# Patient Record
Sex: FEMALE | Race: WHITE | NOT HISPANIC OR LATINO | Employment: OTHER | URBAN - NONMETROPOLITAN AREA
[De-identification: names, ages, dates, MRNs, and addresses within clinical notes are randomized per-mention and may not be internally consistent; named-entity substitution may affect disease eponyms.]

---

## 2018-08-22 ENCOUNTER — OUTSIDE FACILITY SERVICE (OUTPATIENT)
Dept: CARDIOLOGY | Facility: CLINIC | Age: 63
End: 2018-08-22

## 2018-08-22 PROCEDURE — 93018 CV STRESS TEST I&R ONLY: CPT | Performed by: INTERNAL MEDICINE

## 2018-08-29 ENCOUNTER — OFFICE VISIT (OUTPATIENT)
Dept: CARDIOLOGY | Facility: CLINIC | Age: 63
End: 2018-08-29

## 2018-08-29 VITALS
OXYGEN SATURATION: 97 % | BODY MASS INDEX: 47.04 KG/M2 | DIASTOLIC BLOOD PRESSURE: 82 MMHG | SYSTOLIC BLOOD PRESSURE: 142 MMHG | HEIGHT: 60 IN | WEIGHT: 239.6 LBS | HEART RATE: 71 BPM

## 2018-08-29 DIAGNOSIS — R42 DIZZINESS: ICD-10-CM

## 2018-08-29 DIAGNOSIS — I10 ESSENTIAL HYPERTENSION: ICD-10-CM

## 2018-08-29 DIAGNOSIS — R06.02 SHORTNESS OF BREATH: ICD-10-CM

## 2018-08-29 DIAGNOSIS — I48.0 PAROXYSMAL ATRIAL FIBRILLATION (HCC): Primary | ICD-10-CM

## 2018-08-29 PROCEDURE — 99204 OFFICE O/P NEW MOD 45 MIN: CPT | Performed by: PHYSICIAN ASSISTANT

## 2018-08-29 RX ORDER — AMIODARONE HYDROCHLORIDE 200 MG/1
TABLET ORAL DAILY
COMMUNITY
Start: 2018-08-24 | End: 2018-09-20 | Stop reason: SDUPTHER

## 2018-08-29 RX ORDER — WARFARIN SODIUM 4 MG/1
4 TABLET ORAL NIGHTLY
COMMUNITY
Start: 2018-08-24 | End: 2019-10-23 | Stop reason: ALTCHOICE

## 2018-08-29 RX ORDER — COLESEVELAM HYDROCHLORIDE 625 MG/1
2 TABLET, FILM COATED ORAL 2 TIMES DAILY WITH MEALS
COMMUNITY
Start: 2018-06-04

## 2018-08-29 RX ORDER — AMLODIPINE BESYLATE 10 MG/1
5 TABLET ORAL DAILY
COMMUNITY
Start: 2018-08-04 | End: 2019-12-12 | Stop reason: SDUPTHER

## 2018-08-29 RX ORDER — FUROSEMIDE 20 MG/1
TABLET ORAL DAILY PRN
COMMUNITY
Start: 2018-08-24 | End: 2019-12-12 | Stop reason: ALTCHOICE

## 2018-08-29 RX ORDER — DIPHENOXYLATE HYDROCHLORIDE AND ATROPINE SULFATE 2.5; .025 MG/1; MG/1
1 TABLET ORAL 2 TIMES DAILY
COMMUNITY
Start: 2016-03-04 | End: 2020-06-18 | Stop reason: HOSPADM

## 2018-08-29 RX ORDER — WARFARIN SODIUM 1 MG/1
TABLET ORAL
COMMUNITY
Start: 2018-07-31 | End: 2018-10-01 | Stop reason: DRUGHIGH

## 2018-08-29 RX ORDER — OMEPRAZOLE 20 MG/1
20 CAPSULE, DELAYED RELEASE ORAL DAILY
COMMUNITY
Start: 2018-06-04 | End: 2020-06-18 | Stop reason: HOSPADM

## 2018-08-29 RX ORDER — METOPROLOL SUCCINATE 100 MG/1
TABLET, EXTENDED RELEASE ORAL DAILY
COMMUNITY
Start: 2018-08-24 | End: 2018-08-29

## 2018-08-29 RX ORDER — METOPROLOL SUCCINATE 50 MG/1
50 TABLET, EXTENDED RELEASE ORAL DAILY
Qty: 30 TABLET | Refills: 11 | Status: SHIPPED | OUTPATIENT
Start: 2018-08-29 | End: 2018-09-10 | Stop reason: SDUPTHER

## 2018-08-29 NOTE — PATIENT INSTRUCTIONS
Obesity, Adult  Obesity is the condition of having too much total body fat. Being overweight or obese means that your weight is greater than what is considered healthy for your body size. Obesity is determined by a measurement called BMI. BMI is an estimate of body fat and is calculated from height and weight. For adults, a BMI of 30 or higher is considered obese.  Obesity can eventually lead to other health concerns and major illnesses, including:  · Stroke.  · Coronary artery disease (CAD).  · Type 2 diabetes.  · Some types of cancer, including cancers of the colon, breast, uterus, and gallbladder.  · Osteoarthritis.  · High blood pressure (hypertension).  · High cholesterol.  · Sleep apnea.  · Gallbladder stones.  · Infertility problems.    What are the causes?  The main cause of obesity is taking in (consuming) more calories than your body uses for energy. Other factors that contribute to this condition may include:  · Being born with genes that make you more likely to become obese.  · Having a medical condition that causes obesity. These conditions include:  ? Hypothyroidism.  ? Polycystic ovarian syndrome (PCOS).  ? Binge-eating disorder.  ? Cushing syndrome.  · Taking certain medicines, such as steroids, antidepressants, and seizure medicines.  · Not being physically active (sedentary lifestyle).  · Living where there are limited places to exercise safely or buy healthy foods.  · Not getting enough sleep.    What increases the risk?  The following factors may increase your risk of this condition:  · Having a family history of obesity.  · Being a woman of -American descent.  · Being a man of  descent.    What are the signs or symptoms?  Having excessive body fat is the main symptom of this condition.  How is this diagnosed?  This condition may be diagnosed based on:  · Your symptoms.  · Your medical history.  · A physical exam. Your health care provider may measure:  ? Your BMI. If you are an  adult with a BMI between 25 and less than 30, you are considered overweight. If you are an adult with a BMI of 30 or higher, you are considered obese.  ? The distances around your hips and your waist (circumferences). These may be compared to each other to help diagnose your condition.  ? Your skinfold thickness. Your health care provider may gently pinch a fold of your skin and measure it.    How is this treated?  Treatment for this condition often includes changing your lifestyle. Treatment may include some or all of the following:  · Dietary changes. Work with your health care provider and a dietitian to set a weight-loss goal that is healthy and reasonable for you. Dietary changes may include eating:  ? Smaller portions. A portion size is the amount of a particular food that is healthy for you to eat at one time. This varies from person to person.  ? Low-calorie or low-fat options.  ? More whole grains, fruits, and vegetables.  · Regular physical activity. This may include aerobic activity (cardio) and strength training.  · Medicine to help you lose weight. Your health care provider may prescribe medicine if you are unable to lose 1 pound a week after 6 weeks of eating more healthily and doing more physical activity.  · Surgery. Surgical options may include gastric banding and gastric bypass. Surgery may be done if:  ? Other treatments have not helped to improve your condition.  ? You have a BMI of 40 or higher.  ? You have life-threatening health problems related to obesity.    Follow these instructions at home:    Eating and drinking    · Follow recommendations from your health care provider about what you eat and drink. Your health care provider may advise you to:  ? Limit fast foods, sweets, and processed snack foods.  ? Choose low-fat options, such as low-fat milk instead of whole milk.  ? Eat 5 or more servings of fruits or vegetables every day.  ? Eat at home more often. This gives you more control over  what you eat.  ? Choose healthy foods when you eat out.  ? Learn what a healthy portion size is.  ? Keep low-fat snacks on hand.  ? Avoid sugary drinks, such as soda, fruit juice, iced tea sweetened with sugar, and flavored milk.  ? Eat a healthy breakfast.  · Drink enough water to keep your urine clear or pale yellow.  · Do not go without eating for long periods of time (do not fast) or follow a fad diet. Fasting and fad diets can be unhealthy and even dangerous.  Physical Activity  · Exercise regularly, as told by your health care provider. Ask your health care provider what types of exercise are safe for you and how often you should exercise.  · Warm up and stretch before being active.  · Cool down and stretch after being active.  · Rest between periods of activity.  Lifestyle  · Limit the time that you spend in front of your TV, computer, or video game system.  · Find ways to reward yourself that do not involve food.  · Limit alcohol intake to no more than 1 drink a day for nonpregnant women and 2 drinks a day for men. One drink equals 12 oz of beer, 5 oz of wine, or 1½ oz of hard liquor.  General instructions  · Keep a weight loss journal to keep track of the food you eat and how much you exercise you get.  · Take over-the-counter and prescription medicines only as told by your health care provider.  · Take vitamins and supplements only as told by your health care provider.  · Consider joining a support group. Your health care provider may be able to recommend a support group.  · Keep all follow-up visits as told by your health care provider. This is important.  Contact a health care provider if:  · You are unable to meet your weight loss goal after 6 weeks of dietary and lifestyle changes.  This information is not intended to replace advice given to you by your health care provider. Make sure you discuss any questions you have with your health care provider.  Document Released: 01/25/2006 Document Revised:  05/22/2017 Document Reviewed: 10/05/2016  [employer] Interactive Patient Education © 2018 [employer]  MyPlate from [employer]  The general, healthful diet is based on the 2010 Dietary Guidelines for Americans. The amount of food you need to eat from each food group depends on your age, sex, and level of physical activity and can be individualized by a dietitian. Go to ChooseMyPlate.gov for more information.  What do I need to know about the MyPlate plan?  · Enjoy your food, but eat less.  · Avoid oversized portions.  ? ½ of your plate should include fruits and vegetables.  ? ¼ of your plate should be grains.  ? ¼ of your plate should be protein.  Grains  · Make at least half of your grains whole grains.  · For a 2,000 calorie daily food plan, eat 6 oz every day.  · 1 oz is about 1 slice bread, 1 cup cereal, or ½ cup cooked rice, cereal, or pasta.  Vegetables  · Make half your plate fruits and vegetables.  · For a 2,000 calorie daily food plan, eat 2½ cups every day.  · 1 cup is about 1 cup raw or cooked vegetables or vegetable juice or 2 cups raw leafy greens.  Fruits  · Make half your plate fruits and vegetables.  · For a 2,000 calorie daily food plan, eat 2 cups every day.  · 1 cup is about 1 cup fruit or 100% fruit juice or ½ cup dried fruit.  Protein  · For a 2,000 calorie daily food plan, eat 5½ oz every day.  · 1 oz is about 1 oz meat, poultry, or fish, ¼ cup cooked beans, 1 egg, 1 Tbsp peanut butter, or ½ oz nuts or seeds.  Dairy  · Switch to fat-free or low-fat (1%) milk.  · For a 2,000 calorie daily food plan, eat 3 cups every day.  · 1 cup is about 1 cup milk or yogurt or soy milk (soy beverage), 1½ oz natural cheese, or 2 oz processed cheese.  Fats, Oils, and Empty Calories  · Only small amounts of oils are recommended.  · Empty calories are calories from solid fats or added sugars.  · Compare sodium in foods like soup, bread, and frozen meals. Choose the foods with lower numbers.  · Drink water instead of  sugary drinks.  What foods can I eat?  Grains  Whole grains such as whole wheat, quinoa, millet, and bulgur. Bread, rolls, and pasta made from whole grains. Brown or wild rice. Hot or cold cereals made from whole grains and without added sugar.  Vegetables  All fresh vegetables, especially fresh red, dark green, or orange vegetables. Peas and beans. Low-sodium frozen or canned vegetables prepared without added salt. Low-sodium vegetable juices.  Fruits  All fresh, frozen, and dried fruits. Canned fruit packed in water or fruit juice without added sugar. Fruit juices without added sugar.  Meats and Other Protein Sources  Boiled, baked, or grilled lean meat trimmed of fat. Skinless poultry. Fresh seafood and shellfish. Canned seafood packed in water. Unsalted nuts and unsalted nut butters. Tofu. Dried beans and pea. Eggs.  Dairy  Low-fat or fat-free milk, yogurt, and cheeses.  Sweets and Desserts  Frozen desserts made from low-fat milk.  Fats and Oils  Olive, peanut, and canola oils and margarine. Salad dressing and mayonnaise made from these oils.  Other  Soups and casseroles made from allowed ingredients and without added fat or salt.  The items listed above may not be a complete list of recommended foods or beverages. Contact your dietitian for more options.  What foods are not recommended?  Grains  Sweetened, low-fiber cereals. Packaged baked goods. Snack crackers and chips. Cheese crackers, butter crackers, and biscuits. Frozen waffles, sweet breads, doughnuts, pastries, packaged baking mixes, pancakes, cakes, and cookies.  Vegetables  Regular canned or frozen vegetables or vegetables prepared with salt. Canned tomatoes. Canned tomato sauce. Fried vegetables. Vegetables in cream sauce or cheese sauce.  Fruits  Fruits packed in syrup or made with added sugar.  Meats and Other Protein Sources  Marbled or fatty meats such as ribs. Poultry with skin. Fried meats, poultry, eggs, or fish. Sausages, hot dogs, and deli  meats such as pastrami, bologna, or salami.  Dairy  Whole milk, cream, cheeses made from whole milk, sour cream. Ice cream or yogurt made from whole milk or with added sugar.  Beverages  For adults, no more than one alcoholic drink per day. Regular soft drinks or other sugary beverages. Juice drinks.  Sweets and Desserts  Sugary or fatty desserts, candy, and other sweets.  Fats and Oils  Solid shortening or partially hydrogenated oils. Solid margarine. Margarine that contains trans fats. Butter.  The items listed above may not be a complete list of foods and beverages to avoid. Contact your dietitian for more information.  This information is not intended to replace advice given to you by your health care provider. Make sure you discuss any questions you have with your health care provider.  Document Released: 01/06/2009 Document Revised: 05/25/2017 Document Reviewed: 11/26/2014  [employer] Interactive Patient Education © 2018 [employer]    Atrial Fibrillation  Atrial fibrillation is a type of heartbeat that is irregular or fast (rapid). If you have this condition, your heart keeps quivering in a weird (chaotic) way. This condition can make it so your heart cannot pump blood normally. Having this condition gives a person more risk for stroke, heart failure, and other heart problems. There are different types of atrial fibrillation. Talk with your doctor to learn about the type that you have.  Follow these instructions at home:  · Take over-the-counter and prescription medicines only as told by your doctor.  · If your doctor prescribed a blood-thinning medicine, take it exactly as told. Taking too much of it can cause bleeding. If you do not take enough of it, you will not have the protection that you need against stroke and other problems.  · Do not use any tobacco products. These include cigarettes, chewing tobacco, and e-cigarettes. If you need help quitting, ask your doctor.  · If you have apnea (obstructive  sleep apnea), manage it as told by your doctor.  · Do not drink alcohol.  · Do not drink beverages that have caffeine. These include coffee, soda, and tea.  · Maintain a healthy weight. Do not use diet pills unless your doctor says they are safe for you. Diet pills may make heart problems worse.  · Follow diet instructions as told by your doctor.  · Exercise regularly as told by your doctor.  · Keep all follow-up visits as told by your doctor. This is important.  Contact a doctor if:  · You notice a change in the speed, rhythm, or strength of your heartbeat.  · You are taking a blood-thinning medicine and you notice more bruising.  · You get tired more easily when you move or exercise.  Get help right away if:  · You have pain in your chest or your belly (abdomen).  · You have sweating or weakness.  · You feel sick to your stomach (nauseous).  · You notice blood in your throw up (vomit), poop (stool), or pee (urine).  · You are short of breath.  · You suddenly have swollen feet and ankles.  · You feel dizzy.  · Your suddenly get weak or numb in your face, arms, or legs, especially if it happens on one side of your body.  · You have trouble talking, trouble understanding, or both.  · Your face or your eyelid droops on one side.  These symptoms may be an emergency. Do not wait to see if the symptoms will go away. Get medical help right away. Call your local emergency services (911 in the [place] Do not drive yourself to the hospital.  This information is not intended to replace advice given to you by your health care provider. Make sure you discuss any questions you have with your health care provider.  Document Released: 09/26/2009 Document Revised: 05/25/2017 Document Reviewed: 04/13/2016  [employer] Interactive Patient Education © 2018 [employer] Inc.

## 2018-08-29 NOTE — PROGRESS NOTES
Subjective   Sidra Lane is a 63 y.o. female     Chief Complaint   Patient presents with   • Establish Care     Here for hosp. f/u   • Atrial Fibrillation   • Hypertension   • Congestive Heart Failure       HPI    Problem list  1.  History of chest pain  1.1 stress test August 2018 at [facility] demonstrated no evidence of ischemia and preserved LV function  2.  Preserved systolic function  3.  Paroxysmal atrial fibrillation  3.1 patient is on amiodarone and Coumadin for anticoagulation  4.  Hypertension   5.  History of DVT on chronic anticoagulation      patient is a 63-year-old female that presents to the office for evaluation.  She was recently hospitalized in the setting of atrial fibrillation rapid ventricular response.  Patient went to the hospital with complaints of shortness of breath, diaphoresis and had palpitations.  She was diagnosed with atrial fibrillation rapid ventricular response.  She was seen by cardiology in the hospital.  She was placed on amiodarone.  She was placed on rate control therapy as well.  She underwent stress testing and echocardiogram which was largely unremarkable.  She was discharged to follow-up as an outpatient    Patient feels much improved since discharge.  She has no chest pain or pressure.  Shortness of breath is mild without any progression and in fact has improved as well.  She has no PND orthopnea.  Palpitations have improved.  She has dizziness on occasion.  She feels the medication might be causing the dizziness.  She has no presyncope or syncope and otherwise is doing well          Current Outpatient Prescriptions   Medication Sig Dispense Refill   • amiodarone (PACERONE) 200 MG tablet Daily.     • amLODIPine (NORVASC) 10 MG tablet Daily.     • diphenoxylate-atropine (LOMOTIL) 2.5-0.025 MG per tablet Take  by mouth. Bid-tid prn for diarrhea     • fluticasone (VERAMYST) 27.5 MCG/SPRAY nasal spray 2 sprays into the nostril(s) as directed by  provider Daily.     • furosemide (LASIX) 20 MG tablet Daily As Needed.     • omeprazole (priLOSEC) 20 MG capsule Daily.     • warfarin (COUMADIN) 1 MG tablet takes 5 mg on Wed. And 4 mg all other days     • warfarin (COUMADIN) 4 MG tablet Takes 5 mg on Wed's and 4 mg all other days     • WELCHOL 625 MG tablet 2 tablets 2 (Two) Times a Day.     • metoprolol succinate XL (TOPROL-XL) 50 MG 24 hr tablet Take 1 tablet by mouth Daily. 30 tablet 11     No current facility-administered medications for this visit.        Other and Iodine    Past Medical History:   Diagnosis Date   • Atrial fibrillation (CMS/HCC)    • CHF (congestive heart failure) (CMS/HCC)    • Deep vein thrombosis (CMS/HCC)    • GERD (gastroesophageal reflux disease)    • Hypertension        Social History     Social History   • Marital status: Single     Spouse name: N/A   • Number of children: N/A   • Years of education: N/A     Occupational History   • Not on file.     Social History Main Topics   • Smoking status: Never Smoker   • Smokeless tobacco: Never Used   • Alcohol use No   • Drug use: No   • Sexual activity: Not on file     Other Topics Concern   • Not on file     Social History Narrative   • No narrative on file           Family History   Problem Relation Age of Onset   • Hypertension Mother    • Cervical cancer Mother        Review of Systems   Constitutional: Positive for fatigue (off and on).   HENT: Negative.    Eyes: Positive for visual disturbance (glasses prn).   Respiratory: Positive for shortness of breath.    Cardiovascular: Positive for palpitations and leg swelling (occas. mildly left). Negative for chest pain.   Gastrointestinal: Negative.    Endocrine: Negative.    Genitourinary: Negative.    Musculoskeletal: Positive for arthralgias and myalgias.   Skin: Negative.    Allergic/Immunologic: Positive for environmental allergies.   Neurological: Positive for dizziness and light-headedness.   Hematological: Bruises/bleeds easily  "(both , On Coumadin).   Psychiatric/Behavioral: Positive for sleep disturbance.   All other systems reviewed and are negative.      Objective   Vitals:    08/29/18 1117   BP: 142/82   BP Location: Left arm   Patient Position: Sitting   Pulse: 71   SpO2: 97%   Weight: 109 kg (239 lb 9.6 oz)   Height: 152.4 cm (60\")      /82 (BP Location: Left arm, Patient Position: Sitting)   Pulse 71   Ht 152.4 cm (60\")   Wt 109 kg (239 lb 9.6 oz)   SpO2 97%   BMI 46.79 kg/m²     Lab Results (most recent)     None          Physical Exam   Constitutional: She is oriented to person, place, and time. She appears well-developed and well-nourished. No distress.   HENT:   Head: Normocephalic and atraumatic.   Eyes: Pupils are equal, round, and reactive to light. EOM are normal.   Neck: No JVD present.   Cardiovascular: Normal rate, regular rhythm, normal heart sounds and intact distal pulses.  Exam reveals no gallop and no friction rub.    No murmur heard.  Pulmonary/Chest: Effort normal and breath sounds normal. No respiratory distress. She has no wheezes. She has no rales. She exhibits no tenderness.   Musculoskeletal: Normal range of motion. She exhibits no edema.   Neurological: She is alert and oriented to person, place, and time. No cranial nerve deficit.   Skin: Skin is warm and dry. No rash noted. No erythema. No pallor.   Psychiatric: She has a normal mood and affect. Her behavior is normal.   Nursing note and vitals reviewed.      Procedure   Procedures         Assessment/Plan     Problems Addressed this Visit        Cardiovascular and Mediastinum    Paroxysmal atrial fibrillation (CMS/HCC) - Primary    Relevant Medications    amLODIPine (NORVASC) 10 MG tablet    metoprolol succinate XL (TOPROL-XL) 50 MG 24 hr tablet    Essential hypertension    Relevant Medications    amLODIPine (NORVASC) 10 MG tablet    furosemide (LASIX) 20 MG tablet    metoprolol succinate XL (TOPROL-XL) 50 MG 24 hr tablet       Respiratory    " Shortness of breath       Other    Dizziness            Recommendation  1.  On physical examination, patient appears to BE in sinus rhythm.  She is feeling much improved and I will continue rhythm control medications.  However, I would like to see her for follow-up in one to 2 months.  We will likely consider switching that to another medication without the potential long-term side effects.  2.  Patient on chronic anticoagulation because of history of DVT.  She would like to continue this rather than a novel agent as discussed.  She request anticoagulation with a reversal.  3.  Patient with hypertension with complaints of dizziness.  She was on 25 mg of metoprolol prior to hospital admission.  She was increased 100 mg.  I would like to decrease that to 50 mg and watch her symptoms.  She will call back in a week.  4.  We will see her back for follow-up as scheduled.  For any change in symptom, she will contact us.  Follow-up primary as scheduled                 Patient's Body mass index is 46.79 kg/m². BMI is above normal parameters. Recommendations include: educational material and referral to primary care.         Electronically signed by:

## 2018-09-10 ENCOUNTER — TELEPHONE (OUTPATIENT)
Dept: CARDIOLOGY | Facility: CLINIC | Age: 63
End: 2018-09-10

## 2018-09-10 DIAGNOSIS — R00.2 PALPITATION: Primary | ICD-10-CM

## 2018-09-10 RX ORDER — METOPROLOL SUCCINATE 100 MG/1
100 TABLET, EXTENDED RELEASE ORAL DAILY
Qty: 30 TABLET | Refills: 5 | Status: SHIPPED | OUTPATIENT
Start: 2018-09-10 | End: 2019-01-16

## 2018-09-10 NOTE — TELEPHONE ENCOUNTER
PATIENT L/M FREDDIE DECREASED HER METOPROLOL FROM 100 MG DAILY TO 50 MG PO DAILY, BUT STATES SINCE IT WAS DECREASED HER H/R HAS BEEN GOING BACK UP, SO SHE WENT BACK TO TAKING  MG PO DAILY. FREDDIE WASSERMAN, PAC OK WITH THIS. TRIED CALLING PATIENT BACK TO LET HER KNOW THIS, BUT NO ANSWER AND WAS UNABLE TO LEAVE A MESSAGE. NEW SCRIPT SENT TO BE'S PHARM. PH,LPN

## 2018-09-20 DIAGNOSIS — I48.0 PAROXYSMAL ATRIAL FIBRILLATION (HCC): Primary | ICD-10-CM

## 2018-09-20 RX ORDER — AMIODARONE HYDROCHLORIDE 200 MG/1
200 TABLET ORAL DAILY
Qty: 30 TABLET | Refills: 5 | Status: SHIPPED | OUTPATIENT
Start: 2018-09-20 | End: 2018-10-01 | Stop reason: ALTCHOICE

## 2018-10-01 ENCOUNTER — OFFICE VISIT (OUTPATIENT)
Dept: CARDIOLOGY | Facility: CLINIC | Age: 63
End: 2018-10-01

## 2018-10-01 VITALS
HEIGHT: 60 IN | WEIGHT: 240.4 LBS | OXYGEN SATURATION: 95 % | DIASTOLIC BLOOD PRESSURE: 88 MMHG | SYSTOLIC BLOOD PRESSURE: 130 MMHG | BODY MASS INDEX: 47.2 KG/M2 | HEART RATE: 92 BPM

## 2018-10-01 DIAGNOSIS — R42 DIZZINESS: ICD-10-CM

## 2018-10-01 DIAGNOSIS — R06.02 SHORTNESS OF BREATH: ICD-10-CM

## 2018-10-01 DIAGNOSIS — I48.0 PAROXYSMAL ATRIAL FIBRILLATION (HCC): Primary | ICD-10-CM

## 2018-10-01 DIAGNOSIS — I10 ESSENTIAL HYPERTENSION: ICD-10-CM

## 2018-10-01 PROBLEM — I82.4Y9 DEEP VEIN THROMBOSIS (DVT) OF PROXIMAL LOWER EXTREMITY (HCC): Status: ACTIVE | Noted: 2018-10-01

## 2018-10-01 PROCEDURE — 93000 ELECTROCARDIOGRAM COMPLETE: CPT | Performed by: INTERNAL MEDICINE

## 2018-10-01 PROCEDURE — 99214 OFFICE O/P EST MOD 30 MIN: CPT | Performed by: INTERNAL MEDICINE

## 2018-10-01 RX ORDER — FLECAINIDE ACETATE 50 MG/1
50 TABLET ORAL 2 TIMES DAILY
Qty: 60 TABLET | Refills: 5 | Status: SHIPPED | OUTPATIENT
Start: 2018-10-01 | End: 2018-11-28 | Stop reason: SDUPTHER

## 2018-10-01 NOTE — PROGRESS NOTES
Subjective   Sidra Lane is a 63 y.o. female     Chief Complaint   Patient presents with   • Atrial Fibrillation     Here for hosp. f/u   • Hypertension   • Deep Vein Thrombosis   • Congestive Heart Failure       PROBLEM LIST:     1.  History of chest pain  1.1 stress test August 2018 at [facility] demonstrated no evidence of ischemia and preserved LV function  2.  Preserved systolic function  3.  Paroxysmal atrial fibrillation  3.1 patient is on amiodarone and Coumadin for anticoagulation  4.  Hypertension   5.  History of DVT on chronic anticoagulation              Specialty Problems        Cardiology Problems    Essential hypertension        Paroxysmal atrial fibrillation (CMS/HCC)                HPI:  Ms. Laen returns for follow-up on paroxysmal atrial fibrillation.    When last seen in our office Ms. Lane was feeling well was felt to be in a sinus rhythm by physical exam.  No EKG was performed.  She did well from that time until last Thursday when she noted recurrent dizziness weakness and fatigue.  She was found to be in atrial fibrillation with a predominantly controlled ventricular response.    Ms. Lane denies orthopnea, PND, or significant change in lower extremity edema.  However, she noted that her INR was high for the first time overnight extended period, and that she has had more distention of her abdominal wall hernia since being in A. fib.  He had minimal palpitations she has had no symptoms of arterial embolic events.  She continues to be without bleeding on Coumadin.  Last INR was 2.7.              CURRENT MEDICATION:    Current Outpatient Prescriptions   Medication Sig Dispense Refill   • amiodarone (PACERONE) 200 MG tablet Take 1 tablet by mouth Daily. 30 tablet 5   • amLODIPine (NORVASC) 10 MG tablet Daily.     • diphenoxylate-atropine (LOMOTIL) 2.5-0.025 MG per tablet Take  by mouth. Bid-tid prn for diarrhea     • fluticasone (VERAMYST) 27.5 MCG/SPRAY nasal spray 2  "sprays into the nostril(s) as directed by provider Daily.     • metoprolol succinate XL (TOPROL-XL) 100 MG 24 hr tablet Take 1 tablet by mouth Daily. 30 tablet 5   • omeprazole (priLOSEC) 20 MG capsule Daily.     • warfarin (COUMADIN) 4 MG tablet Take 4 mg by mouth Every Night.     • WELCHOL 625 MG tablet 2 tablets 2 (Two) Times a Day.     • furosemide (LASIX) 20 MG tablet Daily As Needed.       No current facility-administered medications for this visit.        ALLERGIES:    Other and Iodine    PAST MEDICAL HISTORY:    Past Medical History:   Diagnosis Date   • Atrial fibrillation (CMS/HCC)    • CHF (congestive heart failure) (CMS/HCC)    • Deep vein thrombosis (CMS/HCC)    • GERD (gastroesophageal reflux disease)    • Hypertension        SURGICAL HISTORY:    Past Surgical History:   Procedure Laterality Date   • CHOLECYSTECTOMY     • COLON SURGERY      bowel leakage, some of colon removed   • LAPAROSCOPIC TUBAL LIGATION     • LEG SURGERY      multiple stents to BLE   • TONSILLECTOMY         SOCIAL HISTORY:    Social History     Social History   • Marital status: Single     Spouse name: N/A   • Number of children: N/A   • Years of education: N/A     Occupational History   • Not on file.     Social History Main Topics   • Smoking status: Never Smoker   • Smokeless tobacco: Never Used   • Alcohol use No   • Drug use: No   • Sexual activity: Not on file     Other Topics Concern   • Not on file     Social History Narrative   • No narrative on file       FAMILY HISTORY:    Family History   Problem Relation Age of Onset   • Hypertension Mother    • Cervical cancer Mother        Review of Systems   Constitutional: Positive for fatigue.   HENT: Negative.    Eyes: Positive for visual disturbance (reading glasses).   Respiratory: Positive for shortness of breath.    Cardiovascular: Positive for chest pain (\"tightness\" with a-fib episodes and left arm pain), palpitations (Hx a-fib) and leg swelling (occas. left). " "  Gastrointestinal: Positive for diarrhea. Negative for blood in stool (no melena,hematuria,heematochezia,hemoptysis).   Endocrine: Negative.    Genitourinary: Negative.    Musculoskeletal: Positive for arthralgias and myalgias.   Skin: Negative.    Allergic/Immunologic: Positive for environmental allergies.   Neurological: Positive for dizziness (with a-fib episodes).   Hematological: Bruises/bleeds easily (bruise).   Psychiatric/Behavioral: Positive for sleep disturbance.       VISIT VITALS:  Vitals:    10/01/18 1031   BP: 130/88   BP Location: Left arm   Patient Position: Sitting   Pulse: 92   SpO2: 95%   Weight: 109 kg (240 lb 6.4 oz)   Height: 152.4 cm (60\")      /88 (BP Location: Left arm, Patient Position: Sitting)   Pulse 92   Ht 152.4 cm (60\")   Wt 109 kg (240 lb 6.4 oz)   SpO2 95%   BMI 46.95 kg/m²     RECENT LABS:    Objective       Physical Exam   Constitutional: She is oriented to person, place, and time. She appears well-developed and well-nourished. No distress.   HENT:   Head: Normocephalic and atraumatic.   Eyes: Pupils are equal, round, and reactive to light. Conjunctivae and EOM are normal.   Neck: Normal range of motion. Neck supple. No hepatojugular reflux and no JVD present. Carotid bruit is not present. No tracheal deviation present.   NL. Carotid upstrokes   Cardiovascular: Normal rate, normal heart sounds and intact distal pulses.  An irregular rhythm present.   Apical rate approx. 100     Pulmonary/Chest: Effort normal and breath sounds normal. She has no wheezes. She has no rhonchi. She has no rales.   NL. Exp. phase   Abdominal: Soft. Bowel sounds are normal. She exhibits no distension, no abdominal bruit and no mass. There is no tenderness. There is no rebound and no guarding.   No organomegaly   Musculoskeletal: Normal range of motion. She exhibits no edema, tenderness or deformity.   LLE, barley trace edema, palpable pedal pulses  RLE, trace edema   Neurological: She is " alert and oriented to person, place, and time.   Skin: Skin is warm and dry. No rash noted. No erythema. No pallor.   Psychiatric: She has a normal mood and affect. Her behavior is normal. Judgment and thought content normal.   Nursing note and vitals reviewed.        ECG 12 Lead  Date/Time: 10/1/2018 10:46 AM  Performed by: HI SINGH  Authorized by: HI SINGH   Rhythm: atrial fibrillation  Rate: normal  QRS axis: normal                Assessment/Plan   #1.  Paroxysmal atrial fibrillation.  The patient had normal LV systolic function by echo and no evidence of ischemia on initial evaluation for A. fib when recently hospitalized.  As symptoms are temporally related to recurrence of atrial fibrillation, I don't think that further evaluation is indicated.    #2.  Therefore, we will have the patient stop amiodarone and we will start flecainide 50 mg twice a day tomorrow with serial EKGs.  QTC today, despite amiodarone, was 389.    #3.  We'll make arrangements for elective cardioversion in one to 2 weeks and we'll recheck INR immediately before that procedure.    #4.  She was given precautions about increased ventricular response rates off amiodarone and the potential for proarrhythmia.    #5.  Ms. Lane will follow-up with Dr. Wilson as instructed, and in our office on a when necessary basis.   Diagnosis Plan   1. Paroxysmal atrial fibrillation (CMS/HCC)  ECG 12 Lead   2. Essential hypertension  ECG 12 Lead   3. Shortness of breath     4. Dizziness     5.      DVT    No Follow-up on file.            Patient's Body mass index is 46.95 kg/m². BMI is above normal parameters. Recommendations include: educational material and referral to primary care.       Sidra Hines LPN    Scribed for Dr. Hi Singh by Sidra Hines LPN October 1, 2018 10:47 AM         Electronically signed by:            This note is dictated utilizing voice recognition software.  Although this record has been proof read,  transcriptional errors may still be present. If questions occur regarding the content of this record please do not hesitate to call our office.

## 2018-10-01 NOTE — PATIENT INSTRUCTIONS
Obesity, Adult  Obesity is the condition of having too much total body fat. Being overweight or obese means that your weight is greater than what is considered healthy for your body size. Obesity is determined by a measurement called BMI. BMI is an estimate of body fat and is calculated from height and weight. For adults, a BMI of 30 or higher is considered obese.  Obesity can eventually lead to other health concerns and major illnesses, including:  · Stroke.  · Coronary artery disease (CAD).  · Type 2 diabetes.  · Some types of cancer, including cancers of the colon, breast, uterus, and gallbladder.  · Osteoarthritis.  · High blood pressure (hypertension).  · High cholesterol.  · Sleep apnea.  · Gallbladder stones.  · Infertility problems.    What are the causes?  The main cause of obesity is taking in (consuming) more calories than your body uses for energy. Other factors that contribute to this condition may include:  · Being born with genes that make you more likely to become obese.  · Having a medical condition that causes obesity. These conditions include:  ? Hypothyroidism.  ? Polycystic ovarian syndrome (PCOS).  ? Binge-eating disorder.  ? Cushing syndrome.  · Taking certain medicines, such as steroids, antidepressants, and seizure medicines.  · Not being physically active (sedentary lifestyle).  · Living where there are limited places to exercise safely or buy healthy foods.  · Not getting enough sleep.    What increases the risk?  The following factors may increase your risk of this condition:  · Having a family history of obesity.  · Being a woman of -American descent.  · Being a man of  descent.    What are the signs or symptoms?  Having excessive body fat is the main symptom of this condition.  How is this diagnosed?  This condition may be diagnosed based on:  · Your symptoms.  · Your medical history.  · A physical exam. Your health care provider may measure:  ? Your BMI. If you are an  adult with a BMI between 25 and less than 30, you are considered overweight. If you are an adult with a BMI of 30 or higher, you are considered obese.  ? The distances around your hips and your waist (circumferences). These may be compared to each other to help diagnose your condition.  ? Your skinfold thickness. Your health care provider may gently pinch a fold of your skin and measure it.    How is this treated?  Treatment for this condition often includes changing your lifestyle. Treatment may include some or all of the following:  · Dietary changes. Work with your health care provider and a dietitian to set a weight-loss goal that is healthy and reasonable for you. Dietary changes may include eating:  ? Smaller portions. A portion size is the amount of a particular food that is healthy for you to eat at one time. This varies from person to person.  ? Low-calorie or low-fat options.  ? More whole grains, fruits, and vegetables.  · Regular physical activity. This may include aerobic activity (cardio) and strength training.  · Medicine to help you lose weight. Your health care provider may prescribe medicine if you are unable to lose 1 pound a week after 6 weeks of eating more healthily and doing more physical activity.  · Surgery. Surgical options may include gastric banding and gastric bypass. Surgery may be done if:  ? Other treatments have not helped to improve your condition.  ? You have a BMI of 40 or higher.  ? You have life-threatening health problems related to obesity.    Follow these instructions at home:    Eating and drinking    · Follow recommendations from your health care provider about what you eat and drink. Your health care provider may advise you to:  ? Limit fast foods, sweets, and processed snack foods.  ? Choose low-fat options, such as low-fat milk instead of whole milk.  ? Eat 5 or more servings of fruits or vegetables every day.  ? Eat at home more often. This gives you more control over  what you eat.  ? Choose healthy foods when you eat out.  ? Learn what a healthy portion size is.  ? Keep low-fat snacks on hand.  ? Avoid sugary drinks, such as soda, fruit juice, iced tea sweetened with sugar, and flavored milk.  ? Eat a healthy breakfast.  · Drink enough water to keep your urine clear or pale yellow.  · Do not go without eating for long periods of time (do not fast) or follow a fad diet. Fasting and fad diets can be unhealthy and even dangerous.  Physical Activity  · Exercise regularly, as told by your health care provider. Ask your health care provider what types of exercise are safe for you and how often you should exercise.  · Warm up and stretch before being active.  · Cool down and stretch after being active.  · Rest between periods of activity.  Lifestyle  · Limit the time that you spend in front of your TV, computer, or video game system.  · Find ways to reward yourself that do not involve food.  · Limit alcohol intake to no more than 1 drink a day for nonpregnant women and 2 drinks a day for men. One drink equals 12 oz of beer, 5 oz of wine, or 1½ oz of hard liquor.  General instructions  · Keep a weight loss journal to keep track of the food you eat and how much you exercise you get.  · Take over-the-counter and prescription medicines only as told by your health care provider.  · Take vitamins and supplements only as told by your health care provider.  · Consider joining a support group. Your health care provider may be able to recommend a support group.  · Keep all follow-up visits as told by your health care provider. This is important.  Contact a health care provider if:  · You are unable to meet your weight loss goal after 6 weeks of dietary and lifestyle changes.  This information is not intended to replace advice given to you by your health care provider. Make sure you discuss any questions you have with your health care provider.  Document Released: 01/25/2006 Document Revised:  05/22/2017 Document Reviewed: 10/05/2016  [employer] Interactive Patient Education © 2018 [employer]  MyPlate from [employer]  The general, healthful diet is based on the 2010 Dietary Guidelines for Americans. The amount of food you need to eat from each food group depends on your age, sex, and level of physical activity and can be individualized by a dietitian. Go to ChooseMyPlate.gov for more information.  What do I need to know about the MyPlate plan?  · Enjoy your food, but eat less.  · Avoid oversized portions.  ? ½ of your plate should include fruits and vegetables.  ? ¼ of your plate should be grains.  ? ¼ of your plate should be protein.  Grains  · Make at least half of your grains whole grains.  · For a 2,000 calorie daily food plan, eat 6 oz every day.  · 1 oz is about 1 slice bread, 1 cup cereal, or ½ cup cooked rice, cereal, or pasta.  Vegetables  · Make half your plate fruits and vegetables.  · For a 2,000 calorie daily food plan, eat 2½ cups every day.  · 1 cup is about 1 cup raw or cooked vegetables or vegetable juice or 2 cups raw leafy greens.  Fruits  · Make half your plate fruits and vegetables.  · For a 2,000 calorie daily food plan, eat 2 cups every day.  · 1 cup is about 1 cup fruit or 100% fruit juice or ½ cup dried fruit.  Protein  · For a 2,000 calorie daily food plan, eat 5½ oz every day.  · 1 oz is about 1 oz meat, poultry, or fish, ¼ cup cooked beans, 1 egg, 1 Tbsp peanut butter, or ½ oz nuts or seeds.  Dairy  · Switch to fat-free or low-fat (1%) milk.  · For a 2,000 calorie daily food plan, eat 3 cups every day.  · 1 cup is about 1 cup milk or yogurt or soy milk (soy beverage), 1½ oz natural cheese, or 2 oz processed cheese.  Fats, Oils, and Empty Calories  · Only small amounts of oils are recommended.  · Empty calories are calories from solid fats or added sugars.  · Compare sodium in foods like soup, bread, and frozen meals. Choose the foods with lower numbers.  · Drink water instead of  sugary drinks.  What foods can I eat?  Grains  Whole grains such as whole wheat, quinoa, millet, and bulgur. Bread, rolls, and pasta made from whole grains. Brown or wild rice. Hot or cold cereals made from whole grains and without added sugar.  Vegetables  All fresh vegetables, especially fresh red, dark green, or orange vegetables. Peas and beans. Low-sodium frozen or canned vegetables prepared without added salt. Low-sodium vegetable juices.  Fruits  All fresh, frozen, and dried fruits. Canned fruit packed in water or fruit juice without added sugar. Fruit juices without added sugar.  Meats and Other Protein Sources  Boiled, baked, or grilled lean meat trimmed of fat. Skinless poultry. Fresh seafood and shellfish. Canned seafood packed in water. Unsalted nuts and unsalted nut butters. Tofu. Dried beans and pea. Eggs.  Dairy  Low-fat or fat-free milk, yogurt, and cheeses.  Sweets and Desserts  Frozen desserts made from low-fat milk.  Fats and Oils  Olive, peanut, and canola oils and margarine. Salad dressing and mayonnaise made from these oils.  Other  Soups and casseroles made from allowed ingredients and without added fat or salt.  The items listed above may not be a complete list of recommended foods or beverages. Contact your dietitian for more options.  What foods are not recommended?  Grains  Sweetened, low-fiber cereals. Packaged baked goods. Snack crackers and chips. Cheese crackers, butter crackers, and biscuits. Frozen waffles, sweet breads, doughnuts, pastries, packaged baking mixes, pancakes, cakes, and cookies.  Vegetables  Regular canned or frozen vegetables or vegetables prepared with salt. Canned tomatoes. Canned tomato sauce. Fried vegetables. Vegetables in cream sauce or cheese sauce.  Fruits  Fruits packed in syrup or made with added sugar.  Meats and Other Protein Sources  Marbled or fatty meats such as ribs. Poultry with skin. Fried meats, poultry, eggs, or fish. Sausages, hot dogs, and deli  meats such as pastrami, bologna, or salami.  Dairy  Whole milk, cream, cheeses made from whole milk, sour cream. Ice cream or yogurt made from whole milk or with added sugar.  Beverages  For adults, no more than one alcoholic drink per day. Regular soft drinks or other sugary beverages. Juice drinks.  Sweets and Desserts  Sugary or fatty desserts, candy, and other sweets.  Fats and Oils  Solid shortening or partially hydrogenated oils. Solid margarine. Margarine that contains trans fats. Butter.  The items listed above may not be a complete list of foods and beverages to avoid. Contact your dietitian for more information.  This information is not intended to replace advice given to you by your health care provider. Make sure you discuss any questions you have with your health care provider.  Document Released: 01/06/2009 Document Revised: 05/25/2017 Document Reviewed: 11/26/2014  [employer] Interactive Patient Education © 2018 [employer] Inc.

## 2018-10-03 ENCOUNTER — CLINICAL SUPPORT (OUTPATIENT)
Dept: CARDIOLOGY | Facility: CLINIC | Age: 63
End: 2018-10-03

## 2018-10-03 VITALS
SYSTOLIC BLOOD PRESSURE: 130 MMHG | DIASTOLIC BLOOD PRESSURE: 60 MMHG | OXYGEN SATURATION: 96 % | BODY MASS INDEX: 47.91 KG/M2 | HEIGHT: 60 IN | WEIGHT: 244 LBS | HEART RATE: 60 BPM

## 2018-10-03 DIAGNOSIS — I48.0 PAROXYSMAL ATRIAL FIBRILLATION (HCC): Primary | ICD-10-CM

## 2018-10-03 PROCEDURE — 93000 ELECTROCARDIOGRAM COMPLETE: CPT | Performed by: PHYSICIAN ASSISTANT

## 2018-10-03 NOTE — PROGRESS NOTES
Sidratonya Lane  1955  10/3/2018   ?   Chief Complaint   Patient presents with   • Atrial Fibrillation     presents as a nurse visit       ?   HPI: patient was recently started on flecainide therapy by Dr. Singh. Patient states that she is feeling great today. EKG verbal order given by Dr. Singh. EKG was obtain and taken to Freddie Silva PA-C to review.   ?   ?   ?     Current Outpatient Prescriptions:   •  amLODIPine (NORVASC) 10 MG tablet, Daily., Disp: , Rfl:   •  diphenoxylate-atropine (LOMOTIL) 2.5-0.025 MG per tablet, Take  by mouth. Bid-tid prn for diarrhea, Disp: , Rfl:   •  flecainide (TAMBOCOR) 50 MG tablet, Take 1 tablet by mouth 2 (Two) Times a Day., Disp: 60 tablet, Rfl: 5  •  fluticasone (VERAMYST) 27.5 MCG/SPRAY nasal spray, 2 sprays into the nostril(s) as directed by provider Daily., Disp: , Rfl:   •  furosemide (LASIX) 20 MG tablet, Daily As Needed., Disp: , Rfl:   •  metoprolol succinate XL (TOPROL-XL) 100 MG 24 hr tablet, Take 1 tablet by mouth Daily., Disp: 30 tablet, Rfl: 5  •  omeprazole (priLOSEC) 20 MG capsule, Daily., Disp: , Rfl:   •  warfarin (COUMADIN) 4 MG tablet, Take 4 mg by mouth Every Night., Disp: , Rfl:   •  WELCHOL 625 MG tablet, 2 tablets 2 (Two) Times a Day., Disp: , Rfl:    ?   ?   Other and Iodine         ECG 12 Lead  Date/Time: 10/3/2018 6:18 PM  Performed by: FREDDIE SILVA  Authorized by: FREDDIE SILVA                ?   Assessment/Plan      1. Cherelle fib    Freddie reviewed ekg. EKG looked fine. Patient is to come back 1 more day for ekg. Patient was instructed to continue medications as directed.       ?   ?   ?

## 2018-10-04 ENCOUNTER — CLINICAL SUPPORT (OUTPATIENT)
Dept: CARDIOLOGY | Facility: CLINIC | Age: 63
End: 2018-10-04

## 2018-10-04 VITALS
HEIGHT: 60 IN | HEART RATE: 87 BPM | WEIGHT: 243.6 LBS | DIASTOLIC BLOOD PRESSURE: 87 MMHG | BODY MASS INDEX: 47.83 KG/M2 | SYSTOLIC BLOOD PRESSURE: 153 MMHG | OXYGEN SATURATION: 99 %

## 2018-10-04 DIAGNOSIS — I48.0 PAROXYSMAL ATRIAL FIBRILLATION (HCC): Primary | ICD-10-CM

## 2018-10-04 PROCEDURE — 93000 ELECTROCARDIOGRAM COMPLETE: CPT | Performed by: PHYSICIAN ASSISTANT

## 2018-10-04 NOTE — PROGRESS NOTES
Sidra Baker  1955  10/4/2018   ?   Chief Complaint   Patient presents with   • Atrial Fibrillation     presents as a nurse visit       ?   HPI: patient presents today for day #2 EKG for flecainide therapy. Patient states she feels great today. Patient denies any chest pain and shortness of breath.   ?   ?   ?     Current Outpatient Prescriptions:   •  amLODIPine (NORVASC) 10 MG tablet, Daily., Disp: , Rfl:   •  diphenoxylate-atropine (LOMOTIL) 2.5-0.025 MG per tablet, Take  by mouth. Bid-tid prn for diarrhea, Disp: , Rfl:   •  flecainide (TAMBOCOR) 50 MG tablet, Take 1 tablet by mouth 2 (Two) Times a Day., Disp: 60 tablet, Rfl: 5  •  fluticasone (VERAMYST) 27.5 MCG/SPRAY nasal spray, 2 sprays into the nostril(s) as directed by provider Daily., Disp: , Rfl:   •  furosemide (LASIX) 20 MG tablet, Daily As Needed., Disp: , Rfl:   •  metoprolol succinate XL (TOPROL-XL) 100 MG 24 hr tablet, Take 1 tablet by mouth Daily., Disp: 30 tablet, Rfl: 5  •  omeprazole (priLOSEC) 20 MG capsule, Daily., Disp: , Rfl:   •  warfarin (COUMADIN) 4 MG tablet, Take 4 mg by mouth Every Night., Disp: , Rfl:   •  WELCHOL 625 MG tablet, 2 tablets 2 (Two) Times a Day., Disp: , Rfl:    ?   ?   Other and Iodine         ECG 12 Lead  Date/Time: 10/4/2018 5:49 PM  Performed by: BLAIR SILVA  Authorized by: BLAIR SILVA                ?   Assessment/Plan    ?   ? 1. Atrial fib    Verbal orders readback and verified by Blair Silva Pa-C to continue medications as directed. Patient's EKG looked good today. Patient is going to local PCP office to have EKG and will have final ekg faxed to us. We will call the patient Monday to talk with her about her EKG.   ?

## 2018-10-10 ENCOUNTER — TELEPHONE (OUTPATIENT)
Dept: CARDIOLOGY | Facility: CLINIC | Age: 63
End: 2018-10-10

## 2018-10-10 NOTE — TELEPHONE ENCOUNTER
PATIENT IS AWARE OF EKG BEING IN NORMAL SINUS RHYTHM. PATIENT IS AWARE CARDIOVERSION IS CANCELED.

## 2018-11-28 DIAGNOSIS — I48.0 PAROXYSMAL ATRIAL FIBRILLATION (HCC): ICD-10-CM

## 2018-11-28 RX ORDER — FLECAINIDE ACETATE 50 MG/1
TABLET ORAL
Qty: 60 TABLET | Refills: 5 | Status: SHIPPED | OUTPATIENT
Start: 2018-11-28 | End: 2018-11-29 | Stop reason: SDUPTHER

## 2018-11-29 DIAGNOSIS — I48.0 PAROXYSMAL ATRIAL FIBRILLATION (HCC): ICD-10-CM

## 2018-11-29 RX ORDER — FLECAINIDE ACETATE 50 MG/1
50 TABLET ORAL 2 TIMES DAILY
Qty: 180 TABLET | Refills: 3 | Status: SHIPPED | OUTPATIENT
Start: 2018-11-29 | End: 2019-01-16

## 2019-01-16 ENCOUNTER — OFFICE VISIT (OUTPATIENT)
Dept: CARDIOLOGY | Facility: CLINIC | Age: 64
End: 2019-01-16

## 2019-01-16 VITALS
BODY MASS INDEX: 49.36 KG/M2 | OXYGEN SATURATION: 98 % | WEIGHT: 251.4 LBS | SYSTOLIC BLOOD PRESSURE: 148 MMHG | HEART RATE: 69 BPM | DIASTOLIC BLOOD PRESSURE: 84 MMHG | HEIGHT: 60 IN

## 2019-01-16 DIAGNOSIS — I48.91 ATRIAL FIBRILLATION, UNSPECIFIED TYPE (HCC): ICD-10-CM

## 2019-01-16 DIAGNOSIS — I25.10 CORONARY ARTERY DISEASE INVOLVING NATIVE CORONARY ARTERY OF NATIVE HEART WITHOUT ANGINA PECTORIS: ICD-10-CM

## 2019-01-16 DIAGNOSIS — R06.02 SHORTNESS OF BREATH: Primary | ICD-10-CM

## 2019-01-16 PROCEDURE — 99214 OFFICE O/P EST MOD 30 MIN: CPT | Performed by: PHYSICIAN ASSISTANT

## 2019-01-16 RX ORDER — METOPROLOL SUCCINATE 50 MG/1
50 TABLET, EXTENDED RELEASE ORAL DAILY
Qty: 30 TABLET | Refills: 11 | Status: SHIPPED | OUTPATIENT
Start: 2019-01-16 | End: 2019-08-27 | Stop reason: SDUPTHER

## 2019-01-16 NOTE — PROGRESS NOTES
Problem list     Subjective   Sidra Lane is a 64 y.o. female     Chief Complaint   Patient presents with   • Follow-up     presents for f/u   • Atrial Fibrillation   • Shortness of Breath       HPI        PROBLEM LIST:      1.  History of chest pain  1.1 stress test August 2018 at [facility] demonstrated no evidence of ischemia and preserved LV function  2.  Preserved systolic function  3.  Paroxysmal atrial fibrillation  3.1 patient  Coumadin for anticoagulation  3.2 patient previously on amiodarone.  Currently on flecainide  4.  Hypertension   5.  History of DVT on chronic anticoagulation         Patient is a 64-year-old female that presents back for follow-up.  Last office visit she had amiodarone discontinued and was placed on flecainide.  Patient describes not being able to tolerate that medication.  It has worked will suppress her atrial fibrillation she describes having flushing and lightheadedness with this medication.  She would like to have that switch to something else.    She has no chest pain or pressure.  No shortness of breath.  No PND orthopnea.  She is feeling well otherwise.  She is feeling she is having adverse effects from the antiarrhythmic medicine    Outpatient Encounter Medications as of 1/16/2019   Medication Sig Dispense Refill   • amLODIPine (NORVASC) 10 MG tablet Daily.     • diphenoxylate-atropine (LOMOTIL) 2.5-0.025 MG per tablet Take  by mouth. Bid-tid prn for diarrhea     • fluticasone (VERAMYST) 27.5 MCG/SPRAY nasal spray 2 sprays into the nostril(s) as directed by provider Daily.     • omeprazole (priLOSEC) 20 MG capsule Daily.     • warfarin (COUMADIN) 4 MG tablet Take 4 mg by mouth Every Night.     • WELCHOL 625 MG tablet 2 tablets 2 (Two) Times a Day.     • [DISCONTINUED] flecainide (TAMBOCOR) 50 MG tablet Take 1 tablet by mouth 2 (Two) Times a Day. 180 tablet 3   • [DISCONTINUED] metoprolol succinate XL (TOPROL-XL) 100 MG 24 hr tablet Take 1 tablet by  mouth Daily. 30 tablet 5   • furosemide (LASIX) 20 MG tablet Daily As Needed.     • metoprolol succinate XL (TOPROL-XL) 50 MG 24 hr tablet Take 1 tablet by mouth Daily. 30 tablet 11   • Sotalol HCl AF 80 MG tablet Take 1 tablet by mouth 2 (Two) Times a Day. 60 tablet 11     No facility-administered encounter medications on file as of 1/16/2019.        Other and Iodine    Past Medical History:   Diagnosis Date   • Atrial fibrillation (CMS/HCC)    • CHF (congestive heart failure) (CMS/[facility])    • Deep vein thrombosis (CMS/HCC)    • GERD (gastroesophageal reflux disease)    • Hypertension        Social History     Socioeconomic History   • Marital status: Single     Spouse name: Not on file   • Number of children: Not on file   • Years of education: Not on file   • Highest education level: Not on file   Social Needs   • Financial resource strain: Not on file   • Food insecurity - worry: Not on file   • Food insecurity - inability: Not on file   • Transportation needs - medical: Not on file   • Transportation needs - non-medical: Not on file   Occupational History   • Not on file   Tobacco Use   • Smoking status: Never Smoker   • Smokeless tobacco: Never Used   Substance and Sexual Activity   • Alcohol use: No   • Drug use: No   • Sexual activity: Not on file   Other Topics Concern   • Not on file   Social History Narrative   • Not on file       Family History   Problem Relation Age of Onset   • Hypertension Mother    • Cervical cancer Mother        Review of Systems   Constitutional: Positive for fatigue.   Respiratory: Positive for cough (dry) and shortness of breath (with activity and times at rest).    Cardiovascular: Negative.  Negative for chest pain, palpitations and leg swelling.   Gastrointestinal: Positive for diarrhea.   Endocrine: Negative.    Genitourinary: Negative.    Musculoskeletal: Positive for arthralgias.   Skin: Negative.    Allergic/Immunologic: Positive for environmental allergies.   Neurological:  "Positive for dizziness (dizziness with feeling of presyncope lasting a few seconds. She states this happens every couple days and started when taking the Flecainide.  ) and weakness (legs).   Hematological: Bruises/bleeds easily.   Psychiatric/Behavioral: Positive for agitation. The patient is nervous/anxious.    All other systems reviewed and are negative.      Objective   Vitals:    01/16/19 1134   BP: 148/84   BP Location: Left arm   Patient Position: Sitting   Pulse: 69   SpO2: 98%   Weight: 114 kg (251 lb 6.4 oz)   Height: 152.4 cm (60\")      /84 (BP Location: Left arm, Patient Position: Sitting)   Pulse 69   Ht 152.4 cm (60\")   Wt 114 kg (251 lb 6.4 oz)   SpO2 98%   BMI 49.10 kg/m²     Lab Results (most recent)     None          Physical Exam   Constitutional: She is oriented to person, place, and time. She appears well-developed and well-nourished. No distress.   HENT:   Head: Normocephalic and atraumatic.   Eyes: EOM are normal. Pupils are equal, round, and reactive to light.   Neck: No JVD present.   Cardiovascular: Normal rate, regular rhythm, normal heart sounds and intact distal pulses. Exam reveals no gallop and no friction rub.   No murmur heard.  Pulmonary/Chest: Effort normal and breath sounds normal. No respiratory distress. She has no wheezes. She has no rales. She exhibits no tenderness.   Musculoskeletal: Normal range of motion. She exhibits no edema.   Neurological: She is alert and oriented to person, place, and time. No cranial nerve deficit.   Skin: Skin is warm and dry. No rash noted. No erythema. No pallor.   Psychiatric: She has a normal mood and affect. Her behavior is normal.   Nursing note and vitals reviewed.      Procedure   Procedures       Assessment/Plan     Problems Addressed this Visit        Cardiovascular and Mediastinum    Atrial fibrillation (CMS/HCC)    Relevant Medications    Sotalol HCl AF 80 MG tablet    metoprolol succinate XL (TOPROL-XL) 50 MG 24 hr tablet    " Coronary artery disease involving native coronary artery of native heart without angina pectoris    Relevant Medications    Sotalol HCl AF 80 MG tablet    metoprolol succinate XL (TOPROL-XL) 50 MG 24 hr tablet       Respiratory    Shortness of breath - Primary            Recommendation  1.  I'm stopping flecainide.  She will be placed on sotalol 80 mg twice a day.  She'll start this on Sunday and return Monday for EKG.  We will monitor QTC.  2.  I am decreasing her metoprolol 50 mg since we are starting her on sotalol and may likely stop that medication pending EKG findings.     3.  I would like to see her back officially in one month to reevaluate clinical course patient follow-up primary as scheduled              Patient's Body mass index is 49.1 kg/m². BMI is above normal parameters. Recommendations include: educational material.       Electronically signed by:

## 2019-01-22 ENCOUNTER — TELEPHONE (OUTPATIENT)
Dept: CARDIOLOGY | Facility: CLINIC | Age: 64
End: 2019-01-22

## 2019-01-28 ENCOUNTER — TELEPHONE (OUTPATIENT)
Dept: CARDIOLOGY | Facility: CLINIC | Age: 64
End: 2019-01-28

## 2019-01-28 NOTE — TELEPHONE ENCOUNTER
Patient was in office to be seen today, while waiting she reported current symptoms of chest pain. Patient was notified to go to local ER. Patient verbalized understanding and left stating she was going to ER.-JAVAD:ROBERT

## 2019-02-05 ENCOUNTER — OFFICE VISIT (OUTPATIENT)
Dept: CARDIOLOGY | Facility: CLINIC | Age: 64
End: 2019-02-05

## 2019-02-05 VITALS
DIASTOLIC BLOOD PRESSURE: 82 MMHG | HEIGHT: 60 IN | OXYGEN SATURATION: 96 % | HEART RATE: 57 BPM | WEIGHT: 242 LBS | SYSTOLIC BLOOD PRESSURE: 129 MMHG | BODY MASS INDEX: 47.51 KG/M2

## 2019-02-05 DIAGNOSIS — R07.2 PRECORDIAL PAIN: Primary | ICD-10-CM

## 2019-02-05 DIAGNOSIS — R00.2 PALPITATIONS: ICD-10-CM

## 2019-02-05 DIAGNOSIS — R06.02 SOB (SHORTNESS OF BREATH): ICD-10-CM

## 2019-02-05 DIAGNOSIS — I48.91 ATRIAL FIBRILLATION, UNSPECIFIED TYPE (HCC): ICD-10-CM

## 2019-02-05 PROCEDURE — 93000 ELECTROCARDIOGRAM COMPLETE: CPT | Performed by: PHYSICIAN ASSISTANT

## 2019-02-05 PROCEDURE — 99214 OFFICE O/P EST MOD 30 MIN: CPT | Performed by: PHYSICIAN ASSISTANT

## 2019-02-05 RX ORDER — DILTIAZEM HYDROCHLORIDE 180 MG/1
CAPSULE, COATED, EXTENDED RELEASE ORAL DAILY
COMMUNITY
Start: 2019-01-22 | End: 2019-02-21

## 2019-02-05 NOTE — PROGRESS NOTES
"Problem list     Subjective   Sidra Lane is a 64 y.o. female     Chief Complaint   Patient presents with   • Follow-up     prsents as hospital f/u for afib and PE   • Atrial Fibrillation   • Chest Pain   • Palpitations   • Pulmonary Embolism   • Shortness of Breath       HPI         PROBLEM LIST:      1.  History of chest pain  1.1 stress test August 2018 at [facility] demonstrated no evidence of ischemia and preserved LV function  2.  Preserved systolic function  3.  Paroxysmal atrial fibrillation  3.1 patient  Coumadin for anticoagulation  3.2 patient previously on amiodarone.  Currently on flecainide  4.  Hypertension   5.  History of DVT on chronic anticoagulation  6.  Recent left lower lobe pulmonary embolus with patient subtherapeutic on Coumadin January 2019         Patient is a 64-year-old female that presents back to the office for follow-up.  Patient recently had to stop Coumadin therapy because of GI bleed.  This subsequently improved and she was started back on Coumadin therapy.  However, one night she woke up and had a sudden onset of chest pain that she described as a forceful\" character\".  She had shortness of breath and dizziness.  She eventually went emergency room and was diagnosed with pulmonary embolus.  Coumadin was subtherapeutic INR on laboratories.  Patient was subsequently placed on Lovenox shots until INR was therapeutic.    Patient describes continuing to feel poorly.  Her chest pain has improved significantly.  She still has occasional tightness and discomfort.  She has moderate levels of exertional dyspnea and this is been chronic but rather mildly progressive.  She has no PND orthopnea.    Occasional palpitations but no associated dizziness presyncope or syncope.  She has significant fatigue.  She has no evidence of bleeding on Coumadin.  Otherwise is doing well      Outpatient Encounter Medications as of 2/5/2019   Medication Sig Dispense Refill   • " amLODIPine (NORVASC) 10 MG tablet Daily.     • diltiaZEM CD (CARDIZEM CD) 180 MG 24 hr capsule Daily.     • diphenoxylate-atropine (LOMOTIL) 2.5-0.025 MG per tablet Take  by mouth. Bid-tid prn for diarrhea     • fluticasone (VERAMYST) 27.5 MCG/SPRAY nasal spray 2 sprays into the nostril(s) as directed by provider Daily.     • metoprolol succinate XL (TOPROL-XL) 50 MG 24 hr tablet Take 1 tablet by mouth Daily. 30 tablet 11   • omeprazole (priLOSEC) 20 MG capsule Daily.     • Sotalol HCl AF 80 MG tablet Take 1 tablet by mouth 2 (Two) Times a Day. 60 tablet 11   • warfarin (COUMADIN) 4 MG tablet Take 4 mg by mouth Every Night.     • WELCHOL 625 MG tablet 2 tablets 2 (Two) Times a Day.     • furosemide (LASIX) 20 MG tablet Daily As Needed.       No facility-administered encounter medications on file as of 2/5/2019.        Other and Iodine    Past Medical History:   Diagnosis Date   • Atrial fibrillation (CMS/HCC)    • CHF (congestive heart failure) (CMS/HCC)    • Deep vein thrombosis (CMS/HCC)    • GERD (gastroesophageal reflux disease)    • Hypertension    • Pulmonary embolism (CMS/HCC)        Social History     Socioeconomic History   • Marital status: Single     Spouse name: Not on file   • Number of children: Not on file   • Years of education: Not on file   • Highest education level: Not on file   Social Needs   • Financial resource strain: Not on file   • Food insecurity - worry: Not on file   • Food insecurity - inability: Not on file   • Transportation needs - medical: Not on file   • Transportation needs - non-medical: Not on file   Occupational History   • Not on file   Tobacco Use   • Smoking status: Never Smoker   • Smokeless tobacco: Never Used   Substance and Sexual Activity   • Alcohol use: No   • Drug use: No   • Sexual activity: Not on file   Other Topics Concern   • Not on file   Social History Narrative   • Not on file       Family History   Problem Relation Age of Onset   • Hypertension Mother    •  "Cervical cancer Mother        Review of Systems   Constitutional: Positive for diaphoresis (night sweats) and fatigue.   HENT: Negative.    Eyes: Positive for visual disturbance (reading glasses).   Respiratory: Positive for cough (dry), shortness of breath ( with daily activity and times at rest ) and wheezing.    Cardiovascular: Positive for chest pain and palpitations. Negative for leg swelling.   Gastrointestinal: Positive for diarrhea.   Endocrine: Negative.    Genitourinary: Negative.    Musculoskeletal: Positive for arthralgias and back pain.   Skin: Negative.    Neurological: Positive for dizziness ( with quick movement; recent episode of near syncope while sitting at kitchen table) and weakness (generalized).   Hematological: Bruises/bleeds easily.   Psychiatric/Behavioral: Positive for sleep disturbance (wakes up smothering/soa).   All other systems reviewed and are negative.      Objective   Vitals:    02/05/19 1054   BP: 129/82   BP Location: Left arm   Patient Position: Sitting   Pulse: 57   SpO2: 96%   Weight: 110 kg (242 lb)   Height: 152.4 cm (60\")      /82 (BP Location: Left arm, Patient Position: Sitting)   Pulse 57   Ht 152.4 cm (60\")   Wt 110 kg (242 lb)   SpO2 96%   BMI 47.26 kg/m²     Lab Results (most recent)     None          Physical Exam   Constitutional: She is oriented to person, place, and time. She appears well-developed and well-nourished. No distress.   HENT:   Head: Normocephalic and atraumatic.   Eyes: EOM are normal. Pupils are equal, round, and reactive to light.   Neck: No JVD present.   Cardiovascular: Normal rate, regular rhythm, normal heart sounds and intact distal pulses. Exam reveals no gallop and no friction rub.   No murmur heard.  Pulmonary/Chest: Effort normal and breath sounds normal. No respiratory distress. She has no wheezes. She has no rales. She exhibits no tenderness.   Musculoskeletal: Normal range of motion. She exhibits no edema.   Neurological: She " is alert and oriented to person, place, and time. No cranial nerve deficit.   Skin: Skin is warm and dry. No rash noted. No erythema. No pallor.   Psychiatric: She has a normal mood and affect. Her behavior is normal.   Nursing note and vitals reviewed.      Procedure     ECG 12 Lead  Date/Time: 2/5/2019 11:00 AM  Performed by: Blair Silva PA  Authorized by: Blair Silva PA   Comments: EKG demonstrates sinus rhythm at 71 bpm with no acute ST changes               Assessment/Plan     Problems Addressed this Visit        Cardiovascular and Mediastinum    Atrial fibrillation (CMS/HCC)    Relevant Medications    diltiaZEM CD (CARDIZEM CD) 180 MG 24 hr capsule    Other Relevant Orders    ECG 12 Lead      Other Visit Diagnoses     Precordial pain    -  Primary    Relevant Orders    ECG 12 Lead    Palpitations        Relevant Orders    ECG 12 Lead    SOB (shortness of breath)        Relevant Orders    ECG 12 Lead            Recommendation  1.  Patient with atrial fibrillation that is paroxysmal currently on sotalol therapy.  We will continue that for now continue Coumadin therapy.  We did discuss novel oral -anticoagulants but she would like to continue Coumadin therapy at this time.  2.  Patient currently on sotalol, diltiazem, amlodipine, and metoprolol.  I feel that the combination of these medications would be dangerous.  I would like to stop her metoprolol and diltiazem.  We are slowly tapering her off these medications she will continue amlodipine and sotalol.  She will call Monday for symptom check and blood pressure readings.  3.  I would like to see her back for follow-up in a few weeks to reevaluate clinical course.  Otherwise I feel her chest discomfort shortness of breath is likely from her recent diagnosis of PE.  However, if symptoms persist with recent stress test being normal and failing to improve on antianginal medications, when mapped consider catheterization for refractory symptoms.  For  now, we would like to adjust medications and see her back closely.  Otherwise, she will follow with primary as scheduled              Patient's Body mass index is 47.26 kg/m². BMI is above normal parameters. Recommendations include: educational material.       Electronically signed by:

## 2019-02-05 NOTE — PATIENT INSTRUCTIONS
Heart-Healthy Eating Plan  Many factors influence your heart health, including eating and exercise habits. Heart (coronary) risk increases with abnormal blood fat (lipid) levels. Heart-healthy meal planning includes limiting unhealthy fats, increasing healthy fats, and making other small dietary changes. This includes maintaining a healthy body weight to help keep lipid levels within a normal range.  What is my plan?  Your health care provider recommends that you:  · Get no more than _________% of the total calories in your daily diet from fat.  · Limit your intake of saturated fat to less than _________% of your total calories each day.  · Limit the amount of cholesterol in your diet to less than _________ mg per day.    What types of fat should I choose?  · Choose healthy fats more often. Choose monounsaturated and polyunsaturated fats, such as olive oil and canola oil, flaxseeds, walnuts, almonds, and seeds.  · Eat more omega-3 fats. Good choices include salmon, mackerel, sardines, tuna, flaxseed oil, and ground flaxseeds. Aim to eat fish at least two times each week.  · Limit saturated fats. Saturated fats are primarily found in animal products, such as meats, butter, and cream. Plant sources of saturated fats include palm oil, palm kernel oil, and coconut oil.  · Avoid foods with partially hydrogenated oils in them. These contain trans fats. Examples of foods that contain trans fats are stick margarine, some tub margarines, cookies, crackers, and other baked goods.  What general guidelines do I need to follow?  · Check food labels carefully to identify foods with trans fats or high amounts of saturated fat.  · Fill one half of your plate with vegetables and green salads. Eat 4-5 servings of vegetables per day. A serving of vegetables equals 1 cup of raw leafy vegetables, ½ cup of raw or cooked cut-up vegetables, or ½ cup of vegetable juice.  · Fill one fourth of your plate with whole grains. Look for the word  "\"whole\" as the first word in the ingredient list.  · Fill one fourth of your plate with lean protein foods.  · Eat 4-5 servings of fruit per day. A serving of fruit equals one medium whole fruit, ¼ cup of dried fruit, ½ cup of fresh, frozen, or canned fruit, or ½ cup of 100% fruit juice.  · Eat more foods that contain soluble fiber. Examples of foods that contain this type of fiber are apples, broccoli, carrots, beans, peas, and barley. Aim to get 20-30 g of fiber per day.  · Eat more home-cooked food and less restaurant, buffet, and fast food.  · Limit or avoid alcohol.  · Limit foods that are high in starch and sugar.  · Avoid fried foods.  · Cook foods by using methods other than frying. Baking, boiling, grilling, and broiling are all great options. Other fat-reducing suggestions include:  ? Removing the skin from poultry.  ? Removing all visible fats from meats.  ? Skimming the fat off of stews, soups, and gravies before serving them.  ? Steaming vegetables in water or broth.  · Lose weight if you are overweight. Losing just 5-10% of your initial body weight can help your overall health and prevent diseases such as diabetes and heart disease.  · Increase your consumption of nuts, legumes, and seeds to 4-5 servings per week. One serving of dried beans or legumes equals ½ cup after being cooked, one serving of nuts equals 1½ ounces, and one serving of seeds equals ½ ounce or 1 tablespoon.  · You may need to monitor your salt (sodium) intake, especially if you have high blood pressure. Talk with your health care provider or dietitian to get more information about reducing sodium.  What foods can I eat?  Grains    Breads, including [place], white, tomas, wheat, raisin, rye, oatmeal, and Italian. Tortillas that are neither fried nor made with lard or trans fat. Low-fat rolls, including hotdog and hamburger buns and English muffins. Biscuits. Muffins. Waffles. Pancakes. Light popcorn. Whole-grain cereals. Flatbread. " Araceli toast. Pretzels. Breadsticks. Rusks. Low-fat snacks and crackers, including oyster, saltine, matzo, demarcus, animal, and rye. Rice and pasta, including brown rice and those that are made with whole wheat.  Vegetables  All vegetables.  Fruits  All fruits, but limit coconut.  Meats and Other Protein Sources  Lean, well-trimmed beef, veal, pork, and lamb. Chicken and turkey without skin. All fish and shellfish. Wild duck, rabbit, pheasant, and venison. Egg whites or low-cholesterol egg substitutes. Dried beans, peas, lentils, and tofu. Seeds and most nuts.  Dairy  Low-fat or nonfat cheeses, including ricotta, string, and mozzarella. Skim or 1% milk that is liquid, powdered, or evaporated. Buttermilk that is made with low-fat milk. Nonfat or low-fat yogurt.  Beverages  Mineral water. Diet carbonated beverages.  Sweets and Desserts  Sherbets and fruit ices. Honey, jam, marmalade, jelly, and syrups. Meringues and gelatins. Pure sugar candy, such as hard candy, jelly beans, gumdrops, mints, marshmallows, and small amounts of dark chocolate. Wil food cake.  Eat all sweets and desserts in moderation.  Fats and Oils  Nonhydrogenated (trans-free) margarines. Vegetable oils, including soybean, sesame, sunflower, olive, peanut, safflower, corn, canola, and cottonseed. Salad dressings or mayonnaise that are made with a vegetable oil. Limit added fats and oils that you use for cooking, baking, salads, and as spreads.  Other  Cocoa powder. Coffee and tea. All seasonings and condiments.  The items listed above may not be a complete list of recommended foods or beverages. Contact your dietitian for more options.  What foods are not recommended?  Grains  Breads that are made with saturated or trans fats, oils, or whole milk. Croissants. Butter rolls. Cheese breads. Sweet rolls. Donuts. Buttered popcorn. Chow mein noodles. High-fat crackers, such as cheese or butter crackers.  Meats and Other Protein Sources  Fatty meats, such  as hotdogs, short ribs, sausage, spareribs, atkins, ribeye roast or steak, and mutton. High-fat deli meats, such as salami and bologna. Caviar. Domestic duck and goose. Organ meats, such as kidney, liver, sweetbreads, brains, gizzard, chitterlings, and heart.  Dairy  Cream, sour cream, cream cheese, and creamed cottage cheese. Whole milk cheeses, including blue (will), [place], Brie, Bryant, American, Havarti, Swiss, cheddar, Camembert, and [place]. Whole or 2% milk that is liquid, evaporated, or condensed. Whole buttermilk. Cream sauce or high-fat cheese sauce. Yogurt that is made from whole milk.  Beverages  Regular sodas and drinks with added sugar.  Sweets and Desserts  Frosting. Pudding. Cookies. Cakes other than ilya food cake. Candy that has milk chocolate or white chocolate, hydrogenated fat, butter, coconut, or unknown ingredients. Buttered syrups. Full-fat ice cream or ice cream drinks.  Fats and Oils  Gravy that has suet, meat fat, or shortening. Cocoa butter, hydrogenated oils, palm oil, coconut oil, palm kernel oil. These can often be found in baked products, candy, fried foods, nondairy creamers, and whipped toppings. Solid fats and shortenings, including atkins fat, salt pork, lard, and butter. Nondairy cream substitutes, such as coffee creamers and sour cream substitutes. Salad dressings that are made of unknown oils, cheese, or sour cream.  The items listed above may not be a complete list of foods and beverages to avoid. Contact your dietitian for more information.  This information is not intended to replace advice given to you by your health care provider. Make sure you discuss any questions you have with your health care provider.  Document Released: 09/26/2009 Document Revised: 07/07/2017 Document Reviewed: 06/11/2015  [employer] Interactive Patient Education © 2018 [employer]

## 2019-02-13 ENCOUNTER — TELEPHONE (OUTPATIENT)
Dept: CARDIOLOGY | Facility: CLINIC | Age: 64
End: 2019-02-13

## 2019-02-13 NOTE — TELEPHONE ENCOUNTER
Patient stopped the Diltiazem 3 days ago and initially noticed her HR increasing so she didn't wean herself off the Metoprolol as instructed by Blair (half tablet for 3 days).   Today HR 66, /72   Yesterday /71 HR 65     Advised patient to take half tablet of Metoprolol the next 3 days as instructed by Blair with monitoring her HR/BP. She will contact our office with questions or concerns. Anne Wiggins MA    ----- Message from Kye Jenkins sent at 2/13/2019  9:24 AM EST -----  Regarding: CHANGE IN T'S MEDICATION  BLAIR TOLD PT TO WEAN OFF 2 OF HER MEDS. SAID HR BEEN RUNNING 102, 113 BUT PAST 2 DAYS 94, 98,69. WANTS TO KNOW IF HE STILL WANTS HER TO KEEP WEANING OFF THE MEDS ON A COUNT OF HER HR GOING UP.

## 2019-02-18 ENCOUNTER — TELEPHONE (OUTPATIENT)
Dept: CARDIOLOGY | Facility: CLINIC | Age: 64
End: 2019-02-18

## 2019-02-18 NOTE — TELEPHONE ENCOUNTER
Pt called to report vitals as discussed with Blair Silva PA-C post tapering metoprolol and diltiazem. Her BP is as follows:  Day 1.  113/81 P.107,98  Day 2.  112/68 P.114,105  Day 3.  118/86 P.116,100  Day 4.  114/108 P.125,107    Pt states she is having light-headedness upon standing from sitting position.  She denies syncope or any other sx at this time.    She was reminded of her apt. 2/21/19.  She confirmed understanding.  DENISE;KARLA

## 2019-02-19 ENCOUNTER — TELEPHONE (OUTPATIENT)
Dept: CARDIOLOGY | Facility: CLINIC | Age: 64
End: 2019-02-19

## 2019-02-19 NOTE — TELEPHONE ENCOUNTER
----- Message from CONNIE Steven sent at 2/19/2019 10:54 AM EST -----  Contact: Pt  Is patient just on one medicine now  ----- Message -----  From: Fouzia Webster MA  Sent: 2/18/2019   2:59 PM  To: CONNIE Steven    Pt called to report vitals as discussed with Blair Silva PA-C post tapering metoprolol and diltiazem.  He BP is as follows:    Day1.  113/81 P.107,98  Day2.  112/68 P.114,105  Day 3. 118/86 P.116,100  Day 4. 114/108 P.125,107    Pt states she is having light-headedness upon standing from sitting position.  She denies syncope or any other sx at this time.  She was reminded of her apt. 2/21/19.  She confirmed understanding.  SONAL    Pt confirmed d/c after tapering diltiazem.  She is still tapering metoprolol.  She said she is tapering at a slower rate than you advised, due to increase light-headedness.  Tried calling today to check on her, No answer x2.  SONAL

## 2019-02-21 ENCOUNTER — OFFICE VISIT (OUTPATIENT)
Dept: CARDIOLOGY | Facility: CLINIC | Age: 64
End: 2019-02-21

## 2019-02-21 VITALS
DIASTOLIC BLOOD PRESSURE: 81 MMHG | WEIGHT: 249.4 LBS | OXYGEN SATURATION: 97 % | HEIGHT: 60 IN | BODY MASS INDEX: 48.97 KG/M2 | HEART RATE: 81 BPM | SYSTOLIC BLOOD PRESSURE: 136 MMHG

## 2019-02-21 DIAGNOSIS — I10 ESSENTIAL HYPERTENSION: ICD-10-CM

## 2019-02-21 DIAGNOSIS — I48.0 PAROXYSMAL ATRIAL FIBRILLATION (HCC): ICD-10-CM

## 2019-02-21 DIAGNOSIS — R06.02 SHORTNESS OF BREATH: Primary | ICD-10-CM

## 2019-02-21 PROCEDURE — 99213 OFFICE O/P EST LOW 20 MIN: CPT | Performed by: PHYSICIAN ASSISTANT

## 2019-02-21 NOTE — PATIENT INSTRUCTIONS
Heart-Healthy Eating Plan  Many factors influence your heart health, including eating and exercise habits. Heart (coronary) risk increases with abnormal blood fat (lipid) levels. Heart-healthy meal planning includes limiting unhealthy fats, increasing healthy fats, and making other small dietary changes. This includes maintaining a healthy body weight to help keep lipid levels within a normal range.  What is my plan?  Your health care provider recommends that you:  · Get no more than _________% of the total calories in your daily diet from fat.  · Limit your intake of saturated fat to less than _________% of your total calories each day.  · Limit the amount of cholesterol in your diet to less than _________ mg per day.    What types of fat should I choose?  · Choose healthy fats more often. Choose monounsaturated and polyunsaturated fats, such as olive oil and canola oil, flaxseeds, walnuts, almonds, and seeds.  · Eat more omega-3 fats. Good choices include salmon, mackerel, sardines, tuna, flaxseed oil, and ground flaxseeds. Aim to eat fish at least two times each week.  · Limit saturated fats. Saturated fats are primarily found in animal products, such as meats, butter, and cream. Plant sources of saturated fats include palm oil, palm kernel oil, and coconut oil.  · Avoid foods with partially hydrogenated oils in them. These contain trans fats. Examples of foods that contain trans fats are stick margarine, some tub margarines, cookies, crackers, and other baked goods.  What general guidelines do I need to follow?  · Check food labels carefully to identify foods with trans fats or high amounts of saturated fat.  · Fill one half of your plate with vegetables and green salads. Eat 4-5 servings of vegetables per day. A serving of vegetables equals 1 cup of raw leafy vegetables, ½ cup of raw or cooked cut-up vegetables, or ½ cup of vegetable juice.  · Fill one fourth of your plate with whole grains. Look for the word  "\"whole\" as the first word in the ingredient list.  · Fill one fourth of your plate with lean protein foods.  · Eat 4-5 servings of fruit per day. A serving of fruit equals one medium whole fruit, ¼ cup of dried fruit, ½ cup of fresh, frozen, or canned fruit, or ½ cup of 100% fruit juice.  · Eat more foods that contain soluble fiber. Examples of foods that contain this type of fiber are apples, broccoli, carrots, beans, peas, and barley. Aim to get 20-30 g of fiber per day.  · Eat more home-cooked food and less restaurant, buffet, and fast food.  · Limit or avoid alcohol.  · Limit foods that are high in starch and sugar.  · Avoid fried foods.  · Cook foods by using methods other than frying. Baking, boiling, grilling, and broiling are all great options. Other fat-reducing suggestions include:  ? Removing the skin from poultry.  ? Removing all visible fats from meats.  ? Skimming the fat off of stews, soups, and gravies before serving them.  ? Steaming vegetables in water or broth.  · Lose weight if you are overweight. Losing just 5-10% of your initial body weight can help your overall health and prevent diseases such as diabetes and heart disease.  · Increase your consumption of nuts, legumes, and seeds to 4-5 servings per week. One serving of dried beans or legumes equals ½ cup after being cooked, one serving of nuts equals 1½ ounces, and one serving of seeds equals ½ ounce or 1 tablespoon.  · You may need to monitor your salt (sodium) intake, especially if you have high blood pressure. Talk with your health care provider or dietitian to get more information about reducing sodium.  What foods can I eat?  Grains    Breads, including Samoan, white, tomas, wheat, raisin, rye, oatmeal, and Italian. Tortillas that are neither fried nor made with lard or trans fat. Low-fat rolls, including hotdog and hamburger buns and English muffins. Biscuits. Muffins. Waffles. Pancakes. Light popcorn. Whole-grain cereals. Flatbread. " Araceli toast. Pretzels. Breadsticks. Rusks. Low-fat snacks and crackers, including oyster, saltine, matzo, demarcus, animal, and rye. Rice and pasta, including brown rice and those that are made with whole wheat.  Vegetables  All vegetables.  Fruits  All fruits, but limit coconut.  Meats and Other Protein Sources  Lean, well-trimmed beef, veal, pork, and lamb. Chicken and turkey without skin. All fish and shellfish. Wild duck, rabbit, pheasant, and venison. Egg whites or low-cholesterol egg substitutes. Dried beans, peas, lentils, and tofu. Seeds and most nuts.  Dairy  Low-fat or nonfat cheeses, including ricotta, string, and mozzarella. Skim or 1% milk that is liquid, powdered, or evaporated. Buttermilk that is made with low-fat milk. Nonfat or low-fat yogurt.  Beverages  Mineral water. Diet carbonated beverages.  Sweets and Desserts  Sherbets and fruit ices. Honey, jam, marmalade, jelly, and syrups. Meringues and gelatins. Pure sugar candy, such as hard candy, jelly beans, gumdrops, mints, marshmallows, and small amounts of dark chocolate. Wil food cake.  Eat all sweets and desserts in moderation.  Fats and Oils  Nonhydrogenated (trans-free) margarines. Vegetable oils, including soybean, sesame, sunflower, olive, peanut, safflower, corn, canola, and cottonseed. Salad dressings or mayonnaise that are made with a vegetable oil. Limit added fats and oils that you use for cooking, baking, salads, and as spreads.  Other  Cocoa powder. Coffee and tea. All seasonings and condiments.  The items listed above may not be a complete list of recommended foods or beverages. Contact your dietitian for more options.  What foods are not recommended?  Grains  Breads that are made with saturated or trans fats, oils, or whole milk. Croissants. Butter rolls. Cheese breads. Sweet rolls. Donuts. Buttered popcorn. Chow mein noodles. High-fat crackers, such as cheese or butter crackers.  Meats and Other Protein Sources  Fatty meats, such  as hotdogs, short ribs, sausage, spareribs, atkins, ribeye roast or steak, and mutton. High-fat deli meats, such as salami and bologna. Caviar. Domestic duck and goose. Organ meats, such as kidney, liver, sweetbreads, brains, gizzard, chitterlings, and heart.  Dairy  Cream, sour cream, cream cheese, and creamed cottage cheese. Whole milk cheeses, including blue (will), [place], Brie, [place], American, Havarti, Swiss, cheddar, Camembert, and [place]. Whole or 2% milk that is liquid, evaporated, or condensed. Whole buttermilk. Cream sauce or high-fat cheese sauce. Yogurt that is made from whole milk.  Beverages  Regular sodas and drinks with added sugar.  Sweets and Desserts  Frosting. Pudding. Cookies. Cakes other than ilya food cake. Candy that has milk chocolate or white chocolate, hydrogenated fat, butter, coconut, or unknown ingredients. Buttered syrups. Full-fat ice cream or ice cream drinks.  Fats and Oils  Gravy that has suet, meat fat, or shortening. Cocoa butter, hydrogenated oils, palm oil, coconut oil, palm kernel oil. These can often be found in baked products, candy, fried foods, nondairy creamers, and whipped toppings. Solid fats and shortenings, including atkins fat, salt pork, lard, and butter. Nondairy cream substitutes, such as coffee creamers and sour cream substitutes. Salad dressings that are made of unknown oils, cheese, or sour cream.  The items listed above may not be a complete list of foods and beverages to avoid. Contact your dietitian for more information.  This information is not intended to replace advice given to you by your health care provider. Make sure you discuss any questions you have with your health care provider.  Document Released: 09/26/2009 Document Revised: 07/07/2017 Document Reviewed: 06/11/2015  [employer] Interactive Patient Education © 2018 [employer]

## 2019-02-21 NOTE — PROGRESS NOTES
Problem list     Subjective   Sidra Lane is a 64 y.o. female     Chief Complaint   Patient presents with   • Follow-up     presents for 2 wk f/u for medication changes   • Atrial Fibrillation   • Shortness of Breath       HPI      PROBLEM LIST:      1.  History of chest pain  1.1 stress test August 2018 at [facility] demonstrated no evidence of ischemia and preserved LV function  2.  Preserved systolic function  3.  Paroxysmal atrial fibrillation  3.1 patient  Coumadin for anticoagulation  3.2 patient previously on amiodarone.  Currently on flecainide  4.  Hypertension   5.  History of DVT on chronic anticoagulation  6.  Recent left lower lobe pulmonary embolus with patient subtherapeutic on Coumadin January 2019           Patient is a 64-year-old female that presents back for follow-up.  Last office visit she was on sotalol, diltiazem, metoprolol and amlodipine.  We stopped her diltiazem and wanted to wait her metoprolol.  She presents back today for follow-up.    Patient describes being down to 25 mg metoprolol but feeling remarkably improved.  Patient has no chest pain.  Shortness of breath is improved and only mild at this time.  She will experience this when she exerts for extended levels.  No PND orthopnea.    Patient doesn't palpitate or have dysrhythmic symptoms.  She is feeling remarkably improved since medications were adjusted.  No other complaints        Outpatient Encounter Medications as of 2/21/2019   Medication Sig Dispense Refill   • amLODIPine (NORVASC) 10 MG tablet Daily.     • diphenoxylate-atropine (LOMOTIL) 2.5-0.025 MG per tablet Take  by mouth. Bid-tid prn for diarrhea     • furosemide (LASIX) 20 MG tablet Daily As Needed.     • metoprolol succinate XL (TOPROL-XL) 50 MG 24 hr tablet Take 1 tablet by mouth Daily. (Patient taking differently: Take 25 mg by mouth Daily.) 30 tablet 11   • omeprazole (priLOSEC) 20 MG capsule Daily.     • Sotalol HCl AF 80 MG tablet Take  1 tablet by mouth 2 (Two) Times a Day. 60 tablet 11   • warfarin (COUMADIN) 4 MG tablet Take 4 mg by mouth Every Night.     • WELCHOL 625 MG tablet 2 tablets 2 (Two) Times a Day.     • fluticasone (VERAMYST) 27.5 MCG/SPRAY nasal spray 2 sprays into the nostril(s) as directed by provider Daily.     • [DISCONTINUED] diltiaZEM CD (CARDIZEM CD) 180 MG 24 hr capsule Daily.       No facility-administered encounter medications on file as of 2/21/2019.        Other and Iodine    Past Medical History:   Diagnosis Date   • Atrial fibrillation (CMS/HCC)    • CHF (congestive heart failure) (CMS/HCC)    • Deep vein thrombosis (CMS/HCC)    • GERD (gastroesophageal reflux disease)    • Hypertension    • Pulmonary embolism (CMS/HCC)        Social History     Socioeconomic History   • Marital status: Single     Spouse name: Not on file   • Number of children: Not on file   • Years of education: Not on file   • Highest education level: Not on file   Social Needs   • Financial resource strain: Not on file   • Food insecurity - worry: Not on file   • Food insecurity - inability: Not on file   • Transportation needs - medical: Not on file   • Transportation needs - non-medical: Not on file   Occupational History   • Not on file   Tobacco Use   • Smoking status: Never Smoker   • Smokeless tobacco: Never Used   Substance and Sexual Activity   • Alcohol use: No   • Drug use: No   • Sexual activity: Not on file   Other Topics Concern   • Not on file   Social History Narrative   • Not on file       Family History   Problem Relation Age of Onset   • Hypertension Mother    • Cervical cancer Mother        Review of Systems   Constitutional: Positive for fatigue.   HENT:        Sinus issues   Eyes: Negative.    Respiratory: Positive for shortness of breath (on exertion).    Cardiovascular: Negative.  Negative for chest pain, palpitations and leg swelling.   Gastrointestinal: Positive for diarrhea.   Endocrine: Negative.    Genitourinary:  "Negative.    Musculoskeletal: Negative.    Skin: Negative.    Allergic/Immunologic: Negative.    Neurological: Negative.    Hematological: Bruises/bleeds easily (bruise).   Psychiatric/Behavioral: Positive for suicidal ideas (has woke up smothering/soa).   All other systems reviewed and are negative.      Objective   Vitals:    02/21/19 0954   BP: 136/81   BP Location: Left arm   Patient Position: Sitting   Pulse: 81   SpO2: 97%   Weight: 113 kg (249 lb 6.4 oz)   Height: 152.4 cm (60\")      /81 (BP Location: Left arm, Patient Position: Sitting)   Pulse 81   Ht 152.4 cm (60\")   Wt 113 kg (249 lb 6.4 oz)   SpO2 97%   BMI 48.71 kg/m²     Lab Results (most recent)     None          Physical Exam   Constitutional: She is oriented to person, place, and time. She appears well-developed and well-nourished. No distress.   HENT:   Head: Normocephalic and atraumatic.   Eyes: EOM are normal. Pupils are equal, round, and reactive to light.   Neck: No JVD present.   Cardiovascular: Normal rate, regular rhythm, normal heart sounds and intact distal pulses. Exam reveals no gallop and no friction rub.   No murmur heard.  Pulmonary/Chest: Effort normal and breath sounds normal. No respiratory distress. She has no wheezes. She has no rales. She exhibits no tenderness.   Musculoskeletal: Normal range of motion. She exhibits no edema.   Neurological: She is alert and oriented to person, place, and time. No cranial nerve deficit.   Skin: Skin is warm and dry. No rash noted. No erythema. No pallor.   Psychiatric: She has a normal mood and affect. Her behavior is normal.   Nursing note and vitals reviewed.      Procedure   Procedures       Assessment/Plan     Problems Addressed this Visit        Cardiovascular and Mediastinum    Atrial fibrillation (CMS/HCC)    Essential hypertension       Respiratory    Shortness of breath - Primary             recommendation  1.  Patient with atrial fibrillation currently on sotalol therapy.  " Patient feeling much better after just medications.  We will continue for now.  2.  No evidence of bleeding on Coumadin nor symptoms of cerebral embolic events.  INR is being monitored by primary.  3.  Dyspnea is very minimal at this time.  It is only mild and I feel it is likely multifactorial in origin.  We will continue to monitor.  If that worsens, she will contact our office.  Otherwise blood pressure is well-controlled on current medical therapy and will continue current dosing.  4.  We will see her back follow-up in 6 months or sooner as symptoms discussed.  Follow-up primary as scheduled              Patient's Body mass index is 48.71 kg/m². BMI is above normal parameters. Recommendations include: educational material.       Electronically signed by:

## 2019-03-13 ENCOUNTER — TELEPHONE (OUTPATIENT)
Dept: CARDIOLOGY | Facility: CLINIC | Age: 64
End: 2019-03-13

## 2019-03-13 NOTE — TELEPHONE ENCOUNTER
Called pt x2 LM to call office to get her scheduled for EKG NV per Qamar ROSALES,KARLA    ----- Message from CONNIE Mendoza sent at 3/13/2019  1:59 PM EDT -----  Patient needs consideration for EKG.  ----- Message -----  From: Fouzia Webster MA  Sent: 3/13/2019  10:34 AM  To: CONNIE Mendoza    Pt called stating she is in A-fib x3 days with light- headedness.  She denies CP or SOA at this time.   She saw Blair the end of Feb.  She has a f/u in Aug.   Please advise.  KRALA WALKER

## 2019-03-13 NOTE — TELEPHONE ENCOUNTER
Pt called stating she has been in A-fib for 3 days.  She states she is having light-headedness she denies SOA, or CP.  She saw NB last in Feb.  She has f/u in Aug.  Sent to JM for advisement.  DENISE,CMA

## 2019-03-14 ENCOUNTER — TELEPHONE (OUTPATIENT)
Dept: CARDIOLOGY | Facility: CLINIC | Age: 64
End: 2019-03-14

## 2019-03-14 NOTE — TELEPHONE ENCOUNTER
Called pt to schedule NV.  She stated she went to PCP and they referred her to go to ED.  Pt said dx was A-fib, she received injection of diltiazem.  CT scan , lungs clear.  Pt  Stated she is not having any cardiac symptoms at this time.  KARLA WALKER    ----- Message from CONNIE Mendoza sent at 3/13/2019  1:59 PM EDT -----  Patient needs consideration for EKG.  ----- Message -----  From: Fouzia Webster MA  Sent: 3/13/2019  10:34 AM  To: CONNIE Mendoza    Pt called stating she is in A-fib x3 days with light- headedness.  She denies CP or SOA at this time.   She saw Blair the end of Feb.  She has a f/u in Aug.   Please advise.  KARLA WALKER

## 2019-08-27 ENCOUNTER — OFFICE VISIT (OUTPATIENT)
Dept: CARDIOLOGY | Facility: CLINIC | Age: 64
End: 2019-08-27

## 2019-08-27 VITALS
SYSTOLIC BLOOD PRESSURE: 133 MMHG | HEIGHT: 60 IN | WEIGHT: 237 LBS | HEART RATE: 105 BPM | OXYGEN SATURATION: 97 % | BODY MASS INDEX: 46.53 KG/M2 | DIASTOLIC BLOOD PRESSURE: 89 MMHG

## 2019-08-27 DIAGNOSIS — I48.0 PAROXYSMAL ATRIAL FIBRILLATION (HCC): Primary | ICD-10-CM

## 2019-08-27 DIAGNOSIS — R06.02 SHORTNESS OF BREATH: ICD-10-CM

## 2019-08-27 DIAGNOSIS — I10 ESSENTIAL HYPERTENSION: ICD-10-CM

## 2019-08-27 PROCEDURE — 99214 OFFICE O/P EST MOD 30 MIN: CPT | Performed by: PHYSICIAN ASSISTANT

## 2019-08-27 PROCEDURE — 93000 ELECTROCARDIOGRAM COMPLETE: CPT | Performed by: PHYSICIAN ASSISTANT

## 2019-08-27 RX ORDER — METOPROLOL SUCCINATE 50 MG/1
50 TABLET, EXTENDED RELEASE ORAL DAILY
Qty: 30 TABLET | Refills: 11 | Status: SHIPPED | OUTPATIENT
Start: 2019-08-27 | End: 2020-04-14

## 2019-08-27 NOTE — PROGRESS NOTES
Problem list     Subjective   Sidra Lane is a 64 y.o. female     Chief Complaint   Patient presents with   • Atrial Fibrillation   • Palpitations       HPI    PROBLEM LIST:      1.  History of chest pain  1.1 stress test August 2018 at [facility] demonstrated no evidence of ischemia and preserved LV function  2.  Preserved systolic function  3.  Paroxysmal atrial fibrillation  3.1 patient  Coumadin for anticoagulation  3.2 patient previously on amiodarone.  Currently on sotalol having failed flecainide  4.  Hypertension   5.  History of DVT on chronic anticoagulation  6.  Recent left lower lobe pulmonary embolus with patient subtherapeutic on Coumadin January 2019     Patient is a 64-year-old female who presents back to the office for follow-up.  We follow the patient historically because of paroxysmal atrial fibrillation.  She has been on sotalol.  She has done well.  However, she has noticed her heart rate increasing of recent.  She feels that she is having atrial fibrillation frequently if not all the time.    She does not describe any chest pain.  Her dyspnea is mild and unchanged.  This is chronic without any progression.  She has no PND orthopnea.    Again, she continues to have palpitations.  She does not describe any dizziness presyncope or syncope but otherwise is doing well      Outpatient Encounter Medications as of 8/27/2019   Medication Sig Dispense Refill   • amLODIPine (NORVASC) 10 MG tablet Daily.     • diphenoxylate-atropine (LOMOTIL) 2.5-0.025 MG per tablet Take  by mouth. Bid-tid prn for diarrhea     • fluticasone (VERAMYST) 27.5 MCG/SPRAY nasal spray 2 sprays into the nostril(s) as directed by provider Daily.     • furosemide (LASIX) 20 MG tablet Daily As Needed.     • metoprolol succinate XL (TOPROL-XL) 50 MG 24 hr tablet Take 1 tablet by mouth Daily. 30 tablet 11   • omeprazole (priLOSEC) 20 MG capsule Daily.     • Sotalol HCl AF 80 MG tablet Take 1 tablet by mouth 2  (Two) Times a Day. 60 tablet 11   • warfarin (COUMADIN) 4 MG tablet Take 4 mg by mouth Every Night.     • WELCHOL 625 MG tablet 2 tablets 2 (Two) Times a Day.     • [DISCONTINUED] metoprolol succinate XL (TOPROL-XL) 50 MG 24 hr tablet Take 1 tablet by mouth Daily. (Patient taking differently: Take 25 mg by mouth Daily.) 30 tablet 11     No facility-administered encounter medications on file as of 8/27/2019.        Other and Iodine    Past Medical History:   Diagnosis Date   • Atrial fibrillation (CMS/HCC)    • CHF (congestive heart failure) (CMS/HCC)    • Deep vein thrombosis (CMS/HCC)    • GERD (gastroesophageal reflux disease)    • Hypertension    • Pulmonary embolism (CMS/HCC)        Social History     Socioeconomic History   • Marital status: Single     Spouse name: Not on file   • Number of children: Not on file   • Years of education: Not on file   • Highest education level: Not on file   Tobacco Use   • Smoking status: Never Smoker   • Smokeless tobacco: Never Used   Substance and Sexual Activity   • Alcohol use: No   • Drug use: No       Family History   Problem Relation Age of Onset   • Hypertension Mother    • Cervical cancer Mother        Review of Systems   Constitutional: Negative.  Negative for chills, fatigue and fever.   HENT: Positive for congestion (stuffy nose ).    Eyes: Positive for visual disturbance (reading glasses ).   Respiratory: Positive for shortness of breath (SOA when laying down ). Negative for chest tightness.    Cardiovascular: Positive for palpitations (Races occasionally ) and leg swelling (Left LE edema at times ). Negative for chest pain.   Gastrointestinal: Negative.    Endocrine: Positive for heat intolerance (stays hot ). Negative for cold intolerance.   Genitourinary: Negative.    Musculoskeletal: Positive for arthralgias (joints ). Negative for back pain.   Skin: Negative.  Negative for rash and wound.   Allergic/Immunologic: Negative.  Negative for environmental allergies  "and food allergies.   Neurological: Positive for light-headedness (when standing too fast, bending over ). Negative for dizziness and weakness.   Hematological: Bruises/bleeds easily (bruises easily ).   Psychiatric/Behavioral: Positive for sleep disturbance (Will wake up with smothering at times ).   All other systems reviewed and are negative.      Objective   Vitals:    08/27/19 1434   BP: 133/89   BP Location: Left arm   Patient Position: Sitting   Pulse: 105   SpO2: 97%   Weight: 108 kg (237 lb)   Height: 152.4 cm (60\")      /89 (BP Location: Left arm, Patient Position: Sitting)   Pulse 105   Ht 152.4 cm (60\")   Wt 108 kg (237 lb)   SpO2 97%   BMI 46.29 kg/m²     Lab Results (most recent)     None          Physical Exam   Constitutional: She is oriented to person, place, and time. She appears well-developed and well-nourished. No distress.   HENT:   Head: Normocephalic and atraumatic.   Eyes: EOM are normal. Pupils are equal, round, and reactive to light.   Neck: No JVD present.   Cardiovascular: Normal rate, regular rhythm, normal heart sounds and intact distal pulses. Exam reveals no gallop and no friction rub.   No murmur heard.  Pulmonary/Chest: Effort normal and breath sounds normal. No respiratory distress. She has no wheezes. She has no rales. She exhibits no tenderness.   Musculoskeletal: Normal range of motion. She exhibits no edema.   Neurological: She is alert and oriented to person, place, and time. No cranial nerve deficit.   Skin: Skin is warm and dry. No rash noted. No erythema. No pallor.   Psychiatric: She has a normal mood and affect. Her behavior is normal.   Nursing note and vitals reviewed.      Procedure     ECG 12 Lead  Date/Time: 8/27/2019 2:42 PM  Performed by: Blair Silva PA  Authorized by: Blair Silva PA   Comparison: not compared with previous ECG   Comments: A fib     EKG demonstrates atrial fibrillation at 90 bpm with no acute ST changes           "     Assessment/Plan     Problems Addressed this Visit        Cardiovascular and Mediastinum    Atrial fibrillation (CMS/HCC) - Primary    Relevant Medications    metoprolol succinate XL (TOPROL-XL) 50 MG 24 hr tablet    Other Relevant Orders    ECG 12 Lead    Cardiac Event Monitor    Essential hypertension    Relevant Medications    metoprolol succinate XL (TOPROL-XL) 50 MG 24 hr tablet    Other Relevant Orders    Cardiac Event Monitor       Respiratory    Shortness of breath    Relevant Orders    Cardiac Event Monitor          Recommendation  1.  Patient with atrial fibrillation on EKG today and is not sure if medication is working.  However, she does feel well on sotalol.  She describes when she started that medication it improved her dizziness.  I would like to increase her Toprol to help with rate control.  However I would like to have her wear an event monitor.  If she is in atrial fibrillation chronically, feel that we need to stop sotalol and discontinue rate control therapy as she does not appear significantly symptomatic.  2.  Otherwise dyspnea is minimal blood pressure control.  We will schedule event monitor and see her back for follow-up.  She will follow with primary as scheduled           Patient's Body mass index is 46.29 kg/m². BMI is above normal parameters. Recommendations include: educational material and referral to primary care.       Electronically signed by:

## 2019-08-27 NOTE — PATIENT INSTRUCTIONS
"Fat and Cholesterol Restricted Eating Plan  Getting too much fat and cholesterol in your diet may cause health problems. Choosing the right foods helps keep your fat and cholesterol at normal levels. This can keep you from getting certain diseases.  Your doctor may recommend an eating plan that includes:  · Total fat: ______% or less of total calories a day.  · Saturated fat: ______% or less of total calories a day.  · Cholesterol: less than _________mg a day.  · Fiber: ______g a day.  What are tips for following this plan?  General tips    · Work with your doctor to lose weight if you need to.  · Avoid:  ? Foods with added sugar.  ? Fried foods.  ? Foods with partially hydrogenated oils.  · Limit alcohol intake to no more than 1 drink a day for nonpregnant women and 2 drinks a day for men. One drink equals 12 oz of beer, 5 oz of wine, or 1½ oz of hard liquor.  Reading food labels  · Check food labels for:  ? Trans fats.  ? Partially hydrogenated oils.  ? Saturated fat (g) in each serving.  ? Cholesterol (mg) in each serving.  ? Fiber (g) in each serving.  · Choose foods with healthy fats, such as:  ? Monounsaturated fats.  ? Polyunsaturated fats.  ? Omega-3 fats.  · Choose grain products that have whole grains. Look for the word \"whole\" as the first word in the ingredient list.  Cooking  · Cook foods using low-fat methods. These include baking, boiling, grilling, and broiling.  · Eat more home-cooked foods. Eat at restaurants and buffets less often.  · Avoid cooking using saturated fats, such as butter, cream, palm oil, palm kernel oil, and coconut oil.  Meal planning    · At meals, divide your plate into four equal parts:  ? Fill one-half of your plate with vegetables and green salads.  ? Fill one-fourth of your plate with whole grains.  ? Fill one-fourth of your plate with low-fat (lean) protein foods.  · Eat fish that is high in omega-3 fats at least two times a week. This includes mackerel, tuna, sardines, and " salmon.  · Eat foods that are high in fiber, such as whole grains, beans, apples, broccoli, carrots, peas, and barley.  Recommended foods  Grains  · Whole grains, such as whole wheat or whole grain breads, crackers, cereals, and pasta. Unsweetened oatmeal, bulgur, barley, quinoa, or brown rice. Corn or whole wheat flour tortillas.  Vegetables  · Fresh or frozen vegetables (raw, steamed, roasted, or grilled). Green salads.  Fruits  · All fresh, canned (in natural juice), or frozen fruits.  Meats and other protein foods  · Ground beef (85% or leaner), grass-fed beef, or beef trimmed of fat. Skinless chicken or turkey. Ground chicken or turkey. Pork trimmed of fat. All fish and seafood. Egg whites. Dried beans, peas, or lentils. Unsalted nuts or seeds. Unsalted canned beans. Nut butters without added sugar or oil.  Dairy  · Low-fat or nonfat dairy products, such as skim or 1% milk, 2% or reduced-fat cheeses, low-fat and fat-free ricotta or cottage cheese, or plain low-fat and nonfat yogurt.  Fats and oils  · Tub margarine without trans fats. Light or reduced-fat mayonnaise and salad dressings. Avocado. Olive, canola, sesame, or safflower oils.  The items listed above may not be a complete list of recommended foods or beverages. Contact your dietitian for more options.  The items listed above may not be a complete list of foods and beverages [you/your child] can eat. Contact a dietitian for more information.  Foods to avoid  Grains  · White bread. White pasta. White rice. Cornbread. Bagels, pastries, and croissants. Crackers and snack foods that contain trans fat and hydrogenated oils.  Vegetables  · Vegetables cooked in cheese, cream, or butter sauce. Fried vegetables.  Fruits  · Canned fruit in heavy syrup. Fruit in cream or butter sauce. Fried fruit.  Meats and other protein foods  · Fatty cuts of meat. Ribs, chicken wings, atkins, sausage, bologna, salami, chitterlings, fatback, hot dogs, bratwurst, and packaged  lunch meats. Liver and organ meats. Whole eggs and egg yolks. Chicken and turkey with skin. Fried meat.  Dairy  · Whole or 2% milk, cream, half-and-half, and cream cheese. Whole milk cheeses. Whole-fat or sweetened yogurt. Full-fat cheeses. Nondairy creamers and whipped toppings. Processed cheese, cheese spreads, and cheese curds.  Beverages  · Alcohol. Sugar-sweetened drinks such as sodas, lemonade, and fruit drinks.  Fats and oils  · Butter, stick margarine, lard, shortening, ghee, or atkins fat. Coconut, palm kernel, and palm oils.  Sweets and desserts  · Corn syrup, sugars, honey, and molasses. Candy. Jam and jelly. Syrup. Sweetened cereals. Cookies, pies, cakes, donuts, muffins, and ice cream.  The items listed above may not be a complete list of foods and beverages to avoid. Contact your dietitian for more information.  The items listed above may not be a complete list of foods and beverages [you/your child] should avoid. Contact a dietitian for more information.  Summary  · Choosing the right foods helps keep your fat and cholesterol at normal levels. This can keep you from getting certain diseases.  · At meals, fill one-half of your plate with vegetables and green salads.  · Eat high-fiber foods, like whole grains, beans, apples, carrots, peas, and barley.  · Limit added sugar, saturated fats, alcohol, and fried foods.  This information is not intended to replace advice given to you by your health care provider. Make sure you discuss any questions you have with your health care provider.  Document Released: 06/18/2013 Document Revised: 09/04/2018 Document Reviewed: 09/04/2018  [employer] Interactive Patient Education © 2019 [employer]  BMI for Adults    Body mass index (BMI) is a number that is calculated from a person's weight and height. BMI may help to estimate how much of a person's weight is composed of fat. BMI can help identify those who may be at higher risk for certain medical problems.  How is BMI  "used with adults?  BMI is used as a screening tool to identify possible weight problems. It is used to check whether a person is obese, overweight, healthy weight, or underweight.  How is BMI calculated?  BMI measures your weight and compares it to your height. This can be done either in English ([place] or metric measurements. Note that charts are available to help you find your BMI quickly and easily without having to do these calculations yourself.  To calculate your BMI in English ([place] measurements, your health care provider will:  1. Measure your weight in pounds (lb).  2. Multiply the number of pounds by 703.  ? For example, for a person who weighs 180 lb, multiply that number by 703, which equals 126,540.  3. Measure your height in inches (in). Then multiply that number by itself to get a measurement called \"inches squared.\"  ? For example, for a person who is 70 in tall, the \"inches squared\" measurement is 70 in x 70 in, which equals 4900 inches squared.  4. Divide the total from Step 2 (number of lb x 703) by the total from Step 3 (inches squared): 126,540 ÷ 4900 = 25.8. This is your BMI.  To calculate your BMI in metric measurements, your health care provider will:  1. Measure your weight in kilograms (kg).  2. Measure your height in meters (m). Then multiply that number by itself to get a measurement called \"meters squared.\"  ? For example, for a person who is 1.75 m tall, the \"meters squared\" measurement is 1.75 m x 1.75 m, which is equal to 3.1 meters squared.  3. Divide the number of kilograms (your weight) by the meters squared number. In this example: 70 ÷ 3.1 = 22.6. This is your BMI.  How is BMI interpreted?  To interpret your results, your health care provider will use BMI charts to identify whether you are underweight, normal weight, overweight, or obese. The following guidelines will be used:  · Underweight: BMI less than 18.5.  · Normal weight: BMI between 18.5 and 24.9.  · Overweight: BMI " between 25 and 29.9.  · Obese: BMI of 30 and above.  Please note:  · Weight includes both fat and muscle, so someone with a muscular build, such as an athlete, may have a BMI that is higher than 24.9. In cases like these, BMI is not an accurate measure of body fat.  · To determine if excess body fat is the cause of a BMI of 25 or higher, further assessments may need to be done by a health care provider.  · BMI is usually interpreted in the same way for men and women.  Why is BMI a useful tool?  BMI is useful in two ways:  · Identifying a weight problem that may be related to a medical condition, or that may increase the risk for medical problems.  · Promoting lifestyle and diet changes in order to reach a healthy weight.  Summary  · Body mass index (BMI) is a number that is calculated from a person's weight and height.  · BMI may help to estimate how much of a person's weight is composed of fat. BMI can help identify those who may be at higher risk for certain medical problems.  · BMI can be measured using English measurements or metric measurements.  · To interpret your results, your health care provider will use BMI charts to identify whether you are underweight, normal weight, overweight, or obese.  This information is not intended to replace advice given to you by your health care provider. Make sure you discuss any questions you have with your health care provider.  Document Released: 08/29/2005 Document Revised: 10/31/2018 Document Reviewed: 10/31/2018  [employer] Interactive Patient Education © 2019 [employer]

## 2019-09-12 ENCOUNTER — TELEPHONE (OUTPATIENT)
Dept: CARDIOLOGY | Facility: CLINIC | Age: 64
End: 2019-09-12

## 2019-09-12 NOTE — TELEPHONE ENCOUNTER
Pt left a vm that she wanted to know where to send her cardiac monitor back to. I called the patient and explained to her that the box has a few UPS dropoff sites listed on them. She states that she was told by [facility] that she needed a different phone for the monitor as the one sent didn't not transmit for her area. Upon logging in to [facility], I informed the patient that there was not a baseline reading for her  Monitor and she should contact [facility] [place] to inquire about resolving her transmission issues. - AS, PAC

## 2019-09-16 ENCOUNTER — TELEPHONE (OUTPATIENT)
Dept: CARDIOLOGY | Facility: CLINIC | Age: 64
End: 2019-09-16

## 2019-09-16 NOTE — TELEPHONE ENCOUNTER
PT STATED THAT WHEN SHE WENT TO CHANGE THE PAD ON HER MONITOR IT TOOK THE SKIN OFF AND SHE DOES NOT THINK THAT SHE WILL BE ABLE TO COMPLETE THE MONITOR BC HER SKIN IS SO IRRITATED / SORE

## 2019-10-16 ENCOUNTER — OFFICE VISIT (OUTPATIENT)
Dept: CARDIOLOGY | Facility: CLINIC | Age: 64
End: 2019-10-16

## 2019-10-16 VITALS
WEIGHT: 235 LBS | OXYGEN SATURATION: 97 % | HEIGHT: 60 IN | SYSTOLIC BLOOD PRESSURE: 112 MMHG | BODY MASS INDEX: 46.13 KG/M2 | HEART RATE: 80 BPM | DIASTOLIC BLOOD PRESSURE: 66 MMHG

## 2019-10-16 DIAGNOSIS — R06.02 SHORTNESS OF BREATH: Primary | ICD-10-CM

## 2019-10-16 DIAGNOSIS — R53.83 FATIGUE, UNSPECIFIED TYPE: ICD-10-CM

## 2019-10-16 DIAGNOSIS — R07.9 CHEST PAIN, UNSPECIFIED TYPE: ICD-10-CM

## 2019-10-16 PROCEDURE — 99214 OFFICE O/P EST MOD 30 MIN: CPT | Performed by: PHYSICIAN ASSISTANT

## 2019-10-16 RX ORDER — DIGOXIN 125 MCG
125 TABLET ORAL DAILY
Qty: 30 TABLET | Refills: 11 | Status: SHIPPED | OUTPATIENT
Start: 2019-10-16 | End: 2019-10-23

## 2019-10-16 NOTE — PROGRESS NOTES
Problem list     Subjective   Sidra Lane is a 64 y.o. female     Chief Complaint   Patient presents with   • Atrial Fibrillation     Here for a follow up on testing    • Hypertension   • Shortness of Breath     PROBLEM LIST:      1.  History of chest pain  1.1 stress test August 2018 at [facility] demonstrated no evidence of ischemia and preserved LV function  2.  Preserved systolic function  3.  Paroxysmal atrial fibrillation  3.1 patient  Coumadin for anticoagulation  3.2 patient previously on amiodarone.  Currently on sotalol having failed flecainide  4.  Hypertension   5.  History of DVT on chronic anticoagulation  6.  Recent left lower lobe pulmonary embolus with patient subtherapeutic on Coumadin January 2019    HPI    Patient is a 64-year-old female who presents to the office for evaluation and follow-up.  Patient describes doing well.  We had patient wear event monitor because of atrial fibrillation to determine whether or not she was in chronic atrial fibrillation or paroxysmal.  She had been on sotalol but the last office visit she was in A. fib.  Event monitor confirmed that she is in atrial fibrillation chronically.    She does feel this but does not seem to be significantly symptomatic.  She notices her heart will race at times.  She describes at times in the past she has had to go to the hospital because of RVR.  She has occasional substernal pressure as of pain that she describes.  This is not associated with her A. fib.  She describes it lasting for about an hour at times will occur approximately once a week.  She has no further pain.  She does have mild to moderate exertional dyspnea but no severe shortness of breath.  No PND orthopnea.    She otherwise is doing well    Outpatient Encounter Medications as of 10/16/2019   Medication Sig Dispense Refill   • amLODIPine (NORVASC) 10 MG tablet Daily.     • diphenoxylate-atropine (LOMOTIL) 2.5-0.025 MG per tablet Take  by mouth.  Bid-tid prn for diarrhea     • fluticasone (VERAMYST) 27.5 MCG/SPRAY nasal spray 2 sprays into the nostril(s) as directed by provider Daily.     • furosemide (LASIX) 20 MG tablet Daily As Needed.     • metoprolol succinate XL (TOPROL-XL) 50 MG 24 hr tablet Take 1 tablet by mouth Daily. 30 tablet 11   • omeprazole (priLOSEC) 20 MG capsule Daily.     • warfarin (COUMADIN) 4 MG tablet Take 4 mg by mouth Every Night.     • WELCHOL 625 MG tablet 2 tablets 2 (Two) Times a Day.     • [DISCONTINUED] Sotalol HCl AF 80 MG tablet Take 1 tablet by mouth 2 (Two) Times a Day. 60 tablet 11   • digoxin (LANOXIN) 125 MCG tablet Take 1 tablet by mouth Daily. 30 tablet 11     No facility-administered encounter medications on file as of 10/16/2019.        Other and Iodine    Past Medical History:   Diagnosis Date   • Atrial fibrillation (CMS/HCC)    • CHF (congestive heart failure) (CMS/HCC)    • Deep vein thrombosis (CMS/HCC)    • GERD (gastroesophageal reflux disease)    • Hypertension    • Pulmonary embolism (CMS/HCC)        Social History     Socioeconomic History   • Marital status: Single     Spouse name: Not on file   • Number of children: Not on file   • Years of education: Not on file   • Highest education level: Not on file   Tobacco Use   • Smoking status: Never Smoker   • Smokeless tobacco: Never Used   Substance and Sexual Activity   • Alcohol use: No   • Drug use: No       Family History   Problem Relation Age of Onset   • Hypertension Mother    • Cervical cancer Mother        Review of Systems   Constitutional: Positive for fatigue (has felt more tired since starting on Sotalol. ). Negative for chills and fever.   HENT: Negative.    Eyes: Positive for visual disturbance (reading glasses ).   Respiratory: Positive for shortness of breath (when laying down ). Negative for chest tightness.    Cardiovascular: Positive for chest pain and palpitations (occasional flutters ). Negative for leg swelling.   Gastrointestinal:  "Positive for abdominal pain (some cramping in abdomen since starting on Sotalol ).   Endocrine: Negative.  Negative for cold intolerance and heat intolerance.   Genitourinary: Negative.    Musculoskeletal: Negative.  Negative for arthralgias and back pain.   Skin: Negative.  Negative for rash and wound.   Allergic/Immunologic: Negative.  Negative for environmental allergies and food allergies.   Neurological: Positive for light-headedness (when standing too fast, bends over ). Negative for dizziness and weakness.   Hematological: Bruises/bleeds easily (bruises and bleeds easily ).   Psychiatric/Behavioral: Positive for sleep disturbance (Doesn't sleep well at night, has awaken with smothering. Does snore and has witnessed apneic events ).   All other systems reviewed and are negative.      Objective   Vitals:    10/16/19 1335   BP: 112/66   BP Location: Left arm   Patient Position: Sitting   Pulse: 80   SpO2: 97%   Weight: 107 kg (235 lb)   Height: 152.4 cm (60\")      /66 (BP Location: Left arm, Patient Position: Sitting)   Pulse 80   Ht 152.4 cm (60\")   Wt 107 kg (235 lb)   SpO2 97%   BMI 45.90 kg/m²     Lab Results (most recent)     None          Physical Exam   Constitutional: She is oriented to person, place, and time. She appears well-developed and well-nourished. No distress.   HENT:   Head: Normocephalic and atraumatic.   Eyes: EOM are normal. Pupils are equal, round, and reactive to light.   Neck: No JVD present.   Cardiovascular: Normal rate, normal heart sounds and intact distal pulses. An irregularly irregular rhythm present. Exam reveals no gallop and no friction rub.   No murmur heard.  Pulmonary/Chest: Effort normal and breath sounds normal. No respiratory distress. She has no wheezes. She has no rales. She exhibits no tenderness.   Musculoskeletal: Normal range of motion. She exhibits no edema.   Neurological: She is alert and oriented to person, place, and time. No cranial nerve deficit. "   Skin: Skin is warm and dry. No rash noted. No erythema. No pallor.   Psychiatric: She has a normal mood and affect. Her behavior is normal.   Nursing note and vitals reviewed.      Procedure   Procedures       Assessment/Plan     Problems Addressed this Visit        Respiratory    Shortness of breath - Primary    Relevant Orders    Overnight Sleep Oximetry Study    Adult Transthoracic Echo Complete W/ Cont if Necessary Per Protocol    Stress Test With Myocardial Perfusion One Day       Nervous and Auditory    Chest pain    Relevant Orders    Overnight Sleep Oximetry Study    Adult Transthoracic Echo Complete W/ Cont if Necessary Per Protocol    Stress Test With Myocardial Perfusion One Day       Other    Fatigue    Relevant Orders    Overnight Sleep Oximetry Study    Adult Transthoracic Echo Complete W/ Cont if Necessary Per Protocol    Stress Test With Myocardial Perfusion One Day            Recommendation  1.  Patient with complaints of chest discomfort.  I feel that we should rule out ischemia as a component.  She had negative testing in the past but continues to have substernal discomfort.  We will schedule for stress testing.  2.  Echocardiogram to evaluate LV structure and function and evaluate filling pressures.  3.  I would like to schedule for overnight sleep oximetry.  She describes waking up frequently and has been short of breath.  4.  I am stopping sotalol as apparently she is in chronic A. fib.  I am adding digoxin to her regimen.  She will monitor heart rate and call back in 1 week.  5.  We will see her back for follow-up after testing.  She will follow with primary as scheduled         Patient's Body mass index is 45.9 kg/m². BMI is above normal parameters. Recommendations include: educational material and referral to primary care.       Electronically signed by:

## 2019-10-16 NOTE — PATIENT INSTRUCTIONS
"Fat and Cholesterol Restricted Eating Plan  Getting too much fat and cholesterol in your diet may cause health problems. Choosing the right foods helps keep your fat and cholesterol at normal levels. This can keep you from getting certain diseases.  Your doctor may recommend an eating plan that includes:  · Total fat: ______% or less of total calories a day.  · Saturated fat: ______% or less of total calories a day.  · Cholesterol: less than _________mg a day.  · Fiber: ______g a day.  What are tips for following this plan?  General tips    · Work with your doctor to lose weight if you need to.  · Avoid:  ? Foods with added sugar.  ? Fried foods.  ? Foods with partially hydrogenated oils.  · Limit alcohol intake to no more than 1 drink a day for nonpregnant women and 2 drinks a day for men. One drink equals 12 oz of beer, 5 oz of wine, or 1½ oz of hard liquor.  Reading food labels  · Check food labels for:  ? Trans fats.  ? Partially hydrogenated oils.  ? Saturated fat (g) in each serving.  ? Cholesterol (mg) in each serving.  ? Fiber (g) in each serving.  · Choose foods with healthy fats, such as:  ? Monounsaturated fats.  ? Polyunsaturated fats.  ? Omega-3 fats.  · Choose grain products that have whole grains. Look for the word \"whole\" as the first word in the ingredient list.  Cooking  · Cook foods using low-fat methods. These include baking, boiling, grilling, and broiling.  · Eat more home-cooked foods. Eat at restaurants and buffets less often.  · Avoid cooking using saturated fats, such as butter, cream, palm oil, palm kernel oil, and coconut oil.  Meal planning    · At meals, divide your plate into four equal parts:  ? Fill one-half of your plate with vegetables and green salads.  ? Fill one-fourth of your plate with whole grains.  ? Fill one-fourth of your plate with low-fat (lean) protein foods.  · Eat fish that is high in omega-3 fats at least two times a week. This includes mackerel, tuna, sardines, and " salmon.  · Eat foods that are high in fiber, such as whole grains, beans, apples, broccoli, carrots, peas, and barley.  Recommended foods  Grains  · Whole grains, such as whole wheat or whole grain breads, crackers, cereals, and pasta. Unsweetened oatmeal, bulgur, barley, quinoa, or brown rice. Corn or whole wheat flour tortillas.  Vegetables  · Fresh or frozen vegetables (raw, steamed, roasted, or grilled). Green salads.  Fruits  · All fresh, canned (in natural juice), or frozen fruits.  Meats and other protein foods  · Ground beef (85% or leaner), grass-fed beef, or beef trimmed of fat. Skinless chicken or turkey. Ground chicken or turkey. Pork trimmed of fat. All fish and seafood. Egg whites. Dried beans, peas, or lentils. Unsalted nuts or seeds. Unsalted canned beans. Nut butters without added sugar or oil.  Dairy  · Low-fat or nonfat dairy products, such as skim or 1% milk, 2% or reduced-fat cheeses, low-fat and fat-free ricotta or cottage cheese, or plain low-fat and nonfat yogurt.  Fats and oils  · Tub margarine without trans fats. Light or reduced-fat mayonnaise and salad dressings. Avocado. Olive, canola, sesame, or safflower oils.  The items listed above may not be a complete list of recommended foods or beverages. Contact your dietitian for more options.  The items listed above may not be a complete list of foods and beverages [you/your child] can eat. Contact a dietitian for more information.  Foods to avoid  Grains  · White bread. White pasta. White rice. Cornbread. Bagels, pastries, and croissants. Crackers and snack foods that contain trans fat and hydrogenated oils.  Vegetables  · Vegetables cooked in cheese, cream, or butter sauce. Fried vegetables.  Fruits  · Canned fruit in heavy syrup. Fruit in cream or butter sauce. Fried fruit.  Meats and other protein foods  · Fatty cuts of meat. Ribs, chicken wings, atkins, sausage, bologna, salami, chitterlings, fatback, hot dogs, bratwurst, and packaged  lunch meats. Liver and organ meats. Whole eggs and egg yolks. Chicken and turkey with skin. Fried meat.  Dairy  · Whole or 2% milk, cream, half-and-half, and cream cheese. Whole milk cheeses. Whole-fat or sweetened yogurt. Full-fat cheeses. Nondairy creamers and whipped toppings. Processed cheese, cheese spreads, and cheese curds.  Beverages  · Alcohol. Sugar-sweetened drinks such as sodas, lemonade, and fruit drinks.  Fats and oils  · Butter, stick margarine, lard, shortening, ghee, or atkins fat. Coconut, palm kernel, and palm oils.  Sweets and desserts  · Corn syrup, sugars, honey, and molasses. Candy. Jam and jelly. Syrup. Sweetened cereals. Cookies, pies, cakes, donuts, muffins, and ice cream.  The items listed above may not be a complete list of foods and beverages to avoid. Contact your dietitian for more information.  The items listed above may not be a complete list of foods and beverages [you/your child] should avoid. Contact a dietitian for more information.  Summary  · Choosing the right foods helps keep your fat and cholesterol at normal levels. This can keep you from getting certain diseases.  · At meals, fill one-half of your plate with vegetables and green salads.  · Eat high-fiber foods, like whole grains, beans, apples, carrots, peas, and barley.  · Limit added sugar, saturated fats, alcohol, and fried foods.  This information is not intended to replace advice given to you by your health care provider. Make sure you discuss any questions you have with your health care provider.  Document Released: 06/18/2013 Document Revised: 09/04/2018 Document Reviewed: 09/04/2018  [employer] Interactive Patient Education © 2019 [employer]  BMI for Adults    Body mass index (BMI) is a number that is calculated from a person's weight and height. BMI may help to estimate how much of a person's weight is composed of fat. BMI can help identify those who may be at higher risk for certain medical problems.  How is BMI  "used with adults?  BMI is used as a screening tool to identify possible weight problems. It is used to check whether a person is obese, overweight, healthy weight, or underweight.  How is BMI calculated?  BMI measures your weight and compares it to your height. This can be done either in English ([place] or metric measurements. Note that charts are available to help you find your BMI quickly and easily without having to do these calculations yourself.  To calculate your BMI in English ([place] measurements, your health care provider will:  1. Measure your weight in pounds (lb).  2. Multiply the number of pounds by 703.  ? For example, for a person who weighs 180 lb, multiply that number by 703, which equals 126,540.  3. Measure your height in inches (in). Then multiply that number by itself to get a measurement called \"inches squared.\"  ? For example, for a person who is 70 in tall, the \"inches squared\" measurement is 70 in x 70 in, which equals 4900 inches squared.  4. Divide the total from Step 2 (number of lb x 703) by the total from Step 3 (inches squared): 126,540 ÷ 4900 = 25.8. This is your BMI.  To calculate your BMI in metric measurements, your health care provider will:  1. Measure your weight in kilograms (kg).  2. Measure your height in meters (m). Then multiply that number by itself to get a measurement called \"meters squared.\"  ? For example, for a person who is 1.75 m tall, the \"meters squared\" measurement is 1.75 m x 1.75 m, which is equal to 3.1 meters squared.  3. Divide the number of kilograms (your weight) by the meters squared number. In this example: 70 ÷ 3.1 = 22.6. This is your BMI.  How is BMI interpreted?  To interpret your results, your health care provider will use BMI charts to identify whether you are underweight, normal weight, overweight, or obese. The following guidelines will be used:  · Underweight: BMI less than 18.5.  · Normal weight: BMI between 18.5 and 24.9.  · Overweight: BMI " between 25 and 29.9.  · Obese: BMI of 30 and above.  Please note:  · Weight includes both fat and muscle, so someone with a muscular build, such as an athlete, may have a BMI that is higher than 24.9. In cases like these, BMI is not an accurate measure of body fat.  · To determine if excess body fat is the cause of a BMI of 25 or higher, further assessments may need to be done by a health care provider.  · BMI is usually interpreted in the same way for men and women.  Why is BMI a useful tool?  BMI is useful in two ways:  · Identifying a weight problem that may be related to a medical condition, or that may increase the risk for medical problems.  · Promoting lifestyle and diet changes in order to reach a healthy weight.  Summary  · Body mass index (BMI) is a number that is calculated from a person's weight and height.  · BMI may help to estimate how much of a person's weight is composed of fat. BMI can help identify those who may be at higher risk for certain medical problems.  · BMI can be measured using English measurements or metric measurements.  · To interpret your results, your health care provider will use BMI charts to identify whether you are underweight, normal weight, overweight, or obese.  This information is not intended to replace advice given to you by your health care provider. Make sure you discuss any questions you have with your health care provider.  Document Released: 08/29/2005 Document Revised: 10/31/2018 Document Reviewed: 10/31/2018  [employer] Interactive Patient Education © 2019 [employer]

## 2019-10-21 ENCOUNTER — TELEPHONE (OUTPATIENT)
Dept: CARDIOLOGY | Facility: CLINIC | Age: 64
End: 2019-10-21

## 2019-10-21 NOTE — TELEPHONE ENCOUNTER
Cut amlodipine to 5 mg daily.  Increase Toprol.  If she is on 25 mg twice a day increase to 50 mg twice a day

## 2019-10-21 NOTE — TELEPHONE ENCOUNTER
----- Message from Anne Wiggins MA sent at 10/21/2019  9:35 AM EDT -----  Patient prescribed Digoxin on her last visit and is unable to take it. She states it causes left arm pain, chest pain and her mouth to feel numb. She will not be taking it today. She is currently not having any chest pain. Please call [phone].

## 2019-10-22 ENCOUNTER — TELEPHONE (OUTPATIENT)
Dept: CARDIOLOGY | Facility: CLINIC | Age: 64
End: 2019-10-22

## 2019-10-22 NOTE — TELEPHONE ENCOUNTER
Patient called office regarding Digoxin causing chest and arm pain. Per Blair Silva PAC patient to stop Digoxin. Increase Metoprolol to 50 mg bid. Decrease Amlodipine to 5 mg daily. Do not restart Sotalol. Patient stated she had not taken digoxin since yesterday, still not feeling well, complained of dizziness and slight chest pain. Instructed patient she needed to go to the nearest ER to be evaluated. Patient verbalized understanding stated she would go to ER. Kaye Daniel LPN

## 2019-10-23 ENCOUNTER — OFFICE VISIT (OUTPATIENT)
Dept: CARDIOLOGY | Facility: CLINIC | Age: 64
End: 2019-10-23

## 2019-10-23 ENCOUNTER — LAB (OUTPATIENT)
Dept: LAB | Facility: HOSPITAL | Age: 64
End: 2019-10-23

## 2019-10-23 VITALS
HEART RATE: 96 BPM | HEIGHT: 60 IN | DIASTOLIC BLOOD PRESSURE: 85 MMHG | SYSTOLIC BLOOD PRESSURE: 135 MMHG | OXYGEN SATURATION: 97 % | WEIGHT: 235.4 LBS | BODY MASS INDEX: 46.22 KG/M2

## 2019-10-23 DIAGNOSIS — I48.0 PAROXYSMAL ATRIAL FIBRILLATION (HCC): Primary | ICD-10-CM

## 2019-10-23 DIAGNOSIS — R06.02 SHORTNESS OF BREATH: ICD-10-CM

## 2019-10-23 DIAGNOSIS — I48.0 PAROXYSMAL ATRIAL FIBRILLATION (HCC): ICD-10-CM

## 2019-10-23 DIAGNOSIS — R53.83 FATIGUE, UNSPECIFIED TYPE: ICD-10-CM

## 2019-10-23 DIAGNOSIS — R07.9 CHEST PAIN, UNSPECIFIED TYPE: ICD-10-CM

## 2019-10-23 DIAGNOSIS — I10 ESSENTIAL HYPERTENSION: ICD-10-CM

## 2019-10-23 DIAGNOSIS — I82.4Y9 ACUTE DEEP VEIN THROMBOSIS (DVT) OF PROXIMAL VEIN OF LOWER EXTREMITY, UNSPECIFIED LATERALITY (HCC): ICD-10-CM

## 2019-10-23 DIAGNOSIS — I25.10 CORONARY ARTERY DISEASE INVOLVING NATIVE CORONARY ARTERY OF NATIVE HEART WITHOUT ANGINA PECTORIS: ICD-10-CM

## 2019-10-23 DIAGNOSIS — R42 DIZZINESS: ICD-10-CM

## 2019-10-23 LAB
INR PPP: 1.96 (ref 0.9–1.1)
PROTHROMBIN TIME: 23.3 SECONDS (ref 11–15.4)

## 2019-10-23 PROCEDURE — 99214 OFFICE O/P EST MOD 30 MIN: CPT | Performed by: INTERNAL MEDICINE

## 2019-10-23 PROCEDURE — 85610 PROTHROMBIN TIME: CPT | Performed by: INTERNAL MEDICINE

## 2019-10-23 PROCEDURE — 36415 COLL VENOUS BLD VENIPUNCTURE: CPT

## 2019-10-23 NOTE — PATIENT INSTRUCTIONS
Obesity, Adult  Obesity is the condition of having too much total body fat. Being overweight or obese means that your weight is greater than what is considered healthy for your body size. Obesity is determined by a measurement called BMI. BMI is an estimate of body fat and is calculated from height and weight. For adults, a BMI of 30 or higher is considered obese.  Obesity can eventually lead to other health concerns and major illnesses, including:  · Stroke.  · Coronary artery disease (CAD).  · Type 2 diabetes.  · Some types of cancer, including cancers of the colon, breast, uterus, and gallbladder.  · Osteoarthritis.  · High blood pressure (hypertension).  · High cholesterol.  · Sleep apnea.  · Gallbladder stones.  · Infertility problems.  What are the causes?  The main cause of obesity is taking in (consuming) more calories than your body uses for energy. Other factors that contribute to this condition may include:  · Being born with genes that make you more likely to become obese.  · Having a medical condition that causes obesity. These conditions include:  ? Hypothyroidism.  ? Polycystic ovarian syndrome (PCOS).  ? Binge-eating disorder.  ? Cushing syndrome.  · Taking certain medicines, such as steroids, antidepressants, and seizure medicines.  · Not being physically active (sedentary lifestyle).  · Living where there are limited places to exercise safely or buy healthy foods.  · Not getting enough sleep.  What increases the risk?  The following factors may increase your risk of this condition:  · Having a family history of obesity.  · Being a woman of -American descent.  · Being a man of  descent.  What are the signs or symptoms?  Having excessive body fat is the main symptom of this condition.  How is this diagnosed?  This condition may be diagnosed based on:  · Your symptoms.  · Your medical history.  · A physical exam. Your health care provider may measure:  ? Your BMI. If you are an adult  with a BMI between 25 and less than 30, you are considered overweight. If you are an adult with a BMI of 30 or higher, you are considered obese.  ? The distances around your hips and your waist (circumferences). These may be compared to each other to help diagnose your condition.  ? Your skinfold thickness. Your health care provider may gently pinch a fold of your skin and measure it.  How is this treated?  Treatment for this condition often includes changing your lifestyle. Treatment may include some or all of the following:  · Dietary changes. Work with your health care provider and a dietitian to set a weight-loss goal that is healthy and reasonable for you. Dietary changes may include eating:  ? Smaller portions. A portion size is the amount of a particular food that is healthy for you to eat at one time. This varies from person to person.  ? Low-calorie or low-fat options.  ? More whole grains, fruits, and vegetables.  · Regular physical activity. This may include aerobic activity (cardio) and strength training.  · Medicine to help you lose weight. Your health care provider may prescribe medicine if you are unable to lose 1 pound a week after 6 weeks of eating more healthily and doing more physical activity.  · Surgery. Surgical options may include gastric banding and gastric bypass. Surgery may be done if:  ? Other treatments have not helped to improve your condition.  ? You have a BMI of 40 or higher.  ? You have life-threatening health problems related to obesity.  Follow these instructions at home:    Eating and drinking    · Follow recommendations from your health care provider about what you eat and drink. Your health care provider may advise you to:  ? Limit fast foods, sweets, and processed snack foods.  ? Choose low-fat options, such as low-fat milk instead of whole milk.  ? Eat 5 or more servings of fruits or vegetables every day.  ? Eat at home more often. This gives you more control over what you  eat.  ? Choose healthy foods when you eat out.  ? Learn what a healthy portion size is.  ? Keep low-fat snacks on hand.  ? Avoid sugary drinks, such as soda, fruit juice, iced tea sweetened with sugar, and flavored milk.  ? Eat a healthy breakfast.  · Drink enough water to keep your urine clear or pale yellow.  · Do not go without eating for long periods of time (do not fast) or follow a fad diet. Fasting and fad diets can be unhealthy and even dangerous.  Physical Activity  · Exercise regularly, as told by your health care provider. Ask your health care provider what types of exercise are safe for you and how often you should exercise.  · Warm up and stretch before being active.  · Cool down and stretch after being active.  · Rest between periods of activity.  Lifestyle  · Limit the time that you spend in front of your TV, computer, or video game system.  · Find ways to reward yourself that do not involve food.  · Limit alcohol intake to no more than 1 drink a day for nonpregnant women and 2 drinks a day for men. One drink equals 12 oz of beer, 5 oz of wine, or 1½ oz of hard liquor.  General instructions  · Keep a weight loss journal to keep track of the food you eat and how much you exercise you get.  · Take over-the-counter and prescription medicines only as told by your health care provider.  · Take vitamins and supplements only as told by your health care provider.  · Consider joining a support group. Your health care provider may be able to recommend a support group.  · Keep all follow-up visits as told by your health care provider. This is important.  Contact a health care provider if:  · You are unable to meet your weight loss goal after 6 weeks of dietary and lifestyle changes.  This information is not intended to replace advice given to you by your health care provider. Make sure you discuss any questions you have with your health care provider.  Document Released: 01/25/2006 Document Revised: 05/22/2017  Document Reviewed: 10/05/2016  [employer] Interactive Patient Education © 2019 [employer]  MyPlate from [employer]    MyPlate is an outline of a general healthy diet based on the 2010 Dietary Guidelines for Americans, from the U.S. Department of Agriculture (USDA). It sets guidelines for how much food you should eat from each food group based on your age, sex, and level of physical activity.  What are tips for following MyPlate?  To follow MyPlate recommendations:  · Eat a wide variety of fruits and vegetables, grains, and protein foods.  · Serve smaller portions and eat less food throughout the day.  · Limit portion sizes to avoid overeating.  · Enjoy your food.  · Get at least 150 minutes of exercise every week. This is about 30 minutes each day, 5 or more days per week.  It can be difficult to have every meal look like MyPlate. Think about MyPlate as eating guidelines for an entire day, rather than each individual meal.  Fruits and vegetables  · Make half of your plate fruits and vegetables.  · Eat many different colors of fruits and vegetables each day.  · For a 2,000 calorie daily food plan, eat:  ? 2½ cups of vegetables every day.  ? 2 cups of fruit every day.  · 1 cup is equal to:  ? 1 cup raw or cooked vegetables.  ? 1 cup raw fruit.  ? 1 medium-sized orange, apple, or banana.  ? 1 cup 100% fruit or vegetable juice.  ? 2 cups raw leafy greens, such as lettuce, spinach, or kale.  ? ½ cup dried fruit.  Grains  · One fourth of your plate should be grains.  · Make at least half of the grains you eat each day whole grains.  · For a 2,000 calorie daily food plan, eat 6 oz of grains every day.  · 1 oz is equal to:  ? 1 slice bread.  ? 1 cup cereal.  ? ½ cup cooked rice, cereal, or pasta.  Protein  · One fourth of your plate should be protein.  · Eat a wide variety of protein foods, including meat, poultry, fish, eggs, beans, nuts, and tofu.  · For a 2,000 calorie daily food plan, eat 5½ oz of protein every day.  · 1 oz  is equal to:  ? 1 oz meat, poultry, or fish.  ? ¼ cup cooked beans.  ? 1 egg.  ? ½ oz nuts or seeds.  ? 1 Tbsp peanut butter.  Dairy  · Drink fat-free or low-fat (1%) milk.  · Eat or drink dairy as a side to meals.  · For a 2,000 calorie daily food plan, eat or drink 3 cups of dairy every day.  · 1 cup is equal to:  ? 1 cup milk, yogurt, cottage cheese, or soy milk (soy beverage).  ? 2 oz processed cheese.  ? 1½ oz natural cheese.  Fats, oils, salt, and sugars  · Only small amounts of oils are recommended.  · Avoid foods that are high in calories and low in nutritional value (empty calories), like foods high in fat or added sugars.  · Choose foods that are low in salt (sodium). Choose foods that have less than 140 milligrams (mg) of sodium per serving.  · Drink water instead of sugary drinks. Drink enough water each day to keep your urine pale yellow.  Where to find support  · Work with your health care provider or a nutrition specialist (dietitian) to develop a customized eating plan that is right for you.  · Download an claudia (mobile application) to help you track your daily food intake.  Where to find more information  · Go to ChooseMyPlate.gov for more information.  · Learn more and log your daily food intake according to MyPlate using USDA's SuperTracker: www.supertracker.usda.gov  Summary  · MyPlate is a general guideline for healthy eating from the USDA. It is based on the 2010 Dietary Guidelines for Americans.  · In general, fruits and vegetables should take up ½ of your plate, grains should take up ¼ of your plate, and protein should take up ¼ of your plate.  This information is not intended to replace advice given to you by your health care provider. Make sure you discuss any questions you have with your health care provider.  Document Released: 01/06/2009 Document Revised: 03/19/2018 Document Reviewed: 03/19/2018  [employer] Interactive Patient Education © 2019 [employer]

## 2019-10-23 NOTE — PROGRESS NOTES
Subjective   Sidra Lane is a 64 y.o. female     Chief Complaint   Patient presents with   • Chest Pain     Here for a ER follow up    • Atrial Fibrillation       PROBLEM LIST:     1.  History of chest pain  1.1 stress test August 2018 at [facility] demonstrated no evidence of ischemia and preserved LV function  2.  Preserved systolic function  3.  Paroxysmal atrial fibrillation  3.1 patient  Coumadin for anticoagulation  3.2 patient previously on amiodarone.  Currently on sotalol having failed flecainide  3.3 Failed Sotalol, Side effects on Dig.  4.  Hypertension   5.  History of DVT on chronic anticoagulation  6.  Recent left lower lobe pulmonary embolus with patient subtherapeutic on Coumadin January 2019        Specialty Problems        Cardiology Problems    Atrial fibrillation (CMS/HCC)        Essential hypertension        Deep vein thrombosis (DVT) of proximal lower extremity (CMS/[facility])        Coronary artery disease involving native coronary artery of native heart without angina pectoris                HPI:  Ms. Lane returns for follow-up after being seen in the emergency room at [facility] for evaluation of chest pain, shortness of breath, perioral numbness, and nausea.    Ms. Lane was seen last week in our office and was determined to be in chronic atrial fibrillation.  Sotalol was stopped, the patient was placed on digoxin.  Ms. Lane states that she did not tolerate the digoxin with symptoms described above.  She was seen in the emergency room yesterday with unremarkable chest x-ray negative cardiac enzymes and unremarkable EKG.  She was discharged in stable condition.  She states she feels better today and that all of the symptoms she had in the ER are improving.    Ms. Lane describes a retrosternal left precordial ache similar to that which she had at the time of her last visit which prompted request for stress testing and echo.    Review of data it  appears that Ms. Lane had a second pulmonary embolism when she was subtherapeutic on Coumadin at the time of a GI bleed.  She has never had GI evaluation.  Apparently she was told that she could not start on a direct acting oral anticoagulant because of increased risk of bleeding and not being able to address bleeding were to occur.                      CURRENT MEDICATION:    Current Outpatient Medications   Medication Sig Dispense Refill   • diphenoxylate-atropine (LOMOTIL) 2.5-0.025 MG per tablet Take  by mouth. Bid-tid prn for diarrhea     • fluticasone (VERAMYST) 27.5 MCG/SPRAY nasal spray 2 sprays into the nostril(s) as directed by provider Daily.     • metoprolol succinate XL (TOPROL-XL) 50 MG 24 hr tablet Take 1 tablet by mouth Daily. (Patient taking differently: Take 50 mg by mouth 2 (Two) Times a Day.) 30 tablet 11   • omeprazole (priLOSEC) 20 MG capsule Daily.     • warfarin (COUMADIN) 4 MG tablet Take 4 mg by mouth Every Night.     • WELCHOL 625 MG tablet 2 tablets 2 (Two) Times a Day.     • amLODIPine (NORVASC) 10 MG tablet 5 mg Daily.     • digoxin (LANOXIN) 125 MCG tablet Take 1 tablet by mouth Daily. 30 tablet 11   • furosemide (LASIX) 20 MG tablet Daily As Needed.       No current facility-administered medications for this visit.        ALLERGIES:    Other and Iodine    PAST MEDICAL HISTORY:    Past Medical History:   Diagnosis Date   • Atrial fibrillation (CMS/HCC)    • CHF (congestive heart failure) (CMS/HCC)    • Deep vein thrombosis (CMS/HCC)    • GERD (gastroesophageal reflux disease)    • Hypertension    • Pulmonary embolism (CMS/HCC)        SURGICAL HISTORY:    Past Surgical History:   Procedure Laterality Date   • CHOLECYSTECTOMY     • COLON SURGERY      bowel leakage, some of colon removed   • LAPAROSCOPIC TUBAL LIGATION     • LEG SURGERY      multiple stents to BLE   • TONSILLECTOMY         SOCIAL HISTORY:    Social History     Socioeconomic History   • Marital status: Single     Spouse  "name: Not on file   • Number of children: Not on file   • Years of education: Not on file   • Highest education level: Not on file   Tobacco Use   • Smoking status: Never Smoker   • Smokeless tobacco: Never Used   Substance and Sexual Activity   • Alcohol use: No   • Drug use: No       FAMILY HISTORY:    Family History   Problem Relation Age of Onset   • Hypertension Mother    • Cervical cancer Mother        Review of Systems   Constitutional: Positive for fatigue.   HENT: Negative.    Eyes: Positive for visual disturbance (glasses prn).   Respiratory: Positive for shortness of breath (with ambulating long distance).    Cardiovascular: Positive for chest pain and palpitations (HX a-fib). Negative for leg swelling.   Gastrointestinal: Positive for diarrhea.   Endocrine: Negative.    Genitourinary: Negative.    Musculoskeletal: Positive for arthralgias and myalgias.   Skin: Negative.    Allergic/Immunologic: Negative.    Neurological: Positive for dizziness and light-headedness.   Hematological: Bruises/bleeds easily (bruise).   Psychiatric/Behavioral: Positive for sleep disturbance.       VISIT VITALS:  Vitals:    10/23/19 1107   BP: 135/85   BP Location: Left arm   Patient Position: Sitting   Pulse: 96   SpO2: 97%   Weight: 107 kg (235 lb 6.4 oz)   Height: 152.4 cm (60\")      /85 (BP Location: Left arm, Patient Position: Sitting)   Pulse 96   Ht 152.4 cm (60\")   Wt 107 kg (235 lb 6.4 oz)   SpO2 97%   BMI 45.97 kg/m²     RECENT LABS:    Objective       Physical Exam   Constitutional: She is oriented to person, place, and time. She appears well-developed and well-nourished. No distress.   HENT:   Head: Normocephalic and atraumatic.   Eyes: Conjunctivae and EOM are normal. Pupils are equal, round, and reactive to light.   Neck: Normal range of motion. Neck supple. No hepatojugular reflux and no JVD present. Carotid bruit is not present. No tracheal deviation present.   Nl. Carotid upstrokes   Cardiovascular: " Normal rate, regular rhythm, normal heart sounds and intact distal pulses.   Pulmonary/Chest: Effort normal and breath sounds normal. She has no wheezes. She has no rhonchi. She has no rales.   Nl. Expir. Phases  Nl. Breath sound intensity   Abdominal: Soft. Bowel sounds are normal. She exhibits no distension, no abdominal bruit and no mass. There is no tenderness. There is no rebound and no guarding.   No organomegaly   Musculoskeletal: Normal range of motion. She exhibits no edema, tenderness or deformity.   Neurological: She is alert and oriented to person, place, and time.   Skin: Skin is warm and dry. No rash noted. No erythema. No pallor.   Psychiatric: She has a normal mood and affect. Her behavior is normal. Judgment and thought content normal.   Nursing note and vitals reviewed.      Procedures      Assessment/Plan   #1.  Atrial fibrillation.  Ms. Lane has failed multiple antidysrhythmic's.  For the present I think that rate control is reasonable and in that regard we will continue metoprolol at 100 mg twice daily.  I do not believe that any of the patient's symptoms were related to digoxin but we will continue to hold that medication.    2.  The patient describes chest discomfort atypical for angina with some features with ischemia.  Therefore I agree with the need for stress testing and echo.    3.  I see no reason why Ms. Lane cannot be started on a novel oral anticoagulant.  We will recheck INR and, when 2 or less, we will start Eliquis 5 mg twice daily.    4.  We will see the patient in follow-up medially after testing at that time make a determination of the need for further evaluation, and consideration for long-term rhythm or rate control.  She will follow with Dr. Mcknight per his instructions.   Diagnosis Plan   1. Paroxysmal atrial fibrillation (CMS/HCC)     2. Essential hypertension     3. Coronary artery disease involving native coronary artery of native heart without angina pectoris     4.  Shortness of breath     5. Chest pain, unspecified type         No Follow-up on file.           Patient's Body mass index is 45.97 kg/m². BMI is above normal parameters. Recommendations include: educational material and referral to primary care.       Sidra Hines LPN    Scribed for Dr. Hi Singh by Sidra Hines LPN October 23, 2019 11:23 AM         Electronically signed by:            This note is dictated utilizing voice recognition software.  Although this record has been proof read, transcriptional errors may still be present. If questions occur regarding the content of this record please do not hesitate to call our office.

## 2019-10-24 ENCOUNTER — TELEPHONE (OUTPATIENT)
Dept: CARDIOLOGY | Facility: CLINIC | Age: 64
End: 2019-10-24

## 2019-10-24 NOTE — TELEPHONE ENCOUNTER
INR 1.96 DRAWN YEST. CALLED AND RELAYED TO MRS. JOHNSON THIS AM, HAD NOT PICKED UP ELIQUIS YET. SHE IS GOING TO PICK IT UP TODAY AND AWARE TO STOP COUMADIN TODAY, DOESN'T TAKE UNTIL EVENING ANYWAY, AND START ELIQUIS TODAY. AWARE TO TAKE BID. VERBALIZED SHE UNDERSTOOD. PH,LPN              ----- Message from Hi Singh MD sent at 10/24/2019 12:35 PM EDT -----  D/c coumadin, start Eliquis today.

## 2019-11-04 ENCOUNTER — TELEPHONE (OUTPATIENT)
Dept: CARDIOLOGY | Facility: CLINIC | Age: 64
End: 2019-11-04

## 2019-11-04 DIAGNOSIS — R09.02 HYPOXEMIA: ICD-10-CM

## 2019-11-04 DIAGNOSIS — R06.02 SHORTNESS OF BREATH: Primary | ICD-10-CM

## 2019-11-04 DIAGNOSIS — R79.81 ABNORMAL PULSE OXIMETRY: ICD-10-CM

## 2019-11-04 NOTE — TELEPHONE ENCOUNTER
Spoke to patient and notified her of results. She is okay with referral. CHARITO MCCULLOUGH    11/5/19 Left another msg for patient. She had tried returning my call and left a msg on nurse line.  CHARITO MCCULLOUGH    11/4/19  Left msg asking patient to call back.  CHARITO MCCULLOUGH    ----- Message from CONNIE Steven sent at 11/4/2019  4:01 PM EST -----  Refer to pulm    Patient had a average O2 sat of 92% with a low of 59%. Over 88 minutes at less than 88%.

## 2019-11-11 ENCOUNTER — APPOINTMENT (OUTPATIENT)
Dept: CARDIOLOGY | Facility: HOSPITAL | Age: 64
End: 2019-11-11

## 2019-12-12 ENCOUNTER — OFFICE VISIT (OUTPATIENT)
Dept: CARDIOLOGY | Facility: CLINIC | Age: 64
End: 2019-12-12

## 2019-12-12 VITALS
DIASTOLIC BLOOD PRESSURE: 94 MMHG | RESPIRATION RATE: 16 BRPM | OXYGEN SATURATION: 93 % | HEART RATE: 98 BPM | HEIGHT: 60 IN | SYSTOLIC BLOOD PRESSURE: 153 MMHG | WEIGHT: 236 LBS | BODY MASS INDEX: 46.33 KG/M2

## 2019-12-12 DIAGNOSIS — I10 ESSENTIAL HYPERTENSION: ICD-10-CM

## 2019-12-12 DIAGNOSIS — I48.20 CHRONIC ATRIAL FIBRILLATION (HCC): ICD-10-CM

## 2019-12-12 DIAGNOSIS — R06.02 SHORTNESS OF BREATH: ICD-10-CM

## 2019-12-12 DIAGNOSIS — R07.9 CHEST PAIN, UNSPECIFIED TYPE: Primary | ICD-10-CM

## 2019-12-12 PROCEDURE — 99214 OFFICE O/P EST MOD 30 MIN: CPT | Performed by: PHYSICIAN ASSISTANT

## 2019-12-12 RX ORDER — AMLODIPINE BESYLATE 10 MG/1
10 TABLET ORAL DAILY
Qty: 30 TABLET | Refills: 6 | Status: SHIPPED | OUTPATIENT
Start: 2019-12-12 | End: 2020-01-30 | Stop reason: SDUPTHER

## 2019-12-12 RX ORDER — LISINOPRIL 20 MG/1
20 TABLET ORAL DAILY
Qty: 30 TABLET | Refills: 11 | Status: SHIPPED | OUTPATIENT
Start: 2019-12-12 | End: 2019-12-12

## 2019-12-12 NOTE — PATIENT INSTRUCTIONS
"Fat and Cholesterol Restricted Eating Plan  Getting too much fat and cholesterol in your diet may cause health problems. Choosing the right foods helps keep your fat and cholesterol at normal levels. This can keep you from getting certain diseases.  Your doctor may recommend an eating plan that includes:  · Total fat: ______% or less of total calories a day.  · Saturated fat: ______% or less of total calories a day.  · Cholesterol: less than _________mg a day.  · Fiber: ______g a day.  What are tips for following this plan?  Meal planning  · At meals, divide your plate into four equal parts:  ? Fill one-half of your plate with vegetables and green salads.  ? Fill one-fourth of your plate with whole grains.  ? Fill one-fourth of your plate with low-fat (lean) protein foods.  · Eat fish that is high in omega-3 fats at least two times a week. This includes mackerel, tuna, sardines, and salmon.  · Eat foods that are high in fiber, such as whole grains, beans, apples, broccoli, carrots, peas, and barley.  General tips    · Work with your doctor to lose weight if you need to.  · Avoid:  ? Foods with added sugar.  ? Fried foods.  ? Foods with partially hydrogenated oils.  · Limit alcohol intake to no more than 1 drink a day for nonpregnant women and 2 drinks a day for men. One drink equals 12 oz of beer, 5 oz of wine, or 1½ oz of hard liquor.  Reading food labels  · Check food labels for:  ? Trans fats.  ? Partially hydrogenated oils.  ? Saturated fat (g) in each serving.  ? Cholesterol (mg) in each serving.  ? Fiber (g) in each serving.  · Choose foods with healthy fats, such as:  ? Monounsaturated fats.  ? Polyunsaturated fats.  ? Omega-3 fats.  · Choose grain products that have whole grains. Look for the word \"whole\" as the first word in the ingredient list.  Cooking  · Cook foods using low-fat methods. These include baking, boiling, grilling, and broiling.  · Eat more home-cooked foods. Eat at restaurants and buffets " less often.  · Avoid cooking using saturated fats, such as butter, cream, palm oil, palm kernel oil, and coconut oil.  Recommended foods    Fruits  · All fresh, canned (in natural juice), or frozen fruits.  Vegetables  · Fresh or frozen vegetables (raw, steamed, roasted, or grilled). Green salads.  Grains  · Whole grains, such as whole wheat or whole grain breads, crackers, cereals, and pasta. Unsweetened oatmeal, bulgur, barley, quinoa, or brown rice. Corn or whole wheat flour tortillas.  Meats and other protein foods  · Ground beef (85% or leaner), grass-fed beef, or beef trimmed of fat. Skinless chicken or turkey. Ground chicken or turkey. Pork trimmed of fat. All fish and seafood. Egg whites. Dried beans, peas, or lentils. Unsalted nuts or seeds. Unsalted canned beans. Nut butters without added sugar or oil.  Dairy  · Low-fat or nonfat dairy products, such as skim or 1% milk, 2% or reduced-fat cheeses, low-fat and fat-free ricotta or cottage cheese, or plain low-fat and nonfat yogurt.  Fats and oils  · Tub margarine without trans fats. Light or reduced-fat mayonnaise and salad dressings. Avocado. Olive, canola, sesame, or safflower oils.  The items listed above may not be a complete list of foods and beverages you can eat. Contact a dietitian for more information.  Foods to avoid  Fruits  · Canned fruit in heavy syrup. Fruit in cream or butter sauce. Fried fruit.  Vegetables  · Vegetables cooked in cheese, cream, or butter sauce. Fried vegetables.  Grains  · White bread. White pasta. White rice. Cornbread. Bagels, pastries, and croissants. Crackers and snack foods that contain trans fat and hydrogenated oils.  Meats and other protein foods  · Fatty cuts of meat. Ribs, chicken wings, atkins, sausage, bologna, salami, chitterlings, fatback, hot dogs, bratwurst, and packaged lunch meats. Liver and organ meats. Whole eggs and egg yolks. Chicken and turkey with skin. Fried meat.  Dairy  · Whole or 2% milk, cream,  half-and-half, and cream cheese. Whole milk cheeses. Whole-fat or sweetened yogurt. Full-fat cheeses. Nondairy creamers and whipped toppings. Processed cheese, cheese spreads, and cheese curds.  Beverages  · Alcohol. Sugar-sweetened drinks such as sodas, lemonade, and fruit drinks.  Fats and oils  · Butter, stick margarine, lard, shortening, ghee, or atkins fat. Coconut, palm kernel, and palm oils.  Sweets and desserts  · Corn syrup, sugars, honey, and molasses. Candy. Jam and jelly. Syrup. Sweetened cereals. Cookies, pies, cakes, donuts, muffins, and ice cream.  The items listed above may not be a complete list of foods and beverages you should avoid. Contact a dietitian for more information.  Summary  · Choosing the right foods helps keep your fat and cholesterol at normal levels. This can keep you from getting certain diseases.  · At meals, fill one-half of your plate with vegetables and green salads.  · Eat high-fiber foods, like whole grains, beans, apples, carrots, peas, and barley.  · Limit added sugar, saturated fats, alcohol, and fried foods.  This information is not intended to replace advice given to you by your health care provider. Make sure you discuss any questions you have with your health care provider.  Document Released: 06/18/2013 Document Revised: 08/21/2019 Document Reviewed: 09/04/2018  [employer] Interactive Patient Education © 2019 [employer]  BMI for Adults    Body mass index (BMI) is a number that is calculated from a person's weight and height. BMI may help to estimate how much of a person's weight is composed of fat. BMI can help identify those who may be at higher risk for certain medical problems.  How is BMI used with adults?  BMI is used as a screening tool to identify possible weight problems. It is used to check whether a person is obese, overweight, healthy weight, or underweight.  How is BMI calculated?  BMI measures your weight and compares it to your height. This can be done  "either in English ([place] or metric measurements. Note that charts are available to help you find your BMI quickly and easily without having to do these calculations yourself.  To calculate your BMI in English ([place] measurements, your health care provider will:  1. Measure your weight in pounds (lb).  2. Multiply the number of pounds by 703.  ? For example, for a person who weighs 180 lb, multiply that number by 703, which equals 126,540.  3. Measure your height in inches (in). Then multiply that number by itself to get a measurement called \"inches squared.\"  ? For example, for a person who is 70 in tall, the \"inches squared\" measurement is 70 in x 70 in, which equals 4900 inches squared.  4. Divide the total from Step 2 (number of lb x 703) by the total from Step 3 (inches squared): 126,540 ÷ 4900 = 25.8. This is your BMI.  To calculate your BMI in metric measurements, your health care provider will:  1. Measure your weight in kilograms (kg).  2. Measure your height in meters (m). Then multiply that number by itself to get a measurement called \"meters squared.\"  ? For example, for a person who is 1.75 m tall, the \"meters squared\" measurement is 1.75 m x 1.75 m, which is equal to 3.1 meters squared.  3. Divide the number of kilograms (your weight) by the meters squared number. In this example: 70 ÷ 3.1 = 22.6. This is your BMI.  How is BMI interpreted?  To interpret your results, your health care provider will use BMI charts to identify whether you are underweight, normal weight, overweight, or obese. The following guidelines will be used:  · Underweight: BMI less than 18.5.  · Normal weight: BMI between 18.5 and 24.9.  · Overweight: BMI between 25 and 29.9.  · Obese: BMI of 30 and above.  Please note:  · Weight includes both fat and muscle, so someone with a muscular build, such as an athlete, may have a BMI that is higher than 24.9. In cases like these, BMI is not an accurate measure of body fat.  · To determine " if excess body fat is the cause of a BMI of 25 or higher, further assessments may need to be done by a health care provider.  · BMI is usually interpreted in the same way for men and women.  Why is BMI a useful tool?  BMI is useful in two ways:  · Identifying a weight problem that may be related to a medical condition, or that may increase the risk for medical problems.  · Promoting lifestyle and diet changes in order to reach a healthy weight.  Summary  · Body mass index (BMI) is a number that is calculated from a person's weight and height.  · BMI may help to estimate how much of a person's weight is composed of fat. BMI can help identify those who may be at higher risk for certain medical problems.  · BMI can be measured using English measurements or metric measurements.  · To interpret your results, your health care provider will use BMI charts to identify whether you are underweight, normal weight, overweight, or obese.  This information is not intended to replace advice given to you by your health care provider. Make sure you discuss any questions you have with your health care provider.  Document Released: 08/29/2005 Document Revised: 10/31/2018 Document Reviewed: 10/31/2018  [employer] Interactive Patient Education © 2019 [employer]

## 2020-01-03 ENCOUNTER — OFFICE VISIT (OUTPATIENT)
Dept: CARDIOLOGY | Facility: CLINIC | Age: 65
End: 2020-01-03

## 2020-01-03 VITALS
BODY MASS INDEX: 46.13 KG/M2 | OXYGEN SATURATION: 96 % | WEIGHT: 235 LBS | HEIGHT: 60 IN | SYSTOLIC BLOOD PRESSURE: 143 MMHG | HEART RATE: 98 BPM | DIASTOLIC BLOOD PRESSURE: 99 MMHG

## 2020-01-03 DIAGNOSIS — I48.0 PAROXYSMAL ATRIAL FIBRILLATION (HCC): Primary | ICD-10-CM

## 2020-01-03 DIAGNOSIS — I10 ESSENTIAL HYPERTENSION: ICD-10-CM

## 2020-01-03 PROCEDURE — 99213 OFFICE O/P EST LOW 20 MIN: CPT | Performed by: NURSE PRACTITIONER

## 2020-01-03 RX ORDER — LISINOPRIL 5 MG/1
5 TABLET ORAL DAILY PRN
COMMUNITY
End: 2020-01-09 | Stop reason: SDUPTHER

## 2020-01-03 NOTE — PATIENT INSTRUCTIONS
"Fat and Cholesterol Restricted Eating Plan  Getting too much fat and cholesterol in your diet may cause health problems. Choosing the right foods helps keep your fat and cholesterol at normal levels. This can keep you from getting certain diseases.  Your doctor may recommend an eating plan that includes:  · Total fat: ______% or less of total calories a day.  · Saturated fat: ______% or less of total calories a day.  · Cholesterol: less than _________mg a day.  · Fiber: ______g a day.  What are tips for following this plan?  Meal planning  · At meals, divide your plate into four equal parts:  ? Fill one-half of your plate with vegetables and green salads.  ? Fill one-fourth of your plate with whole grains.  ? Fill one-fourth of your plate with low-fat (lean) protein foods.  · Eat fish that is high in omega-3 fats at least two times a week. This includes mackerel, tuna, sardines, and salmon.  · Eat foods that are high in fiber, such as whole grains, beans, apples, broccoli, carrots, peas, and barley.  General tips    · Work with your doctor to lose weight if you need to.  · Avoid:  ? Foods with added sugar.  ? Fried foods.  ? Foods with partially hydrogenated oils.  · Limit alcohol intake to no more than 1 drink a day for nonpregnant women and 2 drinks a day for men. One drink equals 12 oz of beer, 5 oz of wine, or 1½ oz of hard liquor.  Reading food labels  · Check food labels for:  ? Trans fats.  ? Partially hydrogenated oils.  ? Saturated fat (g) in each serving.  ? Cholesterol (mg) in each serving.  ? Fiber (g) in each serving.  · Choose foods with healthy fats, such as:  ? Monounsaturated fats.  ? Polyunsaturated fats.  ? Omega-3 fats.  · Choose grain products that have whole grains. Look for the word \"whole\" as the first word in the ingredient list.  Cooking  · Cook foods using low-fat methods. These include baking, boiling, grilling, and broiling.  · Eat more home-cooked foods. Eat at restaurants and buffets " less often.  · Avoid cooking using saturated fats, such as butter, cream, palm oil, palm kernel oil, and coconut oil.  Recommended foods    Fruits  · All fresh, canned (in natural juice), or frozen fruits.  Vegetables  · Fresh or frozen vegetables (raw, steamed, roasted, or grilled). Green salads.  Grains  · Whole grains, such as whole wheat or whole grain breads, crackers, cereals, and pasta. Unsweetened oatmeal, bulgur, barley, quinoa, or brown rice. Corn or whole wheat flour tortillas.  Meats and other protein foods  · Ground beef (85% or leaner), grass-fed beef, or beef trimmed of fat. Skinless chicken or turkey. Ground chicken or turkey. Pork trimmed of fat. All fish and seafood. Egg whites. Dried beans, peas, or lentils. Unsalted nuts or seeds. Unsalted canned beans. Nut butters without added sugar or oil.  Dairy  · Low-fat or nonfat dairy products, such as skim or 1% milk, 2% or reduced-fat cheeses, low-fat and fat-free ricotta or cottage cheese, or plain low-fat and nonfat yogurt.  Fats and oils  · Tub margarine without trans fats. Light or reduced-fat mayonnaise and salad dressings. Avocado. Olive, canola, sesame, or safflower oils.  The items listed above may not be a complete list of foods and beverages you can eat. Contact a dietitian for more information.  Foods to avoid  Fruits  · Canned fruit in heavy syrup. Fruit in cream or butter sauce. Fried fruit.  Vegetables  · Vegetables cooked in cheese, cream, or butter sauce. Fried vegetables.  Grains  · White bread. White pasta. White rice. Cornbread. Bagels, pastries, and croissants. Crackers and snack foods that contain trans fat and hydrogenated oils.  Meats and other protein foods  · Fatty cuts of meat. Ribs, chicken wings, atkins, sausage, bologna, salami, chitterlings, fatback, hot dogs, bratwurst, and packaged lunch meats. Liver and organ meats. Whole eggs and egg yolks. Chicken and turkey with skin. Fried meat.  Dairy  · Whole or 2% milk, cream,  half-and-half, and cream cheese. Whole milk cheeses. Whole-fat or sweetened yogurt. Full-fat cheeses. Nondairy creamers and whipped toppings. Processed cheese, cheese spreads, and cheese curds.  Beverages  · Alcohol. Sugar-sweetened drinks such as sodas, lemonade, and fruit drinks.  Fats and oils  · Butter, stick margarine, lard, shortening, ghee, or atkins fat. Coconut, palm kernel, and palm oils.  Sweets and desserts  · Corn syrup, sugars, honey, and molasses. Candy. Jam and jelly. Syrup. Sweetened cereals. Cookies, pies, cakes, donuts, muffins, and ice cream.  The items listed above may not be a complete list of foods and beverages you should avoid. Contact a dietitian for more information.  Summary  · Choosing the right foods helps keep your fat and cholesterol at normal levels. This can keep you from getting certain diseases.  · At meals, fill one-half of your plate with vegetables and green salads.  · Eat high-fiber foods, like whole grains, beans, apples, carrots, peas, and barley.  · Limit added sugar, saturated fats, alcohol, and fried foods.  This information is not intended to replace advice given to you by your health care provider. Make sure you discuss any questions you have with your health care provider.  Document Released: 06/18/2013 Document Revised: 08/21/2019 Document Reviewed: 09/04/2018  [employer] Interactive Patient Education © 2019 [employer]  BMI for Adults    Body mass index (BMI) is a number that is calculated from a person's weight and height. BMI may help to estimate how much of a person's weight is composed of fat. BMI can help identify those who may be at higher risk for certain medical problems.  How is BMI used with adults?  BMI is used as a screening tool to identify possible weight problems. It is used to check whether a person is obese, overweight, healthy weight, or underweight.  How is BMI calculated?  BMI measures your weight and compares it to your height. This can be done  "either in English ([place] or metric measurements. Note that charts are available to help you find your BMI quickly and easily without having to do these calculations yourself.  To calculate your BMI in English ([place] measurements, your health care provider will:  1. Measure your weight in pounds (lb).  2. Multiply the number of pounds by 703.  ? For example, for a person who weighs 180 lb, multiply that number by 703, which equals 126,540.  3. Measure your height in inches (in). Then multiply that number by itself to get a measurement called \"inches squared.\"  ? For example, for a person who is 70 in tall, the \"inches squared\" measurement is 70 in x 70 in, which equals 4900 inches squared.  4. Divide the total from Step 2 (number of lb x 703) by the total from Step 3 (inches squared): 126,540 ÷ 4900 = 25.8. This is your BMI.  To calculate your BMI in metric measurements, your health care provider will:  1. Measure your weight in kilograms (kg).  2. Measure your height in meters (m). Then multiply that number by itself to get a measurement called \"meters squared.\"  ? For example, for a person who is 1.75 m tall, the \"meters squared\" measurement is 1.75 m x 1.75 m, which is equal to 3.1 meters squared.  3. Divide the number of kilograms (your weight) by the meters squared number. In this example: 70 ÷ 3.1 = 22.6. This is your BMI.  How is BMI interpreted?  To interpret your results, your health care provider will use BMI charts to identify whether you are underweight, normal weight, overweight, or obese. The following guidelines will be used:  · Underweight: BMI less than 18.5.  · Normal weight: BMI between 18.5 and 24.9.  · Overweight: BMI between 25 and 29.9.  · Obese: BMI of 30 and above.  Please note:  · Weight includes both fat and muscle, so someone with a muscular build, such as an athlete, may have a BMI that is higher than 24.9. In cases like these, BMI is not an accurate measure of body fat.  · To determine " if excess body fat is the cause of a BMI of 25 or higher, further assessments may need to be done by a health care provider.  · BMI is usually interpreted in the same way for men and women.  Why is BMI a useful tool?  BMI is useful in two ways:  · Identifying a weight problem that may be related to a medical condition, or that may increase the risk for medical problems.  · Promoting lifestyle and diet changes in order to reach a healthy weight.  Summary  · Body mass index (BMI) is a number that is calculated from a person's weight and height.  · BMI may help to estimate how much of a person's weight is composed of fat. BMI can help identify those who may be at higher risk for certain medical problems.  · BMI can be measured using English measurements or metric measurements.  · To interpret your results, your health care provider will use BMI charts to identify whether you are underweight, normal weight, overweight, or obese.  This information is not intended to replace advice given to you by your health care provider. Make sure you discuss any questions you have with your health care provider.  Document Released: 08/29/2005 Document Revised: 10/31/2018 Document Reviewed: 10/31/2018  [employer] Interactive Patient Education © 2019 [employer]

## 2020-01-03 NOTE — PROGRESS NOTES
Subjective   Sidar Lane is a 65 y.o. female     Chief Complaint   Patient presents with   • Atrial Fibrillation     Here for a follow up    • Shortness of Breath       PROBLEM LIST:       1.  History of chest pain  1.1 stress test August 2018 at [facility] demonstrated no evidence of ischemia and preserved LV function  2.  Preserved systolic function  3.  Persistent atrial fibrillation atrial fibrillation  3.1 patient  Coumadin for anticoagulation  3.2 patient previously on amiodarone.  Currently on sotalol having failed flecainide  3.3 Failed Sotalol, Side effects on Dig.  4.  Hypertension   5.  History of DVT on chronic anticoagulation  6.  Recent left lower lobe pulmonary embolus with patient subtherapeutic on Coumadin January 2019         HPI:    Patient is a pleasant 65-year-old  female who was last seen in the office on December 12, 2019 by CONNIE Faith in the setting of hypertension and tachycardia.  Medication changes, nuclear stress test and echocardiogram were ordered during this visit.  Patient states that she stopped taking her sotalol in December and despite having a dose increase of metoprolol, her blood pressure began to remain elevated.  She was unable to tolerate clonidine in the past so she sought treatment in the emergency room for hypertension.  She states that prior to going to the emergency room that she had taken Norvasc 10 mg, Metoprolol 100 mg, and lisinopril 5 mg that day.  She states that while she was at the hospital she also had chest pain, shortness of breath, palpitations, and dizziness.  She states that she also had a terrible headache.      Patient presents today with blood pressure log over the last several days; see below                     AM            PM  12/21      132/91 123/86  12/22      123/87 132/82  12/23      127/88   12/24      127/91 131/94  12/25      119/85 135/101  12/26      124/95 136/97  12/27       126/84 126/92      Patient denies any chest pain or pressure.  Occasional breakthrough palpitations.  No feelings of tachycardia or rapid forceful heartbeats. She states that she only gets dizzy when her blood pressure is elevated.  No reports of presyncope or syncope.  Patient denies any PND or orthopnea.  Patient continues to report some chronic shortness of breath with activity.  Patient denies any PND or orthopnea.  She tells me today that she feels like that these medications are not helping with her blood pressure she takes her blood pressure multiple times per day and currently she states that she takes Norvasc 5 mg in the morning and 5 mg at night she also takes metoprolol 50 mg in the morning and 50 mg at night and uses lisinopril 5 mg as needed in the afternoons if her blood pressure remains elevated.      PRIOR MEDICATIONS    Current Outpatient Medications on File Prior to Visit   Medication Sig Dispense Refill   • amLODIPine (NORVASC) 10 MG tablet Take 1 tablet by mouth Daily. 30 tablet 6   • apixaban (ELIQUIS) 5 MG tablet tablet Take 1 tablet by mouth Every 12 (Twelve) Hours. 60 tablet 11   • diphenoxylate-atropine (LOMOTIL) 2.5-0.025 MG per tablet Take  by mouth. Bid-tid prn for diarrhea     • fluticasone (VERAMYST) 27.5 MCG/SPRAY nasal spray 2 sprays into the nostril(s) as directed by provider Daily.     • lisinopril (PRINIVIL,ZESTRIL) 5 MG tablet Take 5 mg by mouth Daily As Needed (If diastolic BP is higher than 90).     • metoprolol succinate XL (TOPROL-XL) 50 MG 24 hr tablet Take 1 tablet by mouth Daily. (Patient taking differently: Take 50 mg by mouth 2 (Two) Times a Day.) 30 tablet 11   • omeprazole (priLOSEC) 20 MG capsule Daily.     • WELCHOL 625 MG tablet 2 tablets 2 (Two) Times a Day.       No current facility-administered medications on file prior to visit.        ALLERGIES:    Digoxin; Iodine; and Other    PAST MEDICAL HISTORY:    Past Medical History:   Diagnosis Date   • Atrial  fibrillation (CMS/HCC)    • CHF (congestive heart failure) (CMS/HCC)    • Deep vein thrombosis (CMS/HCC)    • GERD (gastroesophageal reflux disease)    • Hypertension    • Pulmonary embolism (CMS/HCC)        SURGICAL HISTORY:    Past Surgical History:   Procedure Laterality Date   • CHOLECYSTECTOMY     • COLON SURGERY      bowel leakage, some of colon removed   • LAPAROSCOPIC TUBAL LIGATION     • LEG SURGERY      multiple stents to BLE   • TONSILLECTOMY         SOCIAL HISTORY:    Social History     Socioeconomic History   • Marital status: Single     Spouse name: Not on file   • Number of children: Not on file   • Years of education: Not on file   • Highest education level: Not on file   Tobacco Use   • Smoking status: Never Smoker   • Smokeless tobacco: Never Used   Substance and Sexual Activity   • Alcohol use: No   • Drug use: No       FAMILY HISTORY:    Family History   Problem Relation Age of Onset   • Hypertension Mother    • Cervical cancer Mother        Review of Systems   Constitutional: Positive for fatigue (tires easily ). Negative for chills and fever.   HENT: Positive for congestion (stuffy nose ).    Eyes: Positive for visual disturbance (reading glasses ).   Respiratory: Positive for shortness of breath (some increased shortness of air ). Negative for chest tightness.    Cardiovascular: Negative.  Negative for chest pain, palpitations and leg swelling.        Went to [facility] ER with elevated BP about 2 weeks ago. States they kept her overnight and did her stress and ECHO while she was there.    Gastrointestinal: Negative.    Endocrine: Negative.  Negative for cold intolerance and heat intolerance.   Genitourinary: Negative.    Musculoskeletal: Positive for arthralgias (right shoulder ). Negative for back pain.   Skin: Negative.  Negative for rash and wound.   Allergic/Immunologic: Negative.  Negative for environmental allergies and food allergies.   Neurological: Positive for light-headedness (when  "standing too fast ) and headaches (with elevated BP ). Negative for dizziness and weakness.   Hematological: Negative.  Does not bruise/bleed easily.   Psychiatric/Behavioral: Negative.  Negative for sleep disturbance (denies waking with smothering ).       VISIT VITALS:    /99 (BP Location: Left arm, Patient Position: Sitting)   Pulse 98   Ht 152.4 cm (60\")   Wt 107 kg (235 lb)   SpO2 96%   BMI 45.90 kg/m²     RECENT LABS:    Objective       Physical Exam   Constitutional: She is oriented to person, place, and time. She appears well-developed and well-nourished. No distress.   HENT:   Head: Normocephalic and atraumatic.   Eyes: Pupils are equal, round, and reactive to light. Conjunctivae and EOM are normal.   Neck: Neck supple. No JVD present. Carotid bruit is not present.   Cardiovascular: Normal rate, normal heart sounds and intact distal pulses. An irregularly irregular rhythm present. Exam reveals no gallop and no friction rub.   No murmur heard.  Pulses:       Radial pulses are 2+ on the right side, and 2+ on the left side.   Pulmonary/Chest: Effort normal and breath sounds normal. No respiratory distress. She has no wheezes. She has no rales. She exhibits no tenderness.   Abdominal: Normal appearance and bowel sounds are normal. There is no tenderness.   Musculoskeletal: Normal range of motion. She exhibits no edema.   Neurological: She is alert and oriented to person, place, and time. She has normal strength. No cranial nerve deficit.   Skin: Skin is warm, dry and intact. Capillary refill takes less than 2 seconds. No rash noted. No erythema. No pallor.   Psychiatric: She has a normal mood and affect. Her speech is normal and behavior is normal. Judgment and thought content normal. Cognition and memory are normal.   Nursing note and vitals reviewed.      Procedures      Assessment/Plan      Diagnosis Plan   1. Paroxysmal atrial fibrillation (CMS/HCC)     2. Essential hypertension       Chronic " "atrial fibrillation patient rate controlled with metoprolol succinate 50 mg twice daily.  Patient on Eliquis 5 mg twice daily for anticoagulation.  No report of any recent blood loss or bleeding issues.  Patient reports occasional breakthrough palpitations but seems to be doing well on current medication therapy.  In regards to the patient's hypertension I would like for the patient to take metoprolol 100 mg daily in the a.m. and Norvasc 10 mg in the evening.  May continue to use Lisinopril PRN for SBP > 150 or DBP >90.   We will call the patient on Wednesday of next week for a blood pressure log to reassess the need for any medication changes.      Per report patient unable to tolerate clonidine p.o. due to severe dizziness and feeling \"odd\".  If blood pressures remain elevated may consider using a clonidine patch for better delivery and blood pressure control.    Patient states that she did have her stress and echocardiogram performed at [facility], will request these results and will call the patient and notify her of the findings.    Patient does have an appointment later this month with Dr. Singh in which she would like to keep.  Otherwise we can see the patient back pending phone call next week in approximately 3 months.      Return in about 3 months (around 4/3/2020) for Next scheduled follow up, Recheck.       Body mass index is 45.9 kg/m². Educational Material Provided      ISSA Chilel  "

## 2020-01-07 ENCOUNTER — TELEPHONE (OUTPATIENT)
Dept: CARDIOLOGY | Facility: CLINIC | Age: 65
End: 2020-01-07

## 2020-01-07 NOTE — TELEPHONE ENCOUNTER
"STRESS TEST AND ECHO RESULTS DISCUSSED WITH MRS. JOHNSON. STATES CHRISTOPHER CHANGED HER METOPROLOL SUCC.  MG IN AM INSTEAD OF 50 MG PO B ID. STATES SHE TOOK FIRST DOSE THAT AM AND BY EVENING DIASTOLIC B/P STAYED 112 ALL NIGHT, STATES \"I CAN'T DO THAT\", SO SHE WENT BACK TO 50 MG PO BID. TOLD HER I WOULD LET CHRISTOPHER KNOW. DOUGLAS LOFTON              ----- Message from ISSA Chilel sent at 1/7/2020 12:07 PM EST -----  Would you notify of Stress and Echo results from [facility]    Echo showed mild aortic stenosis, EF 60%  Other findings were not clinically significant    Stress yielded no evidence of ischemia and no perfusion defects    "

## 2020-01-08 ENCOUNTER — TELEPHONE (OUTPATIENT)
Dept: CARDIOLOGY | Facility: CLINIC | Age: 65
End: 2020-01-08

## 2020-01-08 NOTE — TELEPHONE ENCOUNTER
Medication list updated.  CHARITO MCCULLOUGH    From Emely note:  (Called patient 1/8/2019 at 4:10 pm  Instructed patient to continue to log her BP readings and to bring her monitor to the next appointment so that we can make sure it is accurate.    Also instructed patient to increase Lisinopril from 5 mg to 10 mg.)      Spoke to Emely Haley and she discussed starting patient on Clonidine patch, but wants to do a little more research first. Patient notified that we will be in contact with her soon. CHARITO MCCULLOUGH    Called and spoke to patient she said she is still getting BP readings with diastolic in the upper 90's and 100's.  This morning it was 134/100 with pulse of 80.  Yesterday was 135/90, 100 and Monday was 123/92, 95.  States she is taking Metoprolol 50mg bid, Amlodipine 10mg about noon and will take Lisinopril 5mg in the evening if BP is still elevated. Having headaches and some dizziness in the last few days. Denies any chest pain. Will discuss with Emely and call patient back with further instructions. CHARITO MCCULLOUGH      ----- Message from ISSA Chilel sent at 1/5/2020  7:15 PM EST -----  Would you call the patient on Wednesday and get her BP readings.  I made some changes in the office an I wanted to check up on her a few days after she modified her meds.

## 2020-01-09 ENCOUNTER — DOCUMENTATION (OUTPATIENT)
Dept: CARDIOLOGY | Facility: CLINIC | Age: 65
End: 2020-01-09

## 2020-01-09 RX ORDER — LISINOPRIL 10 MG/1
10 TABLET ORAL DAILY PRN
Start: 2020-01-09 | End: 2020-01-30 | Stop reason: SDUPTHER

## 2020-01-09 NOTE — PROGRESS NOTES
Called patient 1/8/2019 at 4:10 pm  Instructed patient to continue to log her BP readings and to bring her monitor to the next appointment so that we can make sure it is accurate.    Also instructed patient to increase Lisinopril from 5 mg to 10 mg.

## 2020-01-30 ENCOUNTER — OFFICE VISIT (OUTPATIENT)
Dept: CARDIOLOGY | Facility: CLINIC | Age: 65
End: 2020-01-30

## 2020-01-30 VITALS
HEART RATE: 89 BPM | DIASTOLIC BLOOD PRESSURE: 93 MMHG | HEIGHT: 60 IN | OXYGEN SATURATION: 98 % | SYSTOLIC BLOOD PRESSURE: 136 MMHG | WEIGHT: 233.6 LBS | BODY MASS INDEX: 45.86 KG/M2

## 2020-01-30 DIAGNOSIS — R42 DIZZINESS: ICD-10-CM

## 2020-01-30 DIAGNOSIS — I25.10 CORONARY ARTERY DISEASE INVOLVING NATIVE CORONARY ARTERY OF NATIVE HEART WITHOUT ANGINA PECTORIS: ICD-10-CM

## 2020-01-30 DIAGNOSIS — I82.4Y9 ACUTE DEEP VEIN THROMBOSIS (DVT) OF PROXIMAL VEIN OF LOWER EXTREMITY, UNSPECIFIED LATERALITY (HCC): ICD-10-CM

## 2020-01-30 DIAGNOSIS — R07.2 PRECORDIAL PAIN: ICD-10-CM

## 2020-01-30 DIAGNOSIS — R53.83 FATIGUE, UNSPECIFIED TYPE: ICD-10-CM

## 2020-01-30 DIAGNOSIS — I10 ESSENTIAL HYPERTENSION: ICD-10-CM

## 2020-01-30 DIAGNOSIS — I48.0 PAROXYSMAL ATRIAL FIBRILLATION (HCC): Primary | ICD-10-CM

## 2020-01-30 DIAGNOSIS — R06.02 SHORTNESS OF BREATH: ICD-10-CM

## 2020-01-30 PROCEDURE — 99212 OFFICE O/P EST SF 10 MIN: CPT | Performed by: INTERNAL MEDICINE

## 2020-01-30 RX ORDER — LISINOPRIL 5 MG/1
5 TABLET ORAL EVERY EVENING
Qty: 30 TABLET | Refills: 11 | Status: SHIPPED | OUTPATIENT
Start: 2020-01-30 | End: 2020-03-30 | Stop reason: SDUPTHER

## 2020-01-30 RX ORDER — AMLODIPINE BESYLATE 5 MG/1
5 TABLET ORAL 2 TIMES DAILY
Qty: 60 TABLET | Refills: 11 | Status: SHIPPED | OUTPATIENT
Start: 2020-01-30 | End: 2020-04-14 | Stop reason: SDUPTHER

## 2020-01-30 NOTE — PATIENT INSTRUCTIONS
Obesity, Adult  Obesity is the condition of having too much total body fat. Being overweight or obese means that your weight is greater than what is considered healthy for your body size. Obesity is determined by a measurement called BMI. BMI is an estimate of body fat and is calculated from height and weight. For adults, a BMI of 30 or higher is considered obese.  Obesity can lead to other health concerns and major illnesses, including:  · Stroke.  · Coronary artery disease (CAD).  · Type 2 diabetes.  · Some types of cancer, including cancers of the colon, breast, uterus, and gallbladder.  · Osteoarthritis.  · High blood pressure (hypertension).  · High cholesterol.  · Sleep apnea.  · Gallbladder stones.  · Infertility problems.  What are the causes?  Common causes of this condition include:  · Eating daily meals that are high in calories, sugar, and fat.  · Being born with genes that may make you more likely to become obese.  · Having a medical condition that causes obesity, including:  ? Hypothyroidism.  ? Polycystic ovarian syndrome (PCOS).  ? Binge-eating disorder.  ? Cushing syndrome.  · Taking certain medicines, such as steroids, antidepressants, and seizure medicines.  · Not being physically active (sedentary lifestyle).  · Not getting enough sleep.  · Drinking high amounts of sugar-sweetened beverages, such as soft drinks.  What increases the risk?  The following factors may make you more likely to develop this condition:  · Having a family history of obesity.  · Being a woman of  descent.  · Being a man of  descent.  · Living in an area with limited access to:  ? [place], recreation centers, or sidewalks.  ? Healthy food choices, such as grocery stores and farmers' markets.  What are the signs or symptoms?  The main sign of this condition is having too much body fat.  How is this diagnosed?  This condition is diagnosed based on:  · Your BMI. If you are an adult with a BMI of 30 or  higher, you are considered obese.  · Your waist circumference. This measures the distance around your waistline.  · Your skinfold thickness. Your health care provider may gently pinch a fold of your skin and measure it.  You may have other tests to check for underlying conditions.  How is this treated?  Treatment for this condition often includes changing your lifestyle. Treatment may include some or all of the following:  · Dietary changes. This may include developing a healthy meal plan.  · Regular physical activity. This may include activity that causes your heart to beat faster (aerobic exercise) and strength training. Work with your health care provider to design an exercise program that works for you.  · Medicine to help you lose weight if you are unable to lose 1 pound a week after 6 weeks of healthy eating and more physical activity.  · Treating conditions that cause the obesity (underlying conditions).  · Surgery. Surgical options may include gastric banding and gastric bypass. Surgery may be done if:  ? Other treatments have not helped to improve your condition.  ? You have a BMI of 40 or higher.  ? You have life-threatening health problems related to obesity.  Follow these instructions at home:  Eating and drinking    · Follow recommendations from your health care provider about what you eat and drink. Your health care provider may advise you to:  ? Limit fast food, sweets, and processed snack foods.  ? Choose low-fat options, such as low-fat milk instead of whole milk.  ? Eat 5 or more servings of fruits or vegetables every day.  ? Eat at home more often. This gives you more control over what you eat.  ? Choose healthy foods when you eat out.  ? Learn to read food labels. This will help you understand how much food is considered 1 serving.  ? Learn what a healthy serving size is.  ? Keep low-fat snacks available.  ? Limit sugary drinks, such as soda, fruit juice, sweetened iced tea, and flavored  milk.  · Drink enough water to keep your urine pale yellow.  · Do not follow a fad diet. Fad diets can be unhealthy and even dangerous.  Physical activity  · Exercise regularly, as told by your health care provider.  ? Most adults should get up to 150 minutes of moderate-intensity exercise every week.  ? Ask your health care provider what types of exercise are safe for you and how often you should exercise.  · Warm up and stretch before being active.  · Cool down and stretch after being active.  · Rest between periods of activity.  Lifestyle  · Work with your health care provider and a dietitian to set a weight-loss goal that is healthy and reasonable for you.  · Limit your screen time.  · Find ways to reward yourself that do not involve food.  · Do not drink alcohol if:  ? Your health care provider tells you not to drink.  ? You are pregnant, may be pregnant, or are planning to become pregnant.  · If you drink alcohol:  ? Limit how much you use to:  § 0-1 drink a day for women.  § 0-2 drinks a day for men.  ? Be aware of how much alcohol is in your drink. In the [place] one drink equals one 12 oz bottle of beer (355 mL), one 5 oz glass of wine (148 mL), or one 1½ oz glass of hard liquor (44 mL).  General instructions  · Keep a weight-loss journal to keep track of the food you eat and how much exercise you get.  · Take over-the-counter and prescription medicines only as told by your health care provider.  · Take vitamins and supplements only as told by your health care provider.  · Consider joining a support group. Your health care provider may be able to recommend a support group.  · Keep all follow-up visits as told by your health care provider. This is important.  Contact a health care provider if:  · You are unable to meet your weight loss goal after 6 weeks of dietary and lifestyle changes.  Get help right away if you are having:  · Trouble breathing.  · Suicidal thoughts or behaviors.  Summary  · Obesity is the  condition of having too much total body fat.  · Being overweight or obese means that your weight is greater than what is considered healthy for your body size.  · Work with your health care provider and a dietitian to set a weight-loss goal that is healthy and reasonable for you.  · Exercise regularly, as told by your health care provider. Ask your health care provider what types of exercise are safe for you and how often you should exercise.  This information is not intended to replace advice given to you by your health care provider. Make sure you discuss any questions you have with your health care provider.  Document Released: 01/25/2006 Document Revised: 08/22/2019 Document Reviewed: 08/22/2019  [employer] Interactive Patient Education © 2019 [employer]  MyPlate from [employer]    MyPlate is an outline of a general healthy diet based on the 2010 Dietary Guidelines for Americans, from the [place]. Department of Agriculture (USDA). It sets guidelines for how much food you should eat from each food group based on your age, sex, and level of physical activity.  What are tips for following MyPlate?  To follow MyPlate recommendations:  · Eat a wide variety of fruits and vegetables, grains, and protein foods.  · Serve smaller portions and eat less food throughout the day.  · Limit portion sizes to avoid overeating.  · Enjoy your food.  · Get at least 150 minutes of exercise every week. This is about 30 minutes each day, 5 or more days per week.  It can be difficult to have every meal look like MyPlate. Think about MyPlate as eating guidelines for an entire day, rather than each individual meal.  Fruits and vegetables  · Make half of your plate fruits and vegetables.  · Eat many different colors of fruits and vegetables each day.  · For a 2,000 calorie daily food plan, eat:  ? 2½ cups of vegetables every day.  ? 2 cups of fruit every day.  · 1 cup is equal to:  ? 1 cup raw or cooked vegetables.  ? 1 cup raw fruit.  ? 1  medium-sized orange, apple, or banana.  ? 1 cup 100% fruit or vegetable juice.  ? 2 cups raw leafy greens, such as lettuce, spinach, or kale.  ? ½ cup dried fruit.  Grains  · One fourth of your plate should be grains.  · Make at least half of the grains you eat each day whole grains.  · For a 2,000 calorie daily food plan, eat 6 oz of grains every day.  · 1 oz is equal to:  ? 1 slice bread.  ? 1 cup cereal.  ? ½ cup cooked rice, cereal, or pasta.  Protein  · One fourth of your plate should be protein.  · Eat a wide variety of protein foods, including meat, poultry, fish, eggs, beans, nuts, and tofu.  · For a 2,000 calorie daily food plan, eat 5½ oz of protein every day.  · 1 oz is equal to:  ? 1 oz meat, poultry, or fish.  ? ¼ cup cooked beans.  ? 1 egg.  ? ½ oz nuts or seeds.  ? 1 Tbsp peanut butter.  Dairy  · Drink fat-free or low-fat (1%) milk.  · Eat or drink dairy as a side to meals.  · For a 2,000 calorie daily food plan, eat or drink 3 cups of dairy every day.  · 1 cup is equal to:  ? 1 cup milk, yogurt, cottage cheese, or soy milk (soy beverage).  ? 2 oz processed cheese.  ? 1½ oz natural cheese.  Fats, oils, salt, and sugars  · Only small amounts of oils are recommended.  · Avoid foods that are high in calories and low in nutritional value (empty calories), like foods high in fat or added sugars.  · Choose foods that are low in salt (sodium). Choose foods that have less than 140 milligrams (mg) of sodium per serving.  · Drink water instead of sugary drinks. Drink enough water each day to keep your urine pale yellow.  Where to find support  · Work with your health care provider or a nutrition specialist (dietitian) to develop a customized eating plan that is right for you.  · Download an claudia (mobile application) to help you track your daily food intake.  Where to find more information  · Go to ChooseMyPlate.gov for more information.  · Learn more and log your daily food intake according to MyPlate using  USDA's SuperTracker: www.supertracker.usda.gov  Summary  · MyPlate is a general guideline for healthy eating from the USDA. It is based on the 2010 Dietary Guidelines for Americans.  · In general, fruits and vegetables should take up ½ of your plate, grains should take up ¼ of your plate, and protein should take up ¼ of your plate.  This information is not intended to replace advice given to you by your health care provider. Make sure you discuss any questions you have with your health care provider.  Document Released: 01/06/2009 Document Revised: 03/19/2018 Document Reviewed: 03/19/2018  [employer] Interactive Patient Education © 2019 [employer]

## 2020-02-25 ENCOUNTER — OFFICE VISIT (OUTPATIENT)
Dept: CARDIOLOGY | Facility: CLINIC | Age: 65
End: 2020-02-25

## 2020-02-25 VITALS
DIASTOLIC BLOOD PRESSURE: 96 MMHG | HEART RATE: 102 BPM | WEIGHT: 235 LBS | BODY MASS INDEX: 46.13 KG/M2 | HEIGHT: 60 IN | OXYGEN SATURATION: 96 % | SYSTOLIC BLOOD PRESSURE: 151 MMHG

## 2020-02-25 DIAGNOSIS — I10 ESSENTIAL HYPERTENSION: ICD-10-CM

## 2020-02-25 DIAGNOSIS — R07.9 CHEST PAIN, UNSPECIFIED TYPE: ICD-10-CM

## 2020-02-25 DIAGNOSIS — R06.02 SHORTNESS OF BREATH: ICD-10-CM

## 2020-02-25 DIAGNOSIS — I48.20 CHRONIC ATRIAL FIBRILLATION (HCC): Primary | ICD-10-CM

## 2020-02-25 PROCEDURE — 99214 OFFICE O/P EST MOD 30 MIN: CPT | Performed by: PHYSICIAN ASSISTANT

## 2020-02-25 RX ORDER — NITROGLYCERIN 0.4 MG/1
TABLET SUBLINGUAL
Qty: 25 TABLET | Refills: 11 | Status: SHIPPED | OUTPATIENT
Start: 2020-02-25

## 2020-02-25 NOTE — PATIENT INSTRUCTIONS
"Fat and Cholesterol Restricted Eating Plan  Getting too much fat and cholesterol in your diet may cause health problems. Choosing the right foods helps keep your fat and cholesterol at normal levels. This can keep you from getting certain diseases.  Your doctor may recommend an eating plan that includes:  · Total fat: ______% or less of total calories a day.  · Saturated fat: ______% or less of total calories a day.  · Cholesterol: less than _________mg a day.  · Fiber: ______g a day.  What are tips for following this plan?  Meal planning  · At meals, divide your plate into four equal parts:  ? Fill one-half of your plate with vegetables and green salads.  ? Fill one-fourth of your plate with whole grains.  ? Fill one-fourth of your plate with low-fat (lean) protein foods.  · Eat fish that is high in omega-3 fats at least two times a week. This includes mackerel, tuna, sardines, and salmon.  · Eat foods that are high in fiber, such as whole grains, beans, apples, broccoli, carrots, peas, and barley.  General tips    · Work with your doctor to lose weight if you need to.  · Avoid:  ? Foods with added sugar.  ? Fried foods.  ? Foods with partially hydrogenated oils.  · Limit alcohol intake to no more than 1 drink a day for nonpregnant women and 2 drinks a day for men. One drink equals 12 oz of beer, 5 oz of wine, or 1½ oz of hard liquor.  Reading food labels  · Check food labels for:  ? Trans fats.  ? Partially hydrogenated oils.  ? Saturated fat (g) in each serving.  ? Cholesterol (mg) in each serving.  ? Fiber (g) in each serving.  · Choose foods with healthy fats, such as:  ? Monounsaturated fats.  ? Polyunsaturated fats.  ? Omega-3 fats.  · Choose grain products that have whole grains. Look for the word \"whole\" as the first word in the ingredient list.  Cooking  · Cook foods using low-fat methods. These include baking, boiling, grilling, and broiling.  · Eat more home-cooked foods. Eat at restaurants and buffets " less often.  · Avoid cooking using saturated fats, such as butter, cream, palm oil, palm kernel oil, and coconut oil.  Recommended foods    Fruits  · All fresh, canned (in natural juice), or frozen fruits.  Vegetables  · Fresh or frozen vegetables (raw, steamed, roasted, or grilled). Green salads.  Grains  · Whole grains, such as whole wheat or whole grain breads, crackers, cereals, and pasta. Unsweetened oatmeal, bulgur, barley, quinoa, or brown rice. Corn or whole wheat flour tortillas.  Meats and other protein foods  · Ground beef (85% or leaner), grass-fed beef, or beef trimmed of fat. Skinless chicken or turkey. Ground chicken or turkey. Pork trimmed of fat. All fish and seafood. Egg whites. Dried beans, peas, or lentils. Unsalted nuts or seeds. Unsalted canned beans. Nut butters without added sugar or oil.  Dairy  · Low-fat or nonfat dairy products, such as skim or 1% milk, 2% or reduced-fat cheeses, low-fat and fat-free ricotta or cottage cheese, or plain low-fat and nonfat yogurt.  Fats and oils  · Tub margarine without trans fats. Light or reduced-fat mayonnaise and salad dressings. Avocado. Olive, canola, sesame, or safflower oils.  The items listed above may not be a complete list of foods and beverages you can eat. Contact a dietitian for more information.  Foods to avoid  Fruits  · Canned fruit in heavy syrup. Fruit in cream or butter sauce. Fried fruit.  Vegetables  · Vegetables cooked in cheese, cream, or butter sauce. Fried vegetables.  Grains  · White bread. White pasta. White rice. Cornbread. Bagels, pastries, and croissants. Crackers and snack foods that contain trans fat and hydrogenated oils.  Meats and other protein foods  · Fatty cuts of meat. Ribs, chicken wings, atkins, sausage, bologna, salami, chitterlings, fatback, hot dogs, bratwurst, and packaged lunch meats. Liver and organ meats. Whole eggs and egg yolks. Chicken and turkey with skin. Fried meat.  Dairy  · Whole or 2% milk, cream,  half-and-half, and cream cheese. Whole milk cheeses. Whole-fat or sweetened yogurt. Full-fat cheeses. Nondairy creamers and whipped toppings. Processed cheese, cheese spreads, and cheese curds.  Beverages  · Alcohol. Sugar-sweetened drinks such as sodas, lemonade, and fruit drinks.  Fats and oils  · Butter, stick margarine, lard, shortening, ghee, or atkins fat. Coconut, palm kernel, and palm oils.  Sweets and desserts  · Corn syrup, sugars, honey, and molasses. Candy. Jam and jelly. Syrup. Sweetened cereals. Cookies, pies, cakes, donuts, muffins, and ice cream.  The items listed above may not be a complete list of foods and beverages you should avoid. Contact a dietitian for more information.  Summary  · Choosing the right foods helps keep your fat and cholesterol at normal levels. This can keep you from getting certain diseases.  · At meals, fill one-half of your plate with vegetables and green salads.  · Eat high-fiber foods, like whole grains, beans, apples, carrots, peas, and barley.  · Limit added sugar, saturated fats, alcohol, and fried foods.  This information is not intended to replace advice given to you by your health care provider. Make sure you discuss any questions you have with your health care provider.  Document Released: 06/18/2013 Document Revised: 08/21/2019 Document Reviewed: 09/04/2018  [employer] Interactive Patient Education © 2020 [employer]  BMI for Adults    Body mass index (BMI) is a number that is calculated from a person's weight and height. BMI may help to estimate how much of a person's weight is composed of fat. BMI can help identify those who may be at higher risk for certain medical problems.  How is BMI used with adults?  BMI is used as a screening tool to identify possible weight problems. It is used to check whether a person is obese, overweight, healthy weight, or underweight.  How is BMI calculated?  BMI measures your weight and compares it to your height. This can be done  "either in English ([place] or metric measurements. Note that charts are available to help you find your BMI quickly and easily without having to do these calculations yourself.  To calculate your BMI in English ([place] measurements, your health care provider will:  1. Measure your weight in pounds (lb).  2. Multiply the number of pounds by 703.  ? For example, for a person who weighs 180 lb, multiply that number by 703, which equals 126,540.  3. Measure your height in inches (in). Then multiply that number by itself to get a measurement called \"inches squared.\"  ? For example, for a person who is 70 in tall, the \"inches squared\" measurement is 70 in x 70 in, which equals 4900 inches squared.  4. Divide the total from Step 2 (number of lb x 703) by the total from Step 3 (inches squared): 126,540 ÷ 4900 = 25.8. This is your BMI.  To calculate your BMI in metric measurements, your health care provider will:  1. Measure your weight in kilograms (kg).  2. Measure your height in meters (m). Then multiply that number by itself to get a measurement called \"meters squared.\"  ? For example, for a person who is 1.75 m tall, the \"meters squared\" measurement is 1.75 m x 1.75 m, which is equal to 3.1 meters squared.  3. Divide the number of kilograms (your weight) by the meters squared number. In this example: 70 ÷ 3.1 = 22.6. This is your BMI.  How is BMI interpreted?  To interpret your results, your health care provider will use BMI charts to identify whether you are underweight, normal weight, overweight, or obese. The following guidelines will be used:  · Underweight: BMI less than 18.5.  · Normal weight: BMI between 18.5 and 24.9.  · Overweight: BMI between 25 and 29.9.  · Obese: BMI of 30 and above.  Please note:  · Weight includes both fat and muscle, so someone with a muscular build, such as an athlete, may have a BMI that is higher than 24.9. In cases like these, BMI is not an accurate measure of body fat.  · To determine " if excess body fat is the cause of a BMI of 25 or higher, further assessments may need to be done by a health care provider.  · BMI is usually interpreted in the same way for men and women.  Why is BMI a useful tool?  BMI is useful in two ways:  · Identifying a weight problem that may be related to a medical condition, or that may increase the risk for medical problems.  · Promoting lifestyle and diet changes in order to reach a healthy weight.  Summary  · Body mass index (BMI) is a number that is calculated from a person's weight and height.  · BMI may help to estimate how much of a person's weight is composed of fat. BMI can help identify those who may be at higher risk for certain medical problems.  · BMI can be measured using English measurements or metric measurements.  · To interpret your results, your health care provider will use BMI charts to identify whether you are underweight, normal weight, overweight, or obese.  This information is not intended to replace advice given to you by your health care provider. Make sure you discuss any questions you have with your health care provider.  Document Released: 08/29/2005 Document Revised: 10/31/2018 Document Reviewed: 10/31/2018  [employer] Interactive Patient Education © 2020 [employer]

## 2020-02-25 NOTE — PROGRESS NOTES
Problem list     Subjective   Sidra Lane is a 65 y.o. female     Chief Complaint   Patient presents with   • Atrial Fibrillation     Here for follow up    • Shortness of Breath   PROBLEM LIST:         1.  History of chest pain  1.1 stress test August 2018 at [facility] demonstrated no evidence of ischemia and preserved LV function  1.2 Stress test at [facility] 12-, no ischemia  2.  Preserved systolic function  2.1 echo at [facility] 12-, EF 60%, mild-mod. AS trivial TR, pulm. Pressures 26 mmHg  3.  Persistent atrial fibrillation atrial fibrillation  3.1 patient  Coumadin for anticoagulation  3.2 patient previously on amiodarone.  Currently on sotalol having failed flecainide  3.3 Failed Sotalol, Side effects on Dig.  4.  Hypertension   5.  History of DVT on chronic anticoagulation  6.  Recent left lower lobe pulmonary embolus with patient subtherapeutic on Coumadin January 2019    HPI    Patient is a 65-year-old female that presents to the office for evaluation.  Patient is being seen today because she presented to primary yesterday.  I received a call, she had a lot of issues in regards to chest discomfort and palpitations.  Patient historically has been seen at the office for atrial fibrillation which in the past had been paroxysmal.  Unfortunately, she had breakthrough episodes and had failed sotalol and flecainide.  She had previously been on amiodarone and there was some concern about long-term effects.  We attempted to try to rate control the patient has at least at 1 point it did not seem that she was much symptomatic being in atrial fibrillation.    Unfortunately, she continues to have symptoms and when she feels her heartbeat irregular and she feels poorly.  She is not tolerating it well.  She has chest discomfort.  She notices a left precordial discomfort that occurs on the left side of her chest and she will also feel it in the upper back.  She has noticed this intermittently  for the last 3 days.  Her dyspnea continues to be mild to moderate.  No progressive shortness of breath.  No PND orthopnea.    Patient has had no symptoms of bleeding on Eliquis.  She is doing well otherwise          Current Outpatient Medications on File Prior to Visit   Medication Sig Dispense Refill   • amLODIPine (NORVASC) 5 MG tablet Take 1 tablet by mouth 2 (Two) Times a Day. 60 tablet 11   • apixaban (ELIQUIS) 5 MG tablet tablet Take 1 tablet by mouth Every 12 (Twelve) Hours. 60 tablet 11   • diphenoxylate-atropine (LOMOTIL) 2.5-0.025 MG per tablet Take  by mouth. Bid-tid prn for diarrhea     • fluticasone (VERAMYST) 27.5 MCG/SPRAY nasal spray 2 sprays into the nostril(s) as directed by provider Daily.     • lisinopril (PRINIVIL,ZESTRIL) 5 MG tablet Take 1 tablet by mouth Every Evening. (Patient taking differently: Take 5 mg by mouth 2 (Two) Times a Day.) 30 tablet 11   • metoprolol succinate XL (TOPROL-XL) 50 MG 24 hr tablet Take 1 tablet by mouth Daily. (Patient taking differently: Take 50 mg by mouth 2 (Two) Times a Day.) 30 tablet 11   • omeprazole (priLOSEC) 20 MG capsule Daily.     • WELCHOL 625 MG tablet 2 tablets 2 (Two) Times a Day.       No current facility-administered medications on file prior to visit.        Digoxin; Iodine; and Other    Past Medical History:   Diagnosis Date   • Atrial fibrillation (CMS/HCC)    • CHF (congestive heart failure) (CMS/HCC)    • Deep vein thrombosis (CMS/HCC)    • GERD (gastroesophageal reflux disease)    • Hypertension    • Pulmonary embolism (CMS/HCC)        Social History     Socioeconomic History   • Marital status: Single     Spouse name: Not on file   • Number of children: Not on file   • Years of education: Not on file   • Highest education level: Not on file   Tobacco Use   • Smoking status: Never Smoker   • Smokeless tobacco: Never Used   Substance and Sexual Activity   • Alcohol use: No   • Drug use: No       Family History   Problem Relation Age of Onset  "  • Hypertension Mother    • Cervical cancer Mother        Review of Systems   Constitutional: Positive for fatigue (stays tired ). Negative for chills and fever.   HENT: Positive for congestion (stuffy nose at night ).    Eyes: Positive for visual disturbance (reading glasses ).   Respiratory: Positive for chest tightness (feels like a band around her chest ) and shortness of breath (increased shortness of air recently ).    Cardiovascular: Positive for chest pain (having some dull pain in left side of chest, seems worse when she lays down. Pain is radiating into her back ) and palpitations (occasional palps ). Negative for leg swelling.   Gastrointestinal: Positive for nausea. Negative for abdominal pain, blood in stool and vomiting.   Endocrine: Negative.  Negative for cold intolerance and heat intolerance.   Genitourinary: Negative.    Musculoskeletal: Positive for arthralgias (hands and feet ) and back pain (upper back pain ).   Skin: Negative.  Negative for rash and wound.   Allergic/Immunologic: Negative.  Negative for environmental allergies and food allergies.   Neurological: Positive for light-headedness. Negative for dizziness and weakness.   Hematological: Bruises/bleeds easily.   Psychiatric/Behavioral: Positive for sleep disturbance (has awaken with smothering at times ).        More anxious today since her mother had surgery this morning    All other systems reviewed and are negative.      Objective   Vitals:    02/25/20 1131   BP: 151/96   BP Location: Left arm   Patient Position: Sitting   Pulse: 102   SpO2: 96%   Weight: 107 kg (235 lb)   Height: 152.4 cm (60\")      /96 (BP Location: Left arm, Patient Position: Sitting)   Pulse 102   Ht 152.4 cm (60\")   Wt 107 kg (235 lb)   SpO2 96%   BMI 45.90 kg/m²     Lab Results (most recent)     None          Physical Exam   Constitutional: She is oriented to person, place, and time. She appears well-developed and well-nourished. No distress.   "   HENT:   Head: Normocephalic and atraumatic.   Eyes: Conjunctivae are normal. Right eye exhibits no discharge. Left eye exhibits no discharge. No scleral icterus.   Neck: No JVD present.   Cardiovascular: Normal rate, regular rhythm and normal heart sounds. Exam reveals no gallop and no friction rub.   No murmur heard.  Pulmonary/Chest: Effort normal and breath sounds normal. No respiratory distress. She has no wheezes. She has no rales. She exhibits no tenderness.   Musculoskeletal: Normal range of motion. She exhibits no edema.   Neurological: She is alert and oriented to person, place, and time. No cranial nerve deficit.   Skin: Skin is warm and dry. No rash noted. No erythema. No pallor.   Psychiatric: She has a normal mood and affect. Her behavior is normal.   Nursing note and vitals reviewed.      Procedure   Procedures       Assessment/Plan     Problems Addressed this Visit        Cardiovascular and Mediastinum    Atrial fibrillation (CMS/HCC) - Primary    Relevant Medications    nitroglycerin (NITROSTAT) 0.4 MG SL tablet    Other Relevant Orders    Ambulatory Referral to Cardiac Electrophysiology    Essential hypertension    Relevant Orders    Ambulatory Referral to Cardiac Electrophysiology       Respiratory    Shortness of breath    Relevant Orders    Ambulatory Referral to Cardiac Electrophysiology       Nervous and Auditory    Chest pain    Relevant Orders    Ambulatory Referral to Cardiac Electrophysiology            Recommendation  1.  Patient with complaints of atrial fibrillation and chest discomfort.  She had stress test in December showing no evidence of ischemia but I am concerned about her discomfort.  We have discussed options with the patient.  Ultimately, she wants to be referred to EP services which I agree.  2.  In the short-term, we discussed cardioversion and trying to reinstitute medications at least in the short-term.  She does not want us to perform any evaluation at this time but  rather be seen by EP services and follow  with their recommendation  3.  In regards to chest pain, I am concerned.  She has felt this over the last 3 days despite stress test being negative.  We discussed further evaluation and even catheterization but she refuses despite our concern about ongoing chest pain in the setting of negative stress test.  I am sending in nitroglycerin as needed for chest discomfort.  She describes to me that she will go to the emergency room for any worsening symptoms.  4.  Otherwise she would like to see EP services before considering any further evaluation.  She will follow with primary as scheduled         Sidra Domingo  reports that she has never smoked. She has never used smokeless tobacco..         Patient's Body mass index is 45.9 kg/m². BMI is above normal parameters. Recommendations include: educational material and referral to primary care.       Electronically signed by:

## 2020-03-03 ENCOUNTER — TELEPHONE (OUTPATIENT)
Dept: CARDIOLOGY | Facility: CLINIC | Age: 65
End: 2020-03-03

## 2020-03-03 NOTE — TELEPHONE ENCOUNTER
Patient called the office and stated she just wanted Blair to know that Dr. Mcknight sent her home with a bracelet to check her HR when she is at home and it has consistently been staying between 130-140. Patient did not request a call back regarding this.

## 2020-03-03 NOTE — TELEPHONE ENCOUNTER
Attempted to call patient on both numbers listed however no voicemail available.   Called patient's sister, Ana María (on [employer] to discuss appointments), she will have patient call Dr Alan to schedule appointment. Patient referred by Blair Silva PA-C on 2/25/2020, referral shows ready to schedule. Anne Wiggins MA

## 2020-03-30 ENCOUNTER — TELEPHONE (OUTPATIENT)
Dept: CARDIOLOGY | Facility: CLINIC | Age: 65
End: 2020-03-30

## 2020-03-30 DIAGNOSIS — I10 ESSENTIAL HYPERTENSION: ICD-10-CM

## 2020-03-30 RX ORDER — LISINOPRIL 10 MG/1
10 TABLET ORAL EVERY EVENING
Qty: 30 TABLET | Refills: 11 | Status: SHIPPED | OUTPATIENT
Start: 2020-03-30 | End: 2020-04-14 | Stop reason: SDUPTHER

## 2020-03-30 NOTE — TELEPHONE ENCOUNTER
----- Message from CONNIE Steven sent at 3/30/2020 11:13 AM EDT -----  Either  5 mg twice daily or 10 mg daily  ----- Message -----  From: Rita Vegas  Sent: 3/30/2020   9:50 AM EDT  To: CONNIE Steven/ [facility] L[facility] stating that the last dose of Lisinopril he received from us was 5mg, but pt told him she takes 10mg. He stated he needs a new rx, if 10mg is what you want her to take.    Per chart review, you prescribed 5mg, but there's a note stating pt is taking differently and is taking 5mg BID.     Blair: Would you like pt on 5mg or 10mg?

## 2020-04-14 ENCOUNTER — OFFICE VISIT (OUTPATIENT)
Dept: CARDIOLOGY | Facility: CLINIC | Age: 65
End: 2020-04-14

## 2020-04-14 VITALS
WEIGHT: 235 LBS | SYSTOLIC BLOOD PRESSURE: 122 MMHG | HEART RATE: 97 BPM | BODY MASS INDEX: 46.13 KG/M2 | HEIGHT: 60 IN | DIASTOLIC BLOOD PRESSURE: 89 MMHG

## 2020-04-14 DIAGNOSIS — I82.5Z3 CHRONIC DEEP VEIN THROMBOSIS (DVT) OF DISTAL VEIN OF BOTH LOWER EXTREMITIES (HCC): ICD-10-CM

## 2020-04-14 DIAGNOSIS — I10 ESSENTIAL HYPERTENSION: ICD-10-CM

## 2020-04-14 DIAGNOSIS — I25.10 CORONARY ARTERY DISEASE INVOLVING NATIVE CORONARY ARTERY OF NATIVE HEART WITHOUT ANGINA PECTORIS: Primary | ICD-10-CM

## 2020-04-14 DIAGNOSIS — I48.11 LONGSTANDING PERSISTENT ATRIAL FIBRILLATION (HCC): ICD-10-CM

## 2020-04-14 PROCEDURE — 99443 PR PHYS/QHP TELEPHONE EVALUATION 21-30 MIN: CPT | Performed by: INTERNAL MEDICINE

## 2020-04-14 RX ORDER — LISINOPRIL 10 MG/1
10 TABLET ORAL EVERY EVENING
Qty: 30 TABLET | Refills: 11 | Status: SHIPPED | OUTPATIENT
Start: 2020-04-14 | End: 2020-06-18 | Stop reason: HOSPADM

## 2020-04-14 RX ORDER — SOTALOL HYDROCHLORIDE 80 MG/1
80 TABLET ORAL 2 TIMES DAILY
Qty: 180 TABLET | Refills: 2 | Status: SHIPPED | OUTPATIENT
Start: 2020-04-14 | End: 2020-06-18 | Stop reason: HOSPADM

## 2020-04-14 RX ORDER — AMLODIPINE BESYLATE 5 MG/1
5 TABLET ORAL DAILY
Qty: 90 TABLET | Refills: 2 | Status: SHIPPED | OUTPATIENT
Start: 2020-04-14 | End: 2020-06-18 | Stop reason: HOSPADM

## 2020-04-14 RX ORDER — MECLIZINE HYDROCHLORIDE 25 MG/1
25 TABLET ORAL 3 TIMES DAILY PRN
COMMUNITY
Start: 2020-02-29 | End: 2020-06-18 | Stop reason: HOSPADM

## 2020-04-14 RX ORDER — SOTALOL HYDROCHLORIDE 80 MG/1
TABLET ORAL 2 TIMES DAILY
COMMUNITY
Start: 2020-02-26 | End: 2020-04-14 | Stop reason: SDUPTHER

## 2020-04-14 NOTE — PROGRESS NOTES
Electrophysiology Clinic Consult     Sidra Lane  1955  [unfilled]  [unfilled]    04/14/20    DATE OF ADMISSION: (Not on file)  [facility] CARDIOLOGY    Jl Mcknight MD  [address] / [facility] [address]    Chief Complaint   Patient presents with   • Atrial Fibrillation     Problem list:    1.  Symptomatic persistent atrial fibrillation   A.  Failed flecainide, sotalol, and amiodarone therapy   B.  Intolerant to digoxin   C.  In persistent AF at least since February   D.  On chronic anticoagulation  2.  Hypertension  3.  CAD   A.  Stress test August 2018 at [facility]: No evidence of  ischemia,  normal LV size and  function   B.  Stress test at [facility] 12/14/2019 normal LV size and function no ischemia   C.  Echocardiogram [facility] 12/14/2019: LVEF 60%, mild to moderate aortic stenosis, mild TR.  4.  DVT on chronic anticoagulation.  5.  Left lower lobe pulmonary embolism January 2019 with subtherapeutic PT/INR on Coumadin: now on eliquis  6.  ??  Stenting of both iliac veins with possible IVC filter   7.  Chronic diarrhea  8.  Surgeries   A.  Laparoscopic tubal ligation   B.  Colon surgery with surgical removal due to bowel leakage   C.  Cholecystectomy      History of Present Illness:   65-year-old white female referred to us from Dr. Singh's office for management of her symptomatic atrial fibrillation.  The patient thinks that her atrial fibrillation started approximately 2 to 3 years ago.  She is unsure whether or not she is been persistent or paroxysmal in nature.  She states that she has failed multiple medications including flecainide, amiodarone, and digoxin.  She has been on sotalol which also failed.  She now takes sotalol for heart rate control.  While she is in atrial fibrillation she feels very poorly.  Her symptoms are moderate to severe in nature including fatigue, lightheadedness, tachypalpitations, irregularity of the beats, and  chest pain when her heart rate rate gets greater than 100 bpm.  There there are no relieving or aggravating factors.  She now think she has been in persistent atrial fibrillation for quite some time and at least since February of this year.  Her fatigue is excessive and she feels her quality of life is extremely poor while in atrial fibrillation.  She does have chronic diarrhea which sometimes will worsens on occasion and this does seem to be associated with worsening of her symptoms.  She has had a longstanding history of DVT/PE when on subtherapeutic Coumadin.  However since being on Eliquis she has had no further episodes of DVT or PE.  She also states that she underwent stenting of both iliac veins in the past when she had extensive DVTs.  BP labile at times. HR running  bpm.       Allergies   Allergen Reactions   • Digoxin Other (See Comments)     Severe c/p, left arm pain, lips numb, finger tip numbness   • Iodine Rash     Breathing problems   • Other Other (See Comments)     Monistat, causes blisters        Cannot display prior to admission medications because the patient has not been admitted in this contact.            Current Outpatient Medications:   •  amLODIPine (NORVASC) 5 MG tablet, Take 1 tablet by mouth Daily., Disp: 90 tablet, Rfl: 2  •  apixaban (ELIQUIS) 5 MG tablet tablet, Take 1 tablet by mouth Every 12 (Twelve) Hours., Disp: 180 tablet, Rfl: 2  •  diphenoxylate-atropine (LOMOTIL) 2.5-0.025 MG per tablet, Take  by mouth. Bid-tid prn for diarrhea, Disp: , Rfl:   •  fluticasone (VERAMYST) 27.5 MCG/SPRAY nasal spray, 2 sprays into the nostril(s) as directed by provider Daily., Disp: , Rfl:   •  lisinopril (PRINIVIL,ZESTRIL) 10 MG tablet, Take 1 tablet by mouth Every Evening., Disp: 30 tablet, Rfl: 11  •  meclizine (ANTIVERT) 25 MG tablet, As Needed., Disp: , Rfl:   •  nitroglycerin (NITROSTAT) 0.4 MG SL tablet, 1 under the tongue as needed for angina, may repeat q5mins for up three doses,  "Disp: 25 tablet, Rfl: 11  •  omeprazole (priLOSEC) 20 MG capsule, Daily., Disp: , Rfl:   •  sotalol (BETAPACE) 80 MG tablet, Take 1 tablet by mouth 2 (Two) Times a Day., Disp: 180 tablet, Rfl: 2  •  WELCHOL 625 MG tablet, 2 tablets 2 (Two) Times a Day., Disp: , Rfl:     Social History     Socioeconomic History   • Marital status: Single     Spouse name: Not on file   • Number of children: Not on file   • Years of education: Not on file   • Highest education level: Not on file   Tobacco Use   • Smoking status: Never Smoker   • Smokeless tobacco: Never Used   Substance and Sexual Activity   • Alcohol use: No   • Drug use: No       Family History   Problem Relation Age of Onset   • Hypertension Mother    • Cervical cancer Mother      No family hx of DVT/PE  Mother had AF with AF RFCA    REVIEW OF SYSTEMS:   CONST:  No weight loss, fever, chills, + weakness + fatigue.   HEENT:  No visual loss, blurred vision, double vision, yellow sclerae.                   No hearing loss, congestion, sore throat.   SKIN:      No rashes, urticaria, ulcers, sores.     RESP:     + shortness of breath, No hemoptysis, cough, sputum.   GI:           No anorexia, nausea, vomiting, + diarrhea + abdominal pain, melena.   :         No burning on urination, hematuria or increased frequency.  ENDO:    No diaphoresis, cold or heat intolerance. No polyuria or polydipsia.   NEURO:  No headache, + dizziness, No syncope, paralysis, ataxia, or parasthesias.                  No change in bowel or bladder control. No history of CVA/TIA  MUSC:    No muscle, back pain, + joint pain + stiffness.   HEME:    No anemia, bleeding, bruising. +  DVT/PE.  PSYCH:  No history of depression, anxiety    Vitals:    04/14/20 1349   BP: 122/89   BP Location: Left arm   Patient Position: Sitting   Pulse: 97   Weight: 107 kg (235 lb)   Height: 152.4 cm (60\")                       I personally viewed and interpreted the patient's EKG/Telemetry/lab data    Twelve-lead EKG " 2/24/2020: Atrial fibrillation, heart rate 88 bpm, normal QRS and QT intervals.    Labs on 1/29/2019: PT/INR 1.49, platelet count 180, hematocrit 36, creatinine 1.1.      Lab Results   Component Value Date    INR 1.96 (H) 10/23/2019    PROTIME 23.3 (H) 10/23/2019     No results found for: TSH, K3JNCVP, S3DDTFN, THYROIDAB        Procedures      ICD-10-CM ICD-9-CM   1. Coronary artery disease involving native coronary artery of native heart without angina pectoris I25.10 414.01   2. Longstanding persistent atrial fibrillation I48.11 427.31   3. Essential hypertension I10 401.9   4. Chronic deep vein thrombosis (DVT) of distal vein of both lower extremities (CMS/HCC) I82.5Z3 453.52       Assessment and Plan:   1.  Persistent atrial fibrillation symptomatic in nature refractory or intolerant to flecainide, sotalol, and amiodarone.  Amandeep Vasc score of 4 on warfarin.  Options at this time include Tikosyn versus pulmonary vein ablation.  The patient would like to pursue Tikosyn admission.  She will need to stop her sotalol for 5 days prior to admission.  At that point she will start Lopressor 50 mg p.o. twice daily.  She does understand that her diarrhea may be worsened by the Tikosyn.  If Tikosyn is unsuccessful or she is intolerant to the medication, then we will pursue pulmonary vein ablation.    2.  Hypertensive heart disease; presently doing well on current medical therapy    3. CAD; per Dr. Singh    4.  DVT/PE; will need records from [facility] to determine if an IVC filter was placed.    You have chosen to receive care through a telephone visit. Do you consent to use a telephone visit for your medical care today? Yes    This visit has been rescheduled as a phone visit to comply with patient safety concerns in accordance with CDC recommendations. Total time of discussion/chart preparation was 25 minutes.    Return to clinic in 3 months in [facility] location

## 2020-05-26 PROBLEM — I48.11 LONGSTANDING PERSISTENT ATRIAL FIBRILLATION (HCC): Status: ACTIVE | Noted: 2020-05-26

## 2020-05-27 DIAGNOSIS — I48.11 LONGSTANDING PERSISTENT ATRIAL FIBRILLATION (HCC): Primary | ICD-10-CM

## 2020-05-27 RX ORDER — PREDNISONE 50 MG/1
TABLET ORAL
Qty: 3 TABLET | Refills: 0 | Status: ON HOLD | OUTPATIENT
Start: 2020-05-27 | End: 2020-06-01

## 2020-05-28 ENCOUNTER — TELEPHONE (OUTPATIENT)
Dept: CARDIOLOGY | Facility: CLINIC | Age: 65
End: 2020-05-28

## 2020-05-28 ENCOUNTER — PREP FOR SURGERY (OUTPATIENT)
Dept: OTHER | Facility: HOSPITAL | Age: 65
End: 2020-05-28

## 2020-05-28 DIAGNOSIS — I48.11 LONGSTANDING PERSISTENT ATRIAL FIBRILLATION (HCC): Primary | ICD-10-CM

## 2020-05-28 RX ORDER — PROMETHAZINE HYDROCHLORIDE 25 MG/ML
12.5 INJECTION, SOLUTION INTRAMUSCULAR; INTRAVENOUS EVERY 4 HOURS PRN
Status: CANCELLED | OUTPATIENT
Start: 2020-05-28

## 2020-05-28 RX ORDER — SODIUM CHLORIDE 9 MG/ML
1 INJECTION, SOLUTION INTRAVENOUS CONTINUOUS
Status: CANCELLED | OUTPATIENT
Start: 2020-05-28 | End: 2020-05-28

## 2020-05-28 RX ORDER — NITROGLYCERIN 0.4 MG/1
0.4 TABLET SUBLINGUAL
Status: CANCELLED | OUTPATIENT
Start: 2020-05-28

## 2020-05-28 RX ORDER — SODIUM CHLORIDE 0.9 % (FLUSH) 0.9 %
3 SYRINGE (ML) INJECTION EVERY 12 HOURS SCHEDULED
Status: CANCELLED | OUTPATIENT
Start: 2020-05-28

## 2020-05-28 RX ORDER — ACETAMINOPHEN 325 MG/1
650 TABLET ORAL EVERY 4 HOURS PRN
Status: CANCELLED | OUTPATIENT
Start: 2020-05-28

## 2020-05-28 RX ORDER — SODIUM CHLORIDE 0.9 % (FLUSH) 0.9 %
10 SYRINGE (ML) INJECTION AS NEEDED
Status: CANCELLED | OUTPATIENT
Start: 2020-05-28

## 2020-05-28 NOTE — TELEPHONE ENCOUNTER
Patient notified about premeds for CTA and she understands how to take them. Patient requests that you or Dr. Alan call her because she doesn't understand anything about the procedure and says that it was never explained to her. She has lots of questions about the risks, medications after the procedure, her history of crohns and how she may be affected ect. I offered information, but she requested you or Dr. Alan answer her questions.

## 2020-05-29 ENCOUNTER — DOCUMENTATION (OUTPATIENT)
Dept: CARDIOLOGY | Facility: CLINIC | Age: 65
End: 2020-05-29

## 2020-05-29 RX ORDER — METOPROLOL TARTRATE 50 MG/1
50 TABLET, FILM COATED ORAL 2 TIMES DAILY
Qty: 60 TABLET | Refills: 3 | Status: SHIPPED | OUTPATIENT
Start: 2020-05-29 | End: 2020-06-18 | Stop reason: HOSPADM

## 2020-05-29 NOTE — PROGRESS NOTES
The patient called with questions regarding her upcoming PVA next month.  I did answer all of her questions regarding the PVA including risks, benefits, and alternatives to the procedure.  Questions were answered to her satisfaction.  She is ready to proceed.    Electronically signed by ISSA Gore, 05/29/20, 12:35 PM.

## 2020-05-29 NOTE — TELEPHONE ENCOUNTER
I spoke to the patient over the phone and answered all of her questions and she is ready to proceed.  Are we able to get records from [facility] to see if she had an IVC filter placed? GFT mentioned getting records when he did her telephone visit but I did not see any that came through in Epic.

## 2020-05-31 ENCOUNTER — HOSPITAL ENCOUNTER (INPATIENT)
Facility: HOSPITAL | Age: 65
LOS: 18 days | Discharge: NURSING, REHAB OR LONG-TERM CARE | End: 2020-06-18
Attending: EMERGENCY MEDICINE | Admitting: INTERNAL MEDICINE

## 2020-05-31 ENCOUNTER — APPOINTMENT (OUTPATIENT)
Dept: MRI IMAGING | Facility: HOSPITAL | Age: 65
End: 2020-05-31

## 2020-05-31 ENCOUNTER — APPOINTMENT (OUTPATIENT)
Dept: CT IMAGING | Facility: HOSPITAL | Age: 65
End: 2020-05-31

## 2020-05-31 ENCOUNTER — APPOINTMENT (OUTPATIENT)
Dept: GENERAL RADIOLOGY | Facility: HOSPITAL | Age: 65
End: 2020-05-31

## 2020-05-31 DIAGNOSIS — R47.1 DYSARTHRIA: ICD-10-CM

## 2020-05-31 DIAGNOSIS — R13.12 OROPHARYNGEAL DYSPHAGIA: Primary | ICD-10-CM

## 2020-05-31 DIAGNOSIS — R41.841 COGNITIVE COMMUNICATION DEFICIT: ICD-10-CM

## 2020-05-31 DIAGNOSIS — K52.9 CHRONIC DIARRHEA: ICD-10-CM

## 2020-05-31 DIAGNOSIS — I63.9 ACUTE ISCHEMIC STROKE (HCC): ICD-10-CM

## 2020-05-31 DIAGNOSIS — I63.411 CEREBROVASCULAR ACCIDENT (CVA) DUE TO EMBOLISM OF RIGHT MIDDLE CEREBRAL ARTERY (HCC): ICD-10-CM

## 2020-05-31 LAB
ALT SERPL W P-5'-P-CCNC: 19 U/L (ref 1–33)
APTT PPP: 24.8 SECONDS (ref 24–37)
AST SERPL-CCNC: 31 U/L (ref 1–32)
BASE EXCESS BLDA CALC-SCNC: 1 MMOL/L (ref -5–5)
BASOPHILS # BLD AUTO: 0.08 10*3/MM3 (ref 0–0.2)
BASOPHILS NFR BLD AUTO: 0.9 % (ref 0–1.5)
CA-I BLDA-SCNC: 1.18 MMOL/L (ref 1.2–1.32)
CO2 BLDA-SCNC: 28 MMOL/L (ref 24–29)
CREAT BLDA-MCNC: 0.7 MG/DL (ref 0.6–1.3)
DEPRECATED RDW RBC AUTO: 52.7 FL (ref 37–54)
EOSINOPHIL # BLD AUTO: 0.15 10*3/MM3 (ref 0–0.4)
EOSINOPHIL NFR BLD AUTO: 1.6 % (ref 0.3–6.2)
ERYTHROCYTE [DISTWIDTH] IN BLOOD BY AUTOMATED COUNT: 19.6 % (ref 12.3–15.4)
GLUCOSE BLDC GLUCOMTR-MCNC: 159 MG/DL (ref 70–130)
GLUCOSE BLDC GLUCOMTR-MCNC: 165 MG/DL (ref 70–130)
HCO3 BLDA-SCNC: 26.4 MMOL/L (ref 22–26)
HCT VFR BLD AUTO: 41.9 % (ref 34–46.6)
HCT VFR BLDA CALC: 43 % (ref 38–51)
HGB BLD-MCNC: 11.9 G/DL (ref 12–15.9)
HGB BLDA-MCNC: 14.6 G/DL (ref 12–17)
HOLD SPECIMEN: NORMAL
HOLD SPECIMEN: NORMAL
IMM GRANULOCYTES # BLD AUTO: 0.04 10*3/MM3 (ref 0–0.05)
IMM GRANULOCYTES NFR BLD AUTO: 0.4 % (ref 0–0.5)
LYMPHOCYTES # BLD AUTO: 1.88 10*3/MM3 (ref 0.7–3.1)
LYMPHOCYTES NFR BLD AUTO: 20.1 % (ref 19.6–45.3)
MCH RBC QN AUTO: 22.2 PG (ref 26.6–33)
MCHC RBC AUTO-ENTMCNC: 28.4 G/DL (ref 31.5–35.7)
MCV RBC AUTO: 78.3 FL (ref 79–97)
MONOCYTES # BLD AUTO: 0.74 10*3/MM3 (ref 0.1–0.9)
MONOCYTES NFR BLD AUTO: 7.9 % (ref 5–12)
NEUTROPHILS # BLD AUTO: 6.48 10*3/MM3 (ref 1.7–7)
NEUTROPHILS NFR BLD AUTO: 69.1 % (ref 42.7–76)
NRBC BLD AUTO-RTO: 0 /100 WBC (ref 0–0.2)
PCO2 BLDA: 45.6 MM HG (ref 35–45)
PH BLDA: 7.37 PH UNITS (ref 7.35–7.6)
PLATELET # BLD AUTO: 307 10*3/MM3 (ref 140–450)
PMV BLD AUTO: 10.4 FL (ref 6–12)
PO2 BLDA: 32 MMHG (ref 80–105)
POTASSIUM BLDA-SCNC: 4.8 MMOL/L (ref 3.5–4.9)
RBC # BLD AUTO: 5.35 10*6/MM3 (ref 3.77–5.28)
SAO2 % BLDA: 59 % (ref 95–98)
SARS-COV-2 RNA RESP QL NAA+PROBE: NOT DETECTED
SODIUM BLDA-SCNC: 138 MMOL/L (ref 138–146)
TROPONIN T SERPL-MCNC: <0.01 NG/ML (ref 0–0.03)
WBC NRBC COR # BLD: 9.37 10*3/MM3 (ref 3.4–10.8)
WHOLE BLOOD HOLD SPECIMEN: NORMAL
WHOLE BLOOD HOLD SPECIMEN: NORMAL

## 2020-05-31 PROCEDURE — 82803 BLOOD GASES ANY COMBINATION: CPT

## 2020-05-31 PROCEDURE — 99291 CRITICAL CARE FIRST HOUR: CPT | Performed by: INTERNAL MEDICINE

## 2020-05-31 PROCEDURE — 70450 CT HEAD/BRAIN W/O DYE: CPT

## 2020-05-31 PROCEDURE — 25010000002 METHYLPREDNISOLONE PER 125 MG: Performed by: EMERGENCY MEDICINE

## 2020-05-31 PROCEDURE — 0042T HC CT CEREBRAL PERFUSION W/WO CONTRAST: CPT

## 2020-05-31 PROCEDURE — 82947 ASSAY GLUCOSE BLOOD QUANT: CPT

## 2020-05-31 PROCEDURE — 84460 ALANINE AMINO (ALT) (SGPT): CPT | Performed by: EMERGENCY MEDICINE

## 2020-05-31 PROCEDURE — 85014 HEMATOCRIT: CPT

## 2020-05-31 PROCEDURE — 71045 X-RAY EXAM CHEST 1 VIEW: CPT

## 2020-05-31 PROCEDURE — 0 IODIXANOL PER 1 ML: Performed by: RADIOLOGY

## 2020-05-31 PROCEDURE — 84484 ASSAY OF TROPONIN QUANT: CPT | Performed by: EMERGENCY MEDICINE

## 2020-05-31 PROCEDURE — 84450 TRANSFERASE (AST) (SGOT): CPT | Performed by: EMERGENCY MEDICINE

## 2020-05-31 PROCEDURE — 25010000003 LIDOCAINE 1 % SOLUTION: Performed by: RADIOLOGY

## 2020-05-31 PROCEDURE — C1887 CATHETER, GUIDING: HCPCS | Performed by: RADIOLOGY

## 2020-05-31 PROCEDURE — 82330 ASSAY OF CALCIUM: CPT

## 2020-05-31 PROCEDURE — 61645 PERQ ART M-THROMBECT &/NFS: CPT | Performed by: RADIOLOGY

## 2020-05-31 PROCEDURE — 99284 EMERGENCY DEPT VISIT MOD MDM: CPT

## 2020-05-31 PROCEDURE — 84295 ASSAY OF SERUM SODIUM: CPT

## 2020-05-31 PROCEDURE — 87635 SARS-COV-2 COVID-19 AMP PRB: CPT | Performed by: EMERGENCY MEDICINE

## 2020-05-31 PROCEDURE — 84132 ASSAY OF SERUM POTASSIUM: CPT

## 2020-05-31 PROCEDURE — 85025 COMPLETE CBC W/AUTO DIFF WBC: CPT | Performed by: EMERGENCY MEDICINE

## 2020-05-31 PROCEDURE — 93005 ELECTROCARDIOGRAM TRACING: CPT | Performed by: EMERGENCY MEDICINE

## 2020-05-31 PROCEDURE — 85730 THROMBOPLASTIN TIME PARTIAL: CPT | Performed by: EMERGENCY MEDICINE

## 2020-05-31 PROCEDURE — C1769 GUIDE WIRE: HCPCS | Performed by: RADIOLOGY

## 2020-05-31 PROCEDURE — 82565 ASSAY OF CREATININE: CPT

## 2020-05-31 PROCEDURE — 70496 CT ANGIOGRAPHY HEAD: CPT

## 2020-05-31 PROCEDURE — 25010000002 ALTEPLASE 2 MG RECONSTITUTED SOLUTION: Performed by: RADIOLOGY

## 2020-05-31 PROCEDURE — C1760 CLOSURE DEV, VASC: HCPCS | Performed by: RADIOLOGY

## 2020-05-31 PROCEDURE — C1894 INTRO/SHEATH, NON-LASER: HCPCS | Performed by: RADIOLOGY

## 2020-05-31 PROCEDURE — 25010000002 DIPHENHYDRAMINE PER 50 MG: Performed by: EMERGENCY MEDICINE

## 2020-05-31 PROCEDURE — 0 IOPAMIDOL PER 1 ML: Performed by: EMERGENCY MEDICINE

## 2020-05-31 PROCEDURE — 70498 CT ANGIOGRAPHY NECK: CPT

## 2020-05-31 RX ORDER — ATORVASTATIN CALCIUM 40 MG/1
80 TABLET, FILM COATED ORAL NIGHTLY
Status: DISCONTINUED | OUTPATIENT
Start: 2020-05-31 | End: 2020-06-18 | Stop reason: HOSPADM

## 2020-05-31 RX ORDER — SODIUM CHLORIDE 0.9 % (FLUSH) 0.9 %
10 SYRINGE (ML) INJECTION AS NEEDED
Status: DISCONTINUED | OUTPATIENT
Start: 2020-05-31 | End: 2020-06-06

## 2020-05-31 RX ORDER — FAMOTIDINE 10 MG/ML
20 INJECTION, SOLUTION INTRAVENOUS EVERY 12 HOURS SCHEDULED
Status: DISPENSED | OUTPATIENT
Start: 2020-05-31 | End: 2020-06-07

## 2020-05-31 RX ORDER — SODIUM CHLORIDE 0.9 % (FLUSH) 0.9 %
10 SYRINGE (ML) INJECTION EVERY 12 HOURS SCHEDULED
Status: DISCONTINUED | OUTPATIENT
Start: 2020-05-31 | End: 2020-06-06

## 2020-05-31 RX ORDER — METHYLPREDNISOLONE SODIUM SUCCINATE 125 MG/2ML
125 INJECTION, POWDER, LYOPHILIZED, FOR SOLUTION INTRAMUSCULAR; INTRAVENOUS ONCE
Status: COMPLETED | OUTPATIENT
Start: 2020-05-31 | End: 2020-05-31

## 2020-05-31 RX ORDER — LIDOCAINE HYDROCHLORIDE 10 MG/ML
INJECTION, SOLUTION INFILTRATION; PERINEURAL AS NEEDED
Status: DISCONTINUED | OUTPATIENT
Start: 2020-05-31 | End: 2020-05-31 | Stop reason: HOSPADM

## 2020-05-31 RX ORDER — ASPIRIN 325 MG
325 TABLET ORAL DAILY
Status: DISCONTINUED | OUTPATIENT
Start: 2020-06-01 | End: 2020-06-03

## 2020-05-31 RX ORDER — DIPHENHYDRAMINE HYDROCHLORIDE 50 MG/ML
25 INJECTION INTRAMUSCULAR; INTRAVENOUS ONCE
Status: COMPLETED | OUTPATIENT
Start: 2020-05-31 | End: 2020-05-31

## 2020-05-31 RX ORDER — LABETALOL HYDROCHLORIDE 5 MG/ML
INJECTION, SOLUTION INTRAVENOUS AS NEEDED
Status: DISCONTINUED | OUTPATIENT
Start: 2020-05-31 | End: 2020-05-31 | Stop reason: HOSPADM

## 2020-05-31 RX ORDER — IODIXANOL 320 MG/ML
INJECTION, SOLUTION INTRAVASCULAR AS NEEDED
Status: DISCONTINUED | OUTPATIENT
Start: 2020-05-31 | End: 2020-05-31 | Stop reason: HOSPADM

## 2020-05-31 RX ORDER — ASPIRIN 300 MG/1
300 SUPPOSITORY RECTAL DAILY
Status: DISCONTINUED | OUTPATIENT
Start: 2020-06-01 | End: 2020-06-03

## 2020-05-31 RX ADMIN — NICARDIPINE HYDROCHLORIDE 15 MG: 0.1 INJECTION, SOLUTION INTRAVENOUS at 17:45

## 2020-05-31 RX ADMIN — DIPHENHYDRAMINE HYDROCHLORIDE 25 MG: 50 INJECTION INTRAMUSCULAR; INTRAVENOUS at 14:58

## 2020-05-31 RX ADMIN — METHYLPREDNISOLONE SODIUM SUCCINATE 125 MG: 125 INJECTION, POWDER, FOR SOLUTION INTRAMUSCULAR; INTRAVENOUS at 14:57

## 2020-05-31 RX ADMIN — IOPAMIDOL 120 ML: 755 INJECTION, SOLUTION INTRAVENOUS at 15:15

## 2020-05-31 RX ADMIN — FAMOTIDINE 20 MG: 10 INJECTION INTRAVENOUS at 20:16

## 2020-05-31 RX ADMIN — METOPROLOL TARTRATE 5 MG: 5 INJECTION INTRAVENOUS at 21:07

## 2020-05-31 RX ADMIN — NICARDIPINE HYDROCHLORIDE 5 MG/HR: 0.1 INJECTION, SOLUTION INTRAVENOUS at 23:21

## 2020-05-31 RX ADMIN — SODIUM CHLORIDE, PRESERVATIVE FREE 10 ML: 5 INJECTION INTRAVENOUS at 20:16

## 2020-06-01 ENCOUNTER — APPOINTMENT (OUTPATIENT)
Dept: CT IMAGING | Facility: HOSPITAL | Age: 65
End: 2020-06-01

## 2020-06-01 ENCOUNTER — APPOINTMENT (OUTPATIENT)
Dept: CARDIOLOGY | Facility: HOSPITAL | Age: 65
End: 2020-06-01

## 2020-06-01 LAB
ANION GAP SERPL CALCULATED.3IONS-SCNC: 16 MMOL/L (ref 5–15)
BASOPHILS # BLD AUTO: 0.01 10*3/MM3 (ref 0–0.2)
BASOPHILS NFR BLD AUTO: 0.1 % (ref 0–1.5)
BH CV ECHO MEAS - AO MAX PG (FULL): 17.5 MMHG
BH CV ECHO MEAS - AO MAX PG: 21 MMHG
BH CV ECHO MEAS - AO MEAN PG (FULL): 9 MMHG
BH CV ECHO MEAS - AO MEAN PG: 11 MMHG
BH CV ECHO MEAS - AO ROOT AREA (BSA CORRECTED): 1.6
BH CV ECHO MEAS - AO ROOT AREA: 8.6 CM^2
BH CV ECHO MEAS - AO ROOT DIAM: 3.3 CM
BH CV ECHO MEAS - AO V2 MAX: 227 CM/SEC
BH CV ECHO MEAS - AO V2 MEAN: 154 CM/SEC
BH CV ECHO MEAS - AO V2 VTI: 45.9 CM
BH CV ECHO MEAS - ASC AORTA: 2.9 CM
BH CV ECHO MEAS - AVA(I,A): 1.4 CM^2
BH CV ECHO MEAS - AVA(I,D): 1.4 CM^2
BH CV ECHO MEAS - AVA(V,A): 1.3 CM^2
BH CV ECHO MEAS - AVA(V,D): 1.3 CM^2
BH CV ECHO MEAS - BSA(HAYCOCK): 2.2 M^2
BH CV ECHO MEAS - BSA: 2 M^2
BH CV ECHO MEAS - BZI_BMI: 45.7 KILOGRAMS/M^2
BH CV ECHO MEAS - BZI_METRIC_HEIGHT: 154.9 CM
BH CV ECHO MEAS - BZI_METRIC_WEIGHT: 109.8 KG
BH CV ECHO MEAS - EDV(CUBED): 74.1 ML
BH CV ECHO MEAS - EDV(MOD-SP2): 52.6 ML
BH CV ECHO MEAS - EDV(MOD-SP4): 95.9 ML
BH CV ECHO MEAS - EDV(TEICH): 78.6 ML
BH CV ECHO MEAS - EF(CUBED): 78.9 %
BH CV ECHO MEAS - EF(MOD-SP2): 55.3 %
BH CV ECHO MEAS - EF(MOD-SP4): 66.7 %
BH CV ECHO MEAS - EF(TEICH): 71.6 %
BH CV ECHO MEAS - ESV(CUBED): 15.6 ML
BH CV ECHO MEAS - ESV(MOD-SP2): 23.5 ML
BH CV ECHO MEAS - ESV(MOD-SP4): 31.9 ML
BH CV ECHO MEAS - ESV(TEICH): 22.3 ML
BH CV ECHO MEAS - FS: 40.5 %
BH CV ECHO MEAS - IVS/LVPW: 0.91
BH CV ECHO MEAS - IVSD: 1 CM
BH CV ECHO MEAS - LA DIMENSION: 3.9 CM
BH CV ECHO MEAS - LA/AO: 1.2
BH CV ECHO MEAS - LAD MAJOR: 6.5 CM
BH CV ECHO MEAS - LAT PEAK E' VEL: 15.2 CM/SEC
BH CV ECHO MEAS - LATERAL E/E' RATIO: 7
BH CV ECHO MEAS - LV DIASTOLIC VOL/BSA (35-75): 46.8 ML/M^2
BH CV ECHO MEAS - LV MASS(C)D: 147 GRAMS
BH CV ECHO MEAS - LV MASS(C)DI: 71.8 GRAMS/M^2
BH CV ECHO MEAS - LV MAX PG: 3.5 MMHG
BH CV ECHO MEAS - LV MEAN PG: 2 MMHG
BH CV ECHO MEAS - LV SYSTOLIC VOL/BSA (12-30): 15.6 ML/M^2
BH CV ECHO MEAS - LV V1 MAX: 93.3 CM/SEC
BH CV ECHO MEAS - LV V1 MEAN: 63.4 CM/SEC
BH CV ECHO MEAS - LV V1 VTI: 20.9 CM
BH CV ECHO MEAS - LVIDD: 4.2 CM
BH CV ECHO MEAS - LVIDS: 2.5 CM
BH CV ECHO MEAS - LVLD AP2: 7.4 CM
BH CV ECHO MEAS - LVLD AP4: 7.2 CM
BH CV ECHO MEAS - LVLS AP2: 5.6 CM
BH CV ECHO MEAS - LVLS AP4: 6.5 CM
BH CV ECHO MEAS - LVOT AREA (M): 3.1 CM^2
BH CV ECHO MEAS - LVOT AREA: 3.1 CM^2
BH CV ECHO MEAS - LVOT DIAM: 2 CM
BH CV ECHO MEAS - LVPWD: 1.1 CM
BH CV ECHO MEAS - MED PEAK E' VEL: 8.8 CM/SEC
BH CV ECHO MEAS - MEDIAL E/E' RATIO: 12
BH CV ECHO MEAS - MV DEC SLOPE: 354 CM/SEC^2
BH CV ECHO MEAS - MV DEC TIME: 0.3 SEC
BH CV ECHO MEAS - MV E MAX VEL: 106 CM/SEC
BH CV ECHO MEAS - PA ACC TIME: 0.1 SEC
BH CV ECHO MEAS - PA MAX PG: 4.8 MMHG
BH CV ECHO MEAS - PA PR(ACCEL): 33 MMHG
BH CV ECHO MEAS - PA V2 MAX: 109.6 CM/SEC
BH CV ECHO MEAS - RAP SYSTOLE: 8 MMHG
BH CV ECHO MEAS - RVSP: 30 MMHG
BH CV ECHO MEAS - SI(AO): 191.8 ML/M^2
BH CV ECHO MEAS - SI(CUBED): 28.5 ML/M^2
BH CV ECHO MEAS - SI(LVOT): 32.1 ML/M^2
BH CV ECHO MEAS - SI(MOD-SP2): 14.2 ML/M^2
BH CV ECHO MEAS - SI(MOD-SP4): 31.2 ML/M^2
BH CV ECHO MEAS - SI(TEICH): 27.5 ML/M^2
BH CV ECHO MEAS - SV(AO): 392.9 ML
BH CV ECHO MEAS - SV(CUBED): 58.5 ML
BH CV ECHO MEAS - SV(LVOT): 65.7 ML
BH CV ECHO MEAS - SV(MOD-SP2): 29.1 ML
BH CV ECHO MEAS - SV(MOD-SP4): 64 ML
BH CV ECHO MEAS - SV(TEICH): 56.3 ML
BH CV ECHO MEAS - TAPSE (>1.6): 2.4 CM2
BH CV ECHO MEAS - TR MAX PG: 22 MMHG
BH CV ECHO MEAS - TR MAX VEL: 235 CM/SEC
BH CV ECHO MEAS - TV E MAX VEL: 69 CM/SEC
BH CV ECHO MEASUREMENTS AVERAGE E/E' RATIO: 8.83
BH CV XLRA - RV BASE: 3.9 CM
BH CV XLRA - RV LENGTH: 6.1 CM
BH CV XLRA - RV MID: 3.7 CM
BH CV XLRA - TDI S': 11.5 CM/SEC
BUN BLD-MCNC: 12 MG/DL (ref 8–23)
BUN/CREAT SERPL: 16.4 (ref 7–25)
CALCIUM SPEC-SCNC: 9 MG/DL (ref 8.6–10.5)
CHLORIDE SERPL-SCNC: 101 MMOL/L (ref 98–107)
CHOLEST SERPL-MCNC: 213 MG/DL (ref 0–200)
CO2 SERPL-SCNC: 20 MMOL/L (ref 22–29)
CREAT BLD-MCNC: 0.73 MG/DL (ref 0.57–1)
DEPRECATED RDW RBC AUTO: 53.1 FL (ref 37–54)
EOSINOPHIL # BLD AUTO: 0 10*3/MM3 (ref 0–0.4)
EOSINOPHIL NFR BLD AUTO: 0 % (ref 0.3–6.2)
ERYTHROCYTE [DISTWIDTH] IN BLOOD BY AUTOMATED COUNT: 19.9 % (ref 12.3–15.4)
GFR SERPL CREATININE-BSD FRML MDRD: 80 ML/MIN/1.73
GLUCOSE BLD-MCNC: 149 MG/DL (ref 65–99)
GLUCOSE BLDC GLUCOMTR-MCNC: 101 MG/DL (ref 70–130)
GLUCOSE BLDC GLUCOMTR-MCNC: 120 MG/DL (ref 70–130)
GLUCOSE BLDC GLUCOMTR-MCNC: 122 MG/DL (ref 70–130)
GLUCOSE BLDC GLUCOMTR-MCNC: 148 MG/DL (ref 70–130)
HBA1C MFR BLD: 5.8 % (ref 4.8–5.6)
HCT VFR BLD AUTO: 41.1 % (ref 34–46.6)
HDLC SERPL-MCNC: 90 MG/DL (ref 40–60)
HGB BLD-MCNC: 11.7 G/DL (ref 12–15.9)
IMM GRANULOCYTES # BLD AUTO: 0.06 10*3/MM3 (ref 0–0.05)
IMM GRANULOCYTES NFR BLD AUTO: 0.6 % (ref 0–0.5)
LDLC SERPL CALC-MCNC: 79 MG/DL (ref 0–100)
LDLC/HDLC SERPL: 0.88 {RATIO}
LEFT ATRIUM VOLUME INDEX: 44 ML/M2
LYMPHOCYTES # BLD AUTO: 0.85 10*3/MM3 (ref 0.7–3.1)
LYMPHOCYTES NFR BLD AUTO: 8.5 % (ref 19.6–45.3)
MAGNESIUM SERPL-MCNC: 2 MG/DL (ref 1.6–2.4)
MAXIMAL PREDICTED HEART RATE: 153 BPM
MCH RBC QN AUTO: 22.1 PG (ref 26.6–33)
MCHC RBC AUTO-ENTMCNC: 28.5 G/DL (ref 31.5–35.7)
MCV RBC AUTO: 77.5 FL (ref 79–97)
MONOCYTES # BLD AUTO: 0.33 10*3/MM3 (ref 0.1–0.9)
MONOCYTES NFR BLD AUTO: 3.3 % (ref 5–12)
NEUTROPHILS # BLD AUTO: 8.74 10*3/MM3 (ref 1.7–7)
NEUTROPHILS NFR BLD AUTO: 87.5 % (ref 42.7–76)
NRBC BLD AUTO-RTO: 0 /100 WBC (ref 0–0.2)
PHOSPHATE SERPL-MCNC: 2.8 MG/DL (ref 2.5–4.5)
PLATELET # BLD AUTO: 259 10*3/MM3 (ref 140–450)
PMV BLD AUTO: 10.3 FL (ref 6–12)
POTASSIUM BLD-SCNC: 4.1 MMOL/L (ref 3.5–5.2)
RBC # BLD AUTO: 5.3 10*6/MM3 (ref 3.77–5.28)
SODIUM BLD-SCNC: 137 MMOL/L (ref 136–145)
STRESS TARGET HR: 130 BPM
TRIGL SERPL-MCNC: 218 MG/DL (ref 0–150)
VLDLC SERPL-MCNC: 43.6 MG/DL
WBC NRBC COR # BLD: 9.99 10*3/MM3 (ref 3.4–10.8)

## 2020-06-01 PROCEDURE — 99232 SBSQ HOSP IP/OBS MODERATE 35: CPT | Performed by: INTERNAL MEDICINE

## 2020-06-01 PROCEDURE — 80061 LIPID PANEL: CPT | Performed by: RADIOLOGY

## 2020-06-01 PROCEDURE — 80048 BASIC METABOLIC PNL TOTAL CA: CPT | Performed by: INTERNAL MEDICINE

## 2020-06-01 PROCEDURE — 92610 EVALUATE SWALLOWING FUNCTION: CPT

## 2020-06-01 PROCEDURE — 70450 CT HEAD/BRAIN W/O DYE: CPT

## 2020-06-01 PROCEDURE — 85025 COMPLETE CBC W/AUTO DIFF WBC: CPT | Performed by: INTERNAL MEDICINE

## 2020-06-01 PROCEDURE — 92523 SPEECH SOUND LANG COMPREHEN: CPT

## 2020-06-01 PROCEDURE — 93306 TTE W/DOPPLER COMPLETE: CPT

## 2020-06-01 PROCEDURE — 83735 ASSAY OF MAGNESIUM: CPT | Performed by: INTERNAL MEDICINE

## 2020-06-01 PROCEDURE — 93306 TTE W/DOPPLER COMPLETE: CPT | Performed by: INTERNAL MEDICINE

## 2020-06-01 PROCEDURE — 82962 GLUCOSE BLOOD TEST: CPT

## 2020-06-01 PROCEDURE — 84100 ASSAY OF PHOSPHORUS: CPT | Performed by: INTERNAL MEDICINE

## 2020-06-01 PROCEDURE — 99291 CRITICAL CARE FIRST HOUR: CPT | Performed by: PSYCHIATRY & NEUROLOGY

## 2020-06-01 PROCEDURE — 83036 HEMOGLOBIN GLYCOSYLATED A1C: CPT | Performed by: RADIOLOGY

## 2020-06-01 PROCEDURE — 3E05317 INTRODUCTION OF OTHER THROMBOLYTIC INTO PERIPHERAL ARTERY, PERCUTANEOUS APPROACH: ICD-10-PCS | Performed by: RADIOLOGY

## 2020-06-01 PROCEDURE — 03CG3Z7 EXTIRPATION OF MATTER FROM INTRACRANIAL ARTERY USING STENT RETRIEVER, PERCUTANEOUS APPROACH: ICD-10-PCS | Performed by: RADIOLOGY

## 2020-06-01 RX ORDER — METOPROLOL TARTRATE 5 MG/5ML
2.5 INJECTION INTRAVENOUS EVERY 6 HOURS SCHEDULED
Status: DISCONTINUED | OUTPATIENT
Start: 2020-06-01 | End: 2020-06-03

## 2020-06-01 RX ORDER — DEXTROSE AND SODIUM CHLORIDE 5; .9 G/100ML; G/100ML
75 INJECTION, SOLUTION INTRAVENOUS CONTINUOUS
Status: DISCONTINUED | OUTPATIENT
Start: 2020-06-01 | End: 2020-06-03

## 2020-06-01 RX ORDER — DEXMEDETOMIDINE HYDROCHLORIDE 4 UG/ML
.2-1.5 INJECTION, SOLUTION INTRAVENOUS
Status: DISCONTINUED | OUTPATIENT
Start: 2020-06-01 | End: 2020-06-03

## 2020-06-01 RX ORDER — SODIUM CHLORIDE 9 MG/ML
75 INJECTION, SOLUTION INTRAVENOUS CONTINUOUS
Status: ACTIVE | OUTPATIENT
Start: 2020-06-01 | End: 2020-06-02

## 2020-06-01 RX ORDER — ACETAMINOPHEN 650 MG/1
650 SUPPOSITORY RECTAL EVERY 4 HOURS PRN
Status: DISCONTINUED | OUTPATIENT
Start: 2020-06-01 | End: 2020-06-18 | Stop reason: HOSPADM

## 2020-06-01 RX ORDER — METOPROLOL TARTRATE 5 MG/5ML
5 INJECTION INTRAVENOUS ONCE
Status: COMPLETED | OUTPATIENT
Start: 2020-06-01 | End: 2020-06-01

## 2020-06-01 RX ADMIN — METOPROLOL TARTRATE 2.5 MG: 5 INJECTION INTRAVENOUS at 12:28

## 2020-06-01 RX ADMIN — SODIUM CHLORIDE 75 ML/HR: 9 INJECTION, SOLUTION INTRAVENOUS at 10:02

## 2020-06-01 RX ADMIN — SODIUM CHLORIDE, PRESERVATIVE FREE 10 ML: 5 INJECTION INTRAVENOUS at 12:19

## 2020-06-01 RX ADMIN — FAMOTIDINE 20 MG: 10 INJECTION INTRAVENOUS at 21:11

## 2020-06-01 RX ADMIN — ACETAMINOPHEN 650 MG: 650 SUPPOSITORY RECTAL at 10:02

## 2020-06-01 RX ADMIN — FAMOTIDINE 20 MG: 10 INJECTION INTRAVENOUS at 10:02

## 2020-06-01 RX ADMIN — ASPIRIN 300 MG: 300 SUPPOSITORY RECTAL at 12:18

## 2020-06-01 RX ADMIN — SODIUM CHLORIDE, PRESERVATIVE FREE 10 ML: 5 INJECTION INTRAVENOUS at 21:10

## 2020-06-01 RX ADMIN — DEXMEDETOMIDINE HYDROCHLORIDE 0.2 MCG/KG/HR: 4 INJECTION, SOLUTION INTRAVENOUS at 23:09

## 2020-06-01 RX ADMIN — METOPROLOL TARTRATE 2.5 MG: 5 INJECTION INTRAVENOUS at 18:34

## 2020-06-01 RX ADMIN — DEXTROSE AND SODIUM CHLORIDE 75 ML/HR: 5; 900 INJECTION, SOLUTION INTRAVENOUS at 15:24

## 2020-06-01 RX ADMIN — METOPROLOL TARTRATE 5 MG: 5 INJECTION INTRAVENOUS at 05:00

## 2020-06-01 RX ADMIN — ACETAMINOPHEN 650 MG: 650 SUPPOSITORY RECTAL at 16:45

## 2020-06-01 RX ADMIN — METOPROLOL TARTRATE 5 MG: 5 INJECTION INTRAVENOUS at 00:58

## 2020-06-01 NOTE — PAYOR COMM NOTE
"Poornima Webb RN   Phone [phone]  Fax [phone]      DomingoSidra Chen (65 y.o. Female)     Date of Birth Social Security Number Address Home Phone MRN    1955  [id number]   [place] [address] [phone] [id number]    Latter day Marital Status          Unknown Single       Admission Date Admission Type Admitting Provider Attending Provider Department, Room/Bed    5/31/20 Emergency Edward Garcia MD Mueller, Joseph C, MD [facility] 2B ICU, N238/1    Discharge Date Discharge Disposition Discharge Destination                       Attending Provider:  Edward Garcia MD    Allergies:  Digoxin, Iodine, Other    Isolation:  None   Infection:  None   Code Status:  CPR    Ht:  154.9 cm (61\")   Wt:  110 kg (242 lb 4.6 oz)    Admission Cmt:  None   Principal Problem:  Cerebrovascular accident (CVA) due to embolism of right middle cerebral artery (CMS/HCC) [I63.411]                 Active Insurance as of 5/31/2020     Primary Coverage     Payor Plan Insurance Group Employer/Plan Group    ANTHEM MEDICARE REPLACEMENT ANTHEM MEDICARE ADVANTAGE [id number]     Payor Plan Address Payor Plan Phone Number Payor Plan Fax Number Effective Dates    [address] [phone]  7/1/2019 - None Entered    [facility] [address]       Subscriber Name Subscriber Birth Date Member ID       SIDRA JOHNSON CHEN 1955 [id number]           Secondary Coverage     Payor Plan Insurance Group Employer/Plan Group    [place]      Payor Plan Address Payor Plan Phone Number Payor Plan Fax Number Effective Dates    [address] [phone]  8/27/2019 - None Entered    [place] [id number]       Subscriber Name Subscriber Birth Date Member ID       SIDRA JOHNSON CHEN 1955 [id number]                 Emergency Contacts      (Rel.) Home Phone Work Phone Mobile Phone    Kaye Porter (Daughter) -- -- [phone]    MANDA CUEVAS (Sister) [phone] -- " "[phone]               History & Physical      Edward Garcia MD at 05/31/20 8755          Pulmonary/Critical Care History and Physical Exam     LOS: 0 days   Patient Care Team:  Jl Mcknight MD as PCP - General (Family Medicine)    Chief Complaint:    Chief Complaint   Patient presents with   • Stroke       Subjective     HPI:   Sidra Lane is a 65 year old female with PMH persistent A-Fib on Eliquis, CAD, HTN, DVT/PE, GERD, who developed sudden onset left sided facial droop, left sided weakness, and facial droop at 1230 today. She states that she hasn't felt well this past week with fatigue and last night had some dizziness. She states that her right leg had been \"going out from under her\" intermittently over the past week but she attributed this to back pain which started after she mowed her lawn 3 weeks ago.  She does report that she had a brain scan at [facility] approximately 3 weeks ago for evaluation of episodes of dizziness and was told it was okay.  EMS was called and noted she had some movement in her LUE but still had significant weakness. She was flown to [facility] via helicopter for higher level of care and on arrival NIH was 6. She is on Eliquis daily for persistent A-Fib and was therefore not a candidate for systemic tPA. She reported an allergic reaction to Iodine contrast in 1973 but stated that she could have contrast if she is premedicated beforehand.  She was pre-medicated with Benadryl 25 mg and Solu Medrol 125 mg prior to scans. CT scan of the head showed an area concerning for evolving right MCA infarction.  CTP revealed an M2 occlusion and she was taken emergently to Cath Lab for neurointervention where she underwent unsuccessful attempt at thrombectomy and received IA tPA. Post-procedure she was brought to Neuro ICU for close monitoring.     On arrival to ICU she is alert and oriented with slurred speech and NIH is 7. She has a history of A-Fib " followed by Dr. Alan and is scheduled for a PVA on June 22.     History taken from:     Past Medical History:   Diagnosis Date   • Atrial fibrillation (CMS/HCC)    • CHF (congestive heart failure) (CMS/HCC)    • Deep vein thrombosis (CMS/HCC)    • GERD (gastroesophageal reflux disease)    • Hypertension    • May-Thurner syndrome    • Pulmonary embolism (CMS/HCC)        Past Surgical History:   Procedure Laterality Date   • CHOLECYSTECTOMY     • COLON SURGERY      bowel leakage, some of colon removed   • LAPAROSCOPIC TUBAL LIGATION     • LEG SURGERY      multiple stents to BLE   • TONSILLECTOMY         Family History   Problem Relation Age of Onset   • Hypertension Mother    • Cervical cancer Mother        Social History     Socioeconomic History   • Marital status: Single     Spouse name: Not on file   • Number of children: Not on file   • Years of education: Not on file   • Highest education level: Not on file   Tobacco Use   • Smoking status: Never Smoker   • Smokeless tobacco: Never Used   Substance and Sexual Activity   • Alcohol use: No   • Drug use: No       Allergies   Allergen Reactions   • Digoxin Other (See Comments)     Severe c/p, left arm pain, lips numb, finger tip numbness   • Iodine Rash     Breathing problems   • Other Other (See Comments)     Monistat, causes blisters       PMH/FH/SocH were reviewed by me and updates were made.     Review of Systems:    Review of 14 systems was completed with positives and pertinent negatives noted in the subjective section.  All other systems reviewed and are negative.       Objective     Vital Signs  Temp:  [97.7 °F (36.5 °C)-98.6 °F (37 °C)] 97.7 °F (36.5 °C)  Heart Rate:  [] 116  Resp:  [16] 16  BP: ()/() 111/78  Body mass index is 43.46 kg/m².       Physical Exam:     General Appearance:    Alert, cooperative, in no acute distress   Head:    Normocephalic, without obvious abnormality, atraumatic   Eyes:            Lids and lashes normal,  conjunctivae and sclerae normal, no   icterus, no pallor, corneas clear, PERRL, forced right gaze deviation   ENMT:   Ears appear intact with no abnormalities noted      No oral lesions, no thrush, oral mucosa moist   Neck:   No adenopathy, supple, trachea midline, no thyromegaly, no   carotid bruit, no JVD       Lungs/resp:     Normal effort, symmetric chest rise, no crepitus, clear to      auscultation bilaterally, no chest wall tenderness                  Heart/CV:    Regular rhythm and normal rate, normal S1 and S2, no            murmur   Abdomen/GI:     Normal bowel sounds, no masses, no organomegaly, soft        non-tender, non-distended   G/U:     Deferred, Logan catheter   Extremities/MSK:   No clubbing, cyanosis or edema.  Normal tone.  No deformities. Right groin soft, dressing intact, oozing blood, no hematoma   Pulses:   Pulses palpable and equal bilaterally   Skin:   No bleeding, bruising or rash   Hem/Lymph:   No cervical or supraclavicular adenopathy.    Neurologic:  Left facial droop and right upper facial droop, Left upper extremity greater than left lower extremity weakness.  Very little  strength left hand.            Psychiatric:  Normal mood and affect, oriented x 3.   The above findings are documentation my personal physical examination.  Electronically signed by:Edward Garcia MD 05/31/20 19:40    Interval: handoff  1a. Level of Consciousness: 0-->Alert, keenly responsive  1b. LOC Questions: 0-->Answers both questions correctly  1c. LOC Commands: 0-->Performs both tasks correctly  2. Best Gaze: 1-->Partial gaze palsy, gaze is abnormal in one or both eyes, but forced deviation or total gaze paresis is not present  3. Visual: 0-->No visual loss  4. Facial Palsy: 1-->Minor paralysis (flattened nasolabial fold, asymmetry on smiling)  5a. Motor Arm, Left: 1-->Drift, limb holds 90 (or 45) degrees, but drifts down before full 10 seconds, does not hit bed or other support  5b. Motor Arm,  Right: 0-->No drift, limb holds 90 (or 45) degrees for full 10 secs  6a. Motor Leg, Left: 1-->Drift, leg falls by the end of the 5-sec period but does not hit bed  6b. Motor Leg, Right: 0-->No drift, leg holds 30 degree position for full 5 secs  7. Limb Ataxia: 0-->Absent  8. Sensory: 1-->Mild-to-moderate sensory loss, patient feels pinprick is less sharp or is dull on the affected side, or there is a loss of superficial pain with pinprick, but patient is aware of being touched  9. Best Language: 0-->No aphasia, normal  10. Dysarthria: 1-->Mild-to-moderate dysarthria, patient slurs at least some words and, at worst, can be understood with some difficulty  11. Extinction and Inattention (formerly Neglect): 0-->No abnormality    Total (NIH Stroke Scale): 6   Results Review:     I reviewed the patient's new clinical results.   Results from last 7 days   Lab Units 05/31/20  1517 05/31/20  1501   CREATININE mg/dL  --  0.70   ALT (SGPT) U/L 19  --    AST (SGOT) U/L 31  --      Results from last 7 days   Lab Units 05/31/20  1517 05/31/20  1507   WBC 10*3/mm3 9.37  --    HEMOGLOBIN g/dL 11.9*  --    HEMOGLOBIN, POC g/dL  --  14.6   HEMATOCRIT % 41.9  --    HEMATOCRIT POC %  --  43   PLATELETS 10*3/mm3 307  --      Results from last 7 days   Lab Units 05/31/20  1507   PH, ARTERIAL pH units 7.37       I reviewed the patient's new imaging including images and reports.    CT head:  IMPRESSION:  Abnormal sulcal effacement with cortical extension of edema  in the right frontotemporal region and insular cortex concerning for  evolving right MCA infarction. No acute hemorrhage.    CTA head/neck:  IMPRESSION:  CTA head and neck with only mild atherosclerotic involvement  of the distal internal carotid arteries however no hemodynamically  significant stenosis, aneurysm or occlusion; specifically no large  vessel occlusion involving the [place] with only mild  irregularities in the distal MCA distributions of potential  underlying  atherosclerotic involvement without obvious occlusion.    CT cerebral perfusion:  IMPRESSION:  Area of reversible ischemia within the right MCA territory  without core infarct component identified.    Medication Review:     [START ON 6/1/2020] aspirin 325 mg Oral Daily   Or      [START ON 6/1/2020] aspirin 300 mg Rectal Daily   atorvastatin 80 mg Oral Nightly   famotidine 20 mg Intravenous Q12H   sodium chloride 10 mL Intravenous Q12H       niCARdipine 5-15 mg/hr Last Rate: Stopped (05/31/20 1854)       Assessment/Plan     Active Hospital Problems    Diagnosis   • **Cerebrovascular accident (CVA) due to embolism of right middle cerebral artery (CMS/HCC)   • Acute ischemic stroke (CMS/HCC)   • Atrial fibrillation (CMS/HCC)     65 y.o. 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  CT perfusion showed area in right MCA territory of reversible ischemia and she underwent angiography with intra-arterial TPA after failed thrombectomy.  She is transferred to the intensive care unit post procedure with persistent left sided weakness.  She has persistent atrial fibrillation.    Patient is critically ill secondary to neurologic failure with high risk of life-threatening decline in condition including potential hemorrhagic conversion, recurrent stroke, seizure and requires continuous monitoring frequent reassessment to minimize this risk.  I personally reassessed her multiple times today.    Plan:  1.  Admit to Neuro ICU  2.  Stroke Pathway, no tPA  3.  Neurology consult  4.  ST/OT/PT  5.  Cardene drip to keep SBP   6.  NPO until dysphagia eval  7.   mg QD  8.  Lipitor 80 mg QD  9.  MRI brain  10.  GI/DVT Prophylaxis    Edward Garcai MD  05/31/20  19:19     I performed an independent history and physical examination. Portions of the history were obtained by APRN and  were modified by me according to my findings. The above note reflects my findings, assessment, and plan.    Edward Garcia MD        Critical care time : 40 minutes  spent by me personally.(This excludes time spent performing separately reportable procedures and services). including high complexity decision making to assess, manipulate, and support vital organ system failure in this individual who has impairment of one or more vital organ systems such that there is a high probability of imminent or life threatening deterioration in the patient’s condition.    Electronically signed by:  Edward Garcia MD   05/31/20  19:47      • Please note that portions of this note were completed with [employer] - a voice recognition program.     Electronically signed by Edward Garcia MD at 05/31/20 9701

## 2020-06-01 NOTE — PLAN OF CARE
Problem: Patient Care Overview  Goal: Plan of Care Review  Outcome: Ongoing (interventions implemented as appropriate)  Flowsheets (Taken 6/1/2020 1723)  Progress: no change  Plan of Care Reviewed With: patient  Outcome Summary: NIH 6-8. Pt anxious majority of shift. Pt c/o headache since arrival, MD aware. NPO, awaiting speech eval. 's-150's, PRN 5mg lopressor given with no reief, APRN notified. A one time dose of 5mg lopressor ordered and administered with no relief, APRN notified with no new orders. SBP within range, 90's-130's. Groin site is clean, dry, and intact. Pt refused MRI, stroke navigator notified. Obtained head CT this AM. Afebrile. Adequate urine output. VSS, will continue to monitor.

## 2020-06-01 NOTE — PROGRESS NOTES
Discharge Planning Assessment  [facility]     Patient Name: Sidra Lane  MRN: [id number]  Today's Date: 6/1/2020    Admit Date: 5/31/2020    Discharge Needs Assessment     Row Name 06/01/20 0825       Living Environment    Lives With  alone    Current Living Arrangements  home/apartment/condo    Provides Primary Care For  no one    Family Caregiver if Needed  child(lynne), adult    Family Caregiver Names  Mary dtr    Quality of Family Relationships  helpful;involved;supportive    Living Arrangement Comments  Pt lives alone in [facility]. Pt is independent with ADL's . Pt has had [facility] in the past. 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 Patients mother and sister live nearby and can assist with care if needed.       Resource/Environmental Concerns    Resource/Environmental Concerns  none       Transition Planning    Patient/Family Anticipates Transition to  home;home with help/services    Patient/Family Anticipated Services at Transition  ;rehabilitation services await PT eval    Transportation Anticipated  family or friend will provide       Discharge Needs Assessment    Readmission Within the Last 30 Days  no previous admission in last 30 days    Concerns to be Addressed  adjustment to diagnosis/illness    Equipment Currently Used at Home  walker, rolling;commode;oxygen    Anticipated Changes Related to Illness  none    Equipment Needed After Discharge  none    Outpatient/Agency/Support Group Needs  inpatient rehabilitation facility    Discharge Coordination/Progress  PCP Jl Sandoval Medicare Replacement         Discharge Plan     Row Name 06/01/20 0831       Plan    Plan  IDP    Plan Comments  Met with patient at . Pt does live alone in [facility]. Pt is independent with ADL PTA. Will await PT eval to determine discharge needs. CM will follow.    Final Discharge Disposition Code  62 - inpatient rehab facility        Destination      Coordination has not  been started for this encounter.      Durable Medical Equipment      Coordination has not been started for this encounter.      Dialysis/Infusion      Coordination has not been started for this encounter.      Home Medical Care      Coordination has not been started for this encounter.      Therapy      Coordination has not been started for this encounter.      Community Resources      Coordination has not been started for this encounter.        Expected Discharge Date and Time     Expected Discharge Date Expected Discharge Time    Jun 5, 2020         Demographic Summary     Row Name 06/01/20 0821       General Information    Admission Type  inpatient    Arrived From  emergency department    Referral Source  admission list    Reason for Consult  discharge planning    Preferred Language  English       Contact Information    Permission Granted to Share Info With          Functional Status     Row Name 06/01/20 0821       Functional Status    Usual Activity Tolerance  good       Functional Status, IADL    Medications  independent    Meal Preparation  independent    Housekeeping  independent    Laundry  independent    Shopping  independent        Psychosocial    No documentation.       Abuse/Neglect    No documentation.       Legal    No documentation.       Substance Abuse    No documentation.       Patient Forms    No documentation.           Ester Sprague RN

## 2020-06-01 NOTE — CONSULTS
Stroke Consult Note    Patient Name: Sidra Lane   MRN: [id number]  Age: 65 y.o.  Sex: female  : 1955    Primary Care Physician: Jl Mcknight MD  Referring Physician:  No ref. provider found    Handedness: Right  Race: White    Chief Complaint/Reason for Consultation: Left-sided weakness    HPI: This is 65-year-old right-handed white female with known diagnosis of hypertension, hyperlipidemia, atrial fibrillation on Eliquis (patient has been in persistent A. fib for last few weeks, and was working with cardiologist to have cardioversion done as outpatient within next week or 2) who was brought in with acute onset of left-sided weakness including in the face arm and leg, for which she was considered to be given TPA, but she was not eligible for TPA as she was on Eliquis.  Patient was found to have superior temporal branch of the right MCA occluded, for which she was taken to IR.  Interventionalist was not able to do the thrombectomy, but did give her intra-arterial TPA.  This did not help her with the symptoms, and she continues to have the left-sided weakness.  No significant improvement since yesterday.  Patient continues to be in persistent atrial fibrillation    Last Known Normal Date/Time: 1230 2020 EST     Review of Systems   Constitutional: Negative.    HENT: Negative.    Eyes: Negative.    Respiratory: Negative.    Cardiovascular: Negative.    Gastrointestinal: Negative.    Endocrine: Negative.    Genitourinary: Negative.    Musculoskeletal: Negative.    Skin: Negative.    Allergic/Immunologic: Negative.    Neurological:        Per HPI   Hematological: Negative.    Psychiatric/Behavioral: Negative.         Temp:  [97.7 °F (36.5 °C)-99.4 °F (37.4 °C)] 99.4 °F (37.4 °C)  Heart Rate:  [] 108  Resp:  [16-20] 20  BP: ()/() 126/88    Neurological Exam  Mental Status  Drowsy. Oriented only to person, place and time. Recent and remote memory are intact. Moderate dysarthria  present. Language is fluent with no aphasia. Attention and concentration are normal. Fund of knowledge is appropriate for level of education.  Left-sided neglect.  Anosognosia,asomatognosia. .    Cranial Nerves  CN II: Right normal visual field. Left homonymous hemianopsia.  CN III, IV, VI: Pupils equal round and reactive to light bilaterally. Partial gaze palsy with right gaze preference, unable to move eyes beyond midline on left.  CN V: Facial sensation is normal.  CN VII:  Right: There is no facial weakness.  Left: There is central facial weakness.  CN VIII: Hearing is normal.  CN IX, X: Palate elevates symmetrically  CN XI: Shoulder shrug strength is normal.  CN XII: Tongue midline without atrophy or fasciculations.    Motor  Normal muscle bulk throughout. No fasciculations present. Normal muscle tone.  Left upper extremity is 2/5, left lower extremity is 3/5  Right upper extremity/right lower extremity spontaneous movement 5/5.    Sensory  Sensation: Decreased to light touch on left upper and lower extremity.     Reflexes  Deep tendon reflexes are 2+ and symmetric in all four extremities with downgoing toes bilaterally.    Coordination  No dysmetria appreciated.    Gait  Not assessed.      Physical Exam   Constitutional: She appears well-developed and well-nourished.   HENT:   Head: Normocephalic and atraumatic.   Eyes: Pupils are equal, round, and reactive to light. Conjunctivae are normal.   Neck: Normal range of motion. Neck supple.   Cardiovascular:   Tachycardic, persistent atrial fibrillation   Abdominal: Soft. She exhibits no distension.   Neurological: She has normal reflexes.   Skin: Skin is warm and dry.   Psychiatric: She has a normal mood and affect. Her behavior is normal.   Nursing note and vitals reviewed.      Acute Stroke Data    Alteplase (tPA) Inclusion / Exclusion Criteria    Time: 14:39  Person Administering Scale: Spike Vaughan MD    Inclusion Criteria  [x]   18 years of age or greater    [x]   Onset of symptoms < 4.5 hours before beginning treatment (stroke onset = time patient was last seen well or without symptoms).   [x]   Diagnosis of acute ischemic stroke causing measurable disabling deficit (Complete Hemianopia, Any Aphasia, Visual or Sensory Extinction, Any weakness limiting sustained effort against gravity)   []   Any remaining deficit considered potentially disabling in view of patient and practitioner   Exclusion criteria (Do not proceed with Alteplase if any are checked under exclusion criteria)  []   Onset unknown or GREATER than 4.5 hours   []   ICH on CT/MRI   []   CT demonstrates hypodensity representing acute or subacute infarct   []   Significant head trauma or prior stroke in the previous 3 months   []   Symptoms suggestive of subarachnoid hemorrhage   []   History of un-ruptured intracranial aneurysm GREATER than 10 mm   []   Recent intracranial or intraspinal surgery within the last 3 months   []   Arterial puncture at a non-compressible site in the previous 7 days   []   Active internal bleeding   []   Acute bleeding tendency   []   Platelet count LESS than 100,000 for known hematological diseases such as leukemia, thrombocytopenia or chronic cirrhosis   []   Current use of anticoagulant with INR GREATER than 1.7 or PT GREATER than 15 seconds, aPTT GREATER than 40 seconds   []   Heparin received within 48 hours, resulting in abnormally elevated aPTT GREATER than upper limit of normal   [x]   Current use of direct thrombin inhibitors or direct factor Xa inhibitors in the past 48 hours   []   Elevated blood pressure refractory to treatment (systolic GREATER than 185 mm/Hg or diastolic  GREATER than 110 mm/Hg   []   Suspected infective endocarditis and aortic arch dissection   []   Current use of therapeutic treatment dose of low-molecular-weight heparin (LMWH) within the previous 24 hours   []   Structural GI malignancy or bleed   Relative exclusion for all patients  []   Only  minor non-disabling symptoms   []   Pregnancy   []   Seizure at onset with postictal residual neurological impairments   []   Major surgery or previous trauma within past 14 days   []   History of previous spontaneous ICH, intracranial neoplasm, or AV malformation   []   Postpartum (within previous 14 days)   []   Recent GI or urinary tract hemorrhage (within previous 21 days)   []   Recent acute MI (within previous 3 months)   []   History of un-ruptured intracranial aneurysm LESS than 10 mm   []   History of ruptured intracranial aneurysm   []   Blood glucose LESS than 50 mg/dL (2.7 mmol/L)   []   Dural puncture within the last 7 days   []   Known GREATER than 10 cerebral microbleeds   Additional exclusions for patients with symptoms onset between 3 and 4.5 hours.  []   Age > 80.   []   On any anticoagulants regardless of INR  >>> Warfarin (Coumadin), Heparin, Enoxaparin (Lovenox), fondaparinux (Arixtra), bivalirudin (Angiomax), Argatroban, dabigatran (Pradaxa), rivaroxaban (Xarelto), or apixaban (Eliquis)   []   Severe stroke (NIHSS > 25).   []   History of BOTH diabetes and previous ischemic stroke.   []   The risks and benefits have been discussed with the patient or family related to the administration of IV Alteplase for stroke symptoms.   []   I have discussed and reviewed the patient's case and imaging with the attending prior to IV Alteplase.    Time Alteplase administered       Past Medical History:   Diagnosis Date   • Atrial fibrillation (CMS/HCC)    • CHF (congestive heart failure) (CMS/HCC)    • Deep vein thrombosis (CMS/HCC)    • GERD (gastroesophageal reflux disease)    • Hypertension    • May-Thurner syndrome    • Pulmonary embolism (CMS/HCC)      Past Surgical History:   Procedure Laterality Date   • CHOLECYSTECTOMY     • COLON SURGERY      bowel leakage, some of colon removed   • INTERVENTIONAL RADIOLOGY PROCEDURE Bilateral 5/31/2020    Procedure: CAROTID CEREBRAL ANGIOGRAM BILATERAL;  Surgeon:  Brant Duarte MD;  Location: [facility] INVASIVE LOC[facility];  Service: Interventional Radiology;  Laterality: Bilateral;   • LAPAROSCOPIC TUBAL LIGATION     • LEG SURGERY      multiple stents to BLE   • TONSILLECTOMY       Family History   Problem Relation Age of Onset   • Hypertension Mother    • Cervical cancer Mother      Social History     Socioeconomic History   • Marital status: Single     Spouse name: Not on file   • Number of children: Not on file   • Years of education: Not on file   • Highest education level: Not on file   Tobacco Use   • Smoking status: Never Smoker   • Smokeless tobacco: Never Used   Substance and Sexual Activity   • Alcohol use: No   • Drug use: No     Allergies   Allergen Reactions   • Digoxin Other (See Comments)     Severe c/p, left arm pain, lips numb, finger tip numbness   • Iodine Rash     Breathing problems   • Other Other (See Comments)     Monistat, causes blisters     Prior to Admission medications    Medication Sig Start Date End Date Taking? Authorizing Provider   amLODIPine (NORVASC) 5 MG tablet Take 1 tablet by mouth Daily. 4/14/20  Yes Demetrius Alan MD   apixaban (ELIQUIS) 5 MG tablet tablet Take 1 tablet by mouth Every 12 (Twelve) Hours. 4/14/20  Yes Demetrius Alan MD   diphenoxylate-atropine (LOMOTIL) 2.5-0.025 MG per tablet Take 1 tablet by mouth 2 (Two) Times a Day. May take one additional if severe diarrhea. 3/4/16  Yes Kingsley Velazquez MD   fluticasone (VERAMYST) 27.5 MCG/SPRAY nasal spray 2 sprays into the nostril(s) as directed by provider Daily.   Yes Kingsley Velazquez MD   lisinopril (PRINIVIL,ZESTRIL) 10 MG tablet Take 1 tablet by mouth Every Evening. 4/14/20  Yes Demetrius Alan MD   nitroglycerin (NITROSTAT) 0.4 MG SL tablet 1 under the tongue as needed for angina, may repeat q5mins for up three doses 2/25/20  Yes Blair Silva PA   omeprazole (priLOSEC) 20 MG capsule Take 20 mg by mouth Daily. 6/4/18  Yes Kingsley Velazquez MD    sotalol (BETAPACE) 80 MG tablet Take 1 tablet by mouth 2 (Two) Times a Day. 4/14/20  Yes Demetrius Alan MD   WELCHOL 625 MG tablet Take 2 tablets by mouth 2 (Two) Times a Day With Meals. 6/4/18  Yes ProviderKingsley MD   meclizine (ANTIVERT) 25 MG tablet Take 25 mg by mouth 3 (Three) Times a Day As Needed. 2/29/20   ProviderKingsley MD   metoprolol tartrate (LOPRESSOR) 50 MG tablet Take 1 tablet by mouth 2 (Two) Times a Day. Take 1 tablet by mouth two times a day when you stop your Sotalol  Patient not taking: Reported on 6/1/2020 5/29/20   Cassia Moss APRN   diphenhydrAMINE (BENADRYL) 50 MG tablet Take one tablet 1 hour prior to exam. 5/27/20 6/1/20  Demetrius Alan MD   predniSONE (DELTASONE) 50 MG tablet Take one tablet 13hours, 7 hours and 1 hour prior to exam. 5/27/20 6/1/20  Demetrius Alan MD       [facility] Meds:  Scheduled-   aspirin 325 mg Oral Daily   Or      aspirin 300 mg Rectal Daily   atorvastatin 80 mg Oral Nightly   famotidine 20 mg Intravenous Q12H   metoprolol tartrate 2.5 mg Intravenous Q6H   sodium chloride 10 mL Intravenous Q12H     Infusions-   dextrose 5 % and sodium chloride 0.9 % 75 mL/hr    niCARdipine 5-15 mg/hr Last Rate: Stopped (06/01/20 0015)   sodium chloride 75 mL/hr Last Rate: 75 mL/hr (06/01/20 1002)      PRNs- •  acetaminophen  •  metoprolol tartrate  •  sodium chloride  •  sodium chloride    Functional Status Prior to Current Stroke/[place] Score: 0    NIH Stroke Scale  Time: 14:39  Person Administering Scale: Spike Vaughan MD    1a  Level of consciousness: 1=not alert but arousable by minor stimulation to obey, answer or respond   1b. LOC questions:  0=Performs both tasks correctly   1c. LOC commands: 0=Performs both tasks correctly   2.  Best Gaze: 1=partial gaze palsy   3.  Visual: 2=Complete hemianopia   4. Facial Palsy: 2=Partial paralysis (total or near total paralysis of the lower face)   5a.  Motor left arm: 2=Some effort against  gravity, limb cannot get to or maintain (if cured) 90 (or 45) degrees, drifts down to bed, but has some effort against gravity   5b.  Motor right arm: 0=No drift, limb holds 90 (or 45) degrees for full 10 seconds   6a. motor left le=Drift, limb holds 90 (or 45) degrees but drifts down before full 10 seconds: does not hit bed   6b  Motor right le=No drift, limb holds 90 (or 45) degrees for full 10 seconds   7. Limb Ataxia: 0=Absent   8.  Sensory: 1=Mild to moderate sensory loss; patient feels pinprick is less sharp or is dull on the affected side; there is a loss of superficial pain with pinprick but patient is aware She is being touched   9. Best Language:  0=No aphasia, normal   10. Dysarthria: 1=Mild to moderate, patient slurs at least some words and at worst, can be understood with some difficulty   11. Extinction and Inattention: 2=Profound goldie-inattention or goldie-inattention to more than one modality. Does not recognize own hand or orients only to one side of space    Total:   13       Results Reviewed:  I have personally reviewed current lab, radiology, and data and agree with results.      Results for orders placed in visit on 19   SCANNED - ECHOCARDIOGRAM       Assessment/Plan:  65-year-old right-handed white female with known diagnosis of hypertension, hyperlipidemia, persistent atrial fibrillation, on Eliquis, who comes in with right MCA syndrome, and found to have right MCA stroke.  Thrombectomy was attempted, but intra-arterial TPA was given.  Patient was not a candidate for TPA secondary to being on Eliquis.  Her initial CT head showed early right MCA stroke, with gray-white matter in differentiation and some sulcal effacement.  CT angiogram head and neck showed her to have right superior temporal branch occlusion.  CT perfusion showed her to have a mismatch in the right MCA territory.  Repeat CT head this morning showed an evolving right MCA stroke, in the area of perfusion deficit.       1. Right MCA stroke.  Likely cardioembolic, given her being in persistent A. fib.  Patient has had no significant improvement since getting TPA intra-arterially.  Will recommend to keep her head of bed at around 10 to 15 degrees, and keep systolic blood pressure in 1 40-1 80 range.  Hold any blood pressure medications for now for permissive hypertension.  We will also repeat CT head at around 4 PM which will be her 24-hour CT head.  Patient had some headache, and CT head will help us to see if she has any hemorrhage  2. Persistent atrial fibrillation.  I may need to repeat her CT head in couple days, to decide on her anticoagulation.  Cardiology on board for rate and rhythm control.  3. Essential hypertension.  Allowing for permissive hypertension for now.  Hold any blood pressure medications for now  4. Mixed hyperlipidemia.  Her total cholesterol is 213 with LDL of 79.  Recommend Lipitor 80 mg daily.  Her A1c is 5.8.  5. Activity.  Bedrest for today.  PT/OT can work with her tomorrow.  6. Diet.  Okay for speech therapy to evaluate her.  She can be sitting upright when she is eating, but other times laying in bed.    Patient is critical with evolving right MCA stroke, and at risk of permanent neurological deficit.  I spent more than 45 minutes evaluating the labs and imaging, and discussing goals with the patient and her family.  Also discussed the case with the ICU team.  Thank you for the consult          Spike Vaughan MD  June 1, 2020  2:39 PM

## 2020-06-01 NOTE — PROGRESS NOTES
INTENSIVIST   PROGRESS NOTE     Hospital:  LOS: 1 day     Ms. Sidra Lane, 65 y.o. female is followed for a Chief Complaint of: CVA      Subjective   S     Interval History:  No acute events. Patient is upset that she cannot eat.        The patient's relevant past medical, surgical and social history were reviewed and updated in Epic as appropriate.      ROS:   Constitutional: Negative for fever.   Respiratory: Negative for dyspnea.   Cardiovascular: Negative for chest pain.   Gastrointestinal: Negative for  nausea, vomiting and diarrhea.     Objective   O     Vitals:  Temp  Min: 97.7 °F (36.5 °C)  Max: 99.3 °F (37.4 °C)  BP  Min: 90/74  Max: 162/106  Pulse  Min: 58  Max: 149  Resp  Min: 16  Max: 20  SpO2  Min: 89 %  Max: 99 % Flow (L/min)  Min: 2  Max: 4    Intake/Ouptut 24 hrs (7:00AM - 6:59 AM)  Intake & Output (last 3 days)       05/29 0701 - 05/30 0700 05/30 0701 - 05/31 0700 05/31 0701 - 06/01 0700 06/01 0701 - 06/02 0700    I.V. (mL/kg)   275.9 (2.5)     Total Intake(mL/kg)   275.9 (2.5)     Urine (mL/kg/hr)   1075 150 (0.2)    Stool   0     Total Output   1075 150    Net   -799.1 -150            Stool Unmeasured Occurrence   1 x           Medications (drips):    niCARdipine Last Rate: Stopped (06/01/20 0015)   sodium chloride Last Rate: 75 mL/hr (06/01/20 1002)          Physical Examination  Telemetry:  Atrial fibrillation.    Constitutional:  No acute distress.  Resting in bed comfortably   Eyes: No scleral icterus.   PERRL, EOM intact.    Neck:  Supple, FROM   Cardiovascular: Normal rate, regular and rhythm. Normal heart sounds.  No murmurs, gallop or rub.   Respiratory: No respiratory distress. Normal respiratory effort.  Normal breath sounds  Clear to ascultation   Abdominal:  Soft. No masses. Non-tender. No distension. No HSM.   Extremities: No digital cyanosis. No clubbing.  No peripheral edema.   Skin: No rashes, lesions or ulcers   Neurological:   Alert and Oriented to person, place, and  time.       Interval: handoff  1a. Level of Consciousness: 0-->Alert, keenly responsive  1b. LOC Questions: 0-->Answers both questions correctly  1c. LOC Commands: 0-->Performs both tasks correctly  2. Best Gaze: 1-->Partial gaze palsy, gaze is abnormal in one or both eyes, but forced deviation or total gaze paresis is not present  3. Visual: 1-->Partial hemianopia  4. Facial Palsy: 1-->Minor paralysis (flattened nasolabial fold, asymmetry on smiling)  5a. Motor Arm, Left: 3-->No effort against gravity, limb falls  5b. Motor Arm, Right: 0-->No drift, limb holds 90 (or 45) degrees for full 10 secs  6a. Motor Leg, Left: 1-->Drift, leg falls by the end of the 5-sec period but does not hit bed  6b. Motor Leg, Right: 0-->No drift, leg holds 30 degree position for full 5 secs  7. Limb Ataxia: 0-->Absent  8. Sensory: 1-->Mild-to-moderate sensory loss, patient feels pinprick is less sharp or is dull on the affected side, or there is a loss of superficial pain with pinprick, but patient is aware of being touched  9. Best Language: 0-->No aphasia, normal  10. Dysarthria: 1-->Mild-to-moderate dysarthria, patient slurs at least some words and, at worst, can be understood with some difficulty  11. Extinction and Inattention (formerly Neglect): 1-->Visual, tactile, auditory, spatial, or personal inattention or extinction to bilateral simultaneous stimulation in one of the sensory modalities    Total (NIH Stroke Scale): 10       Results from last 7 days   Lab Units 06/01/20  0637 05/31/20  1517 05/31/20  1507   WBC 10*3/mm3 9.99 9.37  --    HEMOGLOBIN g/dL 11.7* 11.9*  --    HEMOGLOBIN, POC g/dL  --   --  14.6   MCV fL 77.5* 78.3*  --    PLATELETS 10*3/mm3 259 307  --      Results from last 7 days   Lab Units 06/01/20  0637 05/31/20  1501   SODIUM mmol/L 137  --    POTASSIUM mmol/L 4.1  --    CO2 mmol/L 20.0*  --    CREATININE mg/dL 0.73 0.70   GLUCOSE mg/dL 149*  --    MAGNESIUM mg/dL 2.0  --    PHOSPHORUS mg/dL 2.8  --       Estimated Creatinine Clearance: 78.3 mL/min (by C-G formula based on SCr of 0.73 mg/dL).  Results from last 7 days   Lab Units 05/31/20  1517   ALT (SGPT) U/L 19   AST (SGOT) U/L 31       Results from last 7 days   Lab Units 05/31/20  1507   PH, ARTERIAL pH units 7.37       Images:  Imaging Results (Last 24 Hours)     Procedure Component Value Units Date/Time    CT Head Without Contrast [[id number]] Collected:  06/01/20 0815     Updated:  06/01/20 0822    Narrative:       EXAMINATION: CT HEAD WO CONTRAST-06/01/2020:      INDICATION: Stroke; I63.9-Cerebral infarction, unspecified.     TECHNIQUE: 5 mm unenhanced images through the brain.     The radiation dose reduction device was turned on for each scan per the  ALARA (As Low as Reasonably Achievable) protocol.     COMPARISON: Cerebral perfusion exam of 05/31/2020.     FINDINGS: The calvarium appears intact. The included paranasal sinuses  and mastoids appear clear. Soft tissue window images show a fairly  subtle wedge-shaped area of edema approximately 3.5 cm in diameter in  the right frontal lobe and anterior-superior temporal lobe, also a  sub-centimeter focus of low attenuation in the right basal ganglia,  probably acute infarcts. This corresponds well to the perfusion scan  abnormality seen on yesterday's exam. Focal low-density areas in the  chucho are inconsistent between the initial and delayed scans and are  likely skull base streak artifact. There is no evidence of edema/infarct  elsewhere, no evidence of hemorrhage, mass or mass effect,  hydrocephalus, or abnormal extra-axial collection.       Impression:       Evolving right MCA territory infarct corresponding to  perfusion scan abnormality. Small right basal ganglia infarct. No  evidence of hemorrhage.     D:  06/01/2020  E:  06/01/2020             XR Chest 1 View [[id number]] Collected:  06/01/20 0022     Updated:  06/01/20 0025    Narrative:       CR Chest 1 Vw    INDICATION:   Stroke protocol.      COMPARISON:    None available.    FINDINGS:  Single portable AP view(s) of the chest.    Heart is enlarged. Atherosclerotic calcifications are present in the aorta. The lungs are clear. No pneumothorax is identified. Pulmonary vascularity is normal.       Impression:       No acute cardiopulmonary findings.    Signer Name: Hi Hansen MD   Signed: 6/1/2020 12:22 AM   Workstation Name: [facility]    [facility]    MRI Brain Without Contrast [[id number]] Resulted:  05/31/20 2243     Updated:  05/31/20 2252    CT Head Without Contrast Stroke Protocol [[id number]] Collected:  05/31/20 1450     Updated:  05/31/20 1609    Narrative:       EXAMINATION: CT HEAD WO CONTRAST - 05/31/2020      INDICATION: Left-sided weakness, facial droop and slurred speech.     TECHNIQUE: CT head without intravenous contrast following stroke  protocol.      The radiation dose reduction device was turned on for each scan per the  ALARA (As Low as Reasonably Achievable) protocol.     COMPARISON: NONE     FINDINGS: Abnormal sulcal effacement and edema within the right insular  cortex and right frontotemporal region concerning for evolving right MCA  infarction without intra-axial hemorrhage or extra-axial fluid  collection. No midline shift or hydrocephalus. Globes and orbits  unremarkable. Visualized paranasal sinuses and mastoid air cells are  grossly clear an dwell-pneumatized. Calvarium intact.       Impression:       Abnormal sulcal effacement with cortical extension of edema  in the right frontotemporal region and insular cortex concerning for  evolving right MCA infarction. No acute hemorrhage..     Scan performed on 05/31/2020 at 1443 hours. Scan report given to  treatment team on 05/31/2020 at 1448 hours.     DICTATED:   05/31/2020  EDITED/ls :   05/31/2020        CT Cerebral Perfusion With & Without Contrast [[id number]] Collected:  05/31/20 1527     Updated:  05/31/20 1600    Narrative:       EXAMINATION:  CT CEREBRAL PERFUSION WWO CONTRAST - 05/31/2020      INDICATION: Left-sided weakness, facial droop and slurred speech.     TECHNIQUE: CT cerebral perfusion with and without intravenous contrast.  Multiple parametric maps including mean transit time, time to drain,  cerebral blood flow and cerebral blood volume performed.     The radiation dose reduction device was turned on for each scan per the  ALARA (As Low as Reasonably Achievable) protocol.     COMPARISON: CT head performed immediately prior.     FINDINGS: Abnormal area of perfusion defect within the right  frontotemporal region and insular cortex of the right MCA distribution.  Reversible ischemia without core infarct.       Impression:       Area of reversible ischemia within the right MCA territory  without core infarct component identified.     DICTATED:   05/31/2020  EDITED/ls :   05/31/2020        CT Angiogram Neck [[id number]] Collected:  05/31/20 1528     Updated:  05/31/20 1559    Narrative:       EXAMINATION: CT ANGIOGRAM NECK, CT ANGIOGRAM HEAD - 05/31/2020     INDICATION: Left-sided weakness, facial droop and slurred speech.  Evaluate for stroke.     TECHNIQUE: CT angiogram head and neck with and without intravenous  contrast. 2D and 3D reconstructions performed.     The radiation dose reduction device was turned on for each scan per the  ALARA (As Low as Reasonably Achievable) protocol.     COMPARISON: CT head noncontrast and CT cerebral perfusion performed  concurrently.     FINDINGS:      CTA Neck: Normal 3-vessel arch with patent great vessel origins.  Proximal subclavian arteries are patent. Vertebral arteries are  symmetric in caliber without focal severe stenosis, aneurysm or  occlusion. Carotids demonstrate retropharyngeal course of the distal  right common and proximal internal carotid arteries high branching  pattern and tortuosity demonstrating no atherosclerotic involvement and  calcific disease burden of the carotid bifurcations with 0%  right and 0%  left luminal narrowing as measured by NASCET  criteria with patency of  the distal internal carotid arteries to the intracranial segments as  discussed further below the dedicated CTA head portion. Cervical soft  tissues unremarkable without bulky cervical adenopathy or soft tissue  findings of abnormality or acuity.     CTA Head: Distal internal carotid arteries are patent with only mild  atherosclerotic involvement and calcific burden however no  hemodynamically significant stenosis, aneurysm or occlusion. Anterior  cerebral arteries are patent without hemodynamically significant  stenosis, aneurysm or occlusion. Middle cerebral arteries are patent  without hemodynamically significant stenosis aneurysm or occlusion; in  particular no large vessel occlusion throughout the M2 and and M3  segments with only mild irregularities of potential atherosclerotic  involvement of the distal portions but no distinct occlusion.  Vertebrobasilar and posterior cerebral arteries are patent without  hemodynamically significant stenosis, aneurysm or occlusion. Partially  visualized venous structures unremarkable with superior sagittal sinus  patent.       Impression:       CTA head and neck with only mild atherosclerotic involvement  of the distal internal carotid arteries however no hemodynamically  significant stenosis, aneurysm or occlusion; specifically no large  vessel occlusion involving the [place] with only mild  irregularities in the distal MCA distributions of potential underlying  atherosclerotic involvement without obvious occlusion.     DICTATED:   05/31/2020  EDITED/ls :   05/31/2020           CT Angiogram Head [[id number]] Collected:  05/31/20 1528     Updated:  05/31/20 1559    Narrative:       EXAMINATION: CT ANGIOGRAM NECK, CT ANGIOGRAM HEAD - 05/31/2020     INDICATION: Left-sided weakness, facial droop and slurred speech.  Evaluate for stroke.     TECHNIQUE: CT angiogram head and neck with  and without intravenous  contrast. 2D and 3D reconstructions performed.     The radiation dose reduction device was turned on for each scan per the  ALARA (As Low as Reasonably Achievable) protocol.     COMPARISON: CT head noncontrast and CT cerebral perfusion performed  concurrently.     FINDINGS:      CTA Neck: Normal 3-vessel arch with patent great vessel origins.  Proximal subclavian arteries are patent. Vertebral arteries are  symmetric in caliber without focal severe stenosis, aneurysm or  occlusion. Carotids demonstrate retropharyngeal course of the distal  right common and proximal internal carotid arteries high branching  pattern and tortuosity demonstrating no atherosclerotic involvement and  calcific disease burden of the carotid bifurcations with 0% right and 0%  left luminal narrowing as measured by NASCET  criteria with patency of  the distal internal carotid arteries to the intracranial segments as  discussed further below the dedicated CTA head portion. Cervical soft  tissues unremarkable without bulky cervical adenopathy or soft tissue  findings of abnormality or acuity.     CTA Head: Distal internal carotid arteries are patent with only mild  atherosclerotic involvement and calcific burden however no  hemodynamically significant stenosis, aneurysm or occlusion. Anterior  cerebral arteries are patent without hemodynamically significant  stenosis, aneurysm or occlusion. Middle cerebral arteries are patent  without hemodynamically significant stenosis aneurysm or occlusion; in  particular no large vessel occlusion throughout the M2 and and M3  segments with only mild irregularities of potential atherosclerotic  involvement of the distal portions but no distinct occlusion.  Vertebrobasilar and posterior cerebral arteries are patent without  hemodynamically significant stenosis, aneurysm or occlusion. Partially  visualized venous structures unremarkable with superior sagittal sinus  patent.        Impression:       CTA head and neck with only mild atherosclerotic involvement  of the distal internal carotid arteries however no hemodynamically  significant stenosis, aneurysm or occlusion; specifically no large  vessel occlusion involving the [place] with only mild  irregularities in the distal MCA distributions of potential underlying  atherosclerotic involvement without obvious occlusion.     DICTATED:   05/31/2020  EDITED/ls :   05/31/2020                   Results: Reviewed.  I reviewed the patient's new laboratory and imaging results.  I independently reviewed the patient's new images.    Medications: Reviewed.    Assessment/Plan   A / P     Ms. 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 Her CT perfusion showed an area in the right MCA territory of reversible ischemia and she underwent angiography with intra-arterial tPA after a failed thrombectomy. She continues to have an NIHSS of 10 this AM.     Nutrition:   NPO Diet  Advance Directives:   Code Status and Medical Interventions:   Ordered at: 05/31/20 1743     Code Status:    CPR     Medical Interventions (Level of Support Prior to Arrest):    Full       Active Hospital Problems    Diagnosis   • **Cerebrovascular accident (CVA) due to embolism of right middle cerebral artery (CMS/HCC)   • Acute ischemic stroke (CMS/HCC)   • Atrial fibrillation (CMS/HCC)       Assessment / Plan:    Speech evaluation  EP to see as she was scheduled for an outpatient pulmonary vein ablation.   Neurology following  PT/OT when able  ASA/Statin  Echo with bubble  AM labs    High level of risk due to:  severe exacerbation of chronic illness and illness with threat to life or bodily function.    Plan of care and goals reviewed during interdisciplinary rounds.  I discussed the patient's findings and my recommendations with patient and nursing  staff      Magda Tavares, DO    Intensive Care Medicine and Pulmonary Medicine

## 2020-06-01 NOTE — NURSING NOTE
"Attempted to obtain MRI around 2230. Pt complained that her doctor told her she was not allowed to get an MRI due to \"the clips from her belly surgery.\" The MRI tech and I explained to her that we were aware of all the surgeries she had. We also assured her that we are aware of her colon surgery, cholecystectomy, and the stents in her legs and it was safe for her to get an MRI. She agreed to proceed.  Pt was on the MRI table for two minutes and she started moving and shouting for help, the scan was stopped. The patient was panicking and demanding to get out, she stated that she was suffocating and refused to continue with the MRI.  I educated her on the importance of this MRI, however she continued to refuse. Stroke navigator notified.   "

## 2020-06-01 NOTE — PLAN OF CARE
Problem: Patient Care Overview  Goal: Individualization and Mutuality  Outcome: Ongoing (interventions implemented as appropriate)     Problem: Skin Injury Risk (Adult)  Goal: Skin Health and Integrity  Outcome: Ongoing (interventions implemented as appropriate)     Problem: Pain, Chronic (Adult)  Goal: Acceptable Pain/Comfort Level and Functional Ability  Outcome: Ongoing (interventions implemented as appropriate)     Problem: Fall Risk (Adult)  Goal: Absence of Fall  Outcome: Ongoing (interventions implemented as appropriate)     Problem: Patient Care Overview  Goal: Plan of Care Review  Outcome: Ongoing (interventions implemented as appropriate)  Flowsheets (Taken 6/1/2020 4304)  Progress: improving  Plan of Care Reviewed With: patient; daughter       Patient remains aware of self and situation but gets confused at times on where she is when she wakes up. She is weaker on the left, has a left facial droop, left visual cut and slurred speech. Patient is on RA with sat 95%. She remains in Afib with rate in 100's. -130. Patient will not keep scds on, continually takes them off. She failed her bedside dysphagia and SLP wants to do MBS tomorrow. CT of head and echo done.

## 2020-06-01 NOTE — CONSULTS
[facility] at [facility]   Consult     Sidra Lane  1955    There is no work phone number on file.      06/01/20    DATE OF ADMISSION: 5/31/2020  [facility] 2B ICU    Jl Mcknight MD  [address] / [facility] [id number]    Chief Complaint: Atrial fibrillation    Problem List:  1. Persistent atrial fibrillation, symptomatic  a. Failed flecainide, sotalol, and amiodarone therapy  b. Intolerant to digoxin  c. In persistent AF at least since February 2020  d. CHADSVASc 6 (age, female, HTN, CAD, CVA), on Eliquis therapy  2. Acute CVA  a. Admission to [facility] 5/31/2020 with acute right MCA CVA while on Eliquis therapy  b. S/p failed thrombectomy, received intra-arterial TPA with minimal success  3. Hypertension  4. CAD  a. Stress test August 2018 at [facility]: No evidence of ischemia, normal LV size and function  b. Stress test at [facility] 12/14/2019: Normal LV size and function, no ischemia  c. Echocardiogram [facility] 12/14/2019: LVEF 60%, mild to moderate aortic stenosis, mild TR  5. DVT on chronic anticoagulation  6. Stenting of both iliac veins with possible IVC filter?  7. Left lower lobe PE January 2019 with subtherapeutic PT/INR on Coumadin, now on Eliquis  8. Chronic diarrhea  9. Surgical history  a. Laparoscopic tubal ligation  b. Colon surgery with surgical removal due to bowel leakage  c. Cholecystectomy      History of Present Illness:   Patient is a 65-year-old  female with a past medical history significant for persistent atrial fibrillation with a presenting CHADSVASc score of 4 (now 6 with recent CVA), on Eliquis therapy, hypertension, CAD, DVT/PE, who presented to [facility] yesterday with an acute right MCA CVA with presenting symptoms of headache, left-sided weakness, left-sided facial droop, described as severe in nature, occurring acutely at approximately 1230 yesterday afternoon.  She did  not receive systemic TPA upon arrival given that she has been on Eliquis therapy for her atrial fibrillation.  She underwent failed thrombectomy and did subsequently receive intra-arterial TPA which was not successful in alleviating her clot burden given evolving stroke noted on repeat imaging today.  She currently complains of severe headache, dull in nature, located on the right side, and has just received pain medication for this.  There is no evidence of hemorrhage on her CT scan today.  She has been in persistent atrial fibrillation over the last few months, symptomatic in nature, and has failed medical therapy with multiple AAD so was set up for PVA later this month with Dr. Alan on 6/22.  I actually spoke with the patient over the phone on Friday to discuss her PVA and she was doing quite well at that time.  She reports compliance with her Eliquis and denies any missed doses in the last month.    Allergies   Allergen Reactions   • Digoxin Other (See Comments)     Severe c/p, left arm pain, lips numb, finger tip numbness   • Iodine Rash     Breathing problems   • Other Other (See Comments)     Monistat, causes blisters       Prior to Admission Medications     Prescriptions Last Dose Informant Patient Reported? Taking?    amLODIPine (NORVASC) 5 MG tablet   No Yes    Take 1 tablet by mouth Daily.    apixaban (ELIQUIS) 5 MG tablet tablet   No Yes    Take 1 tablet by mouth Every 12 (Twelve) Hours.    diphenoxylate-atropine (LOMOTIL) 2.5-0.025 MG per tablet   Yes Yes    Take  by mouth. Bid-tid prn for diarrhea    fluticasone (VERAMYST) 27.5 MCG/SPRAY nasal spray   Yes Yes    2 sprays into the nostril(s) as directed by provider Daily.    lisinopril (PRINIVIL,ZESTRIL) 10 MG tablet   No Yes    Take 1 tablet by mouth Every Evening.    metoprolol tartrate (LOPRESSOR) 50 MG tablet   No Yes    Take 1 tablet by mouth 2 (Two) Times a Day. Take 1 tablet by mouth two times a day when you stop your Sotalol     nitroglycerin (NITROSTAT) 0.4 MG SL tablet   No Yes    1 under the tongue as needed for angina, may repeat q5mins for up three doses    omeprazole (priLOSEC) 20 MG capsule   Yes Yes    Daily.    sotalol (BETAPACE) 80 MG tablet   No Yes    Take 1 tablet by mouth 2 (Two) Times a Day.    WELCHOL 625 MG tablet   Yes Yes    2 tablets 2 (Two) Times a Day.    meclizine (ANTIVERT) 25 MG tablet   Yes No    As Needed.            Current Facility-Administered Medications:   •  acetaminophen (TYLENOL) suppository 650 mg, 650 mg, Rectal, Q4H PRN, Chaitanya, Magda V., DO, 650 mg at 06/01/20 1002  •  aspirin tablet 325 mg, 325 mg, Oral, Daily **OR** aspirin suppository 300 mg, 300 mg, Rectal, Daily, Given, Brant WOODY MD  •  atorvastatin (LIPITOR) tablet 80 mg, 80 mg, Oral, Nightly, Given, Brant WOODY MD  •  famotidine (PEPCID) injection 20 mg, 20 mg, Intravenous, Q12H, Freda Sidhu, APRN, 20 mg at 06/01/20 1002  •  metoprolol tartrate (LOPRESSOR) injection 2.5 mg, 2.5 mg, Intravenous, Q6H, Magda Tavares VYarelis, DO  •  metoprolol tartrate (LOPRESSOR) injection 5 mg, 5 mg, Intravenous, Q6H PRN, Edward Garcia MD, 5 mg at 06/01/20 0500  •  niCARdipine (CARDENE) 20 mg in 200 mL NS (0.1 mg/mL) infusion, 5-15 mg/hr, Intravenous, Titrated, Alexa Rivera, APRN, Stopped at 06/01/20 0015  •  sodium chloride 0.9 % flush 10 mL, 10 mL, Intravenous, PRN, Given, Brant WOODY MD  •  sodium chloride 0.9 % flush 10 mL, 10 mL, Intravenous, Q12H, Given, Brant WOODY MD, 10 mL at 05/31/20 2016  •  sodium chloride 0.9 % flush 10 mL, 10 mL, Intravenous, PRN, Given, Brant WOODY MD  •  sodium chloride 0.9 % infusion, 75 mL/hr, Intravenous, Continuous, Given, Brant WOODY MD, Last Rate: 75 mL/hr at 06/01/20 1002, 75 mL/hr at 06/01/20 1002    Social History     Socioeconomic History   • Marital status: Single     Spouse name: Not on file   • Number of children: Not on file   • Years of education: Not on file   • Highest education level: Not on file   Tobacco  Use   • Smoking status: Never Smoker   • Smokeless tobacco: Never Used   Substance and Sexual Activity   • Alcohol use: No   • Drug use: No       Family History   Problem Relation Age of Onset   • Hypertension Mother    • Cervical cancer Mother        REVIEW OF SYSTEMS:   CONST:  No weight loss, fever, chills, + weakness  HEENT:  No visual loss, blurred vision, double vision, yellow sclerae.                   No hearing loss, congestion, sore throat.   SKIN:      No rashes, urticaria, ulcers, sores.     RESP:     No shortness of breath, hemoptysis, cough, sputum.   GI:           No anorexia, nausea, vomiting, diarrhea. No abdominal pain, melena.   :         No burning on urination, hematuria or increased frequency.  ENDO:    No diaphoresis, cold or heat intolerance. No polyuria or polydipsia.   NEURO:  + headache, + dizziness, no syncope, paralysis, + left sided weakness                  No change in bowel or bladder control.  MUSC:    No muscle, back pain, joint pain or stiffness.   HEME:    No anemia, bleeding, bruising. No history of DVT/PE.  PSYCH:  No history of depression, anxiety    OBJECTIVE:    Vitals:    06/01/20 0945 06/01/20 1000 06/01/20 1003 06/01/20 1010   BP: 110/77  109/76    BP Location:       Patient Position:       Pulse: 114  117 (!) 134   Resp:  20  20   Temp:       TempSrc:       SpO2: 96%  96% 97%   Weight:       Height:             Vital Sign Min/Max for last 24 hours  Temp  Min: 97.7 °F (36.5 °C)  Max: 99.3 °F (37.4 °C)   BP  Min: 90/74  Max: 162/106   Pulse  Min: 58  Max: 149   Resp  Min: 16  Max: 20   SpO2  Min: 89 %  Max: 99 %   Flow (L/min)  Min: 2  Max: 4      Intake/Output Summary (Last 24 hours) at 6/1/2020 1111  Last data filed at 6/1/2020 0800  Gross per 24 hour   Intake 275.9 ml   Output 1225 ml   Net -949.1 ml             Physical Exam:  GEN: Well nourished, well-developed, no acute distress  HEENT: Normocephalic, atraumatic, PERRLA, moist mucous membranes  NECK: Supple, No  JVD, no thyromegaly, no lymphadenopathy  CARD: S1S2, irreg irreg, tachycardic, no murmur, gallop, or rub  LUNGS: Clear to auscultation, normal respiratory effort  ABDOMEN: Soft, nontender, normal bowel sounds  EXTREMITIES: No gross deformities, no clubbing, cyanosis, or edema  SKIN: Warm, dry  NEURO: Speech slurred, left facial droop, left sided weakness  PSYCHIATRIC: Normal affect and mood      Data:   Results from last 7 days   Lab Units 06/01/20  0637 05/31/20  1517 05/31/20  1507   WBC 10*3/mm3 9.99 9.37  --    HEMOGLOBIN g/dL 11.7* 11.9*  --    HEMOGLOBIN, POC g/dL  --   --  14.6   HEMATOCRIT % 41.1 41.9  --    HEMATOCRIT POC %  --   --  43   PLATELETS 10*3/mm3 259 307  --      Results from last 7 days   Lab Units 06/01/20  0637 05/31/20  1501   SODIUM mmol/L 137  --    POTASSIUM mmol/L 4.1  --    CHLORIDE mmol/L 101  --    CO2 mmol/L 20.0*  --    BUN mg/dL 12  --    CREATININE mg/dL 0.73 0.70   GLUCOSE mg/dL 149*  --       Results from last 7 days   Lab Units 06/01/20  0637   HEMOGLOBIN A1C % 5.80*             Results from last 7 days   Lab Units 05/31/20  1516   APTT seconds 24.8     Results from last 7 days   Lab Units 05/31/20  1517   TROPONIN T ng/mL <0.010     Results from last 7 days   Lab Units 06/01/20  0637   CHOLESTEROL mg/dL 213*   TRIGLYCERIDES mg/dL 218*   HDL CHOL mg/dL 90*   LDL CHOL mg/dL 79           Intake/Output Summary (Last 24 hours) at 6/1/2020 1111  Last data filed at 6/1/2020 0800  Gross per 24 hour   Intake 275.9 ml   Output 1225 ml   Net -949.1 ml       Chest X-Ray:  Imaging Results (Last 24 Hours)     Procedure Component Value Units Date/Time    CT Head Without Contrast [[id number]] Collected:  06/01/20 0815     Updated:  06/01/20 0822    Narrative:       EXAMINATION: CT HEAD WO CONTRAST-06/01/2020:      INDICATION: Stroke; I63.9-Cerebral infarction, unspecified.     TECHNIQUE: 5 mm unenhanced images through the brain.     The radiation dose reduction device was turned on for each  scan per the  ALARA (As Low as Reasonably Achievable) protocol.     COMPARISON: Cerebral perfusion exam of 05/31/2020.     FINDINGS: The calvarium appears intact. The included paranasal sinuses  and mastoids appear clear. Soft tissue window images show a fairly  subtle wedge-shaped area of edema approximately 3.5 cm in diameter in  the right frontal lobe and anterior-superior temporal lobe, also a  sub-centimeter focus of low attenuation in the right basal ganglia,  probably acute infarcts. This corresponds well to the perfusion scan  abnormality seen on yesterday's exam. Focal low-density areas in the  chucho are inconsistent between the initial and delayed scans and are  likely skull base streak artifact. There is no evidence of edema/infarct  elsewhere, no evidence of hemorrhage, mass or mass effect,  hydrocephalus, or abnormal extra-axial collection.       Impression:       Evolving right MCA territory infarct corresponding to  perfusion scan abnormality. Small right basal ganglia infarct. No  evidence of hemorrhage.     D:  06/01/2020  E:  06/01/2020             XR Chest 1 View [[id number]] Collected:  06/01/20 0022     Updated:  06/01/20 0025    Narrative:       CR Chest 1 Vw    INDICATION:   Stroke protocol.     COMPARISON:    None available.    FINDINGS:  Single portable AP view(s) of the chest.    Heart is enlarged. Atherosclerotic calcifications are present in the aorta. The lungs are clear. No pneumothorax is identified. Pulmonary vascularity is normal.       Impression:       No acute cardiopulmonary findings.    Signer Name: Hi Hansen MD   Signed: 6/1/2020 12:22 AM   Workstation Name: [facility] [place]    [facility]    MRI Brain Without Contrast [[id number]] Resulted:  05/31/20 2243     Updated:  05/31/20 2252    CT Head Without Contrast Stroke Protocol [[id number]] Collected:  05/31/20 1450     Updated:  05/31/20 1609    Narrative:       EXAMINATION: CT HEAD WO CONTRAST - 05/31/2020       INDICATION: Left-sided weakness, facial droop and slurred speech.     TECHNIQUE: CT head without intravenous contrast following stroke  protocol.      The radiation dose reduction device was turned on for each scan per the  ALARA (As Low as Reasonably Achievable) protocol.     COMPARISON: NONE     FINDINGS: Abnormal sulcal effacement and edema within the right insular  cortex and right frontotemporal region concerning for evolving right MCA  infarction without intra-axial hemorrhage or extra-axial fluid  collection. No midline shift or hydrocephalus. Globes and orbits  unremarkable. Visualized paranasal sinuses and mastoid air cells are  grossly clear an dwell-pneumatized. Calvarium intact.       Impression:       Abnormal sulcal effacement with cortical extension of edema  in the right frontotemporal region and insular cortex concerning for  evolving right MCA infarction. No acute hemorrhage..     Scan performed on 05/31/2020 at 1443 hours. Scan report given to  treatment team on 05/31/2020 at 1448 hours.     DICTATED:   05/31/2020  EDITED/ls :   05/31/2020        CT Cerebral Perfusion With & Without Contrast [[id number]] Collected:  05/31/20 1527     Updated:  05/31/20 1600    Narrative:       EXAMINATION: CT CEREBRAL PERFUSION WWO CONTRAST - 05/31/2020      INDICATION: Left-sided weakness, facial droop and slurred speech.     TECHNIQUE: CT cerebral perfusion with and without intravenous contrast.  Multiple parametric maps including mean transit time, time to drain,  cerebral blood flow and cerebral blood volume performed.     The radiation dose reduction device was turned on for each scan per the  ALARA (As Low as Reasonably Achievable) protocol.     COMPARISON: CT head performed immediately prior.     FINDINGS: Abnormal area of perfusion defect within the right  frontotemporal region and insular cortex of the right MCA distribution.  Reversible ischemia without core infarct.       Impression:       Area of  reversible ischemia within the right MCA territory  without core infarct component identified.     DICTATED:   05/31/2020  EDITED/ls :   05/31/2020        CT Angiogram Neck [[id number]] Collected:  05/31/20 1528     Updated:  05/31/20 1559    Narrative:       EXAMINATION: CT ANGIOGRAM NECK, CT ANGIOGRAM HEAD - 05/31/2020     INDICATION: Left-sided weakness, facial droop and slurred speech.  Evaluate for stroke.     TECHNIQUE: CT angiogram head and neck with and without intravenous  contrast. 2D and 3D reconstructions performed.     The radiation dose reduction device was turned on for each scan per the  ALARA (As Low as Reasonably Achievable) protocol.     COMPARISON: CT head noncontrast and CT cerebral perfusion performed  concurrently.     FINDINGS:      CTA Neck: Normal 3-vessel arch with patent great vessel origins.  Proximal subclavian arteries are patent. Vertebral arteries are  symmetric in caliber without focal severe stenosis, aneurysm or  occlusion. Carotids demonstrate retropharyngeal course of the distal  right common and proximal internal carotid arteries high branching  pattern and tortuosity demonstrating no atherosclerotic involvement and  calcific disease burden of the carotid bifurcations with 0% right and 0%  left luminal narrowing as measured by NASCET  criteria with patency of  the distal internal carotid arteries to the intracranial segments as  discussed further below the dedicated CTA head portion. Cervical soft  tissues unremarkable without bulky cervical adenopathy or soft tissue  findings of abnormality or acuity.     CTA Head: Distal internal carotid arteries are patent with only mild  atherosclerotic involvement and calcific burden however no  hemodynamically significant stenosis, aneurysm or occlusion. Anterior  cerebral arteries are patent without hemodynamically significant  stenosis, aneurysm or occlusion. Middle cerebral arteries are patent  without hemodynamically significant  stenosis aneurysm or occlusion; in  particular no large vessel occlusion throughout the M2 and and M3  segments with only mild irregularities of potential atherosclerotic  involvement of the distal portions but no distinct occlusion.  Vertebrobasilar and posterior cerebral arteries are patent without  hemodynamically significant stenosis, aneurysm or occlusion. Partially  visualized venous structures unremarkable with superior sagittal sinus  patent.       Impression:       CTA head and neck with only mild atherosclerotic involvement  of the distal internal carotid arteries however no hemodynamically  significant stenosis, aneurysm or occlusion; specifically no large  vessel occlusion involving the [place] with only mild  irregularities in the distal MCA distributions of potential underlying  atherosclerotic involvement without obvious occlusion.     DICTATED:   05/31/2020  EDITED/ls :   05/31/2020           CT Angiogram Head [[id number]] Collected:  05/31/20 1528     Updated:  05/31/20 1559    Narrative:       EXAMINATION: CT ANGIOGRAM NECK, CT ANGIOGRAM HEAD - 05/31/2020     INDICATION: Left-sided weakness, facial droop and slurred speech.  Evaluate for stroke.     TECHNIQUE: CT angiogram head and neck with and without intravenous  contrast. 2D and 3D reconstructions performed.     The radiation dose reduction device was turned on for each scan per the  ALARA (As Low as Reasonably Achievable) protocol.     COMPARISON: CT head noncontrast and CT cerebral perfusion performed  concurrently.     FINDINGS:      CTA Neck: Normal 3-vessel arch with patent great vessel origins.  Proximal subclavian arteries are patent. Vertebral arteries are  symmetric in caliber without focal severe stenosis, aneurysm or  occlusion. Carotids demonstrate retropharyngeal course of the distal  right common and proximal internal carotid arteries high branching  pattern and tortuosity demonstrating no atherosclerotic involvement  and  calcific disease burden of the carotid bifurcations with 0% right and 0%  left luminal narrowing as measured by NASCET  criteria with patency of  the distal internal carotid arteries to the intracranial segments as  discussed further below the dedicated CTA head portion. Cervical soft  tissues unremarkable without bulky cervical adenopathy or soft tissue  findings of abnormality or acuity.     CTA Head: Distal internal carotid arteries are patent with only mild  atherosclerotic involvement and calcific burden however no  hemodynamically significant stenosis, aneurysm or occlusion. Anterior  cerebral arteries are patent without hemodynamically significant  stenosis, aneurysm or occlusion. Middle cerebral arteries are patent  without hemodynamically significant stenosis aneurysm or occlusion; in  particular no large vessel occlusion throughout the M2 and and M3  segments with only mild irregularities of potential atherosclerotic  involvement of the distal portions but no distinct occlusion.  Vertebrobasilar and posterior cerebral arteries are patent without  hemodynamically significant stenosis, aneurysm or occlusion. Partially  visualized venous structures unremarkable with superior sagittal sinus  patent.       Impression:       CTA head and neck with only mild atherosclerotic involvement  of the distal internal carotid arteries however no hemodynamically  significant stenosis, aneurysm or occlusion; specifically no large  vessel occlusion involving the [place] with only mild  irregularities in the distal MCA distributions of potential underlying  atherosclerotic involvement without obvious occlusion.     DICTATED:   05/31/2020  EDITED/ls :   05/31/2020                 Telemetry: Atrial fibrillation, HR  bpm  EKG: None for review today.  EKG from yesterday reviewed demonstrating atrial fibrillation,  bpm    Assessment and Plan:   1. Persistent atrial fibrillation:  - Patient with known  persistent atrial fibrillation, actually scheduled for PVA with Dr. Alan later this month, presents with acute CVA.  Currently rates ~110-115 bpm.  Will use IV metoprolol at this time for rate control as she has not had a swallow eval.  Stop Sotalol.  Goal HR <120 bpm.  - CHADSVASc previously 4, now 6 with recent CVA, on Eliquis therapy.  Patient reports compliance and denies any missed doses.  - Echocardiogram pending    2. Acute R MCA CVA:  - Admitted yesterday with acute CVA s/p failed thrombectomy but did receive intra-arterial TPA with minimal success.  Stroke evolving per repeat CT head.  - Will need repeat CT head per neurology to rule out hemorrhagic conversion prior to restarting anticoagulation.  Consider switching to Xarelto as she has failed Eliquis.  - Supportive care per ICU/stroke team    3. Hypertension:  - Allowing for permissive hypertension in the setting of CVA, per neurology    Electronically signed by ISSA Gore, 06/01/20, 11:11 AM.

## 2020-06-01 NOTE — PLAN OF CARE
Problem: Patient Care Overview  Goal: Plan of Care Review  Outcome: Ongoing (interventions implemented as appropriate)  Flowsheets (Taken 6/1/2020 1301)  Plan of Care Reviewed With: patient; daughter  Note:   SLP evaluation completed. Will address swallowing concerns w/ MBS when off bedrest restrictions and address dysarthria in tx, as well as further cognitive-linguistic dx tx. Please see note for further details and recommendations.

## 2020-06-01 NOTE — CONSULTS
Order for diabetes education per stroke protocol noted. Current A1c 5.8%. Patient is inappropriate for education at this time, please re consult if needed. Thank you.

## 2020-06-01 NOTE — THERAPY EVALUATION
Acute Care - Speech Language Pathology Initial Evaluation  [facility]   Cognitive-Communication Evaluation  & Clinical Swallow Evaluation     Patient Name: Sidra Lane  : 1955  MRN: [id number]  Today's Date: 2020               Admit Date: 2020     Visit Dx:    ICD-10-CM ICD-9-CM   1. Acute ischemic stroke (CMS/HCC) I63.9 434.91   2. Dysphagia, unspecified type R13.10 787.20   3. Dysarthria R47.1 784.51     Patient Active Problem List   Diagnosis   • Shortness of breath   • Atrial fibrillation (CMS/HCC)   • Essential hypertension   • Dizziness   • Deep vein thrombosis (DVT) of proximal lower extremity (CMS/HCC)   • Coronary artery disease involving native coronary artery of native heart without angina pectoris   • Fatigue   • Chest pain   • Longstanding persistent atrial fibrillation   • Cerebrovascular accident (CVA) due to embolism of right middle cerebral artery (CMS/HCC)   • Acute ischemic stroke (CMS/HCC)     Past Medical History:   Diagnosis Date   • Atrial fibrillation (CMS/HCC)    • CHF (congestive heart failure) (CMS/HCC)    • Deep vein thrombosis (CMS/HCC)    • GERD (gastroesophageal reflux disease)    • Hypertension    • May-Thurner syndrome    • Pulmonary embolism (CMS/HCC)      Past Surgical History:   Procedure Laterality Date   • CHOLECYSTECTOMY     • COLON SURGERY      bowel leakage, some of colon removed   • INTERVENTIONAL RADIOLOGY PROCEDURE Bilateral 2020    Procedure: CAROTID CEREBRAL ANGIOGRAM BILATERAL;  Surgeon: Brant Duarte MD;  Location: [facility] INVASIVE LOCATION;  Service: Interventional Radiology;  Laterality: Bilateral;   • LAPAROSCOPIC TUBAL LIGATION     • LEG SURGERY      multiple stents to BLE   • TONSILLECTOMY          SLP EVALUATION (last 72 hours)      SLP SLC Evaluation     Row Name 20 1015                   Communication Assessment/Intervention    Document Type  evaluation  -AC        Subjective Information  complains of;fatigue;pain  -AC  "       Patient Observations  cooperative drowsy  -AC        Patient/Family Observations  Pt's dtr @ bedside. Evaluation interrupted by multiple other staff members' assessments.  -AC        Patient Effort  good  -AC           General Information    Patient Profile Reviewed  yes  -AC        Pertinent History Of Current Problem   64yo adm w/ R MCA CVA - small R BG infarct. S/p tPA and unsuccessful thrombectomy. Hx GERD, CHF, PE.   -AC        Precautions/Limitations, Vision  WFL;for purposes of eval  -AC        Precautions/Limitations, Hearing  WFL;for purposes of eval  -AC        Prior Level of Function-Communication  WFL  -AC        Plans/Goals Discussed with  patient and family;agreed upon  -AC        Barriers to Rehab  none identified  -AC        Patient's Goals for Discharge  patient did not state  -AC        Family Goals for Discharge  family did not state  -AC           Pain Assessment    Additional Documentation  Pain Scale: FACES Pre/Post-Treatment (Group)  -AC           Pain Scale: FACES Pre/Post-Treatment    Pain: FACES Scale, Pretreatment  4-->hurts little more  -AC        Pain: FACES Scale, Post-Treatment  0-->no hurt  -AC        Pre/Post Treatment Pain Comment  Pt initially c/o \"pain all over.\" RN gave pt Tylenol & pt later reported feeling comfortable.   -AC           Comprehension Assessment/Intervention    Comprehension Assessment/Intervention  Auditory Comprehension  -AC           Auditory Comprehension Assessment/Intervention    Auditory Comprehension (Communication)  WFL;other (see comments) for basic assessment tasks/conv  -AC        Answers Questions (Communication)  WFL;simple;yes/no;wh questions;personal  -AC        Able to Follow Commands (Communication)  WFL;1-step  -AC        Narrative Discourse  WFL;conversational level  -AC           Expression Assessment/Intervention    Expression Assessment/Intervention  verbal expression  -AC           Verbal Expression Assessment/Intervention    Verbal " Expression  WFL;other (see comments) for basic assessment tasks/conv  -AC        Automatic Speech (Communication)  WFL;response to greeting  -AC        Repetition  WFL;words  -AC        Conversational Discourse/Fluency  WFL  -AC           Oral Motor Structure and Function    Dentition Assessment  missing teeth  -AC        Mucosal Quality  dry;sticky  -AC           Oral Musculature and Cranial Nerve Assessment    Oral Motor General Assessment  oral labial or buccal impairment;lingual impairment  -AC        Oral Labial or Buccal Impairment, Detail, Cranial Nerve VII (Facial):  left labial droop;reduced ROM  -AC        Lingual Impairment, Detail. Cranial Nerves IX, XII (Glossopharyngeal and Hypoglossal)  reduced lingual ROM;reduced strength left  -AC           Motor Speech Assessment/Intervention    Motor Speech Function  moderate impairment;unfamiliar listener  -AC        Characteristics Consistent with Dysarthria  decreased articulation;hyponasality  -AC        Conversational Speech (Communication)  moderate impairment;simple  -AC        Motor Speech, Comment  Pt ~60% intelligible to unfamiliar listener in conv speech. Worse as length of utterance increased.  -AC           Cognitive Assessment Intervention- SLP    Cognitive Function (Cognition)  unable/difficult to assess;other (see comments) 2' time constraints  -AC           SLP Clinical Impressions    SLP Diagnosis  Moderate dysarthria. Basic receptive/expressive language skills intact. Pt would benefit from further assessment of cognitive-linguistic skills in dx tx.  -AC        Rehab Potential/Prognosis  good  -AC        SLC Criteria for Skilled Therapy Interventions Met  yes  -AC        Functional Impact  difficulty communicating in an emergency;difficulty in expressing complex messages;restrictions in personal and social life  -           Recommendations    Therapy Frequency (SLP SLC)  5 days per week  -AC        Predicted Duration Therapy Intervention (Days)   until discharge  -AC        Anticipated Dischage Disposition (SLP)  anticipate therapy at next level of care;inpatient rehabilitation facility  -AC          User Key  (r) = Recorded By, (t) = Taken By, (c) = Cosigned By    Initials Name Effective Dates    Cynthia Chung MS CCC-SLP 07/27/17 -              EDUCATION  The patient has been educated in the following areas:     Cognitive Impairment Communication Impairment.    SLP Recommendation and Plan  SLP Diagnosis: Moderate dysarthria. Basic receptive/expressive language skills intact. Pt would benefit from further assessment of cognitive-linguistic skills in dx tx.     Swallow Criteria for Skilled Therapeutic Interventions Met: demonstrates skilled criteria  SLC Criteria for Skilled Therapy Interventions Met: yes  Anticipated Dischage Disposition (SLP): anticipate therapy at next level of care, inpatient rehabilitation facility     Predicted Duration Therapy Intervention (Days): until discharge    Plan of Care Reviewed With: patient, daughter      SLP GOALS     Row Name 06/01/20 1015             Articulation Goal 1 (SLP)    Improve Articulation Goal 1 (SLP)  by over-articulating at phrase level;80%;with minimal cues (75-90%)  -AC      Time Frame (Articulation Goal 1, SLP)  short term goal (STG)  -AC         Additional Goal 1 (SLP)    Additional Goal 1, SLP  LTG: Pt will improve cognitive-communication skills in order to participate in care in hospital setting for 100% of opportunities w/o cues.  -AC      Time Frame (Additional Goal 1, SLP)  by discharge  -AC        User Key  (r) = Recorded By, (t) = Taken By, (c) = Cosigned By    Initials Name Provider Type    Cynthia Chung MS CCC-SLP Speech and Language Pathologist                  Time Calculation:     Time Calculation- SLP     Row Name 06/01/20 1306             Time Calculation- SLP    SLP Start Time  1015  -AC      SLP Received On  06/01/20  -AC        User Key  (r) = Recorded By, (t) = Taken By, (c) =  Cosigned By    Initials Name Provider Type    AC Cynthia Campbell, MS CCC-SLP Speech and Language Pathologist          Therapy Charges for Today     Code Description Service Date Service Provider Modifiers Qty    98132566370 HC ST EVAL ORAL PHARYNG SWALLOW 3 2020 Cynthia Cmapbell, MS CCC-SLP GN 1    [id number] HC ST EVAL SPEECH AND PROD W LANG  2 2020 Cynthia Campbell, MS CCC-SLP GN 1                     Cynthia Campbell, MS CCC-SLP  2020 and Acute Care - Speech Language Pathology   Swallow Initial Evaluation [facility]     Patient Name: Sidra Lane  : 1955  MRN: [id number]  Today's Date: 2020               Admit Date: 2020    Visit Dx:     ICD-10-CM ICD-9-CM   1. Acute ischemic stroke (CMS/HCC) I63.9 434.91   2. Dysphagia, unspecified type R13.10 787.20   3. Dysarthria R47.1 784.51     Patient Active Problem List   Diagnosis   • Shortness of breath   • Atrial fibrillation (CMS/HCC)   • Essential hypertension   • Dizziness   • Deep vein thrombosis (DVT) of proximal lower extremity (CMS/HCC)   • Coronary artery disease involving native coronary artery of native heart without angina pectoris   • Fatigue   • Chest pain   • Longstanding persistent atrial fibrillation   • Cerebrovascular accident (CVA) due to embolism of right middle cerebral artery (CMS/HCC)   • Acute ischemic stroke (CMS/HCC)     Past Medical History:   Diagnosis Date   • Atrial fibrillation (CMS/HCC)    • CHF (congestive heart failure) (CMS/HCC)    • Deep vein thrombosis (CMS/HCC)    • GERD (gastroesophageal reflux disease)    • Hypertension    • May-Thurner syndrome    • Pulmonary embolism (CMS/HCC)      Past Surgical History:   Procedure Laterality Date   • CHOLECYSTECTOMY     • COLON SURGERY      bowel leakage, some of colon removed   • INTERVENTIONAL RADIOLOGY PROCEDURE Bilateral 2020    Procedure: CAROTID CEREBRAL ANGIOGRAM BILATERAL;  Surgeon: Brant Duarte MD;  Location: [facility] INVASIVE LOCATION;   "Service: Interventional Radiology;  Laterality: Bilateral;   • LAPAROSCOPIC TUBAL LIGATION     • LEG SURGERY      multiple stents to BLE   • TONSILLECTOMY          SWALLOW EVALUATION (last 72 hours)      SLP Adult Swallow Evaluation     Row Name 06/01/20 1045                   Rehab Evaluation    Document Type  evaluation  -AC        Patient Effort  good  -AC           General Information    Patient Profile Reviewed  yes  -AC        Current Method of Nutrition  NPO  -AC        Prior Level of Function-Swallowing  no diet consistency restrictions  -AC        Plans/Goals Discussed with  patient and family;agreed upon  -AC        Barriers to Rehab  none identified  -AC        Patient's Goals for Discharge  return to PO diet  -AC        Family Goals for Discharge  patient able to return to PO diet;patient able to eat/drink without coughing/choking  -AC           Pain Scale: FACES Pre/Post-Treatment    Pain: FACES Scale, Pretreatment  4-->hurts little more  -AC        Pain: FACES Scale, Post-Treatment  0-->no hurt  -AC        Pre/Post Treatment Pain Comment  Pt initially c/o \"pain all over.\" RN gave pt Tylenol & pt later reported feeling comfortable.  -AC           Oral Motor and Function    Secretion Management  WNL/WFL  -AC           General Eating/Swallowing Observations    Respiratory Support Currently in Use  nasal cannula  -AC        Eating/Swallowing Skills  fed by SLP;self-fed  -AC        Positioning During Eating  upright in bed;other (see comments) pt on bedrest, but RN ok'd sitting upright for swallow eval  -AC        Utensils Used  spoon;cup  -AC        Consistencies Trialed  thin liquids;nectar/syrup-thick liquids;pureed  -AC           Clinical Swallow Eval    Oral Prep Phase  impaired  -AC        Oral Transit  impaired  -AC        Oral Residue  impaired  -AC        Pharyngeal Phase  no overt signs/symptoms of pharyngeal impairment  -AC           Oral Prep Concerns    Oral Prep Concerns  incomplete or weak " lip closure around spoon;anterior loss  -AC        Incomplete or Weak Lip Closure Around Spoon  all consistencies  -AC        Anterior Loss  thin  -AC        Oral Prep Concerns, Comment  2' reduced labial seal  -AC           Oral Transit Concerns    Oral Transit Concerns  increased oral transit time  -AC        Increased Oral Transit Time  all consistencies  -AC        Oral Transit Concerns, Comment  2' reduced lingual strength/ROM  -           Oral Residue Concerns    Oral Residue Concerns  diffuse residue throughout oral cavity  -        Diffuse Residue Throughout Oral Cavity  pudding  -AC        Oral Residue Concerns, Comment  Mild amount, cleared w/ oral suctioning. 2' reduced lingual strength/ROM.  -           Pharyngeal Phase Concerns    Pharyngeal Phase Concerns  cough;multiple swallows  -        Multiple Swallows  all consistencies  -AC        Cough  thin  -AC        Pharyngeal Phase Concerns, Comment  Immediate hard coughing w/ thin liquid. Multiple swallows w/ all consistencies trialed.   -           Clinical Impression    SLP Swallowing Diagnosis  moderate;oral dysfunction;suspected pharyngeal dysfunction  -        Functional Impact  risk of aspiration/pneumonia;risk of malnutrition;risk of dehydration  -        Rehab Potential/Prognosis, Swallowing  good, to achieve stated therapy goals  -        Swallow Criteria for Skilled Therapeutic Interventions Met  demonstrates skilled criteria  -           Recommendations    Predicted Duration Therapy Intervention (Days)  until discharge  -        SLP Diet Recommendation  NPO  -        Recommended Diagnostics  VFSS (MBS);other (see comments) when pt off bedrest  -        SLP Rec. for Method of Medication Administration  meds via alternate route  -        Anticipated Dischage Disposition (SLP)  anticipate therapy at next level of care;inpatient rehabilitation facility  -          User Key  (r) = Recorded By, (t) = Taken By, (c) =  Cosigned By    Initials Name Effective Dates    Cynthia Chung MS CCC-SLP 07/27/17 -           EDUCATION  The patient has been educated in the following areas:   Dysphagia (Swallowing Impairment) Oral Care/Hydration NPO rationale.    SLP Recommendation and Plan  SLP Swallowing Diagnosis: moderate, oral dysfunction, suspected pharyngeal dysfunction  SLP Diet Recommendation: NPO     SLP Rec. for Method of Medication Administration: meds via alternate route        Recommended Diagnostics: VFSS (MBS), other (see comments)(when pt off bedrest)  Swallow Criteria for Skilled Therapeutic Interventions Met: demonstrates skilled criteria  Anticipated Dischage Disposition (SLP): anticipate therapy at next level of care, inpatient rehabilitation facility  Rehab Potential/Prognosis, Swallowing: good, to achieve stated therapy goals     Predicted Duration Therapy Intervention (Days): until discharge       Plan of Care Reviewed With: patient, daughter    SLP GOALS     Row Name 06/01/20 1015             Articulation Goal 1 (SLP)    Improve Articulation Goal 1 (SLP)  by over-articulating at phrase level;80%;with minimal cues (75-90%)  -AC      Time Frame (Articulation Goal 1, SLP)  short term goal (STG)  -AC         Additional Goal 1 (SLP)    Additional Goal 1, SLP  LTG: Pt will improve cognitive-communication skills in order to participate in care in hospital setting for 100% of opportunities w/o cues.  -AC      Time Frame (Additional Goal 1, SLP)  by discharge  -AC        User Key  (r) = Recorded By, (t) = Taken By, (c) = Cosigned By    Initials Name Provider Type    Cynthia Chung MS CCC-SLP Speech and Language Pathologist             Time Calculation:   Time Calculation- SLP     Row Name 06/01/20 1306             Time Calculation- SLP    SLP Start Time  1015  -AC      SLP Received On  06/01/20  -AC        User Key  (r) = Recorded By, (t) = Taken By, (c) = Cosigned By    Initials Name Provider Type    Cynthia Chung  MS CCC-SLP Speech and Language Pathologist          Therapy Charges for Today     Code Description Service Date Service Provider Modifiers Qty    76273787812  ST EVAL ORAL PHARYNG SWALLOW 3 6/1/2020 Cynthia Campbell, MS CCC-SLP GN 1    [id number]  ST EVAL SPEECH AND PROD W LANG  2 6/1/2020 Cynthia Campbell, MS MALLORY-SLP GN 1               Cynthia Campbell MS CCC-SLP  6/1/2020

## 2020-06-01 NOTE — PROGRESS NOTES
"Clinical Nutrition Note      Patient Name: Sidra Lane  MRN: [id number]  Admission date: 5/31/2020      Multidisciplinary Rounds    Additional information obtained during MDR:  RN reports pt had CVA on Eliquis so she could not get tPA; s/p unsuccessful mechanical thrombectomy but rec'd ia tPA.  Pt remains in uncontrolled PAF, cardiology consulted, pt was to have ablation in a few weeks.  Dr. Vaughan w/ neurology to see pt. SLP to evaluate swallow.    Current diet: NPO Diet    Oral Nutrition Supplement:    Pertinent medical data reviewed  No nutrition risk identified on nursing screen; MST score \"0\"    Intervention:  Plan of care and goals reviewed    Monitor:  RD to follow per protocol      Katelynn Varela MS,RD,LD  06/01/20 4:15 PM  Time: 15  mins       "

## 2020-06-02 ENCOUNTER — APPOINTMENT (OUTPATIENT)
Dept: GENERAL RADIOLOGY | Facility: HOSPITAL | Age: 65
End: 2020-06-02

## 2020-06-02 ENCOUNTER — APPOINTMENT (OUTPATIENT)
Dept: MRI IMAGING | Facility: HOSPITAL | Age: 65
End: 2020-06-02

## 2020-06-02 LAB
ALBUMIN SERPL-MCNC: 3.7 G/DL (ref 3.5–5.2)
ALP SERPL-CCNC: 58 U/L (ref 39–117)
ALT SERPL W P-5'-P-CCNC: 19 U/L (ref 1–33)
ANION GAP SERPL CALCULATED.3IONS-SCNC: 15 MMOL/L (ref 5–15)
ANION GAP SERPL CALCULATED.3IONS-SCNC: 19 MMOL/L (ref 5–15)
AST SERPL-CCNC: 38 U/L (ref 1–32)
BILIRUB SERPL-MCNC: 0.2 MG/DL (ref 0.2–1.2)
BUN BLD-MCNC: 12 MG/DL (ref 8–23)
BUN BLD-MCNC: 12 MG/DL (ref 8–23)
BUN/CREAT SERPL: 17.4 (ref 7–25)
CALCIUM SPEC-SCNC: 8.6 MG/DL (ref 8.6–10.5)
CALCIUM SPEC-SCNC: 8.6 MG/DL (ref 8.6–10.5)
CHLORIDE SERPL-SCNC: 100 MMOL/L (ref 98–107)
CHLORIDE SERPL-SCNC: 101 MMOL/L (ref 98–107)
CHOLEST SERPL-MCNC: 186 MG/DL (ref 0–200)
CO2 SERPL-SCNC: 17 MMOL/L (ref 22–29)
CO2 SERPL-SCNC: 22 MMOL/L (ref 22–29)
CREAT BLD-MCNC: 0.69 MG/DL (ref 0.57–1)
CREAT BLD-MCNC: 0.78 MG/DL (ref 0.57–1)
GFR SERPL CREATININE-BSD FRML MDRD: 85 ML/MIN/1.73
GLUCOSE BLD-MCNC: 142 MG/DL (ref 65–99)
GLUCOSE BLD-MCNC: 145 MG/DL (ref 65–99)
GLUCOSE BLDC GLUCOMTR-MCNC: 117 MG/DL (ref 70–130)
GLUCOSE BLDC GLUCOMTR-MCNC: 143 MG/DL (ref 70–130)
MAGNESIUM SERPL-MCNC: 2.2 MG/DL (ref 1.6–2.4)
MAGNESIUM SERPL-MCNC: 2.2 MG/DL (ref 1.6–2.4)
PHOSPHATE SERPL-MCNC: 3.8 MG/DL (ref 2.5–4.5)
PHOSPHATE SERPL-MCNC: 4 MG/DL (ref 2.5–4.5)
POTASSIUM BLD-SCNC: 4.4 MMOL/L (ref 3.5–5.2)
POTASSIUM BLD-SCNC: 4.5 MMOL/L (ref 3.5–5.2)
PROT SERPL-MCNC: 6.9 G/DL (ref 6–8.5)
SODIUM BLD-SCNC: 137 MMOL/L (ref 136–145)
SODIUM BLD-SCNC: 137 MMOL/L (ref 136–145)
TRIGL SERPL-MCNC: 147 MG/DL (ref 0–150)

## 2020-06-02 PROCEDURE — 99232 SBSQ HOSP IP/OBS MODERATE 35: CPT | Performed by: INTERNAL MEDICINE

## 2020-06-02 PROCEDURE — 99232 SBSQ HOSP IP/OBS MODERATE 35: CPT | Performed by: PSYCHIATRY & NEUROLOGY

## 2020-06-02 PROCEDURE — 82962 GLUCOSE BLOOD TEST: CPT

## 2020-06-02 PROCEDURE — 92611 MOTION FLUOROSCOPY/SWALLOW: CPT

## 2020-06-02 PROCEDURE — 83735 ASSAY OF MAGNESIUM: CPT | Performed by: INTERNAL MEDICINE

## 2020-06-02 PROCEDURE — 92507 TX SP LANG VOICE COMM INDIV: CPT

## 2020-06-02 PROCEDURE — 84100 ASSAY OF PHOSPHORUS: CPT | Performed by: INTERNAL MEDICINE

## 2020-06-02 PROCEDURE — 80053 COMPREHEN METABOLIC PANEL: CPT | Performed by: INTERNAL MEDICINE

## 2020-06-02 PROCEDURE — 70551 MRI BRAIN STEM W/O DYE: CPT

## 2020-06-02 PROCEDURE — 99232 SBSQ HOSP IP/OBS MODERATE 35: CPT | Performed by: NURSE PRACTITIONER

## 2020-06-02 PROCEDURE — 97162 PT EVAL MOD COMPLEX 30 MIN: CPT

## 2020-06-02 PROCEDURE — 97165 OT EVAL LOW COMPLEX 30 MIN: CPT

## 2020-06-02 PROCEDURE — 97530 THERAPEUTIC ACTIVITIES: CPT

## 2020-06-02 PROCEDURE — 84478 ASSAY OF TRIGLYCERIDES: CPT | Performed by: INTERNAL MEDICINE

## 2020-06-02 PROCEDURE — 74018 RADEX ABDOMEN 1 VIEW: CPT

## 2020-06-02 PROCEDURE — 74230 X-RAY XM SWLNG FUNCJ C+: CPT

## 2020-06-02 PROCEDURE — 82465 ASSAY BLD/SERUM CHOLESTEROL: CPT | Performed by: INTERNAL MEDICINE

## 2020-06-02 RX ORDER — ACETAMINOPHEN 160 MG/5ML
650 SOLUTION ORAL EVERY 4 HOURS PRN
Status: DISCONTINUED | OUTPATIENT
Start: 2020-06-02 | End: 2020-06-18 | Stop reason: HOSPADM

## 2020-06-02 RX ADMIN — ACETAMINOPHEN ORAL SOLUTION 649.6 MG: 650 SOLUTION ORAL at 21:28

## 2020-06-02 RX ADMIN — FAMOTIDINE 20 MG: 10 INJECTION INTRAVENOUS at 20:21

## 2020-06-02 RX ADMIN — BARIUM SULFATE 20 ML: 400 PASTE ORAL at 11:20

## 2020-06-02 RX ADMIN — ASPIRIN 300 MG: 300 SUPPOSITORY RECTAL at 08:52

## 2020-06-02 RX ADMIN — METOPROLOL TARTRATE 2.5 MG: 5 INJECTION INTRAVENOUS at 00:40

## 2020-06-02 RX ADMIN — ATORVASTATIN CALCIUM 80 MG: 40 TABLET, FILM COATED ORAL at 20:21

## 2020-06-02 RX ADMIN — BARIUM SULFATE 50 ML: 400 SUSPENSION ORAL at 11:20

## 2020-06-02 RX ADMIN — DEXMEDETOMIDINE HYDROCHLORIDE 1.2 MCG/KG/HR: 4 INJECTION, SOLUTION INTRAVENOUS at 03:53

## 2020-06-02 RX ADMIN — FAMOTIDINE 20 MG: 10 INJECTION INTRAVENOUS at 08:52

## 2020-06-02 RX ADMIN — METOPROLOL TARTRATE 2.5 MG: 5 INJECTION INTRAVENOUS at 23:32

## 2020-06-02 RX ADMIN — METOPROLOL TARTRATE 2.5 MG: 5 INJECTION INTRAVENOUS at 13:20

## 2020-06-02 RX ADMIN — DEXMEDETOMIDINE HYDROCHLORIDE 1.2 MCG/KG/HR: 4 INJECTION, SOLUTION INTRAVENOUS at 01:57

## 2020-06-02 RX ADMIN — METOPROLOL TARTRATE 2.5 MG: 5 INJECTION INTRAVENOUS at 17:14

## 2020-06-02 RX ADMIN — ACETAMINOPHEN ORAL SOLUTION 649.6 MG: 650 SOLUTION ORAL at 17:14

## 2020-06-02 RX ADMIN — METOPROLOL TARTRATE 2.5 MG: 5 INJECTION INTRAVENOUS at 06:24

## 2020-06-02 RX ADMIN — SODIUM CHLORIDE, PRESERVATIVE FREE 10 ML: 5 INJECTION INTRAVENOUS at 20:21

## 2020-06-02 RX ADMIN — DEXTROSE AND SODIUM CHLORIDE 75 ML/HR: 5; 900 INJECTION, SOLUTION INTRAVENOUS at 03:53

## 2020-06-02 RX ADMIN — DEXTROSE AND SODIUM CHLORIDE 75 ML/HR: 5; 900 INJECTION, SOLUTION INTRAVENOUS at 17:24

## 2020-06-02 RX ADMIN — BARIUM SULFATE 100 ML: 0.81 POWDER, FOR SUSPENSION ORAL at 11:20

## 2020-06-02 RX ADMIN — DEXMEDETOMIDINE HYDROCHLORIDE 0.8 MCG/KG/HR: 4 INJECTION, SOLUTION INTRAVENOUS at 06:23

## 2020-06-02 RX ADMIN — SODIUM CHLORIDE, PRESERVATIVE FREE 10 ML: 5 INJECTION INTRAVENOUS at 08:53

## 2020-06-02 NOTE — PROGRESS NOTES
Stroke Progress Note       Chief Complaint:  Left-sided weakness    Subjective     Subjective:    Per nursing the patient became very agitated early this morning requiring sedation with Precedex.  Prior to receiving sedation, nursing reports that patient was able to move her left side (leg>arm).    Review of Systems     Unable to perform ROS: Mental status change (decreased LOC secondary to sedative medications)        Objective      Temp:  [98.4 °F (36.9 °C)-99.6 °F (37.6 °C)] 98.4 °F (36.9 °C)  Heart Rate:  [] 84  Resp:  [18-20] 20  BP: (114-153)/() 142/110    Neurological Exam  Mental Status  Unresponsive to painful stimuli.     Neurological exam limited due to patients decreased LOC and inability to participate     Physical Exam   Constitutional: She appears well-developed and well-nourished.   HENT:   Head: Normocephalic and atraumatic.   Cardiovascular: Normal rate.   Persistent atrial fibrillation   Pulmonary/Chest: Effort normal.   Neurological:   Unresponsive to painful stimuli; patient's sedation turned off 30 minutes ago   Skin: Skin is warm and dry.     Nursing notes and vitals reviewed.    Results Review:    I reviewed the patient's new clinical results.        Results for orders placed during the hospital encounter of 05/31/20   Adult Transthoracic Echo Complete W/ Cont if Necessary Per Protocol    Narrative · Left ventricular systolic function is normal. Estimated EF appears to be   in the range of 56 - 60%.  · Normal right ventricular cavity size and systolic function noted.  · Left atrial cavity size is moderately dilated. Left atrial volume is   moderately increased.  · No evidence of a patent foramen ovale. Saline test results are negative.  · The aortic valve is abnormal in structure. The valve exhibits sclerosis.   There is mild calcification of the aortic valve  · Mild aortic valve stenosis is present            Assessment/Plan     Assessment/Plan:      65-year-old right-handed  white female with known diagnosis of hypertension, hyperlipidemia, persistent atrial fibrillation, on Eliquis, who comes in with right MCA syndrome, and found to have right MCA stroke.  Thrombectomy was attempted, but intra-arterial TPA was given.  Patient was not a candidate for TPA secondary to being on Eliquis.  Her initial CT head showed early right MCA stroke, with gray-white matter in differentiation and some sulcal effacement.  CT angiogram head and neck showed her to have right superior temporal branch occlusion.  CT perfusion showed her to have a mismatch in the right MCA territory.  Repeat CT head this morning showed an evolving right MCA stroke, in the area of perfusion deficit.        1. Right MCA stroke.  Likely cardioembolic, given her being in persistent A. fib.  Patient has had no significant improvement since getting TPA intra-arterially. Will repeat CT head in AM, to consider starting anticoag. Cont ASA and statins for now.   2. Persistent atrial fibrillation.  Will consider restarting Eliquis in AM based upon repeat CT in AM.  Cardiology following. Rate controlled.   3. Essential hypertension.  Ok to normalize blood pressure SBP goal 100-140.  4. Mixed hyperlipidemia.  Her total cholesterol is 213 with LDL of 79.  Continue Lipitor 80 mg daily.    5. Activity - Ok to resume PT/OT, Can increase HOB.  6. Diet - SLP following; scheduled to have MBS today.     Case was discussed with the patient's daughter who is at bedside, nursing, and Intensivist.  We will continue to follow her.      Orders have been placed on Dr. Vaughan's behalf.     Edith Zee RN  06/02/20  10:15    I had face-to-face interaction with and examined  the patient, and discussed with the family. I have reviewed Edith note, and have edited the as needed.

## 2020-06-02 NOTE — PROGRESS NOTES
[facility] at [facility]  Progress Note       LOS: 2 days   Patient Care Team:  Jl Mcknight MD as PCP - General (Family Medicine)    Chief Complaint:  F/u atrial fibrillation    Subjective     Interval History: Nursing notes report increasing confusion overnight requiring restraints.  Currently sedated on Precedex.  Daughter at bedside.  Remains in atrial fibrillation, HR's improved.          Review of Systems:   Unable to obtain full ROS as patient is drowsy/sedated on Precedex.      Objective       Current Facility-Administered Medications:   •  acetaminophen (TYLENOL) suppository 650 mg, 650 mg, Rectal, Q4H PRN, Case, Magda V., DO, 650 mg at 06/01/20 1645  •  aspirin tablet 325 mg, 325 mg, Oral, Daily **OR** aspirin suppository 300 mg, 300 mg, Rectal, Daily, Given, Brant WOODY MD, 300 mg at 06/02/20 0852  •  atorvastatin (LIPITOR) tablet 80 mg, 80 mg, Oral, Nightly, Given, Brant WOODY MD  •  dexmedetomidine (PRECEDEX) 400 mcg/100 mL (4 mcg/mL) infusion, 0.2-1.5 mcg/kg/hr, Intravenous, Titrated, Dominique Aviles APRN, Last Rate: 22 mL/hr at 06/02/20 0758, 0.8 mcg/kg/hr at 06/02/20 0758  •  dextrose 5 % and sodium chloride 0.9 % infusion, 75 mL/hr, Intravenous, Continuous, Case, Magda V., DO, Last Rate: 75 mL/hr at 06/02/20 0353, 75 mL/hr at 06/02/20 0353  •  famotidine (PEPCID) injection 20 mg, 20 mg, Intravenous, Q12H, Freda Sidhu APRN, 20 mg at 06/02/20 0852  •  metoprolol tartrate (LOPRESSOR) injection 2.5 mg, 2.5 mg, Intravenous, Q6H, Case, Magda V., DO, 2.5 mg at 06/02/20 0624  •  metoprolol tartrate (LOPRESSOR) injection 5 mg, 5 mg, Intravenous, Q6H PRN, Edward Garcia MD, 5 mg at 06/01/20 0500  •  niCARdipine (CARDENE) 20 mg in 200 mL NS (0.1 mg/mL) infusion, 5-15 mg/hr, Intravenous, Titrated, Alexa Rivera APRN, Stopped at 06/01/20 0015  •  sodium chloride 0.9 % flush 10 mL, 10 mL, Intravenous, PRN, Given, Brant WOODY MD  •  sodium chloride 0.9 % flush 10  "mL, 10 mL, Intravenous, Q12H, Given, Brant WOODY MD, 10 mL at 06/02/20 0853  •  sodium chloride 0.9 % flush 10 mL, 10 mL, Intravenous, PRN, Given, Brant WOODY MD    Vital Sign Min/Max for last 24 hours  Temp  Min: 98.4 °F (36.9 °C)  Max: 99.6 °F (37.6 °C)   BP  Min: 114/65  Max: 153/67   Pulse  Min: 84  Max: 116   Resp  Min: 18  Max: 20   SpO2  Min: 91 %  Max: 98 %   Flow (L/min)  Min: 2  Max: 4   Weight  Min: 110 kg (242 lb 8.1 oz)  Max: 110 kg (242 lb 8.1 oz)     Flowsheet Rows      First Filed Value   Admission Height  154.9 cm (61\") Documented at 05/31/2020 1502   Admission Weight  104 kg (230 lb) Documented at 05/31/2020 1502            Intake/Output Summary (Last 24 hours) at 6/2/2020 1021  Last data filed at 6/2/2020 0737  Gross per 24 hour   Intake 3419.73 ml   Output 200 ml   Net 3219.73 ml       Physical Exam:     General Appearance:    Drowsy, sedated   Lungs:     Clear to auscultation anteriorly, respirations regular, even and unlabored    Heart:    Irreg irreg, normal S1 and S2, no murmur, no gallop, no rub, no click   Chest Wall:    No abnormalities observed   Abdomen:     Hypoactive bowel sounds, no masses, soft, non-tender, non-distended   Extremities:   No edema, no cyanosis, no redness   Pulses:   Pulses palpable and equal bilaterally   Skin:   No bleeding, bruising or rash        Results Review:   Results from last 7 days   Lab Units 06/01/20  0637 05/31/20  1517 05/31/20  1507   WBC 10*3/mm3 9.99 9.37  --    HEMOGLOBIN g/dL 11.7* 11.9*  --    HEMOGLOBIN, POC g/dL  --   --  14.6   HEMATOCRIT % 41.1 41.9  --    HEMATOCRIT POC %  --   --  43   PLATELETS 10*3/mm3 259 307  --      Results from last 7 days   Lab Units 06/02/20  0430 06/01/20  0637  05/31/20  1501   SODIUM mmol/L 137 137  --   --    POTASSIUM mmol/L 4.4 4.1  --   --    CHLORIDE mmol/L 100 101   < >  --    CO2 mmol/L 22.0 20.0*   < >  --    BUN mg/dL 12 12  --   --    CREATININE mg/dL 0.69 0.73  --  0.70   GLUCOSE mg/dL 142* 149*   < >  --  "    < > = values in this interval not displayed.      Results from last 7 days   Lab Units 06/01/20  0637   HEMOGLOBIN A1C % 5.80*     Results from last 7 days   Lab Units 06/01/20  0637   CHOLESTEROL mg/dL 213*   TRIGLYCERIDES mg/dL 218*   HDL CHOL mg/dL 90*             Results from last 7 days   Lab Units 05/31/20  1516   APTT seconds 24.8     Results from last 7 days   Lab Units 05/31/20  1517   TROPONIN T ng/mL <0.010     Results from last 7 days   Lab Units 06/02/20  0430   MAGNESIUM mg/dL 2.2       Intake/Output Summary (Last 24 hours) at 6/2/2020 1021  Last data filed at 6/2/2020 0737  Gross per 24 hour   Intake 3419.73 ml   Output 200 ml   Net 3219.73 ml       I personally viewed and interpreted the patient's EKG/Telemetry data    EKG: None for review today    Telemetry: Atrial fibrillation, HR  bpm    Echocardiogram 6/1/2020:  · Left ventricular systolic function is normal. Estimated EF appears to be in the range of 56 - 60%.  · Normal right ventricular cavity size and systolic function noted.  · Left atrial cavity size is moderately dilated. Left atrial volume is moderately increased.  · No evidence of a patent foramen ovale. Saline test results are negative.  · The aortic valve is abnormal in structure. The valve exhibits sclerosis. There is mild calcification of the aortic valve  · Mild aortic valve stenosis is present    Present on Admission:  • Cerebrovascular accident (CVA) due to embolism of right middle cerebral artery (CMS/HCC)  • Atrial fibrillation (CMS/HCC)  • Acute ischemic stroke (CMS/HCC)    Assessment/Plan   1. Persistent atrial fibrillation:  - Patient with known persistent atrial fibrillation, actually scheduled for PVA with Dr. Alan later this month, presents with acute CVA.  - Sotalol discontinued on admission  - Continue rate control with IV metoprolol (failed swallow eval).   - CHADSVASc previously 4, now 6 with recent CVA, on Eliquis therapy.  Patient reports compliance and  denies any missed doses.  - Echocardiogram yesterday showing normal EF, no evidence of PFO     2. Acute R MCA CVA:  - Admitted with acute CVA s/p failed thrombectomy but did receive intra-arterial TPA with minimal success.  Stroke evolving per repeat CT head.  - No significant carotid stenosis per CTA  - Repeat CT head yesterday afternoon showing evolving right MCA stroke with no hemorrhagic conversion.  Will defer to neurology when ok to resume anticoagulation.  Consider switching to Xarelto as she has failed Eliquis.  - Supportive care per ICU/stroke team     3. Hypertension:  - Allowing for permissive hypertension in the setting of CVA, per neurology    Plan:  1. Continue IV metoprolol for rate control.  2. Restart anticoagulation (likely Xarelto) when ok with neurology.    Electronically signed by ISSA Gore, 06/02/20, 10:21 AM.

## 2020-06-02 NOTE — THERAPY EVALUATION
Acute Care - Occupational Therapy Initial Evaluation  [facility]     Patient Name: Sidra Lane  : 1955  MRN: [id number]  Today's Date: 2020             Admit Date: 2020       ICD-10-CM ICD-9-CM   1. Acute ischemic stroke (CMS/HCC) I63.9 434.91   2. Oropharyngeal dysphagia R13.12 787.22   3. Dysarthria R47.1 784.51   4. Cognitive communication deficit R41.841 799.52     Patient Active Problem List   Diagnosis   • Shortness of breath   • Atrial fibrillation (CMS/HCC)   • Essential hypertension   • Dizziness   • Deep vein thrombosis (DVT) of proximal lower extremity (CMS/HCC)   • Coronary artery disease involving native coronary artery of native heart without angina pectoris   • Fatigue   • Chest pain   • Longstanding persistent atrial fibrillation   • Cerebrovascular accident (CVA) due to embolism of right middle cerebral artery (CMS/HCC)   • Acute ischemic stroke (CMS/HCC)     Past Medical History:   Diagnosis Date   • Atrial fibrillation (CMS/HCC)    • CHF (congestive heart failure) (CMS/HCC)    • Deep vein thrombosis (CMS/HCC)    • GERD (gastroesophageal reflux disease)    • Hypertension    • May-Thurner syndrome    • Pulmonary embolism (CMS/HCC)      Past Surgical History:   Procedure Laterality Date   • CHOLECYSTECTOMY     • COLON SURGERY      bowel leakage, some of colon removed   • INTERVENTIONAL RADIOLOGY PROCEDURE Bilateral 2020    Procedure: CAROTID CEREBRAL ANGIOGRAM BILATERAL;  Surgeon: Brant Duarte MD;  Location: [facility] INVASIVE LOCATION;  Service: Interventional Radiology;  Laterality: Bilateral;   • LAPAROSCOPIC TUBAL LIGATION     • LEG SURGERY      multiple stents to BLE   • TONSILLECTOMY            OT ASSESSMENT FLOWSHEET (last 12 hours)      Occupational Therapy Evaluation     Row Name 20 1430                   OT Evaluation Time/Intention    Subjective Information  complains of;weakness;fatigue  -KA        Document Type  evaluation  -KA        Symptoms  Noted During/After Treatment  none  -KA           General Information    Patient Profile Reviewed?  yes  -KA        Patient Observations  alert;cooperative;agree to therapy  -KA        Patient/Family Observations  dtr present  -KA        Prior Level of Function  independent:;all household mobility;community mobility;ADL's;home management;cooking;cleaning;driving;shopping  -KA        Equipment Currently Used at Home  walker, rolling  -KA        Existing Precautions/Restrictions  fall;other (see comments) L hemiparesis  -KA        Risks Reviewed  patient and family:;nausea/vomiting;LOB;dizziness;increased discomfort;change in vital signs;increased drainage;lines disloged  -KA        Benefits Reviewed  patient and family:;improve function;increase independence;increase strength;increase balance;improve skin integrity;decrease pain;decrease risk of DVT;increase knowledge  -KA           Relationship/Environment    Lives With  alone  -KA        Concerns About Impact on Relationships  dtr lives in [place]  -KA           Resource/Environmental Concerns    Current Living Arrangements  home/apartment/condo  -KA           Home Main Entrance    Number of Stairs, Main Entrance  two  -KA           Cognitive Assessment/Interventions    Additional Documentation  Cognitive Assessment/Intervention (Group)  -KA           Cognitive Assessment/Intervention- PT/OT    Affect/Mental Status (Cognitive)  low arousal/lethargic  -KA        Orientation Status (Cognition)  oriented x 4  -KA        Follows Commands (Cognition)  follows one step commands;over 90% accuracy;delayed response/completion;increased processing time needed;repetition of directions required  -KA        Cognitive Function (Cognitive)  safety deficit  -KA        Safety Deficit (Cognitive)  mild deficit;insight into deficits/self awareness;problem solving;safety precautions awareness;safety precautions follow-through/compliance;judgment;awareness of need for assistance;impulsivity   -KA           Transfer Assessment/Treatment    Transfer Assessment/Treatment  sit-stand transfer;stand-sit transfer  -KA           Sit-Stand Transfer    Sit-Stand [place] (Transfers)  minimum assist (75% patient effort);verbal cues  -KA           Stand-Sit Transfer    Stand-Sit [place] (Transfers)  minimum assist (75% patient effort);verbal cues  -KA           BADL Safety/Performance    Impairments, BADL Safety/Performance  balance;cognition;endurance/activity tolerance;strength;motor control  -           General ROM    GENERAL ROM COMMENTS  B UE WFL  -KA           MMT (Manual Muscle Testing)    General MMT Comments  L UE grossly 2/5  -KA           Sensory Assessment/Intervention    Sensory General Assessment  light touch sensation deficits identified  -        Additional Documentation  Vision Assessment/Intervention (Group)  -           Light Touch Sensation Assessment    Left Upper Extremity: Light Touch Sensation Assessment  moderate impairment, 50 to 74% correct responses  -           Vision Assessment/Intervention    Visual Impairment/Limitations  peripheral vision impaired left;other (see comments) R gaze   -KA        Visual Motor Impairment  visual tracking, down;visual tracking, left;visual tracking, right;visual tracking, up  -        Visual Processing Deficit  visual attention, left;visual search, left  -           Positioning and Restraints    Pre-Treatment Position  sitting in chair/recliner  -        Post Treatment Position  chair  -KA        In Chair  notified nsg;reclined;call light within reach;encouraged to call for assist;exit alarm on;with nsg;legs elevated;heels elevated  -           Pain Scale: FACES Pre/Post-Treatment    Pain: FACES Scale, Pretreatment  2-->hurts little bit  -KA        Pain: FACES Scale, Post-Treatment  2-->hurts little bit  -KA        Pre/Post Treatment Pain Comment  tolerated.   -           Wound 05/31/20 1930 Right groin Puncture    Wound -  Properties Group Date first assessed: 05/31/20  -MB Time first assessed: 1930  -MB Present on Hospital Admission: N  -MB Side: Right  -MB Location: groin  -MB Primary Wound Type: Puncture  -MB       Plan of Care Review    Plan of Care Reviewed With  patient;daughter  -KA           Clinical Impression (OT)    OT Diagnosis  decreased ADL/IADL independence  -KA        Patient/Family Goals Statement (OT Eval)  return to [facility]  -KA        Criteria for Skilled Therapeutic Interventions Met (OT Eval)  yes;treatment indicated  -KA        Rehab Potential (OT Eval)  good, to achieve stated therapy goals  -KA        Therapy Frequency (OT Eval)  daily  -KA        Predicted Duration of Therapy Intervention (Therapy Eval)  10  -KA        Care Plan Review (OT)  evaluation/treatment results reviewed;care plan/treatment goals reviewed;risks/benefits reviewed;current/potential barriers reviewed;patient/other agree to care plan  -KA        Care Plan Review, Other Participant (OT Eval)  daughter  -KA        Anticipated Discharge Disposition (OT)  inpatient rehabilitation facility  -KA           Vital Signs    Pre Systolic BP Rehab  142  -KA        Pre Treatment Diastolic BP  98  -KA        Post Systolic BP Rehab  150  -KA        Post Treatment Diastolic BP  107  -KA        Pretreatment Heart Rate (beats/min)  96  -KA        Posttreatment Heart Rate (beats/min)  99  -KA        Pre SpO2 (%)  82  -KA        Post SpO2 (%)  88  -KA        Pre Patient Position  Sitting  -KA        Intra Patient Position  Standing  -KA        Post Patient Position  Sitting  -KA           OT Goals    Transfer Goal Selection (OT)  transfer, OT goal 1  -KA        Dressing Goal Selection (OT)  dressing, OT goal 1  -KA        Toileting Goal Selection (OT)  toileting, OT goal 1  -KA        Strength Goal Selection (OT)  strength, OT goal 1;strength, OT goal 2  -KA        Additional Documentation  Strength Goal Selection (OT) (Row);Grooming Goal Selection (OT) (Row)   -KA           Transfer Goal 1 (OT)    Activity/Assistive Device (Transfer Goal 1, OT)  bed-to-chair/chair-to-bed;toilet;walker, rolling  -KA        [place] Level/Cues Needed (Transfer Goal 1, OT)  standby assist  -KA        Time Frame (Transfer Goal 1, OT)  long term goal (LTG);by discharge  -KA        Progress/Outcome (Transfer Goal 1, OT)  goal ongoing  -KA           Dressing Goal 1 (OT)    Activity/Assistive Device (Dressing Goal 1, OT)  lower body dressing socks w/AE  -KA        [place]/Cues Needed (Dressing Goal 1, OT)  moderate assist (50-74% patient effort)  -KA        Time Frame (Dressing Goal 1, OT)  long term goal (LTG);by discharge  -KA        Progress/Outcome (Dressing Goal 1, OT)  goal ongoing  -KA           Strength Goal 1 (OT)    Strength Goal 1 (OT)  Pt will improve L UE from 2/5 to 3/5 and complete AROM in all planes 1x5.    -KA        Time Frame (Strength Goal 1, OT)  long term goal (LTG);by discharge  -KA        Progress/Outcome (Strength Goal 1, OT)  goal ongoing  -KA           Strength Goal 2 (OT)    Strength Goal 2 (OT)  Pt will complete tputty/sponge HEP using handout with Renee.   -KA        Time Frame (Strength Goal 2, OT)  long term goal (LTG);by discharge  -KA        Progress/Outcome (Strength Goal 2, OT)  goal ongoing  -KA           Living Environment    Home Accessibility  tub/shower is not walk in;stairs to enter home  -KA          User Key  (r) = Recorded By, (t) = Taken By, (c) = Cosigned By    Initials Name Effective Dates    Toña Handy, DANIEL 06/14/19 -     Cassia Grajeda OT 07/17/19 -          Occupational Therapy Education                 Title: PT OT SLP Therapies (Done)     Topic: Occupational Therapy (Done)     Point: ADL training (Done)     Description:   Instruct learner(s) on proper safety adaptation and remediation techniques during self care or transfers.   Instruct in proper use of assistive devices.              Learning Progress Summary            Patient Acceptance, E,D, VU,DU by MAXIMO at 6/2/2020 1410    Comment:  Pt educated on role of OT, safety, fall prevention, ADLs, transfers, and POC.   Family Acceptance, E,D, VU,DU by MAXIMO at 6/2/2020 1410    Comment:  Pt educated on role of OT, safety, fall prevention, ADLs, transfers, and POC.                   Point: Home exercise program (Done)     Description:   Instruct learner(s) on appropriate technique for monitoring, assisting and/or progressing therapeutic exercises/activities.              Learning Progress Summary           Patient Acceptance, E,D, VU,DU by MAXIMO at 6/2/2020 1410    Comment:  Pt educated on role of OT, safety, fall prevention, ADLs, transfers, and POC.   Family Acceptance, E,D, VU,DU by MAXIMO at 6/2/2020 1410    Comment:  Pt educated on role of OT, safety, fall prevention, ADLs, transfers, and POC.                   Point: Precautions (Done)     Description:   Instruct learner(s) on prescribed precautions during self-care and functional transfers.              Learning Progress Summary           Patient Acceptance, E,D, VU,DU by MAXIMO at 6/2/2020 1410    Comment:  Pt educated on role of OT, safety, fall prevention, ADLs, transfers, and POC.   Family Acceptance, E,D, VU,DU by MAXIMO at 6/2/2020 1410    Comment:  Pt educated on role of OT, safety, fall prevention, ADLs, transfers, and POC.                   Point: Body mechanics (Done)     Description:   Instruct learner(s) on proper positioning and spine alignment during self-care, functional mobility activities and/or exercises.              Learning Progress Summary           Patient Acceptance, E,D, VU,DU by MAXIMO at 6/2/2020 1410    Comment:  Pt educated on role of OT, safety, fall prevention, ADLs, transfers, and POC.   Family Acceptance, E,D, VU,DU by MAXIMO at 6/2/2020 1410    Comment:  Pt educated on role of OT, safety, fall prevention, ADLs, transfers, and POC.                               User Key     Initials Effective Dates Name Provider Type  Discipline     07/17/19 -  Cassia Zee OT Occupational Therapist OT                  OT Recommendation and Plan  Outcome Summary/Treatment Plan (OT)  Anticipated Discharge Disposition (OT): inpatient rehabilitation facility  Therapy Frequency (OT Eval): daily  Plan of Care Review  Plan of Care Reviewed With: patient, daughter  Plan of Care Reviewed With: patient, daughter  Outcome Summary: Don socks: D.  Sit<>Stand: Renee to manage L UE.  Pt/family educated on self ROM exercise program for L UE.  Limited by strength, endurance, balance, cognition, medical.  Recommended D/C: IRF.  Will cont to observe and address ADL/IADL deficits as needed.    Outcome Measures     Row Name 06/02/20 1410             How much help from another is currently needed...    Putting on and taking off regular lower body clothing?  1  -KA      Bathing (including washing, rinsing, and drying)  2  -KA      Toileting (which includes using toilet bed pan or urinal)  2  -KA      Putting on and taking off regular upper body clothing  2  -KA      Taking care of personal grooming (such as brushing teeth)  2  -KA      Eating meals  2  -KA      AM-PAC 6 Clicks Score (OT)  11  -KA         Modified [place] Scale    Pre-Stroke Modified [place] Scale  0 - No Symptoms at all.  -KA      Modified [place] Scale  3 - Moderate disability.  Requiring some help, but able to walk without assistance.  -KA         Functional Assessment    Outcome Measure Options  AM-PAC 6 Clicks Daily Activity (OT);Modified Isaac  -        User Key  (r) = Recorded By, (t) = Taken By, (c) = Cosigned By    Initials Name Provider Type    Cassia Grajeda OT Occupational Therapist          Time Calculation:   Time Calculation- OT     Row Name 06/02/20 1410             Time Calculation- OT    OT Start Time  1410  -KA      OT Received On  06/02/20  -KA      OT Goal Re-Cert Due Date  06/12/20  -KA         Timed Charges    14274 - OT Therapeutic Activity Minutes  10  -KA         User Key  (r) = Recorded By, (t) = Taken By, (c) = Cosigned By    Initials Name Provider Type    Cassia Grajeda OT Occupational Therapist        Therapy Charges for Today     Code Description Service Date Service Provider Modifiers Qty    [id number]  OT THERAPEUTIC ACT EA 15 MIN 6/2/2020 Cassia Zee OT GO 1    [id number]  OT EVAL LOW COMPLEXITY 4 6/2/2020 Cassia Zee OT GO 1               Cassia Zee OT  6/2/2020

## 2020-06-02 NOTE — PROGRESS NOTES
INTENSIVIST   PROGRESS NOTE     Hospital:  LOS: 2 days     Ms. Sidra Lane, 65 y.o. female is followed for a Chief Complaint of: CVA      Subjective   S     Interval History:  Agitated overnight and started on Precedex. More calm this AM.        The patient's relevant past medical, surgical and social history were reviewed and updated in Epic as appropriate.      ROS: Unable to obtain secondary to sedation.     Objective   O     Vitals:  Temp  Min: 98.4 °F (36.9 °C)  Max: 99.6 °F (37.6 °C)  BP  Min: 114/65  Max: 153/67  Pulse  Min: 84  Max: 110  Resp  Min: 18  Max: 20  SpO2  Min: 91 %  Max: 98 % Flow (L/min)  Min: 2  Max: 4    Intake/Ouptut 24 hrs (7:00AM - 6:59 AM)  Intake & Output (last 3 days)       05/30 0701 - 05/31 0700 05/31 0701 - 06/01 0700 06/01 0701 - 06/02 0700 06/02 0701 - 06/03 0700    I.V. (mL/kg)  275.9 (2.5) 2192.7 (19.9) 1227 (11.2)    Total Intake(mL/kg)  275.9 (2.5) 2192.7 (19.9) 1227 (11.2)    Urine (mL/kg/hr)  1075 350 (0.1)     Stool  0      Total Output  1075 350     Net  -799.1 +1842.7 +1227            Urine Unmeasured Occurrence   1 x 1 x    Stool Unmeasured Occurrence  1 x            Medications (drips):    dexmedetomidine Last Rate: 0.8 mcg/kg/hr (06/02/20 8727)   dextrose 5 % and sodium chloride 0.9 % Last Rate: 75 mL/hr (06/02/20 3433)   niCARdipine Last Rate: Stopped (06/01/20 0015)          Physical Examination  Telemetry:  Atrial fibrillation.    Constitutional:  No acute distress.  Resting in bed comfortably   Eyes: No scleral icterus.   PERRL, EOM intact.    Neck:  Supple, FROM   Cardiovascular: Normal rate, regular and rhythm. Normal heart sounds.  No murmurs, gallop or rub.   Respiratory: No respiratory distress. Normal respiratory effort.  Normal breath sounds  Clear to ascultation   Abdominal:  Soft. No masses. Non-tender. No distension. No HSM.   Extremities: No digital cyanosis. No clubbing.  No peripheral edema.   Skin: No rashes, lesions or ulcers   Neurological:    Awakens to stimulation and follows commands.        Interval: (MRI)  1a. Level of Consciousness: 1-->Not alert, but arousable by minor stimulation to obey, answer, or respond  1b. LOC Questions: 0-->Answers both questions correctly  1c. LOC Commands: 0-->Performs both tasks correctly  2. Best Gaze: 1-->Partial gaze palsy, gaze is abnormal in one or both eyes, but forced deviation or total gaze paresis is not present  3. Visual: 1-->Partial hemianopia  4. Facial Palsy: 1-->Minor paralysis (flattened nasolabial fold, asymmetry on smiling)  5a. Motor Arm, Left: 2-->Some effort against gravity, limb cannot get to or maintain (if cued) 90 (or 45) degrees, drifts down to bed, but has some effort against gravity  5b. Motor Arm, Right: 0-->No drift, limb holds 90 (or 45) degrees for full 10 secs  6a. Motor Leg, Left: 1-->Drift, leg falls by the end of the 5-sec period but does not hit bed  6b. Motor Leg, Right: 0-->No drift, leg holds 30 degree position for full 5 secs  7. Limb Ataxia: 0-->Absent  8. Sensory: 1-->Mild-to-moderate sensory loss, patient feels pinprick is less sharp or is dull on the affected side, or there is a loss of superficial pain with pinprick, but patient is aware of being touched  9. Best Language: 0-->No aphasia, normal  10. Dysarthria: 1-->Mild-to-moderate dysarthria, patient slurs at least some words and, at worst, can be understood with some difficulty  11. Extinction and Inattention (formerly Neglect): 1-->Visual, tactile, auditory, spatial, or personal inattention or extinction to bilateral simultaneous stimulation in one of the sensory modalities    Total (NIH Stroke Scale): 10       Results from last 7 days   Lab Units 06/01/20  0637 05/31/20  1517 05/31/20  1507   WBC 10*3/mm3 9.99 9.37  --    HEMOGLOBIN g/dL 11.7* 11.9*  --    HEMOGLOBIN, POC g/dL  --   --  14.6   MCV fL 77.5* 78.3*  --    PLATELETS 10*3/mm3 259 307  --      Results from last 7 days   Lab Units 06/02/20  0430 06/01/20  0637  05/31/20  1501   SODIUM mmol/L 137 137  --    POTASSIUM mmol/L 4.4 4.1  --    CO2 mmol/L 22.0 20.0*  --    CREATININE mg/dL 0.69 0.73 0.70   GLUCOSE mg/dL 142* 149*  --    MAGNESIUM mg/dL 2.2 2.0  --    PHOSPHORUS mg/dL 3.8 2.8  --      Estimated Creatinine Clearance: 78.3 mL/min (by C-G formula based on SCr of 0.69 mg/dL).  Results from last 7 days   Lab Units 05/31/20  1517   ALT (SGPT) U/L 19   AST (SGOT) U/L 31       Results from last 7 days   Lab Units 05/31/20  1507   PH, ARTERIAL pH units 7.37       Images:  Imaging Results (Last 24 Hours)     Procedure Component Value Units Date/Time    MRI Brain Without Contrast [[id number]] Collected:  06/02/20 1301     Updated:  06/02/20 1315    Narrative:       EXAMINATION: MRI BRAIN WO CONTRAST-06/02/2020:     INDICATION: CVA; I63.9-Cerebral infarction, unspecified;  R13.12-Dysphagia, oropharyngeal phase; R47.1-Dysarthria and anarthria.      TECHNIQUE: Multiplanar MRI of the brain without intravenous contrast.     COMPARISON: NONE.     FINDINGS: Confluent area of restricted diffusion within the right  frontotemporal region along with anterior and posterior MCA distribution  restriction consistent with evolving right MCA infarction with  effacement of the right lateral ventricle, however, no midline shift.  Background increased signal findings of at least moderate involvement  demonstrating moderately advanced chronic small vessel ischemic disease.  Susceptibility-weighted imaging performed without susceptibility  artifact. Pituitary and sella within normal limits. Globes and orbits  retain normal T2 signal characteristics. The visualized paranasal  sinuses and mastoid air cells are grossly clear and well pneumatized. No  cerebellopontine angle mass lesion. Normal signal flow voids of the  distal internal carotid and vertebrobasilar arteries.       Impression:       Evolving right MCA infarction with restricted diffusion and  effacement of the right lateral ventricle,  however, no midline shift.  This is superimposed upon moderately advanced chronic small vessel  ischemic disease findings.     D:  06/02/2020  E:  06/02/2020              FL Video Swallow With Speech Single Contrast [[id number]] Resulted:  06/02/20 1054     Updated:  06/02/20 1121    CT Head Without Contrast [[id number]] Collected:  06/01/20 0815     Updated:  06/01/20 2249    Narrative:       EXAMINATION: CT HEAD WO CONTRAST-06/01/2020:      INDICATION: Stroke; I63.9-Cerebral infarction, unspecified.     TECHNIQUE: 5 mm unenhanced images through the brain.     The radiation dose reduction device was turned on for each scan per the  ALARA (As Low as Reasonably Achievable) protocol.     COMPARISON: Cerebral perfusion exam of 05/31/2020.     FINDINGS: The calvarium appears intact. The included paranasal sinuses  and mastoids appear clear. Soft tissue window images show a fairly  subtle wedge-shaped area of edema approximately 3.5 cm in diameter in  the right frontal lobe and anterior-superior temporal lobe, also a  sub-centimeter focus of low attenuation in the right basal ganglia,  probably acute infarcts. This corresponds well to the perfusion scan  abnormality seen on yesterday's exam. Focal low-density areas in the  chucho are inconsistent between the initial and delayed scans and are  likely skull base streak artifact. There is no evidence of edema/infarct  elsewhere, no evidence of hemorrhage, mass or mass effect,  hydrocephalus, or abnormal extra-axial collection.       Impression:       Evolving right MCA territory infarct corresponding to  perfusion scan abnormality. Small right basal ganglia infarct. No  evidence of hemorrhage.     D:  06/01/2020  E:  06/01/2020           This report was finalized on 6/1/2020 10:46 PM by Dr. Ervin Troncoso MD.       CT Head Without Contrast [[id number]] Collected:  06/01/20 1639     Updated:  06/01/20 1825    Narrative:       EXAMINATION: CT HEAD WO CONTRAST- 06/01/2020      INDICATION: stroke; I63.9-Cerebral infarction, unspecified;  R13.10-Dysphagia, unspecified; R47.1-Dysarthria and anarthria; acute  left-sided weakness     TECHNIQUE: Multiple axial CT imaging was obtained of the head without  the administration of intravenous contrast.     The radiation dose reduction device was turned on for each scan per the  ALARA (As Low as Reasonably Achievable) protocol.     COMPARISON: NONE     FINDINGS: There is a large area of low density involving the right MCA  territory representing the patient's evolving area of infarction. There  is no significant change seen in the interval. No hemorrhagic  conversion. There is no significant effacement on the right lateral  ventricle. No significant midline shift. Bony structures are  unremarkable. Visualized paranasal sinuses are clear. The mastoid air  cells are patent.       Impression:       Stable examination with evolving right MCA territory  infarct. No hemorrhagic conversion.     D:  06/01/2020  E:  06/01/2020               Results: Reviewed.  I reviewed the patient's new laboratory and imaging results.  I independently reviewed the patient's new images.    Medications: Reviewed.    Assessment/Plan   A / P     Ms. 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 Her CT perfusion showed an area in the right MCA territory of reversible ischemia and she underwent angiography with intra-arterial tPA after a failed thrombectomy. She continues to have an NIHSS of 10. She became agitated overnight and was started on a Precedex drip.     Nutrition:   NPO Diet  Advance Directives:   Code Status and Medical Interventions:   Ordered at: 05/31/20 1743     Code Status:    CPR     Medical Interventions (Level of Support Prior to Arrest):    Full       Active Hospital Problems    Diagnosis   • **Cerebrovascular accident (CVA) due  to embolism of right middle cerebral artery (CMS/HCC)   • Acute ischemic stroke (CMS/HCC)   • Atrial fibrillation (CMS/HCC)       Assessment / Plan:    MBS today. Will need Keofeed with tube feeds if she does not pass. Discussed with daughter at bedside.   Wean Precedex as tolerated.   EP following for management of Afib.   Neurology following  PT/OT today.   ASA/Statin  AM labs    High level of risk due to:  severe exacerbation of chronic illness and illness with threat to life or bodily function.    Plan of care and goals reviewed during interdisciplinary rounds.  I discussed the patient's findings and my recommendations with family and nursing staff      Magda Tavares, DO    Intensive Care Medicine and Pulmonary Medicine

## 2020-06-02 NOTE — MBS/VFSS/FEES
Acute Care - Speech Language Pathology   Swallow Initial Evaluation  Leti   Modified Barium Swallow Study (MBS)  (+ cognitive-communication treatment)     Patient Name: Sidra Lane  : 1955  MRN: [id number]  Today's Date: 2020               Admit Date: 2020    Visit Dx:     ICD-10-CM ICD-9-CM   1. Acute ischemic stroke (CMS/HCC) I63.9 434.91   2. Oropharyngeal dysphagia R13.12 787.22   3. Dysarthria R47.1 784.51   4. Cognitive communication deficit R41.841 799.52     Patient Active Problem List   Diagnosis   • Shortness of breath   • Atrial fibrillation (CMS/HCC)   • Essential hypertension   • Dizziness   • Deep vein thrombosis (DVT) of proximal lower extremity (CMS/HCC)   • Coronary artery disease involving native coronary artery of native heart without angina pectoris   • Fatigue   • Chest pain   • Longstanding persistent atrial fibrillation   • Cerebrovascular accident (CVA) due to embolism of right middle cerebral artery (CMS/HCC)   • Acute ischemic stroke (CMS/HCC)     Past Medical History:   Diagnosis Date   • Atrial fibrillation (CMS/HCC)    • CHF (congestive heart failure) (CMS/HCC)    • Deep vein thrombosis (CMS/HCC)    • GERD (gastroesophageal reflux disease)    • Hypertension    • May-Thurner syndrome    • Pulmonary embolism (CMS/HCC)      Past Surgical History:   Procedure Laterality Date   • CHOLECYSTECTOMY     • COLON SURGERY      bowel leakage, some of colon removed   • INTERVENTIONAL RADIOLOGY PROCEDURE Bilateral 2020    Procedure: CAROTID CEREBRAL ANGIOGRAM BILATERAL;  Surgeon: Brant uDarte MD;  Location: [facility] INVASIVE LOCATION;  Service: Interventional Radiology;  Laterality: Bilateral;   • LAPAROSCOPIC TUBAL LIGATION     • LEG SURGERY      multiple stents to BLE   • TONSILLECTOMY          SWALLOW EVALUATION (last 72 hours)      SLP Adult Swallow Evaluation     Row Name 20 1100                Rehab Evaluation    Document Type  evaluation;therapy  note (daily note)  -RD       Subjective Information  complains of;fatigue;weakness  -RD       Patient Observations  alert;lethargic;cooperative;agree to therapy  -RD       Patient/Family Observations  RN present for MBS. No family present  -RD       Patient Effort  adequate  -RD          General Information    Patient Profile Reviewed  yes  -RD       Pertinent History Of Current Problem  See initial evaluation. R MCA CVA, s/p TPA unsuccessful. MBS to see if safe for any PO.   -RD       Current Method of Nutrition  NPO  -RD       Precautions/Limitations, Vision  WFL;for purposes of eval  -RD       Precautions/Limitations, Hearing  WFL;for purposes of eval  -RD       Prior Level of Function-Communication  other (see comments) baseline unknown  -RD       Prior Level of Function-Swallowing  no diet consistency restrictions  -RD       Plans/Goals Discussed with  patient;agreed upon  -RD       Barriers to Rehab  none identified  -RD       Patient's Goals for Discharge  return to PO diet;eat/drink without coughing/choking  -RD       Family Goals for Discharge  --          Pain Assessment    Additional Documentation  Pain Scale: FACES Pre/Post-Treatment (Group)  -RD          Pain Scale: FACES Pre/Post-Treatment    Pain: FACES Scale, Pretreatment  4-->hurts little more  -RD       Pain: FACES Scale, Post-Treatment  4-->hurts little more  -RD       MBS/VFSS    Utensils Used  spoon;cup  -RD       Consistencies Trialed  thin liquids;nectar/syrup-thick liquids;pudding thick  -RD          MBS/VFSS Interpretation    Oral Prep Phase  impaired oral phase of swallowing  -RD       Oral Transit Phase  impaired  -RD       Oral Residue  impaired  -RD          Oral Preparatory Phase    Oral Preparatory Phase  reduced lip closure around utensil;anterior loss;prolonged manipulation  -RD       Reduced Lip Closure Around Utensil  all consistencies tested;secondary to reduced labial seal  -RD       Anterior Loss  thin liquids;nectar-thick  liquids;pudding/puree;secondary to reduced labial seal  -RD       Prolonged Manipulation  all consistencies tested;secondary to reduced lingual strength;secondary to reduced lingual range of motion  -RD          Oral Transit Phase    Impaired Oral Transit Phase  increased A-P transit time;premature spillage of liquids into pharynx;other (see comments) significantly incr'd transit time  -RD       Increased A-P Transit Time  all consistencies tested;secondary to reduced lingual control  -RD       Premature Spillage of Liquids into Pharynx  thin liquids;nectar-thick liquids;secondary to reduced lingual control;discoordination of lingual movement  -RD          Oral Residue    Impaired Oral Residue  diffuse residue throughout oral cavity  -RD       Diffuse Residue throughout Oral Cavity  all consistencies tested;secondary to reduced lingual strength;secondary to reduced lingual range of motion  -RD       Response to Oral Residue  unable to clear residue;other (see comments) spilled to pharynx  -RD       Oral Residue, Comment  Oral residue removed via suction and wet washcloth by RN at the end of eval given pt continued coughing/choking after exam  -RD          Initiation of Pharyngeal Swallow    Initiation of Pharyngeal Swallow  bolus in pyriform sinuses  -RD       Pharyngeal Phase  impaired pharyngeal phase of swallowing  -RD       Penetration Before the Swallow  thin liquids;secondary to reduced back of tongue control;secondary to delayed swallow initiation or mistiming  -RD       Penetration During the Swallow  thin liquids;nectar-thick liquids;pudding/puree;secondary to delayed swallow initiation or mistiming;secondary to reduced laryngeal elevation;secondary to reduced vestibular closure  -RD       Aspiration During the Swallow  thin liquids;secondary to delayed swallow initiation or mistiming;secondary to reduced laryngeal elevation;secondary to reduced vestibular closure  -RD       Aspiration After the Swallow   thin liquids;nectar-thick liquids;pudding/puree;secondary to residue;in valleculae;in pyriform sinuses;in laryngeal vestibule  -RD       Response to Penetration  no response  -RD       Response to Aspiration  inconsistent response;weak cough/throat clear;could not clear subglottic material;no response, silent aspiration  -RD       Rosenbek's Scale  thin:;7--->level 7;nectar:;pudding/puree:;8--->level 8  -RD       Pharyngeal Residue  all consistencies tested;base of tongue;valleculae;pyriform sinuses;posterior pharyngeal wall;laryngeal vestibule;diffuse within pharynx;secondary to reduced base of tongue retraction;secondary to reduced posterior pharyngeal wall stripping;secondary to reduced laryngeal elevation;secondary to reduced hyolaryngeal excursion  -RD       Response to Residue  unable to clear residue;cleared residue with spontaneous subsequent swallow;cleared residue with cued swallow  -RD       Attempted Compensatory Maneuvers  bolus size;bolus presentation style;additional subsequent swallow;throat clear after swallow  -RD       Response to Attempted Compensatory Maneuvers  did not prevent aspiration;did not reduce residue  -RD       Pharyngeal Phase, Comment  Severe pharyngeal dysphagia. Penetration before/during w/ aspiration during the swallow w/ thin liquids 2' pre-spill + delay and reduced vestibular closure. Pt sensed large amounts of aspiration w/ weak coughing, but could not clear subglottic material. Penetration during w/ thins/nectar/pudding consistencies w/ aspiration after the swallow from vestibular residue and diffuse pharyngeal residue spilling into airway. Suspect continued aspiration after exam as pt noted w/ wet vocal quality and coughing requiring suctioning via RN. Moderate-severe diffuse pharyngeal residue (> w/ pudding) 2' diffuse pharyngeal weakness. Safest NPO w/ temporary alternate nutrition per MD discretion.   -RD          Esophageal Phase    Esophageal Phase  other (see comments)   -RD       Esophageal Phase, Comment  Pt noted w/ area of tissue in upper esophagus consistent w/ suspected esophageal web. Did not immediately appear to impact function.   -RD          Clinical Impression    SLP Swallowing Diagnosis  mod-severe;oral dysfunction;severe;pharyngeal dysfunction  -RD       Functional Impact  risk of aspiration/pneumonia;risk of malnutrition;risk of dehydration  -RD       Rehab Potential/Prognosis, Swallowing  good, to achieve stated therapy goals  -RD       Swallow Criteria for Skilled Therapeutic Interventions Met  demonstrates skilled criteria  -RD          Recommendations    Therapy Frequency (Swallow)  5 days per week  -RD       Predicted Duration Therapy Intervention (Days)  until discharge  -RD       SLP Diet Recommendation  NPO;temporary alternate methods of nutrition/hydration;other (see comments) per MD discretion  -RD       Recommended Diagnostics  --       Recommended Precautions and Strategies  other (see comments) Oral care BID/PRN  -RD       SLP Rec. for Method of Medication Administration  meds via alternate route  -RD       Monitor for Signs of Aspiration  notify SLP if any concerns  -RD       Anticipated Dischage Disposition (SLP)  inpatient rehabilitation facility;anticipate therapy at next level of care  -RD         User Key  (r) = Recorded By, (t) = Taken By, (c) = Cosigned By    Initials Name Effective Dates    AC Cynthia Campbell, MS CCC-SLP 07/27/17 -     RD Jade Whittaker, MS CCC-SLP 04/03/18 -           EDUCATION  The patient has been educated in the following areas:   Dysphagia (Swallowing Impairment) Oral Care/Hydration NPO rationale.    SLP Recommendation and Plan  SLP Swallowing Diagnosis: mod-severe, oral dysfunction, severe, pharyngeal dysfunction  SLP Diet Recommendation: NPO, temporary alternate methods of nutrition/hydration, other (see comments)(per MD discretion)  Recommended Precautions and Strategies: other (see comments)(Oral care BID/PRN)  SLP  Rec. for Method of Medication Administration: meds via alternate route     Monitor for Signs of Aspiration: notify SLP if any concerns     Swallow Criteria for Skilled Therapeutic Interventions Met: demonstrates skilled criteria  Anticipated Dischage Disposition (SLP): inpatient rehabilitation facility, anticipate therapy at next level of care  Rehab Potential/Prognosis, Swallowing: good, to achieve stated therapy goals  Therapy Frequency (Swallow): 5 days per week  Predicted Duration Therapy Intervention (Days): until discharge       Plan of Care Reviewed With: patient    SLP GOALS     Row Name 06/02/20 1100 06/01/20 1015          Oral Nutrition/Hydration Goal 1 (SLP)    Oral Nutrition/Hydration Goal 1, SLP  LTG: Pt will return to regular diet & thin liquids w/ 100% accuracy w/o cues  -RD  --     Time Frame (Oral Nutrition/Hydration Goal 1, SLP)  by discharge  -RD  --        Oral Nutrition/Hydration Goal 2 (SLP)    Oral Nutrition/Hydration Goal 2, SLP  Pt will tolerate therapeutic trials of thin H2O w/ no overt s/s of aspiration w/ 60% accuracy w/o cues to indicate readiness for repeat instrumental evaluation  -RD  --     Time Frame (Oral Nutrition/Hydration Goal 2, SLP)  short term goal (STG);by discharge  -RD  --        Labial Strengthening Goal 1 (SLP)    Activity (Labial Strengthening Goal 1, SLP)  increase labial tone  -RD  --     Increase Labial Tone  labial resistance exercises  -RD  --     [place]/Accuracy (Labial Strengthening Goal 1, SLP)  with minimal cues (75-90% accuracy)  -RD  --     Time Frame (Labial Strengthening Goal 1, SLP)  short term goal (STG);by discharge  -RD  --        Lingual Strengthening Goal 1 (SLP)    Activity (Lingual Strengthening Goal 1, SLP)  increase lingual tone/sensation/control/coordination/movement;increase tongue back strength  -RD  --     Increase Lingual Tone/Sensation/Control/Coordination/Movement  lingual movement exercises  -RD  --     Increase Tongue Back Strength   lingual resistance exercises  -RD  --     [place]/Accuracy (Lingual Strengthening Goal 1, SLP)  with minimal cues (75-90% accuracy)  -RD  --     Time Frame (Lingual Strengthening Goal 1, SLP)  short term goal (STG);by discharge  -RD  --        Pharyngeal Strengthening Exercise Goal 1 (SLP)    Activity (Pharyngeal Strengthening Goal 1, SLP)  increase timing;increase superior movement of the hyolaryngeal complex;increase anterior movement of the hyolaryngeal complex;increase closure at entrance to airway/closure of airway at glottis;increase squeeze/positive pressure generation;increase tongue base retraction  -RD  --     Increase Timing  prepping - 3 second prep or suck swallow or 3-step swallow  -RD  --     Increase Superior Movement of the Hyolaryngeal Complex  super-supraglottic swallow  -RD  --     Increase Anterior Movement of the Hyolaryngeal Complex  chin tuck against resistance (CTAR)  -RD  --     Increase Closure at Entrance to Airway/Closure of Airway at Glottis  super-supraglottic swallow  -RD  --     Increase Squeeze/Positive Pressure Generation  effortful pitch glide (falsetto + pharyngeal squeeze)  -RD  --     Increase Tongue Base Retraction  hard effortful swallow  -RD  --     [place]/Accuracy (Pharyngeal Strengthening Goal 1, SLP)  with minimal cues (75-90% accuracy)  -RD  --     Time Frame (Pharyngeal Strengthening Goal 1, SLP)  short term goal (STG);by discharge  -RD  --        Articulation Goal 1 (SLP)    Improve Articulation Goal 1 (SLP)  by over-articulating at phrase level;80%;with minimal cues (75-90%)  -RD  by over-articulating at phrase level;80%;with minimal cues (75-90%)  -AC     Time Frame (Articulation Goal 1, SLP)  short term goal (STG)  -RD  short term goal (STG)  -AC     Progress (Articulation Goal 1, SLP)  40%;with minimal cues (75-90%)  -RD  --     Progress/Outcomes (Articulation Goal 1, SLP)  continuing progress toward goal  -RD  --        Attention Goal 1 (SLP)    Improve  Attention by Goal 1 (SLP)  looking at speaker;80%;with minimal cues (75-90%)  -RD  --     Time Frame (Attention Goal 1, SLP)  short term goal (STG);by discharge  -RD  --     Progress (Attention Goal 1, SLP)  30%;with moderate cues (50-74%)  -RD  --     Progress/Outcomes (Attention Goal 1, SLP)  continuing progress toward goal  -RD  --        Additional Goal 1 (SLP)    Additional Goal 1, SLP  LTG: Pt will improve cognitive-communication skills in order to participate in care in hospital setting for 100% of opportunities w/o cues.  -RD  LTG: Pt will improve cognitive-communication skills in order to participate in care in hospital setting for 100% of opportunities w/o cues.  -AC     Time Frame (Additional Goal 1, SLP)  by discharge  -RD  by discharge  -AC     Progress/Outcomes (Additional Goal 1, SLP)  continuing progress toward goal  -RD  --       User Key  (r) = Recorded By, (t) = Taken By, (c) = Cosigned By    Initials Name Provider Type    Cynthia Chung MS CCC-SLP Speech and Language Pathologist    Jade Arrieta MS CCC-SLP Speech and Language Pathologist             Time Calculation:   Time Calculation- SLP     Row Name 20 1426             Time Calculation- SLP    SLP Start Time  1100  -RD      SLP Received On  20  -RD        User Key  (r) = Recorded By, (t) = Taken By, (c) = Cosigned By    Initials Name Provider Type    Jade Arrieta MS CCC-SLP Speech and Language Pathologist          Therapy Charges for Today     Code Description Service Date Service Provider Modifiers Qty    26095010965 HC ST TREATMENT SPEECH 2 2020 Jade Whittaker MS CCC-SLP GN 1    [id number] HC ST MOTION FLUORO EVAL SWALLOW 4 2020 Jade Whittaker MS CCC-SLP GN 1              Acute Care - Speech Language Pathology Treatment Note   Leti     Patient Name: Sidra Lane  : 1955  MRN: [id number]  Today's Date: 2020         Admit Date: 2020    Visit Dx:      ICD-10-CM  ICD-9-CM   1. Acute ischemic stroke (CMS/HCC) I63.9 434.91   2. Oropharyngeal dysphagia R13.12 787.22   3. Dysarthria R47.1 784.51   4. Cognitive communication deficit R41.841 799.52     Patient Active Problem List   Diagnosis   • Shortness of breath   • Atrial fibrillation (CMS/HCC)   • Essential hypertension   • Dizziness   • Deep vein thrombosis (DVT) of proximal lower extremity (CMS/HCC)   • Coronary artery disease involving native coronary artery of native heart without angina pectoris   • Fatigue   • Chest pain   • Longstanding persistent atrial fibrillation   • Cerebrovascular accident (CVA) due to embolism of right middle cerebral artery (CMS/HCC)   • Acute ischemic stroke (CMS/HCC)        Therapy Treatment  Rehabilitation Treatment Summary     Row Name 06/02/20 1100             Treatment Time/Intention    Discipline  speech language pathologist  -RD      Mode of Treatment  speech-language pathology;individual therapy  -RD      Care Plan Review  evaluation/treatment results reviewed;care plan/treatment goals reviewed;risks/benefits reviewed;current/potential barriers reviewed;patient/other agree to care plan  -RD      Therapy Frequency (SLP SLC)  5 days per week  -RD      Recorded by [RD] Jade Whittaker MS CCC-SLP 06/02/20 1309      Row Name                Wound 05/31/20 1930 Right groin Puncture    Wound - Properties Group Date first assessed: 05/31/20 [MB] Time first assessed: 1930 [MB] Present on Hospital Admission: N [MB] Side: Right [MB] Location: groin [MB] Primary Wound Type: Puncture [MB] Recorded by:  [MB] Toña Maier RN 05/31/20 2037    Row Name 06/02/20 1100             Outcome Summary/Treatment Plan (SLP)    Daily Summary of Progress (SLP)  progress toward functional goals as expected  -RD      Plan for Continued Treatment (SLP)  Saw for cognitive-communication treatment. Addressed dysarthria w/ compensations and added goal for attention. Incr'd lethargy today. Continue treatment,  initiate dysphagia treatment.   -RD      Recorded by [RD] Jade Whittaker, MS CCC-SLP 06/02/20 6730        User Key  (r) = Recorded By, (t) = Taken By, (c) = Cosigned By    Initials Name Effective Dates Discipline    RD Jade Whittaker, MS CCC-SLP 04/03/18 -  SLP    Toña Handy, RN 06/14/19 -  Nurse          EDUCATION  The patient has been educated in the following areas:   Cognitive Impairment Communication Impairment.    SLP Recommendation and Plan  Daily Summary of Progress (SLP): progress toward functional goals as expected     Plan for Continued Treatment (SLP): Saw for cognitive-communication treatment. Addressed dysarthria w/ compensations and added goal for attention. Incr'd lethargy today. Continue treatment, initiate dysphagia treatment.   Anticipated Dischage Disposition (SLP): inpatient rehabilitation facility, anticipate therapy at next level of care             SLP GOALS     Row Name 06/02/20 1100 06/01/20 1015          Oral Nutrition/Hydration Goal 1 (SLP)    Oral Nutrition/Hydration Goal 1, SLP  LTG: Pt will return to regular diet & thin liquids w/ 100% accuracy w/o cues  -RD  --     Time Frame (Oral Nutrition/Hydration Goal 1, SLP)  by discharge  -RD  --        Oral Nutrition/Hydration Goal 2 (SLP)    Oral Nutrition/Hydration Goal 2, SLP  Pt will tolerate therapeutic trials of thin H2O w/ no overt s/s of aspiration w/ 60% accuracy w/o cues to indicate readiness for repeat instrumental evaluation  -RD  --     Time Frame (Oral Nutrition/Hydration Goal 2, SLP)  short term goal (STG);by discharge  -RD  --        Labial Strengthening Goal 1 (SLP)    Activity (Labial Strengthening Goal 1, SLP)  increase labial tone  -RD  --     Increase Labial Tone  labial resistance exercises  -RD  --     [place]/Accuracy (Labial Strengthening Goal 1, SLP)  with minimal cues (75-90% accuracy)  -RD  --     Time Frame (Labial Strengthening Goal 1, SLP)  short term goal (STG);by discharge  -RD  --         Lingual Strengthening Goal 1 (SLP)    Activity (Lingual Strengthening Goal 1, SLP)  increase lingual tone/sensation/control/coordination/movement;increase tongue back strength  -RD  --     Increase Lingual Tone/Sensation/Control/Coordination/Movement  lingual movement exercises  -RD  --     Increase Tongue Back Strength  lingual resistance exercises  -RD  --     [place]/Accuracy (Lingual Strengthening Goal 1, SLP)  with minimal cues (75-90% accuracy)  -RD  --     Time Frame (Lingual Strengthening Goal 1, SLP)  short term goal (STG);by discharge  -RD  --        Pharyngeal Strengthening Exercise Goal 1 (SLP)    Activity (Pharyngeal Strengthening Goal 1, SLP)  increase timing;increase superior movement of the hyolaryngeal complex;increase anterior movement of the hyolaryngeal complex;increase closure at entrance to airway/closure of airway at glottis;increase squeeze/positive pressure generation;increase tongue base retraction  -RD  --     Increase Timing  prepping - 3 second prep or suck swallow or 3-step swallow  -RD  --     Increase Superior Movement of the Hyolaryngeal Complex  super-supraglottic swallow  -RD  --     Increase Anterior Movement of the Hyolaryngeal Complex  chin tuck against resistance (CTAR)  -RD  --     Increase Closure at Entrance to Airway/Closure of Airway at Glottis  super-supraglottic swallow  -RD  --     Increase Squeeze/Positive Pressure Generation  effortful pitch glide (falsetto + pharyngeal squeeze)  -RD  --     Increase Tongue Base Retraction  hard effortful swallow  -RD  --     [place]/Accuracy (Pharyngeal Strengthening Goal 1, SLP)  with minimal cues (75-90% accuracy)  -RD  --     Time Frame (Pharyngeal Strengthening Goal 1, SLP)  short term goal (STG);by discharge  -RD  --        Articulation Goal 1 (SLP)    Improve Articulation Goal 1 (SLP)  by over-articulating at phrase level;80%;with minimal cues (75-90%)  -RD  by over-articulating at phrase level;80%;with minimal cues  (75-90%)  -AC     Time Frame (Articulation Goal 1, SLP)  short term goal (STG)  -RD  short term goal (STG)  -AC     Progress (Articulation Goal 1, SLP)  40%;with minimal cues (75-90%)  -RD  --     Progress/Outcomes (Articulation Goal 1, SLP)  continuing progress toward goal  -RD  --        Attention Goal 1 (SLP)    Improve Attention by Goal 1 (SLP)  looking at speaker;80%;with minimal cues (75-90%)  -RD  --     Time Frame (Attention Goal 1, SLP)  short term goal (STG);by discharge  -RD  --     Progress (Attention Goal 1, SLP)  30%;with moderate cues (50-74%)  -RD  --     Progress/Outcomes (Attention Goal 1, SLP)  continuing progress toward goal  -RD  --        Additional Goal 1 (SLP)    Additional Goal 1, SLP  LTG: Pt will improve cognitive-communication skills in order to participate in care in hospital setting for 100% of opportunities w/o cues.  -RD  LTG: Pt will improve cognitive-communication skills in order to participate in care in hospital setting for 100% of opportunities w/o cues.  -AC     Time Frame (Additional Goal 1, SLP)  by discharge  -RD  by discharge  -AC     Progress/Outcomes (Additional Goal 1, SLP)  continuing progress toward goal  -RD  --       User Key  (r) = Recorded By, (t) = Taken By, (c) = Cosigned By    Initials Name Provider Type    Cynthia Chung MS CCC-SLP Speech and Language Pathologist    Jade Arrieta MS CCC-SLP Speech and Language Pathologist              Time Calculation:     Time Calculation- SLP     Row Name 06/02/20 1426             Time Calculation- SLP    SLP Start Time  1100  -RD      SLP Received On  06/02/20  -RD        User Key  (r) = Recorded By, (t) = Taken By, (c) = Cosigned By    Initials Name Provider Type    Jade Arrieta MS CCC-SLP Speech and Language Pathologist          Therapy Charges for Today     Code Description Service Date Service Provider Modifiers Qty    44428774520  ST TREATMENT SPEECH 2 6/2/2020 Jade Whittaker MS CCC-JALEN GN 1     [id number] HC ST MOTION FLUORO EVAL SWALLOW 4 6/2/2020 Jade Whittaker, MS CCC-SLP GN 1                     Jade Whittaker, MS MALLORY-SLP  6/2/2020      Jade Whittaker, MS MALLORY-JALEN  6/2/2020

## 2020-06-02 NOTE — THERAPY EVALUATION
Patient Name: Sidra Lane  : 1955    MRN: [id number]                              Today's Date: 2020       Admit Date: 2020    Visit Dx:     ICD-10-CM ICD-9-CM   1. Acute ischemic stroke (CMS/HCC) I63.9 434.91   2. Oropharyngeal dysphagia R13.12 787.22   3. Dysarthria R47.1 784.51   4. Cognitive communication deficit R41.841 799.52     Patient Active Problem List   Diagnosis   • Shortness of breath   • Atrial fibrillation (CMS/HCC)   • Essential hypertension   • Dizziness   • Deep vein thrombosis (DVT) of proximal lower extremity (CMS/HCC)   • Coronary artery disease involving native coronary artery of native heart without angina pectoris   • Fatigue   • Chest pain   • Longstanding persistent atrial fibrillation   • Cerebrovascular accident (CVA) due to embolism of right middle cerebral artery (CMS/HCC)   • Acute ischemic stroke (CMS/HCC)     Past Medical History:   Diagnosis Date   • Atrial fibrillation (CMS/HCC)    • CHF (congestive heart failure) (CMS/HCC)    • Deep vein thrombosis (CMS/HCC)    • GERD (gastroesophageal reflux disease)    • Hypertension    • May-Thurner syndrome    • Pulmonary embolism (CMS/HCC)      Past Surgical History:   Procedure Laterality Date   • CHOLECYSTECTOMY     • COLON SURGERY      bowel leakage, some of colon removed   • INTERVENTIONAL RADIOLOGY PROCEDURE Bilateral 2020    Procedure: CAROTID CEREBRAL ANGIOGRAM BILATERAL;  Surgeon: Brant Duarte MD;  Location: [facility] INVASIVE LOCATION;  Service: Interventional Radiology;  Laterality: Bilateral;   • LAPAROSCOPIC TUBAL LIGATION     • LEG SURGERY      multiple stents to BLE   • TONSILLECTOMY       General Information     Row Name 20 1535          PT Evaluation Time/Intention    Document Type  evaluation  -KG     Mode of Treatment  physical therapy  -KG     Row Name 20 1535          General Information    Patient Profile Reviewed?  yes  -KG     Prior Level of Function  independent:;all  household mobility;gait;transfer;ADL's;dressing;bathing  -KG     Existing Precautions/Restrictions  fall;other (see comments) L UE weakness/inattention; slurred speech  -KG     Barriers to Rehab  none identified  -KG     Row Name 06/02/20 1535          Relationship/Environment    Lives With  alone  -KG     Row Name 06/02/20 1535          Resource/Environmental Concerns    Current Living Arrangements  home/apartment/condo  -KG     Row Name 06/02/20 1535          Home Main Entrance    Number of Stairs, Main Entrance  two  -KG     Stair Railings, Main Entrance  none  -KG     Row Name 06/02/20 1535          Stairs Within Home, Primary    Number of Stairs, Within Home, Primary  none  -KG     Row Name 06/02/20 1535          Cognitive Assessment/Intervention- PT/OT    Orientation Status (Cognition)  oriented x 3  -KG     Cognitive Assessment/Intervention Comment  pt easily distracted; required frequent redirecting  -KG     Row Name 06/02/20 1535          Safety Issues, Functional Mobility    Safety Issues Affecting Function (Mobility)  awareness of need for assistance;insight into deficits/self awareness;safety precaution awareness;safety precautions follow-through/compliance  -KG     Impairments Affecting Function (Mobility)  balance;coordination;endurance/activity tolerance;postural/trunk control;range of motion (ROM);shortness of breath;strength  -KG       User Key  (r) = Recorded By, (t) = Taken By, (c) = Cosigned By    Initials Name Provider Type    KG Taylor Jeff, PT Physical Therapist        Mobility     Row Name 06/02/20 1538          Bed Mobility Assessment/Treatment    Bed Mobility Assessment/Treatment  supine-sit  -KG     Supine-Sit [place] (Bed Mobility)  moderate assist (50% patient effort);2 person assist;verbal cues  -KG     Assistive Device (Bed Mobility)  draw sheet;head of bed elevated  -KG     Comment (Bed Mobility)  VC's for sequencing. Pt required increased assistance at trunk and BLEs.  Pt unable to assist with L UE.   -KG     Row Name 06/02/20 1538          Transfer Assessment/Treatment    Comment (Transfers)  STS from EOB with PT/PTS in front on either side to block knees and provide B UE support. SPT from bed to chair with B UE support.   -KG     Row Name 06/02/20 1538          Bed-Chair Transfer    Bed-Chair [place] (Transfers)  moderate assist (50% patient effort);2 person assist;verbal cues  -KG     Assistive Device (Bed-Chair Transfers)  other (see comments) B UE support  -KG     Row Name 06/02/20 1538          Sit-Stand Transfer    Sit-Stand [place] (Transfers)  minimum assist (75% patient effort);2 person assist;verbal cues  -KG     Assistive Device (Sit-Stand Transfers)  other (see comments) B UE support  -KG     Row Name 06/02/20 1538          Gait/Stairs Assessment/Training    Gait/Stairs Assessment/Training  gait/ambulation independence  -KG     [place] Level (Gait)  moderate assist (50% patient effort);2 person assist;verbal cues  -KG     Assistive Device (Gait)  other (see comments) B UE support  -KG     Distance in Feet (Gait)  5  -KG     Pattern (Gait)  step-through  -KG     Deviations/Abnormal Patterns (Gait)  base of support, narrow;patti decreased;stride length decreased  -KG     Bilateral Gait Deviations  forward flexed posture;heel strike decreased  -KG     Left Sided Gait Deviations  weight shift ability decreased  -KG     Comment (Gait/Stairs)  Pt able to take steps forward and backward at chair. VC's for upright posture and increased step length. Pt slightly unsteady; required B UE support for increased stability. Limited by weakness.   -KG       User Key  (r) = Recorded By, (t) = Taken By, (c) = Cosigned By    Initials Name Provider Type    KG Taylor Jeff, PT Physical Therapist        Obj/Interventions     Row Name 06/02/20 1540          General ROM    GENERAL ROM COMMENTS  BLE WFL   -KG     Row Name 06/02/20 1540          MMT (Manual Muscle  Testing)    General MMT Comments  BLE grossly 3+/5; formal assessment difficult   -KG     Row Name 06/02/20 1540          Static Sitting Balance    Level of [place] (Unsupported Sitting, Static Balance)  contact guard assist  -KG     Sitting Position (Unsupported Sitting, Static Balance)  sitting in chair  -KG     Row Name 06/02/20 1540          Static Standing Balance    Level of [place] (Supported Standing, Static Balance)  minimal assist, 75% patient effort  -KG     Assistive Device Utilized (Supported Standing, Static Balance)  other (see comments) UE support  -KG     Row Name 06/02/20 1540          Dynamic Standing Balance    Level of [place], Reaches Outside Midline (Standing, Dynamic Balance)  minimal assist, 75% patient effort;2 person assist  -KG     Assistive Device Utilized (Supported Standing, Dynamic Balance)  other (see comments) B UE support  -KG     Row Name 06/02/20 1540          Sensory Assessment/Intervention    Sensory General Assessment  no sensation deficits identified  -KG       User Key  (r) = Recorded By, (t) = Taken By, (c) = Cosigned By    Initials Name Provider Type    KG Taylor Jeff N, PT Physical Therapist        Goals/Plan     Row Name 06/02/20 1543          Bed Mobility Goal 1 (PT)    Activity/Assistive Device (Bed Mobility Goal 1, PT)  sit to supine;supine to sit  -KG     [place] Level/Cues Needed (Bed Mobility Goal 1, PT)  moderate assist (50-74% patient effort)  -KG     Time Frame (Bed Mobility Goal 1, PT)  2 weeks  -KG     Progress/Outcomes (Bed Mobility Goal 1, PT)  goal ongoing  -KG     Row Name 06/02/20 1543          Transfer Goal 1 (PT)    Activity/Assistive Device (Transfer Goal 1, PT)  sit-to-stand/stand-to-sit;bed-to-chair/chair-to-bed  -KG     [place] Level/Cues Needed (Transfer Goal 1, PT)  contact guard assist  -KG     Time Frame (Transfer Goal 1, PT)  2 weeks  -KG     Progress/Outcome (Transfer Goal 1, PT)  goal ongoing  -KG     Row Name  06/02/20 1543          Gait Training Goal 1 (PT)    Activity/Assistive Device (Gait Training Goal 1, PT)  gait (walking locomotion)  -KG     [place] Level (Gait Training Goal 1, PT)  minimum assist (75% or more patient effort)  -KG     Distance (Gait Goal 1, PT)  150 feet  -KG     Time Frame (Gait Training Goal 1, PT)  2 weeks  -KG     Progress/Outcome (Gait Training Goal 1, PT)  goal ongoing  -KG       User Key  (r) = Recorded By, (t) = Taken By, (c) = Cosigned By    Initials Name Provider Type    KG Taylor Jeff N, PT Physical Therapist        Clinical Impression     Row Name 06/02/20 1541          Pain Assessment    Additional Documentation  Pain Scale: Numbers Pre/Post-Treatment (Group)  -KG     [facility] Name 06/02/20 1541          Pain Scale: Numbers Pre/Post-Treatment    Pain Scale: Numbers, Pretreatment  0/10 - no pain  -KG     Pain Scale: Numbers, Post-Treatment  0/10 - no pain  -KG     Row Name 06/02/20 1541          Physical Therapy Clinical Impression    Patient/Family Goals Statement (PT Clinical Impression)  return to PLOF  -KG     Criteria for Skilled Interventions Met (PT Clinical Impression)  yes;treatment indicated  -KG     Rehab Potential (PT Clinical Summary)  good, to achieve stated therapy goals  -KG     Row Name 06/02/20 1541          Vital Signs    Pre Systolic BP Rehab  124  -KG     Pre Treatment Diastolic BP  90  -KG     Post Systolic BP Rehab  142  -KG     Post Treatment Diastolic BP  98  -KG     Pretreatment Heart Rate (beats/min)  76  -KG     Posttreatment Heart Rate (beats/min)  83  -KG     Pre SpO2 (%)  95  -KG     O2 Delivery Pre Treatment  supplemental O2  -KG     Post SpO2 (%)  98  -KG     O2 Delivery Post Treatment  supplemental O2  -KG     Pre Patient Position  Supine  -KG     Intra Patient Position  Standing  -KG     Post Patient Position  Sitting  -KG     Row Name 06/02/20 1541          Positioning and Restraints    Pre-Treatment Position  in bed  -KG     Post Treatment  Position  chair  -KG     In Chair  reclined;call light within reach;encouraged to call for assist;exit alarm on;with family/caregiver;RUE elevated;LUE elevated;waffle cushion;on mechanical lift sling;legs elevated  -KG       User Key  (r) = Recorded By, (t) = Taken By, (c) = Cosigned By    Initials Name Provider Type    Taylor Rodríguez PT Physical Therapist        Outcome Measures     Row Name 06/02/20 1544          How much help from another person do you currently need...    Turning from your back to your side while in flat bed without using bedrails?  2  -KG     Moving from lying on back to sitting on the side of a flat bed without bedrails?  2  -KG     Moving to and from a bed to a chair (including a wheelchair)?  2  -KG     Standing up from a chair using your arms (e.g., wheelchair, bedside chair)?  3  -KG     Climbing 3-5 steps with a railing?  1  -KG     To walk in hospital room?  2  -KG     AM-PAC 6 Clicks Score (PT)  12  -KG     Row Name 06/02/20 1544          Modified Isaac Scale    Pre-Stroke Modified [place] Scale  0 - No Symptoms at all.  -KG     Modified [place] Scale  4 - Moderately severe disability.  Unable to walk without assistance, and unable to attend to own bodily needs without assistance.  -KG     Row Name 06/02/20 1544          Functional Assessment    Outcome Measure Options  AM-PAC 6 Clicks Basic Mobility (PT);Modified [place]  -KG       User Key  (r) = Recorded By, (t) = Taken By, (c) = Cosigned By    Initials Name Provider Type    Taylor Rodríguez PT Physical Therapist        Physical Therapy Education                 Title: PT OT SLP Therapies (In Progress)     Topic: Physical Therapy (In Progress)     Point: Mobility training (In Progress)     Description:   Instruct learner(s) on safety and technique for assisting patient out of bed, chair or wheelchair.  Instruct in the proper use of assistive devices, such as walker, crutches, cane or brace.              Patient  Friendly Description:   It's important to get you on your feet again, but we need to do so in a way that is safe for you. Falling has serious consequences, and your personal safety is the most important thing of all.        When it's time to get out of bed, one of us or a family member will sit next to you on the bed to give you support.     If your doctor or nurse tells you to use a walker, crutches, a cane, or a brace, be sure you use it every time you get out of bed, even if you think you don't need it.    Learning Progress Summary           Patient Acceptance, E, NR by KG at 6/2/2020 1315                   Point: Home exercise program (Not Started)     Description:   Instruct learner(s) on appropriate technique for monitoring, assisting and/or progressing patient with therapeutic exercises and activities.              Learner Progress:   Not documented in this visit.          Point: Body mechanics (In Progress)     Description:   Instruct learner(s) on proper positioning and spine alignment for patient and/or caregiver during mobility tasks and/or exercises.              Learning Progress Summary           Patient Acceptance, E, NR by KG at 6/2/2020 1315                   Point: Precautions (In Progress)     Description:   Instruct learner(s) on prescribed precautions during mobility and gait tasks              Learning Progress Summary           Patient Acceptance, E, NR by KG at 6/2/2020 1315                               User Key     Initials Effective Dates Name Provider Type Discipline    KG 05/22/20 -  Taylor Jeff, PT Physical Therapist PT              PT Recommendation and Plan  Planned Therapy Interventions (PT Eval): balance training, bed mobility training, gait training, strengthening, transfer training  Outcome Summary/Treatment Plan (PT)  Anticipated Discharge Disposition (PT): inpatient rehabilitation facility  Plan of Care Reviewed With: patient  Outcome Summary: PT initial evaluation  completed for pt s/p R CVA presenting with L UE weakness, impaired balance, and decreased functional mobility. Pt ambulated 5ft with modA x2 and B UE support. Pt's decreased independence warrants PT skilled care. Recommend D/C to IP rehab facility.     Time Calculation:   PT Charges     Row Name 06/02/20 1315             Time Calculation    Start Time  1315  -KG      PT Received On  06/02/20  -KG      PT Goal Re-Cert Due Date  06/12/20  -KG        User Key  (r) = Recorded By, (t) = Taken By, (c) = Cosigned By    Initials Name Provider Type    KG Taylor Jeff, PT Physical Therapist        Therapy Charges for Today     Code Description Service Date Service Provider Modifiers Qty    [id number] HC PT EVAL MOD COMPLEXITY 4 6/2/2020 Taylor Jeff, PT GP 1          PT G-Codes  Outcome Measure Options: AM-PAC 6 Clicks Basic Mobility (PT), Modified Isaac  AM-PAC 6 Clicks Score (PT): 12  AM-PAC 6 Clicks Score (OT): 11  Modified Isaac Scale: 4 - Moderately severe disability.  Unable to walk without assistance, and unable to attend to own bodily needs without assistance.    Angy Jeff PT  6/2/2020

## 2020-06-02 NOTE — PLAN OF CARE
Problem: Patient Care Overview  Goal: Plan of Care Review  Flowsheets  Taken 6/2/2020 1430  Plan of Care Reviewed With: patient;daughter  Taken 6/2/2020 1410  Outcome Summary: Tutu jewell: D.  Sit<>Stand: Renee to manage L UE.  Pt/family educated on self ROM exercise program for L UE.  Limited by strength, endurance, balance, cognition, medical.  Recommended D/C: IRF.  Will cont to observe and address ADL/IADL deficits as needed.

## 2020-06-02 NOTE — PROGRESS NOTES
"                  Clinical Nutrition     Nutrition Support Assessment  Reason for Visit: JEROD BYNUM    Patient Name: Sidra Lane  YOB: 1955  MRN: [id number]  Date of Encounter: 06/02/20 13:58  Admission date: 5/31/2020    Nutrition Assessment   Admission Problem List:    Cerebrovascular accident (CVA) due to embolism of right middle cerebral artery (CMS/HCC)    Atrial fibrillation (CMS/HCC)    Acute ischemic stroke (CMS/HCC)    Applicable PMH:  A-fib   CHF  GERD   DVT  PE  HTN    Applicable Nutrition-Related Information:  (5/31) NPO    Applicable medical tests/procedures since admission:  (6/2) MBS - with recommendation for NPO status.     Reported/Observed/Food/Nutrition Related History:     Failed MBS this morning.  RN plans to place and ND tube (small bore feeding tube). V.O. Per intensivist to start tube feeding per RD recommendations if patient does not pass swallow eval.     Anthropometrics   Height: Height: 154.9 cm (61\")  Weight: Weight: 110 kg (242 lb 8.1 oz) (06/02/20 0600)  BMI: BMI (Calculated): 45.8  BMI classification: Obese Class III extreme obesity: > or equal to 40kg/m2   IBW:  105#     Labs reviewed     Results from last 7 days   Lab Units 06/02/20  0430   SODIUM mmol/L 137   POTASSIUM mmol/L 4.4   CHLORIDE mmol/L 100   CO2 mmol/L 22.0   BUN mg/dL 12   CREATININE mg/dL 0.69   GLUCOSE mg/dL 142*   CALCIUM mg/dL 8.6   PHOSPHORUS mg/dL 3.8     Results from last 7 days   Lab Units 06/02/20  1223 06/02/20  0530 06/01/20  2320 06/01/20  1744 06/01/20  1159 06/01/20  0520   GLUCOSE mg/dL 117 143* 122 101 120 148*     Lab Results   Lab Value Date/Time    HGBA1C 5.80 (H) 06/01/2020 0637       Medications reviewed   Pertinent: Pepcid,   IVF: precedex 0.8mcg/kg/hr, D5W NS @ 75ml/hr     Intake/Ouptut 24 hrs (7:00AM - 6:59 AM)     Intake & Output (last day)       06/01 0701 - 06/02 0700 06/02 0701 - 06/03 0700    I.V. (mL/kg) 2192.7 (19.9) 1227 (11.2)    Total Intake(mL/kg) 2192.7 (19.9) 1227 " (11.2)    Urine (mL/kg/hr) 350 (0.1)     Stool      Total Output 350     Net +1842.7 +1227          Urine Unmeasured Occurrence 1 x 1 x        Needs Assessment   Height used:  154.9cm  Weight used: 105kg  IBW: 47.7kg     Estimated calorie needs: ~1500 kcal/day  Method:Kcals/KG 14= 1470  Method:HB 1627    Estimated protein needs: ~100 g pro/day  1.0 g/kg= 105g pro  2.0grm KG/IBW = 95    Current Nutrition Prescription     PO: NPO Diet (5/31)    Nutrition Diagnosis   Date:  6/2 Updated:   Problem Swallowing difficulty   Etiology Per SLP eval    Signs/Symptoms Per MBS (6/2) with recommendation for NPO status.    Status:     Nutrition Intervention   1.  Follow treatment progress, Care plan reviewed, Nutrition support order placed   Repleat with fiber @ goal rate 75ml/hr. TGV 1500ml. Free water @ 15ml/hr      At Target Goal Volume     % Est needs   Volume  1500ml*      Energy/kcals 1500kcals 100%   Protein 94g pro 94%   Fiber 22.8g         Fluid   W/Free water 1253ml  1553ml       *TGV calculated based on 20hrs delivery with anticipated interruption in EN infusion for routine patient care    2. Nutrition panel, Pre-alb and CRP weekly while on EN  3. Monitor electrolytes with started tube feeding and replace as indicated   4. Recommend D/C IVF once tube feeding at goal rate     Goal:   General: Nutrition support treatment  PO: Advace diet as medically feasible/appropriate  EN/PN: Initiate EN      Monitoring/Evaluation:   Per protocol, Pertinent labs, EN delivery/tolerance, Swallow function      Will Continue to follow per protocol      Elodia Beltran RD  Time Spent:

## 2020-06-02 NOTE — PLAN OF CARE
Problem: Patient Care Overview  Goal: Plan of Care Review  Outcome: Ongoing (interventions implemented as appropriate)  Flowsheets (Taken 6/2/2020 1058)  Plan of Care Reviewed With: patient  Note:   SLP MBS evaluation and treatment completed. Will address moderate-severe oral and severe pharyngeal dysphagia during treatment. Please see note for further details and recommendations.

## 2020-06-02 NOTE — PLAN OF CARE
Problem: Patient Care Overview  Goal: Plan of Care Review  Outcome: Ongoing (interventions implemented as appropriate)  Flowsheets (Taken 6/2/2020 1208)  Plan of Care Reviewed With: patient  Outcome Summary: PT initial evaluation completed for pt s/p R CVA presenting with L UE weakness, impaired balance, and decreased functional mobility. Pt ambulated 5ft with modA x2 and B UE support. Pt's decreased independence warrants PT skilled care. Recommend D/C to IP rehab facility.

## 2020-06-02 NOTE — PLAN OF CARE
Patient was very agitated and confused attempting to get out of bed removing Ivs and medical equimpment patient verbally redirected multiple times patient was climbing over the railing of bed provider notified precedex and soft restraints started for patient safety daughter notified vss will continue to monitor.

## 2020-06-02 NOTE — PLAN OF CARE
Problem: Patient Care Overview  Goal: Plan of Care Review  Flowsheets  Taken 6/2/2020 1530  Plan of Care Reviewed With: patient;daughter  Taken 6/2/2020 1553  Outcome Summary: Pt up in chair with daughter at bedside. Precedex stopped at 0930 prior to MBS (failed). RUE restraint currently off with pt not currently pulling at tubing. intermittently c/o HA. keofeed place @75 in right nare for feeding. Pt has strong  and pushes except in LUE. eyes deviate to right with minimal to no visual threat on lt. Remains in a controlled rate of Afib. up in chair, [facility]

## 2020-06-03 ENCOUNTER — APPOINTMENT (OUTPATIENT)
Dept: CT IMAGING | Facility: HOSPITAL | Age: 65
End: 2020-06-03

## 2020-06-03 LAB
ANION GAP SERPL CALCULATED.3IONS-SCNC: 17 MMOL/L (ref 5–15)
APTT PPP: 25.1 SECONDS (ref 50–95)
BASOPHILS # BLD AUTO: 0.06 10*3/MM3 (ref 0–0.2)
BASOPHILS NFR BLD AUTO: 0.5 % (ref 0–1.5)
BUN BLD-MCNC: 10 MG/DL (ref 8–23)
BUN/CREAT SERPL: 15.2 (ref 7–25)
CALCIUM SPEC-SCNC: 8.5 MG/DL (ref 8.6–10.5)
CHLORIDE SERPL-SCNC: 102 MMOL/L (ref 98–107)
CO2 SERPL-SCNC: 19 MMOL/L (ref 22–29)
CREAT BLD-MCNC: 0.66 MG/DL (ref 0.57–1)
DEPRECATED RDW RBC AUTO: 55.4 FL (ref 37–54)
EOSINOPHIL # BLD AUTO: 0.29 10*3/MM3 (ref 0–0.4)
EOSINOPHIL NFR BLD AUTO: 2.4 % (ref 0.3–6.2)
ERYTHROCYTE [DISTWIDTH] IN BLOOD BY AUTOMATED COUNT: 19.3 % (ref 12.3–15.4)
GFR SERPL CREATININE-BSD FRML MDRD: 89 ML/MIN/1.73
GLUCOSE BLD-MCNC: 134 MG/DL (ref 65–99)
GLUCOSE BLDC GLUCOMTR-MCNC: 107 MG/DL (ref 70–130)
GLUCOSE BLDC GLUCOMTR-MCNC: 111 MG/DL (ref 70–130)
GLUCOSE BLDC GLUCOMTR-MCNC: 114 MG/DL (ref 70–130)
GLUCOSE BLDC GLUCOMTR-MCNC: 131 MG/DL (ref 70–130)
GLUCOSE BLDC GLUCOMTR-MCNC: 133 MG/DL (ref 70–130)
HCT VFR BLD AUTO: 43.1 % (ref 34–46.6)
HGB BLD-MCNC: 11.9 G/DL (ref 12–15.9)
HYPOCHROMIA BLD QL: NORMAL
IMM GRANULOCYTES # BLD AUTO: 0.06 10*3/MM3 (ref 0–0.05)
IMM GRANULOCYTES NFR BLD AUTO: 0.5 % (ref 0–0.5)
INR PPP: 1.02 (ref 0.85–1.16)
LYMPHOCYTES # BLD AUTO: 1.9 10*3/MM3 (ref 0.7–3.1)
LYMPHOCYTES NFR BLD AUTO: 16 % (ref 19.6–45.3)
MAGNESIUM SERPL-MCNC: 2.3 MG/DL (ref 1.6–2.4)
MCH RBC QN AUTO: 22.5 PG (ref 26.6–33)
MCHC RBC AUTO-ENTMCNC: 27.6 G/DL (ref 31.5–35.7)
MCV RBC AUTO: 81.5 FL (ref 79–97)
MONOCYTES # BLD AUTO: 1.12 10*3/MM3 (ref 0.1–0.9)
MONOCYTES NFR BLD AUTO: 9.5 % (ref 5–12)
NEUTROPHILS # BLD AUTO: 8.42 10*3/MM3 (ref 1.7–7)
NEUTROPHILS NFR BLD AUTO: 71.1 % (ref 42.7–76)
NRBC BLD AUTO-RTO: 0 /100 WBC (ref 0–0.2)
PHOSPHATE SERPL-MCNC: 2.5 MG/DL (ref 2.5–4.5)
PLAT MORPH BLD: NORMAL
PLATELET # BLD AUTO: 263 10*3/MM3 (ref 140–450)
PMV BLD AUTO: 10.2 FL (ref 6–12)
POTASSIUM BLD-SCNC: 3.4 MMOL/L (ref 3.5–5.2)
PROTHROMBIN TIME: 13.1 SECONDS (ref 11.5–14)
RBC # BLD AUTO: 5.29 10*6/MM3 (ref 3.77–5.28)
SODIUM BLD-SCNC: 138 MMOL/L (ref 136–145)
SPHEROCYTES BLD QL SMEAR: NORMAL
TROPONIN T SERPL-MCNC: <0.01 NG/ML (ref 0–0.03)
UFH PPP CHRO-ACNC: 0.1 IU/ML (ref 0.3–0.7)
UFH PPP CHRO-ACNC: 0.1 IU/ML (ref 0.3–0.7)
WBC MORPH BLD: NORMAL
WBC NRBC COR # BLD: 11.85 10*3/MM3 (ref 3.4–10.8)

## 2020-06-03 PROCEDURE — 85730 THROMBOPLASTIN TIME PARTIAL: CPT | Performed by: PSYCHIATRY & NEUROLOGY

## 2020-06-03 PROCEDURE — 93005 ELECTROCARDIOGRAM TRACING: CPT | Performed by: NURSE PRACTITIONER

## 2020-06-03 PROCEDURE — 84100 ASSAY OF PHOSPHORUS: CPT | Performed by: INTERNAL MEDICINE

## 2020-06-03 PROCEDURE — 83735 ASSAY OF MAGNESIUM: CPT | Performed by: INTERNAL MEDICINE

## 2020-06-03 PROCEDURE — 97530 THERAPEUTIC ACTIVITIES: CPT

## 2020-06-03 PROCEDURE — 85007 BL SMEAR W/DIFF WBC COUNT: CPT | Performed by: PSYCHIATRY & NEUROLOGY

## 2020-06-03 PROCEDURE — 85025 COMPLETE CBC W/AUTO DIFF WBC: CPT | Performed by: PSYCHIATRY & NEUROLOGY

## 2020-06-03 PROCEDURE — 99232 SBSQ HOSP IP/OBS MODERATE 35: CPT | Performed by: INTERNAL MEDICINE

## 2020-06-03 PROCEDURE — 25010000002 HEPARIN (PORCINE) 25000-0.45 UT/250ML-% SOLUTION: Performed by: PSYCHIATRY & NEUROLOGY

## 2020-06-03 PROCEDURE — 70450 CT HEAD/BRAIN W/O DYE: CPT

## 2020-06-03 PROCEDURE — 84484 ASSAY OF TROPONIN QUANT: CPT | Performed by: NURSE PRACTITIONER

## 2020-06-03 PROCEDURE — 85610 PROTHROMBIN TIME: CPT | Performed by: PSYCHIATRY & NEUROLOGY

## 2020-06-03 PROCEDURE — 92526 ORAL FUNCTION THERAPY: CPT

## 2020-06-03 PROCEDURE — 93010 ELECTROCARDIOGRAM REPORT: CPT | Performed by: INTERNAL MEDICINE

## 2020-06-03 PROCEDURE — 85520 HEPARIN ASSAY: CPT

## 2020-06-03 PROCEDURE — 99233 SBSQ HOSP IP/OBS HIGH 50: CPT | Performed by: PSYCHIATRY & NEUROLOGY

## 2020-06-03 PROCEDURE — 85520 HEPARIN ASSAY: CPT | Performed by: PSYCHIATRY & NEUROLOGY

## 2020-06-03 PROCEDURE — 80048 BASIC METABOLIC PNL TOTAL CA: CPT | Performed by: INTERNAL MEDICINE

## 2020-06-03 PROCEDURE — 82962 GLUCOSE BLOOD TEST: CPT

## 2020-06-03 RX ORDER — QUETIAPINE FUMARATE 25 MG/1
50 TABLET, FILM COATED ORAL NIGHTLY
Status: DISCONTINUED | OUTPATIENT
Start: 2020-06-03 | End: 2020-06-04

## 2020-06-03 RX ORDER — CHOLESTYRAMINE LIGHT 4 G/5.7G
1 POWDER, FOR SUSPENSION ORAL DAILY
Status: DISCONTINUED | OUTPATIENT
Start: 2020-06-03 | End: 2020-06-18 | Stop reason: HOSPADM

## 2020-06-03 RX ORDER — DIPHENOXYLATE HYDROCHLORIDE AND ATROPINE SULFATE 2.5; .025 MG/1; MG/1
1 TABLET ORAL 2 TIMES DAILY
Status: DISCONTINUED | OUTPATIENT
Start: 2020-06-03 | End: 2020-06-18 | Stop reason: HOSPADM

## 2020-06-03 RX ORDER — BUTALBITAL, ACETAMINOPHEN AND CAFFEINE 50; 325; 40 MG/1; MG/1; MG/1
1 TABLET ORAL EVERY 4 HOURS PRN
Status: DISCONTINUED | OUTPATIENT
Start: 2020-06-03 | End: 2020-06-18 | Stop reason: HOSPADM

## 2020-06-03 RX ORDER — METOPROLOL TARTRATE 5 MG/5ML
5 INJECTION INTRAVENOUS EVERY 6 HOURS SCHEDULED
Status: DISCONTINUED | OUTPATIENT
Start: 2020-06-04 | End: 2020-06-04

## 2020-06-03 RX ORDER — HEPARIN SODIUM 10000 [USP'U]/100ML
15 INJECTION, SOLUTION INTRAVENOUS
Status: DISCONTINUED | OUTPATIENT
Start: 2020-06-03 | End: 2020-06-06

## 2020-06-03 RX ORDER — POTASSIUM CHLORIDE 750 MG/1
40 CAPSULE, EXTENDED RELEASE ORAL AS NEEDED
Status: DISCONTINUED | OUTPATIENT
Start: 2020-06-03 | End: 2020-06-18 | Stop reason: HOSPADM

## 2020-06-03 RX ORDER — POTASSIUM CHLORIDE 7.45 MG/ML
10 INJECTION INTRAVENOUS
Status: DISCONTINUED | OUTPATIENT
Start: 2020-06-03 | End: 2020-06-18 | Stop reason: HOSPADM

## 2020-06-03 RX ORDER — POTASSIUM CHLORIDE 1.5 G/1.77G
40 POWDER, FOR SOLUTION ORAL AS NEEDED
Status: DISCONTINUED | OUTPATIENT
Start: 2020-06-03 | End: 2020-06-18 | Stop reason: HOSPADM

## 2020-06-03 RX ORDER — NITROGLYCERIN 0.4 MG/1
0.4 TABLET SUBLINGUAL
Status: DISCONTINUED | OUTPATIENT
Start: 2020-06-03 | End: 2020-06-18 | Stop reason: HOSPADM

## 2020-06-03 RX ORDER — ASPIRIN 81 MG/1
81 TABLET, CHEWABLE ORAL DAILY
Status: DISCONTINUED | OUTPATIENT
Start: 2020-06-04 | End: 2020-06-18 | Stop reason: HOSPADM

## 2020-06-03 RX ADMIN — FAMOTIDINE 20 MG: 10 INJECTION INTRAVENOUS at 07:53

## 2020-06-03 RX ADMIN — SODIUM CHLORIDE, PRESERVATIVE FREE 10 ML: 5 INJECTION INTRAVENOUS at 20:46

## 2020-06-03 RX ADMIN — CHOLESTYRAMINE 4 G: 4 POWDER, FOR SUSPENSION ORAL at 14:15

## 2020-06-03 RX ADMIN — ATORVASTATIN CALCIUM 80 MG: 40 TABLET, FILM COATED ORAL at 20:45

## 2020-06-03 RX ADMIN — METOPROLOL TARTRATE 5 MG: 5 INJECTION INTRAVENOUS at 21:46

## 2020-06-03 RX ADMIN — METOPROLOL TARTRATE 2.5 MG: 5 INJECTION INTRAVENOUS at 12:17

## 2020-06-03 RX ADMIN — HEPARIN SODIUM 9 UNITS/KG/HR: 10000 INJECTION, SOLUTION INTRAVENOUS at 14:16

## 2020-06-03 RX ADMIN — DEXMEDETOMIDINE HYDROCHLORIDE 0.6 MCG/KG/HR: 4 INJECTION, SOLUTION INTRAVENOUS at 04:36

## 2020-06-03 RX ADMIN — METOPROLOL TARTRATE 2.5 MG: 5 INJECTION INTRAVENOUS at 18:25

## 2020-06-03 RX ADMIN — SODIUM CHLORIDE, PRESERVATIVE FREE 10 ML: 5 INJECTION INTRAVENOUS at 07:53

## 2020-06-03 RX ADMIN — METOPROLOL TARTRATE 5 MG: 5 INJECTION INTRAVENOUS at 23:56

## 2020-06-03 RX ADMIN — METOPROLOL TARTRATE 2.5 MG: 5 INJECTION INTRAVENOUS at 06:39

## 2020-06-03 RX ADMIN — POTASSIUM CHLORIDE 40 MEQ: 1.5 POWDER, FOR SOLUTION ORAL at 09:12

## 2020-06-03 RX ADMIN — QUETIAPINE FUMARATE 50 MG: 25 TABLET ORAL at 20:46

## 2020-06-03 RX ADMIN — BUTALBITAL, ACETAMINOPHEN, AND CAFFEINE 1 TABLET: 50; 325; 40 TABLET ORAL at 12:17

## 2020-06-03 RX ADMIN — ASPIRIN 325 MG ORAL TABLET 325 MG: 325 PILL ORAL at 07:53

## 2020-06-03 RX ADMIN — DEXTROSE AND SODIUM CHLORIDE 75 ML/HR: 5; 900 INJECTION, SOLUTION INTRAVENOUS at 06:28

## 2020-06-03 RX ADMIN — ACETAMINOPHEN ORAL SOLUTION 649.6 MG: 650 SOLUTION ORAL at 02:04

## 2020-06-03 RX ADMIN — FAMOTIDINE 20 MG: 10 INJECTION INTRAVENOUS at 20:46

## 2020-06-03 RX ADMIN — DIPHENOXYLATE HYDROCHLORIDE AND ATROPINE SULFATE 1 TABLET: 2.5; .025 TABLET ORAL at 12:17

## 2020-06-03 NOTE — PROGRESS NOTES
"Adult Nutrition  Assessment/PES    Patient Name:  Sidra Lane  YOB: 1955  MRN: [id number]  Admit Date:  5/31/2020    Assessment Date:  6/3/2020    Comments:  Pt tolerating EN support , delivery sub-optimal as expected w/ initiation protocol. SLP evaluation noted . RD will monitor EN support and transition to oral when appropriate.    Reason for Assessment     Row Name 06/03/20 1625          Reason for Assessment    Reason For Assessment  per organizational policy;TF/PN;follow-up protocol MDR, EN support monitor , 30 mins     Diagnosis  neurologic conditions Pt adm w/ CVA while on Eliquis, unsuccessful thrombectomy w/ ia tPA Hx: HTN persisent PAF w/ scheduled ablation in June, CAD, DVT     Identified At Risk by Screening Criteria  tube feeding or parenteral nutrition         Nutrition/Diet History     Row Name 06/03/20 1629          Nutrition/Diet History    Typical Food/Fluid Intake  RN reports pt refused [facility], wants to be closer to home, also wants to eat but failed SLP evaluation, plan for rehab near home. Appropriate for transfer to floor.      Factors Affecting Nutritional Intake  chewing difficulties/inability to chew food;difficulty/impaired swallowing         Anthropometrics     Row Name 06/03/20 1630          Anthropometrics    Height  154.9 cm (61\")     Current Weight Method  measured     Weight  109 kg (240 lb 15.4 oz)        Admit Weight    Admit Weight Method  stated     Admit Weight  104 kg (230 lb)        Ideal Body Weight (IBW)    Ideal Body Weight (IBW) (kg)  48.15     % Ideal Body Weight  227.02        Body Mass Index (BMI)    BMI (kg/m2)  45.62     BMI Assessment  BMI 40 or greater: obesity grade III         Labs/Tests/Procedures/Meds     Row Name 06/03/20 1631          Labs/Procedures/Meds    Lab Results Reviewed  reviewed, pertinent     Lab Results Comments  elev chol, LDL, trig        Diagnostic Tests/Procedures    Diagnostic Test/Procedure Reviewed  reviewed, pertinent     " "Diagnostic Test/Procedures Comments  SLP eval- NPO pt w/ s/sx of aspiration on ice chips and pureed item        Medications    Pertinent Medications Reviewed  reviewed, pertinent         Physical Findings     Row Name 06/03/20 1632          Physical Findings    Gastrointestinal  feeding tube     Tubes  nasogastric tube         Estimated/Assessed Needs     Row Name 06/03/20 1632          Height  154.9 cm(61\")          Additional Documentation  KCAL/KG (Group);Webster Page (Group);Fluid Requirements (Group);Protein Requirements (Group)          Estimated Kcal (Webster Page)   1627          KCAL/KG  14 Kcal/Kg (kcal)          Weight Used For Protein Calculations  47.7 kg (105 lb 2.6 oz)    Est Protein Requirement Amount (gms/kg)  2.0 gm protein    Estimated Protein Requirements (gms/day)  95.4          Estimated Fluid Requirements (mL/day)  1500    Estimated Fluid Requirement Method  RDA Method    RDA Method (mL)  1500        Nutrition Prescription Ordered     Row Name 06/03/20 1633          Nutrition Prescription PO    Current PO Diet  NPO        Nutrition Prescription EN    Enteral Route  NG     Product  Replete with Fiber (Promote with Fiber)     TF Delivery Method  Continuous     Continuous TF Goal Rate (mL/hr)  75 mL/hr     Continuous TF Goal Volume (mL)  1500 mL     Water flush (mL)   15 mL     Water Flush Frequency  Per hour         Evaluation of Received Nutrient/Fluid Intake     Row Name 06/03/20 1634          Calories Evaluation    Enteral Calories (kcal)  252     Total Calories (kcal)  252     % of Kcal Needs  17        Protein Evaluation    Enteral Protein (gm)  15     Total Protein (gm)  15     % of Protein Needs  16        Intake Assessment    Energy/Calorie Requirement Assessment  not meeting needs     Protein Requirement Assessment  not meeting needs     Fluid Requirement Assessment  not meeting needs     Tolerance  tolerating        Fluid Intake Evaluation    Enteral (Free Water) Fluid (mL)  213  "    Free Water Flush Fluid (mL)  195     Total Free Water Intake (mL)  408 mL        EN Evaluation    Number of Days EN Intake Evaluated  1 day     EN Average Volume Delivered (mL/day)  255 mL/day     % Goal Volume   17 %     TF Changes  Rate increased               Problem/Interventions:  Problem 1     Row Name 06/03/20 1638          Nutrition Diagnoses Problem 1    Problem 1  Inadequate Intake/Infusion     Inadequate Intake Type  Oral;Enteral     Macronutrient  Kcal;Protein;Fluid     Micronutrient  Vitamin;Magnesium     Etiology (related to)  Medical Diagnosis     Neurological  CVA;Dysphagia     Signs/Symptoms (evidenced by)  SLP/Swallow eval;NPO;EN Intake Delivery     Percent (%) of EN goal  17 %     Swallow eval status  Done     Type of SLP Evaluation  Bedside failed pt w/ s/sx aspiration               Intervention Goal     Row Name 06/03/20 1639          Intervention Goal    General  Nutrition support treatment;Meet nutritional needs for age/condition     TF/PN  Adjust TF/PN;Tolerate TF at goal         Nutrition Intervention     Row Name 06/03/20 1640          Nutrition Intervention    RD/Tech Action  Await begin PO;Follow Tx progress;Care plan reviewd         Nutrition Prescription     Row Name 06/03/20 1640          Nutrition Prescription EN    Enteral Prescription  Continue same protocol         Education/Evaluation     Row Name 06/03/20 1640          Monitor/Evaluation    Monitor  Per protocol;I&O;Pertinent labs;TF delivery/tolerance;Weight;Symptoms           Electronically signed by:  Katelynn Varela MS,RD,LD  06/03/20 16:40

## 2020-06-03 NOTE — PLAN OF CARE
Problem: Patient Care Overview  Goal: Plan of Care Review  Outcome: Ongoing (interventions implemented as appropriate)  Flowsheets (Taken 6/3/2020 8560)  Progress: improving  Plan of Care Reviewed With: patient; daughter  Outcome Summary: VSS. Transfer orders to the floor. Complains of loose stools and MD started scheduled meds for this issue. Tube feeding almost at goal. Continues to complain of having the keofeed in place. LUE with limited movement. Does continue to have left sided visual neglect. Daughter at the bedside. Updated on the plan of care.

## 2020-06-03 NOTE — PROGRESS NOTES
"Continued Stay Note  [facility]     Patient Name: Sidra Lane  MRN: [id number]  Today's Date: 6/3/2020    Admit Date: 5/31/2020    Discharge Plan     Row Name 06/03/20 1058       Plan    Plan  Update    Plan Comments  Discussed patient in rounds. P.T. recommends IP rehab patient wants to go to [facility] \"because family can come to visit\" referral giv en to  [facility]. Patient is on TF she will need a PEG in order to transfer to rehab. Pt will need ambulance to transfer. CM will follow.    Final Discharge Disposition Code  03 - skilled nursing facility (SNF)        Discharge Codes    No documentation.       Expected Discharge Date and Time     Expected Discharge Date Expected Discharge Time    Jun 5, 2020             Ester Sprague RN    "

## 2020-06-03 NOTE — THERAPY TREATMENT NOTE
Patient Name: Sidra Lane  : 1955    MRN: [id number]                              Today's Date: 6/3/2020       Admit Date: 2020    Visit Dx:     ICD-10-CM ICD-9-CM   1. Acute ischemic stroke (CMS/HCC) I63.9 434.91   2. Oropharyngeal dysphagia R13.12 787.22   3. Dysarthria R47.1 784.51   4. Cognitive communication deficit R41.841 799.52     Patient Active Problem List   Diagnosis   • Shortness of breath   • Atrial fibrillation (CMS/HCC)   • Essential hypertension   • Dizziness   • Deep vein thrombosis (DVT) of proximal lower extremity (CMS/HCC)   • Coronary artery disease involving native coronary artery of native heart without angina pectoris   • Fatigue   • Chest pain   • Longstanding persistent atrial fibrillation   • Cerebrovascular accident (CVA) due to embolism of right middle cerebral artery (CMS/HCC)   • Acute ischemic stroke (CMS/HCC)     Past Medical History:   Diagnosis Date   • Atrial fibrillation (CMS/HCC)    • CHF (congestive heart failure) (CMS/HCC)    • Deep vein thrombosis (CMS/HCC)    • GERD (gastroesophageal reflux disease)    • Hypertension    • May-Thurner syndrome    • Pulmonary embolism (CMS/HCC)      Past Surgical History:   Procedure Laterality Date   • CHOLECYSTECTOMY     • COLON SURGERY      bowel leakage, some of colon removed   • INTERVENTIONAL RADIOLOGY PROCEDURE Bilateral 2020    Procedure: CAROTID CEREBRAL ANGIOGRAM BILATERAL;  Surgeon: Brant Duarte MD;  Location: [facility] INVASIVE LOCATION;  Service: Interventional Radiology;  Laterality: Bilateral;   • LAPAROSCOPIC TUBAL LIGATION     • LEG SURGERY      multiple stents to BLE   • TONSILLECTOMY       General Information     Row Name 20 1526          PT Evaluation Time/Intention    Document Type  therapy note (daily note)  (Pended)   -     Mode of Treatment  physical therapy  (Pended)   -     Row Name 20 1526          General Information    Existing Precautions/Restrictions  fall;other (see  comments)  (Pended)  L UE weakness; L sided neglect  -     Barriers to Rehab  none identified  (Pended)   -     Row Name 06/03/20 1526          Cognitive Assessment/Intervention- PT/OT    Orientation Status (Cognition)  oriented x 3  (Pended)   -     Cognitive Assessment/Intervention Comment  pt easily distracted; L sided neglect  (Pended)   -     Row Name 06/03/20 1526          Safety Issues, Functional Mobility    Safety Issues Affecting Function (Mobility)  awareness of need for assistance;insight into deficits/self awareness;safety precaution awareness;safety precautions follow-through/compliance  (Pended)   -     Impairments Affecting Function (Mobility)  balance;coordination;endurance/activity tolerance;postural/trunk control;range of motion (ROM);shortness of breath;strength  (Pended)   -       User Key  (r) = Recorded By, (t) = Taken By, (c) = Cosigned By    Initials Name Provider Type     Tahira Ruiz, PT Student PT Student        Mobility     Row Name 06/03/20 1530          Bed Mobility Assessment/Treatment    Bed Mobility Assessment/Treatment  rolling left;scooting/bridging;sidelying-sit;sit-supine  (Pended)   -     Rolling Left [place] (Bed Mobility)  1 person assist;moderate assist (50% patient effort)  (Pended)   -     Scooting/Bridging [place] (Bed Mobility)  2 person assist;moderate assist (50% patient effort)  (Pended)   -     Sit-Supine [place] (Bed Mobility)  minimum assist (75% patient effort);2 person assist  (Pended)   -     Sidelying-Sit [place] (Bed Mobility)  moderate assist (50% patient effort);2 person assist  (Pended)   -     Assistive Device (Bed Mobility)  bed rails;draw sheet;head of bed elevated  (Pended)   -     Comment (Bed Mobility)  pt required VC's for sequencing and motor planning. Pt required assistance at trunk and B LEs for bed mobility. Pt unable to assist with L UE.  (Pended)   -[facility] Name 06/03/20 1605           Transfer Assessment/Treatment    Comment (Transfers)  Pt transferred from sitting EOB to standing with mod a x 2.  (Pended)   -     Row Name 06/03/20 1530          Sit-Stand Transfer    Sit-Stand Balsam (Transfers)  2 person assist;verbal cues;minimum assist (75% patient effort)  (Pended)   -     Row Name 06/03/20 1549 06/03/20 1530       Gait/Stairs Assessment/Training    Gait/Stairs Assessment/Training  --  gait/ambulation independence  (Pended)   -    Balsam Level (Gait)  minimum assist (75% patient effort);2 person assist  (Pended)   -  2 person assist;verbal cues;minimum assist (75% patient effort)  (Pended)   -    Assistive Device (Gait)  --  other (see comments)  (Pended)  R UE support on tele monitor  -    Distance in Feet (Gait)  --  30  (Pended)   -    Pattern (Gait)  --  step-through  (Pended)   -    Deviations/Abnormal Patterns (Gait)  --  base of support, narrow;patti decreased;stride length decreased  (Pended)   -    Bilateral Gait Deviations  --  forward flexed posture;heel strike decreased  (Pended)   -    Left Sided Gait Deviations  --  weight shift ability decreased  (Pended)   -    Comment (Gait/Stairs)  --  Pt able to ambulate 30 ft with R UE support on tele monitor with min a x2. VC's for upright posture. Pt's ambulation limited by increased HR.  (Pended)   -      User Key  (r) = Recorded By, (t) = Taken By, (c) = Cosigned By    Initials Name Provider Type     Tahira Ruiz, PT Student PT Student        Obj/Interventions     [facility] Name 06/03/20 1539          Static Sitting Balance    Level of Balsam (Unsupported Sitting, Static Balance)  contact guard assist  (Pended)   -     Sitting Position (Unsupported Sitting, Static Balance)  sitting on edge of bed  (Pended)   -     Row Name 06/03/20 1539          Static Standing Balance    Level of Balsam (Supported Standing, Static Balance)  minimal assist, 75% patient effort;2 person assist   (Pended)   -     Assistive Device Utilized (Supported Standing, Static Balance)  other (see comments)  (Pended)  R UE support on tele monitor  -SH     Row Name 06/03/20 1539          Sensory Assessment/Intervention    Sensory General Assessment  no sensation deficits identified  (Pended)   -       User Key  (r) = Recorded By, (t) = Taken By, (c) = Cosigned By    Initials Name Provider Type     Tahira Ruiz, PT Student PT Student        Goals/Plan    No documentation.       Clinical Impression     Row Name 06/03/20 1541          Pain Assessment    Additional Documentation  Pain Scale: Numbers Pre/Post-Treatment (Group)  (Pended)   -SH     Row Name 06/03/20 1541          Pain Scale: Numbers Pre/Post-Treatment    Pain Scale: Numbers, Pretreatment  0/10 - no pain  (Pended)   -     Pain Scale: Numbers, Post-Treatment  0/10 - no pain  (Pended)   -SH     Row Name 06/03/20 1541          Plan of Care Review    Progress  improving  (Pended)   -SH     Row Name 06/03/20 1541          Vital Signs    Pre Systolic BP Rehab  146  (Pended)   -     Pre Treatment Diastolic BP  109  (Pended)   -     Post Systolic BP Rehab  169  (Pended)   -     Post Treatment Diastolic BP  118  (Pended)   -     Pretreatment Heart Rate (beats/min)  101  (Pended)   -     Intratreatment Heart Rate (beats/min)  124  (Pended)   -     Posttreatment Heart Rate (beats/min)  102  (Pended)   -     Pre SpO2 (%)  96  (Pended)   -     O2 Delivery Pre Treatment  room air  (Pended)   -     Post SpO2 (%)  99  (Pended)   -     O2 Delivery Post Treatment  room air  (Pended)   -     Pre Patient Position  Supine  (Pended)   -     Intra Patient Position  Standing  (Pended)   -SH     Row Name 06/03/20 1541          Positioning and Restraints    Pre-Treatment Position  in bed  (Pended)   -     Post Treatment Position  bed  (Pended)   -     In Bed  notified nsg;supine;encouraged to call for assist;call light within reach;side rails up  x3  (Pended)   -       User Key  (r) = Recorded By, (t) = Taken By, (c) = Cosigned By    Initials Name Provider Type    Tahira Crocker, PT Student PT Student        Outcome Measures     Row Name 06/03/20 1544          How much help from another person do you currently need...    Turning from your back to your side while in flat bed without using bedrails?  2  (Pended)   -SH     Moving from lying on back to sitting on the side of a flat bed without bedrails?  2  (Pended)   -SH     Moving to and from a bed to a chair (including a wheelchair)?  2  (Pended)   -SH     Standing up from a chair using your arms (e.g., wheelchair, bedside chair)?  3  (Pended)   -SH     Climbing 3-5 steps with a railing?  1  (Pended)   -SH     To walk in hospital room?  2  (Pended)   -SH     AM-PAC 6 Clicks Score (PT)  12  -MS (r) SH (t)     Row Name 06/03/20 1545          Functional Assessment    Outcome Measure Options  AM-PAC 6 Clicks Basic Mobility (PT)  (Pended)   -       User Key  (r) = Recorded By, (t) = Taken By, (c) = Cosigned By    Initials Name Provider Type    Marleny Nicholas, RN Registered Nurse    Tahira Crocker, PT Student PT Student        Physical Therapy Education                 Title: PT OT SLP Therapies (In Progress)     Topic: Physical Therapy (In Progress)     Point: Mobility training (Done)     Description:   Instruct learner(s) on safety and technique for assisting patient out of bed, chair or wheelchair.  Instruct in the proper use of assistive devices, such as walker, crutches, cane or brace.              Patient Friendly Description:   It's important to get you on your feet again, but we need to do so in a way that is safe for you. Falling has serious consequences, and your personal safety is the most important thing of all.        When it's time to get out of bed, one of us or a family member will sit next to you on the bed to give you support.     If your doctor or nurse tells you to use a  walker, crutches, a cane, or a brace, be sure you use it every time you get out of bed, even if you think you don't need it.    Learning Progress Summary           Patient Acceptance, E,D, NR,VU by  at 6/3/2020 1350    Acceptance, E, NR by KG at 6/2/2020 1315                   Point: Home exercise program (Not Started)     Description:   Instruct learner(s) on appropriate technique for monitoring, assisting and/or progressing patient with therapeutic exercises and activities.              Learner Progress:   Not documented in this visit.          Point: Body mechanics (Done)     Description:   Instruct learner(s) on proper positioning and spine alignment for patient and/or caregiver during mobility tasks and/or exercises.              Learning Progress Summary           Patient Acceptance, E,D, NR,VU by  at 6/3/2020 1350    Acceptance, E, NR by KG at 6/2/2020 1315                   Point: Precautions (Done)     Description:   Instruct learner(s) on prescribed precautions during mobility and gait tasks              Learning Progress Summary           Patient Acceptance, E,D, NR,VU by  at 6/3/2020 1350    Acceptance, E, NR by KG at 6/2/2020 1315                               User Key     Initials Effective Dates Name Provider Type Discipline     05/22/20 -  Taylor Jeff, PT Physical Therapist PT     03/19/20 -  Tahira Ruiz PT Student PT Student PT              PT Recommendation and Plan     Plan of Care Reviewed With: (P) patient  Progress: (P) improving  Outcome Summary: (P) Pt ambulated 30 ft with min a x 2 with R UE support on tele monitor. Pt's gait limited by increased HR and patient reported fatigue Pt demonstrated decreased gait speed and step length and required VC's to promote upright posture. Continue to progress therapy as appropriate.     Time Calculation:   PT Charges     Row Name 06/03/20 1350             Time Calculation    Start Time  1350  (Pended)   -      PT Received On   06/03/20  (Pended)   -      PT Goal Re-Cert Due Date  06/12/20  (Pended)   -         Time Calculation- PT    Total Timed Code Minutes- PT  15 minute(s)  (Pended)   -         Timed Charges    17334 - PT Therapeutic Activity Minutes  15  (Pended)   -        User Key  (r) = Recorded By, (t) = Taken By, (c) = Cosigned By    Initials Name Provider Type     Tahira Ruiz, PT Student PT Student        Therapy Charges for Today     Code Description Service Date Service Provider Modifiers Qty    [id number]  PT THERAPEUTIC ACT EA 15 MIN 6/3/2020 Tahira Ruiz, PT Student GP 1          PT G-Codes  Outcome Measure Options: (P) AM-PAC 6 Clicks Basic Mobility (PT)  AM-PAC 6 Clicks Score (PT): 12  AM-PAC 6 Clicks Score (OT): 11  Modified [place] Scale: 4 - Moderately severe disability.  Unable to walk without assistance, and unable to attend to own bodily needs without assistance.    Tahira Ruiz, PT Student  6/3/2020

## 2020-06-03 NOTE — PLAN OF CARE
Problem: Patient Care Overview  Goal: Plan of Care Review  6/3/2020 9014 by Tahira Ruiz PT Student  Flowsheets (Taken 6/3/2020 1350)  Progress: improving  Plan of Care Reviewed With: patient  Outcome Summary: Pt ambulated 30 ft with min a x 2 with R UE support on tele monitor. Pt's gait limited by increased HR and patient reported fatigue Pt demonstrated decreased gait speed and step length and required VC's to promote upright posture. Continue to progress therapy as appropriate.

## 2020-06-03 NOTE — THERAPY TREATMENT NOTE
Acute Care - Speech Language Pathology   Swallow Progress Note [facility]     Patient Name: Sidra Lane  : 1955  MRN: [id number]  Today's Date: 6/3/2020               Admit Date: 2020    Visit Dx:      ICD-10-CM ICD-9-CM   1. Acute ischemic stroke (CMS/HCC) I63.9 434.91   2. Oropharyngeal dysphagia R13.12 787.22   3. Dysarthria R47.1 784.51   4. Cognitive communication deficit R41.841 799.52     Patient Active Problem List   Diagnosis   • Shortness of breath   • Atrial fibrillation (CMS/HCC)   • Essential hypertension   • Dizziness   • Deep vein thrombosis (DVT) of proximal lower extremity (CMS/HCC)   • Coronary artery disease involving native coronary artery of native heart without angina pectoris   • Fatigue   • Chest pain   • Longstanding persistent atrial fibrillation   • Cerebrovascular accident (CVA) due to embolism of right middle cerebral artery (CMS/HCC)   • Acute ischemic stroke (CMS/HCC)       Therapy Treatment  Rehabilitation Treatment Summary     Row Name 20 1415             Treatment Time/Intention    Discipline  speech language pathologist  -VO      Document Type  therapy note (daily note)  -VO      Subjective Information  no complaints  -VO      Mode of Treatment  speech-language pathology  -VO      Patient/Family Observations  dtr present  -VO      Care Plan Review  risks/benefits reviewed;care plan/treatment goals reviewed;patient/other agree to care plan;current/potential barriers reviewed  -VO      Care Plan Review, Other Participant(s)  daughter  -VO      Therapy Frequency (Swallow)  5 days per week  -VO      Therapy Frequency (SLP SLC)  5 days per week  -VO      Patient Effort  good  -VO      Recorded by [VO] Nuha Garcia MA,CCC-SLP 20 1537      Row Name 20 1415             Pain Scale: Numbers Pre/Post-Treatment    Pain Scale: Numbers, Pretreatment  0/10 - no pain  -VO      Pain Scale: Numbers, Post-Treatment  0/10 - no pain  -VO      Recorded by  [VO] Nuha Garcia MA,CCC-SLP 06/03/20 1537      Row Name                Wound 05/31/20 1930 Right groin Puncture    Wound - Properties Group Date first assessed: 05/31/20 [MB] Time first assessed: 1930 [MB] Present on Hospital Admission: N [MB] Side: Right [MB] Location: groin [MB] Primary Wound Type: Puncture [MB] Recorded by:  [MB] Toña Maier RN 05/31/20 2037    Row Name 06/03/20 1415             Outcome Summary/Treatment Plan (SLP)    Daily Summary of Progress (SLP)  progress toward functional goals as expected  -VO      Plan for Continued Treatment (SLP)  Saw for dysphagia tx. Trialed ice chips and puree, continues to show s/sxs aspiration w/ all. Rec: 2-3 ice chips after oral care w/ RN to promote swallowing and emphasized simple dysphagia exercises. Will cont to treat.  -VO      Anticipated Dischage Disposition (SLP)  inpatient rehabilitation facility;anticipate therapy at next level of care  -VO      Recorded by [VO] Nuha Garcia MA,CCC-SLP 06/03/20 1537        User Key  (r) = Recorded By, (t) = Taken By, (c) = Cosigned By    Initials Name Effective Dates Discipline    VO Nuha Garcia MA,CCC-SLP 10/24/18 -  SLP    Toña Handy RN 06/14/19 -  Nurse          Outcome Summary  Outcome Summary/Treatment Plan (SLP)  Daily Summary of Progress (SLP): progress toward functional goals as expected (06/03/20 1415 : Nuha Garcia MA,CCC-SLP)  Plan for Continued Treatment (SLP): Saw for dysphagia tx. Trialed ice chips and puree, continues to show s/sxs aspiration w/ all. Rec: 2-3 ice chips after oral care w/ RN to promote swallowing and emphasized simple dysphagia exercises. Will cont to treat. (06/03/20 1415 : Nuha Garcia MA,CCC-SLP)  Anticipated Dischage Disposition (SLP): inpatient rehabilitation facility, anticipate therapy at next level of care (06/03/20 1415 : Nuha Garcia MA,CCC-SLP)      SLP GOALS     Row Name 06/03/20 1415 06/02/20 1100 06/01/20 1015        Oral Nutrition/Hydration Goal 1 (SLP)    Oral Nutrition/Hydration Goal 1, SLP  LTG: Pt will return to regular diet & thin liquids w/ 100% accuracy w/o cues  -VO  LTG: Pt will return to regular diet & thin liquids w/ 100% accuracy w/o cues  -RD  --    Time Frame (Oral Nutrition/Hydration Goal 1, SLP)  by discharge  -VO  by discharge  -RD  --    Progress/Outcomes (Oral Nutrition/Hydration Goal 1, SLP)  continuing progress toward goal  -VO  --  --       Oral Nutrition/Hydration Goal 2 (SLP)    Oral Nutrition/Hydration Goal 2, SLP  Pt will tolerate therapeutic trials of thin H2O w/ no overt s/s of aspiration w/ 60% accuracy w/o cues to indicate readiness for repeat instrumental evaluation  -VO  Pt will tolerate therapeutic trials of thin H2O w/ no overt s/s of aspiration w/ 60% accuracy w/o cues to indicate readiness for repeat instrumental evaluation  -RD  --    Time Frame (Oral Nutrition/Hydration Goal 2, SLP)  short term goal (STG);by discharge  -VO  short term goal (STG);by discharge  -RD  --    Barriers (Oral Nutrition/Hydration Goal 2, SLP)  delayed cough and multiple swallows  -VO  --  --    Progress/Outcomes (Oral Nutrition/Hydration Goal 2, SLP)  continuing progress toward goal  -VO  --  --       Labial Strengthening Goal 1 (SLP)    Activity (Labial Strengthening Goal 1, SLP)  increase labial tone  -VO  increase labial tone  -RD  --    Increase Labial Tone  labial resistance exercises  -VO  labial resistance exercises  -RD  --    [place]/Accuracy (Labial Strengthening Goal 1, SLP)  with minimal cues (75-90% accuracy)  -VO  with minimal cues (75-90% accuracy)  -RD  --    Time Frame (Labial Strengthening Goal 1, SLP)  short term goal (STG);by discharge  -VO  short term goal (STG);by discharge  -RD  --    Progress/Outcomes (Labial Strengthening Goal 1, SLP)  continuing progress toward goal  -VO  --  --       Lingual Strengthening Goal 1 (SLP)    Activity (Lingual Strengthening Goal 1, SLP)  increase  lingual tone/sensation/control/coordination/movement;increase tongue back strength  -VO  increase lingual tone/sensation/control/coordination/movement;increase tongue back strength  -RD  --    Increase Lingual Tone/Sensation/Control/Coordination/Movement  lingual movement exercises  -VO  lingual movement exercises  -RD  --    Increase Tongue Back Strength  lingual resistance exercises  -VO  lingual resistance exercises  -RD  --    [place]/Accuracy (Lingual Strengthening Goal 1, SLP)  with minimal cues (75-90% accuracy)  -VO  with minimal cues (75-90% accuracy)  -RD  --    Time Frame (Lingual Strengthening Goal 1, SLP)  short term goal (STG);by discharge  -VO  short term goal (STG);by discharge  -RD  --    Progress/Outcomes (Lingual Strengthening Goal 1, SLP)  continuing progress toward goal  -VO  --  --       Pharyngeal Strengthening Exercise Goal 1 (SLP)    Activity (Pharyngeal Strengthening Goal 1, SLP)  increase timing;increase superior movement of the hyolaryngeal complex;increase anterior movement of the hyolaryngeal complex;increase closure at entrance to airway/closure of airway at glottis;increase squeeze/positive pressure generation;increase tongue base retraction  -VO  increase timing;increase superior movement of the hyolaryngeal complex;increase anterior movement of the hyolaryngeal complex;increase closure at entrance to airway/closure of airway at glottis;increase squeeze/positive pressure generation;increase tongue base retraction  -RD  --    Increase Timing  prepping - 3 second prep or suck swallow or 3-step swallow  -VO  prepping - 3 second prep or suck swallow or 3-step swallow  -RD  --    Increase Superior Movement of the Hyolaryngeal Complex  super-supraglottic swallow  -VO  super-supraglottic swallow  -RD  --    Increase Anterior Movement of the Hyolaryngeal Complex  chin tuck against resistance (CTAR)  -VO  chin tuck against resistance (CTAR)  -RD  --    Increase Closure at Entrance to  Airway/Closure of Airway at Glottis  super-supraglottic swallow  -VO  super-supraglottic swallow  -RD  --    Increase Squeeze/Positive Pressure Generation  effortful pitch glide (falsetto + pharyngeal squeeze)  -VO  effortful pitch glide (falsetto + pharyngeal squeeze)  -RD  --    Increase Tongue Base Retraction  hard effortful swallow  -VO  hard effortful swallow  -RD  --    [place]/Accuracy (Pharyngeal Strengthening Goal 1, SLP)  with minimal cues (75-90% accuracy)  -VO  with minimal cues (75-90% accuracy)  -RD  --    Time Frame (Pharyngeal Strengthening Goal 1, SLP)  short term goal (STG);by discharge  -VO  short term goal (STG);by discharge  -RD  --    Barriers (Pharyngeal Strengthening Goal 1, SLP)  focused on simple excs that could be completed w/ ice chips after oral care.  -VO  --  --    Progress/Outcomes (Pharyngeal Strengthening Goal 1, SLP)  continuing progress toward goal  -VO  --  --       Articulation Goal 1 (SLP)    Improve Articulation Goal 1 (SLP)  --  by over-articulating at phrase level;80%;with minimal cues (75-90%)  -RD  by over-articulating at phrase level;80%;with minimal cues (75-90%)  -AC    Time Frame (Articulation Goal 1, SLP)  --  short term goal (STG)  -RD  short term goal (STG)  -AC    Progress (Articulation Goal 1, SLP)  --  40%;with minimal cues (75-90%)  -RD  --    Progress/Outcomes (Articulation Goal 1, SLP)  --  continuing progress toward goal  -RD  --       Attention Goal 1 (SLP)    Improve Attention by Goal 1 (SLP)  --  looking at speaker;80%;with minimal cues (75-90%)  -RD  --    Time Frame (Attention Goal 1, SLP)  --  short term goal (STG);by discharge  -RD  --    Progress (Attention Goal 1, SLP)  --  30%;with moderate cues (50-74%)  -RD  --    Progress/Outcomes (Attention Goal 1, SLP)  --  continuing progress toward goal  -RD  --       Additional Goal 1 (SLP)    Additional Goal 1, SLP  --  LTG: Pt will improve cognitive-communication skills in order to participate in care  in hospital setting for 100% of opportunities w/o cues.  -RD  LTG: Pt will improve cognitive-communication skills in order to participate in care in hospital setting for 100% of opportunities w/o cues.  -AC    Time Frame (Additional Goal 1, SLP)  --  by discharge  -RD  by discharge  -AC    Progress/Outcomes (Additional Goal 1, SLP)  --  continuing progress toward goal  -RD  --      User Key  (r) = Recorded By, (t) = Taken By, (c) = Cosigned By    Initials Name Provider Type    AC Cynthia Campbell, MS CCC-SLP Speech and Language Pathologist    Jade Arrieat, MS CCC-SLP Speech and Language Pathologist    Nuha Trevino MA,CCC-SLP Speech and Language Pathologist          EDUCATION  The patient has been educated in the following areas:   Dysphagia (Swallowing Impairment) Oral Care/Hydration NPO rationale.    SLP Recommendation and Plan  Daily Summary of Progress (SLP): progress toward functional goals as expected     Plan for Continued Treatment (SLP): Saw for dysphagia tx. Trialed ice chips and puree, continues to show s/sxs aspiration w/ all. Rec: 2-3 ice chips after oral care w/ RN to promote swallowing and emphasized simple dysphagia exercises. Will cont to treat.  Anticipated Dischage Disposition (SLP): inpatient rehabilitation facility, anticipate therapy at next level of care                    Time Calculation:   Time Calculation- SLP     Row Name 06/03/20 1539             Time Calculation- SLP    SLP Start Time  1415  -VO      SLP Received On  06/03/20  -VO        User Key  (r) = Recorded By, (t) = Taken By, (c) = Cosigned By    Initials Name Provider Type    Nuha Trevino MA,CCC-SLP Speech and Language Pathologist          Therapy Charges for Today     Code Description Service Date Service Provider Modifiers Qty    [id number]  ST TREATMENT SWALLOW 2 6/3/2020 Nuha Garcia MA,CCC-SLP GN 1                 Nuha Garcia MA,MOHAMUD-JALEN  6/3/2020

## 2020-06-03 NOTE — PROGRESS NOTES
Stroke Progress Note       Chief Complaint:  Left-sided weakness    Subjective     Subjective:  The patient feels that she has made some improvements.  She still is having difficulty with her swallowing but does not want a PEG tube yet.    Review of Systems   Constitutional: Positive for fatigue.   Eyes: Positive for visual disturbance.   Respiratory: Negative for shortness of breath.    Cardiovascular: Negative for chest pain.        Sustained atrial fibrillation; rate controlled   Gastrointestinal: Positive for abdominal pain and diarrhea. Negative for nausea.   Endocrine: Negative.    Genitourinary: Negative.    Musculoskeletal: Negative.    Skin: Negative.    Neurological: Positive for facial asymmetry, speech difficulty, weakness and headaches.        Complains of bilateral frontal headache with bilateral eye pain (ongoing since admission)   Psychiatric/Behavioral: Negative.         Objective      Temp:  [97.4 °F (36.3 °C)-98.9 °F (37.2 °C)] 98.5 °F (36.9 °C)  Heart Rate:  [] 101  Resp:  [16-22] 18  BP: (120-161)/() 146/109    Neurological Exam  Mental Status  Awake, alert and oriented to person, place and time.Alert. Mild dysarthria present. Language is fluent with no aphasia. Able to perform serial calculations. Able to spell words backwards. Fund of knowledge is appropriate for level of education.    Cranial Nerves  CN II: Visual fields full to confrontation.  CN III, IV, VI: Pupils equal round and reactive to light bilaterally. Partial gaze palsy with right gaze preference, unable to move eyes beyond midline on left.  CN V: Facial sensation is normal.  CN VII:  Left: There is central facial weakness.  CN VIII: Hearing is normal.  CN IX, X: Palate elevates symmetrically  CN XI: Shoulder shrug strength is normal.  CN XII: Tongue midline without atrophy or fasciculations.    Motor   Right hemiparesis.  Left upper extremity is 2/5, left lower extremity is 4/5  Right upper extremity/right lower  extremity spontaneous movement 5/5.    Sensory  Light touch is normal in upper and lower extremities.     Reflexes  Not assessed.    Coordination  Finger-to-nose, rapid alternating movements and heel-to-shin normal bilaterally without dysmetria.    Gait  Not assessed.      Physical Exam   Constitutional: She is oriented to person, place, and time. She appears well-developed and well-nourished.   HENT:   Head: Normocephalic and atraumatic.   Eyes: Pupils are equal, round, and reactive to light.   Cardiovascular: Normal rate.   Sustained atrial fibrillation   Pulmonary/Chest: Effort normal and breath sounds normal.   Abdominal: Soft.   Neurological: She is alert and oriented to person, place, and time. A cranial nerve deficit is present. Coordination normal. GCS eye subscore is 4. GCS verbal subscore is 5. GCS motor subscore is 6.   Skin: Skin is warm and dry.   Psychiatric: She has a normal mood and affect. Her behavior is normal.     Nursing note and vitals reviewed.    Results Review:    I reviewed the patient's new clinical results.    Lab Results (last 24 hours)     Procedure Component Value Units Date/Time    POC Glucose Once [[id number]]  (Normal) Collected:  06/03/20 1234    Specimen:  Blood Updated:  06/03/20 1238     Glucose 107 mg/dL     POC Glucose Once [[id number]]  (Abnormal) Collected:  06/03/20 0617    Specimen:  Blood Updated:  06/03/20 0629     Glucose 133 mg/dL     Phosphorus [[id number]]  (Normal) Collected:  06/03/20 0327    Specimen:  Blood Updated:  06/03/20 0427     Phosphorus 2.5 mg/dL     Basic Metabolic Panel [[id number]]  (Abnormal) Collected:  06/03/20 0327    Specimen:  Blood Updated:  06/03/20 0425     Glucose 134 mg/dL      BUN 10 mg/dL      Creatinine 0.66 mg/dL      Sodium 138 mmol/L      Potassium 3.4 mmol/L      Chloride 102 mmol/L      CO2 19.0 mmol/L      Calcium 8.5 mg/dL      eGFR Non African Amer 89 mL/min/1.73      BUN/Creatinine Ratio 15.2     Anion Gap 17.0 mmol/L     Narrative:        GFR Normal >60  Chronic Kidney Disease <60  Kidney Failure <15      Magnesium [[id number]]  (Normal) Collected:  06/03/20 0327    Specimen:  Blood Updated:  06/03/20 0425     Magnesium 2.3 mg/dL     POC Glucose Once [[id number]]  (Normal) Collected:  06/02/20 2355    Specimen:  Blood Updated:  06/03/20 0001     Glucose 114 mg/dL     Nutrition Panel [[id number]]  (Abnormal) Collected:  06/02/20 0430    Specimen:  Blood Updated:  06/02/20 1542     Glucose 145 mg/dL      BUN 12 mg/dL      Creatinine 0.78 mg/dL      Sodium 137 mmol/L      Potassium 4.5 mmol/L      Chloride 101 mmol/L      CO2 17.0 mmol/L      Calcium 8.6 mg/dL      Alkaline Phosphatase 58 U/L      ALT (SGPT) 19 U/L      AST (SGOT) 38 U/L      Total Cholesterol 186 mg/dL      Triglycerides 147 mg/dL      Total Protein 6.9 g/dL      Albumin 3.70 g/dL      Phosphorus 4.0 mg/dL      Total Bilirubin 0.2 mg/dL      Magnesium 2.2 mg/dL      Anion Gap 19.0 mmol/L     Narrative:       Cholesterol Reference Ranges:   Desirable       < 200 mg/dL   Borderline    200-239 mg/dL   High Risk       > 239 mg/dL    Triglyceride Reference Ranges:   Normal          < 150 mg/dL   Borderline    150-199 mg/dL   High          200-499 mg/dL   Very High       > 499 mg/dL        Ct Head Without Contrast    Result Date: 6/3/2020  Slight increase seen in size in the edematous area in the right MCA territory. No significant change seen in the surrounding mass effect or midline shift. No hemorrhagic conversion.  D:  06/03/2020 E:  06/03/2020      Ct Head Without Contrast    Result Date: 6/2/2020  Stable examination with evolving right MCA territory infarct. No hemorrhagic conversion.  D:  06/01/2020 E:  06/01/2020  This report was finalized on 6/2/2020 4:34 PM by Dr. Chastity Rico MD.      Mri Brain Without Contrast    Result Date: 6/2/2020  Evolving right MCA infarction with restricted diffusion and effacement of the right lateral ventricle, however, no midline shift. This is  superimposed upon moderately advanced chronic small vessel ischemic disease findings.  D:  06/02/2020 E:  06/02/2020     This report was finalized on 6/2/2020 7:13 PM by Dr. Cristi Perez.      Fl Video Swallow With Speech Single Contrast    Result Date: 6/2/2020  Fluoroscopy provided for a modified barium swallow. Please see speech therapy report for full details and recommendations.    This report was finalized on 6/2/2020 4:35 PM by Dr. Chastity Rico MD.      Xr Abdomen Kub    Result Date: 6/3/2020  Feeding tube tip in the distal stomach or duodenal bulb.  D:  06/02/2020 E:  06/03/2020        Results for orders placed during the hospital encounter of 05/31/20   Adult Transthoracic Echo Complete W/ Cont if Necessary Per Protocol    Narrative · Left ventricular systolic function is normal. Estimated EF appears to be   in the range of 56 - 60%.  · Normal right ventricular cavity size and systolic function noted.  · Left atrial cavity size is moderately dilated. Left atrial volume is   moderately increased.  · No evidence of a patent foramen ovale. Saline test results are negative.  · The aortic valve is abnormal in structure. The valve exhibits sclerosis.   There is mild calcification of the aortic valve  · Mild aortic valve stenosis is present            Assessment/Plan     Assessment/Plan:    Assessment/Plan:        65-year-old right-handed white female with known diagnosis of hypertension, hyperlipidemia, persistent atrial fibrillation, on Eliquis, who comes in with right MCA syndrome, and found to have right MCA stroke.  Thrombectomy was attempted, but intra-arterial TPA was given.  Patient was not a candidate for TPA secondary to being on Eliquis.  Her initial CT head showed early right MCA stroke, with gray-white matter in differentiation and some sulcal effacement.  CT angiogram head and neck showed her to have right superior temporal branch occlusion.  CT perfusion showed her to have a mismatch in the  right MCA territory.  Repeat CT head this morning shows stable appearance of right MCA stroke without evidence of hemorrhage.        1. Right MCA stroke.  Likely cardioembolic, given her being in persistent A. fib.  Patient has had no significant improvement since getting TPA intra-arterially. CT head remains stable today with no evidence of hemmorhage.  Will initiate heparin drip (no bolus ever) and decrease ASA to 81mg.   2. Persistent atrial fibrillation.  Cardiology following. Rate controlled.  Will initiate heparin drip, if patient does well will transition to oral AC.  3. Essential hypertension.  Ok to normalize blood pressure SBP <140.  4. Mixed hyperlipidemia.  Her total cholesterol is 213 with LDL of 79.  Continue Lipitor 80 mg daily.    5. Activity - Ok to resume PT/OT.  6. Diet - SLP following; patient failed MBS yesterday. Keofeed in place.  Enteral feeding started today per Intensivist.     Case was discussed with the patient's daughter who is at bedside and nursing.  We will continue to follow her.      Orders have been placed on Dr. Vaughan's behalf.    Edith Zee RN  06/03/20  12:45    I reviewed Edith note as documented above, and it was edited as needed. Plan was discussed with pt and family.

## 2020-06-03 NOTE — PLAN OF CARE
Problem: Patient Care Overview  Goal: Plan of Care Review  Outcome: Ongoing (interventions implemented as appropriate)  Flowsheets (Taken 6/3/2020 1350)  Progress: improving (Pended)  Plan of Care Reviewed With: patient (Pended)  Outcome Summary: Pt ambulated 30 ft with min a x 2 with R UE support on tele monitor. Pt's gait limited by increased HR and patient reported fatigue Pt demonstrated decreased gait speed and step length and required VC's to promote upright posture. Continue to progress therapy as appropriate. (Pended)

## 2020-06-03 NOTE — PLAN OF CARE
Problem: Patient Care Overview  Goal: Plan of Care Review  Outcome: Ongoing (interventions implemented as appropriate)  Flowsheets (Taken 6/3/2020 6677)  Plan of Care Reviewed With: patient; daughter  Note:   SLP treatment completed. Will continue to address dysphagia and communication. Please see note for further details and recommendations.

## 2020-06-03 NOTE — PROGRESS NOTES
HEPARIN INFUSION  Sidra Lane is a  65 y.o. female receiving heparin infusion.             Therapy for (VTE/Cardiac):   Cardiac  Patient Weight: 109kg  Initial Bolus (Y/N):   N  Any Bolus (Y/N):   N       Signs or Symptoms of Bleeding: None noted      Cardiac or Other (Not VTE)   Initial Bolus: 60 units/kg (Max 4,000 units)  Initial rate: 12 units/kg/hr (Max 1,000 units/hr)   Anti-Xa (IU/mL) Bolus Dose Stop Infusion Rate Change Repeat Anti-Xa      ?0.19 60 units/kg 0 hrs Increase rate by   4 units/kg/hr 6 hrs       0.2 - 0.29  30 units/kg 0 hrs Increase rate by 2 units/kg/hr 6 hrs    0.3 - 0.7 0 0 hrs No change 6 hrs      0.71 - 0.99 0  0 hrs Decrease rate by 2 units/kg/hr 6 hrs            ?1 0 Hold 1 hr Decrease rate by 3 units/kg/hr 6 hrs        Results from last 7 days   Lab Units 06/01/20  0637 05/31/20  1517 05/31/20  1507   HEMOGLOBIN g/dL 11.7* 11.9*  --    HEMOGLOBIN, POC g/dL  --   --  14.6   HEMATOCRIT % 41.1 41.9  --    HEMATOCRIT POC %  --   --  43   PLATELETS 10*3/mm3 259 307  --           Date   Time   Anti-Xa Current Rate (Unit/kg/hr) Bolus   (Units) Rate Change   (Unit/kg/hr) New Rate (Unit/kg/hr) Next   Anti-Xa Comments  Pump Check Daily   6/3 1417    [phone], [facility]  6/3/2020  14:16

## 2020-06-03 NOTE — PROGRESS NOTES
INTENSIVIST   PROGRESS NOTE     Hospital:  LOS: 3 days     Ms. Sidra Lane, 65 y.o. female is followed for a Chief Complaint of: CVA      Subjective   S     Interval History:  Failed MBS yesterday. Keofeed placed and started on tube feeds. She is complaining that she is hungry and that her head hurts. She did not sleep last night.        The patient's relevant past medical, surgical and social history were reviewed and updated in Epic as appropriate.      ROS:   Constitutional: Negative for fever.   Respiratory: Negative for dyspnea.   Cardiovascular: Negative for chest pain.   Gastrointestinal: Negative for  nausea, vomiting and diarrhea.       Objective   O     Vitals:  Temp  Min: 97.4 °F (36.3 °C)  Max: 98.9 °F (37.2 °C)  BP  Min: 120/89  Max: 161/107  Pulse  Min: 81  Max: 107  Resp  Min: 16  Max: 22  SpO2  Min: 89 %  Max: 99 % Flow (L/min)  Min: 2  Max: 3    Intake/Ouptut 24 hrs (7:00AM - 6:59 AM)  Intake & Output (last 3 days)       05/31 0701 - 06/01 0700 06/01 0701 - 06/02 0700 06/02 0701 - 06/03 0700 06/03 0701 - 06/04 0700    I.V. (mL/kg) 275.9 (2.5) 2192.7 (19.9) 2828.8 (26)     Other   60     NG/GT   390 60    Total Intake(mL/kg) 275.9 (2.5) 2192.7 (19.9) 3278.8 (30.1) 60 (0.6)    Urine (mL/kg/hr) 1075 350 (0.1) 1175 (0.4)     Stool 0  0     Total Output 5097 045 4542     Net -799.1 +1842.7 +2103.8 +60            Urine Unmeasured Occurrence  1 x 5 x 1 x    Stool Unmeasured Occurrence 1 x  5 x 1 x          Medications (drips):    dexmedetomidine Last Rate: Stopped (06/03/20 0911)   niCARdipine Last Rate: Stopped (06/01/20 0015)          Physical Examination  Telemetry:  Atrial fibrillation.    Constitutional:  No acute distress.  Resting in bed comfortably  Keofeed in place.    Eyes: No scleral icterus.   PERRL, EOM intact.    Neck:  Supple, FROM   Cardiovascular: Normal rate, regular and rhythm. Normal heart sounds.  No murmurs, gallop or rub.   Respiratory: No respiratory distress. Normal  respiratory effort.  Normal breath sounds  Clear to ascultation   Abdominal:  Soft. No masses. Non-tender. No distension. No HSM.   Extremities: No digital cyanosis. No clubbing.  No peripheral edema.   Skin: No rashes, lesions or ulcers   Neurological:   Alert and interactive.        Interval: handoff  1a. Level of Consciousness: 0-->Alert, keenly responsive  1b. LOC Questions: 0-->Answers both questions correctly  1c. LOC Commands: 0-->Performs both tasks correctly  2. Best Gaze: 1-->Partial gaze palsy, gaze is abnormal in one or both eyes, but forced deviation or total gaze paresis is not present  3. Visual: 1-->Partial hemianopia  4. Facial Palsy: 2-->Partial paralysis (total or near-total paralysis of lower face)  5a. Motor Arm, Left: 1-->Drift, limb holds 90 (or 45) degrees, but drifts down before full 10 seconds, does not hit bed or other support  5b. Motor Arm, Right: 0-->No drift, limb holds 90 (or 45) degrees for full 10 secs  6a. Motor Leg, Left: 0-->No drift, leg holds 30 degree position for full 5 secs  6b. Motor Leg, Right: 0-->No drift, leg holds 30 degree position for full 5 secs  7. Limb Ataxia: 0-->Absent  8. Sensory: 1-->Mild-to-moderate sensory loss, patient feels pinprick is less sharp or is dull on the affected side, or there is a loss of superficial pain with pinprick, but patient is aware of being touched  9. Best Language: 0-->No aphasia, normal  10. Dysarthria: 1-->Mild-to-moderate dysarthria, patient slurs at least some words and, at worst, can be understood with some difficulty  11. Extinction and Inattention (formerly Neglect): 1-->Visual, tactile, auditory, spatial, or personal inattention or extinction to bilateral simultaneous stimulation in one of the sensory modalities    Total (NIH Stroke Scale): 8       Results from last 7 days   Lab Units 06/01/20  0637 05/31/20  1517 05/31/20  1507   WBC 10*3/mm3 9.99 9.37  --    HEMOGLOBIN g/dL 11.7* 11.9*  --    HEMOGLOBIN, POC g/dL  --   --   14.6   MCV fL 77.5* 78.3*  --    PLATELETS 10*3/mm3 259 307  --      Results from last 7 days   Lab Units 06/03/20  0327 06/02/20  0430 06/01/20  0637   SODIUM mmol/L 138 137  137 137   POTASSIUM mmol/L 3.4* 4.5  4.4 4.1   CO2 mmol/L 19.0* 17.0*  22.0 20.0*   CREATININE mg/dL 0.66 0.78  0.69 0.73   GLUCOSE mg/dL 134* 145*  142* 149*   MAGNESIUM mg/dL 2.3 2.2  2.2 2.0   PHOSPHORUS mg/dL 2.5 4.0  3.8 2.8     Estimated Creatinine Clearance: 77.9 mL/min (by C-G formula based on SCr of 0.66 mg/dL).  Results from last 7 days   Lab Units 06/02/20  0430 05/31/20  1517   ALK PHOS U/L 58  --    BILIRUBIN mg/dL 0.2  --    ALT (SGPT) U/L 19 19   AST (SGOT) U/L 38* 31       Results from last 7 days   Lab Units 05/31/20  1507   PH, ARTERIAL pH units 7.37       Images:  Imaging Results (Last 24 Hours)     Procedure Component Value Units Date/Time    CT Head Without Contrast [[id number]] Collected:  06/03/20 0841     Updated:  06/03/20 0909    Narrative:       EXAMINATION: CT HEAD WO CONTRAST-      INDICATION: I63.9-Cerebral infarction, unspecified; R13.12-Dysphagia,  oropharyngeal phase; R47.1-Dysarthria and anarthria; R41.841-Cognitive  communication deficit; followup stroke, left-sided weakness.     TECHNIQUE: Multiple axial CT imaging was obtained of the head from the  skull base to the skull vertex without the administration of intravenous  contrast.     The radiation dose reduction device was turned on for each scan per the  ALARA (As Low as Reasonably Achievable) protocol.     COMPARISON: 06/01/2020.     FINDINGS: Evolving right MCA territory area of infarction extending from  the temporal lobe into the right parietal lobe. The edema is slightly  increased in size when compared to the prior study. The surrounding mass  effect is unchanged. There is no midline shift. No effacement of the  ventricular system.       Impression:       Slight increase seen in size in the edematous area in the  right MCA territory. No  significant change seen in the surrounding mass  effect or midline shift. No hemorrhagic conversion.     D:  06/03/2020  E:  06/03/2020       XR Abdomen KUB [[id number]] Collected:  06/02/20 1701     Updated:  06/03/20 0736    Narrative:       EXAMINATION: XR ABDOMEN KUB- 06/02/2020     INDICATION: Keofeed; I63.9-Cerebral infarction, unspecified;  R13.12-Dysphagia, oropharyngeal phase; R47.1-Dysarthria and anarthria;  R41.841-Cognitive communication deficit     COMPARISON: NONE     FINDINGS: Feeding tube is seen in the distal stomach, almost in the  duodenal bulb. Bowel gas pattern is nonobstructive.       Impression:       Feeding tube tip in the distal stomach or duodenal bulb.     D:  06/02/2020  E:  06/03/2020           MRI Brain Without Contrast [[id number]] Collected:  06/02/20 1301     Updated:  06/02/20 1916    Narrative:       EXAMINATION: MRI BRAIN WO CONTRAST-06/02/2020:     INDICATION: CVA; I63.9-Cerebral infarction, unspecified;  R13.12-Dysphagia, oropharyngeal phase; R47.1-Dysarthria and anarthria.      TECHNIQUE: Multiplanar MRI of the brain without intravenous contrast.     COMPARISON: NONE.     FINDINGS: Confluent area of restricted diffusion within the right  frontotemporal region along with anterior and posterior MCA distribution  restriction consistent with evolving right MCA infarction with  effacement of the right lateral ventricle, however, no midline shift.  Background increased signal findings of at least moderate involvement  demonstrating moderately advanced chronic small vessel ischemic disease.  Susceptibility-weighted imaging performed without susceptibility  artifact. Pituitary and sella within normal limits. Globes and orbits  retain normal T2 signal characteristics. The visualized paranasal  sinuses and mastoid air cells are grossly clear and well pneumatized. No  cerebellopontine angle mass lesion. Normal signal flow voids of the  distal internal carotid and vertebrobasilar  arteries.       Impression:       Evolving right MCA infarction with restricted diffusion and  effacement of the right lateral ventricle, however, no midline shift.  This is superimposed upon moderately advanced chronic small vessel  ischemic disease findings.     D:  06/02/2020  E:  06/02/2020            This report was finalized on 6/2/2020 7:13 PM by Dr. Cristi Perez.       FL Video Swallow With Speech Single Contrast [[id number]] Collected:  06/02/20 1339     Updated:  06/02/20 1638    Narrative:       EXAMINATION: FL VIDEO SWALLOW W SPEECH SINGLE-CONTRAST-     INDICATION: dysphagia; I63.9-Cerebral infarction, unspecified;  R13.10-Dysphagia, unspecified; R47.1-Dysarthria and anarthria     TECHNIQUE: 48 seconds of fluoroscopic time was used for this exam. 6  associated fluoroscopic loops were saved. Patient was evaluated in the  seated lateral position while taking a variety of consistencies of  barium by mouth under the direction of speech pathology.     COMPARISON: NONE     FINDINGS: 48 seconds of fluoroscopy provided for a modified barium  swallow. Please see speech therapy report for full details and  recommendations.          Impression:       Fluoroscopy provided for a modified barium swallow. Please  see speech therapy report for full details and recommendations.         This report was finalized on 6/2/2020 4:35 PM by Dr. Chastity Rico MD.       CT Head Without Contrast [[id number]] Collected:  06/01/20 1639     Updated:  06/02/20 1637    Narrative:       EXAMINATION: CT HEAD WO CONTRAST- 06/01/2020     INDICATION: stroke; I63.9-Cerebral infarction, unspecified;  R13.10-Dysphagia, unspecified; R47.1-Dysarthria and anarthria; acute  left-sided weakness     TECHNIQUE: Multiple axial CT imaging was obtained of the head without  the administration of intravenous contrast.     The radiation dose reduction device was turned on for each scan per the  ALARA (As Low as Reasonably Achievable) protocol.      COMPARISON: NONE     FINDINGS: There is a large area of low density involving the right MCA  territory representing the patient's evolving area of infarction. There  is no significant change seen in the interval. No hemorrhagic  conversion. There is no significant effacement on the right lateral  ventricle. No significant midline shift. Bony structures are  unremarkable. Visualized paranasal sinuses are clear. The mastoid air  cells are patent.       Impression:       Stable examination with evolving right MCA territory  infarct. No hemorrhagic conversion.     D:  06/01/2020  E:  06/01/2020     This report was finalized on 6/2/2020 4:34 PM by Dr. Chastity Rico MD.               Results: Reviewed.  I reviewed the patient's new laboratory and imaging results.  I independently reviewed the patient's new images.    Medications: Reviewed.    Assessment/Plan   A / P     Ms. 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 Her CT perfusion showed an area in the right MCA territory of reversible ischemia and she underwent angiography with intra-arterial tPA after a failed thrombectomy. She failed her MBS and a keofeed has been placed with tube feeds started.      Nutrition:   NPO Diet  Advance Directives:   Code Status and Medical Interventions:   Ordered at: 05/31/20 174     Code Status:    CPR     Medical Interventions (Level of Support Prior to Arrest):    Full       Active Hospital Problems    Diagnosis   • **Cerebrovascular accident (CVA) due to embolism of right middle cerebral artery (CMS/HCC)   • Acute ischemic stroke (CMS/HCC)   • Atrial fibrillation (CMS/HCC)       Assessment / Plan:    Start Seroquel 50mg at night for sleep and agitation.   Start Fioricet for headache.   Restart home antidiarrheals.   EP following for management of Afib.   Neurology following  Tolerating tube  feeds. D/c IVF  PT/OT  ASA/Statin  AM labs  Okay to transfer to telemetry when a bed is available.     High level of risk due to:  severe exacerbation of chronic illness and illness with threat to life or bodily function.    Plan of care and goals reviewed during interdisciplinary rounds.  I discussed the patient's findings and my recommendations with patient, family and nursing staff      Magda Tavares, DO    Intensive Care Medicine and Pulmonary Medicine

## 2020-06-04 ENCOUNTER — APPOINTMENT (OUTPATIENT)
Dept: GENERAL RADIOLOGY | Facility: HOSPITAL | Age: 65
End: 2020-06-04

## 2020-06-04 ENCOUNTER — APPOINTMENT (OUTPATIENT)
Dept: CT IMAGING | Facility: HOSPITAL | Age: 65
End: 2020-06-04

## 2020-06-04 LAB
ANION GAP SERPL CALCULATED.3IONS-SCNC: 17 MMOL/L (ref 5–15)
BUN BLD-MCNC: 12 MG/DL (ref 8–23)
BUN/CREAT SERPL: 17.6 (ref 7–25)
CALCIUM SPEC-SCNC: 9.3 MG/DL (ref 8.6–10.5)
CHLORIDE SERPL-SCNC: 97 MMOL/L (ref 98–107)
CO2 SERPL-SCNC: 22 MMOL/L (ref 22–29)
CREAT BLD-MCNC: 0.68 MG/DL (ref 0.57–1)
GFR SERPL CREATININE-BSD FRML MDRD: 86 ML/MIN/1.73
GLUCOSE BLD-MCNC: 120 MG/DL (ref 65–99)
GLUCOSE BLDC GLUCOMTR-MCNC: 134 MG/DL (ref 70–130)
GLUCOSE BLDC GLUCOMTR-MCNC: 134 MG/DL (ref 70–130)
GLUCOSE BLDC GLUCOMTR-MCNC: 143 MG/DL (ref 70–130)
MAGNESIUM SERPL-MCNC: 2.2 MG/DL (ref 1.6–2.4)
PHOSPHATE SERPL-MCNC: 4.4 MG/DL (ref 2.5–4.5)
POTASSIUM BLD-SCNC: 4.4 MMOL/L (ref 3.5–5.2)
SODIUM BLD-SCNC: 136 MMOL/L (ref 136–145)
TROPONIN T SERPL-MCNC: <0.01 NG/ML (ref 0–0.03)
UFH PPP CHRO-ACNC: 0.1 IU/ML (ref 0.3–0.7)
UFH PPP CHRO-ACNC: 0.17 IU/ML (ref 0.3–0.7)
UFH PPP CHRO-ACNC: 0.19 IU/ML (ref 0.3–0.7)

## 2020-06-04 PROCEDURE — 80048 BASIC METABOLIC PNL TOTAL CA: CPT | Performed by: INTERNAL MEDICINE

## 2020-06-04 PROCEDURE — 84100 ASSAY OF PHOSPHORUS: CPT | Performed by: INTERNAL MEDICINE

## 2020-06-04 PROCEDURE — 87040 BLOOD CULTURE FOR BACTERIA: CPT | Performed by: HOSPITALIST

## 2020-06-04 PROCEDURE — 82962 GLUCOSE BLOOD TEST: CPT

## 2020-06-04 PROCEDURE — 99233 SBSQ HOSP IP/OBS HIGH 50: CPT | Performed by: HOSPITALIST

## 2020-06-04 PROCEDURE — 25010000002 HEPARIN (PORCINE) 25000-0.45 UT/250ML-% SOLUTION

## 2020-06-04 PROCEDURE — 85520 HEPARIN ASSAY: CPT

## 2020-06-04 PROCEDURE — 84484 ASSAY OF TROPONIN QUANT: CPT | Performed by: NURSE PRACTITIONER

## 2020-06-04 PROCEDURE — 99233 SBSQ HOSP IP/OBS HIGH 50: CPT | Performed by: PSYCHIATRY & NEUROLOGY

## 2020-06-04 PROCEDURE — 83735 ASSAY OF MAGNESIUM: CPT | Performed by: INTERNAL MEDICINE

## 2020-06-04 PROCEDURE — 70450 CT HEAD/BRAIN W/O DYE: CPT

## 2020-06-04 PROCEDURE — 74018 RADEX ABDOMEN 1 VIEW: CPT

## 2020-06-04 PROCEDURE — 71045 X-RAY EXAM CHEST 1 VIEW: CPT

## 2020-06-04 RX ORDER — AMLODIPINE BESYLATE 5 MG/1
5 TABLET ORAL DAILY
Status: DISCONTINUED | OUTPATIENT
Start: 2020-06-04 | End: 2020-06-16

## 2020-06-04 RX ORDER — LISINOPRIL 10 MG/1
10 TABLET ORAL EVERY EVENING
Status: DISCONTINUED | OUTPATIENT
Start: 2020-06-04 | End: 2020-06-14

## 2020-06-04 RX ORDER — QUETIAPINE FUMARATE 25 MG/1
25 TABLET, FILM COATED ORAL NIGHTLY PRN
Status: DISCONTINUED | OUTPATIENT
Start: 2020-06-04 | End: 2020-06-05

## 2020-06-04 RX ORDER — ENALAPRILAT 2.5 MG/2ML
1.25 INJECTION INTRAVENOUS EVERY 6 HOURS PRN
Status: DISCONTINUED | OUTPATIENT
Start: 2020-06-04 | End: 2020-06-18 | Stop reason: HOSPADM

## 2020-06-04 RX ADMIN — QUETIAPINE FUMARATE 25 MG: 25 TABLET ORAL at 21:29

## 2020-06-04 RX ADMIN — AMLODIPINE BESYLATE 5 MG: 5 TABLET ORAL at 10:52

## 2020-06-04 RX ADMIN — ACETAMINOPHEN 650 MG: 650 SUPPOSITORY RECTAL at 14:02

## 2020-06-04 RX ADMIN — ACETAMINOPHEN ORAL SOLUTION 649.6 MG: 650 SOLUTION ORAL at 21:47

## 2020-06-04 RX ADMIN — DIPHENOXYLATE HYDROCHLORIDE AND ATROPINE SULFATE 1 TABLET: 2.5; .025 TABLET ORAL at 10:52

## 2020-06-04 RX ADMIN — BUTALBITAL, ACETAMINOPHEN, AND CAFFEINE 1 TABLET: 50; 325; 40 TABLET ORAL at 18:10

## 2020-06-04 RX ADMIN — LISINOPRIL 10 MG: 10 TABLET ORAL at 16:23

## 2020-06-04 RX ADMIN — METOPROLOL TARTRATE 25 MG: 25 TABLET, FILM COATED ORAL at 18:10

## 2020-06-04 RX ADMIN — ATORVASTATIN CALCIUM 80 MG: 40 TABLET, FILM COATED ORAL at 21:25

## 2020-06-04 RX ADMIN — SODIUM CHLORIDE 500 ML: 9 INJECTION, SOLUTION INTRAVENOUS at 08:37

## 2020-06-04 RX ADMIN — METOPROLOL TARTRATE 2.5 MG: 5 INJECTION INTRAVENOUS at 21:28

## 2020-06-04 RX ADMIN — ASPIRIN 81 MG 81 MG: 81 TABLET ORAL at 10:52

## 2020-06-04 RX ADMIN — METOPROLOL TARTRATE 2.5 MG: 5 INJECTION INTRAVENOUS at 02:52

## 2020-06-04 RX ADMIN — SODIUM CHLORIDE, PRESERVATIVE FREE 10 ML: 5 INJECTION INTRAVENOUS at 21:25

## 2020-06-04 RX ADMIN — HEPARIN SODIUM 15 UNITS/KG/HR: 10000 INJECTION, SOLUTION INTRAVENOUS at 13:58

## 2020-06-04 RX ADMIN — FAMOTIDINE 20 MG: 10 INJECTION INTRAVENOUS at 10:53

## 2020-06-04 RX ADMIN — FAMOTIDINE 20 MG: 10 INJECTION INTRAVENOUS at 21:25

## 2020-06-04 RX ADMIN — METOPROLOL TARTRATE 5 MG: 5 INJECTION INTRAVENOUS at 06:02

## 2020-06-04 NOTE — DISCHARGE PLACEMENT REQUEST
"Norah Lane (65 y.o. Female)   Asif Muñoz, RN Case Manager  [phone]  Referral for rehab    Date of Birth Social Security Number Address Home Phone MRN    1955  [address] [place] [address] [phone] [id number]    Congregational Marital Status          Unknown Single       Admission Date Admission Type Admitting Provider Attending Provider Department, Room/Bed    5/31/20 Emergency Alesia Machado MD Khamvanthong, Phonekeo, MD [facility] 3F, S311/1    Discharge Date Discharge Disposition Discharge Destination                       Attending Provider:  Alesia Machado MD    Allergies:  Digoxin, Iodine, Other    Isolation:  None   Infection:  COVID Screen (preop/placement) (06/03/20)   Code Status:  CPR    Ht:  154.9 cm (61\")   Wt:  109 kg (240 lb 15.4 oz)    Admission Cmt:  None   Principal Problem:  Cerebrovascular accident (CVA) due to embolism of right middle cerebral artery (CMS/HCC) [I63.411]                 Active Insurance as of 5/31/2020     Primary Coverage     Payor Plan Insurance Group Employer/Plan Group    ANTHEM MEDICARE REPLACEMENT ANTHEM MEDICARE ADVANTAGE [id number]     Payor Plan Address Payor Plan Phone Number Payor Plan Fax Number Effective Dates    [address] [phone]  7/1/2019 - None Entered    [facility] [id number]       Subscriber Name Subscriber Birth Date Member ID       NORAH LANE CHEN 1955 [id number]           Secondary Coverage     Payor Plan Insurance Group Employer/Plan Group    [place]      Payor Plan Address Payor Plan Phone Number Payor Plan Fax Number Effective Dates    [address] [phone]  8/27/2019 - None Entered    [facility] [id number]       Subscriber Name Subscriber Birth Date Member ID       NORAH LANE CHEN 1955 [id number]                 Emergency Contacts      (Rel.) Home Phone Work Phone Mobile Phone    Kaye Porter (Daughter) -- -- [phone] " "   MANDA CUEVAS (Sister) [phone] -- [phone]               History & Physical      Edward Garcia MD at 05/31/20 1745          Pulmonary/Critical Care History and Physical Exam     LOS: 0 days   Patient Care Team:  Jl Mcknight MD as PCP - General (Family Medicine)    Chief Complaint:    Chief Complaint   Patient presents with   • Stroke       Subjective     HPI:   Sidra Lane is a 65 year old female with PMH persistent A-Fib on Eliquis, CAD, HTN, DVT/PE, GERD, who developed sudden onset left sided facial droop, left sided weakness, and facial droop at 1230 today. She states that she hasn't felt well this past week with fatigue and last night had some dizziness. She states that her right leg had been \"going out from under her\" intermittently over the past week but she attributed this to back pain which started after she mowed her lawn 3 weeks ago.  She does report that she had a brain scan at [facility] approximately 3 weeks ago for evaluation of episodes of dizziness and was told it was okay.  EMS was called and noted she had some movement in her LUE but still had significant weakness. She was flown to [facility] via helicopter for higher level of care and on arrival NIH was 6. She is on Eliquis daily for persistent A-Fib and was therefore not a candidate for systemic tPA. She reported an allergic reaction to Iodine contrast in 1973 but stated that she could have contrast if she is premedicated beforehand.  She was pre-medicated with Benadryl 25 mg and Solu Medrol 125 mg prior to scans. CT scan of the head showed an area concerning for evolving right MCA infarction.  CTP revealed an M2 occlusion and she was taken emergently to Cath Lab for neurointervention where she underwent unsuccessful attempt at thrombectomy and received IA tPA. Post-procedure she was brought to Neuro ICU for close monitoring.     On arrival to ICU she is alert and oriented with slurred speech and " NIH is 7. She has a history of A-Fib followed by Dr. Alan and is scheduled for a PVA on June 22.     History taken from:     Past Medical History:   Diagnosis Date   • Atrial fibrillation (CMS/HCC)    • CHF (congestive heart failure) (CMS/HCC)    • Deep vein thrombosis (CMS/HCC)    • GERD (gastroesophageal reflux disease)    • Hypertension    • May-Thurner syndrome    • Pulmonary embolism (CMS/HCC)        Past Surgical History:   Procedure Laterality Date   • CHOLECYSTECTOMY     • COLON SURGERY      bowel leakage, some of colon removed   • LAPAROSCOPIC TUBAL LIGATION     • LEG SURGERY      multiple stents to BLE   • TONSILLECTOMY         Family History   Problem Relation Age of Onset   • Hypertension Mother    • Cervical cancer Mother        Social History     Socioeconomic History   • Marital status: Single     Spouse name: Not on file   • Number of children: Not on file   • Years of education: Not on file   • Highest education level: Not on file   Tobacco Use   • Smoking status: Never Smoker   • Smokeless tobacco: Never Used   Substance and Sexual Activity   • Alcohol use: No   • Drug use: No       Allergies   Allergen Reactions   • Digoxin Other (See Comments)     Severe c/p, left arm pain, lips numb, finger tip numbness   • Iodine Rash     Breathing problems   • Other Other (See Comments)     Monistat, causes blisters       PMH/FH/SocH were reviewed by me and updates were made.     Review of Systems:    Review of 14 systems was completed with positives and pertinent negatives noted in the subjective section.  All other systems reviewed and are negative.       Objective     Vital Signs  Temp:  [97.7 °F (36.5 °C)-98.6 °F (37 °C)] 97.7 °F (36.5 °C)  Heart Rate:  [] 116  Resp:  [16] 16  BP: ()/() 111/78  Body mass index is 43.46 kg/m².       Physical Exam:     General Appearance:    Alert, cooperative, in no acute distress   Head:    Normocephalic, without obvious abnormality, atraumatic    Eyes:            Lids and lashes normal, conjunctivae and sclerae normal, no   icterus, no pallor, corneas clear, PERRL, forced right gaze deviation   ENMT:   Ears appear intact with no abnormalities noted      No oral lesions, no thrush, oral mucosa moist   Neck:   No adenopathy, supple, trachea midline, no thyromegaly, no   carotid bruit, no JVD       Lungs/resp:     Normal effort, symmetric chest rise, no crepitus, clear to      auscultation bilaterally, no chest wall tenderness                  Heart/CV:    Regular rhythm and normal rate, normal S1 and S2, no            murmur   Abdomen/GI:     Normal bowel sounds, no masses, no organomegaly, soft        non-tender, non-distended   G/U:     Deferred, Logan catheter   Extremities/MSK:   No clubbing, cyanosis or edema.  Normal tone.  No deformities. Right groin soft, dressing intact, oozing blood, no hematoma   Pulses:   Pulses palpable and equal bilaterally   Skin:   No bleeding, bruising or rash   Hem/Lymph:   No cervical or supraclavicular adenopathy.    Neurologic:  Left facial droop and right upper facial droop, Left upper extremity greater than left lower extremity weakness.  Very little  strength left hand.            Psychiatric:  Normal mood and affect, oriented x 3.   The above findings are documentation my personal physical examination.  Electronically signed by:Edward Garcia MD 05/31/20 19:40    Interval: handoff  1a. Level of Consciousness: 0-->Alert, keenly responsive  1b. LOC Questions: 0-->Answers both questions correctly  1c. LOC Commands: 0-->Performs both tasks correctly  2. Best Gaze: 1-->Partial gaze palsy, gaze is abnormal in one or both eyes, but forced deviation or total gaze paresis is not present  3. Visual: 0-->No visual loss  4. Facial Palsy: 1-->Minor paralysis (flattened nasolabial fold, asymmetry on smiling)  5a. Motor Arm, Left: 1-->Drift, limb holds 90 (or 45) degrees, but drifts down before full 10 seconds, does not hit  bed or other support  5b. Motor Arm, Right: 0-->No drift, limb holds 90 (or 45) degrees for full 10 secs  6a. Motor Leg, Left: 1-->Drift, leg falls by the end of the 5-sec period but does not hit bed  6b. Motor Leg, Right: 0-->No drift, leg holds 30 degree position for full 5 secs  7. Limb Ataxia: 0-->Absent  8. Sensory: 1-->Mild-to-moderate sensory loss, patient feels pinprick is less sharp or is dull on the affected side, or there is a loss of superficial pain with pinprick, but patient is aware of being touched  9. Best Language: 0-->No aphasia, normal  10. Dysarthria: 1-->Mild-to-moderate dysarthria, patient slurs at least some words and, at worst, can be understood with some difficulty  11. Extinction and Inattention (formerly Neglect): 0-->No abnormality    Total (NIH Stroke Scale): 6   Results Review:     I reviewed the patient's new clinical results.   Results from last 7 days   Lab Units 05/31/20  1517 05/31/20  1501   CREATININE mg/dL  --  0.70   ALT (SGPT) U/L 19  --    AST (SGOT) U/L 31  --      Results from last 7 days   Lab Units 05/31/20  1517 05/31/20  1507   WBC 10*3/mm3 9.37  --    HEMOGLOBIN g/dL 11.9*  --    HEMOGLOBIN, POC g/dL  --  14.6   HEMATOCRIT % 41.9  --    HEMATOCRIT POC %  --  43   PLATELETS 10*3/mm3 307  --      Results from last 7 days   Lab Units 05/31/20  1507   PH, ARTERIAL pH units 7.37       I reviewed the patient's new imaging including images and reports.    CT head:  IMPRESSION:  Abnormal sulcal effacement with cortical extension of edema  in the right frontotemporal region and insular cortex concerning for  evolving right MCA infarction. No acute hemorrhage.    CTA head/neck:  IMPRESSION:  CTA head and neck with only mild atherosclerotic involvement  of the distal internal carotid arteries however no hemodynamically  significant stenosis, aneurysm or occlusion; specifically no large  vessel occlusion involving the [place] with only mild  irregularities in the distal  MCA distributions of potential underlying  atherosclerotic involvement without obvious occlusion.    CT cerebral perfusion:  IMPRESSION:  Area of reversible ischemia within the right MCA territory  without core infarct component identified.    Medication Review:     [START ON 6/1/2020] aspirin 325 mg Oral Daily   Or      [START ON 6/1/2020] aspirin 300 mg Rectal Daily   atorvastatin 80 mg Oral Nightly   famotidine 20 mg Intravenous Q12H   sodium chloride 10 mL Intravenous Q12H       niCARdipine 5-15 mg/hr Last Rate: Stopped (05/31/20 1854)       Assessment/Plan     Active Hospital Problems    Diagnosis   • **Cerebrovascular accident (CVA) due to embolism of right middle cerebral artery (CMS/HCC)   • Acute ischemic stroke (CMS/HCC)   • Atrial fibrillation (CMS/HCC)     65 y.o. 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  CT perfusion showed area in right MCA territory of reversible ischemia and she underwent angiography with intra-arterial TPA after failed thrombectomy.  She is transferred to the intensive care unit post procedure with persistent left sided weakness.  She has persistent atrial fibrillation.    Patient is critically ill secondary to neurologic failure with high risk of life-threatening decline in condition including potential hemorrhagic conversion, recurrent stroke, seizure and requires continuous monitoring frequent reassessment to minimize this risk.  I personally reassessed her multiple times today.    Plan:  1.  Admit to Neuro ICU  2.  Stroke Pathway, no tPA  3.  Neurology consult  4.  ST/OT/PT  5.  Cardene drip to keep SBP   6.  NPO until dysphagia eval  7.   mg QD  8.  Lipitor 80 mg QD  9.  MRI brain  10.  GI/DVT Prophylaxis    Edward Garcia MD  05/31/20  19:19     I performed an independent history and physical examination. Portions of the  history were obtained by APRN and were modified by me according to my findings. The above note reflects my findings, assessment, and plan.    Edward Garcia MD        Critical care time : 40 minutes  spent by me personally.(This excludes time spent performing separately reportable procedures and services). including high complexity decision making to assess, manipulate, and support vital organ system failure in this individual who has impairment of one or more vital organ systems such that there is a high probability of imminent or life threatening deterioration in the patient’s condition.    Electronically signed by:  Edward Garcia MD   05/31/20  19:47      • Please note that portions of this note were completed with [employer] - a voice recognition program.     Electronically signed by Edward Garcia MD at 05/31/20 1948       Vital Signs (last day)     Date/Time   Temp   Temp src   Pulse   Resp   BP   Patient Position   SpO2    06/04/20 1129   98.6 (37)   Axillary   107   24   (!) 154/103   Lying   92    06/04/20 0929   --   --   109   --   (!) 155/131   Lying   --    06/04/20 0900   --   --   (!) 135   --   --   --   --    06/04/20 0841   --   --   112   24   --   --   90    06/04/20 0829   --   --   116   --   (!) 156/105   Lying   92    06/04/20 0708   98.5 (36.9)   Axillary   106   18   (!) 170/127   Lying   93    06/04/20 0700   --   --   (!) 132   --   --   --   93    06/04/20 0604   --   --   (!) 141   --   --   --   91    06/04/20 0603   98.6 (37)   Oral   (!) 126   18   132/84   Lying   90    06/04/20 0600   --   --   (!) 124   --   --   --   90    06/04/20 0400   --   --   114   --   --   --   94    06/04/20 0200   --   --   (!) 143   --   --   --   91    06/04/20 0045   --   --   (!) 158   --   --   --   92    06/04/20 0001   --   --   113   --   --   --   97    06/03/20 2324   --   --   (!) 146   20   139/92   Lying   --    06/03/20 2135   --   --   (!) 124   --   (!) 122/110   --    94    06/03/20 2230   --   --   115   --   (!) 154/125   --   96    06/03/20 2215   --   --   (!) 134   --   124/91   --   95    06/03/20 2200   --   --   106   18   (!) 132/114   Lying   97    06/03/20 2015   --   --   (!) 131   --   (!) 154/109   --   95    06/03/20 1900   98.7 (37.1)   Oral   --   18   (!) 154/109   Lying   --    06/03/20 1702   --   --   118   20   (!) 133/102   Lying   97    06/03/20 1423   --   --   99   --   148/91   --   97    06/03/20 1300   --   --   101   --   (!) 146/109   --   97    06/03/20 1200   --   --   91   18   (!) 141/103   Lying   95    06/03/20 1100   --   --   96   --   (!) 148/105   --   98    06/03/20 1000   --   --   105   --   144/97   --   96    06/03/20 0907   --   --   86   --   (!) 130/102   --   95    06/03/20 0852   --   --   90   --   (!) 134/105   --   95    06/03/20 0837   --   --   85   --   138/99   --   95    06/03/20 0757   98.5 (36.9)   Oral   83   18   --   Lying   96    06/03/20 0630   --   --   96   --   138/94   --   94    06/03/20 0615   --   --   105   --   128/91   --   93    06/03/20 0550   --   --   90   18   147/96   Lying   95    06/03/20 0515   --   --   85   --   149/86   --   96    06/03/20 0500   --   --   82   --   142/96   --   94    06/03/20 0450   --   --   93   --   138/94   --   92    06/03/20 0435   --   --   83   --   123/90   --   94    06/03/20 0420   --   --   85   --   136/87   --   92    06/03/20 0405   --   --   87   --   138/91   --   91    06/03/20 0400   98.5 (36.9)   Oral   87   20   138/91   Lying   (!) 89    06/03/20 0350   --   --   101   --   123/99   --   95    06/03/20 0335   --   --   96   --   120/89   --   95    06/03/20 0325   --   --   102   --   128/99   --   96    06/03/20 0235   --   --   95   --   150/94   --   95    06/03/20 0220   --   --   99   --   138/94   --   96    06/03/20 0215   --   --   99   --   (!) 138/102   --   96    06/03/20 0205   --   --   96   --   (!) 161/107   --   96    06/03/20 0200   --    --   97   20   (!) 161/107   Lying   96    06/03/20 0135   --   --   101   --   151/94   --   95    06/03/20 0105   --   --   101   --   144/100   --   95    06/03/20 0014   --   --   90   --   129/96   --   95    06/03/20 0000   98.9 (37.2)   Oral   89   20   129/96   Lying   95                Current Facility-Administered Medications   Medication Dose Route Frequency Provider Last Rate Last Dose   • acetaminophen (TYLENOL) 160 MG/5ML solution 650 mg  650 mg Nasogastric Q4H PRN Case, Magda V., DO   649.6 mg at 06/03/20 0204   • acetaminophen (TYLENOL) suppository 650 mg  650 mg Rectal Q4H PRN Case, Magda V., DO   650 mg at 06/01/20 1645   • amLODIPine (NORVASC) tablet 5 mg  5 mg Oral Daily Alesia Machado MD   5 mg at 06/04/20 1052   • aspirin chewable tablet 81 mg  81 mg Oral Daily Spike Vaughan MD   81 mg at 06/04/20 1052   • atorvastatin (LIPITOR) tablet 80 mg  80 mg Oral Nightly Given, Brant WOODY MD   80 mg at 06/03/20 2045   • butalbital-acetaminophen-caffeine (FIORICET, ESGIC) -40 MG per tablet 1 tablet  1 tablet Oral Q4H PRN Case, Magda V., DO   1 tablet at 06/03/20 1217   • cholestyramine light packet 4 g  1 packet Oral Daily Case, Magda V., DO   4 g at 06/03/20 1415   • diphenoxylate-atropine (LOMOTIL) 2.5-0.025 MG per tablet 1 tablet  1 tablet Oral BID Case, Magda V., DO   1 tablet at 06/04/20 1052   • enalaprilat (VASOTEC) injection 1.25 mg  1.25 mg Intravenous Q6H PRN Alesia Machado MD       • famotidine (PEPCID) injection 20 mg  20 mg Intravenous Q12H Freda Sidhu APRN   20 mg at 06/04/20 1053   • heparin 71717 units/250 mL (100 units/mL) in 0.45 % NaCl infusion  13 Units/kg/hr Intravenous Titrated Qamar Braga, [facility] 14.17 mL/hr at 06/04/20 0303 13 Units/kg/hr at 06/04/20 0303   • lisinopril (PRINIVIL,ZESTRIL) tablet 10 mg  10 mg Oral Q PM Alesia Machado MD       • metoprolol tartrate (LOPRESSOR) injection 2.5 mg  2.5 mg Intravenous Q6H  PRN David Coy APRN   2.5 mg at 06/04/20 0252   • metoprolol tartrate (LOPRESSOR) injection 5 mg  5 mg Intravenous Q6H David Coy APRN   5 mg at 06/04/20 0602   • nitroglycerin (NITROSTAT) SL tablet 0.4 mg  0.4 mg Sublingual Q5 Min PRN David Coy APRN       • Pharmacy to Dose Heparin   Does not apply Continuous PRN Joe Lane, [facility]       • potassium chloride (MICRO-K) CR capsule 40 mEq  40 mEq Oral PRN Case, Magda V., DO        Or   • potassium chloride (KLOR-CON) packet 40 mEq  40 mEq Oral PRN Case, Magda V., DO   40 mEq at 06/03/20 0912    Or   • potassium chloride 10 mEq in 100 mL IVPB  10 mEq Intravenous Q1H PRN Case, Magda V., DO       • QUEtiapine (SEROquel) tablet 25 mg  25 mg Oral Nightly PRN Alesia Machado MD       • sodium chloride 0.9 % flush 10 mL  10 mL Intravenous PRN Given, Brant WOODY MD       • sodium chloride 0.9 % flush 10 mL  10 mL Intravenous Q12H Given, Brant WOODY MD   10 mL at 06/03/20 2046   • sodium chloride 0.9 % flush 10 mL  10 mL Intravenous PRN Given, Brant WOODY MD           Lab Results (last 24 hours)     Procedure Component Value Units Date/Time    Heparin Anti-Xa [[id number]] Updated:  06/04/20 1044    Specimen:  Blood     POC Glucose Once [[id number]]  (Abnormal) Collected:  06/04/20 0831    Specimen:  Blood Updated:  06/04/20 0833     Glucose 143 mg/dL     Basic Metabolic Panel [[id number]]  (Abnormal) Collected:  06/04/20 0518    Specimen:  Blood Updated:  06/04/20 0700     Glucose 120 mg/dL      BUN 12 mg/dL      Creatinine 0.68 mg/dL      Sodium 136 mmol/L      Potassium 4.4 mmol/L      Chloride 97 mmol/L      CO2 22.0 mmol/L      Calcium 9.3 mg/dL      eGFR Non African Amer 86 mL/min/1.73      BUN/Creatinine Ratio 17.6     Anion Gap 17.0 mmol/L     Narrative:       GFR Normal >60  Chronic Kidney Disease <60  Kidney Failure <15      Phosphorus [[id number]]  (Normal) Collected:  06/04/20 0518    Specimen:  Blood Updated:  06/04/20 0700      Phosphorus 4.4 mg/dL     Magnesium [[id number]]  (Normal) Collected:  06/04/20 0518    Specimen:  Blood Updated:  06/04/20 0658     Magnesium 2.2 mg/dL     Troponin [[id number]]  (Normal) Collected:  06/04/20 0518    Specimen:  Blood Updated:  06/04/20 0645     Troponin T <0.010 ng/mL     Narrative:       Troponin T Reference Range:  <= 0.03 ng/mL-   Negative for AMI  >0.03 ng/mL-     Abnormal for myocardial necrosis.  Clinicians would have to utilize clinical acumen, EKG, Troponin and serial changes to determine if it is an Acute Myocardial Infarction or myocardial injury due to an underlying chronic condition.       Results may be falsely decreased if patient taking Biotin.      Heparin Anti-Xa [[id number]]  (Abnormal) Collected:  06/04/20 0211    Specimen:  Blood Updated:  06/04/20 0252     Heparin Anti-Xa (UFH) 0.10 IU/ml     POC Glucose Once [[id number]]  (Abnormal) Collected:  06/03/20 2351    Specimen:  Blood Updated:  06/03/20 2353     Glucose 131 mg/dL     Troponin [[id number]]  (Normal) Collected:  06/03/20 2303    Specimen:  Blood Updated:  06/03/20 2326     Troponin T <0.010 ng/mL     Narrative:       Troponin T Reference Range:  <= 0.03 ng/mL-   Negative for AMI  >0.03 ng/mL-     Abnormal for myocardial necrosis.  Clinicians would have to utilize clinical acumen, EKG, Troponin and serial changes to determine if it is an Acute Myocardial Infarction or myocardial injury due to an underlying chronic condition.       Results may be falsely decreased if patient taking Biotin.      Heparin Anti-Xa [[id number]]  (Abnormal) Collected:  06/03/20 2000    Specimen:  Blood Updated:  06/03/20 2025     Heparin Anti-Xa (UFH) 0.10 IU/ml     POC Glucose Once [[id number]]  (Normal) Collected:  06/03/20 1800    Specimen:  Blood Updated:  06/03/20 1808     Glucose 111 mg/dL     CBC & Differential [[id number]] Collected:  06/03/20 1501    Specimen:  Blood Updated:  06/03/20 1559    Narrative:       The following orders were created  for panel order CBC & Differential.  Procedure                               Abnormality         Status                     ---------                               -----------         ------                     CBC Auto Differential[[id number]]        Abnormal            Final result                 Please view results for these tests on the individual orders.    CBC Auto Differential [[id number]]  (Abnormal) Collected:  06/03/20 1501    Specimen:  Blood Updated:  06/03/20 1559     WBC 11.85 10*3/mm3      RBC 5.29 10*6/mm3      Hemoglobin 11.9 g/dL      Hematocrit 43.1 %      MCV 81.5 fL      MCH 22.5 pg      MCHC 27.6 g/dL      RDW 19.3 %      RDW-SD 55.4 fl      MPV 10.2 fL      Platelets 263 10*3/mm3      Neutrophil % 71.1 %      Lymphocyte % 16.0 %      Monocyte % 9.5 %      Eosinophil % 2.4 %      Basophil % 0.5 %      Immature Grans % 0.5 %      Neutrophils, Absolute 8.42 10*3/mm3      Lymphocytes, Absolute 1.90 10*3/mm3      Monocytes, Absolute 1.12 10*3/mm3      Eosinophils, Absolute 0.29 10*3/mm3      Basophils, Absolute 0.06 10*3/mm3      Immature Grans, Absolute 0.06 10*3/mm3      nRBC 0.0 /100 WBC     Scan Slide [[id number]] Collected:  06/03/20 1501    Specimen:  Blood Updated:  06/03/20 1559     Hypochromia Mod/2+     Spherocytes Mod/2+     WBC Morphology Normal     Platelet Morphology Normal    Heparin Anti-Xa [[id number]]  (Abnormal) Collected:  06/03/20 1501    Specimen:  Blood Updated:  06/03/20 1535     Heparin Anti-Xa (UFH) 0.10 IU/ml     Protime-INR [[id number]]  (Normal) Collected:  06/03/20 1501    Specimen:  Blood Updated:  06/03/20 1534     Protime 13.1 Seconds      INR 1.02    aPTT [[id number]]  (Abnormal) Collected:  06/03/20 1501    Specimen:  Blood Updated:  06/03/20 1534     PTT 25.1 seconds     Narrative:       PTT = The equivalent PTT values for the therapeutic range of heparin levels at 0.3 to 0.5 U/ml are 55 to 70 seconds.    POC Glucose Once [[id number]]  (Normal) Collected:  06/03/20  1234    Specimen:  Blood Updated:  06/03/20 1238     Glucose 107 mg/dL            Physician Progress Notes (last 24 hours) (Notes from 06/03/20 1133 through 06/04/20 1133)      Spike Vaughan MD at 06/03/20 1245          Stroke Progress Note       Chief Complaint:  Left-sided weakness    Subjective     Subjective:  The patient feels that she has made some improvements.  She still is having difficulty with her swallowing but does not want a PEG tube yet.    Review of Systems   Constitutional: Positive for fatigue.   Eyes: Positive for visual disturbance.   Respiratory: Negative for shortness of breath.    Cardiovascular: Negative for chest pain.        Sustained atrial fibrillation; rate controlled   Gastrointestinal: Positive for abdominal pain and diarrhea. Negative for nausea.   Endocrine: Negative.    Genitourinary: Negative.    Musculoskeletal: Negative.    Skin: Negative.    Neurological: Positive for facial asymmetry, speech difficulty, weakness and headaches.        Complains of bilateral frontal headache with bilateral eye pain (ongoing since admission)   Psychiatric/Behavioral: Negative.         Objective      Temp:  [97.4 °F (36.3 °C)-98.9 °F (37.2 °C)] 98.5 °F (36.9 °C)  Heart Rate:  [] 101  Resp:  [16-22] 18  BP: (120-161)/() 146/109    Neurological Exam  Mental Status  Awake, alert and oriented to person, place and time.Alert. Mild dysarthria present. Language is fluent with no aphasia. Able to perform serial calculations. Able to spell words backwards. Fund of knowledge is appropriate for level of education.    Cranial Nerves  CN II: Visual fields full to confrontation.  CN III, IV, VI: Pupils equal round and reactive to light bilaterally. Partial gaze palsy with right gaze preference, unable to move eyes beyond midline on left.  CN V: Facial sensation is normal.  CN VII:  Left: There is central facial weakness.  CN VIII: Hearing is normal.  CN IX, X: Palate elevates  symmetrically  CN XI: Shoulder shrug strength is normal.  CN XII: Tongue midline without atrophy or fasciculations.    Motor   Right hemiparesis.  Left upper extremity is 2/5, left lower extremity is 4/5  Right upper extremity/right lower extremity spontaneous movement 5/5.    Sensory  Light touch is normal in upper and lower extremities.     Reflexes  Not assessed.    Coordination  Finger-to-nose, rapid alternating movements and heel-to-shin normal bilaterally without dysmetria.    Gait  Not assessed.      Physical Exam   Constitutional: She is oriented to person, place, and time. She appears well-developed and well-nourished.   HENT:   Head: Normocephalic and atraumatic.   Eyes: Pupils are equal, round, and reactive to light.   Cardiovascular: Normal rate.   Sustained atrial fibrillation   Pulmonary/Chest: Effort normal and breath sounds normal.   Abdominal: Soft.   Neurological: She is alert and oriented to person, place, and time. A cranial nerve deficit is present. Coordination normal. GCS eye subscore is 4. GCS verbal subscore is 5. GCS motor subscore is 6.   Skin: Skin is warm and dry.   Psychiatric: She has a normal mood and affect. Her behavior is normal.     Nursing note and vitals reviewed.    Results Review:    I reviewed the patient's new clinical results.    Lab Results (last 24 hours)     Procedure Component Value Units Date/Time    POC Glucose Once [[id number]]  (Normal) Collected:  06/03/20 1234    Specimen:  Blood Updated:  06/03/20 1238     Glucose 107 mg/dL     POC Glucose Once [[id number]]  (Abnormal) Collected:  06/03/20 0617    Specimen:  Blood Updated:  06/03/20 0629     Glucose 133 mg/dL     Phosphorus [[id number]]  (Normal) Collected:  06/03/20 0327    Specimen:  Blood Updated:  06/03/20 0427     Phosphorus 2.5 mg/dL     Basic Metabolic Panel [[id number]]  (Abnormal) Collected:  06/03/20 0327    Specimen:  Blood Updated:  06/03/20 0425     Glucose 134 mg/dL      BUN 10 mg/dL      Creatinine  0.66 mg/dL      Sodium 138 mmol/L      Potassium 3.4 mmol/L      Chloride 102 mmol/L      CO2 19.0 mmol/L      Calcium 8.5 mg/dL      eGFR Non African Amer 89 mL/min/1.73      BUN/Creatinine Ratio 15.2     Anion Gap 17.0 mmol/L     Narrative:       GFR Normal >60  Chronic Kidney Disease <60  Kidney Failure <15      Magnesium [[id number]]  (Normal) Collected:  06/03/20 0327    Specimen:  Blood Updated:  06/03/20 0425     Magnesium 2.3 mg/dL     POC Glucose Once [[id number]]  (Normal) Collected:  06/02/20 2355    Specimen:  Blood Updated:  06/03/20 0001     Glucose 114 mg/dL     Nutrition Panel [[id number]]  (Abnormal) Collected:  06/02/20 0430    Specimen:  Blood Updated:  06/02/20 1542     Glucose 145 mg/dL      BUN 12 mg/dL      Creatinine 0.78 mg/dL      Sodium 137 mmol/L      Potassium 4.5 mmol/L      Chloride 101 mmol/L      CO2 17.0 mmol/L      Calcium 8.6 mg/dL      Alkaline Phosphatase 58 U/L      ALT (SGPT) 19 U/L      AST (SGOT) 38 U/L      Total Cholesterol 186 mg/dL      Triglycerides 147 mg/dL      Total Protein 6.9 g/dL      Albumin 3.70 g/dL      Phosphorus 4.0 mg/dL      Total Bilirubin 0.2 mg/dL      Magnesium 2.2 mg/dL      Anion Gap 19.0 mmol/L     Narrative:       Cholesterol Reference Ranges:   Desirable       < 200 mg/dL   Borderline    200-239 mg/dL   High Risk       > 239 mg/dL    Triglyceride Reference Ranges:   Normal          < 150 mg/dL   Borderline    150-199 mg/dL   High          200-499 mg/dL   Very High       > 499 mg/dL        Ct Head Without Contrast    Result Date: 6/3/2020  Slight increase seen in size in the edematous area in the right MCA territory. No significant change seen in the surrounding mass effect or midline shift. No hemorrhagic conversion.  D:  06/03/2020 E:  06/03/2020      Ct Head Without Contrast    Result Date: 6/2/2020  Stable examination with evolving right MCA territory infarct. No hemorrhagic conversion.  D:  06/01/2020 E:  06/01/2020  This report was finalized on  6/2/2020 4:34 PM by Dr. Chastity Rico MD.      Mri Brain Without Contrast    Result Date: 6/2/2020  Evolving right MCA infarction with restricted diffusion and effacement of the right lateral ventricle, however, no midline shift. This is superimposed upon moderately advanced chronic small vessel ischemic disease findings.  D:  06/02/2020 E:  06/02/2020     This report was finalized on 6/2/2020 7:13 PM by Dr. Cristi Perez.      Fl Video Swallow With Speech Single Contrast    Result Date: 6/2/2020  Fluoroscopy provided for a modified barium swallow. Please see speech therapy report for full details and recommendations.    This report was finalized on 6/2/2020 4:35 PM by Dr. Chastity Rico MD.      Xr Abdomen Kub    Result Date: 6/3/2020  Feeding tube tip in the distal stomach or duodenal bulb.  D:  06/02/2020 E:  06/03/2020        Results for orders placed during the hospital encounter of 05/31/20   Adult Transthoracic Echo Complete W/ Cont if Necessary Per Protocol    Narrative · Left ventricular systolic function is normal. Estimated EF appears to be   in the range of 56 - 60%.  · Normal right ventricular cavity size and systolic function noted.  · Left atrial cavity size is moderately dilated. Left atrial volume is   moderately increased.  · No evidence of a patent foramen ovale. Saline test results are negative.  · The aortic valve is abnormal in structure. The valve exhibits sclerosis.   There is mild calcification of the aortic valve  · Mild aortic valve stenosis is present            Assessment/Plan     Assessment/Plan:    Assessment/Plan:        65-year-old right-handed white female with known diagnosis of hypertension, hyperlipidemia, persistent atrial fibrillation, on Eliquis, who comes in with right MCA syndrome, and found to have right MCA stroke.  Thrombectomy was attempted, but intra-arterial TPA was given.  Patient was not a candidate for TPA secondary to being on Eliquis.  Her initial CT head  showed early right MCA stroke, with gray-white matter in differentiation and some sulcal effacement.  CT angiogram head and neck showed her to have right superior temporal branch occlusion.  CT perfusion showed her to have a mismatch in the right MCA territory.  Repeat CT head this morning shows stable appearance of right MCA stroke without evidence of hemorrhage.        1. Right MCA stroke.  Likely cardioembolic, given her being in persistent A. fib.  Patient has had no significant improvement since getting TPA intra-arterially. CT head remains stable today with no evidence of hemmorhage.  Will initiate heparin drip (no bolus ever) and decrease ASA to 81mg.   2. Persistent atrial fibrillation.  Cardiology following. Rate controlled.  Will initiate heparin drip, if patient does well will transition to oral AC.  3. Essential hypertension.  Ok to normalize blood pressure SBP  <140.  4. Mixed hyperlipidemia.  Her total cholesterol is 213 with LDL of 79.  Continue Lipitor 80 mg daily.    5. Activity - Ok to resume PT/OT.  6. Diet - SLP following; patient failed MBS yesterday. Keofeed in place.  Enteral feeding started today per Intensivist.     Case was discussed with the patient's daughter who is at bedside and nursing.  We will continue to follow her.      Orders have been placed on Dr. Vaughan's behalf.    Edith Zee RN  06/03/20  12:45    I reviewed Edith note as documented above, and it was edited as needed. Plan was discussed with pt and family.     Electronically signed by Spike Vaughan MD at 06/03/20 1547     Case, Magda WOODS DO at 06/03/20 1159          INTENSIVIST   PROGRESS NOTE     Hospital:  LOS: 3 days     Ms. Sidra Lane, 65 y.o. female is followed for a Chief Complaint of: CVA      Subjective   S     Interval History:  Failed MBS yesterday. Keofeed placed and started on tube feeds. She is complaining that she is hungry and that her head hurts. She did not sleep last night.         The patient's relevant past medical, surgical and social history were reviewed and updated in Epic as appropriate.      ROS:   Constitutional: Negative for fever.   Respiratory: Negative for dyspnea.   Cardiovascular: Negative for chest pain.   Gastrointestinal: Negative for  nausea, vomiting and diarrhea.       Objective   O     Vitals:  Temp  Min: 97.4 °F (36.3 °C)  Max: 98.9 °F (37.2 °C)  BP  Min: 120/89  Max: 161/107  Pulse  Min: 81  Max: 107  Resp  Min: 16  Max: 22  SpO2  Min: 89 %  Max: 99 % Flow (L/min)  Min: 2  Max: 3    Intake/Ouptut 24 hrs (7:00AM - 6:59 AM)  Intake & Output (last 3 days)       05/31 0701 - 06/01 0700 06/01 0701 - 06/02 0700 06/02 0701 - 06/03 0700 06/03 0701 - 06/04 0700    I.V. (mL/kg) 275.9 (2.5) 2192.7 (19.9) 2828.8 (26)     Other   60     NG/GT   390 60    Total Intake(mL/kg) 275.9 (2.5) 2192.7 (19.9) 3278.8 (30.1) 60 (0.6)    Urine (mL/kg/hr) 1075 350 (0.1) 1175 (0.4)     Stool 0  0     Total Output 1890 593 4453     Net -799.1 +1842.7 +2103.8 +60            Urine Unmeasured Occurrence  1 x 5 x 1 x    Stool Unmeasured Occurrence 1 x  5 x 1 x          Medications (drips):    dexmedetomidine Last Rate: Stopped (06/03/20 0911)   niCARdipine Last Rate: Stopped (06/01/20 0015)          Physical Examination  Telemetry:  Atrial fibrillation.    Constitutional:  No acute distress.  Resting in bed comfortably  Keofeed in place.    Eyes: No scleral icterus.   PERRL, EOM intact.    Neck:  Supple, FROM   Cardiovascular: Normal rate, regular and rhythm. Normal heart sounds.  No murmurs, gallop or rub.   Respiratory: No respiratory distress. Normal respiratory effort.  Normal breath sounds  Clear to ascultation   Abdominal:  Soft. No masses. Non-tender. No distension. No HSM.   Extremities: No digital cyanosis. No clubbing.  No peripheral edema.   Skin: No rashes, lesions or ulcers   Neurological:   Alert and interactive.        Interval: handoff  1a. Level of Consciousness: 0-->Alert,  keenly responsive  1b. LOC Questions: 0-->Answers both questions correctly  1c. LOC Commands: 0-->Performs both tasks correctly  2. Best Gaze: 1-->Partial gaze palsy, gaze is abnormal in one or both eyes, but forced deviation or total gaze paresis is not present  3. Visual: 1-->Partial hemianopia  4. Facial Palsy: 2-->Partial paralysis (total or near-total paralysis of lower face)  5a. Motor Arm, Left: 1-->Drift, limb holds 90 (or 45) degrees, but drifts down before full 10 seconds, does not hit bed or other support  5b. Motor Arm, Right: 0-->No drift, limb holds 90 (or 45) degrees for full 10 secs  6a. Motor Leg, Left: 0-->No drift, leg holds 30 degree position for full 5 secs  6b. Motor Leg, Right: 0-->No drift, leg holds 30 degree position for full 5 secs  7. Limb Ataxia: 0-->Absent  8. Sensory: 1-->Mild-to-moderate sensory loss, patient feels pinprick is less sharp or is dull on the affected side, or there is a loss of superficial pain with pinprick, but patient is aware of being touched  9. Best Language: 0-->No aphasia, normal  10. Dysarthria: 1-->Mild-to-moderate dysarthria, patient slurs at least some words and, at worst, can be understood with some difficulty  11. Extinction and Inattention (formerly Neglect): 1-->Visual, tactile, auditory, spatial, or personal inattention or extinction to bilateral simultaneous stimulation in one of the sensory modalities    Total (NIH Stroke Scale): 8       Results from last 7 days   Lab Units 06/01/20  0637 05/31/20  1517 05/31/20  1507   WBC 10*3/mm3 9.99 9.37  --    HEMOGLOBIN g/dL 11.7* 11.9*  --    HEMOGLOBIN, POC g/dL  --   --  14.6   MCV fL 77.5* 78.3*  --    PLATELETS 10*3/mm3 259 307  --      Results from last 7 days   Lab Units 06/03/20  0327 06/02/20  0430 06/01/20  0637   SODIUM mmol/L 138 137  137 137   POTASSIUM mmol/L 3.4* 4.5  4.4 4.1   CO2 mmol/L 19.0* 17.0*  22.0 20.0*   CREATININE mg/dL 0.66 0.78  0.69 0.73   GLUCOSE mg/dL 134* 145*  142* 149*    MAGNESIUM mg/dL 2.3 2.2  2.2 2.0   PHOSPHORUS mg/dL 2.5 4.0  3.8 2.8     Estimated Creatinine Clearance: 77.9 mL/min (by C-G formula based on SCr of 0.66 mg/dL).  Results from last 7 days   Lab Units 06/02/20  0430 05/31/20  1517   ALK PHOS U/L 58  --    BILIRUBIN mg/dL 0.2  --    ALT (SGPT) U/L 19 19   AST (SGOT) U/L 38* 31       Results from last 7 days   Lab Units 05/31/20  1507   PH, ARTERIAL pH units 7.37       Images:  Imaging Results (Last 24 Hours)     Procedure Component Value Units Date/Time    CT Head Without Contrast [[id number]] Collected:  06/03/20 0841     Updated:  06/03/20 0909    Narrative:       EXAMINATION: CT HEAD WO CONTRAST-      INDICATION: I63.9-Cerebral infarction, unspecified; R13.12-Dysphagia,  oropharyngeal phase; R47.1-Dysarthria and anarthria; R41.841-Cognitive  communication deficit; followup stroke, left-sided weakness.     TECHNIQUE: Multiple axial CT imaging was obtained of the head from the  skull base to the skull vertex without the administration of intravenous  contrast.     The radiation dose reduction device was turned on for each scan per the  ALARA (As Low as Reasonably Achievable) protocol.     COMPARISON: 06/01/2020.     FINDINGS: Evolving right MCA territory area of infarction extending from  the temporal lobe into the right parietal lobe. The edema is slightly  increased in size when compared to the prior study. The surrounding mass  effect is unchanged. There is no midline shift. No effacement of the  ventricular system.       Impression:       Slight increase seen in size in the edematous area in the  right MCA territory. No significant change seen in the surrounding mass  effect or midline shift. No hemorrhagic conversion.     D:  06/03/2020  E:  06/03/2020       XR Abdomen KUB [[id number]] Collected:  06/02/20 1701     Updated:  06/03/20 0736    Narrative:       EXAMINATION: XR ABDOMEN KUB- 06/02/2020     INDICATION: Keofeed; I63.9-Cerebral infarction,  unspecified;  R13.12-Dysphagia, oropharyngeal phase; R47.1-Dysarthria and anarthria;  R41.841-Cognitive communication deficit     COMPARISON: NONE     FINDINGS: Feeding tube is seen in the distal stomach, almost in the  duodenal bulb. Bowel gas pattern is nonobstructive.       Impression:       Feeding tube tip in the distal stomach or duodenal bulb.     D:  06/02/2020  E:  06/03/2020           MRI Brain Without Contrast [[id number]] Collected:  06/02/20 1301     Updated:  06/02/20 1916    Narrative:       EXAMINATION: MRI BRAIN WO CONTRAST-06/02/2020:     INDICATION: CVA; I63.9-Cerebral infarction, unspecified;  R13.12-Dysphagia, oropharyngeal phase; R47.1-Dysarthria and anarthria.      TECHNIQUE: Multiplanar MRI of the brain without intravenous contrast.     COMPARISON: NONE.     FINDINGS: Confluent area of restricted diffusion within the right  frontotemporal region along with anterior and posterior MCA distribution  restriction consistent with evolving right MCA infarction with  effacement of the right lateral ventricle, however, no midline shift.  Background increased signal findings of at least moderate involvement  demonstrating moderately advanced chronic small vessel ischemic disease.  Susceptibility-weighted imaging performed without susceptibility  artifact. Pituitary and sella within normal limits. Globes and orbits  retain normal T2 signal characteristics. The visualized paranasal  sinuses and mastoid air cells are grossly clear and well pneumatized. No  cerebellopontine angle mass lesion. Normal signal flow voids of the  distal internal carotid and vertebrobasilar arteries.       Impression:       Evolving right MCA infarction with restricted diffusion and  effacement of the right lateral ventricle, however, no midline shift.  This is superimposed upon moderately advanced chronic small vessel  ischemic disease findings.     D:  06/02/2020  E:  06/02/2020            This report was finalized on 6/2/2020  7:13 PM by Dr. Cristi Perez.       FL Video Swallow With Speech Single Contrast [[id number]] Collected:  06/02/20 1339     Updated:  06/02/20 1636    Narrative:       EXAMINATION: FL VIDEO SWALLOW W SPEECH SINGLE-CONTRAST-     INDICATION: dysphagia; I63.9-Cerebral infarction, unspecified;  R13.10-Dysphagia, unspecified; R47.1-Dysarthria and anarthria     TECHNIQUE: 48 seconds of fluoroscopic time was used for this exam. 6  associated fluoroscopic loops were saved. Patient was evaluated in the  seated lateral position while taking a variety of consistencies of  barium by mouth under the direction of speech pathology.     COMPARISON: NONE     FINDINGS: 48 seconds of fluoroscopy provided for a modified barium  swallow. Please see speech therapy report for full details and  recommendations.          Impression:       Fluoroscopy provided for a modified barium swallow. Please  see speech therapy report for full details and recommendations.         This report was finalized on 6/2/2020 4:35 PM by Dr. Chastity Rico MD.       CT Head Without Contrast [[id number]] Collected:  06/01/20 1639     Updated:  06/02/20 1637    Narrative:       EXAMINATION: CT HEAD WO CONTRAST- 06/01/2020     INDICATION: stroke; I63.9-Cerebral infarction, unspecified;  R13.10-Dysphagia, unspecified; R47.1-Dysarthria and anarthria; acute  left-sided weakness     TECHNIQUE: Multiple axial CT imaging was obtained of the head without  the administration of intravenous contrast.     The radiation dose reduction device was turned on for each scan per the  ALARA (As Low as Reasonably Achievable) protocol.     COMPARISON: NONE     FINDINGS: There is a large area of low density involving the right MCA  territory representing the patient's evolving area of infarction. There  is no significant change seen in the interval. No hemorrhagic  conversion. There is no significant effacement on the right lateral  ventricle. No significant midline shift. Bony  structures are  unremarkable. Visualized paranasal sinuses are clear. The mastoid air  cells are patent.       Impression:       Stable examination with evolving right MCA territory  infarct. No hemorrhagic conversion.     D:  06/01/2020  E:  06/01/2020     This report was finalized on 6/2/2020 4:34 PM by Dr. Chastity Rico MD.               Results: Reviewed.  I reviewed the patient's new laboratory and imaging results.  I independently reviewed the patient's new images.    Medications: Reviewed.    Assessment/Plan   A / P     Ms. 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 Her CT perfusion showed an area in the right MCA territory of reversible ischemia and she underwent angiography with intra-arterial tPA after a failed thrombectomy. She failed her MBS and a keofeed has been placed with tube feeds started.      Nutrition:   NPO Diet  Advance Directives:   Code Status and Medical Interventions:   Ordered at: 05/31/20 6706     Code Status:    CPR     Medical Interventions (Level of Support Prior to Arrest):    Full       Active Hospital Problems    Diagnosis   • **Cerebrovascular accident (CVA) due to embolism of right middle cerebral artery (CMS/HCC)   • Acute ischemic stroke (CMS/HCC)   • Atrial fibrillation (CMS/HCC)       Assessment / Plan:    1. Start Seroquel 50mg at night for sleep and agitation.   2. Start Fioricet for headache.   3. Restart home antidiarrheals.   4. EP following for management of Afib.   5. Neurology following  6. Tolerating tube feeds. D/c IVF  7. PT/OT  8. ASA/Statin  9. AM labs  10. Okay to transfer to telemetry when a bed is available.     High level of risk due to:  severe exacerbation of chronic illness and illness with threat to life or bodily function.    Plan of care and goals reviewed during interdisciplinary rounds.  I discussed the patient's  findings and my recommendations with patient, family and nursing staff      Magda Tavares DO    Intensive Care Medicine and Pulmonary Medicine       Electronically signed by Magda Tavares DO at 20 1203          Physical Therapy Notes (last 24 hours) (Notes from 20 1133 through 20 1133)      Tahira Ruiz, PT Student at 20 1350  Version 1 of          Problem: Patient Care Overview  Goal: Plan of Care Review  Outcome: Ongoing (interventions implemented as appropriate)  Flowsheets (Taken 6/3/2020 1350)  Progress: improving (Pended)  Plan of Care Reviewed With: patient (Pended)  Outcome Summary: Pt ambulated 30 ft with min a x 2 with R UE support on tele monitor. Pt's gait limited by increased HR and patient reported fatigue Pt demonstrated decreased gait speed and step length and required VC's to promote upright posture. Continue to progress therapy as appropriate. (Pended)       Electronically signed by Taylor Jeff PT at 20 1610     Tahira Ruiz PT Student at 20 1350  Version 1 of 1         Problem: Patient Care Overview  Goal: Plan of Care Review  6/3/2020 1554 by Tahira Ruiz PT Student  Flowsheets (Taken 6/3/2020 1350)  Progress: improving  Plan of Care Reviewed With: patient  Outcome Summary: Pt ambulated 30 ft with min a x 2 with R UE support on tele monitor. Pt's gait limited by increased HR and patient reported fatigue Pt demonstrated decreased gait speed and step length and required VC's to promote upright posture. Continue to progress therapy as appropriate.       Electronically signed by Taylor Jeff PT at 20 161     Tahira Ruiz PT Student at 20 1350  Version 2 of 2         Patient Name: Sidra Lane  : 1955    MRN: [id number]                              Today's Date: 6/3/2020       Admit Date: 2020    Visit Dx:     ICD-10-CM ICD-9-CM   1. Acute ischemic stroke (CMS/[facility]) I63.9 434.91   2.  Oropharyngeal dysphagia R13.12 787.22   3. Dysarthria R47.1 784.51   4. Cognitive communication deficit R41.841 799.52     Patient Active Problem List   Diagnosis   • Shortness of breath   • Atrial fibrillation (CMS/HCC)   • Essential hypertension   • Dizziness   • Deep vein thrombosis (DVT) of proximal lower extremity (CMS/HCC)   • Coronary artery disease involving native coronary artery of native heart without angina pectoris   • Fatigue   • Chest pain   • Longstanding persistent atrial fibrillation   • Cerebrovascular accident (CVA) due to embolism of right middle cerebral artery (CMS/HCC)   • Acute ischemic stroke (CMS/HCC)     Past Medical History:   Diagnosis Date   • Atrial fibrillation (CMS/HCC)    • CHF (congestive heart failure) (CMS/HCC)    • Deep vein thrombosis (CMS/HCC)    • GERD (gastroesophageal reflux disease)    • Hypertension    • May-Thurner syndrome    • Pulmonary embolism (CMS/HCC)      Past Surgical History:   Procedure Laterality Date   • CHOLECYSTECTOMY     • COLON SURGERY      bowel leakage, some of colon removed   • INTERVENTIONAL RADIOLOGY PROCEDURE Bilateral 5/31/2020    Procedure: CAROTID CEREBRAL ANGIOGRAM BILATERAL;  Surgeon: Brant Duarte MD;  Location: [facility] INVASIVE LOCATION;  Service: Interventional Radiology;  Laterality: Bilateral;   • LAPAROSCOPIC TUBAL LIGATION     • LEG SURGERY      multiple stents to BLE   • TONSILLECTOMY       General Information     Row Name 06/03/20 1526          PT Evaluation Time/Intention    Document Type  therapy note (daily note)  (Pended)   -     Mode of Treatment  physical therapy  (Pended)   -     Row Name 06/03/20 1526          General Information    Existing Precautions/Restrictions  fall;other (see comments)  (Pended)  L UE weakness; L sided neglect  -     Barriers to Rehab  none identified  (Pended)   -     Row Name 06/03/20 1526          Cognitive Assessment/Intervention- PT/OT    Orientation Status (Cognition)  oriented x 3   (Pended)   -     Cognitive Assessment/Intervention Comment  pt easily distracted; L sided neglect  (Pended)   -[facility] Name 06/03/20 1526          Safety Issues, Functional Mobility    Safety Issues Affecting Function (Mobility)  awareness of need for assistance;insight into deficits/self awareness;safety precaution awareness;safety precautions follow-through/compliance  (Pended)   -     Impairments Affecting Function (Mobility)  balance;coordination;endurance/activity tolerance;postural/trunk control;range of motion (ROM);shortness of breath;strength  (Pended)   -       User Key  (r) = Recorded By, (t) = Taken By, (c) = Cosigned By    Initials Name Provider Type     Tahira Ruiz, PT Student PT Student        Mobility     Row Name 06/03/20 1530          Bed Mobility Assessment/Treatment    Bed Mobility Assessment/Treatment  rolling left;scooting/bridging;sidelying-sit;sit-supine  (Pended)   -     Rolling Left [place] (Bed Mobility)  1 person assist;moderate assist (50% patient effort)  (Pended)   -     Scooting/Bridging [place] (Bed Mobility)  2 person assist;moderate assist (50% patient effort)  (Pended)   -     Sit-Supine [place] (Bed Mobility)  minimum assist (75% patient effort);2 person assist  (Pended)   -     Sidelying-Sit [place] (Bed Mobility)  moderate assist (50% patient effort);2 person assist  (Pended)   -     Assistive Device (Bed Mobility)  bed rails;draw sheet;head of bed elevated  (Pended)   -     Comment (Bed Mobility)  pt required VC's for sequencing and motor planning. Pt required assistance at trunk and B LEs for bed mobility. Pt unable to assist with L UE.  (Pended)   -     Row Name 06/03/20 1530          Transfer Assessment/Treatment    Comment (Transfers)  Pt transferred from sitting EOB to standing with mod a x 2.  (Pended)   -[facility] Name 06/03/20 1530          Sit-Stand Transfer    Sit-Stand [place] (Transfers)  2 person  assist;verbal cues;minimum assist (75% patient effort)  (Pended)   -     Row Name 06/03/20 1549 06/03/20 1530       Gait/Stairs Assessment/Training    Gait/Stairs Assessment/Training  --  gait/ambulation independence  (Pended)   -    [place] Level (Gait)  minimum assist (75% patient effort);2 person assist  (Pended)   -  2 person assist;verbal cues;minimum assist (75% patient effort)  (Pended)   -    Assistive Device (Gait)  --  other (see comments)  (Pended)  R UE support on tele monitor  -    Distance in Feet (Gait)  --  30  (Pended)   -    Pattern (Gait)  --  step-through  (Pended)   -    Deviations/Abnormal Patterns (Gait)  --  base of support, narrow;patti decreased;stride length decreased  (Pended)   -    Bilateral Gait Deviations  --  forward flexed posture;heel strike decreased  (Pended)   -    Left Sided Gait Deviations  --  weight shift ability decreased  (Pended)   -    Comment (Gait/Stairs)  --  Pt able to ambulate 30 ft with R UE support on tele monitor with min a x2. VC's for upright posture. Pt's ambulation limited by increased HR.  (Pended)   -      User Key  (r) = Recorded By, (t) = Taken By, (c) = Cosigned By    Initials Name Provider Type     Tahira Ruiz, PT Student PT Student        Obj/Interventions     Row Name 06/03/20 1539          Static Sitting Balance    Level of [place] (Unsupported Sitting, Static Balance)  contact guard assist  (Pended)   -     Sitting Position (Unsupported Sitting, Static Balance)  sitting on edge of bed  (Pended)   -     Row Name 06/03/20 1539          Static Standing Balance    Level of [place] (Supported Standing, Static Balance)  minimal assist, 75% patient effort;2 person assist  (Pended)   -     Assistive Device Utilized (Supported Standing, Static Balance)  other (see comments)  (Pended)  R UE support on tele monitor  -     Row Name 06/03/20 1539          Sensory Assessment/Intervention    Sensory General  Assessment  no sensation deficits identified  (Pended)   -       User Key  (r) = Recorded By, (t) = Taken By, (c) = Cosigned By    Initials Name Provider Type     Tahira Ruiz, PT Student PT Student        Goals/Plan    No documentation.       Clinical Impression     [facility] Name 06/03/20 1541          Pain Assessment    Additional Documentation  Pain Scale: Numbers Pre/Post-Treatment (Group)  (Pended)   -SH     Row Name 06/03/20 1541          Pain Scale: Numbers Pre/Post-Treatment    Pain Scale: Numbers, Pretreatment  0/10 - no pain  (Pended)   -     Pain Scale: Numbers, Post-Treatment  0/10 - no pain  (Pended)   -SH     Row Name 06/03/20 1541          Plan of Care Review    Progress  improving  (Pended)   -SH     Row Name 06/03/20 1541          Vital Signs    Pre Systolic BP Rehab  146  (Pended)   -     Pre Treatment Diastolic BP  109  (Pended)   -     Post Systolic BP Rehab  169  (Pended)   -     Post Treatment Diastolic BP  118  (Pended)   -     Pretreatment Heart Rate (beats/min)  101  (Pended)   -     Intratreatment Heart Rate (beats/min)  124  (Pended)   -     Posttreatment Heart Rate (beats/min)  102  (Pended)   -     Pre SpO2 (%)  96  (Pended)   -     O2 Delivery Pre Treatment  room air  (Pended)   -     Post SpO2 (%)  99  (Pended)   -     O2 Delivery Post Treatment  room air  (Pended)   -     Pre Patient Position  Supine  (Pended)   -     Intra Patient Position  Standing  (Pended)   -SH     Row Name 06/03/20 1541          Positioning and Restraints    Pre-Treatment Position  in bed  (Pended)   -     Post Treatment Position  bed  (Pended)   -     In Bed  notified nsg;supine;encouraged to call for assist;call light within reach;side rails up x3  (Pended)   -       User Key  (r) = Recorded By, (t) = Taken By, (c) = Cosigned By    Initials Name Provider Type     Tahira Ruiz, PT Student PT Student        Outcome Measures     Row Name 06/03/20 1544          How much  help from another person do you currently need...    Turning from your back to your side while in flat bed without using bedrails?  2  (Pended)   -SH     Moving from lying on back to sitting on the side of a flat bed without bedrails?  2  (Pended)   -SH     Moving to and from a bed to a chair (including a wheelchair)?  2  (Pended)   -SH     Standing up from a chair using your arms (e.g., wheelchair, bedside chair)?  3  (Pended)   -SH     Climbing 3-5 steps with a railing?  1  (Pended)   -SH     To walk in hospital room?  2  (Pended)   -SH     AM-PAC 6 Clicks Score (PT)  12  -MS (r) SH (t)     Row Name 06/03/20 1544          Functional Assessment    Outcome Measure Options  AM-PAC 6 Clicks Basic Mobility (PT)  (Pended)   -SH       User Key  (r) = Recorded By, (t) = Taken By, (c) = Cosigned By    Initials Name Provider Type    Marleny Nicholas, RN Registered Nurse    Tahira Crocker, PT Student PT Student        Physical Therapy Education                 Title: PT OT SLP Therapies (In Progress)     Topic: Physical Therapy (In Progress)     Point: Mobility training (Done)     Description:   Instruct learner(s) on safety and technique for assisting patient out of bed, chair or wheelchair.  Instruct in the proper use of assistive devices, such as walker, crutches, cane or brace.              Patient Friendly Description:   It's important to get you on your feet again, but we need to do so in a way that is safe for you. Falling has serious consequences, and your personal safety is the most important thing of all.        When it's time to get out of bed, one of us or a family member will sit next to you on the bed to give you support.     If your doctor or nurse tells you to use a walker, crutches, a cane, or a brace, be sure you use it every time you get out of bed, even if you think you don't need it.    Learning Progress Summary           Patient Acceptance, E,D, NR,VU by  at 6/3/2020 1350    Acceptance, E, NR  by KG at 6/2/2020 1315                   Point: Home exercise program (Not Started)     Description:   Instruct learner(s) on appropriate technique for monitoring, assisting and/or progressing patient with therapeutic exercises and activities.              Learner Progress:   Not documented in this visit.          Point: Body mechanics (Done)     Description:   Instruct learner(s) on proper positioning and spine alignment for patient and/or caregiver during mobility tasks and/or exercises.              Learning Progress Summary           Patient Acceptance, E,D, NR,VU by  at 6/3/2020 1350    Acceptance, E, NR by KG at 6/2/2020 1315                   Point: Precautions (Done)     Description:   Instruct learner(s) on prescribed precautions during mobility and gait tasks              Learning Progress Summary           Patient Acceptance, E,D, NR,VU by  at 6/3/2020 1350    Acceptance, E, NR by  at 6/2/2020 1315                               User Key     Initials Effective Dates Name Provider Type Discipline     05/22/20 -  Taylor Jeff, PT Physical Therapist PT     03/19/20 -  Tahira Ruiz PT Student PT Student PT              PT Recommendation and Plan     Plan of Care Reviewed With: (P) patient  Progress: (P) improving  Outcome Summary: (P) Pt ambulated 30 ft with min a x 2 with R UE support on tele monitor. Pt's gait limited by increased HR and patient reported fatigue Pt demonstrated decreased gait speed and step length and required VC's to promote upright posture. Continue to progress therapy as appropriate.     Time Calculation:   PT Charges     Row Name 06/03/20 1350             Time Calculation    Start Time  1350  (Pended)   -      PT Received On  06/03/20  (Pended)   -      PT Goal Re-Cert Due Date  06/12/20  (Pended)   -         Time Calculation- PT    Total Timed Code Minutes- PT  15 minute(s)  (Pended)   -         Timed Charges    91659 - PT Therapeutic Activity Minutes  15   (Pended)   -        User Key  (r) = Recorded By, (t) = Taken By, (c) = Cosigned By    Initials Name Provider Type     Tahira Ruiz, PT Student PT Student        Therapy Charges for Today     Code Description Service Date Service Provider Modifiers Qty    47893080277  PT THERAPEUTIC ACT EA 15 MIN 6/3/2020 Tahira Ruiz, PT Student GP 1          PT G-Codes  Outcome Measure Options: (P) AM-PAC 6 Clicks Basic Mobility (PT)  AM-PAC 6 Clicks Score (PT): 12  AM-PAC 6 Clicks Score (OT): 11  Modified [place] Scale: 4 - Moderately severe disability.  Unable to walk without assistance, and unable to attend to own bodily needs without assistance.    Tahira Ruiz PT Student  6/3/2020         Electronically signed by Taylor Jeff PT at 20 1623     Tahira Ruiz, PT Student at 20 1350  Version 1 of 2         Patient Name: Sidra Lane  : 1955    MRN: [id number]                              Today's Date: 6/3/2020       Admit Date: 2020    Visit Dx:     ICD-10-CM ICD-9-CM   1. Acute ischemic stroke (CMS/HCC) I63.9 434.91   2. Oropharyngeal dysphagia R13.12 787.22   3. Dysarthria R47.1 784.51   4. Cognitive communication deficit R41.841 799.52     Patient Active Problem List   Diagnosis   • Shortness of breath   • Atrial fibrillation (CMS/HCC)   • Essential hypertension   • Dizziness   • Deep vein thrombosis (DVT) of proximal lower extremity (CMS/HCC)   • Coronary artery disease involving native coronary artery of native heart without angina pectoris   • Fatigue   • Chest pain   • Longstanding persistent atrial fibrillation   • Cerebrovascular accident (CVA) due to embolism of right middle cerebral artery (CMS/HCC)   • Acute ischemic stroke (CMS/HCC)     Past Medical History:   Diagnosis Date   • Atrial fibrillation (CMS/HCC)    • CHF (congestive heart failure) (CMS/HCC)    • Deep vein thrombosis (CMS/HCC)    • GERD (gastroesophageal reflux disease)    • Hypertension    •  May-Thurner syndrome    • Pulmonary embolism (CMS/HCC)      Past Surgical History:   Procedure Laterality Date   • CHOLECYSTECTOMY     • COLON SURGERY      bowel leakage, some of colon removed   • INTERVENTIONAL RADIOLOGY PROCEDURE Bilateral 5/31/2020    Procedure: CAROTID CEREBRAL ANGIOGRAM BILATERAL;  Surgeon: Brant Duarte MD;  Location: [facility] INVASIVE LOCATION;  Service: Interventional Radiology;  Laterality: Bilateral;   • LAPAROSCOPIC TUBAL LIGATION     • LEG SURGERY      multiple stents to BLE   • TONSILLECTOMY       General Information     Row Name 06/03/20 1526          PT Evaluation Time/Intention    Document Type  therapy note (daily note)  (Pended)   -     Mode of Treatment  physical therapy  (Pended)   -     Row Name 06/03/20 1526          General Information    Existing Precautions/Restrictions  fall;other (see comments)  (Pended)  L UE weakness; L sided neglect  -     Barriers to Rehab  none identified  (Pended)   -[facility] Name 06/03/20 1526          Cognitive Assessment/Intervention- PT/OT    Orientation Status (Cognition)  oriented x 3  (Pended)   -     Cognitive Assessment/Intervention Comment  pt easily distracted; L sided neglect  (Pended)   -[facility] Name 06/03/20 1526          Safety Issues, Functional Mobility    Safety Issues Affecting Function (Mobility)  awareness of need for assistance;insight into deficits/self awareness;safety precaution awareness;safety precautions follow-through/compliance  (Pended)   -     Impairments Affecting Function (Mobility)  balance;coordination;endurance/activity tolerance;postural/trunk control;range of motion (ROM);shortness of breath;strength  (Pended)   -       User Key  (r) = Recorded By, (t) = Taken By, (c) = Cosigned By    Initials Name Provider Type     Tahira Ruiz, PT Student PT Student        Mobility     Row Name 06/03/20 1530          Bed Mobility Assessment/Treatment    Bed Mobility Assessment/Treatment  rolling  left;scooting/bridging;sidelying-sit;sit-supine  (Pended)   -     Rolling Left [place] (Bed Mobility)  1 person assist;moderate assist (50% patient effort)  (Pended)   -     Scooting/Bridging [place] (Bed Mobility)  2 person assist;moderate assist (50% patient effort)  (Pended)   -     Sit-Supine [place] (Bed Mobility)  minimum assist (75% patient effort);2 person assist  (Pended)   -     Sidelying-Sit [place] (Bed Mobility)  moderate assist (50% patient effort);2 person assist  (Pended)   -     Assistive Device (Bed Mobility)  bed rails;draw sheet;head of bed elevated  (Pended)   -     Comment (Bed Mobility)  pt required VC's for sequencing and motor planning. Pt required assistance at trunk and B LEs for bed mobility. Pt unable to assist with L UE.  (Pended)   -     Row Name 06/03/20 1530          Transfer Assessment/Treatment    Comment (Transfers)  Pt transferred from sitting EOB to standing with mod a x 2.  (Pended)   -     Row Name 06/03/20 1530          Sit-Stand Transfer    Sit-Stand [place] (Transfers)  2 person assist;verbal cues;minimum assist (75% patient effort)  (Pended)   -     Row Name 06/03/20 1549 06/03/20 1530       Gait/Stairs Assessment/Training    Gait/Stairs Assessment/Training  --  gait/ambulation independence  (Pended)   -    [place] Level (Gait)  minimum assist (75% patient effort);2 person assist  (Pended)   -  2 person assist;verbal cues;minimum assist (75% patient effort)  (Pended)   -    Assistive Device (Gait)  --  other (see comments)  (Pended)  R UE support on tele monitor  -    Distance in Feet (Gait)  --  30  (Pended)   -    Pattern (Gait)  --  step-through  (Pended)   -    Deviations/Abnormal Patterns (Gait)  --  base of support, narrow;patti decreased;stride length decreased  (Pended)   -    Bilateral Gait Deviations  --  forward flexed posture;heel strike decreased  (Pended)   -    Left Sided Gait Deviations  --   weight shift ability decreased  (Pended)   -    Comment (Gait/Stairs)  --  Pt able to ambulate 30 ft with R UE support on tele monitor with min a x2. VC's for upright posture. Pt's ambulation limited by increased HR.  (Pended)   -      User Key  (r) = Recorded By, (t) = Taken By, (c) = Cosigned By    Initials Name Provider Type     Tahira Ruiz, PT Student PT Student        Obj/Interventions     Row Name 06/03/20 1539          Static Sitting Balance    Level of [place] (Unsupported Sitting, Static Balance)  contact guard assist  (Pended)   -     Sitting Position (Unsupported Sitting, Static Balance)  sitting on edge of bed  (Pended)   -SH     Row Name 06/03/20 1539          Static Standing Balance    Level of [place] (Supported Standing, Static Balance)  minimal assist, 75% patient effort;2 person assist  (Pended)   -     Assistive Device Utilized (Supported Standing, Static Balance)  other (see comments)  (Pended)  R UE support on tele monitor  -SH     Row Name 06/03/20 1539          Sensory Assessment/Intervention    Sensory General Assessment  no sensation deficits identified  (Pended)   -       User Key  (r) = Recorded By, (t) = Taken By, (c) = Cosigned By    Initials Name Provider Type     Tahira Ruiz, PT Student PT Student        Goals/Plan    No documentation.       Clinical Impression     Row Name 06/03/20 1541          Pain Assessment    Additional Documentation  Pain Scale: Numbers Pre/Post-Treatment (Group)  (Pended)   -SH     Row Name 06/03/20 1541          Pain Scale: Numbers Pre/Post-Treatment    Pain Scale: Numbers, Pretreatment  0/10 - no pain  (Pended)   -     Pain Scale: Numbers, Post-Treatment  0/10 - no pain  (Pended)   -SH     Row Name 06/03/20 1541          Plan of Care Review    Progress  improving  (Pended)   -SH     Row Name 06/03/20 1541          Vital Signs    Pre Systolic BP Rehab  146  (Pended)   -     Pre Treatment Diastolic BP  109  (Pended)   -      Post Systolic BP Rehab  169  (Pended)   -     Post Treatment Diastolic BP  118  (Pended)   -SH     Pretreatment Heart Rate (beats/min)  101  (Pended)   -SH     Intratreatment Heart Rate (beats/min)  124  (Pended)   -SH     Posttreatment Heart Rate (beats/min)  102  (Pended)   -SH     Pre SpO2 (%)  96  (Pended)   -SH     O2 Delivery Pre Treatment  room air  (Pended)   -SH     Post SpO2 (%)  99  (Pended)   -SH     O2 Delivery Post Treatment  room air  (Pended)   -     Pre Patient Position  Supine  (Pended)   -SH     Intra Patient Position  Standing  (Pended)   -     Row Name 06/03/20 1541          Positioning and Restraints    Pre-Treatment Position  in bed  (Pended)   -SH     Post Treatment Position  bed  (Pended)   -SH     In Bed  notified nsg;supine;encouraged to call for assist;call light within reach;side rails up x3  (Pended)   -       User Key  (r) = Recorded By, (t) = Taken By, (c) = Cosigned By    Initials Name Provider Type    Tahira Crocker, PT Student PT Student        Outcome Measures     Row Name 06/03/20 1544          How much help from another person do you currently need...    Turning from your back to your side while in flat bed without using bedrails?  2  (Pended)   -SH     Moving from lying on back to sitting on the side of a flat bed without bedrails?  2  (Pended)   -SH     Moving to and from a bed to a chair (including a wheelchair)?  2  (Pended)   -SH     Standing up from a chair using your arms (e.g., wheelchair, bedside chair)?  3  (Pended)   -SH     Climbing 3-5 steps with a railing?  1  (Pended)   -SH     To walk in hospital room?  2  (Pended)   -SH     AM-PAC 6 Clicks Score (PT)  12  (Pended)   -     Row Name 06/03/20 1544          Functional Assessment    Outcome Measure Options  AM-PAC 6 Clicks Basic Mobility (PT)  (Pended)   -       User Key  (r) = Recorded By, (t) = Taken By, (c) = Cosigned By    Initials Name Provider Type    Tahira Crocker, PT Student PT  Student        Physical Therapy Education                 Title: PT OT SLP Therapies (In Progress)     Topic: Physical Therapy (In Progress)     Point: Mobility training (Done)     Description:   Instruct learner(s) on safety and technique for assisting patient out of bed, chair or wheelchair.  Instruct in the proper use of assistive devices, such as walker, crutches, cane or brace.              Patient Friendly Description:   It's important to get you on your feet again, but we need to do so in a way that is safe for you. Falling has serious consequences, and your personal safety is the most important thing of all.        When it's time to get out of bed, one of us or a family member will sit next to you on the bed to give you support.     If your doctor or nurse tells you to use a walker, crutches, a cane, or a brace, be sure you use it every time you get out of bed, even if you think you don't need it.    Learning Progress Summary           Patient Acceptance, E,D, NR,VU by  at 6/3/2020 1350    Acceptance, E, NR by KG at 6/2/2020 1315                   Point: Home exercise program (Not Started)     Description:   Instruct learner(s) on appropriate technique for monitoring, assisting and/or progressing patient with therapeutic exercises and activities.              Learner Progress:   Not documented in this visit.          Point: Body mechanics (Done)     Description:   Instruct learner(s) on proper positioning and spine alignment for patient and/or caregiver during mobility tasks and/or exercises.              Learning Progress Summary           Patient Acceptance, E,D, NR,VU by  at 6/3/2020 1350    Acceptance, E, NR by KG at 6/2/2020 1315                   Point: Precautions (Done)     Description:   Instruct learner(s) on prescribed precautions during mobility and gait tasks              Learning Progress Summary           Patient Acceptance, E,D, NR,VU by SH at 6/3/2020 1350    Acceptance, E, NR by KG at  6/2/2020 1315                               User Key     Initials Effective Dates Name Provider Type Discipline    KG 05/22/20 -  Taylor Jeff PT Physical Therapist PT     03/19/20 -  Tahira Ruiz, PT Student PT Student PT              PT Recommendation and Plan     Plan of Care Reviewed With: (P) patient  Progress: (P) improving  Outcome Summary: (P) Pt ambulated 30 ft with min a x 2 with R UE support on tele monitor. Pt's gait limited by increased HR and patient reported fatigue Pt demonstrated decreased gait speed and step length and required VC's to promote upright posture. Continue to progress therapy as appropriate.     Time Calculation:   PT Charges     Row Name 06/03/20 1350             Time Calculation    Start Time  1350  (Pended)   -      PT Received On  06/03/20  (Pended)   -      PT Goal Re-Cert Due Date  06/12/20  (Pended)   -         Time Calculation- PT    Total Timed Code Minutes- PT  15 minute(s)  (Pended)   -         Timed Charges    04217 - PT Therapeutic Activity Minutes  15  (Pended)   -        User Key  (r) = Recorded By, (t) = Taken By, (c) = Cosigned By    Initials Name Provider Type     Tahira Ruiz, PT Student PT Student        Therapy Charges for Today     Code Description Service Date Service Provider Modifiers Qty    59661553778 HC PT THERAPEUTIC ACT EA 15 MIN 6/3/2020 Tahira Ruiz, PT Student GP 1    79734783590 HC PT THERAPEUTIC ACT EA 15 MIN 6/3/2020 Tahira Ruiz, PT Student GP 1          PT G-Codes  Outcome Measure Options: (P) AM-PAC 6 Clicks Basic Mobility (PT)  AM-PAC 6 Clicks Score (PT): (P) 12  AM-PAC 6 Clicks Score (OT): 11  Modified [place] Scale: 4 - Moderately severe disability.  Unable to walk without assistance, and unable to attend to own bodily needs without assistance.    Tahira Ruiz PT Student  6/3/2020         Electronically signed by Tahira Ruiz PT Student at 06/03/20 1735       Occupational Therapy Notes (last 24  hours) (Notes from 20 1133 through 20 1133)    No notes exist for this encounter.            Speech Language Pathology Notes (last 24 hours) (Notes from 20 1133 through 20 1133)      Nuha Garcia MA,CCC-SLP at 20 1541          Acute Care - Speech Language Pathology   Swallow Progress Note  Leti     Patient Name: Sidra Lane  : 1955  MRN: [id number]  Today's Date: 6/3/2020               Admit Date: 2020    Visit Dx:      ICD-10-CM ICD-9-CM   1. Acute ischemic stroke (CMS/HCC) I63.9 434.91   2. Oropharyngeal dysphagia R13.12 787.22   3. Dysarthria R47.1 784.51   4. Cognitive communication deficit R41.841 799.52     Patient Active Problem List   Diagnosis   • Shortness of breath   • Atrial fibrillation (CMS/HCC)   • Essential hypertension   • Dizziness   • Deep vein thrombosis (DVT) of proximal lower extremity (CMS/HCC)   • Coronary artery disease involving native coronary artery of native heart without angina pectoris   • Fatigue   • Chest pain   • Longstanding persistent atrial fibrillation   • Cerebrovascular accident (CVA) due to embolism of right middle cerebral artery (CMS/HCC)   • Acute ischemic stroke (CMS/HCC)       Therapy Treatment  Rehabilitation Treatment Summary     Row Name 20 1415             Treatment Time/Intention    Discipline  speech language pathologist  -VO      Document Type  therapy note (daily note)  -VO      Subjective Information  no complaints  -VO      Mode of Treatment  speech-language pathology  -VO      Patient/Family Observations  dtr present  -VO      Care Plan Review  risks/benefits reviewed;care plan/treatment goals reviewed;patient/other agree to care plan;current/potential barriers reviewed  -VO      Care Plan Review, Other Participant(s)  daughter  -VO      Therapy Frequency (Swallow)  5 days per week  -VO      Therapy Frequency (SLP SLC)  5 days per week  -VO      Patient Effort  good  -VO      Recorded by  [VO] Nuha Garcia MA,CCC-SLP 06/03/20 1537      Row Name 06/03/20 1415             Pain Scale: Numbers Pre/Post-Treatment    Pain Scale: Numbers, Pretreatment  0/10 - no pain  -VO      Pain Scale: Numbers, Post-Treatment  0/10 - no pain  -VO      Recorded by [VO] Nuha Garcia MA,CCC-SLP 06/03/20 1537      Row Name                Wound 05/31/20 1930 Right groin Puncture    Wound - Properties Group Date first assessed: 05/31/20 [MB] Time first assessed: 1930 [MB] Present on Hospital Admission: N [MB] Side: Right [MB] Location: groin [MB] Primary Wound Type: Puncture [MB] Recorded by:  [MB] Toña Maier RN 05/31/20 2037    Row Name 06/03/20 1415             Outcome Summary/Treatment Plan (SLP)    Daily Summary of Progress (SLP)  progress toward functional goals as expected  -VO      Plan for Continued Treatment (SLP)  Saw for dysphagia tx. Trialed ice chips and puree, continues to show s/sxs aspiration w/ all. Rec: 2-3 ice chips after oral care w/ RN to promote swallowing and emphasized simple dysphagia exercises. Will cont to treat.  -VO      Anticipated Dischage Disposition (SLP)  inpatient rehabilitation facility;anticipate therapy at next level of care  -VO      Recorded by [VO] Nuha Garcia MA,CCC-SLP 06/03/20 1537        User Key  (r) = Recorded By, (t) = Taken By, (c) = Cosigned By    Initials Name Effective Dates Discipline    VO Nuha Garcia MA,CCC-SLP 10/24/18 -  SLP    Toña Handy RN 06/14/19 -  Nurse          Outcome Summary  Outcome Summary/Treatment Plan (SLP)  Daily Summary of Progress (SLP): progress toward functional goals as expected (06/03/20 1415 : Nuha Garcia MA,CCC-SLP)  Plan for Continued Treatment (SLP): Saw for dysphagia tx. Trialed ice chips and puree, continues to show s/sxs aspiration w/ all. Rec: 2-3 ice chips after oral care w/ RN to promote swallowing and emphasized simple dysphagia exercises. Will cont to treat. (06/03/20 1415 :  Nuha Garcia MA,CCC-SLP)  Anticipated Dischage Disposition (SLP): inpatient rehabilitation facility, anticipate therapy at next level of care (06/03/20 1415 : Nuha Garcia MA,[facility]-SLP)      SLP GOALS     Row Name 06/03/20 1415 06/02/20 1100 06/01/20 1015       Oral Nutrition/Hydration Goal 1 (SLP)    Oral Nutrition/Hydration Goal 1, SLP  LTG: Pt will return to regular diet & thin liquids w/ 100% accuracy w/o cues  -VO  LTG: Pt will return to regular diet & thin liquids w/ 100% accuracy w/o cues  -RD  --    Time Frame (Oral Nutrition/Hydration Goal 1, SLP)  by discharge  -VO  by discharge  -RD  --    Progress/Outcomes (Oral Nutrition/Hydration Goal 1, SLP)  continuing progress toward goal  -VO  --  --       Oral Nutrition/Hydration Goal 2 (SLP)    Oral Nutrition/Hydration Goal 2, SLP  Pt will tolerate therapeutic trials of thin H2O w/ no overt s/s of aspiration w/ 60% accuracy w/o cues to indicate readiness for repeat instrumental evaluation  -VO  Pt will tolerate therapeutic trials of thin H2O w/ no overt s/s of aspiration w/ 60% accuracy w/o cues to indicate readiness for repeat instrumental evaluation  -RD  --    Time Frame (Oral Nutrition/Hydration Goal 2, SLP)  short term goal (STG);by discharge  -VO  short term goal (STG);by discharge  -RD  --    Barriers (Oral Nutrition/Hydration Goal 2, SLP)  delayed cough and multiple swallows  -VO  --  --    Progress/Outcomes (Oral Nutrition/Hydration Goal 2, SLP)  continuing progress toward goal  -VO  --  --       Labial Strengthening Goal 1 (SLP)    Activity (Labial Strengthening Goal 1, SLP)  increase labial tone  -VO  increase labial tone  -RD  --    Increase Labial Tone  labial resistance exercises  -VO  labial resistance exercises  -RD  --    [place]/Accuracy (Labial Strengthening Goal 1, SLP)  with minimal cues (75-90% accuracy)  -VO  with minimal cues (75-90% accuracy)  -RD  --    Time Frame (Labial Strengthening Goal 1, SLP)  short term goal  (STG);by discharge  -VO  short term goal (STG);by discharge  -RD  --    Progress/Outcomes (Labial Strengthening Goal 1, SLP)  continuing progress toward goal  -VO  --  --       Lingual Strengthening Goal 1 (SLP)    Activity (Lingual Strengthening Goal 1, SLP)  increase lingual tone/sensation/control/coordination/movement;increase tongue back strength  -VO  increase lingual tone/sensation/control/coordination/movement;increase tongue back strength  -RD  --    Increase Lingual Tone/Sensation/Control/Coordination/Movement  lingual movement exercises  -VO  lingual movement exercises  -RD  --    Increase Tongue Back Strength  lingual resistance exercises  -VO  lingual resistance exercises  -RD  --    [place]/Accuracy (Lingual Strengthening Goal 1, SLP)  with minimal cues (75-90% accuracy)  -VO  with minimal cues (75-90% accuracy)  -RD  --    Time Frame (Lingual Strengthening Goal 1, SLP)  short term goal (STG);by discharge  -VO  short term goal (STG);by discharge  -RD  --    Progress/Outcomes (Lingual Strengthening Goal 1, SLP)  continuing progress toward goal  -VO  --  --       Pharyngeal Strengthening Exercise Goal 1 (SLP)    Activity (Pharyngeal Strengthening Goal 1, SLP)  increase timing;increase superior movement of the hyolaryngeal complex;increase anterior movement of the hyolaryngeal complex;increase closure at entrance to airway/closure of airway at glottis;increase squeeze/positive pressure generation;increase tongue base retraction  -VO  increase timing;increase superior movement of the hyolaryngeal complex;increase anterior movement of the hyolaryngeal complex;increase closure at entrance to airway/closure of airway at glottis;increase squeeze/positive pressure generation;increase tongue base retraction  -RD  --    Increase Timing  prepping - 3 second prep or suck swallow or 3-step swallow  -VO  prepping - 3 second prep or suck swallow or 3-step swallow  -RD  --    Increase Superior Movement of the  Hyolaryngeal Complex  super-supraglottic swallow  -VO  super-supraglottic swallow  -RD  --    Increase Anterior Movement of the Hyolaryngeal Complex  chin tuck against resistance (CTAR)  -VO  chin tuck against resistance (CTAR)  -RD  --    Increase Closure at Entrance to Airway/Closure of Airway at Glottis  super-supraglottic swallow  -VO  super-supraglottic swallow  -RD  --    Increase Squeeze/Positive Pressure Generation  effortful pitch glide (falsetto + pharyngeal squeeze)  -VO  effortful pitch glide (falsetto + pharyngeal squeeze)  -RD  --    Increase Tongue Base Retraction  hard effortful swallow  -VO  hard effortful swallow  -RD  --    [place]/Accuracy (Pharyngeal Strengthening Goal 1, SLP)  with minimal cues (75-90% accuracy)  -VO  with minimal cues (75-90% accuracy)  -RD  --    Time Frame (Pharyngeal Strengthening Goal 1, SLP)  short term goal (STG);by discharge  -VO  short term goal (STG);by discharge  -RD  --    Barriers (Pharyngeal Strengthening Goal 1, SLP)  focused on simple excs that could be completed w/ ice chips after oral care.  -VO  --  --    Progress/Outcomes (Pharyngeal Strengthening Goal 1, SLP)  continuing progress toward goal  -VO  --  --       Articulation Goal 1 (SLP)    Improve Articulation Goal 1 (SLP)  --  by over-articulating at phrase level;80%;with minimal cues (75-90%)  -RD  by over-articulating at phrase level;80%;with minimal cues (75-90%)  -AC    Time Frame (Articulation Goal 1, SLP)  --  short term goal (STG)  -RD  short term goal (STG)  -AC    Progress (Articulation Goal 1, SLP)  --  40%;with minimal cues (75-90%)  -RD  --    Progress/Outcomes (Articulation Goal 1, SLP)  --  continuing progress toward goal  -RD  --       Attention Goal 1 (SLP)    Improve Attention by Goal 1 (SLP)  --  looking at speaker;80%;with minimal cues (75-90%)  -RD  --    Time Frame (Attention Goal 1, SLP)  --  short term goal (STG);by discharge  -RD  --    Progress (Attention Goal 1, SLP)  --   30%;with moderate cues (50-74%)  -RD  --    Progress/Outcomes (Attention Goal 1, SLP)  --  continuing progress toward goal  -RD  --       Additional Goal 1 (SLP)    Additional Goal 1, SLP  --  LTG: Pt will improve cognitive-communication skills in order to participate in care in hospital setting for 100% of opportunities w/o cues.  -RD  LTG: Pt will improve cognitive-communication skills in order to participate in care in hospital setting for 100% of opportunities w/o cues.  -AC    Time Frame (Additional Goal 1, SLP)  --  by discharge  -RD  by discharge  -AC    Progress/Outcomes (Additional Goal 1, SLP)  --  continuing progress toward goal  -RD  --      User Key  (r) = Recorded By, (t) = Taken By, (c) = Cosigned By    Initials Name Provider Type    AC Cynthia Campbell, MS CCC-SLP Speech and Language Pathologist    RD Jade Whittaker, MS CCC-SLP Speech and Language Pathologist    Nuha Trevino MA,CCC-SLP Speech and Language Pathologist          EDUCATION  The patient has been educated in the following areas:   Dysphagia (Swallowing Impairment) Oral Care/Hydration NPO rationale.    SLP Recommendation and Plan  Daily Summary of Progress (SLP): progress toward functional goals as expected     Plan for Continued Treatment (SLP): Saw for dysphagia tx. Trialed ice chips and puree, continues to show s/sxs aspiration w/ all. Rec: 2-3 ice chips after oral care w/ RN to promote swallowing and emphasized simple dysphagia exercises. Will cont to treat.  Anticipated Dischage Disposition (SLP): inpatient rehabilitation facility, anticipate therapy at next level of care                    Time Calculation:   Time Calculation- SLP     Row Name 06/03/20 1539             Time Calculation- SLP    SLP Start Time  1415  -VO      SLP Received On  06/03/20  -VO        User Key  (r) = Recorded By, (t) = Taken By, (c) = Cosigned By    Initials Name Provider Type    Nuha Trevino MA,CCC-SLP Speech and Language Pathologist           Therapy Charges for Today     Code Description Service Date Service Provider Modifiers Qty    [id number]  ST TREATMENT SWALLOW 2 6/3/2020 Nuha Garcia MA,CCC-SLP GN 1                 Nuha Garcia MA,CCC-SLP  6/3/2020    Electronically signed by Nuha Garcia MA,CCC-SLP at 06/03/20 1541     Nuha Garcia MA,CCC-SLP at 06/03/20 1536          Problem: Patient Care Overview  Goal: Plan of Care Review  Outcome: Ongoing (interventions implemented as appropriate)  Flowsheets (Taken 6/3/2020 8228)  Plan of Care Reviewed With: patient; daughter  Note:   SLP treatment completed. Will continue to address dysphagia and communication. Please see note for further details and recommendations.           Electronically signed by Nuha Garcia MA,CCC-SLP at 06/03/20 9654

## 2020-06-04 NOTE — PROGRESS NOTES
Continued Stay Note  [facility]     Patient Name: Sidra Lane  MRN: [id number]  Today's Date: 6/4/2020    Admit Date: 5/31/2020    Discharge Plan     Row Name 06/04/20 0849       Plan    Plan  Signature Of Jonas Virgen    Patient/Family in Agreement with Plan  yes    Plan Comments  Spoke with patient's daughter at bedside. Patient is lethagic possibly related to medications. Plan is Signature of Jonas Virgen Cassia is following. Will need ambulance at transfer. [place] will continue to follow.    Final Discharge Disposition Code  03 - skilled nursing facility (SNF)        Discharge Codes    No documentation.       Expected Discharge Date and Time     Expected Discharge Date Expected Discharge Time    Jun 5, 2020             Naga Muñoz RN

## 2020-06-04 NOTE — PROGRESS NOTES
Stroke Progress Note       Chief Complaint:  Left-sided weakness    Subjective     Subjective:  Patient was agitated last night, for which she was given Seroquel.  This morning patient was more drowsy, and was noted to have worsening left upper extremity weakness and slurred speech.  We were paged to see the patient for this worsening, and when evaluated patient did have worsening of her symptoms.  Patient was taken for stat CT head.    Review of Systems   Constitutional:        Agitation   HENT: Negative for congestion, drooling and ear discharge.    Eyes: Positive for visual disturbance. Negative for photophobia and discharge.   Respiratory: Negative for shortness of breath.    Cardiovascular: Negative for chest pain and leg swelling.        Sustained atrial fibrillation; rate increased overnight   Gastrointestinal: Negative for abdominal pain, diarrhea and nausea.   Endocrine: Negative.    Genitourinary: Negative.    Musculoskeletal: Negative.    Skin: Negative.    Neurological: Positive for facial asymmetry, speech difficulty, weakness and headaches.        Complains of bilateral frontal headache with bilateral eye pain (ongoing since admission)   Psychiatric/Behavioral: Positive for agitation and confusion.        Objective      Temp:  [98.5 °F (36.9 °C)-98.7 °F (37.1 °C)] 98.6 °F (37 °C)  Heart Rate:  [] 107  Resp:  [18-24] 24  BP: (122-170)/() 154/103    Neurological Exam  Mental Status  Arousable to verbal stimuli. Oriented only to person and place. Moderate dysarthria present. Language is fluent with no aphasia. Attention and concentration: Decreased. Fund of knowledge is appropriate for level of education.    Cranial Nerves  CN II: Visual fields full to confrontation.  CN III, IV, VI: Extraocular movements intact bilaterally. Pupils equal round and reactive to light bilaterally. Partial gaze palsy with right gaze preference, unable to move eyes beyond midline on left.  CN V: Facial sensation is  normal.  CN VII:  Right: There is no facial weakness.  Left: There is central facial weakness.  CN VIII: Hearing is normal.  CN IX, X: Palate elevates symmetrically  CN XI: Shoulder shrug strength is normal.  CN XII: Tongue midline without atrophy or fasciculations.    Motor  Normal muscle bulk throughout. No fasciculations present. Right hemiparesis.  Left upper extremity is 2/5, left lower extremity is 3/5  Right upper extremity/right lower extremity spontaneous movement 5/5.    Sensory  Sensation: Was withdrawing to pain in left upper and lower extremity.     Reflexes  Deep tendon reflexes are 2+ and symmetric in all four extremities with downgoing toes bilaterally.    Coordination  Finger-to-nose, rapid alternating movements and heel-to-shin normal bilaterally without dysmetria.    Gait  Not assessed.      Physical Exam   Constitutional: She is oriented to person, place, and time. She appears well-developed and well-nourished.   HENT:   Head: Normocephalic and atraumatic.   Eyes: Pupils are equal, round, and reactive to light. EOM are normal.   Cardiovascular:   Sustained atrial fibrillation with tachycardia   Pulmonary/Chest: Effort normal and breath sounds normal.   Abdominal: Soft. She exhibits no distension.   Neurological: She is oriented to person, place, and time. She has normal reflexes. Coordination normal. GCS eye subscore is 4. GCS verbal subscore is 5. GCS motor subscore is 6.   Drowsy, arousable to verbal stimuli   Skin: Skin is warm and dry.   Psychiatric: She has a normal mood and affect. Her behavior is normal.   Nursing note and vitals reviewed.    Nursing note and vitals reviewed.    Results Review:    I reviewed the patient's new clinical results.      Ct Head Without Contrast    Result Date: 6/4/2020  Evolving right MCA infarcts in the anterior and posterior watershed regions as described, without significant interval extension of infarct territory. No evidence of hemorrhage, new infarct or  other new acute intracranial disease is appreciated  D:  06/04/2020 E:  06/04/2020          Results for orders placed during the hospital encounter of 05/31/20   Adult Transthoracic Echo Complete W/ Cont if Necessary Per Protocol    Narrative · Left ventricular systolic function is normal. Estimated EF appears to be   in the range of 56 - 60%.  · Normal right ventricular cavity size and systolic function noted.  · Left atrial cavity size is moderately dilated. Left atrial volume is   moderately increased.  · No evidence of a patent foramen ovale. Saline test results are negative.  · The aortic valve is abnormal in structure. The valve exhibits sclerosis.   There is mild calcification of the aortic valve  · Mild aortic valve stenosis is present            Assessment/Plan         Assessment/Plan:        65-year-old right-handed white female with known diagnosis of hypertension, hyperlipidemia, persistent atrial fibrillation, on Eliquis, who has been admitted with right MCA stroke.  On arrival, thrombectomy was attempted (was unsuccessful), but intra-arterial TPA was given.  Patient was not a candidate for TPA secondary to being on Eliquis.            1. Right MCA stroke.  Likely cardioembolic, given her being in persistent A. fib.  Patient had improved yesterday, with being more awake and moving her left side better.  But overnight patient has gotten worse with being drowsy and worsening dysarthria and left sided weakness.  We were called emergently at the bedside, and we did a stat CT head, which showed no hemorrhage.  It does show evolving right MCA stroke, which is stable compared to her last scan.  Patient was started on heparin drip yesterday and my concern was possible hemorrhage.  Likely reason for her worsening was secondary to medication.  Will hold Seroquel for now.   Use Seroquel 12.5 mg as needed at night if patient agitated.  2. Persistent atrial fibrillation.   Rate increased again today.  Patient is  being followed by cardiology.  Continue heparin drip.  Will transition to oral anticoagulant tomorrow, if she continues to do well.  3. Essential hypertension.  Normal blood pressure goals.  Systolic goal blood pressure 100-140.  4. Mixed hyperlipidemia.   Continue Lipitor 80 mg daily.    5. Activity - Ok to resume PT/OT.  6. Diet - SLP following; patient failed MBS yesterday. Keofeed in place.  Speech therapy will continue to follow, and if she does not pass swallowing may need PEG placement.     Case was discussed with the patient's daughter who is at bedside and nursing and Dr GLASS.  We will continue to follow her.        Spike Vaughan MD  06/04/20  12:45

## 2020-06-04 NOTE — SIGNIFICANT NOTE
06/04/20 1438   SLP Deferred Reason   SLP Deferred Reason Unable to treat, medical status change   Patient not appropriate for treatment per RN. Will re-attempt when appropriate.

## 2020-06-04 NOTE — PROGRESS NOTES
[facility]  PROGRESS NOTE    Patient Name: Sidra Lane  : 1955  MRN: [id number]    Date of Admission: 2020  Primary Care Physician: Jl Mcknight MD    Subjective   Subjective     CC:  Follow-up acute CVA    HPI:  No significant acute events reported from overnight.  Nursing report reviewed.  This morning patient is very somnolent and apparently had worsening left-sided weakness and somnolence.  She was seen by Dr. Vaughan and stat CT scan was ordered, which was negative for any acute hemorrhagic transformation.  Nurse reported that patient pulled out Keofeed last night, which was replaced successfully.  Daughter at bedside.    Review of Systems  Unable to obtained due to altered mental status.    Objective   Objective     Vital Signs:   Temp:  [98.5 °F (36.9 °C)-98.7 °F (37.1 °C)] 98.6 °F (37 °C)  Heart Rate:  [106-158] 107  Resp:  [18-24] 24  BP: (122-170)/() 154/103  Total (NIH Stroke Scale): 12     Physical Exam:  General Assessment: No acute cardiopulmonary distress. Well developed and well nourished.    HEENT: NCAT, PERRL, MM moist    Neck: Supple, no carotid bruit bilat    CVS: RRR, S1S2 normal, no murmurs    Resp: Exam limited to anterior aspect, relatively clear overall, respiration is nonlabored    Abd: soft, NT, ND, normal BS, no guarding or peritoneal signs    Ext: No edema, both calves are symmetric and NTTP    Neuro: Patient is lethargic, arousable, attempting to answer some simple questions, however speech is very dysarthric.  Patient not following any commands.  She does move her right upper extremity, right lower extremity, and left lower extremity spontaneously; left upper extremity is flaccid.    Skin: W/D/I. No rash.    Psych: Affect is appropriate      Results Reviewed:  Results from last 7 days   Lab Units 20  1501 20  0637 20  1517   WBC 10*3/mm3 11.85* 9.99 9.37   HEMOGLOBIN g/dL 11.9* 11.7* 11.9*    HEMATOCRIT % 43.1 41.1 41.9   PLATELETS 10*3/mm3 263 259 307   INR  1.02  --   --      Results from last 7 days   Lab Units 06/04/20  0518 06/03/20  2303 06/03/20  0327 06/02/20  0430  05/31/20  1517   SODIUM mmol/L 136  --  138 137  137   < >  --    POTASSIUM mmol/L 4.4  --  3.4* 4.5  4.4   < >  --    CHLORIDE mmol/L 97*  --  102 101  100   < >  --    CO2 mmol/L 22.0  --  19.0* 17.0*  22.0   < >  --    BUN mg/dL 12  --  10 12  12   < >  --    CREATININE mg/dL 0.68  --  0.66 0.78  0.69   < >  --    GLUCOSE mg/dL 120*  --  134* 145*  142*   < >  --    CALCIUM mg/dL 9.3  --  8.5* 8.6  8.6   < >  --    ALT (SGPT) U/L  --   --   --  19  --  19   AST (SGOT) U/L  --   --   --  38*  --  31   TROPONIN T ng/mL <0.010 <0.010  --   --   --  <0.010    < > = values in this interval not displayed.     Estimated Creatinine Clearance: 77.9 mL/min (by C-G formula based on SCr of 0.68 mg/dL).    Microbiology Results Abnormal     Procedure Component Value - Date/Time    COVID-19,CEPHEID,AMBERLY IN-HOUSE(OR EMERGENT/ADD-ON),NP SWAB IN TRANSPORT MEDIA 3-4 HR TAT - Swab, Nasopharynx [[id number]]  (Normal) Collected:  05/31/20 1533    Lab Status:  Final result Specimen:  Swab from Nasopharynx Updated:  05/31/20 1638     COVID19 Not Detected          Imaging Results (Last 24 Hours)     Procedure Component Value Units Date/Time    XR Chest 1 View [[id number]] Collected:  06/04/20 1420     Updated:  06/04/20 1421    Narrative:       EXAMINATION: XR CHEST 1 VW-     INDICATION: fever; I63.9-Cerebral infarction, unspecified;  R13.12-Dysphagia, oropharyngeal phase; R47.1-Dysarthria and anarthria;  R41.841-Cognitive communication deficit     COMPARISON: 05/31/2020     FINDINGS: Feeding tube is seen below left hemidiaphragm. Heart is  enlarged with vasculature appears upper limits of normal. Lungs are  moderately well-expanded and appear grossly clear.          Impression:       1. Feeding tube below left diaphragm.  2. Cardiomegaly without  evidence congestive failure.  3. No evidence of active chest disease elsewhere.           CT Head Without Contrast [[id number]] Collected:  06/04/20 0934     Updated:  06/04/20 0943    Narrative:       EXAMINATION: CT HEAD WO CONTRAST-      INDICATION: Cerebral hemorrhage suspected; I63.9-Cerebral infarction,  unspecified; R13.12-Dysphagia, oropharyngeal phase; R47.1-Dysarthria and  anarthria; R41.841-Cognitive communication deficit.     TECHNIQUE: 5 mm unenhanced images through the brain.     The radiation dose reduction device was turned on for each scan per the  ALARA (As Low as Reasonably Achievable) protocol.     COMPARISON: 06/01/2020 head CT scan.     FINDINGS: Evolving right MCA territory infarcts are seen, the 4 cm area  of edema in the anterior, watershed region showing decreased  attenuation, but no significant interval enlargement. The more patchy,  posterior right watershed region infarct is similar, extending over a  roughly 2 cm area, but lower in density than on the prior scan typical  of evolving infarct. No clearly new area of infarction is identified.  There is no evidence of intracranial hemorrhage, no evidence of mass or  significant mass effect. Ventricles are normal in size. No abnormal  extraaxial collection is seen.       Impression:       Evolving right MCA infarcts in the anterior and posterior  watershed regions as described, without significant interval extension  of infarct territory. No evidence of hemorrhage, new infarct or other  new acute intracranial disease is appreciated     D:  06/04/2020  E:  06/04/2020       XR Abdomen KUB [[id number]] Collected:  06/04/20 0201     Updated:  06/04/20 0204    Narrative:       CR Abdomen 1 Vw    INDICATION:   Feeding tube placement.    COMPARISON:   6/2/2020    FINDINGS:  AP view of the abdomen. Tip of the feeding tube remains in the gastric antrum or duodenal bulb. Visualized bowel gas pattern is normal.      Impression:       Feeding tube tip in  the gastric antrum or duodenal bulb.    Signer Name: Hi Hansen MD   Signed: 6/4/2020 2:01 AM   Workstation Name: [id number]    [facility]    XR Abdomen KUB [[id number]] Collected:  06/02/20 1701     Updated:  06/03/20 2051    Narrative:       EXAMINATION: XR ABDOMEN KUB- 06/02/2020     INDICATION: Keofeed; I63.9-Cerebral infarction, unspecified;  R13.12-Dysphagia, oropharyngeal phase; R47.1-Dysarthria and anarthria;  R41.841-Cognitive communication deficit     COMPARISON: NONE     FINDINGS: Feeding tube is seen in the distal stomach, almost in the  duodenal bulb. Bowel gas pattern is nonobstructive.       Impression:       Feeding tube tip in the distal stomach or duodenal bulb.     D:  06/02/2020  E:  06/03/2020         This report was finalized on 6/3/2020 8:48 PM by Dr. Ervin Troncoso MD.       CT Head Without Contrast [[id number]] Collected:  06/03/20 0841     Updated:  06/03/20 1752    Narrative:       EXAMINATION: CT HEAD WO CONTRAST-      INDICATION: I63.9-Cerebral infarction, unspecified; R13.12-Dysphagia,  oropharyngeal phase; R47.1-Dysarthria and anarthria; R41.841-Cognitive  communication deficit; followup stroke, left-sided weakness.     TECHNIQUE: Multiple axial CT imaging was obtained of the head from the  skull base to the skull vertex without the administration of intravenous  contrast.     The radiation dose reduction device was turned on for each scan per the  ALARA (As Low as Reasonably Achievable) protocol.     COMPARISON: 06/01/2020.     FINDINGS: Evolving right MCA territory area of infarction extending from  the temporal lobe into the right parietal lobe. The edema is slightly  increased in size when compared to the prior study. The surrounding mass  effect is unchanged. There is no midline shift. No effacement of the  ventricular system.       Impression:       Slight increase seen in size in the edematous area in the  right MCA territory. No significant change seen  in the surrounding mass  effect or midline shift. No hemorrhagic conversion.     D:  06/03/2020  E:  06/03/2020     This report was finalized on 6/3/2020 5:49 PM by Dr. Chastity Rico MD.             Results for orders placed during the hospital encounter of 05/31/20   Adult Transthoracic Echo Complete W/ Cont if Necessary Per Protocol    Narrative · Left ventricular systolic function is normal. Estimated EF appears to be   in the range of 56 - 60%.  · Normal right ventricular cavity size and systolic function noted.  · Left atrial cavity size is moderately dilated. Left atrial volume is   moderately increased.  · No evidence of a patent foramen ovale. Saline test results are negative.  · The aortic valve is abnormal in structure. The valve exhibits sclerosis.   There is mild calcification of the aortic valve  · Mild aortic valve stenosis is present          I have reviewed the medications:  Scheduled Meds:  amLODIPine 5 mg Oral Daily   aspirin 81 mg Oral Daily   atorvastatin 80 mg Oral Nightly   cholestyramine light 1 packet Oral Daily   diphenoxylate-atropine 1 tablet Oral BID   famotidine 20 mg Intravenous Q12H   lisinopril 10 mg Oral Q PM   metoprolol tartrate 25 mg Oral Q12H   sodium chloride 10 mL Intravenous Q12H     Continuous Infusions:  heparin 15 Units/kg/hr Last Rate: 15 Units/kg/hr (06/04/20 1358)   Pharmacy to Dose Heparin       PRN Meds:.•  acetaminophen  •  acetaminophen  •  butalbital-acetaminophen-caffeine  •  enalaprilat  •  metoprolol tartrate  •  nitroglycerin  •  Pharmacy to Dose Heparin  •  potassium chloride **OR** potassium chloride **OR** potassium chloride  •  QUEtiapine  •  sodium chloride  •  sodium chloride    Assessment/Plan   Assessment & Plan     Active Hospital Problems    Diagnosis  POA   • **Cerebrovascular accident (CVA) due to embolism of right middle cerebral artery (CMS/HCC) [I63.411]  Yes   • Acute ischemic stroke (CMS/HCC) [I63.9]  Yes   • Atrial fibrillation (CMS/HCC)  [I48.91]  Yes      Resolved Hospital Problems   No resolved problems to display.        Brief Hospital Course to date:  Sidra Lane is a 65 y.o. 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  She was found to have a right MCA territory reversible ischemia on CT perfusion.  She underwent a CT head neck angiogram with an intra-arterial TPA after failed thrombectomy and admitted to the ICU afterward.  She was transferred out of ICU on 6/3 with hospitalist assuming care on 6/4.    Right MCA stroke  - likely cardioembolic.  Patient has a history of persistent atrial fibrillation and had been anticoagulated with Eliquis.  She had been followed by her electrophysiologist and had plans for an ablation prior to readmission.  -Again she is status post intra-arterial TPA after failed thrombectomy  -She had some delirium while in the ICU and Seroquel 50 mg nightly was given to her  -This morning she had an altered mental status and possible worsening left upper extremity weakness  -Stat CT brain was performed and acute hemorrhagic transformation was ruled out, but does still reporting evolving MCA CVA that was stable from the previous exam.  -The reasons for her worsening symptoms may be related to Seroquel.  Dr. Vaughan has recommended to hold Seroquel for now, use 12.5 mg as needed if patient is agitated.  -Patient had been on heparin drip, managed by Dr. Vaughan  -EP also following, patient is still in A. fib with RVR, will defer to them to resume home medications since patient now has a Keofeed tube placed  -Failed modified barium swallow and was started on tube feeding, will continue for now    Persistent atrial fibrillation with RVR  -Patient had been on beta-blocker per her cardiologist and had plans for ablation prior to admission  -Currently she is not a candidate for an ablation  -We  will defer management to her EP    Hypertension  -Not well controlled, initially was allowed for permissive hypertension due to acute stroke.  Currently Dr. Vaughan recommends better control of her blood pressure and bring it down to normotensive.  -I have resumed some of patient's home medications and hope to bring it down slowly.  I have not resume her home beta-blocker and will defer that to her cardiologist/EP.    Fever  -Possible aspiration when patient pulled out her Keofeed tube  -We will obtain blood culture x2 and obtain chest x-ray  -We will start antibiotics if chest x-ray is abnormal and consistent with possible pneumonia  -Monitor closely for now and follow-up on chest x-ray    Hyperlipidemia  -High intensity statin therapy    Left-sided weakness  -PT OT, will benefit from short-term rehab once she is medically stable    Dysphasia  -Speech pathologist following, currently getting enteral feeding Via Keofeed tube.  Tube feedings at goal.  -PEG tube placement discussed with patient's family by intensivist, currently they want to hold off.    Repeat CBC and BMP in the morning    DVT Prophylaxis: Fully anticoagulated with heparin drip    Daily Care Communication  Due to current limited visitation policies, an attempt will be made daily to update patient's identified best point-of-contact(s)   Contact: Both daughters, Kaye over the phone   Relation:    Type of communication (phone or televideo):    Time of communication:    Notes (if applicable):      Disposition:TBD pending further evaluation and response to therapy    CODE STATUS:   Code Status and Medical Interventions:   Ordered at: 05/31/20 1743     Code Status:    CPR     Medical Interventions (Level of Support Prior to Arrest):    Full         Electronically signed by Alesia Machado MD, 06/04/20, 14:47.

## 2020-06-04 NOTE — PROGRESS NOTES
HEPARIN INFUSION  Sidra Lane is a  65 y.o. female receiving heparin infusion.     Therapy for (VTE/Cardiac): Cardiac  Patient Weight: 109 kg  Initial Bolus (Y/N): No  Any Bolus (Y/N): No      Signs or Symptoms of Bleeding: None noted    Cardiac or Other (Not VTE)   Initial Bolus: 60 units/kg (Max 4,000 units)  Initial rate: 12 units/kg/hr (Max 1,000 units/hr)   Anti-Xa (IU/mL) Bolus Dose Stop Infusion Rate Change Repeat Anti-Xa      ?0.19 60 units/kg 0 hrs Increase rate by   4 units/kg/hr 6 hrs       0.2 - 0.29  30 units/kg 0 hrs Increase rate by 2 units/kg/hr 6 hrs    0.3 - 0.7 0 0 hrs No change 6 hrs      0.71 - 0.99 0  0 hrs Decrease rate by 2 units/kg/hr 6 hrs            ?1 0 Hold 1 hr Decrease rate by 3 units/kg/hr 6 hrs      Results from last 7 days   Lab Units 06/03/20  1501 06/01/20  0637 05/31/20  1517   INR  1.02  --   --    HEMOGLOBIN g/dL 11.9* 11.7* 11.9*   HEMATOCRIT % 43.1 41.1 41.9   PLATELETS 10*3/mm3 263 259 307       Date   Time   Anti-Xa Current Rate (Unit/kg/hr) Bolus   (Units) Rate Change   (Unit/kg/hr) New Rate (Unit/kg/hr) Next   Anti-Xa Comments  Pump Check Daily   6/3 1417   No +9 9 2000 DW RN   6/3 2000 0.1 9 No -- 9  JAMES RN. Lsot IV access and heparin stopped. Will continue current rate when restarted and redraw lab.   6/4 0300 0.1 9 -- +4 13 0900 D/w RN   6/4 1242 0.17 13 -- +2 15 2000 D/W RN Sydnee, reports drip was off for ~1 hour CT to rule out hemorrhage, may be slightly falsely low                                                                                                                                                                                                Francoise Song, PharmD, BCPS  6/4/2020  13:50

## 2020-06-04 NOTE — NURSING NOTE
Spoke with Sydnee SANCHEZ regarding low Wilbert of 14 today. Per Sydnee, skin looks great, intact; tube feedings for nutrition; incontinent with moisture. Pt on waffle mattress; heel boots to be applied by RN. Wound orders placed, including Q2H turning. Please contact WOC nurse as needed for concerns.

## 2020-06-04 NOTE — DISCHARGE PLACEMENT REQUEST
"Norah Lane (65 y.o. Female)   Asif Muñoz, RN Case Manager  [phone]  Referral for rehab    Date of Birth Social Security Number Address Home Phone MRN    1955  [address] [place] [address] [phone] [id number]    [facility] Marital Status          Unknown Single       Admission Date Admission Type Admitting Provider Attending Provider Department, Room/Bed    5/31/20 Emergency Alesia Machado MD Khamvanthong, Phonekeo, MD [facility] 3F, S311/1    Discharge Date Discharge Disposition Discharge Destination                       Attending Provider:  Alesia Machado MD    Allergies:  Digoxin, Iodine, Other    Isolation:  None   Infection:  COVID Screen (preop/placement) (06/03/20)   Code Status:  CPR    Ht:  154.9 cm (61\")   Wt:  109 kg (240 lb 15.4 oz)    Admission Cmt:  None   Principal Problem:  Cerebrovascular accident (CVA) due to embolism of right middle cerebral artery (CMS/HCC) [I63.411]                 Active Insurance as of 5/31/2020     Primary Coverage     Payor Plan Insurance Group Employer/Plan Group    ANTHEM MEDICARE REPLACEMENT ANTHEM MEDICARE ADVANTAGE [id number]     Payor Plan Address Payor Plan Phone Number Payor Plan Fax Number Effective Dates    [address] [phone]  7/1/2019 - None Entered    [facility] [id number]       Subscriber Name Subscriber Birth Date Member ID       NORAH LANE CHEN 1955 [id number]           Secondary Coverage     Payor Plan Insurance Group Employer/Plan Group    [place]      Payor Plan Address Payor Plan Phone Number Payor Plan Fax Number Effective Dates    [address] [phone]  8/27/2019 - None Entered    [facility] [id number]       Subscriber Name Subscriber Birth Date Member ID       NORAH LANE CHEN 1955 [id number]                 Emergency Contacts      (Rel.) Home Phone Work Phone Mobile Phone    Kaye Porter (Daughter) -- -- [phone]   " "   MANDA CUEVAS (Sister) [phone] -- [phone]               History & Physical      Edward Garcia MD at 05/31/20 1745          Pulmonary/Critical Care History and Physical Exam     LOS: 0 days   Patient Care Team:  Jl Mcknight MD as PCP - General (Family Medicine)    Chief Complaint:    Chief Complaint   Patient presents with   • Stroke       Subjective     HPI:   Sidra Lane is a 65 year old female with PMH persistent A-Fib on Eliquis, CAD, HTN, DVT/PE, GERD, who developed sudden onset left sided facial droop, left sided weakness, and facial droop at 1230 today. She states that she hasn't felt well this past week with fatigue and last night had some dizziness. She states that her right leg had been \"going out from under her\" intermittently over the past week but she attributed this to back pain which started after she mowed her lawn 3 weeks ago.  She does report that she had a brain scan at [facility] approximately 3 weeks ago for evaluation of episodes of dizziness and was told it was okay.  EMS was called and noted she had some movement in her LUE but still had significant weakness. She was flown to [facility] via helicopter for higher level of care and on arrival NIH was 6. She is on Eliquis daily for persistent A-Fib and was therefore not a candidate for systemic tPA. She reported an allergic reaction to Iodine contrast in 1973 but stated that she could have contrast if she is premedicated beforehand.  She was pre-medicated with Benadryl 25 mg and Solu Medrol 125 mg prior to scans. CT scan of the head showed an area concerning for evolving right MCA infarction.  CTP revealed an M2 occlusion and she was taken emergently to Cath Lab for neurointervention where she underwent unsuccessful attempt at thrombectomy and received IA tPA. Post-procedure she was brought to Neuro ICU for close monitoring.     On arrival to ICU she is alert and oriented with slurred speech and " NIH is 7. She has a history of A-Fib followed by Dr. Alan and is scheduled for a PVA on June 22.     History taken from:     Past Medical History:   Diagnosis Date   • Atrial fibrillation (CMS/HCC)    • CHF (congestive heart failure) (CMS/HCC)    • Deep vein thrombosis (CMS/HCC)    • GERD (gastroesophageal reflux disease)    • Hypertension    • May-Thurner syndrome    • Pulmonary embolism (CMS/HCC)        Past Surgical History:   Procedure Laterality Date   • CHOLECYSTECTOMY     • COLON SURGERY      bowel leakage, some of colon removed   • LAPAROSCOPIC TUBAL LIGATION     • LEG SURGERY      multiple stents to BLE   • TONSILLECTOMY         Family History   Problem Relation Age of Onset   • Hypertension Mother    • Cervical cancer Mother        Social History     Socioeconomic History   • Marital status: Single     Spouse name: Not on file   • Number of children: Not on file   • Years of education: Not on file   • Highest education level: Not on file   Tobacco Use   • Smoking status: Never Smoker   • Smokeless tobacco: Never Used   Substance and Sexual Activity   • Alcohol use: No   • Drug use: No       Allergies   Allergen Reactions   • Digoxin Other (See Comments)     Severe c/p, left arm pain, lips numb, finger tip numbness   • Iodine Rash     Breathing problems   • Other Other (See Comments)     Monistat, causes blisters       PMH/FH/SocH were reviewed by me and updates were made.     Review of Systems:    Review of 14 systems was completed with positives and pertinent negatives noted in the subjective section.  All other systems reviewed and are negative.       Objective     Vital Signs  Temp:  [97.7 °F (36.5 °C)-98.6 °F (37 °C)] 97.7 °F (36.5 °C)  Heart Rate:  [] 116  Resp:  [16] 16  BP: ()/() 111/78  Body mass index is 43.46 kg/m².       Physical Exam:     General Appearance:    Alert, cooperative, in no acute distress   Head:    Normocephalic, without obvious abnormality, atraumatic      Eyes:            Lids and lashes normal, conjunctivae and sclerae normal, no   icterus, no pallor, corneas clear, PERRL, forced right gaze deviation   ENMT:   Ears appear intact with no abnormalities noted      No oral lesions, no thrush, oral mucosa moist   Neck:   No adenopathy, supple, trachea midline, no thyromegaly, no   carotid bruit, no JVD       Lungs/resp:     Normal effort, symmetric chest rise, no crepitus, clear to      auscultation bilaterally, no chest wall tenderness                  Heart/CV:    Regular rhythm and normal rate, normal S1 and S2, no            murmur   Abdomen/GI:     Normal bowel sounds, no masses, no organomegaly, soft        non-tender, non-distended   G/U:     Deferred, Logan catheter   Extremities/MSK:   No clubbing, cyanosis or edema.  Normal tone.  No deformities. Right groin soft, dressing intact, oozing blood, no hematoma   Pulses:   Pulses palpable and equal bilaterally   Skin:   No bleeding, bruising or rash   Hem/Lymph:   No cervical or supraclavicular adenopathy.    Neurologic:  Left facial droop and right upper facial droop, Left upper extremity greater than left lower extremity weakness.  Very little  strength left hand.            Psychiatric:  Normal mood and affect, oriented x 3.   The above findings are documentation my personal physical examination.  Electronically signed by:Edward Garcia MD 05/31/20 19:40    Interval: handoff  1a. Level of Consciousness: 0-->Alert, keenly responsive  1b. LOC Questions: 0-->Answers both questions correctly  1c. LOC Commands: 0-->Performs both tasks correctly  2. Best Gaze: 1-->Partial gaze palsy, gaze is abnormal in one or both eyes, but forced deviation or total gaze paresis is not present  3. Visual: 0-->No visual loss  4. Facial Palsy: 1-->Minor paralysis (flattened nasolabial fold, asymmetry on smiling)  5a. Motor Arm, Left: 1-->Drift, limb holds 90 (or 45) degrees, but drifts down before full 10 seconds, does not hit  bed or other support  5b. Motor Arm, Right: 0-->No drift, limb holds 90 (or 45) degrees for full 10 secs  6a. Motor Leg, Left: 1-->Drift, leg falls by the end of the 5-sec period but does not hit bed  6b. Motor Leg, Right: 0-->No drift, leg holds 30 degree position for full 5 secs  7. Limb Ataxia: 0-->Absent  8. Sensory: 1-->Mild-to-moderate sensory loss, patient feels pinprick is less sharp or is dull on the affected side, or there is a loss of superficial pain with pinprick, but patient is aware of being touched  9. Best Language: 0-->No aphasia, normal  10. Dysarthria: 1-->Mild-to-moderate dysarthria, patient slurs at least some words and, at worst, can be understood with some difficulty  11. Extinction and Inattention (formerly Neglect): 0-->No abnormality    Total (NIH Stroke Scale): 6   Results Review:     I reviewed the patient's new clinical results.   Results from last 7 days   Lab Units 05/31/20  1517 05/31/20  1501   CREATININE mg/dL  --  0.70   ALT (SGPT) U/L 19  --    AST (SGOT) U/L 31  --      Results from last 7 days   Lab Units 05/31/20  1517 05/31/20  1507   WBC 10*3/mm3 9.37  --    HEMOGLOBIN g/dL 11.9*  --    HEMOGLOBIN, POC g/dL  --  14.6   HEMATOCRIT % 41.9  --    HEMATOCRIT POC %  --  43   PLATELETS 10*3/mm3 307  --      Results from last 7 days   Lab Units 05/31/20  1507   PH, ARTERIAL pH units 7.37       I reviewed the patient's new imaging including images and reports.    CT head:  IMPRESSION:  Abnormal sulcal effacement with cortical extension of edema  in the right frontotemporal region and insular cortex concerning for  evolving right MCA infarction. No acute hemorrhage.    CTA head/neck:  IMPRESSION:  CTA head and neck with only mild atherosclerotic involvement  of the distal internal carotid arteries however no hemodynamically  significant stenosis, aneurysm or occlusion; specifically no large  vessel occlusion involving the [place] with only mild  irregularities in the distal  MCA distributions of potential underlying  atherosclerotic involvement without obvious occlusion.    CT cerebral perfusion:  IMPRESSION:  Area of reversible ischemia within the right MCA territory  without core infarct component identified.    Medication Review:     [START ON 6/1/2020] aspirin 325 mg Oral Daily   Or      [START ON 6/1/2020] aspirin 300 mg Rectal Daily   atorvastatin 80 mg Oral Nightly   famotidine 20 mg Intravenous Q12H   sodium chloride 10 mL Intravenous Q12H       niCARdipine 5-15 mg/hr Last Rate: Stopped (05/31/20 1854)       Assessment/Plan     Active Hospital Problems    Diagnosis   • **Cerebrovascular accident (CVA) due to embolism of right middle cerebral artery (CMS/HCC)   • Acute ischemic stroke (CMS/HCC)   • Atrial fibrillation (CMS/HCC)     65 y.o. 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  CT perfusion showed area in right MCA territory of reversible ischemia and she underwent angiography with intra-arterial TPA after failed thrombectomy.  She is transferred to the intensive care unit post procedure with persistent left sided weakness.  She has persistent atrial fibrillation.    Patient is critically ill secondary to neurologic failure with high risk of life-threatening decline in condition including potential hemorrhagic conversion, recurrent stroke, seizure and requires continuous monitoring frequent reassessment to minimize this risk.  I personally reassessed her multiple times today.    Plan:  1.  Admit to Neuro ICU  2.  Stroke Pathway, no tPA  3.  Neurology consult  4.  ST/OT/PT  5.  Cardene drip to keep SBP   6.  NPO until dysphagia eval  7.   mg QD  8.  Lipitor 80 mg QD  9.  MRI brain  10.  GI/DVT Prophylaxis    Edward Garcia MD  05/31/20  19:19     I performed an independent history and physical examination. Portions of the  history were obtained by APRN and were modified by me according to my findings. The above note reflects my findings, assessment, and plan.    Edward Garcia MD        Critical care time : 40 minutes  spent by me personally.(This excludes time spent performing separately reportable procedures and services). including high complexity decision making to assess, manipulate, and support vital organ system failure in this individual who has impairment of one or more vital organ systems such that there is a high probability of imminent or life threatening deterioration in the patient’s condition.    Electronically signed by:  Edward Garcia MD   05/31/20  19:47      • Please note that portions of this note were completed with [employer] - a voice recognition program.     Electronically signed by Edward Garcia MD at 05/31/20 1948       Vital Signs (last day)     Date/Time   Temp   Temp src   Pulse   Resp   BP   Patient Position   SpO2    06/04/20 1129   98.6 (37)   Axillary   107   24   (!) 154/103   Lying   92    06/04/20 0929   --   --   109   --   (!) 155/131   Lying   --    06/04/20 0900   --   --   (!) 135   --   --   --   --    06/04/20 0841   --   --   112   24   --   --   90    06/04/20 0829   --   --   116   --   (!) 156/105   Lying   92    06/04/20 0708   98.5 (36.9)   Axillary   106   18   (!) 170/127   Lying   93    06/04/20 0700   --   --   (!) 132   --   --   --   93    06/04/20 0604   --   --   (!) 141   --   --   --   91    06/04/20 0603   98.6 (37)   Oral   (!) 126   18   132/84   Lying   90    06/04/20 0600   --   --   (!) 124   --   --   --   90    06/04/20 0400   --   --   114   --   --   --   94    06/04/20 0200   --   --   (!) 143   --   --   --   91    06/04/20 0045   --   --   (!) 158   --   --   --   92    06/04/20 0001   --   --   113   --   --   --   97    06/03/20 2324   --   --   (!) 146   20   139/92   Lying   --    06/03/20 0335   --   --   (!) 124   --   (!) 122/110   --    94    06/03/20 2230   --   --   115   --   (!) 154/125   --   96    06/03/20 2215   --   --   (!) 134   --   124/91   --   95    06/03/20 2200   --   --   106   18   (!) 132/114   Lying   97    06/03/20 2015   --   --   (!) 131   --   (!) 154/109   --   95    06/03/20 1900   98.7 (37.1)   Oral   --   18   (!) 154/109   Lying   --    06/03/20 1702   --   --   118   20   (!) 133/102   Lying   97    06/03/20 1423   --   --   99   --   148/91   --   97    06/03/20 1300   --   --   101   --   (!) 146/109   --   97    06/03/20 1200   --   --   91   18   (!) 141/103   Lying   95    06/03/20 1100   --   --   96   --   (!) 148/105   --   98    06/03/20 1000   --   --   105   --   144/97   --   96    06/03/20 0907   --   --   86   --   (!) 130/102   --   95    06/03/20 0852   --   --   90   --   (!) 134/105   --   95    06/03/20 0837   --   --   85   --   138/99   --   95    06/03/20 0757   98.5 (36.9)   Oral   83   18   --   Lying   96    06/03/20 0630   --   --   96   --   138/94   --   94    06/03/20 0615   --   --   105   --   128/91   --   93    06/03/20 0550   --   --   90   18   147/96   Lying   95    06/03/20 0515   --   --   85   --   149/86   --   96    06/03/20 0500   --   --   82   --   142/96   --   94    06/03/20 0450   --   --   93   --   138/94   --   92    06/03/20 0435   --   --   83   --   123/90   --   94    06/03/20 0420   --   --   85   --   136/87   --   92    06/03/20 0405   --   --   87   --   138/91   --   91    06/03/20 0400   98.5 (36.9)   Oral   87   20   138/91   Lying   (!) 89    06/03/20 0350   --   --   101   --   123/99   --   95    06/03/20 0335   --   --   96   --   120/89   --   95    06/03/20 0325   --   --   102   --   128/99   --   96    06/03/20 0235   --   --   95   --   150/94   --   95    06/03/20 0220   --   --   99   --   138/94   --   96    06/03/20 0215   --   --   99   --   (!) 138/102   --   96    06/03/20 0205   --   --   96   --   (!) 161/107   --   96    06/03/20 0200   --    --   97   20   (!) 161/107   Lying   96    06/03/20 0135   --   --   101   --   151/94   --   95    06/03/20 0105   --   --   101   --   144/100   --   95    06/03/20 0014   --   --   90   --   129/96   --   95    06/03/20 0000   98.9 (37.2)   Oral   89   20   129/96   Lying   95                Current Facility-Administered Medications   Medication Dose Route Frequency Provider Last Rate Last Dose   • acetaminophen (TYLENOL) 160 MG/5ML solution 650 mg  650 mg Nasogastric Q4H PRN Case, Magda V., DO   649.6 mg at 06/03/20 0204   • acetaminophen (TYLENOL) suppository 650 mg  650 mg Rectal Q4H PRN Case, Magda V., DO   650 mg at 06/04/20 1402   • amLODIPine (NORVASC) tablet 5 mg  5 mg Oral Daily Alesia Machado MD   5 mg at 06/04/20 1052   • aspirin chewable tablet 81 mg  81 mg Oral Daily Spike Vaughan MD   81 mg at 06/04/20 1052   • atorvastatin (LIPITOR) tablet 80 mg  80 mg Oral Nightly Given, Brant WOODY MD   80 mg at 06/03/20 2045   • butalbital-acetaminophen-caffeine (FIORICET, ESGIC) -40 MG per tablet 1 tablet  1 tablet Oral Q4H PRN Case, Magda V., DO   1 tablet at 06/03/20 1217   • cholestyramine light packet 4 g  1 packet Oral Daily Case, Magda V., DO   4 g at 06/03/20 1415   • diphenoxylate-atropine (LOMOTIL) 2.5-0.025 MG per tablet 1 tablet  1 tablet Oral BID Case, Magda V., DO   1 tablet at 06/04/20 1052   • enalaprilat (VASOTEC) injection 1.25 mg  1.25 mg Intravenous Q6H PRN Alesia Machado MD       • famotidine (PEPCID) injection 20 mg  20 mg Intravenous Q12H Freda Sidhu APRN   20 mg at 06/04/20 1053   • heparin 94849 units/250 mL (100 units/mL) in 0.45 % NaCl infusion  15 Units/kg/hr Intravenous Titrated Francoise Song, [facility] 16.35 mL/hr at 06/04/20 1358 15 Units/kg/hr at 06/04/20 1358   • lisinopril (PRINIVIL,ZESTRIL) tablet 10 mg  10 mg Oral Q PM Alesia Machado MD       • metoprolol tartrate (LOPRESSOR) injection 2.5 mg  2.5 mg Intravenous Q6H  PRN David Coy APRN   2.5 mg at 06/04/20 0252   • metoprolol tartrate (LOPRESSOR) tablet 25 mg  25 mg Oral Q12H Cassia Moss APRN       • nitroglycerin (NITROSTAT) SL tablet 0.4 mg  0.4 mg Sublingual Q5 Min PRN David Coy APRN       • Pharmacy to Dose Heparin   Does not apply Continuous PRN Joe Lane, [facility]       • potassium chloride (MICRO-K) CR capsule 40 mEq  40 mEq Oral PRN Case, Magda V., DO        Or   • potassium chloride (KLOR-CON) packet 40 mEq  40 mEq Oral PRN Case, Magda V., DO   40 mEq at 06/03/20 0912    Or   • potassium chloride 10 mEq in 100 mL IVPB  10 mEq Intravenous Q1H PRN Case, Magda V., DO       • QUEtiapine (SEROquel) tablet 25 mg  25 mg Oral Nightly PRN Alesia Machado MD       • sodium chloride 0.9 % flush 10 mL  10 mL Intravenous PRN Given, Brant WOODY MD       • sodium chloride 0.9 % flush 10 mL  10 mL Intravenous Q12H Given, Brant WOODY MD   10 mL at 06/03/20 2046   • sodium chloride 0.9 % flush 10 mL  10 mL Intravenous PRN Given, Brant WOODY MD           Lab Results (last 24 hours)     Procedure Component Value Units Date/Time    Heparin Anti-Xa [[id number]]  (Abnormal) Collected:  06/04/20 1242    Specimen:  Blood Updated:  06/04/20 1314     Heparin Anti-Xa (UFH) 0.17 IU/ml     POC Glucose Once [[id number]]  (Abnormal) Collected:  06/04/20 1131    Specimen:  Blood Updated:  06/04/20 1137     Glucose 134 mg/dL     POC Glucose Once [[id number]]  (Abnormal) Collected:  06/04/20 0831    Specimen:  Blood Updated:  06/04/20 0833     Glucose 143 mg/dL     Basic Metabolic Panel [[id number]]  (Abnormal) Collected:  06/04/20 0518    Specimen:  Blood Updated:  06/04/20 0700     Glucose 120 mg/dL      BUN 12 mg/dL      Creatinine 0.68 mg/dL      Sodium 136 mmol/L      Potassium 4.4 mmol/L      Chloride 97 mmol/L      CO2 22.0 mmol/L      Calcium 9.3 mg/dL      eGFR Non African Amer 86 mL/min/1.73      BUN/Creatinine Ratio 17.6     Anion Gap 17.0 mmol/L      Narrative:       GFR Normal >60  Chronic Kidney Disease <60  Kidney Failure <15      Phosphorus [[id number]]  (Normal) Collected:  06/04/20 0518    Specimen:  Blood Updated:  06/04/20 0700     Phosphorus 4.4 mg/dL     Magnesium [[id number]]  (Normal) Collected:  06/04/20 0518    Specimen:  Blood Updated:  06/04/20 0658     Magnesium 2.2 mg/dL     Troponin [[id number]]  (Normal) Collected:  06/04/20 0518    Specimen:  Blood Updated:  06/04/20 0645     Troponin T <0.010 ng/mL     Narrative:       Troponin T Reference Range:  <= 0.03 ng/mL-   Negative for AMI  >0.03 ng/mL-     Abnormal for myocardial necrosis.  Clinicians would have to utilize clinical acumen, EKG, Troponin and serial changes to determine if it is an Acute Myocardial Infarction or myocardial injury due to an underlying chronic condition.       Results may be falsely decreased if patient taking Biotin.      Heparin Anti-Xa [[id number]]  (Abnormal) Collected:  06/04/20 0211    Specimen:  Blood Updated:  06/04/20 0252     Heparin Anti-Xa (UFH) 0.10 IU/ml     POC Glucose Once [[id number]]  (Abnormal) Collected:  06/03/20 2351    Specimen:  Blood Updated:  06/03/20 2353     Glucose 131 mg/dL     Troponin [[id number]]  (Normal) Collected:  06/03/20 2303    Specimen:  Blood Updated:  06/03/20 2326     Troponin T <0.010 ng/mL     Narrative:       Troponin T Reference Range:  <= 0.03 ng/mL-   Negative for AMI  >0.03 ng/mL-     Abnormal for myocardial necrosis.  Clinicians would have to utilize clinical acumen, EKG, Troponin and serial changes to determine if it is an Acute Myocardial Infarction or myocardial injury due to an underlying chronic condition.       Results may be falsely decreased if patient taking Biotin.      Heparin Anti-Xa [[id number]]  (Abnormal) Collected:  06/03/20 2000    Specimen:  Blood Updated:  06/03/20 2025     Heparin Anti-Xa (UFH) 0.10 IU/ml     POC Glucose Once [[id number]]  (Normal) Collected:  06/03/20 1800    Specimen:  Blood Updated:   06/03/20 1808     Glucose 111 mg/dL     CBC & Differential [[id number]] Collected:  06/03/20 1501    Specimen:  Blood Updated:  06/03/20 1559    Narrative:       The following orders were created for panel order CBC & Differential.  Procedure                               Abnormality         Status                     ---------                               -----------         ------                     CBC Auto Differential[[id number]]        Abnormal            Final result                 Please view results for these tests on the individual orders.    CBC Auto Differential [[id number]]  (Abnormal) Collected:  06/03/20 1501    Specimen:  Blood Updated:  06/03/20 1559     WBC 11.85 10*3/mm3      RBC 5.29 10*6/mm3      Hemoglobin 11.9 g/dL      Hematocrit 43.1 %      MCV 81.5 fL      MCH 22.5 pg      MCHC 27.6 g/dL      RDW 19.3 %      RDW-SD 55.4 fl      MPV 10.2 fL      Platelets 263 10*3/mm3      Neutrophil % 71.1 %      Lymphocyte % 16.0 %      Monocyte % 9.5 %      Eosinophil % 2.4 %      Basophil % 0.5 %      Immature Grans % 0.5 %      Neutrophils, Absolute 8.42 10*3/mm3      Lymphocytes, Absolute 1.90 10*3/mm3      Monocytes, Absolute 1.12 10*3/mm3      Eosinophils, Absolute 0.29 10*3/mm3      Basophils, Absolute 0.06 10*3/mm3      Immature Grans, Absolute 0.06 10*3/mm3      nRBC 0.0 /100 WBC     Scan Slide [[id number]] Collected:  06/03/20 1501    Specimen:  Blood Updated:  06/03/20 1559     Hypochromia Mod/2+     Spherocytes Mod/2+     WBC Morphology Normal     Platelet Morphology Normal    Heparin Anti-Xa [[id number]]  (Abnormal) Collected:  06/03/20 1501    Specimen:  Blood Updated:  06/03/20 1535     Heparin Anti-Xa (UFH) 0.10 IU/ml     Protime-INR [[id number]]  (Normal) Collected:  06/03/20 1501    Specimen:  Blood Updated:  06/03/20 1534     Protime 13.1 Seconds      INR 1.02    aPTT [[id number]]  (Abnormal) Collected:  06/03/20 1501    Specimen:  Blood Updated:  06/03/20 1534     PTT 25.1 seconds      Narrative:       PTT = The equivalent PTT values for the therapeutic range of heparin levels at 0.3 to 0.5 U/ml are 55 to 70 seconds.           Physician Progress Notes (last 24 hours) (Notes from 06/03/20 1133 through 06/04/20 1133)      Spike Vaughan MD at 06/03/20 1245          Stroke Progress Note       Chief Complaint:  Left-sided weakness    Subjective     Subjective:  The patient feels that she has made some improvements.  She still is having difficulty with her swallowing but does not want a PEG tube yet.    Review of Systems   Constitutional: Positive for fatigue.   Eyes: Positive for visual disturbance.   Respiratory: Negative for shortness of breath.    Cardiovascular: Negative for chest pain.        Sustained atrial fibrillation; rate controlled   Gastrointestinal: Positive for abdominal pain and diarrhea. Negative for nausea.   Endocrine: Negative.    Genitourinary: Negative.    Musculoskeletal: Negative.    Skin: Negative.    Neurological: Positive for facial asymmetry, speech difficulty, weakness and headaches.        Complains of bilateral frontal headache with bilateral eye pain (ongoing since admission)   Psychiatric/Behavioral: Negative.         Objective      Temp:  [97.4 °F (36.3 °C)-98.9 °F (37.2 °C)] 98.5 °F (36.9 °C)  Heart Rate:  [] 101  Resp:  [16-22] 18  BP: (120-161)/() 146/109    Neurological Exam  Mental Status  Awake, alert and oriented to person, place and time.Alert. Mild dysarthria present. Language is fluent with no aphasia. Able to perform serial calculations. Able to spell words backwards. Fund of knowledge is appropriate for level of education.    Cranial Nerves  CN II: Visual fields full to confrontation.  CN III, IV, VI: Pupils equal round and reactive to light bilaterally. Partial gaze palsy with right gaze preference, unable to move eyes beyond midline on left.  CN V: Facial sensation is normal.  CN VII:  Left: There is central facial weakness.  CN VIII:  Hearing is normal.  CN IX, X: Palate elevates symmetrically  CN XI: Shoulder shrug strength is normal.  CN XII: Tongue midline without atrophy or fasciculations.    Motor   Right hemiparesis.  Left upper extremity is 2/5, left lower extremity is 4/5  Right upper extremity/right lower extremity spontaneous movement 5/5.    Sensory  Light touch is normal in upper and lower extremities.     Reflexes  Not assessed.    Coordination  Finger-to-nose, rapid alternating movements and heel-to-shin normal bilaterally without dysmetria.    Gait  Not assessed.      Physical Exam   Constitutional: She is oriented to person, place, and time. She appears well-developed and well-nourished.   HENT:   Head: Normocephalic and atraumatic.   Eyes: Pupils are equal, round, and reactive to light.   Cardiovascular: Normal rate.   Sustained atrial fibrillation   Pulmonary/Chest: Effort normal and breath sounds normal.   Abdominal: Soft.   Neurological: She is alert and oriented to person, place, and time. A cranial nerve deficit is present. Coordination normal. GCS eye subscore is 4. GCS verbal subscore is 5. GCS motor subscore is 6.   Skin: Skin is warm and dry.   Psychiatric: She has a normal mood and affect. Her behavior is normal.     Nursing note and vitals reviewed.    Results Review:    I reviewed the patient's new clinical results.    Lab Results (last 24 hours)     Procedure Component Value Units Date/Time    POC Glucose Once [[id number]]  (Normal) Collected:  06/03/20 1234    Specimen:  Blood Updated:  06/03/20 1238     Glucose 107 mg/dL     POC Glucose Once [[id number]]  (Abnormal) Collected:  06/03/20 0617    Specimen:  Blood Updated:  06/03/20 0629     Glucose 133 mg/dL     Phosphorus [[id number]]  (Normal) Collected:  06/03/20 0327    Specimen:  Blood Updated:  06/03/20 0427     Phosphorus 2.5 mg/dL     Basic Metabolic Panel [[id number]]  (Abnormal) Collected:  06/03/20 0327    Specimen:  Blood Updated:  06/03/20 0425     Glucose  134 mg/dL      BUN 10 mg/dL      Creatinine 0.66 mg/dL      Sodium 138 mmol/L      Potassium 3.4 mmol/L      Chloride 102 mmol/L      CO2 19.0 mmol/L      Calcium 8.5 mg/dL      eGFR Non African Amer 89 mL/min/1.73      BUN/Creatinine Ratio 15.2     Anion Gap 17.0 mmol/L     Narrative:       GFR Normal >60  Chronic Kidney Disease <60  Kidney Failure <15      Magnesium [[id number]]  (Normal) Collected:  06/03/20 0327    Specimen:  Blood Updated:  06/03/20 0425     Magnesium 2.3 mg/dL     POC Glucose Once [[id number]]  (Normal) Collected:  06/02/20 2355    Specimen:  Blood Updated:  06/03/20 0001     Glucose 114 mg/dL     Nutrition Panel [[id number]]  (Abnormal) Collected:  06/02/20 0430    Specimen:  Blood Updated:  06/02/20 1542     Glucose 145 mg/dL      BUN 12 mg/dL      Creatinine 0.78 mg/dL      Sodium 137 mmol/L      Potassium 4.5 mmol/L      Chloride 101 mmol/L      CO2 17.0 mmol/L      Calcium 8.6 mg/dL      Alkaline Phosphatase 58 U/L      ALT (SGPT) 19 U/L      AST (SGOT) 38 U/L      Total Cholesterol 186 mg/dL      Triglycerides 147 mg/dL      Total Protein 6.9 g/dL      Albumin 3.70 g/dL      Phosphorus 4.0 mg/dL      Total Bilirubin 0.2 mg/dL      Magnesium 2.2 mg/dL      Anion Gap 19.0 mmol/L     Narrative:       Cholesterol Reference Ranges:   Desirable       < 200 mg/dL   Borderline    200-239 mg/dL   High Risk       > 239 mg/dL    Triglyceride Reference Ranges:   Normal          < 150 mg/dL   Borderline    150-199 mg/dL   High          200-499 mg/dL   Very High       > 499 mg/dL        Ct Head Without Contrast    Result Date: 6/3/2020  Slight increase seen in size in the edematous area in the right MCA territory. No significant change seen in the surrounding mass effect or midline shift. No hemorrhagic conversion.  D:  06/03/2020 E:  06/03/2020      Ct Head Without Contrast    Result Date: 6/2/2020  Stable examination with evolving right MCA territory infarct. No hemorrhagic conversion.  D:  06/01/2020  E:  06/01/2020  This report was finalized on 6/2/2020 4:34 PM by Dr. Chastity Rico MD.      Mri Brain Without Contrast    Result Date: 6/2/2020  Evolving right MCA infarction with restricted diffusion and effacement of the right lateral ventricle, however, no midline shift. This is superimposed upon moderately advanced chronic small vessel ischemic disease findings.  D:  06/02/2020 E:  06/02/2020     This report was finalized on 6/2/2020 7:13 PM by Dr. Cristi Perez.      Fl Video Swallow With Speech Single Contrast    Result Date: 6/2/2020  Fluoroscopy provided for a modified barium swallow. Please see speech therapy report for full details and recommendations.    This report was finalized on 6/2/2020 4:35 PM by Dr. Chastity Rico MD.      Xr Abdomen Kub    Result Date: 6/3/2020  Feeding tube tip in the distal stomach or duodenal bulb.  D:  06/02/2020 E:  06/03/2020        Results for orders placed during the hospital encounter of 05/31/20   Adult Transthoracic Echo Complete W/ Cont if Necessary Per Protocol    Narrative · Left ventricular systolic function is normal. Estimated EF appears to be   in the range of 56 - 60%.  · Normal right ventricular cavity size and systolic function noted.  · Left atrial cavity size is moderately dilated. Left atrial volume is   moderately increased.  · No evidence of a patent foramen ovale. Saline test results are negative.  · The aortic valve is abnormal in structure. The valve exhibits sclerosis.   There is mild calcification of the aortic valve  · Mild aortic valve stenosis is present            Assessment/Plan     Assessment/Plan:    Assessment/Plan:        65-year-old right-handed white female with known diagnosis of hypertension, hyperlipidemia, persistent atrial fibrillation, on Eliquis, who comes in with right MCA syndrome, and found to have right MCA stroke.  Thrombectomy was attempted, but intra-arterial TPA was given.  Patient was not a candidate for TPA  secondary to being on Eliquis.  Her initial CT head showed early right MCA stroke, with gray-white matter in differentiation and some sulcal effacement.  CT angiogram head and neck showed her to have right superior temporal branch occlusion.  CT perfusion showed her to have a mismatch in the right MCA territory.  Repeat CT head this morning shows stable appearance of right MCA stroke without evidence of hemorrhage.        1. Right MCA stroke.  Likely cardioembolic, given her being in persistent A. fib.  Patient has had no significant improvement since getting TPA intra-arterially. CT head remains stable today with no evidence of hemmorhage.  Will initiate heparin drip (no bolus ever) and decrease ASA to 81mg.   2. Persistent atrial fibrillation.  Cardiology following. Rate controlled.  Will initiate heparin drip, if patient does well will transition to oral AC.  3. Essential hypertension.  Ok to normalize blood pressure SBP  <140.  4. Mixed hyperlipidemia.  Her total cholesterol is 213 with LDL of 79.  Continue Lipitor 80 mg daily.    5. Activity - Ok to resume PT/OT.  6. Diet - SLP following; patient failed MBS yesterday. Keofeed in place.  Enteral feeding started today per Intensivist.     Case was discussed with the patient's daughter who is at bedside and nursing.  We will continue to follow her.      Orders have been placed on Dr. Vaughan's behalf.    Edith Zee RN  06/03/20  12:45    I reviewed Edith note as documented above, and it was edited as needed. Plan was discussed with pt and family.     Electronically signed by Spike Vaughan MD at 06/03/20 1540     Chaitanya, Magda WOODS DO at 06/03/20 1159          INTENSIVIST   PROGRESS NOTE     Hospital:  LOS: 3 days     Ms. Sidra Lane, 65 y.o. female is followed for a Chief Complaint of: CVA      Subjective   S     Interval History:  Failed MBS yesterday. Keofeed placed and started on tube feeds. She is complaining that she is hungry and  that her head hurts. She did not sleep last night.        The patient's relevant past medical, surgical and social history were reviewed and updated in Epic as appropriate.      ROS:   Constitutional: Negative for fever.   Respiratory: Negative for dyspnea.   Cardiovascular: Negative for chest pain.   Gastrointestinal: Negative for  nausea, vomiting and diarrhea.       Objective   O     Vitals:  Temp  Min: 97.4 °F (36.3 °C)  Max: 98.9 °F (37.2 °C)  BP  Min: 120/89  Max: 161/107  Pulse  Min: 81  Max: 107  Resp  Min: 16  Max: 22  SpO2  Min: 89 %  Max: 99 % Flow (L/min)  Min: 2  Max: 3    Intake/Ouptut 24 hrs (7:00AM - 6:59 AM)  Intake & Output (last 3 days)       05/31 0701 - 06/01 0700 06/01 0701 - 06/02 0700 06/02 0701 - 06/03 0700 06/03 0701 - 06/04 0700    I.V. (mL/kg) 275.9 (2.5) 2192.7 (19.9) 2828.8 (26)     Other   60     NG/GT   390 60    Total Intake(mL/kg) 275.9 (2.5) 2192.7 (19.9) 3278.8 (30.1) 60 (0.6)    Urine (mL/kg/hr) 1075 350 (0.1) 1175 (0.4)     Stool 0  0     Total Output 4793 424 4200     Net -799.1 +1842.7 +2103.8 +60            Urine Unmeasured Occurrence  1 x 5 x 1 x    Stool Unmeasured Occurrence 1 x  5 x 1 x          Medications (drips):    dexmedetomidine Last Rate: Stopped (06/03/20 0911)   niCARdipine Last Rate: Stopped (06/01/20 0015)          Physical Examination  Telemetry:  Atrial fibrillation.    Constitutional:  No acute distress.  Resting in bed comfortably  Keofeed in place.    Eyes: No scleral icterus.   PERRL, EOM intact.    Neck:  Supple, FROM   Cardiovascular: Normal rate, regular and rhythm. Normal heart sounds.  No murmurs, gallop or rub.   Respiratory: No respiratory distress. Normal respiratory effort.  Normal breath sounds  Clear to ascultation   Abdominal:  Soft. No masses. Non-tender. No distension. No HSM.   Extremities: No digital cyanosis. No clubbing.  No peripheral edema.   Skin: No rashes, lesions or ulcers   Neurological:   Alert and interactive.        Interval:  handoff  1a. Level of Consciousness: 0-->Alert, keenly responsive  1b. LOC Questions: 0-->Answers both questions correctly  1c. LOC Commands: 0-->Performs both tasks correctly  2. Best Gaze: 1-->Partial gaze palsy, gaze is abnormal in one or both eyes, but forced deviation or total gaze paresis is not present  3. Visual: 1-->Partial hemianopia  4. Facial Palsy: 2-->Partial paralysis (total or near-total paralysis of lower face)  5a. Motor Arm, Left: 1-->Drift, limb holds 90 (or 45) degrees, but drifts down before full 10 seconds, does not hit bed or other support  5b. Motor Arm, Right: 0-->No drift, limb holds 90 (or 45) degrees for full 10 secs  6a. Motor Leg, Left: 0-->No drift, leg holds 30 degree position for full 5 secs  6b. Motor Leg, Right: 0-->No drift, leg holds 30 degree position for full 5 secs  7. Limb Ataxia: 0-->Absent  8. Sensory: 1-->Mild-to-moderate sensory loss, patient feels pinprick is less sharp or is dull on the affected side, or there is a loss of superficial pain with pinprick, but patient is aware of being touched  9. Best Language: 0-->No aphasia, normal  10. Dysarthria: 1-->Mild-to-moderate dysarthria, patient slurs at least some words and, at worst, can be understood with some difficulty  11. Extinction and Inattention (formerly Neglect): 1-->Visual, tactile, auditory, spatial, or personal inattention or extinction to bilateral simultaneous stimulation in one of the sensory modalities    Total (NIH Stroke Scale): 8       Results from last 7 days   Lab Units 06/01/20  0637 05/31/20  1517 05/31/20  1507   WBC 10*3/mm3 9.99 9.37  --    HEMOGLOBIN g/dL 11.7* 11.9*  --    HEMOGLOBIN, POC g/dL  --   --  14.6   MCV fL 77.5* 78.3*  --    PLATELETS 10*3/mm3 259 307  --      Results from last 7 days   Lab Units 06/03/20  0327 06/02/20  0430 06/01/20  0637   SODIUM mmol/L 138 137  137 137   POTASSIUM mmol/L 3.4* 4.5  4.4 4.1   CO2 mmol/L 19.0* 17.0*  22.0 20.0*   CREATININE mg/dL 0.66 0.78  0.69  0.73   GLUCOSE mg/dL 134* 145*  142* 149*   MAGNESIUM mg/dL 2.3 2.2  2.2 2.0   PHOSPHORUS mg/dL 2.5 4.0  3.8 2.8     Estimated Creatinine Clearance: 77.9 mL/min (by C-G formula based on SCr of 0.66 mg/dL).  Results from last 7 days   Lab Units 06/02/20  0430 05/31/20  1517   ALK PHOS U/L 58  --    BILIRUBIN mg/dL 0.2  --    ALT (SGPT) U/L 19 19   AST (SGOT) U/L 38* 31       Results from last 7 days   Lab Units 05/31/20  1507   PH, ARTERIAL pH units 7.37       Images:  Imaging Results (Last 24 Hours)     Procedure Component Value Units Date/Time    CT Head Without Contrast [[id number]] Collected:  06/03/20 0841     Updated:  06/03/20 0909    Narrative:       EXAMINATION: CT HEAD WO CONTRAST-      INDICATION: I63.9-Cerebral infarction, unspecified; R13.12-Dysphagia,  oropharyngeal phase; R47.1-Dysarthria and anarthria; R41.841-Cognitive  communication deficit; followup stroke, left-sided weakness.     TECHNIQUE: Multiple axial CT imaging was obtained of the head from the  skull base to the skull vertex without the administration of intravenous  contrast.     The radiation dose reduction device was turned on for each scan per the  ALARA (As Low as Reasonably Achievable) protocol.     COMPARISON: 06/01/2020.     FINDINGS: Evolving right MCA territory area of infarction extending from  the temporal lobe into the right parietal lobe. The edema is slightly  increased in size when compared to the prior study. The surrounding mass  effect is unchanged. There is no midline shift. No effacement of the  ventricular system.       Impression:       Slight increase seen in size in the edematous area in the  right MCA territory. No significant change seen in the surrounding mass  effect or midline shift. No hemorrhagic conversion.     D:  06/03/2020  E:  06/03/2020       XR Abdomen KUB [[id number]] Collected:  06/02/20 1701     Updated:  06/03/20 0736    Narrative:       EXAMINATION: XR ABDOMEN KUB- 06/02/2020     INDICATION:  Keofeed; I63.9-Cerebral infarction, unspecified;  R13.12-Dysphagia, oropharyngeal phase; R47.1-Dysarthria and anarthria;  R41.841-Cognitive communication deficit     COMPARISON: NONE     FINDINGS: Feeding tube is seen in the distal stomach, almost in the  duodenal bulb. Bowel gas pattern is nonobstructive.       Impression:       Feeding tube tip in the distal stomach or duodenal bulb.     D:  06/02/2020  E:  06/03/2020           MRI Brain Without Contrast [[id number]] Collected:  06/02/20 1301     Updated:  06/02/20 1916    Narrative:       EXAMINATION: MRI BRAIN WO CONTRAST-06/02/2020:     INDICATION: CVA; I63.9-Cerebral infarction, unspecified;  R13.12-Dysphagia, oropharyngeal phase; R47.1-Dysarthria and anarthria.      TECHNIQUE: Multiplanar MRI of the brain without intravenous contrast.     COMPARISON: NONE.     FINDINGS: Confluent area of restricted diffusion within the right  frontotemporal region along with anterior and posterior MCA distribution  restriction consistent with evolving right MCA infarction with  effacement of the right lateral ventricle, however, no midline shift.  Background increased signal findings of at least moderate involvement  demonstrating moderately advanced chronic small vessel ischemic disease.  Susceptibility-weighted imaging performed without susceptibility  artifact. Pituitary and sella within normal limits. Globes and orbits  retain normal T2 signal characteristics. The visualized paranasal  sinuses and mastoid air cells are grossly clear and well pneumatized. No  cerebellopontine angle mass lesion. Normal signal flow voids of the  distal internal carotid and vertebrobasilar arteries.       Impression:       Evolving right MCA infarction with restricted diffusion and  effacement of the right lateral ventricle, however, no midline shift.  This is superimposed upon moderately advanced chronic small vessel  ischemic disease findings.     D:  06/02/2020  E:  06/02/2020             This report was finalized on 6/2/2020 7:13 PM by Dr. Cristi Perez.       FL Video Swallow With Speech Single Contrast [[id number]] Collected:  06/02/20 1339     Updated:  06/02/20 1638    Narrative:       EXAMINATION: FL VIDEO SWALLOW W SPEECH SINGLE-CONTRAST-     INDICATION: dysphagia; I63.9-Cerebral infarction, unspecified;  R13.10-Dysphagia, unspecified; R47.1-Dysarthria and anarthria     TECHNIQUE: 48 seconds of fluoroscopic time was used for this exam. 6  associated fluoroscopic loops were saved. Patient was evaluated in the  seated lateral position while taking a variety of consistencies of  barium by mouth under the direction of speech pathology.     COMPARISON: NONE     FINDINGS: 48 seconds of fluoroscopy provided for a modified barium  swallow. Please see speech therapy report for full details and  recommendations.          Impression:       Fluoroscopy provided for a modified barium swallow. Please  see speech therapy report for full details and recommendations.         This report was finalized on 6/2/2020 4:35 PM by Dr. Chastiyt Rico MD.       CT Head Without Contrast [[id number]] Collected:  06/01/20 1639     Updated:  06/02/20 1637    Narrative:       EXAMINATION: CT HEAD WO CONTRAST- 06/01/2020     INDICATION: stroke; I63.9-Cerebral infarction, unspecified;  R13.10-Dysphagia, unspecified; R47.1-Dysarthria and anarthria; acute  left-sided weakness     TECHNIQUE: Multiple axial CT imaging was obtained of the head without  the administration of intravenous contrast.     The radiation dose reduction device was turned on for each scan per the  ALARA (As Low as Reasonably Achievable) protocol.     COMPARISON: NONE     FINDINGS: There is a large area of low density involving the right MCA  territory representing the patient's evolving area of infarction. There  is no significant change seen in the interval. No hemorrhagic  conversion. There is no significant effacement on the right lateral  ventricle.  No significant midline shift. Bony structures are  unremarkable. Visualized paranasal sinuses are clear. The mastoid air  cells are patent.       Impression:       Stable examination with evolving right MCA territory  infarct. No hemorrhagic conversion.     D:  06/01/2020  E:  06/01/2020     This report was finalized on 6/2/2020 4:34 PM by Dr. Chastity Rico MD.               Results: Reviewed.  I reviewed the patient's new laboratory and imaging results.  I independently reviewed the patient's new images.    Medications: Reviewed.    Assessment/Plan   A / P     Ms. 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 Her CT perfusion showed an area in the right MCA territory of reversible ischemia and she underwent angiography with intra-arterial tPA after a failed thrombectomy. She failed her MBS and a keofeed has been placed with tube feeds started.      Nutrition:   NPO Diet  Advance Directives:   Code Status and Medical Interventions:   Ordered at: 05/31/20 1741     Code Status:    CPR     Medical Interventions (Level of Support Prior to Arrest):    Full       Active Hospital Problems    Diagnosis   • **Cerebrovascular accident (CVA) due to embolism of right middle cerebral artery (CMS/HCC)   • Acute ischemic stroke (CMS/HCC)   • Atrial fibrillation (CMS/HCC)       Assessment / Plan:    1. Start Seroquel 50mg at night for sleep and agitation.   2. Start Fioricet for headache.   3. Restart home antidiarrheals.   4. EP following for management of Afib.   5. Neurology following  6. Tolerating tube feeds. D/c IVF  7. PT/OT  8. ASA/Statin  9. AM labs  10. Okay to transfer to telemetry when a bed is available.     High level of risk due to:  severe exacerbation of chronic illness and illness with threat to life or bodily function.    Plan of care and goals reviewed during interdisciplinary  rounds.  I discussed the patient's findings and my recommendations with patient, family and nursing staff      Magda Tavares DO    Intensive Care Medicine and Pulmonary Medicine       Electronically signed by Magda Tavares DO at 20 1203          Physical Therapy Notes (last 24 hours) (Notes from 20 1133 through 20 1133)      Tahira Ruiz, PT Student at 20 1350  Version 1 of 1         Problem: Patient Care Overview  Goal: Plan of Care Review  Outcome: Ongoing (interventions implemented as appropriate)  Flowsheets (Taken 6/3/2020 1350)  Progress: improving (Pended)  Plan of Care Reviewed With: patient (Pended)  Outcome Summary: Pt ambulated 30 ft with min a x 2 with R UE support on tele monitor. Pt's gait limited by increased HR and patient reported fatigue Pt demonstrated decreased gait speed and step length and required VC's to promote upright posture. Continue to progress therapy as appropriate. (Pended)       Electronically signed by Taylor Jeff PT at 20 1610     Tahira Ruiz PT Student at 20 1350  Version 1 of 1         Problem: Patient Care Overview  Goal: Plan of Care Review  6/3/2020 1554 by Tahira Ruiz PT Student  Flowsheets (Taken 6/3/2020 1350)  Progress: improving  Plan of Care Reviewed With: patient  Outcome Summary: Pt ambulated 30 ft with min a x 2 with R UE support on tele monitor. Pt's gait limited by increased HR and patient reported fatigue Pt demonstrated decreased gait speed and step length and required VC's to promote upright posture. Continue to progress therapy as appropriate.       Electronically signed by Taylor Jeff PT at 20 161     Tahira Ruiz PT Student at 20 1350  Version 2 of 2         Patient Name: Sidra Lane  : 1955    MRN: [id number]                              Today's Date: 6/3/2020       Admit Date: 2020    Visit Dx:     ICD-10-CM ICD-9-CM   1. Acute ischemic stroke  (CMS/HCC) I63.9 434.91   2. Oropharyngeal dysphagia R13.12 787.22   3. Dysarthria R47.1 784.51   4. Cognitive communication deficit R41.841 799.52     Patient Active Problem List   Diagnosis   • Shortness of breath   • Atrial fibrillation (CMS/HCC)   • Essential hypertension   • Dizziness   • Deep vein thrombosis (DVT) of proximal lower extremity (CMS/HCC)   • Coronary artery disease involving native coronary artery of native heart without angina pectoris   • Fatigue   • Chest pain   • Longstanding persistent atrial fibrillation   • Cerebrovascular accident (CVA) due to embolism of right middle cerebral artery (CMS/HCC)   • Acute ischemic stroke (CMS/HCC)     Past Medical History:   Diagnosis Date   • Atrial fibrillation (CMS/HCC)    • CHF (congestive heart failure) (CMS/HCC)    • Deep vein thrombosis (CMS/HCC)    • GERD (gastroesophageal reflux disease)    • Hypertension    • May-Thurner syndrome    • Pulmonary embolism (CMS/HCC)      Past Surgical History:   Procedure Laterality Date   • CHOLECYSTECTOMY     • COLON SURGERY      bowel leakage, some of colon removed   • INTERVENTIONAL RADIOLOGY PROCEDURE Bilateral 5/31/2020    Procedure: CAROTID CEREBRAL ANGIOGRAM BILATERAL;  Surgeon: Brant Duarte MD;  Location: [facility] INVASIVE LOCATION;  Service: Interventional Radiology;  Laterality: Bilateral;   • LAPAROSCOPIC TUBAL LIGATION     • LEG SURGERY      multiple stents to BLE   • TONSILLECTOMY       General Information     Row Name 06/03/20 1526          PT Evaluation Time/Intention    Document Type  therapy note (daily note)  (Pended)   -     Mode of Treatment  physical therapy  (Pended)   -     Row Name 06/03/20 1526          General Information    Existing Precautions/Restrictions  fall;other (see comments)  (Pended)  L UE weakness; L sided neglect  -     Barriers to Rehab  none identified  (Pended)   -     Row Name 06/03/20 1526          Cognitive Assessment/Intervention- PT/OT    Orientation  Status (Cognition)  oriented x 3  (Pended)   -     Cognitive Assessment/Intervention Comment  pt easily distracted; L sided neglect  (Pended)   -     Row Name 06/03/20 1526          Safety Issues, Functional Mobility    Safety Issues Affecting Function (Mobility)  awareness of need for assistance;insight into deficits/self awareness;safety precaution awareness;safety precautions follow-through/compliance  (Pended)   -     Impairments Affecting Function (Mobility)  balance;coordination;endurance/activity tolerance;postural/trunk control;range of motion (ROM);shortness of breath;strength  (Pended)   -       User Key  (r) = Recorded By, (t) = Taken By, (c) = Cosigned By    Initials Name Provider Type     Tahira Ruiz, PT Student PT Student        Mobility     Row Name 06/03/20 1530          Bed Mobility Assessment/Treatment    Bed Mobility Assessment/Treatment  rolling left;scooting/bridging;sidelying-sit;sit-supine  (Pended)   -     Rolling Left [place] (Bed Mobility)  1 person assist;moderate assist (50% patient effort)  (Pended)   -     Scooting/Bridging [place] (Bed Mobility)  2 person assist;moderate assist (50% patient effort)  (Pended)   -     Sit-Supine [place] (Bed Mobility)  minimum assist (75% patient effort);2 person assist  (Pended)   -     Sidelying-Sit [place] (Bed Mobility)  moderate assist (50% patient effort);2 person assist  (Pended)   -     Assistive Device (Bed Mobility)  bed rails;draw sheet;head of bed elevated  (Pended)   -     Comment (Bed Mobility)  pt required VC's for sequencing and motor planning. Pt required assistance at trunk and B LEs for bed mobility. Pt unable to assist with L UE.  (Pended)   -     Row Name 06/03/20 1530          Transfer Assessment/Treatment    Comment (Transfers)  Pt transferred from sitting EOB to standing with mod a x 2.  (Pended)   -[facility] Name 06/03/20 1530          Sit-Stand Transfer    Sit-Stand [place]  (Transfers)  2 person assist;verbal cues;minimum assist (75% patient effort)  (Pended)   -     Row Name 06/03/20 1549 06/03/20 1530       Gait/Stairs Assessment/Training    Gait/Stairs Assessment/Training  --  gait/ambulation independence  (Pended)   -    [place] Level (Gait)  minimum assist (75% patient effort);2 person assist  (Pended)   -  2 person assist;verbal cues;minimum assist (75% patient effort)  (Pended)   -    Assistive Device (Gait)  --  other (see comments)  (Pended)  R UE support on tele monitor  -    Distance in Feet (Gait)  --  30  (Pended)   -    Pattern (Gait)  --  step-through  (Pended)   -    Deviations/Abnormal Patterns (Gait)  --  base of support, narrow;patti decreased;stride length decreased  (Pended)   -    Bilateral Gait Deviations  --  forward flexed posture;heel strike decreased  (Pended)   -    Left Sided Gait Deviations  --  weight shift ability decreased  (Pended)   -    Comment (Gait/Stairs)  --  Pt able to ambulate 30 ft with R UE support on tele monitor with min a x2. VC's for upright posture. Pt's ambulation limited by increased HR.  (Pended)   -      User Key  (r) = Recorded By, (t) = Taken By, (c) = Cosigned By    Initials Name Provider Type     Tahira Ruiz, PT Student PT Student        Obj/Interventions     Row Name 06/03/20 1539          Static Sitting Balance    Level of [place] (Unsupported Sitting, Static Balance)  contact guard assist  (Pended)   -     Sitting Position (Unsupported Sitting, Static Balance)  sitting on edge of bed  (Pended)   -     Row Name 06/03/20 1539          Static Standing Balance    Level of [place] (Supported Standing, Static Balance)  minimal assist, 75% patient effort;2 person assist  (Pended)   -     Assistive Device Utilized (Supported Standing, Static Balance)  other (see comments)  (Pended)  R UE support on tele monitor  -     Row Name 06/03/20 1539          Sensory Assessment/Intervention     Sensory General Assessment  no sensation deficits identified  (Pended)   -       User Key  (r) = Recorded By, (t) = Taken By, (c) = Cosigned By    Initials Name Provider Type     Tahira Ruiz, PT Student PT Student        Goals/Plan    No documentation.       Clinical Impression     [facility] Name 06/03/20 1541          Pain Assessment    Additional Documentation  Pain Scale: Numbers Pre/Post-Treatment (Group)  (Pended)   -SH     Row Name 06/03/20 1541          Pain Scale: Numbers Pre/Post-Treatment    Pain Scale: Numbers, Pretreatment  0/10 - no pain  (Pended)   -     Pain Scale: Numbers, Post-Treatment  0/10 - no pain  (Pended)   -SH     Row Name 06/03/20 1541          Plan of Care Review    Progress  improving  (Pended)   -SH     Row Name 06/03/20 1541          Vital Signs    Pre Systolic BP Rehab  146  (Pended)   -     Pre Treatment Diastolic BP  109  (Pended)   -     Post Systolic BP Rehab  169  (Pended)   -     Post Treatment Diastolic BP  118  (Pended)   -     Pretreatment Heart Rate (beats/min)  101  (Pended)   -     Intratreatment Heart Rate (beats/min)  124  (Pended)   -     Posttreatment Heart Rate (beats/min)  102  (Pended)   -     Pre SpO2 (%)  96  (Pended)   -     O2 Delivery Pre Treatment  room air  (Pended)   -     Post SpO2 (%)  99  (Pended)   -     O2 Delivery Post Treatment  room air  (Pended)   -     Pre Patient Position  Supine  (Pended)   -     Intra Patient Position  Standing  (Pended)   -SH     Row Name 06/03/20 1541          Positioning and Restraints    Pre-Treatment Position  in bed  (Pended)   -     Post Treatment Position  bed  (Pended)   -     In Bed  notified nsg;supine;encouraged to call for assist;call light within reach;side rails up x3  (Pended)   -       User Key  (r) = Recorded By, (t) = Taken By, (c) = Cosigned By    Initials Name Provider Type     Tahira Ruiz, PT Student PT Student        Outcome Measures     [facility] Name 06/03/20 1541           How much help from another person do you currently need...    Turning from your back to your side while in flat bed without using bedrails?  2  (Pended)   -SH     Moving from lying on back to sitting on the side of a flat bed without bedrails?  2  (Pended)   -SH     Moving to and from a bed to a chair (including a wheelchair)?  2  (Pended)   -SH     Standing up from a chair using your arms (e.g., wheelchair, bedside chair)?  3  (Pended)   -SH     Climbing 3-5 steps with a railing?  1  (Pended)   -SH     To walk in hospital room?  2  (Pended)   -SH     AM-PAC 6 Clicks Score (PT)  12  -MS (r) SH (t)     Row Name 06/03/20 1544          Functional Assessment    Outcome Measure Options  AM-PAC 6 Clicks Basic Mobility (PT)  (Pended)   -SH       User Key  (r) = Recorded By, (t) = Taken By, (c) = Cosigned By    Initials Name Provider Type    Marleny Nicholas, RN Registered Nurse     Tahira Ruiz, PT Student PT Student        Physical Therapy Education                 Title: PT OT SLP Therapies (In Progress)     Topic: Physical Therapy (In Progress)     Point: Mobility training (Done)     Description:   Instruct learner(s) on safety and technique for assisting patient out of bed, chair or wheelchair.  Instruct in the proper use of assistive devices, such as walker, crutches, cane or brace.              Patient Friendly Description:   It's important to get you on your feet again, but we need to do so in a way that is safe for you. Falling has serious consequences, and your personal safety is the most important thing of all.        When it's time to get out of bed, one of us or a family member will sit next to you on the bed to give you support.     If your doctor or nurse tells you to use a walker, crutches, a cane, or a brace, be sure you use it every time you get out of bed, even if you think you don't need it.    Learning Progress Summary           Patient Acceptance, E,D, NR,VU by  at 6/3/2020 0170     Acceptance, E, NR by KG at 6/2/2020 1315                   Point: Home exercise program (Not Started)     Description:   Instruct learner(s) on appropriate technique for monitoring, assisting and/or progressing patient with therapeutic exercises and activities.              Learner Progress:   Not documented in this visit.          Point: Body mechanics (Done)     Description:   Instruct learner(s) on proper positioning and spine alignment for patient and/or caregiver during mobility tasks and/or exercises.              Learning Progress Summary           Patient Acceptance, E,D, NR,VU by  at 6/3/2020 1350    Acceptance, E, NR by KG at 6/2/2020 1315                   Point: Precautions (Done)     Description:   Instruct learner(s) on prescribed precautions during mobility and gait tasks              Learning Progress Summary           Patient Acceptance, E,D, NR,VU by  at 6/3/2020 1350    Acceptance, E, NR by KG at 6/2/2020 1315                               User Key     Initials Effective Dates Name Provider Type Discipline     05/22/20 -  Taylor Jfef, PT Physical Therapist PT     03/19/20 -  Tahira Ruiz PT Student PT Student PT              PT Recommendation and Plan     Plan of Care Reviewed With: (P) patient  Progress: (P) improving  Outcome Summary: (P) Pt ambulated 30 ft with min a x 2 with R UE support on tele monitor. Pt's gait limited by increased HR and patient reported fatigue Pt demonstrated decreased gait speed and step length and required VC's to promote upright posture. Continue to progress therapy as appropriate.     Time Calculation:   PT Charges     Row Name 06/03/20 1350             Time Calculation    Start Time  1350  (Pended)   -      PT Received On  06/03/20  (Pended)   [facility]      PT Goal Re-Cert Due Date  06/12/20  (Pended)   -         Time Calculation- PT    Total Timed Code Minutes- PT  15 minute(s)  (Pended)   -         Timed Charges    15189 - PT Therapeutic  Activity Minutes  15  (Pended)   -        User Key  (r) = Recorded By, (t) = Taken By, (c) = Cosigned By    Initials Name Provider Type     Tahira Ruiz, PT Student PT Student        Therapy Charges for Today     Code Description Service Date Service Provider Modifiers Qty    96759645971  PT THERAPEUTIC ACT EA 15 MIN 6/3/2020 Tahira Ruiz, PT Student GP 1          PT G-Codes  Outcome Measure Options: (P) AM-PAC 6 Clicks Basic Mobility (PT)  AM-PAC 6 Clicks Score (PT): 12  AM-PAC 6 Clicks Score (OT): 11  Modified [place] Scale: 4 - Moderately severe disability.  Unable to walk without assistance, and unable to attend to own bodily needs without assistance.    Tahira Ruiz PT Student  6/3/2020         Electronically signed by Taylor Jeff, PT at 20 [id number]     Tahira Ruiz, PT Student at 20 1350  Version 1 of 2         Patient Name: Sidra Lane  : 1955    MRN: [id number]                              Today's Date: 6/3/2020       Admit Date: 2020    Visit Dx:     ICD-10-CM ICD-9-CM   1. Acute ischemic stroke (CMS/HCC) I63.9 434.91   2. Oropharyngeal dysphagia R13.12 787.22   3. Dysarthria R47.1 784.51   4. Cognitive communication deficit R41.841 799.52     Patient Active Problem List   Diagnosis   • Shortness of breath   • Atrial fibrillation (CMS/HCC)   • Essential hypertension   • Dizziness   • Deep vein thrombosis (DVT) of proximal lower extremity (CMS/HCC)   • Coronary artery disease involving native coronary artery of native heart without angina pectoris   • Fatigue   • Chest pain   • Longstanding persistent atrial fibrillation   • Cerebrovascular accident (CVA) due to embolism of right middle cerebral artery (CMS/HCC)   • Acute ischemic stroke (CMS/HCC)     Past Medical History:   Diagnosis Date   • Atrial fibrillation (CMS/HCC)    • CHF (congestive heart failure) (CMS/HCC)    • Deep vein thrombosis (CMS/HCC)    • GERD (gastroesophageal reflux disease)    •  Hypertension    • May-Thurner syndrome    • Pulmonary embolism (CMS/HCC)      Past Surgical History:   Procedure Laterality Date   • CHOLECYSTECTOMY     • COLON SURGERY      bowel leakage, some of colon removed   • INTERVENTIONAL RADIOLOGY PROCEDURE Bilateral 5/31/2020    Procedure: CAROTID CEREBRAL ANGIOGRAM BILATERAL;  Surgeon: Brant Duarte MD;  Location: [facility] INVASIVE LOCATION;  Service: Interventional Radiology;  Laterality: Bilateral;   • LAPAROSCOPIC TUBAL LIGATION     • LEG SURGERY      multiple stents to BLE   • TONSILLECTOMY       General Information     Row Name 06/03/20 1526          PT Evaluation Time/Intention    Document Type  therapy note (daily note)  (Pended)   -     Mode of Treatment  physical therapy  (Pended)   -     Row Name 06/03/20 1526          General Information    Existing Precautions/Restrictions  fall;other (see comments)  (Pended)  L UE weakness; L sided neglect  -     Barriers to Rehab  none identified  (Pended)   -[facility] Name 06/03/20 1526          Cognitive Assessment/Intervention- PT/OT    Orientation Status (Cognition)  oriented x 3  (Pended)   -     Cognitive Assessment/Intervention Comment  pt easily distracted; L sided neglect  (Pended)   -[facility] Name 06/03/20 1526          Safety Issues, Functional Mobility    Safety Issues Affecting Function (Mobility)  awareness of need for assistance;insight into deficits/self awareness;safety precaution awareness;safety precautions follow-through/compliance  (Pended)   -     Impairments Affecting Function (Mobility)  balance;coordination;endurance/activity tolerance;postural/trunk control;range of motion (ROM);shortness of breath;strength  (Pended)   -       User Key  (r) = Recorded By, (t) = Taken By, (c) = Cosigned By    Initials Name Provider Type     Tahira Ruiz, PT Student PT Student        Mobility     Row Name 06/03/20 1530          Bed Mobility Assessment/Treatment    Bed Mobility  Assessment/Treatment  rolling left;scooting/bridging;sidelying-sit;sit-supine  (Pended)   -     Rolling Left [place] (Bed Mobility)  1 person assist;moderate assist (50% patient effort)  (Pended)   -     Scooting/Bridging [place] (Bed Mobility)  2 person assist;moderate assist (50% patient effort)  (Pended)   -     Sit-Supine [place] (Bed Mobility)  minimum assist (75% patient effort);2 person assist  (Pended)   -     Sidelying-Sit [place] (Bed Mobility)  moderate assist (50% patient effort);2 person assist  (Pended)   -     Assistive Device (Bed Mobility)  bed rails;draw sheet;head of bed elevated  (Pended)   -     Comment (Bed Mobility)  pt required VC's for sequencing and motor planning. Pt required assistance at trunk and B LEs for bed mobility. Pt unable to assist with L UE.  (Pended)   -     Row Name 06/03/20 1530          Transfer Assessment/Treatment    Comment (Transfers)  Pt transferred from sitting EOB to standing with mod a x 2.  (Pended)   -     Row Name 06/03/20 1530          Sit-Stand Transfer    Sit-Stand [place] (Transfers)  2 person assist;verbal cues;minimum assist (75% patient effort)  (Pended)   -     Row Name 06/03/20 1549 06/03/20 1530       Gait/Stairs Assessment/Training    Gait/Stairs Assessment/Training  --  gait/ambulation independence  (Pended)   -    [place] Level (Gait)  minimum assist (75% patient effort);2 person assist  (Pended)   -  2 person assist;verbal cues;minimum assist (75% patient effort)  (Pended)   -    Assistive Device (Gait)  --  other (see comments)  (Pended)  R UE support on tele monitor  -    Distance in Feet (Gait)  --  30  (Pended)   -    Pattern (Gait)  --  step-through  (Pended)   -    Deviations/Abnormal Patterns (Gait)  --  base of support, narrow;patti decreased;stride length decreased  (Pended)   -    Bilateral Gait Deviations  --  forward flexed posture;heel strike decreased  (Pended)   -     Left Sided Gait Deviations  --  weight shift ability decreased  (Pended)   -    Comment (Gait/Stairs)  --  Pt able to ambulate 30 ft with R UE support on tele monitor with min a x2. VC's for upright posture. Pt's ambulation limited by increased HR.  (Pended)   -      User Key  (r) = Recorded By, (t) = Taken By, (c) = Cosigned By    Initials Name Provider Type     Tahira Ruiz, PT Student PT Student        Obj/Interventions     Row Name 06/03/20 1539          Static Sitting Balance    Level of [place] (Unsupported Sitting, Static Balance)  contact guard assist  (Pended)   -     Sitting Position (Unsupported Sitting, Static Balance)  sitting on edge of bed  (Pended)   -SH     Row Name 06/03/20 1539          Static Standing Balance    Level of [place] (Supported Standing, Static Balance)  minimal assist, 75% patient effort;2 person assist  (Pended)   -     Assistive Device Utilized (Supported Standing, Static Balance)  other (see comments)  (Pended)  R UE support on tele monitor  -SH     Row Name 06/03/20 1539          Sensory Assessment/Intervention    Sensory General Assessment  no sensation deficits identified  (Pended)   -       User Key  (r) = Recorded By, (t) = Taken By, (c) = Cosigned By    Initials Name Provider Type     Tahira Ruiz, PT Student PT Student        Goals/Plan    No documentation.       Clinical Impression     Row Name 06/03/20 1541          Pain Assessment    Additional Documentation  Pain Scale: Numbers Pre/Post-Treatment (Group)  (Pended)   -SH     Row Name 06/03/20 1541          Pain Scale: Numbers Pre/Post-Treatment    Pain Scale: Numbers, Pretreatment  0/10 - no pain  (Pended)   -     Pain Scale: Numbers, Post-Treatment  0/10 - no pain  (Pended)   -SH     Row Name 06/03/20 1541          Plan of Care Review    Progress  improving  (Pended)   -SH     Row Name 06/03/20 1541          Vital Signs    Pre Systolic BP Rehab  146  (Pended)   -     Pre Treatment  Diastolic BP  109  (Pended)   -     Post Systolic BP Rehab  169  (Pended)   -SH     Post Treatment Diastolic BP  118  (Pended)   -SH     Pretreatment Heart Rate (beats/min)  101  (Pended)   -SH     Intratreatment Heart Rate (beats/min)  124  (Pended)   -SH     Posttreatment Heart Rate (beats/min)  102  (Pended)   -SH     Pre SpO2 (%)  96  (Pended)   -SH     O2 Delivery Pre Treatment  room air  (Pended)   -SH     Post SpO2 (%)  99  (Pended)   -SH     O2 Delivery Post Treatment  room air  (Pended)   -     Pre Patient Position  Supine  (Pended)   -     Intra Patient Position  Standing  (Pended)   -     Row Name 06/03/20 1541          Positioning and Restraints    Pre-Treatment Position  in bed  (Pended)   -SH     Post Treatment Position  bed  (Pended)   -SH     In Bed  notified nsg;supine;encouraged to call for assist;call light within reach;side rails up x3  (Pended)   -       User Key  (r) = Recorded By, (t) = Taken By, (c) = Cosigned By    Initials Name Provider Type     Tahira Ruiz, PT Student PT Student        Outcome Measures     Row Name 06/03/20 1544          How much help from another person do you currently need...    Turning from your back to your side while in flat bed without using bedrails?  2  (Pended)   -SH     Moving from lying on back to sitting on the side of a flat bed without bedrails?  2  (Pended)   -SH     Moving to and from a bed to a chair (including a wheelchair)?  2  (Pended)   -SH     Standing up from a chair using your arms (e.g., wheelchair, bedside chair)?  3  (Pended)   -SH     Climbing 3-5 steps with a railing?  1  (Pended)   -SH     To walk in hospital room?  2  (Pended)   -SH     AM-PAC 6 Clicks Score (PT)  12  (Pended)   -     Row Name 06/03/20 1544          Functional Assessment    Outcome Measure Options  AM-PAC 6 Clicks Basic Mobility (PT)  (Pended)   -       User Key  (r) = Recorded By, (t) = Taken By, (c) = Cosigned By    Initials Name Provider Type    FRANCHESCA  Tahira Ruiz, PT Student PT Student        Physical Therapy Education                 Title: PT OT SLP Therapies (In Progress)     Topic: Physical Therapy (In Progress)     Point: Mobility training (Done)     Description:   Instruct learner(s) on safety and technique for assisting patient out of bed, chair or wheelchair.  Instruct in the proper use of assistive devices, such as walker, crutches, cane or brace.              Patient Friendly Description:   It's important to get you on your feet again, but we need to do so in a way that is safe for you. Falling has serious consequences, and your personal safety is the most important thing of all.        When it's time to get out of bed, one of us or a family member will sit next to you on the bed to give you support.     If your doctor or nurse tells you to use a walker, crutches, a cane, or a brace, be sure you use it every time you get out of bed, even if you think you don't need it.    Learning Progress Summary           Patient Acceptance, E,D, NR,VU by  at 6/3/2020 1350    Acceptance, E, NR by KG at 6/2/2020 1315                   Point: Home exercise program (Not Started)     Description:   Instruct learner(s) on appropriate technique for monitoring, assisting and/or progressing patient with therapeutic exercises and activities.              Learner Progress:   Not documented in this visit.          Point: Body mechanics (Done)     Description:   Instruct learner(s) on proper positioning and spine alignment for patient and/or caregiver during mobility tasks and/or exercises.              Learning Progress Summary           Patient Acceptance, E,D, NR,VU by  at 6/3/2020 1350    Acceptance, E, NR by KG at 6/2/2020 1315                   Point: Precautions (Done)     Description:   Instruct learner(s) on prescribed precautions during mobility and gait tasks              Learning Progress Summary           Patient Acceptance, E,D, NR,VU by  at 6/3/2020 1350     Acceptance, E, NR by KG at 6/2/2020 1315                               User Key     Initials Effective Dates Name Provider Type Discipline     05/22/20 -  Taylor Jeff, PT Physical Therapist PT     03/19/20 -  Tahira Ruiz PT Student PT Student PT              PT Recommendation and Plan     Plan of Care Reviewed With: (P) patient  Progress: (P) improving  Outcome Summary: (P) Pt ambulated 30 ft with min a x 2 with R UE support on tele monitor. Pt's gait limited by increased HR and patient reported fatigue Pt demonstrated decreased gait speed and step length and required VC's to promote upright posture. Continue to progress therapy as appropriate.     Time Calculation:   PT Charges     Row Name 06/03/20 1350             Time Calculation    Start Time  1350  (Pended)   -      PT Received On  06/03/20  (Pended)   -      PT Goal Re-Cert Due Date  06/12/20  (Pended)   -         Time Calculation- PT    Total Timed Code Minutes- PT  15 minute(s)  (Pended)   -         Timed Charges    05502 - PT Therapeutic Activity Minutes  15  (Pended)   -        User Key  (r) = Recorded By, (t) = Taken By, (c) = Cosigned By    Initials Name Provider Type     Tahira Ruiz, PT Student PT Student        Therapy Charges for Today     Code Description Service Date Service Provider Modifiers Qty    86756860014 HC PT THERAPEUTIC ACT EA 15 MIN 6/3/2020 Tahira Ruiz, PT Student GP 1    12458950768 HC PT THERAPEUTIC ACT EA 15 MIN 6/3/2020 Tahira Ruiz, PT Student GP 1          PT G-Codes  Outcome Measure Options: (P) AM-PAC 6 Clicks Basic Mobility (PT)  AM-PAC 6 Clicks Score (PT): (P) 12  AM-PAC 6 Clicks Score (OT): 11  Modified [place] Scale: 4 - Moderately severe disability.  Unable to walk without assistance, and unable to attend to own bodily needs without assistance.    Tahira Ruiz PT Student  6/3/2020         Electronically signed by Tahira Ruiz PT Student at 06/03/20 0531        Occupational Therapy Notes (last 24 hours) (Notes from 20 1133 through 20 1133)    No notes exist for this encounter.            Speech Language Pathology Notes (last 24 hours) (Notes from 20 1133 through 20 1133)      Nuha Garcia MA,CCC-SLP at 20 1541          Acute Care - Speech Language Pathology   Swallow Progress Note  Leti     Patient Name: Sidra Lane  : 1955  MRN: [id number]  Today's Date: 6/3/2020               Admit Date: 2020    Visit Dx:      ICD-10-CM ICD-9-CM   1. Acute ischemic stroke (CMS/HCC) I63.9 434.91   2. Oropharyngeal dysphagia R13.12 787.22   3. Dysarthria R47.1 784.51   4. Cognitive communication deficit R41.841 799.52     Patient Active Problem List   Diagnosis   • Shortness of breath   • Atrial fibrillation (CMS/HCC)   • Essential hypertension   • Dizziness   • Deep vein thrombosis (DVT) of proximal lower extremity (CMS/HCC)   • Coronary artery disease involving native coronary artery of native heart without angina pectoris   • Fatigue   • Chest pain   • Longstanding persistent atrial fibrillation   • Cerebrovascular accident (CVA) due to embolism of right middle cerebral artery (CMS/HCC)   • Acute ischemic stroke (CMS/HCC)       Therapy Treatment  Rehabilitation Treatment Summary     Row Name 20 1415             Treatment Time/Intention    Discipline  speech language pathologist  -VO      Document Type  therapy note (daily note)  -VO      Subjective Information  no complaints  -VO      Mode of Treatment  speech-language pathology  -VO      Patient/Family Observations  dtr present  -VO      Care Plan Review  risks/benefits reviewed;care plan/treatment goals reviewed;patient/other agree to care plan;current/potential barriers reviewed  -VO      Care Plan Review, Other Participant(s)  daughter  -VO      Therapy Frequency (Swallow)  5 days per week  -VO      Therapy Frequency (SLP SLC)  5 days per week  -VO       Patient Effort  good  -VO      Recorded by [VO] Nuha Garcia MA,CCC-SLP 06/03/20 1537      Row Name 06/03/20 1415             Pain Scale: Numbers Pre/Post-Treatment    Pain Scale: Numbers, Pretreatment  0/10 - no pain  -VO      Pain Scale: Numbers, Post-Treatment  0/10 - no pain  -VO      Recorded by [VO] Nuha Garcia MA,CCC-SLP 06/03/20 1537      Row Name                Wound 05/31/20 1930 Right groin Puncture    Wound - Properties Group Date first assessed: 05/31/20 [MB] Time first assessed: 1930 [MB] Present on Hospital Admission: N [MB] Side: Right [MB] Location: groin [MB] Primary Wound Type: Puncture [MB] Recorded by:  [MB] Toña Maier RN 05/31/20 2037    Row Name 06/03/20 1415             Outcome Summary/Treatment Plan (SLP)    Daily Summary of Progress (SLP)  progress toward functional goals as expected  -VO      Plan for Continued Treatment (SLP)  Saw for dysphagia tx. Trialed ice chips and puree, continues to show s/sxs aspiration w/ all. Rec: 2-3 ice chips after oral care w/ RN to promote swallowing and emphasized simple dysphagia exercises. Will cont to treat.  -VO      Anticipated Dischage Disposition (SLP)  inpatient rehabilitation facility;anticipate therapy at next level of care  -VO      Recorded by [VO] Nuha Garcia MA,CCC-SLP 06/03/20 1537        User Key  (r) = Recorded By, (t) = Taken By, (c) = Cosigned By    Initials Name Effective Dates Discipline    VO Nuha Garcia MA,CCC-SLP 10/24/18 -  SLP    Toña Handy RN 06/14/19 -  Nurse          Outcome Summary  Outcome Summary/Treatment Plan (SLP)  Daily Summary of Progress (SLP): progress toward functional goals as expected (06/03/20 1415 : Nuha Garcia MA,CCC-SLP)  Plan for Continued Treatment (SLP): Saw for dysphagia tx. Trialed ice chips and puree, continues to show s/sxs aspiration w/ all. Rec: 2-3 ice chips after oral care w/ RN to promote swallowing and emphasized simple dysphagia  exercises. Will cont to treat. (06/03/20 1415 : Nuha Garcia MA,CCC-SLP)  Anticipated Dischage Disposition (SLP): inpatient rehabilitation facility, anticipate therapy at next level of care (06/03/20 1415 : Nuha Garcia MA,CCC-SLP)      SLP GOALS     Row Name 06/03/20 1415 06/02/20 1100 06/01/20 1015       Oral Nutrition/Hydration Goal 1 (SLP)    Oral Nutrition/Hydration Goal 1, SLP  LTG: Pt will return to regular diet & thin liquids w/ 100% accuracy w/o cues  -VO  LTG: Pt will return to regular diet & thin liquids w/ 100% accuracy w/o cues  -RD  --    Time Frame (Oral Nutrition/Hydration Goal 1, SLP)  by discharge  -VO  by discharge  -RD  --    Progress/Outcomes (Oral Nutrition/Hydration Goal 1, SLP)  continuing progress toward goal  -VO  --  --       Oral Nutrition/Hydration Goal 2 (SLP)    Oral Nutrition/Hydration Goal 2, SLP  Pt will tolerate therapeutic trials of thin H2O w/ no overt s/s of aspiration w/ 60% accuracy w/o cues to indicate readiness for repeat instrumental evaluation  -VO  Pt will tolerate therapeutic trials of thin H2O w/ no overt s/s of aspiration w/ 60% accuracy w/o cues to indicate readiness for repeat instrumental evaluation  -RD  --    Time Frame (Oral Nutrition/Hydration Goal 2, SLP)  short term goal (STG);by discharge  -VO  short term goal (STG);by discharge  -RD  --    Barriers (Oral Nutrition/Hydration Goal 2, SLP)  delayed cough and multiple swallows  -VO  --  --    Progress/Outcomes (Oral Nutrition/Hydration Goal 2, SLP)  continuing progress toward goal  -VO  --  --       Labial Strengthening Goal 1 (SLP)    Activity (Labial Strengthening Goal 1, SLP)  increase labial tone  -VO  increase labial tone  -RD  --    Increase Labial Tone  labial resistance exercises  -VO  labial resistance exercises  -RD  --    [place]/Accuracy (Labial Strengthening Goal 1, SLP)  with minimal cues (75-90% accuracy)  -VO  with minimal cues (75-90% accuracy)  -RD  --    Time Frame (Labial  Strengthening Goal 1, SLP)  short term goal (STG);by discharge  -VO  short term goal (STG);by discharge  -RD  --    Progress/Outcomes (Labial Strengthening Goal 1, SLP)  continuing progress toward goal  -VO  --  --       Lingual Strengthening Goal 1 (SLP)    Activity (Lingual Strengthening Goal 1, SLP)  increase lingual tone/sensation/control/coordination/movement;increase tongue back strength  -VO  increase lingual tone/sensation/control/coordination/movement;increase tongue back strength  -RD  --    Increase Lingual Tone/Sensation/Control/Coordination/Movement  lingual movement exercises  -VO  lingual movement exercises  -RD  --    Increase Tongue Back Strength  lingual resistance exercises  -VO  lingual resistance exercises  -RD  --    [place]/Accuracy (Lingual Strengthening Goal 1, SLP)  with minimal cues (75-90% accuracy)  -VO  with minimal cues (75-90% accuracy)  -RD  --    Time Frame (Lingual Strengthening Goal 1, SLP)  short term goal (STG);by discharge  -VO  short term goal (STG);by discharge  -RD  --    Progress/Outcomes (Lingual Strengthening Goal 1, SLP)  continuing progress toward goal  -VO  --  --       Pharyngeal Strengthening Exercise Goal 1 (SLP)    Activity (Pharyngeal Strengthening Goal 1, SLP)  increase timing;increase superior movement of the hyolaryngeal complex;increase anterior movement of the hyolaryngeal complex;increase closure at entrance to airway/closure of airway at glottis;increase squeeze/positive pressure generation;increase tongue base retraction  -VO  increase timing;increase superior movement of the hyolaryngeal complex;increase anterior movement of the hyolaryngeal complex;increase closure at entrance to airway/closure of airway at glottis;increase squeeze/positive pressure generation;increase tongue base retraction  -RD  --    Increase Timing  prepping - 3 second prep or suck swallow or 3-step swallow  -VO  prepping - 3 second prep or suck swallow or 3-step swallow  -RD   --    Increase Superior Movement of the Hyolaryngeal Complex  super-supraglottic swallow  -VO  super-supraglottic swallow  -RD  --    Increase Anterior Movement of the Hyolaryngeal Complex  chin tuck against resistance (CTAR)  -VO  chin tuck against resistance (CTAR)  -RD  --    Increase Closure at Entrance to Airway/Closure of Airway at Glottis  super-supraglottic swallow  -VO  super-supraglottic swallow  -RD  --    Increase Squeeze/Positive Pressure Generation  effortful pitch glide (falsetto + pharyngeal squeeze)  -VO  effortful pitch glide (falsetto + pharyngeal squeeze)  -RD  --    Increase Tongue Base Retraction  hard effortful swallow  -VO  hard effortful swallow  -RD  --    [place]/Accuracy (Pharyngeal Strengthening Goal 1, SLP)  with minimal cues (75-90% accuracy)  -VO  with minimal cues (75-90% accuracy)  -RD  --    Time Frame (Pharyngeal Strengthening Goal 1, SLP)  short term goal (STG);by discharge  -VO  short term goal (STG);by discharge  -RD  --    Barriers (Pharyngeal Strengthening Goal 1, SLP)  focused on simple excs that could be completed w/ ice chips after oral care.  -VO  --  --    Progress/Outcomes (Pharyngeal Strengthening Goal 1, SLP)  continuing progress toward goal  -VO  --  --       Articulation Goal 1 (SLP)    Improve Articulation Goal 1 (SLP)  --  by over-articulating at phrase level;80%;with minimal cues (75-90%)  -RD  by over-articulating at phrase level;80%;with minimal cues (75-90%)  -AC    Time Frame (Articulation Goal 1, SLP)  --  short term goal (STG)  -RD  short term goal (STG)  -AC    Progress (Articulation Goal 1, SLP)  --  40%;with minimal cues (75-90%)  -RD  --    Progress/Outcomes (Articulation Goal 1, SLP)  --  continuing progress toward goal  -RD  --       Attention Goal 1 (SLP)    Improve Attention by Goal 1 (SLP)  --  looking at speaker;80%;with minimal cues (75-90%)  -RD  --    Time Frame (Attention Goal 1, SLP)  --  short term goal (STG);by discharge  -RD  --     Progress (Attention Goal 1, SLP)  --  30%;with moderate cues (50-74%)  -RD  --    Progress/Outcomes (Attention Goal 1, SLP)  --  continuing progress toward goal  -RD  --       Additional Goal 1 (SLP)    Additional Goal 1, SLP  --  LTG: Pt will improve cognitive-communication skills in order to participate in care in hospital setting for 100% of opportunities w/o cues.  -RD  LTG: Pt will improve cognitive-communication skills in order to participate in care in hospital setting for 100% of opportunities w/o cues.  -AC    Time Frame (Additional Goal 1, SLP)  --  by discharge  -RD  by discharge  -AC    Progress/Outcomes (Additional Goal 1, SLP)  --  continuing progress toward goal  -RD  --      User Key  (r) = Recorded By, (t) = Taken By, (c) = Cosigned By    Initials Name Provider Type    AC Cynthia Campbell, MS CCC-SLP Speech and Language Pathologist    Jade Arrieta, MS CCC-SLP Speech and Language Pathologist    Nuha Trevino MA,CCC-SLP Speech and Language Pathologist          EDUCATION  The patient has been educated in the following areas:   Dysphagia (Swallowing Impairment) Oral Care/Hydration NPO rationale.    SLP Recommendation and Plan  Daily Summary of Progress (SLP): progress toward functional goals as expected     Plan for Continued Treatment (SLP): Saw for dysphagia tx. Trialed ice chips and puree, continues to show s/sxs aspiration w/ all. Rec: 2-3 ice chips after oral care w/ RN to promote swallowing and emphasized simple dysphagia exercises. Will cont to treat.  Anticipated Dischage Disposition (SLP): inpatient rehabilitation facility, anticipate therapy at next level of care                    Time Calculation:   Time Calculation- SLP     Row Name 06/03/20 1539             Time Calculation- SLP    SLP Start Time  1415  -VO      SLP Received On  06/03/20  -VO        User Key  (r) = Recorded By, (t) = Taken By, (c) = Cosigned By    Initials Name Provider Type    Nuha Trevino  MA,CCC-SLP Speech and Language Pathologist          Therapy Charges for Today     Code Description Service Date Service Provider Modifiers Qty    [id number]  ST TREATMENT SWALLOW 2 6/3/2020 Nuha Garcia MA,CCC-SLP GN 1                 Nuha Garcia MA,CCC-SLP  6/3/2020    Electronically signed by Nuha Garcia MA,CCC-SLP at 06/03/20 1541     Nuha Garcia MA,CCC-SLP at 06/03/20 1531          Problem: Patient Care Overview  Goal: Plan of Care Review  Outcome: Ongoing (interventions implemented as appropriate)  Flowsheets (Taken 6/3/2020 1539)  Plan of Care Reviewed With: patient; daughter  Note:   SLP treatment completed. Will continue to address dysphagia and communication. Please see note for further details and recommendations.           Electronically signed by Nuha Garcia MA,CCC-SLP at 06/03/20 0177

## 2020-06-04 NOTE — PLAN OF CARE
Lethargic and drowsy. Patient can answer orientation questions appropriately with intermittent confusion. Afib on the monitor with -160's. PRN lopressor given. 3L NC. TF infusing at goal. Patient has been restless and agitated, pulling at lines and tubes. Restraints in place per order. VSS with exception of HR. Left side weakness and facial droop. Visual neglect noted to left side. Will continue to monitor.

## 2020-06-04 NOTE — NURSING NOTE
Called report to 3F and while packing patient up, HR became more increased and stayed up. Talked to APRN who ordered metoprolol and held off on transfer. Was given the okay to transfer around 2245. Went in the room and patient complained of chest pain, got an EKG, called APRN and described the situation. APRN ordered troponin and SL nitro if needed and said was still okay to transfer. Patient was transferred and NIH was done at bedside.

## 2020-06-05 ENCOUNTER — APPOINTMENT (OUTPATIENT)
Dept: GENERAL RADIOLOGY | Facility: HOSPITAL | Age: 65
End: 2020-06-05

## 2020-06-05 LAB
ANION GAP SERPL CALCULATED.3IONS-SCNC: 16 MMOL/L (ref 5–15)
ARTERIAL PATENCY WRIST A: ABNORMAL
ATMOSPHERIC PRESS: ABNORMAL MM[HG]
BACTERIA UR QL AUTO: ABNORMAL /HPF
BASE EXCESS BLDA CALC-SCNC: 1.3 MMOL/L (ref 0–2)
BASOPHILS # BLD AUTO: 0.07 10*3/MM3 (ref 0–0.2)
BASOPHILS NFR BLD AUTO: 0.5 % (ref 0–1.5)
BDY SITE: ABNORMAL
BILIRUB UR QL STRIP: NEGATIVE
BODY TEMPERATURE: 37 C
BUN BLD-MCNC: 21 MG/DL (ref 8–23)
BUN/CREAT SERPL: 25.9 (ref 7–25)
CALCIUM SPEC-SCNC: 9.2 MG/DL (ref 8.6–10.5)
CHLORIDE SERPL-SCNC: 94 MMOL/L (ref 98–107)
CLARITY UR: CLEAR
CO2 BLDA-SCNC: 28 MMOL/L (ref 22–33)
CO2 SERPL-SCNC: 22 MMOL/L (ref 22–29)
COHGB MFR BLD: 0.6 % (ref 0–2)
COLOR UR: YELLOW
CREAT BLD-MCNC: 0.81 MG/DL (ref 0.57–1)
DEPRECATED RDW RBC AUTO: 52.7 FL (ref 37–54)
EOSINOPHIL # BLD AUTO: 0.28 10*3/MM3 (ref 0–0.4)
EOSINOPHIL NFR BLD AUTO: 1.8 % (ref 0.3–6.2)
ERYTHROCYTE [DISTWIDTH] IN BLOOD BY AUTOMATED COUNT: 18.8 % (ref 12.3–15.4)
GFR SERPL CREATININE-BSD FRML MDRD: 71 ML/MIN/1.73
GLUCOSE BLD-MCNC: 156 MG/DL (ref 65–99)
GLUCOSE BLDC GLUCOMTR-MCNC: 102 MG/DL (ref 70–130)
GLUCOSE BLDC GLUCOMTR-MCNC: 111 MG/DL (ref 70–130)
GLUCOSE BLDC GLUCOMTR-MCNC: 115 MG/DL (ref 70–130)
GLUCOSE BLDC GLUCOMTR-MCNC: 138 MG/DL (ref 70–130)
GLUCOSE UR STRIP-MCNC: NEGATIVE MG/DL
HCO3 BLDA-SCNC: 26.6 MMOL/L (ref 20–26)
HCT VFR BLD AUTO: 40.3 % (ref 34–46.6)
HCT VFR BLD CALC: 35.9 %
HGB BLD-MCNC: 11.1 G/DL (ref 12–15.9)
HGB BLDA-MCNC: 11.7 G/DL (ref 14–18)
HGB UR QL STRIP.AUTO: NEGATIVE
HOROWITZ INDEX BLD+IHG-RTO: 36 %
HYALINE CASTS UR QL AUTO: ABNORMAL /LPF
IMM GRANULOCYTES # BLD AUTO: 0.12 10*3/MM3 (ref 0–0.05)
IMM GRANULOCYTES NFR BLD AUTO: 0.8 % (ref 0–0.5)
KETONES UR QL STRIP: NEGATIVE
LEUKOCYTE ESTERASE UR QL STRIP.AUTO: ABNORMAL
LYMPHOCYTES # BLD AUTO: 1.8 10*3/MM3 (ref 0.7–3.1)
LYMPHOCYTES NFR BLD AUTO: 11.8 % (ref 19.6–45.3)
MCH RBC QN AUTO: 22.2 PG (ref 26.6–33)
MCHC RBC AUTO-ENTMCNC: 27.5 G/DL (ref 31.5–35.7)
MCV RBC AUTO: 80.8 FL (ref 79–97)
METHGB BLD QL: 1.6 % (ref 0–1.5)
MODALITY: ABNORMAL
MONOCYTES # BLD AUTO: 1.34 10*3/MM3 (ref 0.1–0.9)
MONOCYTES NFR BLD AUTO: 8.8 % (ref 5–12)
MRSA DNA SPEC QL NAA+PROBE: POSITIVE
NEUTROPHILS # BLD AUTO: 11.65 10*3/MM3 (ref 1.7–7)
NEUTROPHILS NFR BLD AUTO: 76.3 % (ref 42.7–76)
NITRITE UR QL STRIP: NEGATIVE
NOTE: ABNORMAL
NRBC BLD AUTO-RTO: 0 /100 WBC (ref 0–0.2)
OXYHGB MFR BLDV: 93 % (ref 94–99)
PCO2 BLDA: 43.9 MM HG (ref 35–45)
PCO2 TEMP ADJ BLD: 43.9 MM HG (ref 35–45)
PH BLDA: 7.39 PH UNITS (ref 7.35–7.45)
PH UR STRIP.AUTO: <=5 [PH] (ref 5–8)
PH, TEMP CORRECTED: 7.39 PH UNITS
PLATELET # BLD AUTO: 319 10*3/MM3 (ref 140–450)
PMV BLD AUTO: 11.3 FL (ref 6–12)
PO2 BLDA: 80.2 MM HG (ref 83–108)
PO2 TEMP ADJ BLD: 80.2 MM HG (ref 83–108)
POTASSIUM BLD-SCNC: 3.6 MMOL/L (ref 3.5–5.2)
PROT UR QL STRIP: NEGATIVE
RBC # BLD AUTO: 4.99 10*6/MM3 (ref 3.77–5.28)
RBC # UR: ABNORMAL /HPF
REF LAB TEST METHOD: ABNORMAL
SODIUM BLD-SCNC: 132 MMOL/L (ref 136–145)
SP GR UR STRIP: 1.02 (ref 1–1.03)
SQUAMOUS #/AREA URNS HPF: ABNORMAL /HPF
UFH PPP CHRO-ACNC: 0.27 IU/ML (ref 0.3–0.7)
UFH PPP CHRO-ACNC: 0.5 IU/ML (ref 0.3–0.7)
UFH PPP CHRO-ACNC: 0.74 IU/ML (ref 0.3–0.7)
UROBILINOGEN UR QL STRIP: ABNORMAL
VENTILATOR MODE: ABNORMAL
WBC NRBC COR # BLD: 15.26 10*3/MM3 (ref 3.4–10.8)
WBC UR QL AUTO: ABNORMAL /HPF

## 2020-06-05 PROCEDURE — 71045 X-RAY EXAM CHEST 1 VIEW: CPT

## 2020-06-05 PROCEDURE — 99232 SBSQ HOSP IP/OBS MODERATE 35: CPT | Performed by: CLINICAL NURSE SPECIALIST

## 2020-06-05 PROCEDURE — 25010000002 HEPARIN (PORCINE) 25000-0.45 UT/250ML-% SOLUTION

## 2020-06-05 PROCEDURE — 82962 GLUCOSE BLOOD TEST: CPT

## 2020-06-05 PROCEDURE — 80048 BASIC METABOLIC PNL TOTAL CA: CPT | Performed by: HOSPITALIST

## 2020-06-05 PROCEDURE — 87641 MR-STAPH DNA AMP PROBE: CPT | Performed by: INTERNAL MEDICINE

## 2020-06-05 PROCEDURE — 25010000002 VANCOMYCIN 10 G RECONSTITUTED SOLUTION: Performed by: INTERNAL MEDICINE

## 2020-06-05 PROCEDURE — 25010000002 ONDANSETRON PER 1 MG: Performed by: INTERNAL MEDICINE

## 2020-06-05 PROCEDURE — 94799 UNLISTED PULMONARY SVC/PX: CPT

## 2020-06-05 PROCEDURE — 81001 URINALYSIS AUTO W/SCOPE: CPT | Performed by: INTERNAL MEDICINE

## 2020-06-05 PROCEDURE — 25010000002 PIPERACILLIN SOD-TAZOBACTAM PER 1 G: Performed by: INTERNAL MEDICINE

## 2020-06-05 PROCEDURE — 87086 URINE CULTURE/COLONY COUNT: CPT | Performed by: INTERNAL MEDICINE

## 2020-06-05 PROCEDURE — 85025 COMPLETE CBC W/AUTO DIFF WBC: CPT | Performed by: HOSPITALIST

## 2020-06-05 PROCEDURE — 93010 ELECTROCARDIOGRAM REPORT: CPT | Performed by: INTERNAL MEDICINE

## 2020-06-05 PROCEDURE — 36600 WITHDRAWAL OF ARTERIAL BLOOD: CPT

## 2020-06-05 PROCEDURE — 94660 CPAP INITIATION&MGMT: CPT

## 2020-06-05 PROCEDURE — 92526 ORAL FUNCTION THERAPY: CPT

## 2020-06-05 PROCEDURE — 97535 SELF CARE MNGMENT TRAINING: CPT

## 2020-06-05 PROCEDURE — 82805 BLOOD GASES W/O2 SATURATION: CPT

## 2020-06-05 PROCEDURE — 93005 ELECTROCARDIOGRAM TRACING: CPT | Performed by: INTERNAL MEDICINE

## 2020-06-05 PROCEDURE — 99233 SBSQ HOSP IP/OBS HIGH 50: CPT | Performed by: INTERNAL MEDICINE

## 2020-06-05 PROCEDURE — 97530 THERAPEUTIC ACTIVITIES: CPT

## 2020-06-05 PROCEDURE — 85520 HEPARIN ASSAY: CPT

## 2020-06-05 RX ORDER — METOPROLOL TARTRATE 50 MG/1
50 TABLET, FILM COATED ORAL EVERY 12 HOURS SCHEDULED
Status: DISCONTINUED | OUTPATIENT
Start: 2020-06-05 | End: 2020-06-08

## 2020-06-05 RX ORDER — ONDANSETRON 2 MG/ML
4 INJECTION INTRAMUSCULAR; INTRAVENOUS EVERY 6 HOURS PRN
Status: DISCONTINUED | OUTPATIENT
Start: 2020-06-05 | End: 2020-06-18 | Stop reason: HOSPADM

## 2020-06-05 RX ORDER — DIPHENOXYLATE HYDROCHLORIDE AND ATROPINE SULFATE 2.5; .025 MG/1; MG/1
1 TABLET ORAL ONCE
Status: COMPLETED | OUTPATIENT
Start: 2020-06-05 | End: 2020-06-05

## 2020-06-05 RX ORDER — SIMETHICONE 20 MG/.3ML
40 EMULSION ORAL 4 TIMES DAILY PRN
Status: DISCONTINUED | OUTPATIENT
Start: 2020-06-05 | End: 2020-06-18 | Stop reason: HOSPADM

## 2020-06-05 RX ORDER — QUETIAPINE FUMARATE 25 MG/1
12.5 TABLET, FILM COATED ORAL NIGHTLY PRN
Status: DISCONTINUED | OUTPATIENT
Start: 2020-06-05 | End: 2020-06-05

## 2020-06-05 RX ADMIN — DIPHENOXYLATE HYDROCHLORIDE AND ATROPINE SULFATE 1 TABLET: 2.5; .025 TABLET ORAL at 08:29

## 2020-06-05 RX ADMIN — FAMOTIDINE 20 MG: 10 INJECTION INTRAVENOUS at 20:34

## 2020-06-05 RX ADMIN — SODIUM CHLORIDE, PRESERVATIVE FREE 10 ML: 5 INJECTION INTRAVENOUS at 20:35

## 2020-06-05 RX ADMIN — ATORVASTATIN CALCIUM 80 MG: 40 TABLET, FILM COATED ORAL at 20:35

## 2020-06-05 RX ADMIN — HEPARIN SODIUM 18 UNITS/KG/HR: 10000 INJECTION, SOLUTION INTRAVENOUS at 03:09

## 2020-06-05 RX ADMIN — METOPROLOL TARTRATE 2.5 MG: 5 INJECTION INTRAVENOUS at 03:41

## 2020-06-05 RX ADMIN — AMLODIPINE BESYLATE 5 MG: 5 TABLET ORAL at 08:29

## 2020-06-05 RX ADMIN — VANCOMYCIN HYDROCHLORIDE 2500 MG: 10 INJECTION, POWDER, LYOPHILIZED, FOR SOLUTION INTRAVENOUS at 17:38

## 2020-06-05 RX ADMIN — DIPHENOXYLATE HYDROCHLORIDE AND ATROPINE SULFATE 1 TABLET: 2.5; .025 TABLET ORAL at 17:37

## 2020-06-05 RX ADMIN — FAMOTIDINE 20 MG: 10 INJECTION INTRAVENOUS at 08:29

## 2020-06-05 RX ADMIN — POTASSIUM CHLORIDE 40 MEQ: 1.5 POWDER, FOR SOLUTION ORAL at 23:19

## 2020-06-05 RX ADMIN — ONDANSETRON 4 MG: 2 INJECTION INTRAMUSCULAR; INTRAVENOUS at 13:12

## 2020-06-05 RX ADMIN — ONDANSETRON 4 MG: 2 INJECTION INTRAMUSCULAR; INTRAVENOUS at 20:34

## 2020-06-05 RX ADMIN — LISINOPRIL 10 MG: 10 TABLET ORAL at 17:37

## 2020-06-05 RX ADMIN — DIPHENOXYLATE HYDROCHLORIDE AND ATROPINE SULFATE 1 TABLET: 2.5; .025 TABLET ORAL at 20:35

## 2020-06-05 RX ADMIN — METOPROLOL TARTRATE 2.5 MG: 5 INJECTION INTRAVENOUS at 17:05

## 2020-06-05 RX ADMIN — METOPROLOL TARTRATE 25 MG: 25 TABLET, FILM COATED ORAL at 08:32

## 2020-06-05 RX ADMIN — DIPHENOXYLATE HYDROCHLORIDE AND ATROPINE SULFATE 1 TABLET: 2.5; .025 TABLET ORAL at 00:09

## 2020-06-05 RX ADMIN — HEPARIN SODIUM 19 UNITS/KG/HR: 10000 INJECTION, SOLUTION INTRAVENOUS at 16:45

## 2020-06-05 RX ADMIN — ASPIRIN 81 MG 81 MG: 81 TABLET ORAL at 08:29

## 2020-06-05 RX ADMIN — POTASSIUM CHLORIDE 40 MEQ: 1.5 POWDER, FOR SOLUTION ORAL at 17:37

## 2020-06-05 RX ADMIN — CHOLESTYRAMINE 4 G: 4 POWDER, FOR SUSPENSION ORAL at 08:36

## 2020-06-05 RX ADMIN — METOPROLOL TARTRATE 50 MG: 50 TABLET, FILM COATED ORAL at 20:34

## 2020-06-05 RX ADMIN — TAZOBACTAM SODIUM AND PIPERACILLIN SODIUM 3.38 G: 375; 3 INJECTION, SOLUTION INTRAVENOUS at 16:43

## 2020-06-05 NOTE — PLAN OF CARE
Problem: Patient Care Overview  Goal: Plan of Care Review  Flowsheets (Taken 6/5/2020 1505)  Outcome Summary: OT seen pt in room and she is in good spirits this date.  OT monitored HR throughout treatment and she did have HR as high as 165.  Sister assisted with getting pt to visually track to left side.  She sat up eob times 10 minutes with manual cues and assist.  She will require IPF for improved independence with self care.

## 2020-06-05 NOTE — THERAPY TREATMENT NOTE
Acute Care - Occupational Therapy Treatment Note  [facility]     Patient Name: Sidra Lane  : 1955  MRN: [id number]  Today's Date: 2020             Admit Date: 2020       ICD-10-CM ICD-9-CM   1. Acute ischemic stroke (CMS/HCC) I63.9 434.91   2. Oropharyngeal dysphagia R13.12 787.22   3. Dysarthria R47.1 784.51   4. Cognitive communication deficit R41.841 799.52     Patient Active Problem List   Diagnosis   • Shortness of breath   • Atrial fibrillation (CMS/HCC)   • Essential hypertension   • Dizziness   • Deep vein thrombosis (DVT) of proximal lower extremity (CMS/HCC)   • Coronary artery disease involving native coronary artery of native heart without angina pectoris   • Fatigue   • Chest pain   • Longstanding persistent atrial fibrillation   • Cerebrovascular accident (CVA) due to embolism of right middle cerebral artery (CMS/HCC)   • Acute ischemic stroke (CMS/HCC)     Past Medical History:   Diagnosis Date   • Atrial fibrillation (CMS/HCC)    • CHF (congestive heart failure) (CMS/HCC)    • Deep vein thrombosis (CMS/HCC)    • GERD (gastroesophageal reflux disease)    • Hypertension    • May-Thurner syndrome    • Pulmonary embolism (CMS/HCC)      Past Surgical History:   Procedure Laterality Date   • CHOLECYSTECTOMY     • COLON SURGERY      bowel leakage, some of colon removed   • INTERVENTIONAL RADIOLOGY PROCEDURE Bilateral 2020    Procedure: CAROTID CEREBRAL ANGIOGRAM BILATERAL;  Surgeon: Brant Duarte MD;  Location: [facility] INVASIVE LOCATION;  Service: Interventional Radiology;  Laterality: Bilateral;   • LAPAROSCOPIC TUBAL LIGATION     • LEG SURGERY      multiple stents to BLE   • TONSILLECTOMY         Therapy Treatment    Rehabilitation Treatment Summary     Row Name 20 4165             Treatment Time/Intention    Discipline  occupational therapist  -SW      Document Type  therapy note (daily note)  -SW      Subjective Information  complains of;pain;fatigue;weakness   -SW      Mode of Treatment  occupational therapy  -SW      Patient/Family Observations  Pts sister and niece at bedside.  -SW      Patient Effort  excellent  -SW      Comment  She wanted to work hard but had difficulty holding eyes open  -SW      Existing Precautions/Restrictions  fall;other (see comments) hemiparesis, monitor hr  -SW      Recorded by [SW] Casandra Mueller OT 06/05/20 1553      Row Name 06/05/20 1505             Vital Signs    Pre Systolic BP Rehab  142  -SW      Pre Treatment Diastolic BP  96  -SW      Intra Systolic BP Rehab  125  -SW      Intra Treatment Diastolic BP  83  -SW      Post Systolic BP Rehab  116  -SW      Post Treatment Diastolic BP  78  -SW      Pretreatment Heart Rate (beats/min)  130  -SW      Intratreatment Heart Rate (beats/min)  166  -SW      Posttreatment Heart Rate (beats/min)  150  -SW      Pre SpO2 (%)  92  -SW      O2 Delivery Pre Treatment  supplemental O2  -SW      Intra SpO2 (%)  88  -SW      O2 Delivery Intra Treatment  supplemental O2  -SW      Post SpO2 (%)  90  -SW      O2 Delivery Post Treatment  supplemental O2  -SW      Pre Patient Position  Supine  -SW      Intra Patient Position  Sitting  -SW      Post Patient Position  Supine  -SW      Recorded by [SW] Casandra Mueller OT 06/05/20 6501      Row Name 06/05/20 1505             Cognitive Assessment/Intervention    Additional Documentation  Cognitive Assessment/Intervention (Group)  -SW      Recorded by [SW] Casandra Mueller OT 06/05/20 3785      Row Name 06/05/20 1505             Cognitive Assessment/Intervention- PT/OT    Affect/Mental Status (Cognitive)  WFL;low arousal/lethargic  -SW      Orientation Status (Cognition)  oriented x 4  -SW      Follows Commands (Cognition)  follows one step commands  -SW      Cognitive Function (Cognitive)  safety deficit;attention deficit  -SW      Attention Deficit (Cognitive)  mild deficit  -SW      Safety Deficit (Cognitive)  mild deficit  -SW      Recorded by [SW] Casandra Mueller, OT  06/05/20 1553      Row Name 06/05/20 1505             Bed Mobility Assessment/Treatment    Bed Mobility Assessment/Treatment  rolling left;supine-sit-supine  -SW      Rolling Left [place] (Bed Mobility)  moderate assist (50% patient effort)  -SW      Scooting/Bridging [place] (Bed Mobility)  moderate assist (50% patient effort);2 person assist  -SW      Supine-Sit [place] (Bed Mobility)  moderate assist (50% patient effort)  -SW      Sit-Supine [place] (Bed Mobility)  moderate assist (50% patient effort)  -SW      Recorded by [SW] Casandra Mueller OT 06/05/20 1553      Row Name 06/05/20 1505             ADL Assessment/Intervention    22183 - OT Self Care/Mgmt Minutes  15  -SW      BADL Assessment/Intervention  grooming  -SW      Additional Documentation  Comment, IADL Assessment/Training (Row)  -SW      Recorded by [SW] Casandra Mueller, OT 06/05/20 1553      Row Name 06/05/20 1505             Grooming Assessment/Training    [place] Level (Grooming)  grooming skills;verbal cues;moderate assist (50% patient effort)  -SW      Grooming Position  supported sitting  -SW      Comment (Grooming)  has difficulty tending to left side,  -SW      Recorded by [SW] Casandra Mueller, SULEMA 06/05/20 1553      Row Name 06/05/20 1505             General ROM    GENERAL ROM COMMENTS  AROM to LUE impaired.  RUE WFL  -SW      Recorded by [SW] Casandra Mueller OT 06/05/20 1553      Row Name 06/05/20 1505             MMT (Manual Muscle Testing)    Lt Upper Ext  Lt Shoulder Flexion  -SW      Recorded by [SW] Casandra Mueller OT 06/05/20 1553      Row Name 06/05/20 1505             MMT Left Upper Ext    Lt Shoulder Flexion MMT, Gross Movement  (3-/5) fair minus  -SW      Recorded by [SW] Casandra Mueller OT 06/05/20 1553      Row Name 06/05/20 1505             Motor Skills Assessment/Interventions    Additional Documentation  Therapeutic Exercise (Group)  -SW      Recorded by [SW] Casandra Mueller OT 06/05/20 1553      Row Name 06/05/20 1505              Therapeutic Exercise    07836 -  Therapeutic Activity Minutes  23  -SW      Recorded by [SW] Casandra Mueller OT 06/05/20 1553      Row Name 06/05/20 1505             Static Sitting Balance    Level of [place] (Unsupported Sitting, Static Balance)  moderate assist, 50 to 74% patient effort;other (see comments) at times supervision  -SW      Sitting Position (Unsupported Sitting, Static Balance)  sitting on edge of bed  -SW      Recorded by [SW] Casandra Mueller OT 06/05/20 1553      Row Name 06/05/20 1505             Positioning and Restraints    Pre-Treatment Position  in bed  -SW      Post Treatment Position  bed  -SW      In Bed  fowlers;call light within reach;encouraged to call for assist;exit alarm on;with family/caregiver  -SW      Recorded by [SW] Casandra Mueller OT 06/05/20 1553      Row Name 06/05/20 1505             Pain Assessment    Additional Documentation  Pain Scale: Numbers Pre/Post-Treatment (Group)  -SW      Recorded by [SW] Casandra Mueller OT 06/05/20 1553      Row Name 06/05/20 1500             Pain Scale: Numbers Pre/Post-Treatment    Pain Scale: Numbers, Pretreatment  4/10  -SW      Pain Scale: Numbers, Post-Treatment  8/10  -SW      Pain Location - Orientation  lower  -SW      Pain Location  back  -SW      Pain Intervention(s)  Medication (See MAR);Repositioned  -SW      Recorded by [SW] Casandra Mueller OT 06/05/20 1553      Row Name 06/05/20 1505             Sensory Assessment/Intervention    Sensory General Assessment  no sensation deficits identified  -SW      Recorded by [SW] Casandra Mueller OT 06/05/20 1553      Row Name 06/05/20 1503             Vision Assessment/Intervention    Visual Impairment/Limitations  peripheral vision impaired left;other (see comments)  -SW      Visual Motor Impairment  visual tracking, left  -SW      Recorded by [SW] Casandra Mueller OT 06/05/20 1553      Row Name                [REMOVED] Wound 05/31/20 1930 Right groin Puncture    Wound - Properties Group Date first  assessed: 05/31/20 [MB] Time first assessed: 1930 [MB] Present on Hospital Admission: N [MB] Side: Right [MB] Location: groin [MB] Primary Wound Type: Puncture [MB] Resolution Date: 06/05/20 [GG] Resolution Time: 0800 [GG] Wound Outcome: Healed [GG] Recorded by:  [GG] Cyndi Joy RN 06/05/20 0957 [MB] Toña Maier RN 05/31/20 2037    Row Name 06/05/20 1505             Coping    Observed Emotional State  accepting;calm  -SW      Verbalized Emotional State  acceptance  -SW      Recorded by [SW] Casandra Mueller, SULEMA 06/05/20 1553      Row Name 06/05/20 1505             Plan of Care Review    Plan of Care Reviewed With  patient;sibling;other (see comments) niece  -SW      Progress  improving  -SW      Recorded by [SW] Casandra Mueller OT 06/05/20 1553      Row Name 06/05/20 1505             Outcome Summary/Treatment Plan (OT)    Daily Summary of Progress (OT)  progress toward functional goals is good  -SW      Anticipated Discharge Disposition (OT)  inpatient rehabilitation facility  -SW      Recorded by [SW] Casandra Mueller, SULEMA 06/05/20 5635        User Key  (r) = Recorded By, (t) = Taken By, (c) = Cosigned By    Initials Name Effective Dates Discipline    GG Cyndi Joy RN 06/16/16 -  Nurse    Toña Handy RN 06/14/19 -  Nurse    Casandra Brown OT 01/29/20 -  OT             Occupational Therapy Education                 Title: PT OT SLP Therapies (In Progress)     Topic: Occupational Therapy (Done)     Point: ADL training (Done)     Description:   Instruct learner(s) on proper safety adaptation and remediation techniques during self care or transfers.   Instruct in proper use of assistive devices.              Learning Progress Summary           Patient Acceptance, E, VU,DU by MADINA at 6/5/2020 1505    Acceptance, E,D, VU,DU by MAXIMO at 6/2/2020 1410    Comment:  Pt educated on role of OT, safety, fall prevention, ADLs, transfers, and POC.   Family Acceptance, E, VU,DU by MADINA at 6/5/2020 1505    Acceptance,  E,D, VU,DU by MAXIMO at 6/2/2020 1410    Comment:  Pt educated on role of OT, safety, fall prevention, ADLs, transfers, and POC.                   Point: Home exercise program (Done)     Description:   Instruct learner(s) on appropriate technique for monitoring, assisting and/or progressing therapeutic exercises/activities.              Learning Progress Summary           Patient Acceptance, E, VU,DU by MADINA at 6/5/2020 1505    Acceptance, E,D, VU,DU by MAXIMO at 6/2/2020 1410    Comment:  Pt educated on role of OT, safety, fall prevention, ADLs, transfers, and POC.   Family Acceptance, E, VU,DU by MADINA at 6/5/2020 1505    Acceptance, E,D, VU,DU by MAXIMO at 6/2/2020 1410    Comment:  Pt educated on role of OT, safety, fall prevention, ADLs, transfers, and POC.                   Point: Precautions (Done)     Description:   Instruct learner(s) on prescribed precautions during self-care and functional transfers.              Learning Progress Summary           Patient Acceptance, E, VU,DU by MADINA at 6/5/2020 1505    Acceptance, E,D, VU,DU by MAXIMO at 6/2/2020 1410    Comment:  Pt educated on role of OT, safety, fall prevention, ADLs, transfers, and POC.   Family Acceptance, E, VU,DU by MADINA at 6/5/2020 1505    Acceptance, E,D, VU,DU by MAXIMO at 6/2/2020 1410    Comment:  Pt educated on role of OT, safety, fall prevention, ADLs, transfers, and POC.                   Point: Body mechanics (Done)     Description:   Instruct learner(s) on proper positioning and spine alignment during self-care, functional mobility activities and/or exercises.              Learning Progress Summary           Patient Acceptance, E, VU,DU by MADINA at 6/5/2020 1505    Acceptance, E,D, VU,DU by MAXIMO at 6/2/2020 1410    Comment:  Pt educated on role of OT, safety, fall prevention, ADLs, transfers, and POC.   Family Acceptance, E, VU,DU by MADINA at 6/5/2020 1505    Acceptance, E,D, VU,DU by MAXIMO at 6/2/2020 1410    Comment:  Pt educated on role of OT, safety, fall prevention,  ADLs, transfers, and POC.                               User Key     Initials Effective Dates Name Provider Type Discipline    [facility] 07/17/19 -  Cassia Zee OT Occupational Therapist OT     01/29/20 -  Casandra Mueller OT Occupational Therapist OT                OT Recommendation and Plan  Outcome Summary/Treatment Plan (OT)  Daily Summary of Progress (OT): progress toward functional goals is good  Anticipated Discharge Disposition (OT): inpatient rehabilitation facility  Daily Summary of Progress (OT): progress toward functional goals is good  Plan of Care Review  Plan of Care Reviewed With: patient, sibling, other (see comments)(niece)  Plan of Care Reviewed With: patient, sibling, other (see comments)(niece)  Outcome Summary: OT seen pt in room and she is in good spirits this date.  OT monitored HR throughout treatment and she did have HR as high as 165.  Sister assisted with getting pt to visually track to left side.  She sat up eob times 10 minutes with manual cues and assist.  She will require IPF for improved independence with self care.  Outcome Measures     Row Name 06/05/20 1505             How much help from another is currently needed...    Putting on and taking off regular lower body clothing?  1  -SW      Bathing (including washing, rinsing, and drying)  2  -SW      Toileting (which includes using toilet bed pan or urinal)  2  -SW      Putting on and taking off regular upper body clothing  2  -SW      Taking care of personal grooming (such as brushing teeth)  2  -SW      Eating meals  2  -SW      AM-PAC 6 Clicks Score (OT)  11  -SW         Modified Isaac Scale    Pre-Stroke Modified [place] Scale  0 - No Symptoms at all.  -SW      Modified Isaac Scale  4 - Moderately severe disability.  Unable to walk without assistance, and unable to attend to own bodily needs without assistance.  -SW         Functional Assessment    Outcome Measure Options  AM-PAC 6 Clicks Daily Activity (OT)  -SW        User Key   (r) = Recorded By, (t) = Taken By, (c) = Cosigned By    Initials Name Provider Type    Casandra Brown OT Occupational Therapist           Time Calculation:   Time Calculation- OT     Row Name 06/05/20 1505             Time Calculation- OT    OT Start Time  1505  -SW      Total Timed Code Minutes- OT  38 minute(s)  -SW      OT Received On  06/05/20  -SW         Timed Charges    83046 - OT Therapeutic Activity Minutes  23  -SW      22701 - OT Self Care/Mgmt Minutes  15  -SW        User Key  (r) = Recorded By, (t) = Taken By, (c) = Cosigned By    Initials Name Provider Type    Casandra Brown, OT Occupational Therapist        Therapy Charges for Today     Code Description Service Date Service Provider Modifiers Qty    41718942406 HC OT THERAPEUTIC ACT EA 15 MIN 6/5/2020 Casandra Mueller OT GO 2    63040154882 HC OT SELF CARE/MGMT/TRAIN EA 15 MIN 6/5/2020 Casandra Mueller OT GO 1               Casandra Mueller OT  6/5/2020

## 2020-06-05 NOTE — PROGRESS NOTES
Stroke Progress Note       Chief Complaint:  Left-sided weakness    Subjective     Subjective:  Patient was agitated last night, for which she was given a smaller dose of Seroquel 25mg. Even at this low dose she is having problems with drowsiness. Per nurse, she continues to have worsening slurred speech. Daughter at bedside feels like she is much better today.     Review of Systems   Constitutional:        Agitation   HENT: Negative for congestion, drooling and ear discharge.    Eyes: Positive for visual disturbance. Negative for photophobia and discharge.   Respiratory: Negative for shortness of breath.    Cardiovascular: Negative for chest pain and leg swelling.        Sustained atrial fibrillation; rate increased overnight   Gastrointestinal: Negative for abdominal pain, diarrhea and nausea.   Endocrine: Negative.    Genitourinary: Negative.    Musculoskeletal: Negative.    Skin: Negative.    Neurological: Positive for facial asymmetry, speech difficulty and weakness.        Complains of bilateral frontal headache with bilateral eye pain (ongoing since admission)   Psychiatric/Behavioral: Positive for agitation.        Objective      Temp:  [97.9 °F (36.6 °C)-99.4 °F (37.4 °C)] 98.9 °F (37.2 °C)  Heart Rate:  [109-136] 129  Resp:  [20-28] 20  BP: ()/() 142/96    Neurological Exam  Mental Status  Arousable to verbal stimuli. Oriented only to person and place. Moderate dysarthria present. Language is fluent with no aphasia. Attention and concentration: Decreased. Fund of knowledge is appropriate for level of education.    Cranial Nerves  CN II: Visual fields full to confrontation.  CN III, IV, VI: Extraocular movements intact bilaterally. Pupils equal round and reactive to light bilaterally. Partial gaze palsy with right gaze preference, unable to move eyes beyond midline on left.  CN V: Facial sensation is normal.  CN VII:  Right: There is no facial weakness.  Left: There is central facial  weakness.  CN VIII: Hearing is normal.  CN IX, X: Palate elevates symmetrically  CN XI: Shoulder shrug strength is normal.  CN XII: Tongue midline without atrophy or fasciculations.    Motor  Normal muscle bulk throughout. No fasciculations present. Right hemiparesis.  Left upper extremity is 2/5, left lower extremity is 3/5  Right upper extremity/right lower extremity spontaneous movement 5/5.    Sensory  Sensation: Was withdrawing to pain in left upper and lower extremity.     Reflexes  Deep tendon reflexes are 2+ and symmetric in all four extremities with downgoing toes bilaterally.    Coordination  Finger-to-nose, rapid alternating movements and heel-to-shin normal bilaterally without dysmetria.    Gait  Not assessed.      Physical Exam   Constitutional: She is oriented to person, place, and time. She appears well-developed and well-nourished.   HENT:   Head: Normocephalic and atraumatic.   Eyes: Pupils are equal, round, and reactive to light. EOM are normal.   Cardiovascular:   Sustained atrial fibrillation with tachycardia   Pulmonary/Chest: Effort normal and breath sounds normal.   Abdominal: Soft. She exhibits no distension.   Neurological: She is oriented to person, place, and time. She has normal reflexes. Coordination normal. GCS eye subscore is 4. GCS verbal subscore is 5. GCS motor subscore is 6.   Drowsy, arousable to verbal stimuli   Skin: Skin is warm and dry.   Psychiatric: She has a normal mood and affect. Her behavior is normal.   Nursing note and vitals reviewed.    Nursing note and vitals reviewed.    Results Review:    I reviewed the patient's new clinical results.      Ct Head Without Contrast    Result Date: 6/4/2020  Evolving right MCA infarcts in the anterior and posterior watershed regions as described, without significant interval extension of infarct territory. No evidence of hemorrhage, new infarct or other new acute intracranial disease is appreciated  D:  06/04/2020 E:  06/04/2020           Results for orders placed during the hospital encounter of 05/31/20   Adult Transthoracic Echo Complete W/ Cont if Necessary Per Protocol    Narrative · Left ventricular systolic function is normal. Estimated EF appears to be   in the range of 56 - 60%.  · Normal right ventricular cavity size and systolic function noted.  · Left atrial cavity size is moderately dilated. Left atrial volume is   moderately increased.  · No evidence of a patent foramen ovale. Saline test results are negative.  · The aortic valve is abnormal in structure. The valve exhibits sclerosis.   There is mild calcification of the aortic valve  · Mild aortic valve stenosis is present            Assessment/Plan         Assessment/Plan:        65-year-old right-handed white female with known diagnosis of hypertension, hyperlipidemia, persistent atrial fibrillation, on Eliquis, who has been admitted with right MCA stroke.  On arrival, thrombectomy was attempted (was unsuccessful), but intra-arterial TPA was given.  Patient was not a candidate for TPA secondary to being on Eliquis.            1. Right MCA stroke.  Likely cardioembolic, given her being in persistent A. fib.  Patient had improved two days ago, with being more awake and moving her left side better.  But yesterday  patient has got worse with being drowsy and worsening dysarthria and left sided weakness.  She was then seen emergently and a stat  CT head was done that showed no hemorrhage.  It does show evolving right MCA stroke, which is stable compared to her last scan.  Likely reason for her worsening was secondary to medication.  Would prefer to not utilize Seroquel but will give order for Seroquel 12.5 mg only as needed at night if patient agitated.  2. Persistent atrial fibrillation.   Rate increased again today.  Patient is being followed by cardiology.  Continue heparin drip.  Will check head CT on 6/6/2020 and if no hemorrhage will transition to oral anticoagulant  tomorrow, if she continues to do well.  3. Essential hypertension.  Normal blood pressure goals.  Systolic goal blood pressure 100-140.  4. Mixed hyperlipidemia.   Continue Lipitor 80 mg daily.    5. Activity - Ok to resume PT/OT.  6. Diet - SLP following; Keofeed in place.  Speech therapy will continue to follow, and if she does not pass swallowing may need PEG placement.     Case was discussed with the patient's daughter who is at bedside and nursing. We will continue to follow her.        ISSA Troncoso  06/05/20  12:45

## 2020-06-05 NOTE — PROGRESS NOTES
[facility] Medicine Services  PROGRESS NOTE    Patient Name: Sidra Lane  : 1955  MRN: [id number]    Date of Admission: 2020  Primary Care Physician: lJ Mcknight MD    Subjective   Subjective     CC:  Follow-up acute CVA    HPI:  Pt tachypneic this am. Also c/o abdominal pain and diarrhea worse than her baseline diarrhea.     Review of Systems  Unable to obtain due to altered mental status.    Objective   Objective     Vital Signs:   Temp:  [97.9 °F (36.6 °C)-99.4 °F (37.4 °C)] 98.9 °F (37.2 °C)  Heart Rate:  [] 129  Resp:  [20-28] 24  BP: ()/() 118/86  Total (NIH Stroke Scale): 12     Physical Exam:  General Assessment: No acute cardiopulmonary distress. Well developed and well nourished.    HEENT: NCAT, PERRL, MM moist    Neck: Supple, no carotid bruit bilat    CVS: RRR, S1S2 normal, no murmurs    Resp: Exam limited to anterior aspect, relatively clear overall, tachypneic    Abd: soft, NT, ND, normal BS, no guarding or peritoneal signs    Ext: No edema, both calves are symmetric and NTTP    Neuro: Patient is lethargic, arousable, attempting to answer some simple questions, however speech is very dysarthric.  Patient not following any commands.  She does move her right upper extremity, right lower extremity, and left lower extremity spontaneously; left upper extremity is flaccid.    Skin: W/D/I. No rash.    Psych: Affect is appropriate      Results Reviewed:  Results from last 7 days   Lab Units 20  0715 20  1501 20  0637   WBC 10*3/mm3 15.26* 11.85* 9.99   HEMOGLOBIN g/dL 11.1* 11.9* 11.7*   HEMATOCRIT % 40.3 43.1 41.1   PLATELETS 10*3/mm3 319 263 259   INR   --  1.02  --      Results from last 7 days   Lab Units 20  0715 20  0518 20  2303 20  0327 20  0430  20  1517   SODIUM mmol/L 132* 136  --  138 137  137   < >  --    POTASSIUM mmol/L 3.6 4.4  --  3.4* 4.5  4.4   < >  --    CHLORIDE  mmol/L 94* 97*  --  102 101  100   < >  --    CO2 mmol/L 22.0 22.0  --  19.0* 17.0*  22.0   < >  --    BUN mg/dL 21 12  --  10 12  12   < >  --    CREATININE mg/dL 0.81 0.68  --  0.66 0.78  0.69   < >  --    GLUCOSE mg/dL 156* 120*  --  134* 145*  142*   < >  --    CALCIUM mg/dL 9.2 9.3  --  8.5* 8.6  8.6   < >  --    ALT (SGPT) U/L  --   --   --   --  19  --  19   AST (SGOT) U/L  --   --   --   --  38*  --  31   TROPONIN T ng/mL  --  <0.010 <0.010  --   --   --  <0.010    < > = values in this interval not displayed.     Estimated Creatinine Clearance: 76.9 mL/min (by C-G formula based on SCr of 0.81 mg/dL).    Microbiology Results Abnormal     Procedure Component Value - Date/Time    COVID-19,CEPHEID,AMBERLY IN-HOUSE(OR EMERGENT/ADD-ON),NP SWAB IN TRANSPORT MEDIA 3-4 HR TAT - Swab, Nasopharynx [[id number]]  (Normal) Collected:  05/31/20 1533    Lab Status:  Final result Specimen:  Swab from Nasopharynx Updated:  05/31/20 1638     COVID19 Not Detected          Imaging Results (Last 24 Hours)     Procedure Component Value Units Date/Time    XR Chest 1 View [[id number]] Collected:  06/04/20 1420     Updated:  06/04/20 2200    Narrative:       EXAMINATION: XR CHEST 1 VW-06/04/2020:     INDICATION: Fever; I63.9-Cerebral infarction, unspecified;  R13.12-Dysphagia, oropharyngeal phase; R47.1-Dysarthria and anarthria;  R41.841-Cognitive communication deficit.     COMPARISON: 05/31/2020.     FINDINGS: Feeding tube is seen below the left hemidiaphragm. The heart  is enlarged. The vasculature appears upper limits of normal. The lungs  are moderately well expanded and appear grossly clear.       Impression:       1. Feeding tube below the left hemidiaphragm.  2. Cardiomegaly without evidence of congestive failure.  3. No evidence of active chest disease elsewhere.      D:  06/04/2020  E:  06/04/2020     This report was finalized on 6/4/2020 9:56 PM by Dr. Ervin Troncoso MD.       CT Head Without Contrast [[id number]] Collected:   06/04/20 0934     Updated:  06/04/20 0943    Narrative:       EXAMINATION: CT HEAD WO CONTRAST-      INDICATION: Cerebral hemorrhage suspected; I63.9-Cerebral infarction,  unspecified; R13.12-Dysphagia, oropharyngeal phase; R47.1-Dysarthria and  anarthria; R41.841-Cognitive communication deficit.     TECHNIQUE: 5 mm unenhanced images through the brain.     The radiation dose reduction device was turned on for each scan per the  ALARA (As Low as Reasonably Achievable) protocol.     COMPARISON: 06/01/2020 head CT scan.     FINDINGS: Evolving right MCA territory infarcts are seen, the 4 cm area  of edema in the anterior, watershed region showing decreased  attenuation, but no significant interval enlargement. The more patchy,  posterior right watershed region infarct is similar, extending over a  roughly 2 cm area, but lower in density than on the prior scan typical  of evolving infarct. No clearly new area of infarction is identified.  There is no evidence of intracranial hemorrhage, no evidence of mass or  significant mass effect. Ventricles are normal in size. No abnormal  extraaxial collection is seen.       Impression:       Evolving right MCA infarcts in the anterior and posterior  watershed regions as described, without significant interval extension  of infarct territory. No evidence of hemorrhage, new infarct or other  new acute intracranial disease is appreciated     D:  06/04/2020  E:  06/04/2020             Results for orders placed during the hospital encounter of 05/31/20   Adult Transthoracic Echo Complete W/ Cont if Necessary Per Protocol    Narrative · Left ventricular systolic function is normal. Estimated EF appears to be   in the range of 56 - 60%.  · Normal right ventricular cavity size and systolic function noted.  · Left atrial cavity size is moderately dilated. Left atrial volume is   moderately increased.  · No evidence of a patent foramen ovale. Saline test results are negative.  · The aortic  valve is abnormal in structure. The valve exhibits sclerosis.   There is mild calcification of the aortic valve  · Mild aortic valve stenosis is present          I have reviewed the medications:  Scheduled Meds:    amLODIPine 5 mg Oral Daily   aspirin 81 mg Oral Daily   atorvastatin 80 mg Oral Nightly   cholestyramine light 1 packet Oral Daily   diphenoxylate-atropine 1 tablet Oral BID   famotidine 20 mg Intravenous Q12H   lisinopril 10 mg Oral Q PM   metoprolol tartrate 25 mg Oral Q12H   sodium chloride 10 mL Intravenous Q12H     Continuous Infusions:    heparin 18 Units/kg/hr Last Rate: 18 Units/kg/hr (06/05/20 0309)   Pharmacy to Dose Heparin       PRN Meds:.•  acetaminophen  •  acetaminophen  •  butalbital-acetaminophen-caffeine  •  enalaprilat  •  metoprolol tartrate  •  nitroglycerin  •  Pharmacy to Dose Heparin  •  potassium chloride **OR** potassium chloride **OR** potassium chloride  •  QUEtiapine  •  sodium chloride  •  sodium chloride    Assessment/Plan   Assessment & Plan     Active Hospital Problems    Diagnosis  POA   • **Cerebrovascular accident (CVA) due to embolism of right middle cerebral artery (CMS/HCC) [I63.411]  Yes   • Acute ischemic stroke (CMS/HCC) [I63.9]  Yes   • Atrial fibrillation (CMS/HCC) [I48.91]  Yes      Resolved Hospital Problems   No resolved problems to display.        Brief Hospital Course to date:  Sidra Lane is a 65 y.o. 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  She was found to have a right MCA territory reversible ischemia on CT perfusion.  She underwent a CT head neck angiogram with an intra-arterial TPA after failed thrombectomy and admitted to the ICU afterward.  She was transferred out of ICU on 6/3 with hospitalist assuming care on 6/4.    Right MCA stroke  - likely cardioembolic.  Patient has a history of persistent atrial  fibrillation and had been anticoagulated with Eliquis.  She had been followed by her electrophysiologist and had plans for an ablation prior to readmission.  -Again she is status post intra-arterial TPA after failed thrombectomy  -She had some delirium while in the ICU and Seroquel 50 mg nightly was given to her  -Stat CT brain was performed on 6/4 due to worsening left sided weakness and acute hemorrhagic transformation was ruled out, but does still reporting evolving MCA CVA that was stable from the previous exam.  -The reasons for her worsening symptoms may be related to Seroquel.  Dr. Vaughan has recommended to hold Seroquel for now, use 12.5 mg as needed if patient is agitated.  -Patient is on heparin drip (therapeutic), managed by Dr. Vaughan  -EP also following, resumed Metoprolol PO  -Failed modified barium swallow and was started on tube feeding, will continue for now    Persistent atrial fibrillation with RVR  -Patient had been on beta-blocker per her cardiologist and had plans for ablation prior to admission  -Currently she is not a candidate for an ablation  -We will defer management to her EP  --getting PO Metoprolol    Hypertension  -Not well controlled, initially was allowed for permissive hypertension due to acute stroke.  Currently Dr. Vaughan recommends better control of her blood pressure and bring it down to normotensive.  -I have resumed some of patient's home medications and hope to bring it down slowly. Beta blocker resumed by Cards.     Fever  -Possible aspiration when patient pulled out her Keofeed tube  --CXR unremarkable yesterday, repeat CXR this am unremarkable  --now afebrile, added Vanc/Zosyn this am due to worsening leukocytosis with left shift. Will likely de-escalate ABX tomorrow     Hyperlipidemia  -High intensity statin therapy    Left-sided weakness  -PT OT, will benefit from short-term rehab once she is medically stable    Dysphagia  -Speech pathologist following, currently getting  enteral feeding Via Keofeed tube.  Tube feedings at goal.  -PEG tube placement discussed with patient's family by intensivist, currently they want to hold off.        DVT Prophylaxis: Fully anticoagulated with heparin drip      Disposition:TBD pending further evaluation and response to therapy    CODE STATUS:   Code Status and Medical Interventions:   Ordered at: 05/31/20 1743     Code Status:    CPR     Medical Interventions (Level of Support Prior to Arrest):    Full         Electronically signed by Evelina Banda MD, 06/05/20, 08:39.

## 2020-06-05 NOTE — PROGRESS NOTES
"                  Clinical Nutrition     Nutrition Support Assessment  Reason for Visit: Follow-up protocol, EN    Patient Name: Sidra Lane  YOB: 1955  MRN: [id number]  Date of Encounter: 06/05/20 09:04  Admission date: 5/31/2020     Comments: Spoke with RN about pt intolerances/sxs with current EN regimen. Due to reported abominal cramping and patient reported history of small/large bowel resections, plan to trial peptide-based formula with low fiber. Addition of fiber to be added once patient establishes tolerance with EN. Patient with chronic diarrhea at baseline, reported 10-16 loose stools daily.     Plan to start adjust EN regimen to peptide-based Peptamen AF to start at 20 mL/hr advancing as tolerated by 20 mL/hr Q8H to goal rate of 60 mL, TGV = 1320 mL. Free water @ 15 mL/hr.    RD to monitor and adjust regimen as medically appropriate.     Nutrition Assessment     Admission Problem List:  R MCA - CVA   Unsuccessful thrombectomy - iatPA (5/31)  PAF w/ RVR  Fever    Applicable PMH:  A-fib   CAD  GERD   DVT  PE  HTN    Applicable Nutrition-Related Information:  (5/31) NPO    Applicable medical tests/procedures since admission:  (6/2) MBS - with recommendation for NPO status  (6/4) SLP - \"pt not appropriate for tx per RN\"    Reported/Observed/Food/Nutrition Related History:      Spoke with RN who reports patient having abdominal pain, cramping, diarrhea. EN held once AM bag ran out. RN reports patient has diarrhea at baseline but has been worse recently. Plans to cover with IV antibiotics for possible aspiration.     Patient drowsy at visit but able to answer questions appropriate. She states she has had chronic diarrhea for several years. Report she had surgery which removed ~14 cm of her large intestine and ~36 cm of her small bowel about 3 years ago. She was told by her GI physician in [place] that she has Crohn's, does not remember diagnostics test - \"that's all they " "told me.\" She says she takes Lomotil and Welchol at home. Does not follow any specific diet. She states at baseline, it is normal for her to have up to 10 - 16 loose stools daily. Spoke with RN of adjustment to peptide-based formula to assess patients tolerance. Cholestyramine given 6/4 which will hopefully help with loose stools.    Per I/O documentation:  BM x1 (6/4)  BM x2 (6/3)  BM x5 (6/2)    Anthropometrics     Height: Height: 154.9 cm (61\")  Weight: Weight: 109 kg (240 lb 15.4 oz) (06/03/20 1630)  BMI: BMI (Calculated): 45.6  BMI classification: Obese Class III extreme obesity: > or equal to 40kg/m2   IBW:  105#     Labs reviewed     Results from last 7 days   Lab Units 06/05/20  0715 06/04/20  0518   SODIUM mmol/L 132* 136   POTASSIUM mmol/L 3.6 4.4   CHLORIDE mmol/L 94* 97*   CO2 mmol/L 22.0 22.0   BUN mg/dL 21 12   CREATININE mg/dL 0.81 0.68   GLUCOSE mg/dL 156* 120*   CALCIUM mg/dL 9.2 9.3   PHOSPHORUS mg/dL  --  4.4     Results from last 7 days   Lab Units 06/05/20  0547 06/04/20  1812 06/04/20  1131 06/04/20  0831 06/03/20  2351 06/03/20  1800   GLUCOSE mg/dL 138* 134* 134* 143* 131* 111     Lab Results   Lab Value Date/Time    HGBA1C 5.80 (H) 06/01/2020 0637       Medications reviewed   Pertinent: pepcid, lomotil, cholestyramine x4/d  IVF: heparin    Intake/Ouptut 24 hrs (7:00AM - 6:59 AM)     Intake & Output (last day)       06/04 0701 - 06/05 0700 06/05 0701 - 06/06 0700    P.O.      I.V. (mL/kg)      Other 151     NG/     Total Intake(mL/kg) 991 (9.1)     Urine (mL/kg/hr)      Stool      Total Output      Net +991           Urine Unmeasured Occurrence 1 x     Stool Unmeasured Occurrence 1 x         Needs Assessment (6/2)     Height used:  154.9cm  Weight used: 105kg  IBW: 47.7kg     Estimated calorie needs: ~1500 kcal/day  Method:Kcals/KG 14= 1470  Method:HB 1627    Estimated protein needs: ~100 g pro/day  1.0 g/kg= 105g pro  2.0grm KG/IBW = 95    Current Nutrition Prescription     PO: " NPO  EN: Replete w/ Fiber  Goal rate: 75 mL  Route: NGT    Evaluation of Received Nutrient/Fluid Intake last day - 6/4:     At Target Goal Volume  Received per I/O's    % Est needs   Volume  1500 ml 840 mL      Modulars        Energy/kcals 1500 kcals 840 kcals 56%   Protein  96 g pro 54 g pro 56%               Fiber 22.8 g 12.8 g    Fluid   W/Free water 1253 ml  1553 ml 701 mL  852 mL            TGV calculated based on 20hrs delivery (in ICU prior to transfer to floor) with anticipated interruption in EN infusion for routine patient care    Nutrition Diagnosis     6/5  Problem Less than optimal enteral infusion composition/modality   Etiology GI intolerance   Signs/Symptoms Report of abdominal cramping, loose stool, etc   Status: ongoing      6/2 updated 6/5  Problem Swallowing difficulty   Etiology Per SLP eval    Signs/Symptoms Per MBS (6/2) with recommendation for NPO status.    Status: ongoing    Nutrition Intervention     1.  Follow treatment progress, Care plan reviewed   2.  Adjust EN regimen to peptide-based Peptamen AF to start at 20 mL/hr advancing as tolerated by 20 mL/hr Q8H to goal rate of 60 mL, TGV = 1320 mL. Free water @ 15 mL/hr.      At Target Goal Volume     % Est needs   Volume  1320 ml      Energy/kcals 1584 kcals 106%   Protein 100 g pro 100%   Fiber 8 g         Fluid   W/Free water 1071 ml  1616 ml       *TGV calculated based on 22hrs delivery with anticipated interruption in EN infusion for routine patient care    2. Nutrition panel, Pre-alb and CRP weekly while on EN  3. Monitor electrolytes with started tube feeding and replace as indicated   4. RD to monitor patient status, SLP eval, and adjust nutrition support regimen as medically appropriate     Goal:   General: Nutrition support treatment  EN/PN: Tolerate EN at goal, Adjust EN, Deliver estimated needs      Monitoring/Evaluation:   Per protocol, I&O, Pertinent labs, EN delivery/tolerance, GI status, Symptoms, Swallow function    Will  Continue to follow per protocol      Alejandrina Sandoval, MITULN, LD  Time Spent: 60 minutes

## 2020-06-05 NOTE — PLAN OF CARE
Problem: Patient Care Overview  Goal: Plan of Care Review  Outcome: Ongoing (interventions implemented as appropriate)  Flowsheets (Taken 6/5/2020 1600)  Plan of Care Reviewed With: patient  Note:   SLP treatment completed. Will continue to address dysphagia. Please see note for further details and recommendations.

## 2020-06-05 NOTE — PROGRESS NOTES
HEPARIN INFUSION  Sidra Lane is a  65 y.o. female receiving heparin infusion.     Therapy for (VTE/Cardiac): Cardiac  Patient Weight: 109 kg  Initial Bolus (Y/N): No  Any Bolus (Y/N): No      Signs or Symptoms of Bleeding: None noted    Cardiac or Other (Not VTE)   Initial Bolus: 60 units/kg (Max 4,000 units)  Initial rate: 12 units/kg/hr (Max 1,000 units/hr)   Anti-Xa (IU/mL) Bolus Dose Stop Infusion Rate Change Repeat Anti-Xa      ?0.19 60 units/kg 0 hrs Increase rate by   4 units/kg/hr 6 hrs       0.2 - 0.29  30 units/kg 0 hrs Increase rate by 2 units/kg/hr 6 hrs    0.3 - 0.7 0 0 hrs No change 6 hrs      0.71 - 0.99 0  0 hrs Decrease rate by 2 units/kg/hr 6 hrs            ?1 0 Hold 1 hr Decrease rate by 3 units/kg/hr 6 hrs      Results from last 7 days   Lab Units 06/05/20  0715 06/03/20  1501 06/01/20  0637   INR   --  1.02  --    HEMOGLOBIN g/dL 11.1* 11.9* 11.7*   HEMATOCRIT % 40.3 43.1 41.1   PLATELETS 10*3/mm3 319 263 259       Date   Time   Anti-Xa Current Rate (Unit/kg/hr) Bolus   (Units) Rate Change   (Unit/kg/hr) New Rate (Unit/kg/hr) Next   Anti-Xa Comments  Pump Check Daily   6/3 1417   No +9 9 2000 DW RN   6/3 2000 0.1 9 No -- 9  JAMES RN. Lsot IV access and heparin stopped. Will continue current rate when restarted and redraw lab.   6/4 0300 0.1 9 -- +4 13 0900 D/w RN   6/4 1242 0.17 13 -- +2 15 2000 D/W RN Sydnee, reports drip was off for CT to rule out hemorrhage   6/4 1947 0.19 15 -- + 3 18 0600 Spoke Chryl 3060   6/5 0714 0.5 18 -- -- 18 1300 D/W DANIEL Song, PharmD, BCPS  6/5/2020  09:02

## 2020-06-05 NOTE — PROGRESS NOTES
Continued Stay Note  [facility]     Patient Name: Sidra Lane  MRN: [id number]  Today's Date: 6/5/2020    Admit Date: 5/31/2020    Discharge Plan     Row Name 06/05/20 0920       Plan    [facility]    Plan Comments  Spoke with Ana María (daughter) at bedside. Plan is [facility] when medically ready. Patient is having chest pain this morning. CM will continue to follow.    Final Discharge Disposition Code  03 - skilled nursing facility (SNF)        Discharge Codes    No documentation.       Expected Discharge Date and Time     Expected Discharge Date Expected Discharge Time    Jun 5, 2020             Naga Muñoz RN

## 2020-06-05 NOTE — PROGRESS NOTES
"Pharmacy Consult-Vancomycin Dosing  Sidra Lane is a  65 y.o. female receiving vancomycin therapy.     Indication: PNA (possible aspiration)  Consulting Provider: Solo REYNA Consult: no  Goal Trough: 15-20mcg/mL    Current Antimicrobial Therapy  Vancomycin IV, start 6/5  Zosyn 3.375g IV q8h, start 6/5    Allergies  Allergies as of 05/31/2020 - Reviewed 05/31/2020   Allergen Reaction Noted    Digoxin Other (See Comments) 10/23/2019    Iodine Rash 08/29/2018    Other Other (See Comments) 08/29/2018     Labs  Results from last 7 days   Lab Units 06/05/20  0715 06/04/20  0518 06/03/20  0327   BUN mg/dL 21 12 10   CREATININE mg/dL 0.81 0.68 0.66     Results from last 7 days   Lab Units 06/05/20  0715 06/03/20  1501 06/01/20  0637   WBC 10*3/mm3 15.26* 11.85* 9.99     Evaluation of Dosing  Is Patient on Dialysis or Renal Replacement: no    Ht - 154.9 cm (61\")  Wt - 109 kg (240 lb 15.4 oz)    Estimated Creatinine Clearance: 76.9 mL/min (by C-G formula based on SCr of 0.81 mg/dL).    Intake & Output (last 3 days)         06/02 0701 - 06/03 0700 06/03 0701 - 06/04 0700 06/04 0701 - 06/05 0700 06/05 0701 - 06/06 0700    P.O.  0      I.V. (mL/kg) 2828.8 (26) 123.1 (1.1)      Other 60 252 151     NG/ 943 840     Total Intake(mL/kg) 3278.8 (30.1) 1318.1 (12.1) 991 (9.1)     Urine (mL/kg/hr) 1175 (0.4) 2450 (0.9)      Stool 0 0      Total Output 1175 2450      Net +2103.8 -1131.9 +991             Urine Unmeasured Occurrence 5 x 8 x 1 x     Stool Unmeasured Occurrence 5 x 2 x 1 x           Microbiology and Radiology  Microbiology Results (last 10 days)       Procedure Component Value - Date/Time    COVID-19,CEPHEID,AMBERLY IN-HOUSE(OR EMERGENT/ADD-ON),NP SWAB IN TRANSPORT MEDIA 3-4 HR TAT - Swab, Nasopharynx [[id number]]  (Normal) Collected:  05/31/20 1533    Lab Status:  Final result Specimen:  Swab from Nasopharynx Updated:  05/31/20 1638     COVID19 Not Detected          Evaluation of Level       "   Assessment/Plan  Pharmacy to dose vancomycin for possible aspiration PNA in this 65yoF. Based on her age, weight, and renal function, will schedule a 2500mg (23mg/kg) vancomycin loading dose, followed by a 1250mg (11.5mg/kg) IV q12h maintenance dose.  Assess a trough level prior to the 5th overall dose, Sunday 6/7 @ 1030.  Pharmacy will continue to monitor and adjust vancomycin dose as necessary based on renal function, cultures, labs, and clinical status.     Thank you,  Kita Varela [facility]  6/5/2020  10:31

## 2020-06-05 NOTE — THERAPY TREATMENT NOTE
Acute Care - Speech Language Pathology   Swallow Treatment Note [facility]     Patient Name: Sidra Lane  : 1955  MRN: [id number]  Today's Date: 2020               Admit Date: 2020    Visit Dx:      ICD-10-CM ICD-9-CM   1. Acute ischemic stroke (CMS/HCC) I63.9 434.91   2. Oropharyngeal dysphagia R13.12 787.22   3. Dysarthria R47.1 784.51   4. Cognitive communication deficit R41.841 799.52     Patient Active Problem List   Diagnosis   • Shortness of breath   • Atrial fibrillation (CMS/HCC)   • Essential hypertension   • Dizziness   • Deep vein thrombosis (DVT) of proximal lower extremity (CMS/HCC)   • Coronary artery disease involving native coronary artery of native heart without angina pectoris   • Fatigue   • Chest pain   • Longstanding persistent atrial fibrillation   • Cerebrovascular accident (CVA) due to embolism of right middle cerebral artery (CMS/HCC)   • Acute ischemic stroke (CMS/HCC)       Therapy Treatment  Rehabilitation Treatment Summary     Row Name 20 1535 20 1505          Treatment Time/Intention    Discipline  speech language pathologist  -SA  occupational therapist  -SW     Document Type  therapy note (daily note)  -SA  therapy note (daily note)  -SW     Subjective Information  no complaints  -SA  complains of;pain;fatigue;weakness  -SW     Mode of Treatment  individual therapy;speech-language pathology  -SA  occupational therapy  -SW     Patient/Family Observations  Family briefly present  -  Pts sister and niece at bedside.  -SW     Therapy Frequency (Swallow)  5 days per week  -  --     Therapy Frequency (SLP SLC)  5 days per week  -  --     Patient Effort  fair  -SA  excellent  -SW     Comment  --  She wanted to work hard but had difficulty holding eyes open  -     Existing Precautions/Restrictions  --  fall;other (see comments) hemiparesis, monitor hr  -SW     Recorded by [SA] Fely Gage, MS CCC-SLP 20 4029 [SW] Casandra Mueller, OT  06/05/20 1553     Row Name 06/05/20 1505             Vital Signs    Pre Systolic BP Rehab  142  -SW      Pre Treatment Diastolic BP  96  -SW      Intra Systolic BP Rehab  125  -SW      Intra Treatment Diastolic BP  83  -SW      Post Systolic BP Rehab  116  -SW      Post Treatment Diastolic BP  78  -SW      Pretreatment Heart Rate (beats/min)  130  -SW      Intratreatment Heart Rate (beats/min)  166  -SW      Posttreatment Heart Rate (beats/min)  150  -SW      Pre SpO2 (%)  92  -SW      O2 Delivery Pre Treatment  supplemental O2  -SW      Intra SpO2 (%)  88  -SW      O2 Delivery Intra Treatment  supplemental O2  -SW      Post SpO2 (%)  90  -SW      O2 Delivery Post Treatment  supplemental O2  -SW      Pre Patient Position  Supine  -SW      Intra Patient Position  Sitting  -SW      Post Patient Position  Supine  -SW      Recorded by [SW] Casandra Mueller OT 06/05/20 1553      Row Name 06/05/20 1505             Cognitive Assessment/Intervention    Additional Documentation  Cognitive Assessment/Intervention (Group)  -SW      Recorded by [SW] Casandra Mueller OT 06/05/20 1553      Row Name 06/05/20 1505             Cognitive Assessment/Intervention- PT/OT    Affect/Mental Status (Cognitive)  WFL;low arousal/lethargic  -SW      Orientation Status (Cognition)  oriented x 4  -SW      Follows Commands (Cognition)  follows one step commands  -SW      Cognitive Function (Cognitive)  safety deficit;attention deficit  -SW      Attention Deficit (Cognitive)  mild deficit  -SW      Safety Deficit (Cognitive)  mild deficit  -SW      Recorded by [SW] Casandra Mueller OT 06/05/20 1553      Row Name 06/05/20 1505             Bed Mobility Assessment/Treatment    Bed Mobility Assessment/Treatment  rolling left;supine-sit-supine  -SW      Rolling Left [place] (Bed Mobility)  moderate assist (50% patient effort)  -SW      Scooting/Bridging [place] (Bed Mobility)  moderate assist (50% patient effort);2 person assist  -SW      Supine-Sit  [place] (Bed Mobility)  moderate assist (50% patient effort)  -SW      Sit-Supine [place] (Bed Mobility)  moderate assist (50% patient effort)  -SW      Recorded by [SW] Casandra Mueller OT 06/05/20 1553      Row Name 06/05/20 1505             ADL Assessment/Intervention    87119 - OT Self Care/Mgmt Minutes  15  -SW      BADL Assessment/Intervention  grooming  -SW      Additional Documentation  Comment, IADL Assessment/Training (Row)  -SW      Recorded by [SW] Casandra Mueller OT 06/05/20 1553      Row Name 06/05/20 1505             Grooming Assessment/Training    [place] Level (Grooming)  grooming skills;verbal cues;moderate assist (50% patient effort)  -SW      Grooming Position  supported sitting  -SW      Comment (Grooming)  has difficulty tending to left side,  -SW      Recorded by [SW] Casandra Mueller OT 06/05/20 1553      Row Name 06/05/20 1505             General ROM    GENERAL ROM COMMENTS  AROM to LUE impaired.  RUE WFL  -SW      Recorded by [SW] Casandra Mueller OT 06/05/20 1553      Row Name 06/05/20 1505             MMT (Manual Muscle Testing)    Lt Upper Ext  Lt Shoulder Flexion  -SW      Recorded by [SW] Casandra Mueller OT 06/05/20 1553      Row Name 06/05/20 1505             MMT Left Upper Ext    Lt Shoulder Flexion MMT, Gross Movement  (3-/5) fair minus  -SW      Recorded by [SW] Casandra Mueller OT 06/05/20 1553      Row Name 06/05/20 1505             Motor Skills Assessment/Interventions    Additional Documentation  Therapeutic Exercise (Group)  -SW      Recorded by [SW] Casandra Mueller OT 06/05/20 1553      Row Name 06/05/20 1505             Therapeutic Exercise    46280 - OT Therapeutic Activity Minutes  23  -SW      Recorded by [SW] Casandra Mueller OT 06/05/20 1553      Row Name 06/05/20 1505             Static Sitting Balance    Level of [place] (Unsupported Sitting, Static Balance)  moderate assist, 50 to 74% patient effort;other (see comments) at times supervision  -SW      Sitting Position  (Unsupported Sitting, Static Balance)  sitting on edge of bed  -SW      Recorded by [SW] Casandra Mueller, OT 06/05/20 1553      Row Name 06/05/20 1505             Positioning and Restraints    Pre-Treatment Position  in bed  -SW      Post Treatment Position  bed  -SW      In Bed  fowlers;call light within reach;encouraged to call for assist;exit alarm on;with family/caregiver  -SW      Recorded by [SW] Casandra Mueller, SULEMA 06/05/20 1553      Row Name 06/05/20 1535 06/05/20 1505          Pain Assessment    Additional Documentation  Pain Scale: FACES Pre/Post-Treatment (Group)  -SA  Pain Scale: Numbers Pre/Post-Treatment (Group)  -SW     Recorded by [SA] Fely Gage MS CCC-SLP 06/05/20 1558 [SW] Casandra Mueller, OT 06/05/20 1553     Row Name 06/05/20 1505             Pain Scale: Numbers Pre/Post-Treatment    Pain Scale: Numbers, Pretreatment  4/10  -SW      Pain Scale: Numbers, Post-Treatment  8/10  -SW      Pain Location - Orientation  lower  -SW      Pain Location  back  -SW      Pain Intervention(s)  Medication (See MAR);Repositioned  -SW      Recorded by [SW] Casandra Mueller OT 06/05/20 1553      Row Name 06/05/20 1536             Pain Scale: FACES Pre/Post-Treatment    Pain: FACES Scale, Pretreatment  2-->hurts little bit  -SA      Pain: FACES Scale, Post-Treatment  2-->hurts little bit  -SA      Recorded by [SA] Fely Gage MS CCC-SLP 06/05/20 1558      Row Name 06/05/20 1506             Sensory Assessment/Intervention    Sensory General Assessment  no sensation deficits identified  -SW      Recorded by [SW] Casandra Mueller, OT 06/05/20 1553      Row Name 06/05/20 1504             Vision Assessment/Intervention    Visual Impairment/Limitations  peripheral vision impaired left;other (see comments)  -SW      Visual Motor Impairment  visual tracking, left  -SW      Recorded by [SW] Casandra Mueller, OT 06/05/20 1553      Row Name                [REMOVED] Wound 05/31/20 1930 Right groin Puncture    Wound - Properties Group  Date first assessed: 05/31/20 [MB] Time first assessed: 1930 [MB] Present on Hospital Admission: N [MB] Side: Right [MB] Location: groin [MB] Primary Wound Type: Puncture [MB] Resolution Date: 06/05/20 [GG] Resolution Time: 0800 [GG] Wound Outcome: Healed [GG] Recorded by:  [GG] Cyndi Joy RN 06/05/20 0957 [MB] Toña Maier RN 05/31/20 2037    Row Name 06/05/20 1505             Coping    Observed Emotional State  accepting;calm  -SW      Verbalized Emotional State  acceptance  -SW      Recorded by [SW] Casandra Mueller, OT 06/05/20 1553      Row Name 06/05/20 1505             Plan of Care Review    Plan of Care Reviewed With  patient;sibling;other (see comments) niece  -SW      Progress  improving  -SW      Recorded by [SW] Casandra Mueller OT 06/05/20 1553      Row Name 06/05/20 1505             Outcome Summary/Treatment Plan (OT)    Daily Summary of Progress (OT)  progress toward functional goals is good  -SW      Anticipated Discharge Disposition (OT)  inpatient rehabilitation facility  -SW      Recorded by [SW] Casandra Mueller, SULEMA 06/05/20 1553      Row Name 06/05/20 1535             Outcome Summary/Treatment Plan (SLP)    Daily Summary of Progress (SLP)  progress toward functional goals as expected  -SA      Plan for Continued Treatment (SLP)  Continue to follow for speech/language dx tx and dysphagia tx. Pt w/ incr'd lethargy. Immediate cough after ice chip x1. Will continue to follow to address current plan of care.   -SA      Anticipated Dischage Disposition (SLP)  inpatient rehabilitation facility;anticipate therapy at next level of care  -SA      Recorded by [SA] Fley Gage MS CCC-SLP 06/05/20 2152        User Key  (r) = Recorded By, (t) = Taken By, (c) = Cosigned By    Initials Name Effective Dates Discipline    GG Cyndi Joy RN 06/16/16 -  Nurse    Toña Handy RN 06/14/19 -  Nurse    Casandra Brown, OT 01/29/20 -  OT    Fely Sharpe MS CCC-SLP 03/16/20 -  SLP           Outcome Summary  Outcome Summary/Treatment Plan (SLP)  Daily Summary of Progress (SLP): progress toward functional goals as expected (06/05/20 1535 : Fely Gage, MS CCC-SLP)  Plan for Continued Treatment (SLP): Continue to follow for speech/language dx tx and dysphagia tx. Pt w/ incr'd lethargy. Immediate cough after ice chip x1. Will continue to follow to address current plan of care.  (06/05/20 1535 : Fely Gage, MS CCC-SLP)  Anticipated Dischage Disposition (SLP): inpatient rehabilitation facility, anticipate therapy at next level of care (06/05/20 1535 : Fely Gage, MS [facility]-SLP)      SLP GOALS     Row Name 06/05/20 1535 06/03/20 1415          Oral Nutrition/Hydration Goal 1 (SLP)    Oral Nutrition/Hydration Goal 1, SLP  LTG: Pt will return to regular diet & thin liquids w/ 100% accuracy w/o cues  -SA  LTG: Pt will return to regular diet & thin liquids w/ 100% accuracy w/o cues  -VO     Time Frame (Oral Nutrition/Hydration Goal 1, SLP)  by discharge  -SA  by discharge  -VO     Progress/Outcomes (Oral Nutrition/Hydration Goal 1, SLP)  continuing progress toward goal  -SA  continuing progress toward goal  -VO        Oral Nutrition/Hydration Goal 2 (SLP)    Oral Nutrition/Hydration Goal 2, SLP  Pt will tolerate therapeutic trials of thin H2O w/ no overt s/s of aspiration w/ 60% accuracy w/o cues to indicate readiness for repeat instrumental evaluation  -SA  Pt will tolerate therapeutic trials of thin H2O w/ no overt s/s of aspiration w/ 60% accuracy w/o cues to indicate readiness for repeat instrumental evaluation  -VO     Time Frame (Oral Nutrition/Hydration Goal 2, SLP)  short term goal (STG);by discharge  -SA  short term goal (STG);by discharge  -VO     Barriers (Oral Nutrition/Hydration Goal 2, SLP)  Immediate cough w/ ice chip   -SA  delayed cough and multiple swallows  -VO     Progress/Outcomes (Oral Nutrition/Hydration Goal 2, SLP)  continuing progress toward goal  -SA   continuing progress toward goal  -VO        Labial Strengthening Goal 1 (SLP)    Activity (Labial Strengthening Goal 1, SLP)  increase labial tone  -SA  increase labial tone  -VO     Increase Labial Tone  labial resistance exercises  -SA  labial resistance exercises  -VO     [place]/Accuracy (Labial Strengthening Goal 1, SLP)  with minimal cues (75-90% accuracy)  -SA  with minimal cues (75-90% accuracy)  -VO     Time Frame (Labial Strengthening Goal 1, SLP)  short term goal (STG);by discharge  -SA  short term goal (STG);by discharge  -VO     Progress/Outcomes (Labial Strengthening Goal 1, SLP)  goal ongoing  -SA  continuing progress toward goal  -VO        Lingual Strengthening Goal 1 (SLP)    Activity (Lingual Strengthening Goal 1, SLP)  increase lingual tone/sensation/control/coordination/movement;increase tongue back strength  -SA  increase lingual tone/sensation/control/coordination/movement;increase tongue back strength  -VO     Increase Lingual Tone/Sensation/Control/Coordination/Movement  lingual movement exercises  -SA  lingual movement exercises  -VO     Increase Tongue Back Strength  lingual resistance exercises  -SA  lingual resistance exercises  -VO     [place]/Accuracy (Lingual Strengthening Goal 1, SLP)  with minimal cues (75-90% accuracy)  -SA  with minimal cues (75-90% accuracy)  -VO     Time Frame (Lingual Strengthening Goal 1, SLP)  short term goal (STG);by discharge  -SA  short term goal (STG);by discharge  -VO     Progress/Outcomes (Lingual Strengthening Goal 1, SLP)  goal ongoing  -SA  continuing progress toward goal  -VO        Pharyngeal Strengthening Exercise Goal 1 (SLP)    Activity (Pharyngeal Strengthening Goal 1, SLP)  increase timing;increase superior movement of the hyolaryngeal complex;increase anterior movement of the hyolaryngeal complex;increase closure at entrance to airway/closure of airway at glottis;increase squeeze/positive pressure generation;increase tongue base  retraction  -SA  increase timing;increase superior movement of the hyolaryngeal complex;increase anterior movement of the hyolaryngeal complex;increase closure at entrance to airway/closure of airway at glottis;increase squeeze/positive pressure generation;increase tongue base retraction  -VO     Increase Timing  prepping - 3 second prep or suck swallow or 3-step swallow  -SA  prepping - 3 second prep or suck swallow or 3-step swallow  -VO     Increase Superior Movement of the Hyolaryngeal Complex  super-supraglottic swallow  -SA  super-supraglottic swallow  -VO     Increase Anterior Movement of the Hyolaryngeal Complex  chin tuck against resistance (CTAR)  -SA  chin tuck against resistance (CTAR)  -VO     Increase Closure at Entrance to Airway/Closure of Airway at Glottis  super-supraglottic swallow  -SA  super-supraglottic swallow  -VO     Increase Squeeze/Positive Pressure Generation  effortful pitch glide (falsetto + pharyngeal squeeze)  -SA  effortful pitch glide (falsetto + pharyngeal squeeze)  -VO     Increase Tongue Base Retraction  hard effortful swallow  -SA  hard effortful swallow  -VO     [place]/Accuracy (Pharyngeal Strengthening Goal 1, SLP)  with minimal cues (75-90% accuracy)  -SA  with minimal cues (75-90% accuracy)  -VO     Time Frame (Pharyngeal Strengthening Goal 1, SLP)  short term goal (STG);by discharge  -SA  short term goal (STG);by discharge  -VO     Barriers (Pharyngeal Strengthening Goal 1, SLP)  Able to do some effortful swallows w/ max cues. Pt super lethargic   -SA  focused on simple excs that could be completed w/ ice chips after oral care.  -VO     Progress/Outcomes (Pharyngeal Strengthening Goal 1, SLP)  continuing progress toward goal  -SA  continuing progress toward goal  -VO        Articulation Goal 1 (SLP)    Improve Articulation Goal 1 (SLP)  by over-articulating at phrase level;80%;with minimal cues (75-90%)  -SA  --     Time Frame (Articulation Goal 1, SLP)  short term  goal (STG)  -SA  --     Progress/Outcomes (Articulation Goal 1, SLP)  goal ongoing  -SA  --        Attention Goal 1 (SLP)    Improve Attention by Goal 1 (SLP)  looking at speaker;80%;with minimal cues (75-90%)  -SA  --     Time Frame (Attention Goal 1, SLP)  short term goal (STG);by discharge  -SA  --     Progress/Outcomes (Attention Goal 1, SLP)  goal ongoing  -SA  --        Additional Goal 1 (SLP)    Additional Goal 1, SLP  LTG: Pt will improve cognitive-communication skills in order to participate in care in hospital setting for 100% of opportunities w/o cues.  -SA  --     Time Frame (Additional Goal 1, SLP)  by discharge  -SA  --     Progress/Outcomes (Additional Goal 1, SLP)  continuing progress toward goal  -SA  --       User Key  (r) = Recorded By, (t) = Taken By, (c) = Cosigned By    Initials Name Provider Type    Nuha Trevino MA,CCC-SLP Speech and Language Pathologist    Fely Sharpe MS CCC-SLP Speech and Language Pathologist          EDUCATION  The patient has been educated in the following areas:   Dysphagia (Swallowing Impairment).    SLP Recommendation and Plan  Daily Summary of Progress (SLP): progress toward functional goals as expected     Plan for Continued Treatment (SLP): Continue to follow for speech/language dx tx and dysphagia tx. Pt w/ incr'd lethargy. Immediate cough after ice chip x1. Will continue to follow to address current plan of care.   Anticipated Dischage Disposition (SLP): inpatient rehabilitation facility, anticipate therapy at next level of care                    Time Calculation:   Time Calculation- SLP     Row Name 06/05/20 1601             Time Calculation- SLP    SLP Start Time  1535  -      SLP Received On  06/05/20  -        User Key  (r) = Recorded By, (t) = Taken By, (c) = Cosigned By    Initials Name Provider Type    Fely Sharpe MS CCC-SLP Speech and Language Pathologist          Therapy Charges for Today     Code Description Service  Date Service Provider Modifiers Qty    [id number]  ST TREATMENT SWALLOW 3 6/5/2020 Fely Gage, MS CCC-SLP GN 1                 Fely Gage MS CCC-SLP  6/5/2020

## 2020-06-06 ENCOUNTER — APPOINTMENT (OUTPATIENT)
Dept: CT IMAGING | Facility: HOSPITAL | Age: 65
End: 2020-06-06

## 2020-06-06 LAB
ANION GAP SERPL CALCULATED.3IONS-SCNC: 12 MMOL/L (ref 5–15)
BACTERIA SPEC AEROBE CULT: NORMAL
BUN BLD-MCNC: 18 MG/DL (ref 8–23)
BUN/CREAT SERPL: 24 (ref 7–25)
CALCIUM SPEC-SCNC: 8.4 MG/DL (ref 8.6–10.5)
CHLORIDE SERPL-SCNC: 102 MMOL/L (ref 98–107)
CO2 SERPL-SCNC: 24 MMOL/L (ref 22–29)
CREAT BLD-MCNC: 0.75 MG/DL (ref 0.57–1)
DEPRECATED RDW RBC AUTO: 53.8 FL (ref 37–54)
ERYTHROCYTE [DISTWIDTH] IN BLOOD BY AUTOMATED COUNT: 19 % (ref 12.3–15.4)
GFR SERPL CREATININE-BSD FRML MDRD: 77 ML/MIN/1.73
GLUCOSE BLD-MCNC: 122 MG/DL (ref 65–99)
GLUCOSE BLDC GLUCOMTR-MCNC: 114 MG/DL (ref 70–130)
GLUCOSE BLDC GLUCOMTR-MCNC: 115 MG/DL (ref 70–130)
GLUCOSE BLDC GLUCOMTR-MCNC: 135 MG/DL (ref 70–130)
HCT VFR BLD AUTO: 38.3 % (ref 34–46.6)
HGB BLD-MCNC: 10.7 G/DL (ref 12–15.9)
MCH RBC QN AUTO: 22.6 PG (ref 26.6–33)
MCHC RBC AUTO-ENTMCNC: 27.9 G/DL (ref 31.5–35.7)
MCV RBC AUTO: 81 FL (ref 79–97)
PLATELET # BLD AUTO: 295 10*3/MM3 (ref 140–450)
PMV BLD AUTO: 11.2 FL (ref 6–12)
POTASSIUM BLD-SCNC: 4.5 MMOL/L (ref 3.5–5.2)
RBC # BLD AUTO: 4.73 10*6/MM3 (ref 3.77–5.28)
SODIUM BLD-SCNC: 138 MMOL/L (ref 136–145)
UFH PPP CHRO-ACNC: 0.71 IU/ML (ref 0.3–0.7)
WBC NRBC COR # BLD: 12.9 10*3/MM3 (ref 3.4–10.8)

## 2020-06-06 PROCEDURE — 70450 CT HEAD/BRAIN W/O DYE: CPT

## 2020-06-06 PROCEDURE — 99233 SBSQ HOSP IP/OBS HIGH 50: CPT | Performed by: INTERNAL MEDICINE

## 2020-06-06 PROCEDURE — 25010000002 HEPARIN (PORCINE) 25000-0.45 UT/250ML-% SOLUTION

## 2020-06-06 PROCEDURE — 80048 BASIC METABOLIC PNL TOTAL CA: CPT | Performed by: INTERNAL MEDICINE

## 2020-06-06 PROCEDURE — 85520 HEPARIN ASSAY: CPT

## 2020-06-06 PROCEDURE — 85027 COMPLETE CBC AUTOMATED: CPT | Performed by: INTERNAL MEDICINE

## 2020-06-06 PROCEDURE — 99232 SBSQ HOSP IP/OBS MODERATE 35: CPT | Performed by: CLINICAL NURSE SPECIALIST

## 2020-06-06 PROCEDURE — 25010000002 VANCOMYCIN 10 G RECONSTITUTED SOLUTION: Performed by: INTERNAL MEDICINE

## 2020-06-06 PROCEDURE — 25010000002 PIPERACILLIN SOD-TAZOBACTAM PER 1 G: Performed by: INTERNAL MEDICINE

## 2020-06-06 PROCEDURE — 82962 GLUCOSE BLOOD TEST: CPT

## 2020-06-06 PROCEDURE — 25010000002 ONDANSETRON PER 1 MG: Performed by: INTERNAL MEDICINE

## 2020-06-06 RX ORDER — DIPHENOXYLATE HYDROCHLORIDE AND ATROPINE SULFATE 2.5; .025 MG/1; MG/1
1 TABLET ORAL EVERY 8 HOURS PRN
Status: DISCONTINUED | OUTPATIENT
Start: 2020-06-06 | End: 2020-06-18 | Stop reason: HOSPADM

## 2020-06-06 RX ADMIN — HEPARIN SODIUM 15 UNITS/KG/HR: 10000 INJECTION, SOLUTION INTRAVENOUS at 06:09

## 2020-06-06 RX ADMIN — ACETAMINOPHEN ORAL SOLUTION 650 MG: 650 SOLUTION ORAL at 12:50

## 2020-06-06 RX ADMIN — TAZOBACTAM SODIUM AND PIPERACILLIN SODIUM 3.38 G: 375; 3 INJECTION, SOLUTION INTRAVENOUS at 16:50

## 2020-06-06 RX ADMIN — TAZOBACTAM SODIUM AND PIPERACILLIN SODIUM 3.38 G: 375; 3 INJECTION, SOLUTION INTRAVENOUS at 01:34

## 2020-06-06 RX ADMIN — AMLODIPINE BESYLATE 5 MG: 5 TABLET ORAL at 09:58

## 2020-06-06 RX ADMIN — ACETAMINOPHEN ORAL SOLUTION 649.6 MG: 650 SOLUTION ORAL at 05:59

## 2020-06-06 RX ADMIN — METOPROLOL TARTRATE 50 MG: 50 TABLET, FILM COATED ORAL at 21:18

## 2020-06-06 RX ADMIN — ONDANSETRON 4 MG: 2 INJECTION INTRAMUSCULAR; INTRAVENOUS at 04:08

## 2020-06-06 RX ADMIN — DIPHENOXYLATE HYDROCHLORIDE AND ATROPINE SULFATE 1 TABLET: 2.5; .025 TABLET ORAL at 09:51

## 2020-06-06 RX ADMIN — ASPIRIN 81 MG 81 MG: 81 TABLET ORAL at 09:51

## 2020-06-06 RX ADMIN — APIXABAN 5 MG: 5 TABLET, FILM COATED ORAL at 10:07

## 2020-06-06 RX ADMIN — DIPHENOXYLATE HYDROCHLORIDE AND ATROPINE SULFATE 1 TABLET: 2.5; .025 TABLET ORAL at 21:18

## 2020-06-06 RX ADMIN — VANCOMYCIN HYDROCHLORIDE 1250 MG: 10 INJECTION, POWDER, LYOPHILIZED, FOR SOLUTION INTRAVENOUS at 05:59

## 2020-06-06 RX ADMIN — CHOLESTYRAMINE 4 G: 4 POWDER, FOR SUSPENSION ORAL at 09:51

## 2020-06-06 RX ADMIN — TAZOBACTAM SODIUM AND PIPERACILLIN SODIUM 3.38 G: 375; 3 INJECTION, SOLUTION INTRAVENOUS at 09:51

## 2020-06-06 RX ADMIN — ONDANSETRON 4 MG: 2 INJECTION INTRAMUSCULAR; INTRAVENOUS at 09:51

## 2020-06-06 RX ADMIN — ATORVASTATIN CALCIUM 80 MG: 40 TABLET, FILM COATED ORAL at 21:18

## 2020-06-06 RX ADMIN — METOPROLOL TARTRATE 50 MG: 50 TABLET, FILM COATED ORAL at 09:51

## 2020-06-06 RX ADMIN — ONDANSETRON 4 MG: 2 INJECTION INTRAMUSCULAR; INTRAVENOUS at 16:57

## 2020-06-06 RX ADMIN — VANCOMYCIN HYDROCHLORIDE 1250 MG: 10 INJECTION, POWDER, LYOPHILIZED, FOR SOLUTION INTRAVENOUS at 20:46

## 2020-06-06 RX ADMIN — FAMOTIDINE 20 MG: 10 INJECTION INTRAVENOUS at 21:18

## 2020-06-06 RX ADMIN — FAMOTIDINE 20 MG: 10 INJECTION INTRAVENOUS at 09:52

## 2020-06-06 RX ADMIN — APIXABAN 5 MG: 5 TABLET, FILM COATED ORAL at 21:18

## 2020-06-06 RX ADMIN — SIMETHICONE 40 MG: 20 SUSPENSION/ DROPS ORAL at 18:35

## 2020-06-06 NOTE — PROGRESS NOTES
Stroke Progress Note       Chief Complaint:  Left-sided weakness, dysarthria    Subjective     Subjective:  Per nursing staff patient was having some delusional thoughts last pm but did not require any medication.  She is resting quietly this am but opens her eyes and talks when her daughter walks in the room. She complains of being fatigued.      Review of Systems   HENT: Negative for congestion, drooling and ear discharge.    Eyes: Positive for visual disturbance. Negative for photophobia and discharge.   Respiratory: Negative for shortness of breath.    Cardiovascular: Negative for chest pain and leg swelling.        Sustained atrial fibrillation   Gastrointestinal: Negative for abdominal pain, diarrhea and nausea.   Endocrine: Negative.    Genitourinary: Negative.    Musculoskeletal: Negative.    Skin: Negative.    Neurological: Positive for facial asymmetry, speech difficulty and weakness.        Objective      Temp:  [98 °F (36.7 °C)-99.3 °F (37.4 °C)] 98 °F (36.7 °C)  Heart Rate:  [] 96  Resp:  [18-20] 18  BP: (112-142)/() 117/71    Neurological Exam  Mental Status  Arousable to verbal stimuli. Oriented only to person and place. Moderate dysarthria present. Language is fluent with no aphasia. Attention and concentration: Decreased. Fund of knowledge is appropriate for level of education.    Cranial Nerves  CN II: Visual fields full to confrontation.  CN III, IV, VI: Extraocular movements intact bilaterally. Pupils equal round and reactive to light bilaterally. Partial gaze palsy with right gaze preference, unable to move eyes beyond midline on left.  CN V: Facial sensation is normal.  CN VII:  Right: There is no facial weakness.  Left: There is central facial weakness.  CN VIII: Hearing is normal.  CN IX, X: Palate elevates symmetrically  CN XI: Shoulder shrug strength is normal.  CN XII: Tongue midline without atrophy or fasciculations.    Motor  Normal muscle bulk throughout. No fasciculations  present. Right hemiparesis.  Left upper extremity is 2/5, left lower extremity is 3/5  Right upper extremity/right lower extremity spontaneous movement 5/5.    Sensory  Sensation: Was withdrawing to pain in left upper and lower extremity.     Coordination  Finger-to-nose, rapid alternating movements and heel-to-shin normal bilaterally without dysmetria.    Gait  Not assessed.      Physical Exam   Constitutional: She is oriented to person, place, and time. She appears well-developed and well-nourished.   HENT:   Head: Normocephalic and atraumatic.   Eyes: Pupils are equal, round, and reactive to light. EOM are normal.   Cardiovascular:   Sustained atrial fibrillation    Pulmonary/Chest: Effort normal and breath sounds normal.   Abdominal: Soft. She exhibits no distension.   Neurological: She is oriented to person, place, and time. Coordination normal. GCS eye subscore is 4. GCS verbal subscore is 5. GCS motor subscore is 6.   Drowsy, arousable to verbal stimuli   Skin: Skin is warm and dry.   Psychiatric: She has a normal mood and affect. Her behavior is normal.   Nursing note and vitals reviewed.    Nursing note and vitals reviewed.    Results Review:    I reviewed the patient's new clinical results.      Ct Head Without Contrast    Result Date: 6/4/2020  Evolving right MCA infarcts in the anterior and posterior watershed regions as described, without significant interval extension of infarct territory. No evidence of hemorrhage, new infarct or other new acute intracranial disease is appreciated  D:  06/04/2020 E:  06/04/2020          Results for orders placed during the hospital encounter of 05/31/20   Adult Transthoracic Echo Complete W/ Cont if Necessary Per Protocol    Narrative · Left ventricular systolic function is normal. Estimated EF appears to be   in the range of 56 - 60%.  · Normal right ventricular cavity size and systolic function noted.  · Left atrial cavity size is moderately dilated. Left atrial  volume is   moderately increased.  · No evidence of a patent foramen ovale. Saline test results are negative.  · The aortic valve is abnormal in structure. The valve exhibits sclerosis.   There is mild calcification of the aortic valve  · Mild aortic valve stenosis is present            Assessment/Plan         Assessment/Plan:        65-year-old right-handed white female with known diagnosis of hypertension, hyperlipidemia, persistent atrial fibrillation, on Eliquis, who has been admitted with right MCA stroke.  On arrival, thrombectomy was attempted (was unsuccessful), but intra-arterial TPA was given.  Patient was not a candidate for TPA secondary to being on Eliquis.            1. Right MCA stroke.  Likely cardioembolic, given her being in persistent A. fib.  Patient had improved three days ago, with being more awake and moving her left side better.  But on 6/4/2020  patient has got worse with being drowsy and worsening dysarthria and left sided weakness.  She was then seen emergently and a stat  CT head was done that showed no hemorrhage.  It does show evolving right MCA stroke, which is stable compared to her last scan.  Likely reason for her worsening was secondary to medication.  Therefore, Seroquel was discontinued. Apparently according to nurse she continues to have delusions but is not agitated. Repeat head CT this am was negative for hemorrhage.      2. Persistent atrial fibrillation.   Patient is being followed by cardiology.  As head CT was negative will d/c heparin drip and transition patient to Eliquis 5mg BID.   3. Essential hypertension.  Normal blood pressure goals.  Systolic goal blood pressure 100-140.  4. Mixed hyperlipidemia.   Continue Lipitor 80 mg daily.    5. Activity - Ok to resume PT/OT.  6. Diet - SLP following; Keofeed in place.  Speech therapy will continue to follow, and if she does not pass swallowing may need PEG placement.     Case was discussed with the patient's daughter who is  at bedside and nursing. We will continue to follow her.        Lety Reis, ISSA  06/06/20  12:45

## 2020-06-06 NOTE — PROGRESS NOTES
[facility] Medicine Services  PROGRESS NOTE    Patient Name: Sidra Lane  : 1955  MRN: [id number]    Date of Admission: 2020  Primary Care Physician: Jl Mcknight MD    Subjective   Subjective     CC:  Follow-up acute CVA    HPI:  Pt doing okay today, more alert. SOA has improved.  Denies any issues overnight other than persistent diarrhea.     Review of Systems  Gen- No fevers, chills  CV- No chest pain, palpitations  Resp- No cough, dyspnea  GI- + diarrhea, + abdominal pain      Objective   Objective     Vital Signs:   Temp:  [98 °F (36.7 °C)-99.3 °F (37.4 °C)] 98.2 °F (36.8 °C)  Heart Rate:  [110-138] 110  Resp:  [18-24] 18  BP: (112-142)/() 112/72  Total (NIH Stroke Scale): 11     Physical Exam:  General Assessment: No acute cardiopulmonary distress. Well developed and well nourished.    HEENT: NCAT, PERRL, MM moist    Neck: Supple, no carotid bruit bilat    CVS: RRR, S1S2 normal, no murmurs    Resp: Exam limited to anterior aspect, relatively clear overall, breathing comfortably today on 4L BNC    Abd: soft, NT, ND, normal BS, no guarding or peritoneal signs    Ext: No edema, both calves are symmetric and NTTP    Neuro: Patient is lethargic, arousable, attempting to answer some simple questions, however speech is very dysarthric.  Patient attempts to follow commands.  She does move her right upper extremity, right lower extremity, and left lower extremity spontaneously; left upper extremity is flaccid.    Skin: W/D/I. No rash.    Psych: flat affect      Results Reviewed:  Results from last 7 days   Lab Units 20  0304 20  0715 20  1501   WBC 10*3/mm3 12.90* 15.26* 11.85*   HEMOGLOBIN g/dL 10.7* 11.1* 11.9*   HEMATOCRIT % 38.3 40.3 43.1   PLATELETS 10*3/mm3 295 319 263   INR   --   --  1.02     Results from last 7 days   Lab Units 20  0304 20  0715 20  0518 20  2303  20  0430  20  1517   SODIUM mmol/L  138 132* 136  --    < > 137  137   < >  --    POTASSIUM mmol/L 4.5 3.6 4.4  --    < > 4.5  4.4   < >  --    CHLORIDE mmol/L 102 94* 97*  --    < > 101  100   < >  --    CO2 mmol/L 24.0 22.0 22.0  --    < > 17.0*  22.0   < >  --    BUN mg/dL 18 21 12  --    < > 12  12   < >  --    CREATININE mg/dL 0.75 0.81 0.68  --    < > 0.78  0.69   < >  --    GLUCOSE mg/dL 122* 156* 120*  --    < > 145*  142*   < >  --    CALCIUM mg/dL 8.4* 9.2 9.3  --    < > 8.6  8.6   < >  --    ALT (SGPT) U/L  --   --   --   --   --  19  --  19   AST (SGOT) U/L  --   --   --   --   --  38*  --  31   TROPONIN T ng/mL  --   --  <0.010 <0.010  --   --   --  <0.010    < > = values in this interval not displayed.     Estimated Creatinine Clearance: 77.9 mL/min (by C-G formula based on SCr of 0.75 mg/dL).    Microbiology Results Abnormal     Procedure Component Value - Date/Time    MRSA Screen, PCR (Inpatient) - Swab, Nares [[id number]]  (Abnormal) Collected:  06/05/20 1403    Lab Status:  Final result Specimen:  Swab from Nares Updated:  06/05/20 1528     MRSA PCR Positive    Blood Culture - Blood, Hand, Left [[id number]] Collected:  06/04/20 1406    Lab Status:  Preliminary result Specimen:  Blood from Hand, Left Updated:  06/05/20 1501     Blood Culture No growth at 24 hours    Blood Culture - Blood, Hand, Right [[id number]] Collected:  06/04/20 1400    Lab Status:  Preliminary result Specimen:  Blood from Hand, Right Updated:  06/05/20 1501     Blood Culture No growth at 24 hours    COVID-19,CEPHEID,AMBERLY IN-HOUSE(OR EMERGENT/ADD-ON),NP SWAB IN TRANSPORT MEDIA 3-4 HR TAT - Swab, Nasopharynx [[id number]]  (Normal) Collected:  05/31/20 1533    Lab Status:  Final result Specimen:  Swab from Nasopharynx Updated:  05/31/20 1638     COVID19 Not Detected          Imaging Results (Last 24 Hours)     Procedure Component Value Units Date/Time    CT Head Without Contrast [[id number]] Resulted:  06/06/20 0816     Updated:  06/06/20 0833    CT Head Without  Contrast [[id number]] Collected:  06/04/20 0934     Updated:  06/05/20 2239    Narrative:       EXAMINATION: CT HEAD WO CONTRAST-      INDICATION: Cerebral hemorrhage suspected; I63.9-Cerebral infarction,  unspecified; R13.12-Dysphagia, oropharyngeal phase; R47.1-Dysarthria and  anarthria; R41.841-Cognitive communication deficit.     TECHNIQUE: 5 mm unenhanced images through the brain.     The radiation dose reduction device was turned on for each scan per the  ALARA (As Low as Reasonably Achievable) protocol.     COMPARISON: 06/01/2020 head CT scan.     FINDINGS: Evolving right MCA territory infarcts are seen, the 4 cm area  of edema in the anterior, watershed region showing decreased  attenuation, but no significant interval enlargement. The more patchy,  posterior right watershed region infarct is similar, extending over a  roughly 2 cm area, but lower in density than on the prior scan typical  of evolving infarct. No clearly new area of infarction is identified.  There is no evidence of intracranial hemorrhage, no evidence of mass or  significant mass effect. Ventricles are normal in size. No abnormal  extraaxial collection is seen.       Impression:       Evolving right MCA infarcts in the anterior and posterior  watershed regions as described, without significant interval extension  of infarct territory. No evidence of hemorrhage, new infarct or other  new acute intracranial disease is appreciated     D:  06/04/2020  E:  06/04/2020     This report was finalized on 6/5/2020 10:36 PM by Dr. Ervin Troncoso MD.       XR Chest 1 View [[id number]] Collected:  06/05/20 1020     Updated:  06/05/20 1644    Narrative:       EXAMINATION: XR CHEST 1 VW-      INDICATION: Shortness of breath; I63.9-Cerebral infarction, unspecified;  R13.12-Dysphagia, oropharyngeal phase; R47.1-Dysarthria and anarthria;  R41.841-Cognitive communication deficit,      COMPARISON: 06/04/2020.     FINDINGS: Portable chest reveals cardiac silhouette to  be enlarged.  Prominence of the pulmonary vascularity. Degenerative change is seen  within the spine. There is a small left pleural effusion. Upper lung  fields are clear. Feeding tube identified above the level of the  diaphragm.           Impression:       Heart is enlarged with slight prominence of the pulmonary  vascularity. Findings are stable.     D:  06/05/2020  E:  06/05/2020     This report was finalized on 6/5/2020 4:41 PM by Dr. Chastity Rico MD.             Results for orders placed during the hospital encounter of 05/31/20   Adult Transthoracic Echo Complete W/ Cont if Necessary Per Protocol    Narrative · Left ventricular systolic function is normal. Estimated EF appears to be   in the range of 56 - 60%.  · Normal right ventricular cavity size and systolic function noted.  · Left atrial cavity size is moderately dilated. Left atrial volume is   moderately increased.  · No evidence of a patent foramen ovale. Saline test results are negative.  · The aortic valve is abnormal in structure. The valve exhibits sclerosis.   There is mild calcification of the aortic valve  · Mild aortic valve stenosis is present          I have reviewed the medications:  Scheduled Meds:    [START ON 6/7/2020] !Vancomycin Level Draw Needed  Does not apply Once   amLODIPine 5 mg Oral Daily   aspirin 81 mg Oral Daily   atorvastatin 80 mg Oral Nightly   cholestyramine light 1 packet Oral Daily   diphenoxylate-atropine 1 tablet Oral BID   famotidine 20 mg Intravenous Q12H   lisinopril 10 mg Oral Q PM   metoprolol tartrate 50 mg Oral Q12H   piperacillin-tazobactam 3.375 g Intravenous Q8H   sodium chloride 10 mL Intravenous Q12H   vancomycin 12 mg/kg Intravenous Q12H     Continuous Infusions:    heparin 15 Units/kg/hr Last Rate: 15 Units/kg/hr (06/06/20 0609)   Pharmacy to Dose Heparin     Pharmacy to dose vancomycin       PRN Meds:.acetaminophen  •  acetaminophen  •  butalbital-acetaminophen-caffeine  •  enalaprilat  •   metoprolol tartrate  •  nitroglycerin  •  ondansetron  •  Pharmacy to Dose Heparin  •  Pharmacy to dose vancomycin  •  potassium chloride **OR** potassium chloride **OR** potassium chloride  •  simethicone  •  sodium chloride  •  sodium chloride    Assessment/Plan   Assessment & Plan     Active Hospital Problems    Diagnosis  POA   • **Cerebrovascular accident (CVA) due to embolism of right middle cerebral artery (CMS/HCC) [I63.411]  Yes   • Acute ischemic stroke (CMS/HCC) [I63.9]  Yes   • Atrial fibrillation (CMS/HCC) [I48.91]  Yes      Resolved Hospital Problems   No resolved problems to display.        Brief Hospital Course to date:  Sidra Lane is a 65 y.o. 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  She was found to have a right MCA territory reversible ischemia on CT perfusion.  She underwent a CT head neck angiogram with an intra-arterial TPA after failed thrombectomy and admitted to the ICU afterward.  She was transferred out of ICU on 6/3 with hospitalist assuming care on 6/4.    Right MCA stroke  - likely cardioembolic.  Patient has a history of persistent atrial fibrillation and had been anticoagulated with Eliquis.  She had been followed by her electrophysiologist and had plans for an ablation prior to readmission.  -Again she is status post intra-arterial TPA after failed thrombectomy  -She had some delirium while in the ICU and Seroquel 50 mg nightly was given to her  -Stat CT brain was performed on 6/4 due to worsening left sided weakness and acute hemorrhagic transformation was ruled out, but does still reporting evolving MCA CVA that was stable from the previous exam.  -The reasons for her worsening symptoms may be related to Seroquel.  Dr. Vaughan has recommended to hold Seroquel for now, stopped PRN Seroquel.   -Patient is on heparin drip (therapeutic),  managed by Dr. Vaughan. Plan for repeat CT head today and, if stable, will transition to PO anticoagulation.   -EP also following, resumed Metoprolol PO  -Failed modified barium swallow and was started on tube feeding, will continue for now    Persistent atrial fibrillation with RVR  -Patient had been on beta-blocker per her cardiologist and had plans for ablation prior to admission  -Currently she is not a candidate for an ablation  -We will defer management to her EP  --getting PO Metoprolol    Hypertension  -Not well controlled, initially was allowed for permissive hypertension due to acute stroke.  Currently Dr. Vaughan recommends better control of her blood pressure and bring it down to normotensive.  -I have resumed some of patient's home medications and hope to bring it down slowly. Beta blocker resumed by Cards.     SIRS  -Possible aspiration when patient pulled out her Keofeed tube  --CXR unremarkable on 6/4 and 6/5  --now afebrile, added Vanc/Zosyn on 6/5 due to worsening leukocytosis with left shift. MRSA PCR +, so continue Vanc.  Will also continue Zosyn given concern for possible aspiration.  Can likely de-escalate tomorrow if leukocytosis continues to improve and if remains afebrile.     Hyperlipidemia  -High intensity statin therapy    Left-sided weakness  -PT OT, will benefit from short-term rehab once she is medically stable    Dysphagia  -Speech pathologist following, currently getting enteral feeding Via Keofeed tube.  Tube feedings at goal.  -PEG tube placement discussed with patient's family by intensivist, currently they want to hold off.    Diarrhea  --apparently this is a chronic issue at home, but has worsened since admission. Pt's weight is down 12 lbs, having multiple loose stools daily.  Per daughter, pt has had diarrhea since partial colectomy in 2016 ([facility]) and has been told she may have Crohn's- has never been on immunosuppression and can't remember with whom she follows  with GI. May consider GI consult on Monday if diarrhea persists.       DVT Prophylaxis: Fully anticoagulated with heparin drip       Disposition:TBD pending further evaluation and response to therapy    CODE STATUS:   Code Status and Medical Interventions:   Ordered at: 05/31/20 1743     Code Status:    CPR     Medical Interventions (Level of Support Prior to Arrest):    Full         Electronically signed by Evelina Banda MD, 06/06/20, 08:44.

## 2020-06-06 NOTE — PLAN OF CARE
Patient is able to answer orientation questions appropriately, but has intermittent confusion. 4L NC, Afib on the monitor with rate into 130's. Will continue to monitor.

## 2020-06-07 ENCOUNTER — APPOINTMENT (OUTPATIENT)
Dept: GENERAL RADIOLOGY | Facility: HOSPITAL | Age: 65
End: 2020-06-07

## 2020-06-07 LAB
ANION GAP SERPL CALCULATED.3IONS-SCNC: 14 MMOL/L (ref 5–15)
BUN BLD-MCNC: 14 MG/DL (ref 8–23)
BUN/CREAT SERPL: 18.4 (ref 7–25)
CALCIUM SPEC-SCNC: 8.9 MG/DL (ref 8.6–10.5)
CHLORIDE SERPL-SCNC: 101 MMOL/L (ref 98–107)
CO2 SERPL-SCNC: 25 MMOL/L (ref 22–29)
CREAT BLD-MCNC: 0.76 MG/DL (ref 0.57–1)
DEPRECATED RDW RBC AUTO: 55.8 FL (ref 37–54)
ERYTHROCYTE [DISTWIDTH] IN BLOOD BY AUTOMATED COUNT: 19.8 % (ref 12.3–15.4)
GFR SERPL CREATININE-BSD FRML MDRD: 76 ML/MIN/1.73
GLUCOSE BLD-MCNC: 111 MG/DL (ref 65–99)
GLUCOSE BLDC GLUCOMTR-MCNC: 118 MG/DL (ref 70–130)
GLUCOSE BLDC GLUCOMTR-MCNC: 120 MG/DL (ref 70–130)
GLUCOSE BLDC GLUCOMTR-MCNC: 121 MG/DL (ref 70–130)
HCT VFR BLD AUTO: 42.3 % (ref 34–46.6)
HGB BLD-MCNC: 11.6 G/DL (ref 12–15.9)
MCH RBC QN AUTO: 22.2 PG (ref 26.6–33)
MCHC RBC AUTO-ENTMCNC: 27.4 G/DL (ref 31.5–35.7)
MCV RBC AUTO: 81 FL (ref 79–97)
PLATELET # BLD AUTO: 354 10*3/MM3 (ref 140–450)
PMV BLD AUTO: 12.5 FL (ref 6–12)
POTASSIUM BLD-SCNC: 4.4 MMOL/L (ref 3.5–5.2)
RBC # BLD AUTO: 5.22 10*6/MM3 (ref 3.77–5.28)
SODIUM BLD-SCNC: 140 MMOL/L (ref 136–145)
VANCOMYCIN TROUGH SERPL-MCNC: 16.3 MCG/ML (ref 5–20)
WBC NRBC COR # BLD: 10.5 10*3/MM3 (ref 3.4–10.8)

## 2020-06-07 PROCEDURE — 80202 ASSAY OF VANCOMYCIN: CPT

## 2020-06-07 PROCEDURE — 25010000002 LORAZEPAM PER 2 MG: Performed by: INTERNAL MEDICINE

## 2020-06-07 PROCEDURE — 99233 SBSQ HOSP IP/OBS HIGH 50: CPT | Performed by: NURSE PRACTITIONER

## 2020-06-07 PROCEDURE — 80048 BASIC METABOLIC PNL TOTAL CA: CPT

## 2020-06-07 PROCEDURE — 25010000002 VANCOMYCIN 10 G RECONSTITUTED SOLUTION

## 2020-06-07 PROCEDURE — 82962 GLUCOSE BLOOD TEST: CPT

## 2020-06-07 PROCEDURE — 74018 RADEX ABDOMEN 1 VIEW: CPT

## 2020-06-07 PROCEDURE — 85027 COMPLETE CBC AUTOMATED: CPT | Performed by: INTERNAL MEDICINE

## 2020-06-07 PROCEDURE — 99233 SBSQ HOSP IP/OBS HIGH 50: CPT | Performed by: INTERNAL MEDICINE

## 2020-06-07 PROCEDURE — 25010000002 PIPERACILLIN SOD-TAZOBACTAM PER 1 G: Performed by: INTERNAL MEDICINE

## 2020-06-07 RX ORDER — FAMOTIDINE 20 MG/1
20 TABLET, FILM COATED ORAL
Status: DISCONTINUED | OUTPATIENT
Start: 2020-06-08 | End: 2020-06-18 | Stop reason: HOSPADM

## 2020-06-07 RX ORDER — AMOXICILLIN AND CLAVULANATE POTASSIUM 875; 125 MG/1; MG/1
1 TABLET, FILM COATED ORAL EVERY 12 HOURS SCHEDULED
Status: COMPLETED | OUTPATIENT
Start: 2020-06-07 | End: 2020-06-11

## 2020-06-07 RX ORDER — LORAZEPAM 2 MG/ML
1 INJECTION INTRAMUSCULAR ONCE
Status: COMPLETED | OUTPATIENT
Start: 2020-06-07 | End: 2020-06-07

## 2020-06-07 RX ORDER — QUETIAPINE FUMARATE 25 MG/1
12.5 TABLET, FILM COATED ORAL NIGHTLY
Status: DISCONTINUED | OUTPATIENT
Start: 2020-06-07 | End: 2020-06-18 | Stop reason: HOSPADM

## 2020-06-07 RX ORDER — AMOXICILLIN AND CLAVULANATE POTASSIUM 562.5; 437.5; 62.5 MG/1; MG/1; MG/1
2 TABLET, FILM COATED, EXTENDED RELEASE ORAL EVERY 12 HOURS SCHEDULED
Status: DISCONTINUED | OUTPATIENT
Start: 2020-06-07 | End: 2020-06-07

## 2020-06-07 RX ADMIN — METOPROLOL TARTRATE 50 MG: 50 TABLET, FILM COATED ORAL at 15:11

## 2020-06-07 RX ADMIN — ATORVASTATIN CALCIUM 80 MG: 40 TABLET, FILM COATED ORAL at 20:42

## 2020-06-07 RX ADMIN — VANCOMYCIN HYDROCHLORIDE 1250 MG: 10 INJECTION, POWDER, LYOPHILIZED, FOR SOLUTION INTRAVENOUS at 11:33

## 2020-06-07 RX ADMIN — TAZOBACTAM SODIUM AND PIPERACILLIN SODIUM 3.38 G: 375; 3 INJECTION, SOLUTION INTRAVENOUS at 00:00

## 2020-06-07 RX ADMIN — METOPROLOL TARTRATE 50 MG: 50 TABLET, FILM COATED ORAL at 20:42

## 2020-06-07 RX ADMIN — SIMETHICONE 40 MG: 20 SUSPENSION/ DROPS ORAL at 15:10

## 2020-06-07 RX ADMIN — APIXABAN 5 MG: 5 TABLET, FILM COATED ORAL at 14:58

## 2020-06-07 RX ADMIN — DIPHENOXYLATE HYDROCHLORIDE AND ATROPINE SULFATE 1 TABLET: 2.5; .025 TABLET ORAL at 14:59

## 2020-06-07 RX ADMIN — METOPROLOL TARTRATE 2.5 MG: 5 INJECTION INTRAVENOUS at 09:09

## 2020-06-07 RX ADMIN — SIMETHICONE 40 MG: 20 SUSPENSION/ DROPS ORAL at 20:44

## 2020-06-07 RX ADMIN — APIXABAN 5 MG: 5 TABLET, FILM COATED ORAL at 20:42

## 2020-06-07 RX ADMIN — LORAZEPAM 1 MG: 2 INJECTION INTRAMUSCULAR; INTRAVENOUS at 08:29

## 2020-06-07 RX ADMIN — METOPROLOL TARTRATE 5 MG: 5 INJECTION INTRAVENOUS at 12:50

## 2020-06-07 RX ADMIN — TAZOBACTAM SODIUM AND PIPERACILLIN SODIUM 3.38 G: 375; 3 INJECTION, SOLUTION INTRAVENOUS at 08:28

## 2020-06-07 RX ADMIN — ACETAMINOPHEN ORAL SOLUTION 650 MG: 650 SOLUTION ORAL at 14:58

## 2020-06-07 RX ADMIN — AMLODIPINE BESYLATE 5 MG: 5 TABLET ORAL at 15:00

## 2020-06-07 RX ADMIN — AMOXICILLIN AND CLAVULANATE POTASSIUM 1 TABLET: 875; 125 TABLET, FILM COATED ORAL at 20:44

## 2020-06-07 RX ADMIN — Medication: at 05:30

## 2020-06-07 RX ADMIN — DIPHENOXYLATE HYDROCHLORIDE AND ATROPINE SULFATE 1 TABLET: 2.5; .025 TABLET ORAL at 20:42

## 2020-06-07 RX ADMIN — AMOXICILLIN AND CLAVULANATE POTASSIUM 1 TABLET: 875; 125 TABLET, FILM COATED ORAL at 15:10

## 2020-06-07 RX ADMIN — ASPIRIN 81 MG 81 MG: 81 TABLET ORAL at 14:59

## 2020-06-07 RX ADMIN — CHOLESTYRAMINE 4 G: 4 POWDER, FOR SUSPENSION ORAL at 15:10

## 2020-06-07 RX ADMIN — FAMOTIDINE 20 MG: 10 INJECTION INTRAVENOUS at 20:41

## 2020-06-07 RX ADMIN — LISINOPRIL 10 MG: 10 TABLET ORAL at 18:41

## 2020-06-07 RX ADMIN — QUETIAPINE FUMARATE 12.5 MG: 25 TABLET ORAL at 20:42

## 2020-06-07 NOTE — PROGRESS NOTES
Stroke Progress Note       Chief Complaint:  Left-sided weakness, dysarthria    Subjective     Subjective:   Patient is laying in bed with eyes closed but restless.  Daughter is at bedside and relates to me that patient has had a rough weekend with delusional talking and sleepless nights.  Seroquel 50 mg was trialed on Thursday which proved to be very sedating for the patient so it was discontinued.  After discussion with daughter we will restart Seroquel at a lower dose of 12.5 mg before bedtime    Review of Systems   Constitutional: Positive for fatigue.   Cardiovascular:        Atrial fibrillation   Endocrine: Negative.    Genitourinary: Negative.    Musculoskeletal: Negative.    Skin: Negative.    Neurological: Positive for facial asymmetry, speech difficulty and weakness.   Psychiatric/Behavioral: Positive for sleep disturbance.        Objective      Temp:  [97.9 °F (36.6 °C)-99.3 °F (37.4 °C)] 98.4 °F (36.9 °C)  Heart Rate:  [110-136] 126  Resp:  [16-18] 16  BP: (108-151)/(84-97) 151/92    Neurological Exam  Mental Status  Drowsy. Oriented only to person and place. Moderate dysarthria present. Language is fluent with no aphasia. Attention and concentration: Decreased.    Cranial Nerves  CN II: Visual fields full to confrontation.  CN III, IV, VI: Extraocular movements intact bilaterally. Pupils equal round and reactive to light bilaterally. Partial gaze palsy with right gaze preference, unable to move eyes beyond midline on left.  CN V: Facial sensation is normal.  CN VII:  Right: There is no facial weakness.  Left: There is central facial weakness.  CN VIII: Hearing is normal.  CN IX, X: Palate elevates symmetrically  CN XI: Shoulder shrug strength is normal.  CN XII: Tongue midline without atrophy or fasciculations.    Motor   No fasciculations present. Normal muscle tone. No abnormal involuntary movements. Right hemiparesis.  Left upper extremity is 3/5, left lower extremity is 3/5  Right upper  extremity/right lower extremity spontaneous movement 5/5.    Sensory  Light touch is normal in upper and lower extremities. Sensation: Hard to assess due to patient participation.     Coordination  Hard to assess due to patient participation.    Gait  Not assessed.      Physical Exam   Constitutional: She is oriented to person, place, and time. She appears well-developed and well-nourished.   HENT:   Head: Normocephalic and atraumatic.   Eyes: Pupils are equal, round, and reactive to light. EOM are normal.   Cardiovascular:   Sustained atrial fibrillation    Pulmonary/Chest: Effort normal and breath sounds normal.   Abdominal: Soft. She exhibits no distension.   Neurological: She is oriented to person, place, and time. GCS eye subscore is 4. GCS verbal subscore is 5. GCS motor subscore is 6.   Drowsy, arousable to verbal stimuli   Skin: Skin is warm and dry.   Psychiatric: She has a normal mood and affect. Her behavior is normal.   Nursing note and vitals reviewed.    Nursing note and vitals reviewed.    Results Review:    I reviewed the patient's new clinical results.      Ct Head Without Contrast    Result Date: 6/4/2020  Evolving right MCA infarcts in the anterior and posterior watershed regions as described, without significant interval extension of infarct territory. No evidence of hemorrhage, new infarct or other new acute intracranial disease is appreciated  D:  06/04/2020 E:  06/04/2020          Results for orders placed during the hospital encounter of 05/31/20   Adult Transthoracic Echo Complete W/ Cont if Necessary Per Protocol    Narrative · Left ventricular systolic function is normal. Estimated EF appears to be   in the range of 56 - 60%.  · Normal right ventricular cavity size and systolic function noted.  · Left atrial cavity size is moderately dilated. Left atrial volume is   moderately increased.  · No evidence of a patent foramen ovale. Saline test results are negative.  · The aortic valve is  abnormal in structure. The valve exhibits sclerosis.   There is mild calcification of the aortic valve  · Mild aortic valve stenosis is present            Assessment/Plan         Assessment/Plan:        65-year-old right-handed white female with known diagnosis of hypertension, hyperlipidemia, persistent atrial fibrillation, on Eliquis, who was admitted with right MCA stroke.  On arrival, thrombectomy was attempted (was unsuccessful), but intra-arterial TPA was given.  Patient was not a candidate for TPA secondary to being on Eliquis.            1. Right MCA stroke.  Likely cardioembolic, given her being in persistent A. fib.  Patient had improved three days ago, with being more awake and moving her left side better.  But on 6/4/2020  patient  got worse with being drowsy and worsening dysarthria and left sided weakness.  She was then seen emergently and a stat  CT head was done that showed no hemorrhage.  It does show evolving right MCA stroke, which is stable compared to her last scan.  Mental status changes attributed to the addition of Seroquel 50 and was subsequently discontinued.  Patient continued to have poor sleep over the weekend and continues to struggle with delusional behaviors.  After discussion with Dr. Alexander Seroquel 12.5 mg before bedtime has been added  2. Persistent atrial fibrillation.   Patient is being followed by cardiology.   d/c heparin drip and transition patient to Eliquis 5mg BID.   3. Essential hypertension.  Normal blood pressure goals.  Systolic goal blood pressure 100-140.  4. Mixed hyperlipidemia.   Continue Lipitor 80 mg daily.    5. Activity - Ok to resume PT/OT.  6. Diet - SLP following; Keofeed in place.  Speech therapy will continue to follow, and if she does not pass swallowing may need PEG placement.  Of note patient pulled out her own NG tube.  Due to her need to consistently be anticoagulated if this is unable to be restarted patient may have to resume heparin drip     Case  was discussed with the patient's daughter who is at bedside and nursing.        Maritza Lamar, ISSA  06/07/20  12:45

## 2020-06-07 NOTE — NURSING NOTE
"  0725- Observed patient with both right arm and leg and half of her abdomen thrown over the side rail on the left side of the bed.  Repositioned patient back on the bed quickly to prevent patient from falling out of bed. Pt states multiple times that she does not like the current bed she is on and wants her old bed back. After repositioning patient back into bed, this nurse acquired a regular bed and placed her previously used waffle on the regular bed.  0825-Staff x4 transferred pt back onto a regular bed after educating both patient and daughter about the higher risk of skin break down and the [facility]. After patient transferred back onto the regular bed, patients states, \"I want a new bed. I told you that I never like this bed.\" Attempts made several times to reorient the patient to her surroundings but was unable to do so at this time.    0912- Pt given metoprolol 2.5mg IVP for - Afib. Patient continues to remain restless and agitated.  She is pulling off her gown and her oxygen.  Daughter remains present at bedside. Daughter asking \"why don't you just restrain her to prevent her pulling out her feeding tube.\"  Explained that patient was just restless last night (per night shift report). Pt removed tube this morning between 0630 and 0700.  Educated daughter on restraint use and obtaining orders to restrain a patient. Educated daughter Kaye about the difference between the patient being just restless versus pulling out lines and tubes.  Educated Kaye that currently the patient is actively pulling off oxygen tubing and removing her telemetry wires. See restraint documentation.      "

## 2020-06-07 NOTE — NURSING NOTE
ENTERED PATIENTS ROOM AND SHE WAS PULLING KEOFEED OUT. NO S/S OF ASPIRATION NOTED. NOTED APRN NO ANSWER. WILL PASS ON TO DAYSHIFT NURSE.

## 2020-06-07 NOTE — PROGRESS NOTES
[facility] Medicine Services  PROGRESS NOTE    Patient Name: Sidra Lane  : 1955  MRN: [id number]    Date of Admission: 2020  Primary Care Physician: Jl Mcknight MD    Subjective   Subjective     CC:  Follow-up acute CVA    HPI:  Pt did not sleep overnight, very confused and pulling at lines. Pulled out Keofeed again this am.  Daughter at bedside by the time of my evaluation and pt in restraints.     Review of Systems  Gen- No fevers, chills  CV- No chest pain, palpitations  Resp- No cough, dyspnea  GI- + diarrhea, + abdominal pain      Objective   Objective     Vital Signs:   Temp:  [97.9 °F (36.6 °C)-98.8 °F (37.1 °C)] 98.3 °F (36.8 °C)  Heart Rate:  [] 126  Resp:  [18] 18  BP: (108-122)/(71-93) 108/92  Total (NIH Stroke Scale): 9     Physical Exam:  General Assessment: No acute cardiopulmonary distress. Well developed and well nourished.    HEENT: NCAT, PERRL, MM moist    Neck: Supple, no carotid bruit bilat    CVS: RRR, S1S2 normal, no murmurs    Resp: Exam limited to anterior aspect, relatively clear overall, breathing comfortably today on 4L BNC    Abd: soft, NT, ND, normal BS, no guarding or peritoneal signs    Ext: No edema, both calves are symmetric and NTTP    Neuro: Patient is lethargic, arousable, attempting to answer some simple questions, however speech is very dysarthric. Pt confused and does not participate in exam this am.     Skin: W/D/I. No rash.    Psych: flat affect      Results Reviewed:  Results from last 7 days   Lab Units 20  0304 20  0715 20  1501   WBC 10*3/mm3 12.90* 15.26* 11.85*   HEMOGLOBIN g/dL 10.7* 11.1* 11.9*   HEMATOCRIT % 38.3 40.3 43.1   PLATELETS 10*3/mm3 295 319 263   INR   --   --  1.02     Results from last 7 days   Lab Units 20  0304 20  0715 20  0518 20  2303  20  0430  20  1517   SODIUM mmol/L 138 132* 136  --    < > 137  137   < >  --    POTASSIUM mmol/L 4.5  3.6 4.4  --    < > 4.5  4.4   < >  --    CHLORIDE mmol/L 102 94* 97*  --    < > 101  100   < >  --    CO2 mmol/L 24.0 22.0 22.0  --    < > 17.0*  22.0   < >  --    BUN mg/dL 18 21 12  --    < > 12  12   < >  --    CREATININE mg/dL 0.75 0.81 0.68  --    < > 0.78  0.69   < >  --    GLUCOSE mg/dL 122* 156* 120*  --    < > 145*  142*   < >  --    CALCIUM mg/dL 8.4* 9.2 9.3  --    < > 8.6  8.6   < >  --    ALT (SGPT) U/L  --   --   --   --   --  19  --  19   AST (SGOT) U/L  --   --   --   --   --  38*  --  31   TROPONIN T ng/mL  --   --  <0.010 <0.010  --   --   --  <0.010    < > = values in this interval not displayed.     Estimated Creatinine Clearance: 75.7 mL/min (by C-G formula based on SCr of 0.75 mg/dL).    Microbiology Results Abnormal     Procedure Component Value - Date/Time    Blood Culture - Blood, Hand, Left [[id number]] Collected:  06/04/20 1406    Lab Status:  Preliminary result Specimen:  Blood from Hand, Left Updated:  06/06/20 1501     Blood Culture No growth at 2 days    Blood Culture - Blood, Hand, Right [[id number]] Collected:  06/04/20 1400    Lab Status:  Preliminary result Specimen:  Blood from Hand, Right Updated:  06/06/20 1501     Blood Culture No growth at 2 days    Urine Culture - Urine, Urine, Clean Catch [[id number]] Collected:  06/05/20 1358    Lab Status:  Final result Specimen:  Urine, Clean Catch Updated:  06/06/20 0937     Urine Culture 50,000 CFU/mL Mixed Herberth Isolated    Narrative:       Specimen contains mixed organisms of questionable pathogenicity which indicates contamination with commensal herberth.  Further identification is unlikely to provide clinically useful information.  Suggest recollection.    MRSA Screen, PCR (Inpatient) - Swab, Nares [[id number]]  (Abnormal) Collected:  06/05/20 1403    Lab Status:  Final result Specimen:  Swab from Nares Updated:  06/05/20 1528     MRSA PCR Positive    COVID-19,CEPHEID,AMBERLY IN-HOUSE(OR EMERGENT/ADD-ON),NP SWAB IN TRANSPORT MEDIA 3-4  HR TAT - Swab, Nasopharynx [[id number]]  (Normal) Collected:  05/31/20 1533    Lab Status:  Final result Specimen:  Swab from Nasopharynx Updated:  05/31/20 1638     COVID19 Not Detected          Imaging Results (Last 24 Hours)     Procedure Component Value Units Date/Time    CT Head Without Contrast [[id number]] Collected:  06/06/20 0929     Updated:  06/06/20 2226    Narrative:       EXAMINATION: CT HEAD WO CONTRAST - 06/06/2020     INDICATION: Stroke. I63.9-Cerebral infarction, unspecified;  R13.12-Dysphagia, oropharyngeal phase; R47.1-Dysarthria and anarthria;  R41.841-Cognitive communication deficit.     TECHNIQUE: 5 mm unenhanced images through the brain     The radiation dose reduction device was turned on for each scan per the  ALARA (As Low as Reasonably Achievable) protocol.     COMPARISON: 06/04/2020     FINDINGS: Patient history indicates evolving right MCA infarcts.  Evaluate for intracranial hemorrhage.     The calvarium appears intact. Included paranasal sinuses and mastoids  appear clear. Patient's right frontal and much smaller right parietal  infarcts are stable in extent. There is no evidence of new infarct, no  evidence of hemorrhage, and no evidence of mass, mass effect,  hydrocephalus or abnormal extra-axial collection.       Impression:       Stable extent of patient's previously noted right MCA  territory infarcts. No evidence of intracranial hemorrhage, new infarct,  or other clearly new intracranial disease.     DICTATED:   06/06/2020  EDITED/ls :   06/06/2020      This report was finalized on 6/6/2020 10:23 PM by Dr. Ervin Troncoso MD.             Results for orders placed during the hospital encounter of 05/31/20   Adult Transthoracic Echo Complete W/ Cont if Necessary Per Protocol    Narrative · Left ventricular systolic function is normal. Estimated EF appears to be   in the range of 56 - 60%.  · Normal right ventricular cavity size and systolic function noted.  · Left atrial cavity size is  moderately dilated. Left atrial volume is   moderately increased.  · No evidence of a patent foramen ovale. Saline test results are negative.  · The aortic valve is abnormal in structure. The valve exhibits sclerosis.   There is mild calcification of the aortic valve  · Mild aortic valve stenosis is present          I have reviewed the medications:  Scheduled Meds:    amLODIPine 5 mg Oral Daily   apixaban 5 mg Oral Q12H   aspirin 81 mg Oral Daily   atorvastatin 80 mg Oral Nightly   cholestyramine light 1 packet Oral Daily   diphenoxylate-atropine 1 tablet Oral BID   famotidine 20 mg Intravenous Q12H   lisinopril 10 mg Oral Q PM   LORazepam 1 mg Intravenous Once   metoprolol tartrate 50 mg Oral Q12H   piperacillin-tazobactam 3.375 g Intravenous Q8H   vancomycin 12 mg/kg Intravenous Q12H     Continuous Infusions:    Pharmacy to dose vancomycin      PRN Meds:.•  acetaminophen  •  acetaminophen  •  butalbital-acetaminophen-caffeine  •  diphenoxylate-atropine  •  enalaprilat  •  metoprolol tartrate  •  nitroglycerin  •  ondansetron  •  Pharmacy to dose vancomycin  •  potassium chloride **OR** potassium chloride **OR** potassium chloride  •  simethicone    Assessment/Plan   Assessment & Plan     Active Hospital Problems    Diagnosis  POA   • **Cerebrovascular accident (CVA) due to embolism of right middle cerebral artery (CMS/HCC) [I63.411]  Yes   • Acute ischemic stroke (CMS/HCC) [I63.9]  Yes   • Atrial fibrillation (CMS/HCC) [I48.91]  Yes      Resolved Hospital Problems   No resolved problems to display.        Brief Hospital Course to date:  Sidra Lane is a 65 y.o. 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  She was found to have a right MCA territory reversible ischemia on CT perfusion.  She underwent a CT head neck angiogram with an intra-arterial TPA after failed  thrombectomy and admitted to the ICU afterward.  She was transferred out of ICU on 6/3 with hospitalist assuming care on 6/4.    Right MCA stroke  - likely cardioembolic.  Patient has a history of persistent atrial fibrillation and had been anticoagulated with Eliquis.  She had been followed by her electrophysiologist and had plans for an ablation prior to readmission.  -Again she is status post intra-arterial TPA after failed thrombectomy  -She had some delirium while in the ICU and Seroquel 50 mg nightly was given to her  -Stat CT brain was performed on 6/4 due to worsening left sided weakness and acute hemorrhagic transformation was ruled out, but does still reporting evolving MCA CVA that was stable from the previous exam.  -The reasons for her worsening symptoms may be related to Seroquel.  Dr. Vaughan has recommended to hold Seroquel for now, stopped PRN Seroquel.   -Repeat CT head 6/6 with no evidence of hemorrhage, stopped heparin gtt and started on Eliquis. Pt now has no Keofeed, if unable to replace this am, will have to place back on heparin gtt until she has feeding tube again.   -EP also following, resumed Metoprolol PO  -Failed modified barium swallow and was started on tube feeding, self-removed Keofeed today, will get it replaced. Restraints ordered and PRN Ativan x 1 given today.    Persistent atrial fibrillation with RVR  -Patient had been on beta-blocker per her cardiologist and had plans for ablation prior to admission  -Currently she is not a candidate for an ablation  -We will defer management to her EP  --getting PO Metoprolol  --since pt removed Keofeed this am, will increase IV Metoprolol dose to hopefully decrease rate.  May need dilt gtt if unable to get Keofeed.     Hypertension  -Not well controlled, initially was allowed for permissive hypertension due to acute stroke.  Currently Dr. Vaughan recommends better control of her blood pressure and bring it down to normotensive.  -I have resumed  some of patient's home medications and hope to bring it down slowly. Beta blocker resumed by Cards.     SIRS  -Possible aspiration when patient pulled out her Keofeed tube earlier this stay.  Pt again removed her Keofeed this am, but no s/s of aspiration per RN.  Replacing.   --CXR unremarkable on 6/4 and 6/5  --now afebrile, added Vanc/Zosyn on 6/5 due to worsening leukocytosis with left shift. MRSA PCR +, so had been on Vanc.  Will also continue Zosyn given concern for possible aspiration. Will de-escalate today as pt's leukocytosis has resolved.  Will do Augmentin BID to complete a seven day course (to cover for possible aspiration) once Keofeed is palced. Will defer MRSA coverage for now. Repeat CBC with diff in am    Hyperlipidemia  -High intensity statin therapy    Left-sided weakness  -PT OT, will benefit from short-term rehab once she is medically stable    Dysphagia  -Speech pathologist following, currently getting enteral feeding Via Keofeed tube.  Tube feedings at goal.  -PEG tube placement discussed with patient's family by intensivist, currently they want to hold off.    Diarrhea  --apparently this is a chronic issue at home, but has worsened since admission. Pt's weight is down 12 lbs, having multiple loose stools daily.  Per daughter, pt has had diarrhea since partial colectomy in 2016 ([facility]) and has been told she may have Crohn's- has never been on immunosuppression and can't remember with whom she follows with GI. May consider GI consult on Monday if diarrhea persists.       DVT Prophylaxis: Fully anticoagulated with Eliquis      Disposition:TBD pending further evaluation and response to therapy    CODE STATUS:   Code Status and Medical Interventions:   Ordered at: 05/31/20 1743     Code Status:    CPR     Medical Interventions (Level of Support Prior to Arrest):    Full         Electronically signed by Evelina Banda MD, 06/07/20, 08:20.

## 2020-06-07 NOTE — PROGRESS NOTES
"Pharmacy Consult-Vancomycin Dosing  Sidra Lane is a  65 y.o. female receiving vancomycin therapy.     Indication: PNA (possible aspiration)  Consulting Provider: Solo REYNA Consult: no  Goal Trough: 15-20mcg/mL    Current Antimicrobial Therapy  PTD Vancomycin (6/5-6/12)  Zosyn 3.375g IV q8h (6/5-6/12)    Allergies  Allergies as of 05/31/2020 - Reviewed 05/31/2020   Allergen Reaction Noted    Digoxin Other (See Comments) 10/23/2019    Iodine Rash 08/29/2018    Other Other (See Comments) 08/29/2018     Labs  Results from last 7 days   Lab Units 06/07/20  0726 06/06/20  0304 06/05/20  0715   BUN mg/dL 14 18 21   CREATININE mg/dL 0.76 0.75 0.81     Results from last 7 days   Lab Units 06/07/20  0726 06/06/20  0304 06/05/20  0715   WBC 10*3/mm3 10.50 12.90* 15.26*     Evaluation of Dosing  Is Patient on Dialysis or Renal Replacement: no    Ht - 154.9 cm (61\")  Wt - 104 kg (229 lb)    Estimated Creatinine Clearance: 75.7 mL/min (by C-G formula based on SCr of 0.76 mg/dL).    Intake & Output (last 3 days)         06/04 0701 - 06/05 0700 06/05 0701 - 06/06 0700 06/06 0701 - 06/07 0700 06/07 0701 - 06/08 0700    P.O.        I.V. (mL/kg)        Other 151  105     NG/  340     IV Piggyback   250     Total Intake(mL/kg) 991 (9.1)  695 (6.7)     Urine (mL/kg/hr)  1200 (0.5) 1350 (0.5)     Stool   0     Total Output  1200 1350     Net +991 -1200 -655             Urine Unmeasured Occurrence 1 x 1 x 4 x     Stool Unmeasured Occurrence 1 x  16 x           Microbiology and Radiology  Microbiology Results (last 10 days)       Procedure Component Value - Date/Time    MRSA Screen, PCR (Inpatient) - Swab, Nares [[id number]]  (Abnormal) Collected:  06/05/20 1403    Lab Status:  Final result Specimen:  Swab from Nares Updated:  06/05/20 1528     MRSA PCR Positive    Urine Culture - Urine, Urine, Clean Catch [[id number]] Collected:  06/05/20 1358    Lab Status:  Final result Specimen:  Urine, Clean Catch Updated:  06/06/20 0937 "     Urine Culture 50,000 CFU/mL Mixed Herberth Isolated    Narrative:       Specimen contains mixed organisms of questionable pathogenicity which indicates contamination with commensal herberth.  Further identification is unlikely to provide clinically useful information.  Suggest recollection.    Blood Culture - Blood, Hand, Left [[id number]] Collected:  06/04/20 1406    Lab Status:  Preliminary result Specimen:  Blood from Hand, Left Updated:  06/06/20 1501     Blood Culture No growth at 2 days    Blood Culture - Blood, Hand, Right [[id number]] Collected:  06/04/20 1400    Lab Status:  Preliminary result Specimen:  Blood from Hand, Right Updated:  06/06/20 1501     Blood Culture No growth at 2 days    COVID-19,CEPHEID,AMBERLY IN-HOUSE(OR EMERGENT/ADD-ON),NP SWAB IN TRANSPORT MEDIA 3-4 HR TAT - Swab, Nasopharynx [[id number]]  (Normal) Collected:  05/31/20 1533    Lab Status:  Final result Specimen:  Swab from Nasopharynx Updated:  05/31/20 1638     COVID19 Not Detected          Evaluation of Level  Results from last 7 days   Lab Units 06/07/20  0726   VANCOMYCIN TR mcg/mL 16.30   6/7 Vancomycin trough (~11 hour level)    Assessment/Plan  Pharmacy to dose vancomycin for PNA w/ possible aspiration. Goal trough 15-20 mcg/mL.   Patient is currently on a maintenance dose of vancomycin 1250 mg (12 mg/kg) IV Q12H.   Vancomycin trough on 6/7 was therapeutic at 16.3 mcg/mL (~11 hour level).   Will continue current maintenance dose of vancomycin 1250 (12 mg/kg) IV Q12H.  Will order trough in the next 3-5 days of therapy.  Creatinine 0.76, WBC 10.5, afebrile, UOP 1350 mL.  Will continue to follow renal function, cultures/sensitivities, clinical status and adjust accordingly.     Pharmacy will continue to follow.     Thanks  Miah Croft, PharmD  PGY1 Resident  6/7/2020  09:53

## 2020-06-08 LAB
ANION GAP SERPL CALCULATED.3IONS-SCNC: 13 MMOL/L (ref 5–15)
BASOPHILS # BLD AUTO: 0.04 10*3/MM3 (ref 0–0.2)
BASOPHILS NFR BLD AUTO: 0.3 % (ref 0–1.5)
BUN BLD-MCNC: 19 MG/DL (ref 8–23)
BUN/CREAT SERPL: 27.9 (ref 7–25)
C DIFF TOX GENS STL QL NAA+PROBE: NOT DETECTED
CALCIUM SPEC-SCNC: 9 MG/DL (ref 8.6–10.5)
CHLORIDE SERPL-SCNC: 99 MMOL/L (ref 98–107)
CO2 SERPL-SCNC: 25 MMOL/L (ref 22–29)
CREAT BLD-MCNC: 0.68 MG/DL (ref 0.57–1)
CRP SERPL-MCNC: 0.78 MG/DL (ref 0–0.5)
DEPRECATED RDW RBC AUTO: 55.8 FL (ref 37–54)
EOSINOPHIL # BLD AUTO: 0.32 10*3/MM3 (ref 0–0.4)
EOSINOPHIL NFR BLD AUTO: 2.7 % (ref 0.3–6.2)
ERYTHROCYTE [DISTWIDTH] IN BLOOD BY AUTOMATED COUNT: 19.3 % (ref 12.3–15.4)
GFR SERPL CREATININE-BSD FRML MDRD: 86 ML/MIN/1.73
GLUCOSE BLD-MCNC: 116 MG/DL (ref 65–99)
GLUCOSE BLDC GLUCOMTR-MCNC: 102 MG/DL (ref 70–130)
GLUCOSE BLDC GLUCOMTR-MCNC: 118 MG/DL (ref 70–130)
GLUCOSE BLDC GLUCOMTR-MCNC: 118 MG/DL (ref 70–130)
GLUCOSE BLDC GLUCOMTR-MCNC: 122 MG/DL (ref 70–130)
GLUCOSE BLDC GLUCOMTR-MCNC: 149 MG/DL (ref 70–130)
HCT VFR BLD AUTO: 40.7 % (ref 34–46.6)
HGB BLD-MCNC: 11 G/DL (ref 12–15.9)
IMM GRANULOCYTES # BLD AUTO: 0.07 10*3/MM3 (ref 0–0.05)
IMM GRANULOCYTES NFR BLD AUTO: 0.6 % (ref 0–0.5)
LYMPHOCYTES # BLD AUTO: 1.26 10*3/MM3 (ref 0.7–3.1)
LYMPHOCYTES NFR BLD AUTO: 10.8 % (ref 19.6–45.3)
MCH RBC QN AUTO: 22.2 PG (ref 26.6–33)
MCHC RBC AUTO-ENTMCNC: 27 G/DL (ref 31.5–35.7)
MCV RBC AUTO: 82.1 FL (ref 79–97)
MONOCYTES # BLD AUTO: 1.26 10*3/MM3 (ref 0.1–0.9)
MONOCYTES NFR BLD AUTO: 10.8 % (ref 5–12)
NEUTROPHILS # BLD AUTO: 8.7 10*3/MM3 (ref 1.7–7)
NEUTROPHILS NFR BLD AUTO: 74.8 % (ref 42.7–76)
NRBC BLD AUTO-RTO: 0 /100 WBC (ref 0–0.2)
PLATELET # BLD AUTO: 360 10*3/MM3 (ref 140–450)
PMV BLD AUTO: 11.4 FL (ref 6–12)
POTASSIUM BLD-SCNC: 4.2 MMOL/L (ref 3.5–5.2)
PREALB SERPL-MCNC: 18.1 MG/DL (ref 20–40)
RBC # BLD AUTO: 4.96 10*6/MM3 (ref 3.77–5.28)
SODIUM BLD-SCNC: 137 MMOL/L (ref 136–145)
WBC NRBC COR # BLD: 11.65 10*3/MM3 (ref 3.4–10.8)

## 2020-06-08 PROCEDURE — 99233 SBSQ HOSP IP/OBS HIGH 50: CPT | Performed by: INTERNAL MEDICINE

## 2020-06-08 PROCEDURE — 87493 C DIFF AMPLIFIED PROBE: CPT | Performed by: INTERNAL MEDICINE

## 2020-06-08 PROCEDURE — 92526 ORAL FUNCTION THERAPY: CPT

## 2020-06-08 PROCEDURE — 97110 THERAPEUTIC EXERCISES: CPT

## 2020-06-08 PROCEDURE — 85025 COMPLETE CBC W/AUTO DIFF WBC: CPT | Performed by: INTERNAL MEDICINE

## 2020-06-08 PROCEDURE — 84134 ASSAY OF PREALBUMIN: CPT | Performed by: INTERNAL MEDICINE

## 2020-06-08 PROCEDURE — 80048 BASIC METABOLIC PNL TOTAL CA: CPT | Performed by: INTERNAL MEDICINE

## 2020-06-08 PROCEDURE — 82962 GLUCOSE BLOOD TEST: CPT

## 2020-06-08 PROCEDURE — 99232 SBSQ HOSP IP/OBS MODERATE 35: CPT | Performed by: NURSE PRACTITIONER

## 2020-06-08 PROCEDURE — 97530 THERAPEUTIC ACTIVITIES: CPT

## 2020-06-08 PROCEDURE — 86140 C-REACTIVE PROTEIN: CPT | Performed by: INTERNAL MEDICINE

## 2020-06-08 PROCEDURE — 92507 TX SP LANG VOICE COMM INDIV: CPT

## 2020-06-08 RX ORDER — L.ACID,PARA/B.BIFIDUM/S.THERM 8B CELL
1 CAPSULE ORAL DAILY
Status: DISCONTINUED | OUTPATIENT
Start: 2020-06-08 | End: 2020-06-18 | Stop reason: HOSPADM

## 2020-06-08 RX ORDER — AMANTADINE HYDROCHLORIDE 100 MG/1
100 CAPSULE, GELATIN COATED ORAL 2 TIMES DAILY
Status: DISCONTINUED | OUTPATIENT
Start: 2020-06-08 | End: 2020-06-18 | Stop reason: HOSPADM

## 2020-06-08 RX ADMIN — METOPROLOL TARTRATE 50 MG: 50 TABLET, FILM COATED ORAL at 09:41

## 2020-06-08 RX ADMIN — AMOXICILLIN AND CLAVULANATE POTASSIUM 1 TABLET: 875; 125 TABLET, FILM COATED ORAL at 20:22

## 2020-06-08 RX ADMIN — DIPHENOXYLATE HYDROCHLORIDE AND ATROPINE SULFATE 1 TABLET: 2.5; .025 TABLET ORAL at 09:43

## 2020-06-08 RX ADMIN — AMLODIPINE BESYLATE 5 MG: 5 TABLET ORAL at 09:39

## 2020-06-08 RX ADMIN — DIPHENOXYLATE HYDROCHLORIDE AND ATROPINE SULFATE 1 TABLET: 2.5; .025 TABLET ORAL at 04:48

## 2020-06-08 RX ADMIN — SIMETHICONE 40 MG: 20 SUSPENSION/ DROPS ORAL at 09:39

## 2020-06-08 RX ADMIN — FAMOTIDINE 20 MG: 20 TABLET, FILM COATED ORAL at 17:39

## 2020-06-08 RX ADMIN — METOPROLOL TARTRATE 25 MG: 25 TABLET, FILM COATED ORAL at 12:25

## 2020-06-08 RX ADMIN — APIXABAN 5 MG: 5 TABLET, FILM COATED ORAL at 09:39

## 2020-06-08 RX ADMIN — ATORVASTATIN CALCIUM 80 MG: 40 TABLET, FILM COATED ORAL at 20:22

## 2020-06-08 RX ADMIN — AMOXICILLIN AND CLAVULANATE POTASSIUM 1 TABLET: 875; 125 TABLET, FILM COATED ORAL at 09:39

## 2020-06-08 RX ADMIN — AMANTADINE HYDROCHLORIDE 100 MG: 100 CAPSULE ORAL at 17:41

## 2020-06-08 RX ADMIN — FAMOTIDINE 20 MG: 20 TABLET, FILM COATED ORAL at 04:48

## 2020-06-08 RX ADMIN — CHOLESTYRAMINE 4 G: 4 POWDER, FOR SUSPENSION ORAL at 09:39

## 2020-06-08 RX ADMIN — METOPROLOL TARTRATE 75 MG: 25 TABLET, FILM COATED ORAL at 20:22

## 2020-06-08 RX ADMIN — METOPROLOL TARTRATE 5 MG: 5 INJECTION INTRAVENOUS at 04:48

## 2020-06-08 RX ADMIN — RIVAROXABAN 20 MG: 20 TABLET, FILM COATED ORAL at 17:40

## 2020-06-08 RX ADMIN — Medication 1 CAPSULE: at 12:25

## 2020-06-08 RX ADMIN — LISINOPRIL 10 MG: 10 TABLET ORAL at 17:39

## 2020-06-08 RX ADMIN — DIPHENOXYLATE HYDROCHLORIDE AND ATROPINE SULFATE 1 TABLET: 2.5; .025 TABLET ORAL at 20:22

## 2020-06-08 RX ADMIN — QUETIAPINE FUMARATE 12.5 MG: 25 TABLET ORAL at 20:22

## 2020-06-08 RX ADMIN — ACETAMINOPHEN ORAL SOLUTION 649.6 MG: 650 SOLUTION ORAL at 23:43

## 2020-06-08 RX ADMIN — ASPIRIN 81 MG 81 MG: 81 TABLET ORAL at 09:39

## 2020-06-08 NOTE — THERAPY TREATMENT NOTE
Acute Care - Speech Language Pathology Treatment Note  [facility]     Patient Name: Sidra Lane  : 1955  MRN: [id number]  Today's Date: 2020         Admit Date: 2020    Visit Dx:      ICD-10-CM ICD-9-CM   1. Acute ischemic stroke (CMS/HCC) I63.9 434.91   2. Oropharyngeal dysphagia R13.12 787.22   3. Dysarthria R47.1 784.51   4. Cognitive communication deficit R41.841 799.52     Patient Active Problem List   Diagnosis   • Shortness of breath   • Atrial fibrillation (CMS/HCC)   • Essential hypertension   • Dizziness   • Deep vein thrombosis (DVT) of proximal lower extremity (CMS/HCC)   • Coronary artery disease involving native coronary artery of native heart without angina pectoris   • Fatigue   • Chest pain   • Longstanding persistent atrial fibrillation   • Cerebrovascular accident (CVA) due to embolism of right middle cerebral artery (CMS/HCC)   • Acute ischemic stroke (CMS/HCC)        Therapy Treatment  Rehabilitation Treatment Summary     Row Name 20 1600             Treatment Time/Intention    Discipline  speech language pathologist  -      Document Type  therapy note (daily note)  -      Subjective Information  no complaints  -      Mode of Treatment  individual therapy;speech-language pathology  -      Patient/Family Observations  Daughter present  -      Care Plan Review  evaluation/treatment results reviewed;care plan/treatment goals reviewed;risks/benefits reviewed;current/potential barriers reviewed;patient/other agree to care plan  -      Care Plan Review, Other Participant(s)  daughter  -      Therapy Frequency (Swallow)  5 days per week  -      Therapy Frequency (SLP SLC)  5 days per week  -      Patient Effort  good  -      Existing Precautions/Restrictions  fall  -      Recorded by [] Margaret Hung, MS CCC-SLP 20 1621      Row Name 20 1600             Pain Assessment    Additional Documentation  Pain Scale: FACES  Pre/Post-Treatment (Group);Pain Scale: Numbers Pre/Post-Treatment (Group)  -CH      Recorded by [] Anbaell Margaret MS CCC-SLP 06/08/20 1621      Row Name 06/08/20 1600             Pain Scale: Numbers Pre/Post-Treatment    Pain Scale: Numbers, Pretreatment  0/10 - no pain  -CH      Pain Scale: Numbers, Post-Treatment  0/10 - no pain  -      Recorded by [] Anabell Margaret MS CCC-SLP 06/08/20 1621      Row Name 06/08/20 1600             Pain Scale: FACES Pre/Post-Treatment    Pain: FACES Scale, Pretreatment  0-->no hurt  -CH      Pain: FACES Scale, Post-Treatment  0-->no hurt  -      Recorded by [] Anabell Margaret MS CCC-SLP 06/08/20 1621      Row Name 06/08/20 1600             Outcome Summary/Treatment Plan (SLP)    Daily Summary of Progress (SLP)  progress toward functional goals as expected  -      Plan for Continued Treatment (SLP)  Continue to follow to address dysphagia, cognition and communication in treatment.   -      Anticipated Dischage Disposition (SLP)  inpatient rehabilitation facility;anticipate therapy at next level of care  -      Recorded by [] Margaret Hung MS CCC-SLP 06/08/20 1621        User Key  (r) = Recorded By, (t) = Taken By, (c) = Cosigned By    Initials Name Effective Dates Discipline     Margaret Hung MS CCC-SLP 02/14/19 -  SLP          EDUCATION  The patient has been educated in the following areas:   Cognitive Impairment Communication Impairment.    SLP Recommendation and Plan  Daily Summary of Progress (SLP): progress toward functional goals as expected     Plan for Continued Treatment (SLP): Continue to follow to address dysphagia, cognition and communication in treatment.   Anticipated Dischage Disposition (SLP): inpatient rehabilitation facility, anticipate therapy at next level of care             SLP GOALS     Row Name 06/08/20 1600             Oral Nutrition/Hydration Goal 1 (SLP)    Oral Nutrition/Hydration Goal 1, SLP  LTG: Pt will return to  regular diet & thin liquids w/ 100% accuracy w/o cues  -CH      Time Frame (Oral Nutrition/Hydration Goal 1, SLP)  by discharge  -CH      Progress/Outcomes (Oral Nutrition/Hydration Goal 1, SLP)  continuing progress toward goal  -CH         Oral Nutrition/Hydration Goal 2 (SLP)    Oral Nutrition/Hydration Goal 2, SLP  Pt will tolerate therapeutic trials of thin H2O w/ no overt s/s of aspiration w/ 60% accuracy w/o cues to indicate readiness for repeat instrumental evaluation  -CH      Time Frame (Oral Nutrition/Hydration Goal 2, SLP)  short term goal (STG);by discharge  -      Barriers (Oral Nutrition/Hydration Goal 2, SLP)  Delayed cough with ice chips, immediate cough with spoon sips of thin.  -CH      Progress/Outcomes (Oral Nutrition/Hydration Goal 2, SLP)  continuing progress toward goal  -CH         Labial Strengthening Goal 1 (SLP)    Activity (Labial Strengthening Goal 1, SLP)  increase labial tone  -CH      Increase Labial Tone  labial resistance exercises  -CH      [place]/Accuracy (Labial Strengthening Goal 1, SLP)  with minimal cues (75-90% accuracy)  -      Time Frame (Labial Strengthening Goal 1, SLP)  short term goal (STG);by discharge  -CH      Barriers (Labial Strengthening Goal 1, SLP)  Patient completed exercises x 3 each with mod cues  -CH      Progress/Outcomes (Labial Strengthening Goal 1, SLP)  continuing progress toward goal  -CH         Lingual Strengthening Goal 1 (SLP)    Activity (Lingual Strengthening Goal 1, SLP)  increase lingual tone/sensation/control/coordination/movement;increase tongue back strength  -      Increase Lingual Tone/Sensation/Control/Coordination/Movement  lingual movement exercises  -      Increase Tongue Back Strength  lingual resistance exercises  -      [place]/Accuracy (Lingual Strengthening Goal 1, SLP)  with minimal cues (75-90% accuracy)  -      Time Frame (Lingual Strengthening Goal 1, SLP)  short term goal (STG);by discharge  -       Barriers (Lingual Strengthening Goal 1, SLP)  Patient completed exercises x 3 each with mod cues  -CH      Progress/Outcomes (Lingual Strengthening Goal 1, SLP)  continuing progress toward goal  -CH         Pharyngeal Strengthening Exercise Goal 1 (SLP)    Activity (Pharyngeal Strengthening Goal 1, SLP)  increase timing;increase superior movement of the hyolaryngeal complex;increase anterior movement of the hyolaryngeal complex;increase closure at entrance to airway/closure of airway at glottis;increase squeeze/positive pressure generation;increase tongue base retraction  -CH      Increase Timing  prepping - 3 second prep or suck swallow or 3-step swallow  -CH      Increase Superior Movement of the Hyolaryngeal Complex  super-supraglottic swallow  -CH      Increase Anterior Movement of the Hyolaryngeal Complex  chin tuck against resistance (CTAR)  -CH      Increase Closure at Entrance to Airway/Closure of Airway at Glottis  super-supraglottic swallow  -CH      Increase Squeeze/Positive Pressure Generation  effortful pitch glide (falsetto + pharyngeal squeeze)  -CH      Increase Tongue Base Retraction  hard effortful swallow  -CH      [place]/Accuracy (Pharyngeal Strengthening Goal 1, SLP)  with minimal cues (75-90% accuracy)  -CH      Time Frame (Pharyngeal Strengthening Goal 1, SLP)  short term goal (STG);by discharge  -CH      Barriers (Pharyngeal Strengthening Goal 1, SLP)  Patient completed exercises x 3 each with mod cues  -CH      Progress/Outcomes (Pharyngeal Strengthening Goal 1, SLP)  continuing progress toward goal  -CH         Articulation Goal 1 (SLP)    Improve Articulation Goal 1 (SLP)  by over-articulating at phrase level;80%;with minimal cues (75-90%)  -CH      Time Frame (Articulation Goal 1, SLP)  short term goal (STG)  -CH      Progress (Articulation Goal 1, SLP)  50%;with minimal cues (75-90%)  -CH      Progress/Outcomes (Articulation Goal 1, SLP)  continuing progress toward goal  -CH          Attention Goal 1 (SLP)    Improve Attention by Goal 1 (SLP)  looking at speaker;80%;with minimal cues (75-90%)  -CH      Time Frame (Attention Goal 1, SLP)  short term goal (STG);by discharge  -CH      Progress (Attention Goal 1, SLP)  30%;with moderate cues (50-74%)  -CH      Progress/Outcomes (Attention Goal 1, SLP)  continuing progress toward goal  -CH         Additional Goal 1 (SLP)    Additional Goal 1, SLP  LTG: Pt will improve cognitive-communication skills in order to participate in care in hospital setting for 100% of opportunities w/o cues.  -CH      Time Frame (Additional Goal 1, SLP)  by discharge  -CH      Progress/Outcomes (Additional Goal 1, SLP)  continuing progress toward goal  -CH        User Key  (r) = Recorded By, (t) = Taken By, (c) = Cosigned By    Initials Name Provider Type    Margaret Lockhart MS CCC-SLP Speech and Language Pathologist              Time Calculation:     Time Calculation- SLP     Row Name 20 1624             Time Calculation- SLP    SLP Start Time  1600  -CH      SLP Received On  20  -CH        User Key  (r) = Recorded By, (t) = Taken By, (c) = Cosigned By    Initials Name Provider Type    Margaret Lockhart MS CCC-SLP Speech and Language Pathologist          Therapy Charges for Today     Code Description Service Date Service Provider Modifiers Qty    93314167307 HC ST TREATMENT SWALLOW 3 2020 Margaret Hung MS CCC-SLP GN 1    21876921521 HC ST TREATMENT SPEECH 1 2020 Margaret Hung MS CCC-SLP GN 1                     Margaret Hung MS CCC-SLP  2020   and Acute Care - Speech Language Pathology   Swallow Treatment Note  [facility]     Patient Name: Sidra Lane  : 1955  MRN: [id number]  Today's Date: 2020               Admit Date: 2020    Visit Dx:      ICD-10-CM ICD-9-CM   1. Acute ischemic stroke (CMS/[facility]) I63.9 434.91   2. Oropharyngeal dysphagia R13.12 787.22   3. Dysarthria R47.1 784.51   4. Cognitive  communication deficit R41.841 799.52     Patient Active Problem List   Diagnosis   • Shortness of breath   • Atrial fibrillation (CMS/HCC)   • Essential hypertension   • Dizziness   • Deep vein thrombosis (DVT) of proximal lower extremity (CMS/HCC)   • Coronary artery disease involving native coronary artery of native heart without angina pectoris   • Fatigue   • Chest pain   • Longstanding persistent atrial fibrillation   • Cerebrovascular accident (CVA) due to embolism of right middle cerebral artery (CMS/HCC)   • Acute ischemic stroke (CMS/HCC)       Therapy Treatment  Rehabilitation Treatment Summary     Row Name 06/08/20 1600             Treatment Time/Intention    Discipline  speech language pathologist  -      Document Type  therapy note (daily note)  -      Subjective Information  no complaints  -      Mode of Treatment  individual therapy;speech-language pathology  -      Patient/Family Observations  Daughter present  -      Care Plan Review  evaluation/treatment results reviewed;care plan/treatment goals reviewed;risks/benefits reviewed;current/potential barriers reviewed;patient/other agree to care plan  -      Care Plan Review, Other Participant(s)  daughter  -      Therapy Frequency (Swallow)  5 days per week  -      Therapy Frequency (SLP SLC)  5 days per week  -      Patient Effort  good  -      Existing Precautions/Restrictions  fall  -      Recorded by [] Margaret Hung MS CCC-SLP 06/08/20 1621      Row Name 06/08/20 1600             Pain Assessment    Additional Documentation  Pain Scale: FACES Pre/Post-Treatment (Group);Pain Scale: Numbers Pre/Post-Treatment (Group)  -      Recorded by [] Margaret Hung MS CCC-SLP 06/08/20 1621      Row Name 06/08/20 1600             Pain Scale: Numbers Pre/Post-Treatment    Pain Scale: Numbers, Pretreatment  0/10 - no pain  -      Pain Scale: Numbers, Post-Treatment  0/10 - no pain  -      Recorded by [] Anabell  MS Margaret CCC-SLP 06/08/20 1621      Row Name 06/08/20 1600             Pain Scale: FACES Pre/Post-Treatment    Pain: FACES Scale, Pretreatment  0-->no hurt  -CH      Pain: FACES Scale, Post-Treatment  0-->no hurt  -CH      Recorded by [] Margaret Hung MS CCC-SLP 06/08/20 1621      Row Name 06/08/20 1600             Outcome Summary/Treatment Plan (SLP)    Daily Summary of Progress (SLP)  progress toward functional goals as expected  -      Plan for Continued Treatment (SLP)  Continue to follow to address dysphagia, cognition and communication in treatment.   -CH      Anticipated Dischage Disposition (SLP)  inpatient rehabilitation facility;anticipate therapy at next level of care  -CH      Recorded by [] Margaret Hung MS CCC-SLP 06/08/20 1621        User Key  (r) = Recorded By, (t) = Taken By, (c) = Cosigned By    Initials Name Effective Dates Discipline     Margaret Hung MS CCC-SLP 02/14/19 -  SLP          Outcome Summary  Outcome Summary/Treatment Plan (SLP)  Daily Summary of Progress (SLP): progress toward functional goals as expected (06/08/20 1600 : Margaret Hung MS CCC-SLP)  Plan for Continued Treatment (SLP): Continue to follow to address dysphagia, cognition and communication in treatment.  (06/08/20 1600 : Margaret Hung MS CCC-SLP)  Anticipated Dischage Disposition (SLP): inpatient rehabilitation facility, anticipate therapy at next level of care (06/08/20 1600 : Margaret Hung MS CCC-SLP)      SLP GOALS     Row Name 06/08/20 1600             Oral Nutrition/Hydration Goal 1 (SLP)    Oral Nutrition/Hydration Goal 1, SLP  LTG: Pt will return to regular diet & thin liquids w/ 100% accuracy w/o cues  -CH      Time Frame (Oral Nutrition/Hydration Goal 1, SLP)  by discharge  -CH      Progress/Outcomes (Oral Nutrition/Hydration Goal 1, SLP)  continuing progress toward goal  -CH         Oral Nutrition/Hydration Goal 2 (SLP)    Oral Nutrition/Hydration Goal 2, SLP  Pt will  tolerate therapeutic trials of thin H2O w/ no overt s/s of aspiration w/ 60% accuracy w/o cues to indicate readiness for repeat instrumental evaluation  -CH      Time Frame (Oral Nutrition/Hydration Goal 2, SLP)  short term goal (STG);by discharge  -CH      Barriers (Oral Nutrition/Hydration Goal 2, SLP)  Delayed cough with ice chips, immediate cough with spoon sips of thin.  -CH      Progress/Outcomes (Oral Nutrition/Hydration Goal 2, SLP)  continuing progress toward goal  -CH         Labial Strengthening Goal 1 (SLP)    Activity (Labial Strengthening Goal 1, SLP)  increase labial tone  -      Increase Labial Tone  labial resistance exercises  -CH      [place]/Accuracy (Labial Strengthening Goal 1, SLP)  with minimal cues (75-90% accuracy)  -CH      Time Frame (Labial Strengthening Goal 1, SLP)  short term goal (STG);by discharge  -CH      Barriers (Labial Strengthening Goal 1, SLP)  Patient completed exercises x 3 each with mod cues  -CH      Progress/Outcomes (Labial Strengthening Goal 1, SLP)  continuing progress toward goal  -CH         Lingual Strengthening Goal 1 (SLP)    Activity (Lingual Strengthening Goal 1, SLP)  increase lingual tone/sensation/control/coordination/movement;increase tongue back strength  -      Increase Lingual Tone/Sensation/Control/Coordination/Movement  lingual movement exercises  -      Increase Tongue Back Strength  lingual resistance exercises  -CH      [place]/Accuracy (Lingual Strengthening Goal 1, SLP)  with minimal cues (75-90% accuracy)  -CH      Time Frame (Lingual Strengthening Goal 1, SLP)  short term goal (STG);by discharge  -CH      Barriers (Lingual Strengthening Goal 1, SLP)  Patient completed exercises x 3 each with mod cues  -CH      Progress/Outcomes (Lingual Strengthening Goal 1, SLP)  continuing progress toward goal  -CH         Pharyngeal Strengthening Exercise Goal 1 (SLP)    Activity (Pharyngeal Strengthening Goal 1, SLP)  increase timing;increase  superior movement of the hyolaryngeal complex;increase anterior movement of the hyolaryngeal complex;increase closure at entrance to airway/closure of airway at glottis;increase squeeze/positive pressure generation;increase tongue base retraction  -CH      Increase Timing  prepping - 3 second prep or suck swallow or 3-step swallow  -CH      Increase Superior Movement of the Hyolaryngeal Complex  super-supraglottic swallow  -CH      Increase Anterior Movement of the Hyolaryngeal Complex  chin tuck against resistance (CTAR)  -CH      Increase Closure at Entrance to Airway/Closure of Airway at Glottis  super-supraglottic swallow  -CH      Increase Squeeze/Positive Pressure Generation  effortful pitch glide (falsetto + pharyngeal squeeze)  -CH      Increase Tongue Base Retraction  hard effortful swallow  -CH      [place]/Accuracy (Pharyngeal Strengthening Goal 1, SLP)  with minimal cues (75-90% accuracy)  -CH      Time Frame (Pharyngeal Strengthening Goal 1, SLP)  short term goal (STG);by discharge  -CH      Barriers (Pharyngeal Strengthening Goal 1, SLP)  Patient completed exercises x 3 each with mod cues  -CH      Progress/Outcomes (Pharyngeal Strengthening Goal 1, SLP)  continuing progress toward goal  -CH         Articulation Goal 1 (SLP)    Improve Articulation Goal 1 (SLP)  by over-articulating at phrase level;80%;with minimal cues (75-90%)  -CH      Time Frame (Articulation Goal 1, SLP)  short term goal (STG)  -CH      Progress (Articulation Goal 1, SLP)  50%;with minimal cues (75-90%)  -CH      Progress/Outcomes (Articulation Goal 1, SLP)  continuing progress toward goal  -CH         Attention Goal 1 (SLP)    Improve Attention by Goal 1 (SLP)  looking at speaker;80%;with minimal cues (75-90%)  -CH      Time Frame (Attention Goal 1, SLP)  short term goal (STG);by discharge  -CH      Progress (Attention Goal 1, SLP)  30%;with moderate cues (50-74%)  -CH      Progress/Outcomes (Attention Goal 1, SLP)   continuing progress toward goal  -CH         Additional Goal 1 (SLP)    Additional Goal 1, SLP  LTG: Pt will improve cognitive-communication skills in order to participate in care in hospital setting for 100% of opportunities w/o cues.  -CH      Time Frame (Additional Goal 1, SLP)  by discharge  -CH      Progress/Outcomes (Additional Goal 1, SLP)  continuing progress toward goal  -CH        User Key  (r) = Recorded By, (t) = Taken By, (c) = Cosigned By    Initials Name Provider Type    Margaret Lockhart MS CCC-SLP Speech and Language Pathologist          EDUCATION  The patient has been educated in the following areas:   Home Exercise Program (HEP) Dysphagia (Swallowing Impairment) Oral Care/Hydration NPO rationale.    SLP Recommendation and Plan  Daily Summary of Progress (SLP): progress toward functional goals as expected     Plan for Continued Treatment (SLP): Continue to follow to address dysphagia, cognition and communication in treatment.   Anticipated Dischage Disposition (SLP): inpatient rehabilitation facility, anticipate therapy at next level of care                    Time Calculation:   Time Calculation- SLP     Row Name 06/08/20 1624             Time Calculation- SLP    SLP Start Time  1600  -      SLP Received On  06/08/20  -CH        User Key  (r) = Recorded By, (t) = Taken By, (c) = Cosigned By    Initials Name Provider Type    Margaret Lockhart MS CCC-SLP Speech and Language Pathologist          Therapy Charges for Today     Code Description Service Date Service Provider Modifiers Qty    [id number] HC ST TREATMENT SWALLOW 3 6/8/2020 Margaret Hung MS CCC-SLP GN 1    [id number] HC ST TREATMENT SPEECH 1 6/8/2020 Margaret Hung MS CCC-SLP GN 1                 Margaret Hung MS CCC-JALEN  6/8/2020

## 2020-06-08 NOTE — PROGRESS NOTES
"                  Clinical Nutrition     Nutrition Support Assessment  Reason for Visit: Follow-up protocol, EN    Patient Name: Sidra Lane  YOB: 1955  MRN: [id number]  Date of Encounter: 06/08/20 08:02  Admission date: 5/31/2020     Comments: RD notes patient at goal rate of 60 mL. Still with multiple stools daily. Noted now being tested for C. Diff r/o. Will continue on current regimen and await C. Diff test prior to adding additional fiber.     RD to continue to monitor and adjust regimen as medically appropriate.     Nutrition Assessment     Admission Problem List:  R MCA - CVA   Unsuccessful thrombectomy - iatPA (5/31)  PAF w/ RVR  Fever    Applicable PMH:  A-fib   CAD  GERD   DVT  PE  HTN    Applicable Nutrition-Related Information:  (5/31) NPO  (6/2) SLP MBS - NPO, temporary alternate methods of nutrition/hydration, other (see comments)(per MD discretion)  (6/2) NGT placed and EN initiated - Replete w/ Fiber @ 75 mL/hr TGV = 1500 mL, free water @ 15 mL  (6/5) EN changed to Peptamen AF @ 60 mL/hr, TGV = 1320 mL  (6/7) Pt removed keofeed    Applicable medical tests/procedures since admission:  (6/2) MBS - with recommendation for NPO status  (6/4) SLP - \"pt not appropriate for tx per RN\"    Reported/Observed/Food/Nutrition Related History:      Patient with some drowsiness/confusion. In restraints d/t pulling at lines/tubes. Now being r/o for C. Diff. RN reports patient still having multiple stool but d/t chronic diarrhea unable to discern etiology. Otherwise tolerating EN regimen.    Per I/O documentation:  BM x16 (6/6)  BM x6 (6/7)    Anthropometrics     Height: Height: 154.9 cm (61\")  Weight: Weight: 104 kg (229 lb) (06/06/20 1000)  BMI: BMI (Calculated): 43.3  BMI classification: Obese Class III extreme obesity: > or equal to 40kg/m2   IBW:  105#     Labs reviewed     Results from last 7 days   Lab Units 06/07/20  0726  06/04/20  0518   SODIUM mmol/L 140   < > 136   POTASSIUM mmol/L 4.4 "   < > 4.4   CHLORIDE mmol/L 101   < > 97*   CO2 mmol/L 25.0   < > 22.0   BUN mg/dL 14   < > 12   CREATININE mg/dL 0.76   < > 0.68   GLUCOSE mg/dL 111*   < > 120*   CALCIUM mg/dL 8.9   < > 9.3   PHOSPHORUS mg/dL  --   --  4.4    < > = values in this interval not displayed.     Results from last 7 days   Lab Units 06/08/20  0541 06/08/20  0023 06/07/20  1721 06/07/20  1117 06/07/20  0107 06/06/20  1737   GLUCOSE mg/dL 122 149* 121 118 120 115     Lab Results   Lab Value Date/Time    HGBA1C 5.80 (H) 06/01/2020 0637       Medications reviewed   Pertinent: abx, eliquis, lomotil, pepcid, cholestyramine x4/d, seroquel    Intake/Ouptut 24 hrs (7:00AM - 6:59 AM)     Intake & Output (last day)       06/07 0701 - 06/08 0700 06/08 0701 - 06/09 0700    Other 225     NG/     IV Piggyback 250     Total Intake(mL/kg) 1315 (12.6)     Urine (mL/kg/hr)      Stool      Total Output      Net +1315           Urine Unmeasured Occurrence 2 x     Stool Unmeasured Occurrence 6 x         Needs Assessment (6/2)     Height used:  154.9cm  Weight used: 105kg  IBW: 47.7kg     Estimated calorie needs: ~1500 kcal/day  Method:Kcals/KG 14= 1470  Method:HB 1627    Estimated protein needs: ~100 g pro/day  1.0 g/kg= 105g pro  2.0grm KG/IBW = 95    Current Nutrition Prescription     PO: NPO  EN: Peptamen AF  Goal rate: 60 mL  Route: NDT    Evaluation of Received Nutrient/Fluid Intake last day - 6/6 - 6/7:     At Target Goal Volume  Received per I/O's    % Est needs   Volume  1320 ml 590 mL      Modulars        Energy/kcals 1584 kcals 708 kcals 45%   Protein  100 g pro 45 g pro 45%               Fiber 8 g 3.5 g    Fluid   W/Free water 1071 ml  1236 ml 478 mL  643 mL            TGV calculated based on 20hrs delivery (in ICU prior to transfer to floor) with anticipated interruption in EN infusion for routine patient care    Nutrition Diagnosis     6/5 updated 6/8  Problem Less than optimal enteral infusion composition/modality   Etiology Removal of  keofeed   Signs/Symptoms 45% of goal volume provided d/t lost access   Status: ongoing      6/2 updated 6/5, 6/8  Problem Swallowing difficulty   Etiology Per SLP eval    Signs/Symptoms Per MBS (6/2) with recommendation for NPO status.    Status: ongoing    Nutrition Intervention     1.  Follow treatment progress, Care plan reviewed   2.  Cont current EN regimen and await testing results prior to adjustment/addition of fiber to current regimen.     At Target Goal Volume     % Est needs   Volume  1320 ml      Energy/kcals 1584 kcals 106%   Protein 100 g pro 100%   Fiber 8 g         Fluid   W/Free water 1071 ml  1616 ml       *TGV calculated based on 22hrs delivery with anticipated interruption in EN infusion for routine patient care    2. Nutrition panel, Pre-alb and CRP weekly while on EN  3. Monitor electrolytes with started tube feeding and replace as indicated   4. RD to monitor patient status, SLP eval, and adjust nutrition support regimen as medically appropriate     Goal:   General: Nutrition support treatment  EN/PN: Tolerate EN at goal, Adjust EN, Deliver estimated needs      Monitoring/Evaluation:   Per protocol, I&O, Pertinent labs, EN delivery/tolerance, GI status, Symptoms, Swallow function    Will Continue to follow per protocol      Alejandrina Sandoval RDN, LD  Time Spent: 30 minutes

## 2020-06-08 NOTE — PLAN OF CARE
Problem: Patient Care Overview  Goal: Plan of Care Review  Outcome: Ongoing (interventions implemented as appropriate)  Flowsheets (Taken 6/8/2020 1109 by Dayna Julian, PT)  Plan of Care Reviewed With: patient;daughter  Note:   SLP treatment completed. Will continue to address dysphagia, cognition and communication in treatment. Please see note for further details and recommendations.

## 2020-06-08 NOTE — PROGRESS NOTES
[facility]  Progress Note       LOS: 8 days   Patient Care Team:  Jl Mcknight MD as PCP - General (Family Medicine)    Chief Complaint:  F/u atrial fibrillation    Subjective     Interval History: Patient just got up to chair with PT.  Very drowsy at time of my exam.  Restraints in place to prevent pulling out feeding tube.  Atrial fibrillation on tele.  Denies palpitations, CP or SOB.  Slurred speech noted.        Review of Systems:   Unable to obtain full ROS as patient is very drowsy and difficult to keep awake during my exam.      Objective       Current Facility-Administered Medications:   •  acetaminophen (TYLENOL) 160 MG/5ML solution 650 mg, 650 mg, Nasogastric, Q4H PRN, Case, Magda V., DO, 650 mg at 06/07/20 1458  •  acetaminophen (TYLENOL) suppository 650 mg, 650 mg, Rectal, Q4H PRN, Case, Magda V., DO, 650 mg at 06/04/20 1402  •  amLODIPine (NORVASC) tablet 5 mg, 5 mg, Oral, Daily, Alesia Machado MD, 5 mg at 06/08/20 0939  •  amoxicillin-clavulanate (AUGMENTIN) 875-125 MG per tablet 1 tablet, 1 tablet, Oral, Q12H, Evelina Banda MD, 1 tablet at 06/08/20 0939  •  apixaban (ELIQUIS) tablet 5 mg, 5 mg, Oral, Q12H, Spike Vaughan MD, 5 mg at 06/08/20 0939  •  aspirin chewable tablet 81 mg, 81 mg, Oral, Daily, Spike Vaughan MD, 81 mg at 06/08/20 0939  •  atorvastatin (LIPITOR) tablet 80 mg, 80 mg, Oral, Nightly, Case, Magda V., DO, 80 mg at 06/07/20 2042  •  butalbital-acetaminophen-caffeine (FIORICET, ESGIC) -40 MG per tablet 1 tablet, 1 tablet, Oral, Q4H PRN, Case, Magda V., DO, 1 tablet at 06/04/20 1810  •  cholestyramine light packet 4 g, 1 packet, Oral, Daily, Case, Magda V., DO, 4 g at 06/08/20 0939  •  diphenoxylate-atropine (LOMOTIL) 2.5-0.025 MG per tablet 1 tablet, 1 tablet, Oral, BID, Case, Magda WOODS DO, 1 tablet at 06/08/20 0943  •  diphenoxylate-atropine (LOMOTIL) 2.5-0.025 MG per tablet 1 tablet, 1  "tablet, Oral, Q8H PRN, Evelina Banda MD, 1 tablet at 06/08/20 0448  •  enalaprilat (VASOTEC) injection 1.25 mg, 1.25 mg, Intravenous, Q6H PRN, Alesia Machado MD  •  famotidine (PEPCID) tablet 20 mg, 20 mg, Oral, BID AC, Evelina Banda MD, 20 mg at 06/08/20 0448  •  lactobacillus acidophilus (RISAQUAD) capsule 1 capsule, 1 capsule, Oral, Daily, Evelina Banda MD  •  lisinopril (PRINIVIL,ZESTRIL) tablet 10 mg, 10 mg, Oral, Q PM, Alesia Machado MD, 10 mg at 06/07/20 1841  •  metoprolol tartrate (LOPRESSOR) injection 5 mg, 5 mg, Intravenous, Q6H PRN, Evelina Banda MD, 5 mg at 06/08/20 0448  •  metoprolol tartrate (LOPRESSOR) tablet 50 mg, 50 mg, Oral, Q12H, Cassia Moss, APRN, 50 mg at 06/08/20 0941  •  nitroglycerin (NITROSTAT) SL tablet 0.4 mg, 0.4 mg, Sublingual, Q5 Min PRN, David Coy APRN  •  ondansetron (ZOFRAN) injection 4 mg, 4 mg, Intravenous, Q6H PRN, Evelina Banda MD, 4 mg at 06/06/20 1657  •  potassium chloride (MICRO-K) CR capsule 40 mEq, 40 mEq, Oral, PRN **OR** potassium chloride (KLOR-CON) packet 40 mEq, 40 mEq, Oral, PRN, 40 mEq at 06/05/20 2319 **OR** potassium chloride 10 mEq in 100 mL IVPB, 10 mEq, Intravenous, Q1H PRN, Case, Magda V., DO  •  QUEtiapine (SEROquel) tablet 12.5 mg, 12.5 mg, Oral, Nightly, Maritza Lamar, APRN, 12.5 mg at 06/07/20 2042  •  simethicone (MYLICON) 40 MG/0.6ML drops 40 mg, 40 mg, Oral, 4x Daily PRN, Evelina Banda MD, 40 mg at 06/08/20 0939    Vital Sign Min/Max for last 24 hours  Temp  Min: 97.3 °F (36.3 °C)  Max: 98.8 °F (37.1 °C)   BP  Min: 94/62  Max: 138/96   Pulse  Min: 100  Max: 123   Resp  Min: 16  Max: 18   SpO2  Min: 95 %  Max: 100 %   Flow (L/min)  Min: 2  Max: 3   No data recorded     Flowsheet Rows      First Filed Value   Admission Height  154.9 cm (61\") Documented at 05/31/2020 1502   Admission Weight  104 kg (230 lb) Documented at 05/31/2020 1502    "         Intake/Output Summary (Last 24 hours) at 6/8/2020 1101  Last data filed at 6/8/2020 1000  Gross per 24 hour   Intake 1415 ml   Output --   Net 1415 ml       Physical Exam:     General Appearance:    Drowsy, in no acute distress   Lungs:     Clear to auscultation anteriorly, respirations regular, even and unlabored    Heart:    Irreg irreg, normal S1 and S2, no murmur, no gallop, no rub, no click   Chest Wall:    No abnormalities observed   Abdomen:     Normal bowel sounds, no masses, soft, non-distended   Extremities:   No edema, no cyanosis, no redness   Pulses:   Pulses palpable and equal bilaterally   Skin:   No bleeding, bruising or rash  Neuro: Dysarthria        Results Review:   Results from last 7 days   Lab Units 06/08/20  0715 06/07/20  0726 06/06/20  0304   WBC 10*3/mm3 11.65* 10.50 12.90*   HEMOGLOBIN g/dL 11.0* 11.6* 10.7*   HEMATOCRIT % 40.7 42.3 38.3   PLATELETS 10*3/mm3 360 354 295     Results from last 7 days   Lab Units 06/08/20  0715 06/07/20  0726 06/06/20  0304   SODIUM mmol/L 137 140 138   POTASSIUM mmol/L 4.2 4.4 4.5   CHLORIDE mmol/L 99 101 102   CO2 mmol/L 25.0 25.0 24.0   BUN mg/dL 19 14 18   CREATININE mg/dL 0.68 0.76 0.75   GLUCOSE mg/dL 116* 111* 122*          Results from last 7 days   Lab Units 06/02/20  0430   CHOLESTEROL mg/dL 186   TRIGLYCERIDES mg/dL 147             Results from last 7 days   Lab Units 06/03/20  1501   PROTIME Seconds 13.1   INR  1.02   APTT seconds 25.1*     Results from last 7 days   Lab Units 06/04/20  0518 06/03/20  2303   TROPONIN T ng/mL <0.010 <0.010     Results from last 7 days   Lab Units 06/04/20  0518   MAGNESIUM mg/dL 2.2       Intake/Output Summary (Last 24 hours) at 6/8/2020 1101  Last data filed at 6/8/2020 1000  Gross per 24 hour   Intake 1415 ml   Output --   Net 1415 ml       I personally viewed and interpreted the patient's EKG/Telemetry data    EKG: None for review today    Telemetry: Atrial fibrillation, -123 bpm    Ejection  Fraction  No results found for: EF    Echo EF Estimated  No results found for: ECHOEFEST      Present on Admission:  • Cerebrovascular accident (CVA) due to embolism of right middle cerebral artery (CMS/HCC)  • Atrial fibrillation (CMS/HCC)  • Acute ischemic stroke (CMS/HCC)    Assessment/Plan   1. Persistent atrial fibrillation:  - Patient with known persistent atrial fibrillation, actually scheduled for PVA with Dr. Alan later this month, presents with acute CVA.  - Sotalol discontinued on admission  - CHADSVAS previously 4, now 6 with recent CVA, on Eliquis therapy.  Patient reports compliance and denies any missed doses.  - Echocardiogram showing normal EF, no evidence of PFO     2. Acute R MCA CVA:  - Admitted with acute CVA s/p failed thrombectomy but did receive intra-arterial TPA with minimal success.  Stroke evolving per repeat CT head.  - No significant carotid stenosis per CTA  - Repeat CT head showing evolving right MCA stroke with no hemorrhagic conversion.    - Neurology following.  Will need rehab at discharge.     3. Hypertension:  - Overall controlled    Plan:  1. Increase metoprolol to 75 mg bid for better rate control.  Up-titrate further as tolerated.  May need addition of PO diltiazem.  2. Favor switching Eliquis to Xarelto 20 mg daily given Eliquis failure.  3. Continue current antihypertensives.    Electronically signed by ISSA Gore, 06/08/20, 11:01 AM.

## 2020-06-08 NOTE — PROGRESS NOTES
Continued Stay Note  [facility]     Patient Name: Sidra Lane  MRN: [id number]  Today's Date: 6/8/2020    Admit Date: 5/31/2020    Discharge Plan     Row Name 06/08/20 1126       Plan    Plan  [facility] of [facility]    Patient/Family in Agreement with Plan  yes    Plan Comments  Spoke with Ana María (daughter) at bedside. Plan is [facility]. Patient is sleeping in chair but in restraints for pulling lines. CM will continue to follow.    Final Discharge Disposition Code  03 - skilled nursing facility (SNF)        Discharge Codes    No documentation.       Expected Discharge Date and Time     Expected Discharge Date Expected Discharge Time    Jun 5, 2020             Naga Muñoz RN

## 2020-06-08 NOTE — THERAPY TREATMENT NOTE
Patient Name: Sidra Lane  : 1955    MRN: [id number]                              Today's Date: 2020       Admit Date: 2020    Visit Dx:     ICD-10-CM ICD-9-CM   1. Acute ischemic stroke (CMS/HCC) I63.9 434.91   2. Oropharyngeal dysphagia R13.12 787.22   3. Dysarthria R47.1 784.51   4. Cognitive communication deficit R41.841 799.52     Patient Active Problem List   Diagnosis   • Shortness of breath   • Atrial fibrillation (CMS/HCC)   • Essential hypertension   • Dizziness   • Deep vein thrombosis (DVT) of proximal lower extremity (CMS/HCC)   • Coronary artery disease involving native coronary artery of native heart without angina pectoris   • Fatigue   • Chest pain   • Longstanding persistent atrial fibrillation   • Cerebrovascular accident (CVA) due to embolism of right middle cerebral artery (CMS/HCC)   • Acute ischemic stroke (CMS/HCC)     Past Medical History:   Diagnosis Date   • Atrial fibrillation (CMS/HCC)    • CHF (congestive heart failure) (CMS/HCC)    • Deep vein thrombosis (CMS/HCC)    • GERD (gastroesophageal reflux disease)    • Hypertension    • May-Thurner syndrome    • Pulmonary embolism (CMS/HCC)      Past Surgical History:   Procedure Laterality Date   • CHOLECYSTECTOMY     • COLON SURGERY      bowel leakage, some of colon removed   • INTERVENTIONAL RADIOLOGY PROCEDURE Bilateral 2020    Procedure: CAROTID CEREBRAL ANGIOGRAM BILATERAL;  Surgeon: Brant Duarte MD;  Location: [facility] INVASIVE LOCATION;  Service: Interventional Radiology;  Laterality: Bilateral;   • LAPAROSCOPIC TUBAL LIGATION     • LEG SURGERY      multiple stents to BLE   • TONSILLECTOMY       General Information     Row Name 20 1100          PT Evaluation Time/Intention    Document Type  therapy note (daily note)  -CD     Mode of Treatment  physical therapy  -CD     Row Name 20 1100          General Information    Patient Profile Reviewed?  yes  -CD     Existing  Precautions/Restrictions  fall L SIDED WEAKNESS.   -CD     Barriers to Rehab  none identified  -CD     Row Name 06/08/20 1100          Cognitive Assessment/Intervention- PT/OT    Orientation Status (Cognition)  oriented to;person;place  -CD     Cognitive Assessment/Intervention Comment  L NEGLECT, LETHARGIC BUT FOLLOWING COMMANDS AND INITIATING CONVERSATION. R GAZE PREFERENCE.   -CD     Row Name 06/08/20 1100          Safety Issues, Functional Mobility    Safety Issues Affecting Function (Mobility)  insight into deficits/self awareness;awareness of need for assistance;safety precaution awareness;safety precautions follow-through/compliance  -CD     Impairments Affecting Function (Mobility)  balance;coordination;endurance/activity tolerance;strength;motor control;visual/perceptual  -CD       User Key  (r) = Recorded By, (t) = Taken By, (c) = Cosigned By    Initials Name Provider Type    CD Dayna Julian, PT Physical Therapist        Mobility     Row Name 06/08/20 1104          Bed Mobility Assessment/Treatment    Sidelying-Sit [place] (Bed Mobility)  minimum assist (75% patient effort);2 person assist;verbal cues  -CD     Assistive Device (Bed Mobility)  bed rails;head of bed elevated  -CD     Comment (Bed Mobility)  CUES TO REACH FOR BEDRAIL WITH R UE TO ASSIST. PT ABLE TO SCOOT HIPS TO EOB WITH CGA AND CUES.   -CD     Row Name 06/08/20 1104          Transfer Assessment/Treatment    Comment (Transfers)  CUES FOR HAND PLACEMENT. STS AND STAND PIVOT TRANSFERS VIA B UE SUPPORT AND GAIT BELT. PIVOT BED TO BSC TO RECLINER.   -CD     Row Name 06/08/20 1104          Bed-Chair Transfer    Bed-Chair [place] (Transfers)  minimum assist (75% patient effort);2 person assist;verbal cues  -CD     Row Name 06/08/20 1104          Sit-Stand Transfer    Sit-Stand [place] (Transfers)  minimum assist (75% patient effort)  -CD     Row Name 06/08/20 1104          Gait/Stairs Assessment/Training    Comment (Gait/Stairs)   DEFERRED DUE TO C/O DIZZINESS IN STANDING DURING HYGIENE AFTER TOILETING AND CARDIO PA ARRIVED.   -CD       User Key  (r) = Recorded By, (t) = Taken By, (c) = Cosigned By    Initials Name Provider Type    CD Dayna Julian, PT Physical Therapist        Obj/Interventions     Row Name 06/08/20 1107          Therapeutic Exercise    Lower Extremity (Therapeutic Exercise)  LAQ (long arc quad), bilateral;SLR (straight leg raise), bilateral  -CD     Lower Extremity Range of Motion (Therapeutic Exercise)  ankle dorsiflexion/plantar flexion, bilateral  -CD     Position (Therapeutic Exercise)  seated;supine  -CD     Sets/Reps (Therapeutic Exercise)  10 REPS EXCEPT SLR X 5 REPS EACH.   -CD     Row Name 06/08/20 1107          Static Sitting Balance    Level of [place] (Unsupported Sitting, Static Balance)  contact guard assist  -CD     Sitting Position (Unsupported Sitting, Static Balance)  sitting on edge of bed  -CD     Time Able to Maintain Position (Unsupported Sitting, Static Balance)  4 to 5 minutes  -CD     Row Name 06/08/20 1107          Dynamic Sitting Balance    Level of [place], Reaches Outside Midline (Sitting, Dynamic Balance)  contact guard assist  -CD     Sitting Position, Reaches Outside Midline (Sitting, Dynamic Balance)  sitting on edge of bed  -CD     Comment, Reaches Outside Midline (Sitting, Dynamic Balance)  SCOOTING.   -CD     Row Name 06/08/20 1107          Static Standing Balance    Level of [place] (Supported Standing, Static Balance)  minimal assist, 75% patient effort;2 person assist  -CD     Assistive Device Utilized (Supported Standing, Static Balance)  -- VIA B UE SUPPORT.   -CD     Comment (Supported Standing, Static Balance)  STOOD FOR HYGIENE AFTER TOILETING.   -CD     Row Name 06/08/20 1107          Dynamic Standing Balance    Level of [place], Reaches Outside Midline (Standing, Dynamic Balance)  minimal assist, 75% patient effort;2 person assist  -CD     Assistive Device  Utilized (Supported Standing, Dynamic Balance)  -- VIA B UE SUPPORT AND GAIT BELT.   -CD       User Key  (r) = Recorded By, (t) = Taken By, (c) = Cosigned By    Initials Name Provider Type    CD Dayna Julian, PT Physical Therapist        Goals/Plan    No documentation.       Clinical Impression     Row Name 06/08/20 1109          Pain Assessment    Additional Documentation  Pain Scale: FACES Pre/Post-Treatment (Group)  -CD     Row Name 06/08/20 1109          Pain Scale: Numbers Pre/Post-Treatment    Pain Location - Orientation  lower  -CD     Pain Location  back  -CD     Pain Intervention(s)  Ambulation/increased activity;Repositioned  -CD     Row Name 06/08/20 1109          Pain Scale: FACES Pre/Post-Treatment    Pain: FACES Scale, Pretreatment  2-->hurts little bit  -CD     Pain: FACES Scale, Post-Treatment  2-->hurts little bit  -CD     Pre/Post Treatment Pain Comment  TOLERATED MOBILITY IN ROOM.   -CD     Row Name 06/08/20 1102          Plan of Care Review    Plan of Care Reviewed With  patient;daughter  -CD     Progress  improving  -CD     Outcome Summary  PT ALERT, FOLLOWING COMMANDS AND MOTIVATED TO GET OOB WITH THERAPY. PERFORMED BED MOBILITY AND TRANSFERS WITH MIN ASSIST OF 2. CONTINUES WITH L SIDED WEAKNESS, L NEGLECT AND SLURRED SPEECH. RECOMMEND IRF AT D/C.   -CD     Row Name 06/08/20 1104          Physical Therapy Clinical Impression    Patient/Family Goals Statement (PT Clinical Impression)  TO RETURN TO PLOF.   -CD     Criteria for Skilled Interventions Met (PT Clinical Impression)  yes  -CD     Rehab Potential (PT Clinical Summary)  good, to achieve stated therapy goals  -CD     Row Name 06/08/20 1107          Vital Signs    Post Systolic BP Rehab  112  -CD     Post Treatment Diastolic BP  81  -CD     Pretreatment Heart Rate (beats/min)  107  -CD     Intratreatment Heart Rate (beats/min)  109  -CD     Posttreatment Heart Rate (beats/min)  84  -CD     Pre Patient Position  Supine  -CD     Intra  Patient Position  Standing  -CD     Post Patient Position  Sitting  -CD     Row Name 06/08/20 1109          Positioning and Restraints    Pre-Treatment Position  in bed  -CD     Post Treatment Position  chair  -CD     In Chair  reclined;call light within reach;encouraged to call for assist;exit alarm on;with family/caregiver;legs elevated;LUE elevated;notified nsg  -CD       User Key  (r) = Recorded By, (t) = Taken By, (c) = Cosigned By    Initials Name Provider Type    Dayna Camarena PT Physical Therapist        Outcome Measures     Row Name 06/08/20 1112          How much help from another person do you currently need...    Turning from your back to your side while in flat bed without using bedrails?  3  -CD     Moving from lying on back to sitting on the side of a flat bed without bedrails?  2  -CD     Moving to and from a bed to a chair (including a wheelchair)?  3  -CD     Standing up from a chair using your arms (e.g., wheelchair, bedside chair)?  3  -CD     Climbing 3-5 steps with a railing?  1  -CD     To walk in hospital room?  2  -CD     AM-PAC 6 Clicks Score (PT)  14  -CD     Row Name 06/08/20 1112          Modified Isaac Scale    Modified Isaac Scale  4 - Moderately severe disability.  Unable to walk without assistance, and unable to attend to own bodily needs without assistance.  -CD     Row Name 06/08/20 1112          Functional Assessment    Outcome Measure Options  AM-PAC 6 Clicks Basic Mobility (PT);Modified Isaac  -CD       User Key  (r) = Recorded By, (t) = Taken By, (c) = Cosigned By    Initials Name Provider Type    Dayna Camarena PT Physical Therapist        Physical Therapy Education                 Title: PT OT SLP Therapies (Done)     Topic: Physical Therapy (Done)     Point: Mobility training (Done)     Description:   Instruct learner(s) on safety and technique for assisting patient out of bed, chair or wheelchair.  Instruct in the proper use of assistive devices, such as walker,  crutches, cane or brace.              Patient Friendly Description:   It's important to get you on your feet again, but we need to do so in a way that is safe for you. Falling has serious consequences, and your personal safety is the most important thing of all.        When it's time to get out of bed, one of us or a family member will sit next to you on the bed to give you support.     If your doctor or nurse tells you to use a walker, crutches, a cane, or a brace, be sure you use it every time you get out of bed, even if you think you don't need it.    Learning Progress Summary           Patient Acceptance, E, VU,NR by CD at 6/8/2020 1113    Comment:  SEE FLOWSHEET.    Acceptance, E,D, NR,VU by  at 6/3/2020 1350    Acceptance, E, NR by KG at 6/2/2020 1315                   Point: Home exercise program (Done)     Description:   Instruct learner(s) on appropriate technique for monitoring, assisting and/or progressing patient with therapeutic exercises and activities.              Learning Progress Summary           Patient Acceptance, E, VU,NR by CD at 6/8/2020 1113    Comment:  SEE FLOWSHEET.                   Point: Body mechanics (Done)     Description:   Instruct learner(s) on proper positioning and spine alignment for patient and/or caregiver during mobility tasks and/or exercises.              Learning Progress Summary           Patient Acceptance, E, VU,NR by CD at 6/8/2020 1113    Comment:  SEE FLOWSHEET.    Acceptance, E,D, NR,VU by  at 6/3/2020 1350    Acceptance, E, NR by KG at 6/2/2020 1315                   Point: Precautions (Done)     Description:   Instruct learner(s) on prescribed precautions during mobility and gait tasks              Learning Progress Summary           Patient Acceptance, E, VU,NR by CD at 6/8/2020 1113    Comment:  SEE FLOWSHEET.    Acceptance, E,D, NR,VU by  at 6/3/2020 1350    Acceptance, E, NR by KG at 6/2/2020 1315                               User Key     Initials  Effective Dates Name Provider Type Discipline    CD 06/19/15 -  Dayna Julian, PT Physical Therapist PT    KG 05/22/20 -  Taylor Jeff PT Physical Therapist PT    SH 03/19/20 -  Tahira Ruiz, MARIANA Student PT Student PT              PT Recommendation and Plan     Outcome Summary/Treatment Plan (PT)  Anticipated Discharge Disposition (PT): inpatient rehabilitation facility  Plan of Care Reviewed With: patient, daughter  Progress: improving  Outcome Summary: PT ALERT, FOLLOWING COMMANDS AND MOTIVATED TO GET OOB WITH THERAPY. PERFORMED BED MOBILITY AND TRANSFERS WITH MIN ASSIST OF 2. CONTINUES WITH L SIDED WEAKNESS, L NEGLECT AND SLURRED SPEECH. RECOMMEND IRF AT D/C.      Time Calculation:   PT Charges     Row Name 06/08/20 1114             Time Calculation    Start Time  1008  -CD      PT Received On  06/08/20  -CD      PT Goal Re-Cert Due Date  06/12/20  -CD         Time Calculation- PT    Total Timed Code Minutes- PT  42 minute(s)  -CD         Timed Charges    66711 - PT Therapeutic Exercise Minutes  20  -CD      96604 - PT Therapeutic Activity Minutes  22  -CD        User Key  (r) = Recorded By, (t) = Taken By, (c) = Cosigned By    Initials Name Provider Type    CD Dayna Julian, PT Physical Therapist        Therapy Charges for Today     Code Description Service Date Service Provider Modifiers Qty    63190924387 HC PT THER PROC EA 15 MIN 6/8/2020 Dayna Julian, PT GP 1    81760033737 HC PT THERAPEUTIC ACT EA 15 MIN 6/8/2020 Dayna Julian, PT GP 2    74816296529 HC PT THER SUPP EA 15 MIN 6/8/2020 Dayna Julian, PT GP 2          PT G-Codes  Outcome Measure Options: AM-PAC 6 Clicks Basic Mobility (PT), Modified Isaac  AM-PAC 6 Clicks Score (PT): 14  AM-PAC 6 Clicks Score (OT): 11  Modified Dixie Scale: 4 - Moderately severe disability.  Unable to walk without assistance, and unable to attend to own bodily needs without assistance.    Dayna Julian, MARIANA  6/8/2020

## 2020-06-08 NOTE — PROGRESS NOTES
Patient changed from Apixaban to Rivaroxaban this admission for embolic stroke (deemed Apixaban failure per cardiology). Brief education provided on 6/8/2020 verbally, though full education provided in writing as well. Information provided includes effects of medication, drug-drug and drug-food interactions, and signs/symptoms of bleeding and clotting. Patient verbalized understanding through teach back. All pertinent questions were answered.    Francoise Song, Vladimir, BCPS  6/8/2020  13:44

## 2020-06-08 NOTE — PROGRESS NOTES
[facility]  PROGRESS NOTE    Patient Name: Sidra Lane  : 1955  MRN: [id number]    Date of Admission: 2020  Primary Care Physician: Jl Mcknight MD    Subjective   Subjective     CC:  Follow-up acute CVA    HPI:  Pt was given Seroquel last pm (Neurology had ordered).  Lethargic this am, but able to answer some questions.     Review of Systems  Gen- No fevers, chills  CV- No chest pain, palpitations  Resp- No cough, dyspnea  GI- + diarrhea, + abdominal pain      Objective   Objective     Vital Signs:   Temp:  [97.3 °F (36.3 °C)-98.7 °F (37.1 °C)] 97.6 °F (36.4 °C)  Heart Rate:  [100-126] 123  Resp:  [16-18] 18  BP: ()/(62-96) 128/79  Total (NIH Stroke Scale): 10     Physical Exam:  General Assessment: No acute cardiopulmonary distress. Well developed and well nourished.    HEENT: NCAT, PERRL, MM moist    Neck: Supple, no carotid bruit bilat    CVS: RRR, S1S2 normal, no murmurs    Resp: Exam limited to anterior aspect, relatively clear overall, breathing comfortably today on 4L BNC    Abd: soft, NT, ND, normal BS, no guarding or peritoneal signs    Ext: No edema, both calves are symmetric and NTTP    Neuro: Patient is lethargic, arousable, attempting to answer some simple questions, however speech is very dysarthric. Pt confused.     Skin: W/D/I. No rash.    Psych: flat affect      Results Reviewed:  Results from last 7 days   Lab Units 20  0715 20  0726 20  0304  20  1501   WBC 10*3/mm3 11.65* 10.50 12.90*   < > 11.85*   HEMOGLOBIN g/dL 11.0* 11.6* 10.7*   < > 11.9*   HEMATOCRIT % 40.7 42.3 38.3   < > 43.1   PLATELETS 10*3/mm3 360 354 295   < > 263   INR   --   --   --   --  1.02    < > = values in this interval not displayed.     Results from last 7 days   Lab Units 20  0726 20  0304 20  0715 20  0518 20  2303  20  0430   SODIUM mmol/L 140 138 132* 136  --    < > 137  137    POTASSIUM mmol/L 4.4 4.5 3.6 4.4  --    < > 4.5  4.4   CHLORIDE mmol/L 101 102 94* 97*  --    < > 101  100   CO2 mmol/L 25.0 24.0 22.0 22.0  --    < > 17.0*  22.0   BUN mg/dL 14 18 21 12  --    < > 12  12   CREATININE mg/dL 0.76 0.75 0.81 0.68  --    < > 0.78  0.69   GLUCOSE mg/dL 111* 122* 156* 120*  --    < > 145*  142*   CALCIUM mg/dL 8.9 8.4* 9.2 9.3  --    < > 8.6  8.6   ALT (SGPT) U/L  --   --   --   --   --   --  19   AST (SGOT) U/L  --   --   --   --   --   --  38*   TROPONIN T ng/mL  --   --   --  <0.010 <0.010  --   --     < > = values in this interval not displayed.     Estimated Creatinine Clearance: 75.7 mL/min (by C-G formula based on SCr of 0.76 mg/dL).    Microbiology Results Abnormal     Procedure Component Value - Date/Time    Blood Culture - Blood, Hand, Left [[id number]] Collected:  06/04/20 1406    Lab Status:  Preliminary result Specimen:  Blood from Hand, Left Updated:  06/07/20 1501     Blood Culture No growth at 3 days    Blood Culture - Blood, Hand, Right [[id number]] Collected:  06/04/20 1400    Lab Status:  Preliminary result Specimen:  Blood from Hand, Right Updated:  06/07/20 1501     Blood Culture No growth at 3 days    Urine Culture - Urine, Urine, Clean Catch [[id number]] Collected:  06/05/20 1358    Lab Status:  Final result Specimen:  Urine, Clean Catch Updated:  06/06/20 0937     Urine Culture 50,000 CFU/mL Mixed Herberth Isolated    Narrative:       Specimen contains mixed organisms of questionable pathogenicity which indicates contamination with commensal herberth.  Further identification is unlikely to provide clinically useful information.  Suggest recollection.    MRSA Screen, PCR (Inpatient) - Swab, Nares [[id number]]  (Abnormal) Collected:  06/05/20 1403    Lab Status:  Final result Specimen:  Swab from Nares Updated:  06/05/20 1528     MRSA PCR Positive    COVID-19,CEPHEID,AMBERLY IN-HOUSE(OR EMERGENT/ADD-ON),NP SWAB IN TRANSPORT MEDIA 3-4 HR TAT - Swab, Nasopharynx [[id number]]   (Normal) Collected:  05/31/20 1533    Lab Status:  Final result Specimen:  Swab from Nasopharynx Updated:  05/31/20 1638     COVID19 Not Detected          Imaging Results (Last 24 Hours)     Procedure Component Value Units Date/Time    XR Abdomen KUB [[id number]] Collected:  06/07/20 1355     Updated:  06/07/20 2302    Narrative:       EXAMINATION: XR ABDOMEN KUB - 06/07/2020     INDICATION: NG tube placement.  I63.9-Cerebral infarction, unspecified;  R13.12-Dysphagia, oropharyngeal phase; R47.1-Dysarthria and anarthria;  R41.841-Cognitive communication deficit.     COMPARISON: 06/04/2020     FINDINGS: Feeding tube is seen with its tip in the region of the  duodenal bulb. Gas is seen in normal caliber colon. No abnormally  dilated bowel loops are identified. Multiple iliac stents are noted.       Impression:       Feeding tube tip in the region of the duodenal bulb.      DICTATED:   06/07/2020  EDITED/ls :   06/07/2020      This report was finalized on 6/7/2020 10:59 PM by Dr. Ervin Troncoso MD.             Results for orders placed during the hospital encounter of 05/31/20   Adult Transthoracic Echo Complete W/ Cont if Necessary Per Protocol    Narrative · Left ventricular systolic function is normal. Estimated EF appears to be   in the range of 56 - 60%.  · Normal right ventricular cavity size and systolic function noted.  · Left atrial cavity size is moderately dilated. Left atrial volume is   moderately increased.  · No evidence of a patent foramen ovale. Saline test results are negative.  · The aortic valve is abnormal in structure. The valve exhibits sclerosis.   There is mild calcification of the aortic valve  · Mild aortic valve stenosis is present          I have reviewed the medications:  Scheduled Meds:    amLODIPine 5 mg Oral Daily   amoxicillin-clavulanate 1 tablet Oral Q12H   apixaban 5 mg Oral Q12H   aspirin 81 mg Oral Daily   atorvastatin 80 mg Oral Nightly   cholestyramine light 1 packet Oral Daily    diphenoxylate-atropine 1 tablet Oral BID   famotidine 20 mg Oral BID AC   lisinopril 10 mg Oral Q PM   metoprolol tartrate 50 mg Oral Q12H   QUEtiapine 12.5 mg Oral Nightly     Continuous Infusions:     PRN Meds:.•  acetaminophen  •  acetaminophen  •  butalbital-acetaminophen-caffeine  •  diphenoxylate-atropine  •  enalaprilat  •  metoprolol tartrate  •  nitroglycerin  •  ondansetron  •  potassium chloride **OR** potassium chloride **OR** potassium chloride  •  simethicone    Assessment/Plan   Assessment & Plan     Active Hospital Problems    Diagnosis  POA   • **Cerebrovascular accident (CVA) due to embolism of right middle cerebral artery (CMS/HCC) [I63.411]  Yes   • Acute ischemic stroke (CMS/HCC) [I63.9]  Yes   • Atrial fibrillation (CMS/HCC) [I48.91]  Yes      Resolved Hospital Problems   No resolved problems to display.        Brief Hospital Course to date:  Sidra Lane is a 65 y.o. 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  She was found to have a right MCA territory reversible ischemia on CT perfusion.  She underwent a CT head neck angiogram with an intra-arterial TPA after failed thrombectomy and admitted to the ICU afterward.  She was transferred out of ICU on 6/3 with hospitalist assuming care on 6/4.    Right MCA stroke  - likely cardioembolic.  Patient has a history of persistent atrial fibrillation and had been anticoagulated with Eliquis.  She had been followed by her electrophysiologist and had plans for an ablation prior to readmission.  -Again she is status post intra-arterial TPA after failed thrombectomy  -She had some delirium while in the ICU and Seroquel 50 mg nightly was given to her  -Stat CT brain was performed on 6/4 due to worsening left sided weakness and acute hemorrhagic transformation was ruled out, but does still reporting evolving MCA  CVA that was stable from the previous exam.  -The reasons for her worsening symptoms may be related to Seroquel.  Dr. Vaughan has recommended to hold Seroquel for now, stopped PRN Seroquel.   -Repeat CT head 6/6 with no evidence of hemorrhage, stopped heparin gtt and started on Eliquis. Pt now has no Keofeed, if unable to replace this am, will have to place back on heparin gtt until she has feeding tube again.   -EP also following, resumed Metoprolol PO  -Failed modified barium swallow and was started on tube feeding, Keofeed now back in place    Persistent atrial fibrillation with RVR  -Patient had been on beta-blocker per her cardiologist and had plans for ablation prior to admission  -Currently she is not a candidate for an ablation  -We will defer management to her EP  --getting PO Metoprolol      Hypertension  -Not well controlled, initially was allowed for permissive hypertension due to acute stroke.  Currently Dr. Vaughan recommends better control of her blood pressure and bring it down to normotensive.  -I have resumed some of patient's home medications and hope to bring it down slowly. Beta blocker resumed by Cards.     SIRS  -Possible aspiration when patient pulled out her Keofeed tube earlier this stay.  Pt again removed her Keofeed this am, but no s/s of aspiration per RN.  Replacing.   --CXR unremarkable on 6/4 and 6/5  --now afebrile, added Vanc/Zosyn on 6/5 due to worsening leukocytosis with left shift. MRSA PCR +, so had been on Vanc.  Will also continue Zosyn given concern for possible aspiration. Will de-escalate today as pt's leukocytosis has resolved.  Will do Augmentin BID to complete a seven day course (to cover for possible aspiration). Will defer MRSA coverage for now. Repeat CBC with diff in am. Slight leukocytosis with left shift this am, but mild and afebrile.  Continue to monitor on current ABX  --although diarrhea is chronic, given increase in WBCs and the fact that she has been on ABX, will  r/o Cdiff    Hyperlipidemia  -High intensity statin therapy    Left-sided weakness  -PT OT, will benefit from short-term rehab once she is medically stable    Dysphagia  -Speech pathologist following, currently getting enteral feeding Via Keofeed tube.  Tube feedings at goal.  -PEG tube placement discussed with patient's family by intensivist, currently they want to hold off.    Diarrhea  --apparently this is a chronic issue at home, but has worsened since admission. Pt's weight is down 12 lbs, having multiple loose stools daily.  Per daughter, pt has had diarrhea since partial colectomy in 2016 ([facility]) and has been told she may have Crohn's- has never been on immunosuppression and can't remember with whom she follows with GI. May consider GI consult. Again, just checking Cdiff given increase in WBCs and pt has been on ABX.  Start probiotic      DVT Prophylaxis: Fully anticoagulated with Eliquis      Disposition:TBD pending further evaluation and response to therapy    CODE STATUS:   Code Status and Medical Interventions:   Ordered at: 05/31/20 1743     Code Status:    CPR     Medical Interventions (Level of Support Prior to Arrest):    Full         Electronically signed by Evelina Banda MD, 06/08/20, 08:25.

## 2020-06-09 LAB
ALBUMIN SERPL-MCNC: 3.5 G/DL (ref 3.5–5.2)
ALP SERPL-CCNC: 57 U/L (ref 39–117)
ALT SERPL W P-5'-P-CCNC: 15 U/L (ref 1–33)
ANION GAP SERPL CALCULATED.3IONS-SCNC: 13 MMOL/L (ref 5–15)
ANION GAP SERPL CALCULATED.3IONS-SCNC: 19 MMOL/L (ref 5–15)
AST SERPL-CCNC: 22 U/L (ref 1–32)
BACTERIA SPEC AEROBE CULT: NORMAL
BACTERIA SPEC AEROBE CULT: NORMAL
BILIRUB SERPL-MCNC: 0.3 MG/DL (ref 0.2–1.2)
BUN BLD-MCNC: 19 MG/DL (ref 8–23)
BUN BLD-MCNC: 20 MG/DL (ref 8–23)
BUN/CREAT SERPL: 30.2 (ref 7–25)
CALCIUM SPEC-SCNC: 9 MG/DL (ref 8.6–10.5)
CALCIUM SPEC-SCNC: 9 MG/DL (ref 8.6–10.5)
CHLORIDE SERPL-SCNC: 102 MMOL/L (ref 98–107)
CHLORIDE SERPL-SCNC: 107 MMOL/L (ref 98–107)
CHOLEST SERPL-MCNC: 124 MG/DL (ref 0–200)
CO2 SERPL-SCNC: 15 MMOL/L (ref 22–29)
CO2 SERPL-SCNC: 21 MMOL/L (ref 22–29)
CREAT BLD-MCNC: 0.63 MG/DL (ref 0.57–1)
CREAT BLD-MCNC: 0.68 MG/DL (ref 0.57–1)
DEPRECATED RDW RBC AUTO: 57.9 FL (ref 37–54)
ERYTHROCYTE [DISTWIDTH] IN BLOOD BY AUTOMATED COUNT: 19.6 % (ref 12.3–15.4)
GFR SERPL CREATININE-BSD FRML MDRD: 94 ML/MIN/1.73
GLUCOSE BLD-MCNC: 101 MG/DL (ref 65–99)
GLUCOSE BLD-MCNC: 105 MG/DL (ref 65–99)
GLUCOSE BLDC GLUCOMTR-MCNC: 105 MG/DL (ref 70–130)
GLUCOSE BLDC GLUCOMTR-MCNC: 122 MG/DL (ref 70–130)
GLUCOSE BLDC GLUCOMTR-MCNC: 154 MG/DL (ref 70–130)
HCT VFR BLD AUTO: 41.4 % (ref 34–46.6)
HGB BLD-MCNC: 11 G/DL (ref 12–15.9)
MAGNESIUM SERPL-MCNC: 2.4 MG/DL (ref 1.6–2.4)
MCH RBC QN AUTO: 22.3 PG (ref 26.6–33)
MCHC RBC AUTO-ENTMCNC: 26.6 G/DL (ref 31.5–35.7)
MCV RBC AUTO: 84 FL (ref 79–97)
PHOSPHATE SERPL-MCNC: 3.9 MG/DL (ref 2.5–4.5)
PLATELET # BLD AUTO: 357 10*3/MM3 (ref 140–450)
PMV BLD AUTO: 11.2 FL (ref 6–12)
POTASSIUM BLD-SCNC: 4.5 MMOL/L (ref 3.5–5.2)
POTASSIUM BLD-SCNC: 4.7 MMOL/L (ref 3.5–5.2)
PROT SERPL-MCNC: 7 G/DL (ref 6–8.5)
RBC # BLD AUTO: 4.93 10*6/MM3 (ref 3.77–5.28)
SODIUM BLD-SCNC: 136 MMOL/L (ref 136–145)
SODIUM BLD-SCNC: 141 MMOL/L (ref 136–145)
TRIGL SERPL-MCNC: 123 MG/DL (ref 0–150)
WBC NRBC COR # BLD: 8.95 10*3/MM3 (ref 3.4–10.8)

## 2020-06-09 PROCEDURE — 92507 TX SP LANG VOICE COMM INDIV: CPT

## 2020-06-09 PROCEDURE — 82465 ASSAY BLD/SERUM CHOLESTEROL: CPT | Performed by: INTERNAL MEDICINE

## 2020-06-09 PROCEDURE — 99231 SBSQ HOSP IP/OBS SF/LOW 25: CPT | Performed by: NURSE PRACTITIONER

## 2020-06-09 PROCEDURE — 82962 GLUCOSE BLOOD TEST: CPT

## 2020-06-09 PROCEDURE — 80053 COMPREHEN METABOLIC PANEL: CPT | Performed by: INTERNAL MEDICINE

## 2020-06-09 PROCEDURE — 83735 ASSAY OF MAGNESIUM: CPT | Performed by: INTERNAL MEDICINE

## 2020-06-09 PROCEDURE — 97530 THERAPEUTIC ACTIVITIES: CPT

## 2020-06-09 PROCEDURE — 84478 ASSAY OF TRIGLYCERIDES: CPT | Performed by: INTERNAL MEDICINE

## 2020-06-09 PROCEDURE — 99233 SBSQ HOSP IP/OBS HIGH 50: CPT | Performed by: INTERNAL MEDICINE

## 2020-06-09 PROCEDURE — 85027 COMPLETE CBC AUTOMATED: CPT | Performed by: INTERNAL MEDICINE

## 2020-06-09 PROCEDURE — 84100 ASSAY OF PHOSPHORUS: CPT | Performed by: INTERNAL MEDICINE

## 2020-06-09 PROCEDURE — 92526 ORAL FUNCTION THERAPY: CPT

## 2020-06-09 PROCEDURE — 25010000002 ONDANSETRON PER 1 MG: Performed by: INTERNAL MEDICINE

## 2020-06-09 PROCEDURE — 97110 THERAPEUTIC EXERCISES: CPT

## 2020-06-09 RX ORDER — METOPROLOL TARTRATE 100 MG/1
100 TABLET ORAL EVERY 12 HOURS SCHEDULED
Status: DISCONTINUED | OUTPATIENT
Start: 2020-06-09 | End: 2020-06-18 | Stop reason: HOSPADM

## 2020-06-09 RX ADMIN — METOPROLOL TARTRATE 100 MG: 100 TABLET ORAL at 20:54

## 2020-06-09 RX ADMIN — ATORVASTATIN CALCIUM 80 MG: 40 TABLET, FILM COATED ORAL at 20:54

## 2020-06-09 RX ADMIN — QUETIAPINE FUMARATE 12.5 MG: 25 TABLET ORAL at 20:54

## 2020-06-09 RX ADMIN — FAMOTIDINE 20 MG: 20 TABLET, FILM COATED ORAL at 17:37

## 2020-06-09 RX ADMIN — LISINOPRIL 10 MG: 10 TABLET ORAL at 17:37

## 2020-06-09 RX ADMIN — AMOXICILLIN AND CLAVULANATE POTASSIUM 1 TABLET: 875; 125 TABLET, FILM COATED ORAL at 09:23

## 2020-06-09 RX ADMIN — ONDANSETRON 4 MG: 2 INJECTION INTRAMUSCULAR; INTRAVENOUS at 02:24

## 2020-06-09 RX ADMIN — DIPHENOXYLATE HYDROCHLORIDE AND ATROPINE SULFATE 1 TABLET: 2.5; .025 TABLET ORAL at 20:54

## 2020-06-09 RX ADMIN — AMANTADINE HYDROCHLORIDE 100 MG: 100 CAPSULE ORAL at 09:23

## 2020-06-09 RX ADMIN — AMOXICILLIN AND CLAVULANATE POTASSIUM 1 TABLET: 875; 125 TABLET, FILM COATED ORAL at 20:54

## 2020-06-09 RX ADMIN — AMLODIPINE BESYLATE 5 MG: 5 TABLET ORAL at 09:22

## 2020-06-09 RX ADMIN — FAMOTIDINE 20 MG: 20 TABLET, FILM COATED ORAL at 05:58

## 2020-06-09 RX ADMIN — RIVAROXABAN 20 MG: 20 TABLET, FILM COATED ORAL at 17:37

## 2020-06-09 RX ADMIN — CHOLESTYRAMINE 4 G: 4 POWDER, FOR SUSPENSION ORAL at 09:23

## 2020-06-09 RX ADMIN — METOPROLOL TARTRATE 100 MG: 100 TABLET ORAL at 09:30

## 2020-06-09 RX ADMIN — AMANTADINE HYDROCHLORIDE 100 MG: 100 CAPSULE ORAL at 15:40

## 2020-06-09 RX ADMIN — DIPHENOXYLATE HYDROCHLORIDE AND ATROPINE SULFATE 1 TABLET: 2.5; .025 TABLET ORAL at 05:58

## 2020-06-09 RX ADMIN — Medication 1 CAPSULE: at 09:23

## 2020-06-09 RX ADMIN — DIPHENOXYLATE HYDROCHLORIDE AND ATROPINE SULFATE 1 TABLET: 2.5; .025 TABLET ORAL at 09:23

## 2020-06-09 RX ADMIN — ASPIRIN 81 MG 81 MG: 81 TABLET ORAL at 09:25

## 2020-06-09 NOTE — PLAN OF CARE
Problem: Patient Care Overview  Goal: Plan of Care Review  Outcome: Ongoing (interventions implemented as appropriate)  Flowsheets (Taken 6/9/2020 0800 by Eneida De La Cruz RN)  Plan of Care Reviewed With: patient  Note:   SLP treatment completed. Will continue to address dysphagia, cognition and communication in treatment. Please see note for further details and recommendations.

## 2020-06-09 NOTE — PROGRESS NOTES
Continued Stay Note  [facility]     Patient Name: Sidra Lane  MRN: [id number]  Today's Date: 6/9/2020    Admit Date: 5/31/2020    Discharge Plan     Row Name 06/09/20 1238       Plan    Plan  Signature of Jonas Virgen    Patient/Family in Agreement with Plan  yes    Plan Comments  Spoke with patient and daughter at bedside. Plan is still Signature of Jonas Virgen A new GI consult was placed for chronic diarrhea today. CM will continue to follow.    Final Discharge Disposition Code  03 - skilled nursing facility (SNF)        Discharge Codes    No documentation.             Naga Muñoz RN

## 2020-06-09 NOTE — PROGRESS NOTES
Stroke Progress Note       Chief Complaint:  Left-sided weakness, dysarthria    Subjective     Subjective:   Patient is laying in bed with eyes open. She has asked for a bedpan just prior to me entering the room.  She is much more alert and can answer some questions.  Moving all extremities.    Review of Systems   Gastrointestinal: Positive for abdominal pain and diarrhea.   Endocrine: Negative.    Genitourinary: Negative.    Musculoskeletal: Negative.    Skin: Negative.    Neurological: Positive for facial asymmetry, speech difficulty and weakness.        Objective      Temp:  [97.5 °F (36.4 °C)-98.2 °F (36.8 °C)] 98.2 °F (36.8 °C)  Heart Rate:  [] 98  Resp:  [16-18] 16  BP: (108-169)/(71-97) 123/97    Neurological Exam  Mental Status  Awake and drowsy. Oriented only to person and place. Moderate dysarthria present. Language is fluent with no aphasia. Attention and concentration: Decreased.    Cranial Nerves  CN III, IV, VI: Partial gaze palsy with right gaze preference, unable to move eyes beyond midline on left.  CN V: Facial sensation is normal.  CN VII:  Right: There is no facial weakness.  Left: There is central facial weakness.  CN VIII: Hearing is normal.  CN IX, X: Palate elevates symmetrically  CN XI: Shoulder shrug strength is normal.  CN XII: Tongue midline without atrophy or fasciculations.    Motor   No fasciculations present. Normal muscle tone. No abnormal involuntary movements. Right hemiparesis.  Left upper extremity is 3/5, left lower extremity is 3/5  Right upper extremity/right lower extremity spontaneous movement 5/5.    Sensory  Light touch is normal in upper and lower extremities. Sensation: Hard to assess due to patient participation.     Coordination  Hard to assess due to patient participation.    Gait  Not assessed.      Physical Exam   Constitutional: She is oriented to person, place, and time. She appears well-developed and well-nourished.   HENT:   Head: Normocephalic and  atraumatic.   Cardiovascular:   Sustained atrial fibrillation    Pulmonary/Chest: Effort normal and breath sounds normal.   Abdominal: Soft. She exhibits no distension.   Neurological: She is oriented to person, place, and time. GCS eye subscore is 4. GCS verbal subscore is 5. GCS motor subscore is 6.   Drowsy, arousable to verbal stimuli   Skin: Skin is warm and dry.   Psychiatric: She has a normal mood and affect. Her behavior is normal.   Nursing note and vitals reviewed.    Nursing note and vitals reviewed.    Interval: baseline  1a. Level of Consciousness: 0-->Alert, keenly responsive  1b. LOC Questions: 0-->Answers both questions correctly  1c. LOC Commands: 0-->Performs both tasks correctly  2. Best Gaze: 1-->Partial gaze palsy, gaze is abnormal in one or both eyes, but forced deviation or total gaze paresis is not present  3. Visual: 1-->Partial hemianopia  4. Facial Palsy: 2-->Partial paralysis (total or near-total paralysis of lower face)  5a. Motor Arm, Left: 1-->Drift, limb holds 90 (or 45) degrees, but drifts down before full 10 seconds, does not hit bed or other support  5b. Motor Arm, Right: 0-->No drift, limb holds 90 (or 45) degrees for full 10 secs  6a. Motor Leg, Left: 1-->Drift, leg falls by the end of the 5-sec period but does not hit bed  6b. Motor Leg, Right: 0-->No drift, leg holds 30 degree position for full 5 secs  7. Limb Ataxia: 0-->Absent  8. Sensory: 0-->Normal, no sensory loss  9. Best Language: 0-->No aphasia, normal  10. Dysarthria: 1-->Mild-to-moderate dysarthria, patient slurs at least some words and, at worst, can be understood with some difficulty  11. Extinction and Inattention (formerly Neglect): 0-->No abnormality    Total (NIH Stroke Scale): 7      Results Review:    I reviewed the patient's new clinical results.      Ct Head Without Contrast    Result Date: 6/4/2020  Evolving right MCA infarcts in the anterior and posterior watershed regions as described, without significant  interval extension of infarct territory. No evidence of hemorrhage, new infarct or other new acute intracranial disease is appreciated  D:  06/04/2020 E:  06/04/2020          Results for orders placed during the hospital encounter of 05/31/20   Adult Transthoracic Echo Complete W/ Cont if Necessary Per Protocol    Narrative · Left ventricular systolic function is normal. Estimated EF appears to be   in the range of 56 - 60%.  · Normal right ventricular cavity size and systolic function noted.  · Left atrial cavity size is moderately dilated. Left atrial volume is   moderately increased.  · No evidence of a patent foramen ovale. Saline test results are negative.  · The aortic valve is abnormal in structure. The valve exhibits sclerosis.   There is mild calcification of the aortic valve  · Mild aortic valve stenosis is present            Assessment/Plan         Assessment/Plan:        65-year-old right-handed white female with known diagnosis of hypertension, hyperlipidemia, persistent atrial fibrillation, on Eliquis, who was admitted with right MCA stroke.  On arrival, thrombectomy was attempted (was unsuccessful), but intra-arterial TPA was given.  Patient was not a candidate for TPA secondary to being on Eliquis.            1. Right MCA stroke.  Likely cardioembolic, given her being in persistent A. fib.  Patient is status post intra-arterial TPA.  She had an episode of delirium that has now been improved with the addition of Seroquel 12.5 at bedtime.  Her Keofeed now is back in place and she is being transitioned from heparin drip to Xarelto  2. Persistent atrial fibrillation.   Patient is being followed by cardiology.   Anticoagulated by Xarelto now  3. Essential hypertension.  Normal blood pressure goals.  Systolic goal blood pressure 100-140.  4. Mixed hyperlipidemia.   Continue Lipitor 80 mg daily.    5. Activity -continue PT/OT.  6. Diet - SLP following; Keofeed in place.  Speech therapy will continue to  follow, and if she does not pass swallowing may need PEG placement.       Neurology will sign off. Please do not hesitate to call with questions.        ISSA Hamilton  06/09/20  12:45

## 2020-06-09 NOTE — THERAPY TREATMENT NOTE
Acute Care - Occupational Therapy Treatment Note  [facility]     Patient Name: Sidra Lane  : 1955  MRN: [id number]  Today's Date: 2020             Admit Date: 2020       ICD-10-CM ICD-9-CM   1. Acute ischemic stroke (CMS/HCC) I63.9 434.91   2. Oropharyngeal dysphagia R13.12 787.22   3. Dysarthria R47.1 784.51   4. Cognitive communication deficit R41.841 799.52     Patient Active Problem List   Diagnosis   • Shortness of breath   • Atrial fibrillation (CMS/HCC)   • Essential hypertension   • Dizziness   • Deep vein thrombosis (DVT) of proximal lower extremity (CMS/HCC)   • Coronary artery disease involving native coronary artery of native heart without angina pectoris   • Fatigue   • Chest pain   • Longstanding persistent atrial fibrillation (CMS/HCC)   • Cerebrovascular accident (CVA) due to embolism of right middle cerebral artery (CMS/HCC)   • Acute ischemic stroke (CMS/HCC)     Past Medical History:   Diagnosis Date   • Atrial fibrillation (CMS/HCC)    • CHF (congestive heart failure) (CMS/HCC)    • Deep vein thrombosis (CMS/HCC)    • GERD (gastroesophageal reflux disease)    • Hypertension    • May-Thurner syndrome    • Pulmonary embolism (CMS/HCC)      Past Surgical History:   Procedure Laterality Date   • CHOLECYSTECTOMY     • COLON SURGERY      bowel leakage, some of colon removed   • INTERVENTIONAL RADIOLOGY PROCEDURE Bilateral 2020    Procedure: CAROTID CEREBRAL ANGIOGRAM BILATERAL;  Surgeon: Brant Duarte MD;  Location: [facility] INVASIVE LOCATION;  Service: Interventional Radiology;  Laterality: Bilateral;   • LAPAROSCOPIC TUBAL LIGATION     • LEG SURGERY      multiple stents to BLE   • TONSILLECTOMY         Therapy Treatment    Rehabilitation Treatment Summary     Row Name 20 1514 20 1130          Treatment Time/Intention    Discipline  occupational therapist  -JY  speech language pathologist  -     Document Type  therapy note (daily note)  -CHERELLE  therapy  note (daily note)  -CH     Subjective Information  no complaints  -JY  no complaints  -CH     Mode of Treatment  occupational therapy  -JY  individual therapy;speech-language pathology  -CH     Patient/Family Observations  dtg present and supportive   -JY  No family present  -CH     Care Plan Review  --  evaluation/treatment results reviewed;care plan/treatment goals reviewed;risks/benefits reviewed;current/potential barriers reviewed;patient/other agree to care plan  -CH     Therapy Frequency (OT Eval)  daily  -JY  --     Therapy Frequency (Swallow)  --  5 days per week  -CH     Therapy Frequency (SLP SLC)  --  5 days per week  -CH     Patient Effort  good  -JY  good  -CH     Existing Precautions/Restrictions  fall;other (see comments) L hemiparesis, NG tube   -JY2  --     Recorded by [JY] Annika Aviles, OT 06/09/20 1624  [JY2] Annika Aviles, OT 06/09/20 1625 [CH] Margaret Hung, MS CCC-SLP 06/09/20 1246     Row Name 06/09/20 1514             Vital Signs    Pre Systolic BP Rehab  106  -JY      Pre Treatment Diastolic BP  77  -JY      Pretreatment Heart Rate (beats/min)  108  -JY      Pre SpO2 (%)  97  -JY      O2 Delivery Pre Treatment  supplemental O2  -JY      Intra SpO2 (%)  91  -JY      O2 Delivery Intra Treatment  supplemental O2  -JY      Post SpO2 (%)  95  -JY      O2 Delivery Post Treatment  supplemental O2  -JY      Pre Patient Position  Sitting  -JY      Intra Patient Position  Standing  -JY      Post Patient Position  Supine  -JY      Recorded by [JY] Annika Aviles, OT 06/09/20 1642      Row Name 06/09/20 1514             Cognitive Assessment/Intervention    Additional Documentation  Cognitive Assessment/Intervention (Group)  -JY      Recorded by [JY] Annika Aviles OT 06/09/20 1642      Row Name 06/09/20 1514             Cognitive Assessment/Intervention- PT/OT    Affect/Mental Status (Cognitive)  WFL;confused;low arousal/lethargic;other (see comments) situational confusion   -JY      Orientation  Status (Cognition)  oriented x 4  -JY      Follows Commands (Cognition)  follows one step commands;over 90% accuracy;increased processing time needed;delayed response/completion;repetition of directions required;other (see comments) improvement noted this date   -JY      Cognitive Assessment/Intervention Comment  intermittent, situational confusion   -JY      Recorded by [JY] Annika Aviles, OT 06/09/20 1642      Row Name 06/09/20 1514             Safety Issues, Functional Mobility    Comment, Safety Issues/Impairments (Mobility)  defer to PT   -JY      Recorded by [JY] Annika Aviles, OT 06/09/20 1642      Row Name 06/09/20 1514             Bed Mobility Assessment/Treatment    Bed Mobility Assessment/Treatment  sit-supine  -JY      Sit-Supine [place] (Bed Mobility)  moderate assist (50% patient effort);2 person assist;verbal cues  -JY      Bed Mobility, Safety Issues  decreased use of arms for pushing/pulling;decreased use of legs for bridging/pushing  -JY      Assistive Device (Bed Mobility)  head of bed elevated  -JY      Comment (Bed Mobility)  pt more fatigued after increased time sitting in recliner and likely contributing factor to level of A, increased cues for safe plmt of L UE, A at both UB/LB req'd   -JY      Recorded by [JY] Annika Aviles, OT 06/09/20 1642      Row Name 06/09/20 1514             Functional Mobility    Functional Mobility- Comment  defer to PT   -JY      Recorded by [JY] Annika Aviles, OT 06/09/20 1642      Row Name 06/09/20 1514             Transfer Assessment/Treatment    Transfer Assessment/Treatment  sit-stand transfer;stand-sit transfer;chair-bed transfer  -JY      Comment (Transfers)  cues for hand placement to assist w/ controlled ascend/descend, BUE support provided in order to monitor safe plmt of LUE  pt w/ increased fatigue increasing level of A   -JY      Recorded by [JY] Annika Aviles, OT 06/09/20 1642      Row Name 06/09/20 1514             Chair-Bed Transfer     Chair-Bed [place] (Transfers)  minimum assist (75% patient effort);2 person assist;verbal cues  -JY      Assistive Device (Chair-Bed Transfers)  other (see comments) BUE support, gait belt   -JY      Recorded by [JY] Annika Aviles, OT 06/09/20 1642      Row Name 06/09/20 1514             Sit-Stand Transfer    Sit-Stand [place] (Transfers)  minimum assist (75% patient effort);2 person assist;verbal cues  -JY      Assistive Device (Sit-Stand Transfers)  other (see comments) BUE support, gait belt   -JY      Recorded by [JY] Annika Aviles, OT 06/09/20 1642      Row Name 06/09/20 1514             Stand-Sit Transfer    Stand-Sit [place] (Transfers)  minimum assist (75% patient effort);2 person assist;verbal cues  -JY      Assistive Device (Stand-Sit Transfers)  other (see comments) BUE support, gait belt   -JY      Recorded by [JY] Annika Aviles, OT 06/09/20 1642      Row Name 06/09/20 1514             BADL Safety/Performance    Impairments, BADL Safety/Performance  balance;endurance/activity tolerance;strength;motor control  -JY      Recorded by [JY] Annika Aviles, OT 06/09/20 1642      Row Name 06/09/20 1514             Motor Skills Assessment/Interventions    Additional Documentation  Balance (Group);Therapeutic Exercise (Group)  -JY      Recorded by [JJOYA] Annika Aviles, OT 06/09/20 1642      Row Name 06/09/20 1514             Therapeutic Exercise    78298 - OT Therapeutic Exercise Minutes  12  -JY      29468 - OT Therapeutic Activity Minutes  12  -JY      Recorded by [JY] Annika Aviles, OT 06/09/20 1642      Row Name 06/09/20 1514             Therapeutic Exercise    Upper Extremity Range of Motion (Therapeutic Exercise)  shoulder flexion/extension, bilateral;shoulder horizontal abduction/adduction, bilateral;elbow flexion/extension, bilateral;forearm supination/pronation, bilateral;wrist flexion/extension, bilateral  -JY      Hand (Therapeutic Exercise)  finger flexion/extension, left  -JY       Exercise Type (Therapeutic Exercise)  AROM (active range of motion);AAROM (active assistive range of motion)  -JY      Position (Therapeutic Exercise)  other (see comments) semi reclined w/ HOB elevated   -JY      Sets/Reps (Therapeutic Exercise)  1/10  -JY      Equipment (Therapeutic Exercise)  other (see comments) squeeze ball for L hand flex/ext   -JY      Expected Outcome (Therapeutic Exercise)  facilitate normal movement patterns;improve motor control;increase active range of motion  -JY      Comment (Therapeutic Exercise)  reviewed BUE ex for preparation for HEP, utilized SROM tech to integrate LUE and allow RUE to support   -JY      Recorded by [JY] Annika Aviles, OT 06/09/20 1642      Row Name 06/09/20 1514             Balance    Balance  static sitting balance;static standing balance;dynamic sitting balance;dynamic standing balance  -JY      Recorded by [JY] Annika Aviles, OT 06/09/20 1642      Row Name 06/09/20 1514             Static Sitting Balance    Level of [place] (Unsupported Sitting, Static Balance)  supervision  -JY      Sitting Position (Unsupported Sitting, Static Balance)  sitting in chair  -JY      Time Able to Maintain Position (Unsupported Sitting, Static Balance)  more than 5 minutes  -JY      Recorded by [JY] Annika Aviles, OT 06/09/20 1642      Row Name 06/09/20 1514             Dynamic Sitting Balance    Level of [place], Reaches Outside Midline (Sitting, Dynamic Balance)  contact guard assist;other (see comments)  -JY      Sitting Position, Reaches Outside Midline (Sitting, Dynamic Balance)  other (see comments) sitting at edge of recliner   -JY      Comment, Reaches Outside Midline (Sitting, Dynamic Balance)  scooting toward edge of recliner   -JY      Recorded by [JY] Annika Aviles, OT 06/09/20 1642      Row Name 06/09/20 1514             Static Standing Balance    Level of [place] (Supported Standing, Static Balance)  minimal assist, 75% patient effort  -JY       Assistive Device Utilized (Supported Standing, Static Balance)  other (see comments) BUE support   -JY      Recorded by [Annika Barber, OT 06/09/20 1642      Row Name 06/09/20 1514             Dynamic Standing Balance    Level of [place], Reaches Outside Midline (Standing, Dynamic Balance)  minimal assist, 75% patient effort;2 person assist  -JY      Assistive Device Utilized (Supported Standing, Dynamic Balance)  other (see comments) BUE support   -JY      Recorded by [CHERELLE] Annika Aviles, OT 06/09/20 1642      Row Name 06/09/20 1514             Positioning and Restraints    Pre-Treatment Position  sitting in chair/recliner  -JY      Post Treatment Position  bed  -JY      In Bed  notified nsg;fowlers;call light within reach;encouraged to call for assist;exit alarm on;with family/caregiver;side rails up x3;LUE elevated;legs elevated  -JY      Recorded by [CHERELLE] Anniak Aviles, OT 06/09/20 1642      Row Name 06/09/20 1514             Pain Assessment    Additional Documentation  Pain Scale: Numbers Pre/Post-Treatment (Group)  -JY      Recorded by [Annika Barber, OT 06/09/20 1642      Row Name 06/09/20 1514             Pain Scale: Numbers Pre/Post-Treatment    Pain Scale: Numbers, Pretreatment  0/10 - no pain  -JY      Pain Scale: Numbers, Post-Treatment  0/10 - no pain  -JY      Pre/Post Treatment Pain Comment  tolerated   -JY      Pain Intervention(s)  Repositioned;Ambulation/increased activity;Elevated  -JY      Recorded by [Annika Barber, OT 06/09/20 1642      Row Name 06/09/20 1514             Sensory Assessment/Intervention    Sensory General Assessment  no sensation deficits identified BUEs   -JY      Recorded by [Annika Barber, OT 06/09/20 1642      Row Name 06/09/20 1514             Plan of Care Review    Plan of Care Reviewed With  patient;daughter  -JY      Progress  improving  -JY      Recorded by [Annika Barbre, OT 06/09/20 1642      Row Name 06/09/20 1130             Outcome  Summary/Treatment Plan (SLP)    Daily Summary of Progress (SLP)  progress toward functional goals as expected  -      Plan for Continued Treatment (SLP)  Continue to follow to address dysphagia, cognition and communication in treatment. Patient continues to display overt s/s of aspiration with ice chips, thin via tsp and NT liquids via tsp.   -CH      Anticipated Dischage Disposition (SLP)  inpatient rehabilitation facility;anticipate therapy at next level of care  -      Recorded by [] Margaret Hung MS CCC-SLP 06/09/20 1246        User Key  (r) = Recorded By, (t) = Taken By, (c) = Cosigned By    Initials Name Effective Dates Discipline    CH Margaret Hung, MS CCC-SLP 02/14/19 -  SLP    Annika Clancy, SULEMA 01/29/20 -  OT           Rehab Goal Summary     Row Name 06/09/20 1130             Swallow Goals (SLP)    Oral Nutrition/Hydration Goal Selection (SLP)  oral nutrition/hydration, SLP goal 1;oral nutrition/hydration, SLP goal 2  -CH      Labial Strengthening Goal Selection (SLP)  labial strengthening, SLP goal 1  -CH      Lingual Strengthening Goal Selection (SLP)  lingual strengthening, SLP goal 1  -CH      Pharyngeal Strengthening Exercise Goal Selection (SLP)  pharyngeal strengthening exercise, SLP goal 1  -CH      Additional Documentation  labial strengthening goal selection (SLP);lingual strengthening goal selection (SLP);pharyngeal strengthening exercise goal selection (SLP)  -CH         Oral Nutrition/Hydration Goal 1 (SLP)    Oral Nutrition/Hydration Goal 1, SLP  LTG: Pt will return to regular diet & thin liquids w/ 100% accuracy w/o cues  -CH      Time Frame (Oral Nutrition/Hydration Goal 1, SLP)  by discharge  -      Progress/Outcomes (Oral Nutrition/Hydration Goal 1, SLP)  continuing progress toward goal  -CH         Oral Nutrition/Hydration Goal 2 (SLP)    Oral Nutrition/Hydration Goal 2, SLP  Pt will tolerate therapeutic trials of thin H2O w/ no overt s/s of aspiration w/ 60%  accuracy w/o cues to indicate readiness for repeat instrumental evaluation  -CH      Time Frame (Oral Nutrition/Hydration Goal 2, SLP)  short term goal (STG);by discharge  -CH      Barriers (Oral Nutrition/Hydration Goal 2, SLP)  Delayed cough with ice chips, immediate cough with spoon sips of thin.. NT H2O via teaspoon trialed x 3 with delayed cough.   -CH      Progress/Outcomes (Oral Nutrition/Hydration Goal 2, SLP)  continuing progress toward goal  -CH         Labial Strengthening Goal 1 (SLP)    Activity (Labial Strengthening Goal 1, SLP)  increase labial tone  -CH      Increase Labial Tone  labial resistance exercises  -CH      Falls/Accuracy (Labial Strengthening Goal 1, SLP)  with minimal cues (75-90% accuracy)  -CH      Time Frame (Labial Strengthening Goal 1, SLP)  short term goal (STG);by discharge  -CH      Barriers (Labial Strengthening Goal 1, SLP)  Handout provided and reviewed with patient. Patient completed exercises x 3 each with mod cues  -CH      Progress/Outcomes (Labial Strengthening Goal 1, SLP)  continuing progress toward goal  -CH         Lingual Strengthening Goal 1 (SLP)    Activity (Lingual Strengthening Goal 1, SLP)  increase lingual tone/sensation/control/coordination/movement;increase tongue back strength  -CH      Increase Lingual Tone/Sensation/Control/Coordination/Movement  lingual movement exercises  -      Increase Tongue Back Strength  lingual resistance exercises  -CH      Falls/Accuracy (Lingual Strengthening Goal 1, SLP)  with minimal cues (75-90% accuracy)  -CH      Time Frame (Lingual Strengthening Goal 1, SLP)  short term goal (STG);by discharge  -CH      Barriers (Lingual Strengthening Goal 1, SLP)  Handout provided and reviewed with patient. Patient completed exercises x 3 each with mod cues  -CH      Progress/Outcomes (Lingual Strengthening Goal 1, SLP)  continuing progress toward goal  -CH         Pharyngeal Strengthening Exercise Goal 1 (SLP)    Activity  (Pharyngeal Strengthening Goal 1, SLP)  increase timing;increase superior movement of the hyolaryngeal complex;increase anterior movement of the hyolaryngeal complex;increase closure at entrance to airway/closure of airway at glottis;increase squeeze/positive pressure generation;increase tongue base retraction  -CH      Increase Timing  prepping - 3 second prep or suck swallow or 3-step swallow  -CH      Increase Superior Movement of the Hyolaryngeal Complex  super-supraglottic swallow  -CH      Increase Anterior Movement of the Hyolaryngeal Complex  chin tuck against resistance (CTAR)  -CH      Increase Closure at Entrance to Airway/Closure of Airway at Glottis  super-supraglottic swallow  -CH      Increase Squeeze/Positive Pressure Generation  effortful pitch glide (falsetto + pharyngeal squeeze)  -CH      Increase Tongue Base Retraction  hard effortful swallow  -CH      Blum/Accuracy (Pharyngeal Strengthening Goal 1, SLP)  with minimal cues (75-90% accuracy)  -CH      Time Frame (Pharyngeal Strengthening Goal 1, SLP)  short term goal (STG);by discharge  -CH      Barriers (Pharyngeal Strengthening Goal 1, SLP)  Patient completed exercises x 3 each with mod cues. Handout provided and patient encouraged to complete exercises daily in room.  -      Progress/Outcomes (Pharyngeal Strengthening Goal 1, SLP)  continuing progress toward goal  -         Communication Treatment Objective and Progress Goals (SLP)    Motor Speech/Dysarthria Treatment Objectives  Motor Speech/Dysarthria Treatment Objectives (Group)  -      Cognitive Linguistic Treatment Objectives  Cognitive Linguistic Treatment Objectives (Group)  -      Additional Goals  Additional Goals (Group)  -         Motor Speech/Dysarthria Treatment Objectives    Articulation Selection  articulation, SLP goal 1  -CH         Articulation Goal 1 (SLP)    Improve Articulation Goal 1 (SLP)  by over-articulating at phrase level;80%;with minimal cues  (75-90%)  -CH      Time Frame (Articulation Goal 1, SLP)  short term goal (STG)  -CH      Progress (Articulation Goal 1, SLP)  70%;with minimal cues (75-90%)  -CH      Progress/Outcomes (Articulation Goal 1, SLP)  continuing progress toward goal  -CH         Cognitive Linguistic Treatment Objectives    Attention Selection  attention, SLP goal 1  -CH         Attention Goal 1 (SLP)    Improve Attention by Goal 1 (SLP)  looking at speaker;80%;with minimal cues (75-90%)  -CH      Time Frame (Attention Goal 1, SLP)  short term goal (STG);by discharge  -CH      Progress (Attention Goal 1, SLP)  50%;with minimal cues (75-90%)  -CH      Progress/Outcomes (Attention Goal 1, SLP)  continuing progress toward goal  -CH         Additional Goals    Additional Goal Selection  additional goal, SLP goal 1  -CH         Additional Goal 1 (SLP)    Additional Goal 1, SLP  LTG: Pt will improve cognitive-communication skills in order to participate in care in hospital setting for 100% of opportunities w/o cues.  -CH      Time Frame (Additional Goal 1, SLP)  by discharge  -CH      Progress/Outcomes (Additional Goal 1, SLP)  continuing progress toward goal  -CH        User Key  (r) = Recorded By, (t) = Taken By, (c) = Cosigned By    Initials Name Provider Type Discipline    Margaret Lockhart MS CCC-SLP Speech and Language Pathologist SLP        Occupational Therapy Education                 Title: PT OT SLP Therapies (In Progress)     Topic: Occupational Therapy (In Progress)     Point: ADL training (Done)     Description:   Instruct learner(s) on proper safety adaptation and remediation techniques during self care or transfers.   Instruct in proper use of assistive devices.              Learning Progress Summary           Patient Acceptance, LORRI CORTES DU by MADINA at 6/5/2020 1505    Acceptance, E,LORRI VASQUEZ DU by MAXIMO at 6/2/2020 1410    Comment:  Pt educated on role of OT, safety, fall prevention, ADLs, transfers, and POC.   Family Acceptance, E,  VU,DU by MADINA at 6/5/2020 1505    Acceptance, E,D, VU,DU by MAXIMO at 6/2/2020 1410    Comment:  Pt educated on role of OT, safety, fall prevention, ADLs, transfers, and POC.                   Point: Home exercise program (In Progress)     Description:   Instruct learner(s) on appropriate technique for monitoring, assisting and/or progressing therapeutic exercises/activities.              Learning Progress Summary           Patient Acceptance, E,D, NR by CHERELLE at 6/9/2020 1514    Acceptance, E, VU,DU by MADINA at 6/5/2020 1505    Acceptance, E,D, VU,DU by MAXIMO at 6/2/2020 1410    Comment:  Pt educated on role of OT, safety, fall prevention, ADLs, transfers, and POC.   Family Acceptance, E, VU,DU by MADINA at 6/5/2020 1505    Acceptance, E,D, VU,DU by MAXIMO at 6/2/2020 1410    Comment:  Pt educated on role of OT, safety, fall prevention, ADLs, transfers, and POC.                   Point: Precautions (In Progress)     Description:   Instruct learner(s) on prescribed precautions during self-care and functional transfers.              Learning Progress Summary           Patient Acceptance, E,D, NR by CHERELLE at 6/9/2020 1514    Acceptance, E, VU,DU by MADINA at 6/5/2020 1505    Acceptance, E,D, VU,DU by MAXIMO at 6/2/2020 1410    Comment:  Pt educated on role of OT, safety, fall prevention, ADLs, transfers, and POC.   Family Acceptance, E, VU,DU by MADINA at 6/5/2020 1505    Acceptance, E,D, VU,DU by MAXIMO at 6/2/2020 1410    Comment:  Pt educated on role of OT, safety, fall prevention, ADLs, transfers, and POC.                   Point: Body mechanics (In Progress)     Description:   Instruct learner(s) on proper positioning and spine alignment during self-care, functional mobility activities and/or exercises.              Learning Progress Summary           Patient Acceptance, E,D, NR by CHERELLE at 6/9/2020 1514    Acceptance, E, VU,DU by MADINA at 6/5/2020 1505    Acceptance, E,D, VU,DU by MAXIMO at 6/2/2020 1410    Comment:  Pt educated on role of OT, safety, fall  prevention, ADLs, transfers, and POC.   Family Acceptance, E, VU,DU by MADINA at 6/5/2020 1505    Acceptance, E,D, VU,DU by MAXIMO at 6/2/2020 1410    Comment:  Pt educated on role of OT, safety, fall prevention, ADLs, transfers, and POC.                               User Key     Initials Effective Dates Name Provider Type Discipline    MAXIMO 07/17/19 -  Cassia Zee, OT Occupational Therapist OT    JJOYA 01/29/20 -  Annika Aviles OT Occupational Therapist OT    MADINA 01/29/20 -  Casandra Mueller OT Occupational Therapist OT                OT Recommendation and Plan  Therapy Frequency (OT Eval): daily  Plan of Care Review  Plan of Care Reviewed With: patient, daughter  Plan of Care Reviewed With: patient, daughter  Outcome Summary: Pt UIC upon OT arrival, pt w/ request to return to bed d/t fatigue, lethargy. Pt req'd min A x 2 for sit < > Stand t/f and recliner > bed t/f w/ BUE support of which greater A likely due to aforementioned fatigue. Pt req'd mod A x 2 for supine > sittting bed mobility w/ need for UB and LB support and cues throughout to ensure safety, position of LUE. Pt is verbalizing and demonstrating improved ways to integrated impacted UE in TE and activity. OT educated pt on additional ways including those in ADLs. Pt completed BUE TE from fowlers position w/ SROM tech to integrate bilateral UEs and allow RUE to assist LUE. 1/10 set/reps. Pt reported no increase in pain. OT reiterated plan to attempt AE for sock d/d and pt agreeable, haresh as ROM and motor control improves at LUE. Will continue to progress pt as able.  Outcome Measures     Row Name 06/09/20 1514             How much help from another is currently needed...    Putting on and taking off regular lower body clothing?  1  -JY      Bathing (including washing, rinsing, and drying)  2  -JY      Toileting (which includes using toilet bed pan or urinal)  2  -JY      Putting on and taking off regular upper body clothing  2  -JY      Taking care of personal  grooming (such as brushing teeth)  2  -JY      Eating meals  2  -JY      AM-PAC 6 Clicks Score (OT)  11  -JY         Modified Nolan Scale    Modified Nolan Scale  4 - Moderately severe disability.  Unable to walk without assistance, and unable to attend to own bodily needs without assistance.  -JY         Functional Assessment    Outcome Measure Options  AM-PAC 6 Clicks Daily Activity (OT);Modified Isaac  -JY        User Key  (r) = Recorded By, (t) = Taken By, (c) = Cosigned By    Initials Name Provider Type    Annika Clancy OT Occupational Therapist           Time Calculation:   Time Calculation- OT     Row Name 06/09/20 1643 06/09/20 1514          Time Calculation- OT    OT Start Time  1514 -JY  --     OT Received On  06/09/20 -JY  --     OT Goal Re-Cert Due Date  06/12/20 -JY  --        Timed Charges    84203 - OT Therapeutic Exercise Minutes  --  12  -JY     05387 - OT Therapeutic Activity Minutes  --  12  -JY       User Key  (r) = Recorded By, (t) = Taken By, (c) = Cosigned By    Initials Name Provider Type    Annika Clancy OT Occupational Therapist        Therapy Charges for Today     Code Description Service Date Service Provider Modifiers Qty    [id number] HC OT THER PROC EA 15 MIN 6/9/2020 Annika Aviles OT GO 1    96410660543 HC OT THERAPEUTIC ACT EA 15 MIN 6/9/2020 Annika Aviles OT GO 1               Annika Aviles OT  6/9/2020

## 2020-06-09 NOTE — PROGRESS NOTES
[facility] Medicine Services  PROGRESS NOTE    Patient Name: Sidra Lane  : 1955  MRN: [id number]    Date of Admission: 2020  Primary Care Physician: Jl Mcknight MD    Subjective   Subjective     CC:  Follow-up acute CVA    HPI:  Doing better this am, more alert and interactive.  BMs less frequent in last 24 hours but still had quite a bit last pm. Pt also complaining of left leg cramping    Review of Systems  Gen- No fevers, chills  CV- No chest pain, palpitations  Resp- No cough, dyspnea  GI- + diarrhea, + abdominal pain      Objective   Objective     Vital Signs:   Temp:  [97.5 °F (36.4 °C)-100 °F (37.8 °C)] 98.1 °F (36.7 °C)  Heart Rate:  [] 120  Resp:  [17-18] 18  BP: (108-169)/(71-86) 120/86  Total (NIH Stroke Scale): 6     Physical Exam:  General Assessment: No acute cardiopulmonary distress. Well developed and well nourished.    HEENT: NCAT, PERRL, MM moist    Neck: Supple, no carotid bruit bilat    CVS: RRR, S1S2 normal, no murmurs    Resp: Exam limited to anterior aspect, relatively clear overall, breathing comfortably today on 4L BNC    Abd: soft, NT, ND, normal BS, no guarding or peritoneal signs    Ext: No edema, both calves are symmetric and NTTP    Neuro: Patient is alert, interactive, unable to move LUE.  In restraints currently.     Skin: W/D/I. No rash.    Psych: flat affect      Results Reviewed:  Results from last 7 days   Lab Units 20  0521 20  0715 20  0726  20  1501   WBC 10*3/mm3 8.95 11.65* 10.50   < > 11.85*   HEMOGLOBIN g/dL 11.0* 11.0* 11.6*   < > 11.9*   HEMATOCRIT % 41.4 40.7 42.3   < > 43.1   PLATELETS 10*3/mm3 357 360 354   < > 263   INR   --   --   --   --  1.02    < > = values in this interval not displayed.     Results from last 7 days   Lab Units 20  0521 20  0715 20  0726  20  0518 20  1093   SODIUM mmol/L 136 137 140   < > 136  --    POTASSIUM mmol/L 4.5 4.2 4.4   <  > 4.4  --    CHLORIDE mmol/L 102 99 101   < > 97*  --    CO2 mmol/L 21.0* 25.0 25.0   < > 22.0  --    BUN mg/dL 19 19 14   < > 12  --    CREATININE mg/dL 0.63 0.68 0.76   < > 0.68  --    GLUCOSE mg/dL 101* 116* 111*   < > 120*  --    CALCIUM mg/dL 9.0 9.0 8.9   < > 9.3  --    TROPONIN T ng/mL  --   --   --   --  <0.010 <0.010    < > = values in this interval not displayed.     Estimated Creatinine Clearance: 75.7 mL/min (by C-G formula based on SCr of 0.63 mg/dL).    Microbiology Results Abnormal     Procedure Component Value - Date/Time    Clostridium Difficile Toxin - Stool, Per Rectum [[id number]] Collected:  06/08/20 1750    Lab Status:  Final result Specimen:  Stool from Per Rectum Updated:  06/08/20 2011    Narrative:       The following orders were created for panel order Clostridium Difficile Toxin - Stool, Per Rectum.  Procedure                               Abnormality         Status                     ---------                               -----------         ------                     Clostridium Difficile To...[[id number]]  Normal              Final result                 Please view results for these tests on the individual orders.    Clostridium Difficile Toxin, PCR - Stool, Per Rectum [[id number]]  (Normal) Collected:  06/08/20 1750    Lab Status:  Final result Specimen:  Stool from Per Rectum Updated:  06/08/20 2011     C. Difficile Toxins by PCR Not Detected    Narrative:       Performance characteristics of test not established for patients <2 years of age.  Negative for Toxigenic C. Difficile    Blood Culture - Blood, Hand, Left [[id number]] Collected:  06/04/20 1406    Lab Status:  Preliminary result Specimen:  Blood from Hand, Left Updated:  06/08/20 1500     Blood Culture No growth at 4 days    Blood Culture - Blood, Hand, Right [[id number]] Collected:  06/04/20 1400    Lab Status:  Preliminary result Specimen:  Blood from Hand, Right Updated:  06/08/20 1500     Blood Culture No growth at 4 days     Urine Culture - Urine, Urine, Clean Catch [[id number]] Collected:  06/05/20 1358    Lab Status:  Final result Specimen:  Urine, Clean Catch Updated:  06/06/20 0937     Urine Culture 50,000 CFU/mL Mixed Herberth Isolated    Narrative:       Specimen contains mixed organisms of questionable pathogenicity which indicates contamination with commensal herberth.  Further identification is unlikely to provide clinically useful information.  Suggest recollection.    MRSA Screen, PCR (Inpatient) - Swab, Nares [[id number]]  (Abnormal) Collected:  06/05/20 1403    Lab Status:  Final result Specimen:  Swab from Nares Updated:  06/05/20 1528     MRSA PCR Positive    COVID-19,CEPHEID,AMBERLY IN-HOUSE(OR EMERGENT/ADD-ON),NP SWAB IN TRANSPORT MEDIA 3-4 HR TAT - Swab, Nasopharynx [[id number]]  (Normal) Collected:  05/31/20 1533    Lab Status:  Final result Specimen:  Swab from Nasopharynx Updated:  05/31/20 1638     COVID19 Not Detected          Imaging Results (Last 24 Hours)     ** No results found for the last 24 hours. **          Results for orders placed during the hospital encounter of 05/31/20   Adult Transthoracic Echo Complete W/ Cont if Necessary Per Protocol    Narrative · Left ventricular systolic function is normal. Estimated EF appears to be   in the range of 56 - 60%.  · Normal right ventricular cavity size and systolic function noted.  · Left atrial cavity size is moderately dilated. Left atrial volume is   moderately increased.  · No evidence of a patent foramen ovale. Saline test results are negative.  · The aortic valve is abnormal in structure. The valve exhibits sclerosis.   There is mild calcification of the aortic valve  · Mild aortic valve stenosis is present          I have reviewed the medications:  Scheduled Meds:    amantadine 100 mg Oral BID   amLODIPine 5 mg Oral Daily   amoxicillin-clavulanate 1 tablet Oral Q12H   aspirin 81 mg Oral Daily   atorvastatin 80 mg Oral Nightly   cholestyramine light 1 packet Oral  Daily   diphenoxylate-atropine 1 tablet Oral BID   famotidine 20 mg Oral BID AC   lactobacillus acidophilus 1 capsule Oral Daily   lisinopril 10 mg Oral Q PM   metoprolol tartrate 100 mg Oral Q12H   QUEtiapine 12.5 mg Oral Nightly   rivaroxaban 20 mg Oral Daily With Dinner     Continuous Infusions:     PRN Meds:.•  acetaminophen  •  acetaminophen  •  butalbital-acetaminophen-caffeine  •  diphenoxylate-atropine  •  enalaprilat  •  metoprolol tartrate  •  nitroglycerin  •  ondansetron  •  potassium chloride **OR** potassium chloride **OR** potassium chloride  •  simethicone    Assessment/Plan   Assessment & Plan     Active Hospital Problems    Diagnosis  POA   • **Cerebrovascular accident (CVA) due to embolism of right middle cerebral artery (CMS/HCC) [I63.411]  Yes   • Acute ischemic stroke (CMS/HCC) [I63.9]  Yes   • Atrial fibrillation (CMS/HCC) [I48.91]  Yes      Resolved Hospital Problems   No resolved problems to display.        Brief Hospital Course to date:  Sidra Lane is a 65 y.o. 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  She was found to have a right MCA territory reversible ischemia on CT perfusion.  She underwent a CT head neck angiogram with an intra-arterial TPA after failed thrombectomy and admitted to the ICU afterward.  She was transferred out of ICU on 6/3 with hospitalist assuming care on 6/4.    Right MCA stroke  - likely cardioembolic.  Patient has a history of persistent atrial fibrillation and had been anticoagulated with Eliquis.  She had been followed by her electrophysiologist and had plans for an ablation prior to readmission.  -Again she is status post intra-arterial TPA after failed thrombectomy  -She had some delirium while in the ICU and Seroquel 50 mg nightly was given to her  -Stat CT brain was performed on 6/4 due to worsening left sided  weakness and acute hemorrhagic transformation was ruled out, but does still reporting evolving MCA CVA that was stable from the previous exam.  -The reasons for her worsening symptoms may be related to Seroquel.  Now tolerating low dose Seroquel QHS  -Repeat CT head 6/6 with no evidence of hemorrhage, stopped heparin gtt and started on Eliquis. Pt now has no Keofeed, if unable to replace this am, will have to place back on heparin gtt until she has feeding tube again.   -EP also following, resumed Metoprolol PO  -Failed modified barium swallow and was started on tube feeding, Keofeed now back in place  --transitioned from heparin gtt to Eliquis, however, given Eliquis failure (had been on this as outpatient when she presented with CVA), transitioned to Xarelto    Persistent atrial fibrillation with RVR  -Patient had been on beta-blocker per her cardiologist and had plans for ablation prior to admission  -Currently she is not a candidate for an ablation  -We will defer management to her EP  --getting PO Metoprolol, increased per Cards.  May need to further up-titrate  --Now on Xarelto      Hypertension  -Blood pressure much better controlled.  Beta blocker resumed by Cards.     SIRS  -Possible aspiration when patient pulled out her Keofeed tube earlier this stay.  Pt again removed her Keofeed this am, but no s/s of aspiration per RN.  Replacing.   --CXR unremarkable on 6/4 and 6/5  --now afebrile, added Vanc/Zosyn on 6/5 due to worsening leukocytosis with left shift. MRSA PCR +, so had been on Vanc.  Will also continue Zosyn given concern for possible aspiration. Will de-escalate today as pt's leukocytosis has resolved.  Will do Augmentin BID to complete a seven day course (to cover for possible aspiration). Will defer MRSA coverage for now. Repeat CBC with diff in am. Leukocytosis resolved.  C diff NEGATIVE    Hyperlipidemia  -High intensity statin therapy    Left-sided weakness  -PT OT, will benefit from short-term  rehab once she is medically stable    Dysphagia  -Speech pathologist following, currently getting enteral feeding Via Keofeed tube.  Tube feedings at goal.  -PEG tube placement discussed with patient's family by intensivist, currently they want to hold off.    Diarrhea  --apparently this is a chronic issue at home, but has worsened since admission. Pt's weight is down 12 lbs, having multiple loose stools daily.  Per daughter, pt has had diarrhea since partial colectomy in 2016 ([facility]) and has been told she may have Crohn's- has never been on immunosuppression and can't remember with whom she follows with GI. May consider GI consult. Started probiotic      DVT Prophylaxis: Fully anticoagulated with Xarelto       Disposition:TBD pending further evaluation and response to therapy    CODE STATUS:   Code Status and Medical Interventions:   Ordered at: 05/31/20 1743     Code Status:    CPR     Medical Interventions (Level of Support Prior to Arrest):    Full         Electronically signed by Evelina Banda MD, 06/09/20, 08:28.

## 2020-06-09 NOTE — NURSING NOTE
WO nurse for [facility]bert report 15 or less    Spoke with RN Eneida SANCHEZ stated at one point the patient had a dolphin bed ordered but the family and patient refused it.  RN also stated patient has been refusing heel boots at times.  Still on TF's.  Working with PT/OT/ST.    One concern is patient is having a lot of diarrhea.  Per RN on bed pan often, buttocks red and blanchable no broken skin.  Using z guard, has GI consult to address diarrhea.    No pressure injury skin issues noted, all skin interventions in place, including heel boots, repositioning and waffle mattress.    Please consult [facility] nurse if needed.

## 2020-06-09 NOTE — PLAN OF CARE
Problem: Patient Care Overview  Goal: Plan of Care Review  Flowsheets (Taken 6/9/2020 1514)  Outcome Summary: Pt UIC upon OT arrival, pt w/ request to return to bed d/t fatigue, lethargy. Pt req'd min A x 2 for sit < > Stand t/f and recliner > bed t/f w/ BUE support of which greater A likely due to aforementioned fatigue. Pt req'd mod A x 2 for supine > sittting bed mobility w/ need for UB and LB support and cues throughout to ensure safety, position of LUE. Pt is verbalizing and demonstrating improved ways to integrated impacted UE in TE and activity. OT educated pt on additional ways including those in ADLs. Pt completed BUE TE from fowlers position w/ SROM tech to integrate bilateral UEs and allow RUE to assist LUE. 1/10 set/reps. Pt reported no increase in pain. OT reiterated plan to attempt AE for sock d/d and pt agreeable, haresh as ROM and motor control improves at LUE. Will continue to progress pt as able.

## 2020-06-09 NOTE — THERAPY TREATMENT NOTE
Acute Care - Speech Language Pathology Treatment Note  [facility]     Patient Name: Sidra Lane  : 1955  MRN: [id number]  Today's Date: 2020         Admit Date: 2020    Visit Dx:      ICD-10-CM ICD-9-CM   1. Acute ischemic stroke (CMS/HCC) I63.9 434.91   2. Oropharyngeal dysphagia R13.12 787.22   3. Dysarthria R47.1 784.51   4. Cognitive communication deficit R41.841 799.52     Patient Active Problem List   Diagnosis   • Shortness of breath   • Atrial fibrillation (CMS/HCC)   • Essential hypertension   • Dizziness   • Deep vein thrombosis (DVT) of proximal lower extremity (CMS/HCC)   • Coronary artery disease involving native coronary artery of native heart without angina pectoris   • Fatigue   • Chest pain   • Longstanding persistent atrial fibrillation (CMS/HCC)   • Cerebrovascular accident (CVA) due to embolism of right middle cerebral artery (CMS/HCC)   • Acute ischemic stroke (CMS/HCC)        Therapy Treatment  Rehabilitation Treatment Summary     Row Name 20 1130             Treatment Time/Intention    Discipline  speech language pathologist  -      Document Type  therapy note (daily note)  -      Subjective Information  no complaints  -CH      Mode of Treatment  individual therapy;speech-language pathology  -CH      Patient/Family Observations  No family present  -      Care Plan Review  evaluation/treatment results reviewed;care plan/treatment goals reviewed;risks/benefits reviewed;current/potential barriers reviewed;patient/other agree to care plan  -CH      Therapy Frequency (Swallow)  5 days per week  -CH      Therapy Frequency (SLP SLC)  5 days per week  -CH      Patient Effort  good  -CH      Recorded by [CH] Margaret Hung MS CCC-SLP 20 1246      Row Name 20 1130             Outcome Summary/Treatment Plan (SLP)    Daily Summary of Progress (SLP)  progress toward functional goals as expected  -      Plan for Continued Treatment (SLP)  Continue to  follow to address dysphagia, cognition and communication in treatment. Patient continues to display overt s/s of aspiration with ice chips, thin via tsp and NT liquids via tsp.   -CH      Anticipated Dischage Disposition (SLP)  inpatient rehabilitation facility;anticipate therapy at next level of care  -CH      Recorded by [CH] Margaret Hung MS CCC-SLP 06/09/20 1246        User Key  (r) = Recorded By, (t) = Taken By, (c) = Cosigned By    Initials Name Effective Dates Discipline     Margaret Hung, MS CCC-SLP 02/14/19 -  SLP          EDUCATION  The patient has been educated in the following areas:   Cognitive Impairment Communication Impairment.    SLP Recommendation and Plan  Daily Summary of Progress (SLP): progress toward functional goals as expected     Plan for Continued Treatment (SLP): Continue to follow to address dysphagia, cognition and communication in treatment. Patient continues to display overt s/s of aspiration with ice chips, thin via tsp and NT liquids via tsp.   Anticipated Dischage Disposition (SLP): inpatient rehabilitation facility, anticipate therapy at next level of care             SLP GOALS     Row Name 06/09/20 1130 06/08/20 1600          Oral Nutrition/Hydration Goal 1 (SLP)    Oral Nutrition/Hydration Goal 1, SLP  LTG: Pt will return to regular diet & thin liquids w/ 100% accuracy w/o cues  -CH  LTG: Pt will return to regular diet & thin liquids w/ 100% accuracy w/o cues  -CH     Time Frame (Oral Nutrition/Hydration Goal 1, SLP)  by discharge  -CH  by discharge  -CH     Progress/Outcomes (Oral Nutrition/Hydration Goal 1, SLP)  continuing progress toward goal  -CH  continuing progress toward goal  -CH        Oral Nutrition/Hydration Goal 2 (SLP)    Oral Nutrition/Hydration Goal 2, SLP  Pt will tolerate therapeutic trials of thin H2O w/ no overt s/s of aspiration w/ 60% accuracy w/o cues to indicate readiness for repeat instrumental evaluation  -CH  Pt will tolerate therapeutic  trials of thin H2O w/ no overt s/s of aspiration w/ 60% accuracy w/o cues to indicate readiness for repeat instrumental evaluation  -CH     Time Frame (Oral Nutrition/Hydration Goal 2, SLP)  short term goal (STG);by discharge  -  short term goal (STG);by discharge  -     Barriers (Oral Nutrition/Hydration Goal 2, SLP)  Delayed cough with ice chips, immediate cough with spoon sips of thin.. NT H2O via teaspoon trialed x 3 with delayed cough.   -CH  Delayed cough with ice chips, immediate cough with spoon sips of thin.  -CH     Progress/Outcomes (Oral Nutrition/Hydration Goal 2, SLP)  continuing progress toward goal  -CH  continuing progress toward goal  -CH        Labial Strengthening Goal 1 (SLP)    Activity (Labial Strengthening Goal 1, SLP)  increase labial tone  -  increase labial tone  -CH     Increase Labial Tone  labial resistance exercises  -  labial resistance exercises  -CH     [place]/Accuracy (Labial Strengthening Goal 1, SLP)  with minimal cues (75-90% accuracy)  -CH  with minimal cues (75-90% accuracy)  -CH     Time Frame (Labial Strengthening Goal 1, SLP)  short term goal (STG);by discharge  -CH  short term goal (STG);by discharge  -CH     Barriers (Labial Strengthening Goal 1, SLP)  Handout provided and reviewed with patient. Patient completed exercises x 3 each with mod cues  -CH  Patient completed exercises x 3 each with mod cues  -CH     Progress/Outcomes (Labial Strengthening Goal 1, SLP)  continuing progress toward goal  -CH  continuing progress toward goal  -CH        Lingual Strengthening Goal 1 (SLP)    Activity (Lingual Strengthening Goal 1, SLP)  increase lingual tone/sensation/control/coordination/movement;increase tongue back strength  -  increase lingual tone/sensation/control/coordination/movement;increase tongue back strength  -     Increase Lingual Tone/Sensation/Control/Coordination/Movement  lingual movement exercises  -  lingual movement exercises  -      Increase Tongue Back Strength  lingual resistance exercises  -CH  lingual resistance exercises  -CH     [place]/Accuracy (Lingual Strengthening Goal 1, SLP)  with minimal cues (75-90% accuracy)  -CH  with minimal cues (75-90% accuracy)  -CH     Time Frame (Lingual Strengthening Goal 1, SLP)  short term goal (STG);by discharge  -CH  short term goal (STG);by discharge  -CH     Barriers (Lingual Strengthening Goal 1, SLP)  Handout provided and reviewed with patient. Patient completed exercises x 3 each with mod cues  -CH  Patient completed exercises x 3 each with mod cues  -CH     Progress/Outcomes (Lingual Strengthening Goal 1, SLP)  continuing progress toward goal  -CH  continuing progress toward goal  -CH        Pharyngeal Strengthening Exercise Goal 1 (SLP)    Activity (Pharyngeal Strengthening Goal 1, SLP)  increase timing;increase superior movement of the hyolaryngeal complex;increase anterior movement of the hyolaryngeal complex;increase closure at entrance to airway/closure of airway at glottis;increase squeeze/positive pressure generation;increase tongue base retraction  -CH  increase timing;increase superior movement of the hyolaryngeal complex;increase anterior movement of the hyolaryngeal complex;increase closure at entrance to airway/closure of airway at glottis;increase squeeze/positive pressure generation;increase tongue base retraction  -CH     Increase Timing  prepping - 3 second prep or suck swallow or 3-step swallow  -CH  prepping - 3 second prep or suck swallow or 3-step swallow  -CH     Increase Superior Movement of the Hyolaryngeal Complex  super-supraglottic swallow  -CH  super-supraglottic swallow  -CH     Increase Anterior Movement of the Hyolaryngeal Complex  chin tuck against resistance (CTAR)  -CH  chin tuck against resistance (CTAR)  -CH     Increase Closure at Entrance to Airway/Closure of Airway at Glottis  super-supraglottic swallow  -CH  super-supraglottic swallow  -CH     Increase  Squeeze/Positive Pressure Generation  effortful pitch glide (falsetto + pharyngeal squeeze)  -CH  effortful pitch glide (falsetto + pharyngeal squeeze)  -CH     Increase Tongue Base Retraction  hard effortful swallow  -CH  hard effortful swallow  -CH     [place]/Accuracy (Pharyngeal Strengthening Goal 1, SLP)  with minimal cues (75-90% accuracy)  -CH  with minimal cues (75-90% accuracy)  -CH     Time Frame (Pharyngeal Strengthening Goal 1, SLP)  short term goal (STG);by discharge  -CH  short term goal (STG);by discharge  -CH     Barriers (Pharyngeal Strengthening Goal 1, SLP)  Patient completed exercises x 3 each with mod cues. Handout provided and patient encouraged to complete exercises daily in room.  -CH  Patient completed exercises x 3 each with mod cues  -CH     Progress/Outcomes (Pharyngeal Strengthening Goal 1, SLP)  continuing progress toward goal  -CH  continuing progress toward goal  -CH        Articulation Goal 1 (SLP)    Improve Articulation Goal 1 (SLP)  by over-articulating at phrase level;80%;with minimal cues (75-90%)  -CH  by over-articulating at phrase level;80%;with minimal cues (75-90%)  -CH     Time Frame (Articulation Goal 1, SLP)  short term goal (STG)  -CH  short term goal (STG)  -CH     Progress (Articulation Goal 1, SLP)  70%;with minimal cues (75-90%)  -CH  50%;with minimal cues (75-90%)  -CH     Progress/Outcomes (Articulation Goal 1, SLP)  continuing progress toward goal  -CH  continuing progress toward goal  -CH        Attention Goal 1 (SLP)    Improve Attention by Goal 1 (SLP)  looking at speaker;80%;with minimal cues (75-90%)  -CH  looking at speaker;80%;with minimal cues (75-90%)  -CH     Time Frame (Attention Goal 1, SLP)  short term goal (STG);by discharge  -CH  short term goal (STG);by discharge  -CH     Progress (Attention Goal 1, SLP)  50%;with minimal cues (75-90%)  -CH  30%;with moderate cues (50-74%)  -CH     Progress/Outcomes (Attention Goal 1, SLP)  continuing  progress toward goal  -CH  continuing progress toward goal  -CH        Additional Goal 1 (SLP)    Additional Goal 1, SLP  LTG: Pt will improve cognitive-communication skills in order to participate in care in hospital setting for 100% of opportunities w/o cues.  -CH  LTG: Pt will improve cognitive-communication skills in order to participate in care in hospital setting for 100% of opportunities w/o cues.  -CH     Time Frame (Additional Goal 1, SLP)  by discharge  -CH  by discharge  -CH     Progress/Outcomes (Additional Goal 1, SLP)  continuing progress toward goal  -CH  continuing progress toward goal  -CH       User Key  (r) = Recorded By, (t) = Taken By, (c) = Cosigned By    Initials Name Provider Type    Margaret Lockhart MS CCC-SLP Speech and Language Pathologist              Time Calculation:     Time Calculation- SLP     Row Name 20 1251             Time Calculation- SLP    SLP Start Time  1130  -      SLP Received On  20  -CH        User Key  (r) = Recorded By, (t) = Taken By, (c) = Cosigned By    Initials Name Provider Type    Margaret Lockhart MS CCC-SLP Speech and Language Pathologist          Therapy Charges for Today     Code Description Service Date Service Provider Modifiers Qty    38416847673 HC ST TREATMENT SWALLOW 3 2020 Margaret Hung MS CCC-SLP GN 1    40575715850 HC ST TREATMENT SPEECH 1 2020 Margaret Hung MS CCC-SLP GN 1    [id number] HC ST TREATMENT SWALLOW 3 2020 Margaret Hung MS CCC-SLP GN 1    [id number] HC ST TREATMENT SPEECH 3 2020 Margaret Hung MS CCC-SLP GN 1                     Margaret Hung MS CCC-SLP  2020   and Acute Care - Speech Language Pathology   Swallow Treatment Note  [facility]     Patient Name: Sidra Lane  : 1955  MRN: [id number]  Today's Date: 2020               Admit Date: 2020    Visit Dx:      ICD-10-CM ICD-9-CM   1. Acute ischemic stroke (CMS/[facility]) I63.9 434.91   2. Oropharyngeal  dysphagia R13.12 787.22   3. Dysarthria R47.1 784.51   4. Cognitive communication deficit R41.841 799.52     Patient Active Problem List   Diagnosis   • Shortness of breath   • Atrial fibrillation (CMS/HCC)   • Essential hypertension   • Dizziness   • Deep vein thrombosis (DVT) of proximal lower extremity (CMS/HCC)   • Coronary artery disease involving native coronary artery of native heart without angina pectoris   • Fatigue   • Chest pain   • Longstanding persistent atrial fibrillation (CMS/HCC)   • Cerebrovascular accident (CVA) due to embolism of right middle cerebral artery (CMS/HCC)   • Acute ischemic stroke (CMS/HCC)       Therapy Treatment  Rehabilitation Treatment Summary     Row Name 06/09/20 1130             Treatment Time/Intention    Discipline  speech language pathologist  -      Document Type  therapy note (daily note)  -      Subjective Information  no complaints  -      Mode of Treatment  individual therapy;speech-language pathology  -      Patient/Family Observations  No family present  -      Care Plan Review  evaluation/treatment results reviewed;care plan/treatment goals reviewed;risks/benefits reviewed;current/potential barriers reviewed;patient/other agree to care plan  -      Therapy Frequency (Swallow)  5 days per week  -      Therapy Frequency (SLP SLC)  5 days per week  -      Patient Effort  good  -      Recorded by [] Margaret Hung, MS CCC-SLP 06/09/20 1246      Row Name 06/09/20 1130             Outcome Summary/Treatment Plan (SLP)    Daily Summary of Progress (SLP)  progress toward functional goals as expected  -      Plan for Continued Treatment (SLP)  Continue to follow to address dysphagia, cognition and communication in treatment. Patient continues to display overt s/s of aspiration with ice chips, thin via tsp and NT liquids via tsp.   -      Anticipated Dischage Disposition (SLP)  inpatient rehabilitation facility;anticipate therapy at next level of  care  -CH      Recorded by [CH] Margaret Hung MS CCC-SLP 06/09/20 1246        User Key  (r) = Recorded By, (t) = Taken By, (c) = Cosigned By    Initials Name Effective Dates Discipline    CH Margaret Hung, MS CCC-SLP 02/14/19 -  SLP          Outcome Summary  Outcome Summary/Treatment Plan (SLP)  Daily Summary of Progress (SLP): progress toward functional goals as expected (06/09/20 1130 : Margaret Hung MS CCC-SLP)  Plan for Continued Treatment (SLP): Continue to follow to address dysphagia, cognition and communication in treatment. Patient continues to display overt s/s of aspiration with ice chips, thin via tsp and NT liquids via tsp.  (06/09/20 1130 : Margaret Hung MS CCC-SLP)  Anticipated Dischage Disposition (SLP): inpatient rehabilitation facility, anticipate therapy at next level of care (06/09/20 1130 : Margaret Hung MS CCC-SLP)      SLP GOALS     Row Name 06/09/20 1130 06/08/20 1600          Oral Nutrition/Hydration Goal 1 (SLP)    Oral Nutrition/Hydration Goal 1, SLP  LTG: Pt will return to regular diet & thin liquids w/ 100% accuracy w/o cues  -CH  LTG: Pt will return to regular diet & thin liquids w/ 100% accuracy w/o cues  -CH     Time Frame (Oral Nutrition/Hydration Goal 1, SLP)  by discharge  -CH  by discharge  -CH     Progress/Outcomes (Oral Nutrition/Hydration Goal 1, SLP)  continuing progress toward goal  -CH  continuing progress toward goal  -CH        Oral Nutrition/Hydration Goal 2 (SLP)    Oral Nutrition/Hydration Goal 2, SLP  Pt will tolerate therapeutic trials of thin H2O w/ no overt s/s of aspiration w/ 60% accuracy w/o cues to indicate readiness for repeat instrumental evaluation  -CH  Pt will tolerate therapeutic trials of thin H2O w/ no overt s/s of aspiration w/ 60% accuracy w/o cues to indicate readiness for repeat instrumental evaluation  -CH     Time Frame (Oral Nutrition/Hydration Goal 2, SLP)  short term goal (STG);by discharge  -CH  short term goal (STG);by  discharge  -CH     Barriers (Oral Nutrition/Hydration Goal 2, SLP)  Delayed cough with ice chips, immediate cough with spoon sips of thin.. NT H2O via teaspoon trialed x 3 with delayed cough.   -CH  Delayed cough with ice chips, immediate cough with spoon sips of thin.  -CH     Progress/Outcomes (Oral Nutrition/Hydration Goal 2, SLP)  continuing progress toward goal  -CH  continuing progress toward goal  -CH        Labial Strengthening Goal 1 (SLP)    Activity (Labial Strengthening Goal 1, SLP)  increase labial tone  -CH  increase labial tone  -CH     Increase Labial Tone  labial resistance exercises  -CH  labial resistance exercises  -CH     [place]/Accuracy (Labial Strengthening Goal 1, SLP)  with minimal cues (75-90% accuracy)  -CH  with minimal cues (75-90% accuracy)  -CH     Time Frame (Labial Strengthening Goal 1, SLP)  short term goal (STG);by discharge  -CH  short term goal (STG);by discharge  -CH     Barriers (Labial Strengthening Goal 1, SLP)  Handout provided and reviewed with patient. Patient completed exercises x 3 each with mod cues  -CH  Patient completed exercises x 3 each with mod cues  -CH     Progress/Outcomes (Labial Strengthening Goal 1, SLP)  continuing progress toward goal  -CH  continuing progress toward goal  -CH        Lingual Strengthening Goal 1 (SLP)    Activity (Lingual Strengthening Goal 1, SLP)  increase lingual tone/sensation/control/coordination/movement;increase tongue back strength  -CH  increase lingual tone/sensation/control/coordination/movement;increase tongue back strength  -CH     Increase Lingual Tone/Sensation/Control/Coordination/Movement  lingual movement exercises  -CH  lingual movement exercises  -CH     Increase Tongue Back Strength  lingual resistance exercises  -CH  lingual resistance exercises  -CH     [place]/Accuracy (Lingual Strengthening Goal 1, SLP)  with minimal cues (75-90% accuracy)  -CH  with minimal cues (75-90% accuracy)  -CH     Time Frame  (Lingual Strengthening Goal 1, SLP)  short term goal (STG);by discharge  -CH  short term goal (STG);by discharge  -CH     Barriers (Lingual Strengthening Goal 1, SLP)  Handout provided and reviewed with patient. Patient completed exercises x 3 each with mod cues  -CH  Patient completed exercises x 3 each with mod cues  -CH     Progress/Outcomes (Lingual Strengthening Goal 1, SLP)  continuing progress toward goal  -CH  continuing progress toward goal  -CH        Pharyngeal Strengthening Exercise Goal 1 (SLP)    Activity (Pharyngeal Strengthening Goal 1, SLP)  increase timing;increase superior movement of the hyolaryngeal complex;increase anterior movement of the hyolaryngeal complex;increase closure at entrance to airway/closure of airway at glottis;increase squeeze/positive pressure generation;increase tongue base retraction  -CH  increase timing;increase superior movement of the hyolaryngeal complex;increase anterior movement of the hyolaryngeal complex;increase closure at entrance to airway/closure of airway at glottis;increase squeeze/positive pressure generation;increase tongue base retraction  -CH     Increase Timing  prepping - 3 second prep or suck swallow or 3-step swallow  -CH  prepping - 3 second prep or suck swallow or 3-step swallow  -CH     Increase Superior Movement of the Hyolaryngeal Complex  super-supraglottic swallow  -CH  super-supraglottic swallow  -CH     Increase Anterior Movement of the Hyolaryngeal Complex  chin tuck against resistance (CTAR)  -CH  chin tuck against resistance (CTAR)  -CH     Increase Closure at Entrance to Airway/Closure of Airway at Glottis  super-supraglottic swallow  -CH  super-supraglottic swallow  -CH     Increase Squeeze/Positive Pressure Generation  effortful pitch glide (falsetto + pharyngeal squeeze)  -CH  effortful pitch glide (falsetto + pharyngeal squeeze)  -CH     Increase Tongue Base Retraction  hard effortful swallow  -CH  hard effortful swallow  -CH      [place]/Accuracy (Pharyngeal Strengthening Goal 1, SLP)  with minimal cues (75-90% accuracy)  -CH  with minimal cues (75-90% accuracy)  -CH     Time Frame (Pharyngeal Strengthening Goal 1, SLP)  short term goal (STG);by discharge  -CH  short term goal (STG);by discharge  -CH     Barriers (Pharyngeal Strengthening Goal 1, SLP)  Patient completed exercises x 3 each with mod cues. Handout provided and patient encouraged to complete exercises daily in room.  -CH  Patient completed exercises x 3 each with mod cues  -CH     Progress/Outcomes (Pharyngeal Strengthening Goal 1, SLP)  continuing progress toward goal  -CH  continuing progress toward goal  -CH        Articulation Goal 1 (SLP)    Improve Articulation Goal 1 (SLP)  by over-articulating at phrase level;80%;with minimal cues (75-90%)  -CH  by over-articulating at phrase level;80%;with minimal cues (75-90%)  -CH     Time Frame (Articulation Goal 1, SLP)  short term goal (STG)  -CH  short term goal (STG)  -CH     Progress (Articulation Goal 1, SLP)  70%;with minimal cues (75-90%)  -CH  50%;with minimal cues (75-90%)  -CH     Progress/Outcomes (Articulation Goal 1, SLP)  continuing progress toward goal  -CH  continuing progress toward goal  -CH        Attention Goal 1 (SLP)    Improve Attention by Goal 1 (SLP)  looking at speaker;80%;with minimal cues (75-90%)  -CH  looking at speaker;80%;with minimal cues (75-90%)  -CH     Time Frame (Attention Goal 1, SLP)  short term goal (STG);by discharge  -CH  short term goal (STG);by discharge  -CH     Progress (Attention Goal 1, SLP)  50%;with minimal cues (75-90%)  -CH  30%;with moderate cues (50-74%)  -CH     Progress/Outcomes (Attention Goal 1, SLP)  continuing progress toward goal  -CH  continuing progress toward goal  -CH        Additional Goal 1 (SLP)    Additional Goal 1, SLP  LTG: Pt will improve cognitive-communication skills in order to participate in care in hospital setting for 100% of opportunities w/o cues.   -CH  LTG: Pt will improve cognitive-communication skills in order to participate in care in hospital setting for 100% of opportunities w/o cues.  -CH     Time Frame (Additional Goal 1, SLP)  by discharge  -CH  by discharge  -CH     Progress/Outcomes (Additional Goal 1, SLP)  continuing progress toward goal  -CH  continuing progress toward goal  -CH       User Key  (r) = Recorded By, (t) = Taken By, (c) = Cosigned By    Initials Name Provider Type    Margaret Lockhart MS CCC-SLP Speech and Language Pathologist          EDUCATION  The patient has been educated in the following areas:   Home Exercise Program (HEP) Dysphagia (Swallowing Impairment) Oral Care/Hydration NPO rationale.    SLP Recommendation and Plan  Daily Summary of Progress (SLP): progress toward functional goals as expected     Plan for Continued Treatment (SLP): Continue to follow to address dysphagia, cognition and communication in treatment. Patient continues to display overt s/s of aspiration with ice chips, thin via tsp and NT liquids via tsp.   Anticipated Dischage Disposition (SLP): inpatient rehabilitation facility, anticipate therapy at next level of care                    Time Calculation:   Time Calculation- SLP     Row Name 06/09/20 1251             Time Calculation- SLP    SLP Start Time  1130  -      SLP Received On  06/09/20  -        User Key  (r) = Recorded By, (t) = Taken By, (c) = Cosigned By    Initials Name Provider Type    Margaret Lockhart MS CCC-SLP Speech and Language Pathologist          Therapy Charges for Today     Code Description Service Date Service Provider Modifiers Qty    [id number] HC ST TREATMENT SWALLOW 3 6/8/2020 Margaret Hung MS CCC-SLP GN 1    [id number] HC ST TREATMENT SPEECH 1 6/8/2020 Margaret Hung MS CCC-SLP GN 1    [id number] HC ST TREATMENT SWALLOW 3 6/9/2020 Margaret Hung MS CCC-SLP GN 1    [id number] HC ST TREATMENT SPEECH 3 6/9/2020 Margaret Hung MS CCC-SLP GN 1                  Margaret Hung MS CCC-SLP  6/9/2020

## 2020-06-10 ENCOUNTER — APPOINTMENT (OUTPATIENT)
Dept: GENERAL RADIOLOGY | Facility: HOSPITAL | Age: 65
End: 2020-06-10

## 2020-06-10 LAB
ANION GAP SERPL CALCULATED.3IONS-SCNC: 10 MMOL/L (ref 5–15)
BUN BLD-MCNC: 18 MG/DL (ref 8–23)
BUN/CREAT SERPL: 25 (ref 7–25)
CALCIUM SPEC-SCNC: 9.4 MG/DL (ref 8.6–10.5)
CHLORIDE SERPL-SCNC: 103 MMOL/L (ref 98–107)
CO2 SERPL-SCNC: 26 MMOL/L (ref 22–29)
CREAT BLD-MCNC: 0.72 MG/DL (ref 0.57–1)
DEPRECATED RDW RBC AUTO: 57.4 FL (ref 37–54)
ERYTHROCYTE [DISTWIDTH] IN BLOOD BY AUTOMATED COUNT: 19.4 % (ref 12.3–15.4)
GFR SERPL CREATININE-BSD FRML MDRD: 81 ML/MIN/1.73
GLUCOSE BLD-MCNC: 113 MG/DL (ref 65–99)
GLUCOSE BLDC GLUCOMTR-MCNC: 100 MG/DL (ref 70–130)
GLUCOSE BLDC GLUCOMTR-MCNC: 131 MG/DL (ref 70–130)
GLUCOSE BLDC GLUCOMTR-MCNC: 160 MG/DL (ref 70–130)
HCT VFR BLD AUTO: 40.8 % (ref 34–46.6)
HGB BLD-MCNC: 10.9 G/DL (ref 12–15.9)
MCH RBC QN AUTO: 22.3 PG (ref 26.6–33)
MCHC RBC AUTO-ENTMCNC: 26.7 G/DL (ref 31.5–35.7)
MCV RBC AUTO: 83.4 FL (ref 79–97)
PLATELET # BLD AUTO: 372 10*3/MM3 (ref 140–450)
PMV BLD AUTO: 11.8 FL (ref 6–12)
POTASSIUM BLD-SCNC: 4.5 MMOL/L (ref 3.5–5.2)
RBC # BLD AUTO: 4.89 10*6/MM3 (ref 3.77–5.28)
SODIUM BLD-SCNC: 139 MMOL/L (ref 136–145)
WBC NRBC COR # BLD: 10.03 10*3/MM3 (ref 3.4–10.8)

## 2020-06-10 PROCEDURE — 74230 X-RAY XM SWLNG FUNCJ C+: CPT

## 2020-06-10 PROCEDURE — 92526 ORAL FUNCTION THERAPY: CPT

## 2020-06-10 PROCEDURE — 85027 COMPLETE CBC AUTOMATED: CPT | Performed by: INTERNAL MEDICINE

## 2020-06-10 PROCEDURE — 82962 GLUCOSE BLOOD TEST: CPT

## 2020-06-10 PROCEDURE — 92611 MOTION FLUOROSCOPY/SWALLOW: CPT

## 2020-06-10 PROCEDURE — 80048 BASIC METABOLIC PNL TOTAL CA: CPT | Performed by: INTERNAL MEDICINE

## 2020-06-10 PROCEDURE — 99233 SBSQ HOSP IP/OBS HIGH 50: CPT | Performed by: INTERNAL MEDICINE

## 2020-06-10 RX ADMIN — QUETIAPINE FUMARATE 12.5 MG: 25 TABLET ORAL at 20:43

## 2020-06-10 RX ADMIN — ACETAMINOPHEN ORAL SOLUTION 650 MG: 650 SOLUTION ORAL at 09:30

## 2020-06-10 RX ADMIN — BARIUM SULFATE 10 ML: 400 SUSPENSION ORAL at 13:53

## 2020-06-10 RX ADMIN — LISINOPRIL 10 MG: 10 TABLET ORAL at 17:01

## 2020-06-10 RX ADMIN — AMLODIPINE BESYLATE 5 MG: 5 TABLET ORAL at 09:30

## 2020-06-10 RX ADMIN — METOPROLOL TARTRATE 100 MG: 100 TABLET ORAL at 20:43

## 2020-06-10 RX ADMIN — ATORVASTATIN CALCIUM 80 MG: 40 TABLET, FILM COATED ORAL at 20:42

## 2020-06-10 RX ADMIN — DIPHENOXYLATE HYDROCHLORIDE AND ATROPINE SULFATE 1 TABLET: 2.5; .025 TABLET ORAL at 21:01

## 2020-06-10 RX ADMIN — BARIUM SULFATE 20 ML: 400 PASTE ORAL at 13:54

## 2020-06-10 RX ADMIN — BARIUM SULFATE 100 ML: 0.81 POWDER, FOR SUSPENSION ORAL at 13:54

## 2020-06-10 RX ADMIN — BARIUM SULFATE 50 ML: 400 SUSPENSION ORAL at 13:54

## 2020-06-10 RX ADMIN — AMOXICILLIN AND CLAVULANATE POTASSIUM 1 TABLET: 875; 125 TABLET, FILM COATED ORAL at 09:30

## 2020-06-10 RX ADMIN — Medication 1 CAPSULE: at 09:29

## 2020-06-10 RX ADMIN — ASPIRIN 81 MG 81 MG: 81 TABLET ORAL at 09:30

## 2020-06-10 RX ADMIN — AMOXICILLIN AND CLAVULANATE POTASSIUM 1 TABLET: 875; 125 TABLET, FILM COATED ORAL at 20:43

## 2020-06-10 RX ADMIN — RIVAROXABAN 20 MG: 20 TABLET, FILM COATED ORAL at 17:01

## 2020-06-10 RX ADMIN — METOPROLOL TARTRATE 100 MG: 100 TABLET ORAL at 09:30

## 2020-06-10 RX ADMIN — FAMOTIDINE 20 MG: 20 TABLET, FILM COATED ORAL at 17:01

## 2020-06-10 RX ADMIN — AMANTADINE HYDROCHLORIDE 100 MG: 100 CAPSULE ORAL at 15:19

## 2020-06-10 RX ADMIN — CHOLESTYRAMINE 4 G: 4 POWDER, FOR SUSPENSION ORAL at 09:29

## 2020-06-10 RX ADMIN — AMANTADINE HYDROCHLORIDE 100 MG: 100 CAPSULE ORAL at 09:29

## 2020-06-10 RX ADMIN — DIPHENOXYLATE HYDROCHLORIDE AND ATROPINE SULFATE 1 TABLET: 2.5; .025 TABLET ORAL at 09:30

## 2020-06-10 RX ADMIN — FAMOTIDINE 20 MG: 20 TABLET, FILM COATED ORAL at 06:26

## 2020-06-10 NOTE — THERAPY TREATMENT NOTE
Acute Care - Speech Language Pathology   Swallow Treatment Note [facility]     Patient Name: Sidra Lane  : 1955  MRN: [id number]  Today's Date: 6/10/2020               Admit Date: 2020    Visit Dx:      ICD-10-CM ICD-9-CM   1. Acute ischemic stroke (CMS/HCC) I63.9 434.91   2. Oropharyngeal dysphagia R13.12 787.22   3. Dysarthria R47.1 784.51   4. Cognitive communication deficit R41.841 799.52     Patient Active Problem List   Diagnosis   • Shortness of breath   • Atrial fibrillation (CMS/HCC)   • Essential hypertension   • Dizziness   • Deep vein thrombosis (DVT) of proximal lower extremity (CMS/HCC)   • Coronary artery disease involving native coronary artery of native heart without angina pectoris   • Fatigue   • Chest pain   • Longstanding persistent atrial fibrillation (CMS/HCC)   • Cerebrovascular accident (CVA) due to embolism of right middle cerebral artery (CMS/HCC)   • Acute ischemic stroke (CMS/HCC)       Therapy Treatment  Rehabilitation Treatment Summary     Row Name 06/10/20 1100             Treatment Time/Intention    Discipline  speech language pathologist  -      Document Type  therapy note (daily note)  -      Subjective Information  no complaints  -      Mode of Treatment  speech-language pathology;individual therapy  -      Patient/Family Observations  No family present  -      Care Plan Review  care plan/treatment goals reviewed  -      Therapy Frequency (Swallow)  5 days per week  -SA      Therapy Frequency (SLP SLC)  5 days per week  -SA      Recorded by [SA] Fely Gage MS CCC-SLP 06/10/20 1128      Row Name 06/10/20 1100             Pain Assessment    Additional Documentation  Pain Scale: FACES Pre/Post-Treatment (Group)  -SA      Recorded by [SA] Fely Gage MS CCC-SLP 06/10/20 1128      Row Name 06/10/20 1100             Pain Scale: FACES Pre/Post-Treatment    Pain: FACES Scale, Pretreatment  0-->no hurt  -SA      Pain: FACES Scale,  Post-Treatment  0-->no hurt  -SA      Recorded by [SA] Fely Gage MS CCC-SLP 06/10/20 1128      Row Name 06/10/20 1100             Outcome Summary/Treatment Plan (SLP)    Daily Summary of Progress (SLP)  progress toward functional goals is good  -SA      Plan for Continued Treatment (SLP)  Pt seen for dysphagia tx. Given trials of ice chips, thin via tsp/cup, and puree. No observable s/sxs of aspiration noted w/ trials. Discussed plan to repeat MBS study to further assess swallow function. MBS to be completed later this afternoon or as radiology schedule allows.   -SA      Anticipated Dischage Disposition (SLP)  inpatient rehabilitation facility;anticipate therapy at next level of care  -SA      Recorded by [SA] Fely Ggae MS CCC-SLP 06/10/20 1128        User Key  (r) = Recorded By, (t) = Taken By, (c) = Cosigned By    Initials Name Effective Dates Discipline    SA Fely Gage MS CCC-SLP 03/16/20 -  SLP          Outcome Summary  Outcome Summary/Treatment Plan (SLP)  Daily Summary of Progress (SLP): progress toward functional goals is good (06/10/20 1100 : Fely Gage MS CCC-SLP)  Plan for Continued Treatment (SLP): Pt seen for dysphagia tx. Given trials of ice chips, thin via tsp/cup, and puree. No observable s/sxs of aspiration noted w/ trials. Discussed plan to repeat MBS study to further assess swallow function. MBS to be completed later this afternoon or as radiology schedule allows.  (06/10/20 1100 : Fely Gage MS CCC-SLP)  Anticipated Dischage Disposition (SLP): inpatient rehabilitation facility, anticipate therapy at next level of care (06/10/20 1100 : Fely Gage MS [facility])      SLP GOALS     Row Name 06/10/20 1100 06/09/20 1130 06/08/20 1600       Oral Nutrition/Hydration Goal 1 (SLP)    Oral Nutrition/Hydration Goal 1, SLP  LTG: Pt will return to regular diet & thin liquids w/ 100% accuracy w/o cues  -SA  LTG: Pt will return to regular diet & thin  liquids w/ 100% accuracy w/o cues  -CH  LTG: Pt will return to regular diet & thin liquids w/ 100% accuracy w/o cues  -CH    Time Frame (Oral Nutrition/Hydration Goal 1, SLP)  by discharge  -SA  by discharge  -CH  by discharge  -CH    Progress/Outcomes (Oral Nutrition/Hydration Goal 1, SLP)  continuing progress toward goal  -SA  continuing progress toward goal  -CH  continuing progress toward goal  -CH       Oral Nutrition/Hydration Goal 2 (SLP)    Oral Nutrition/Hydration Goal 2, SLP  Pt will tolerate therapeutic trials of thin H2O w/ no overt s/s of aspiration w/ 60% accuracy w/o cues to indicate readiness for repeat instrumental evaluation  -SA  Pt will tolerate therapeutic trials of thin H2O w/ no overt s/s of aspiration w/ 60% accuracy w/o cues to indicate readiness for repeat instrumental evaluation  -CH  Pt will tolerate therapeutic trials of thin H2O w/ no overt s/s of aspiration w/ 60% accuracy w/o cues to indicate readiness for repeat instrumental evaluation  -CH    Time Frame (Oral Nutrition/Hydration Goal 2, SLP)  short term goal (STG);by discharge  -SA  short term goal (STG);by discharge  -CH  short term goal (STG);by discharge  -CH    Barriers (Oral Nutrition/Hydration Goal 2, SLP)  Tolerated ice chips, thin via tsp/cup w/o any observable s/sxs of aspiration  -SA  Delayed cough with ice chips, immediate cough with spoon sips of thin.. NT H2O via teaspoon trialed x 3 with delayed cough.   -CH  Delayed cough with ice chips, immediate cough with spoon sips of thin.  -CH    Progress/Outcomes (Oral Nutrition/Hydration Goal 2, SLP)  continuing progress toward goal  -SA  continuing progress toward goal  -CH  continuing progress toward goal  -CH       Labial Strengthening Goal 1 (SLP)    Activity (Labial Strengthening Goal 1, SLP)  increase labial tone  -SA  increase labial tone  -CH  increase labial tone  -CH    Increase Labial Tone  labial resistance exercises  -SA  labial resistance exercises  -CH  labial  resistance exercises  -CH    [place]/Accuracy (Labial Strengthening Goal 1, SLP)  with minimal cues (75-90% accuracy)  -SA  with minimal cues (75-90% accuracy)  -CH  with minimal cues (75-90% accuracy)  -CH    Time Frame (Labial Strengthening Goal 1, SLP)  short term goal (STG);by discharge  -SA  short term goal (STG);by discharge  -CH  short term goal (STG);by discharge  -CH    Barriers (Labial Strengthening Goal 1, SLP)  --  Handout provided and reviewed with patient. Patient completed exercises x 3 each with mod cues  -CH  Patient completed exercises x 3 each with mod cues  -CH    Progress/Outcomes (Labial Strengthening Goal 1, SLP)  goal ongoing  -SA  continuing progress toward goal  -CH  continuing progress toward goal  -CH       Lingual Strengthening Goal 1 (SLP)    Activity (Lingual Strengthening Goal 1, SLP)  increase lingual tone/sensation/control/coordination/movement;increase tongue back strength  -SA  increase lingual tone/sensation/control/coordination/movement;increase tongue back strength  -CH  increase lingual tone/sensation/control/coordination/movement;increase tongue back strength  -CH    Increase Lingual Tone/Sensation/Control/Coordination/Movement  lingual movement exercises  -SA  lingual movement exercises  -CH  lingual movement exercises  -CH    Increase Tongue Back Strength  lingual resistance exercises  -SA  lingual resistance exercises  -CH  lingual resistance exercises  -CH    [place]/Accuracy (Lingual Strengthening Goal 1, SLP)  with minimal cues (75-90% accuracy)  -SA  with minimal cues (75-90% accuracy)  -CH  with minimal cues (75-90% accuracy)  -CH    Time Frame (Lingual Strengthening Goal 1, SLP)  short term goal (STG);by discharge  -SA  short term goal (STG);by discharge  -CH  short term goal (STG);by discharge  -CH    Barriers (Lingual Strengthening Goal 1, SLP)  --  Handout provided and reviewed with patient. Patient completed exercises x 3 each with mod cues  -CH   Patient completed exercises x 3 each with mod cues  -CH    Progress/Outcomes (Lingual Strengthening Goal 1, SLP)  goal ongoing  -SA  continuing progress toward goal  -CH  continuing progress toward goal  -CH       Pharyngeal Strengthening Exercise Goal 1 (SLP)    Activity (Pharyngeal Strengthening Goal 1, SLP)  increase timing;increase superior movement of the hyolaryngeal complex;increase anterior movement of the hyolaryngeal complex;increase closure at entrance to airway/closure of airway at glottis;increase squeeze/positive pressure generation;increase tongue base retraction  -SA  increase timing;increase superior movement of the hyolaryngeal complex;increase anterior movement of the hyolaryngeal complex;increase closure at entrance to airway/closure of airway at glottis;increase squeeze/positive pressure generation;increase tongue base retraction  -CH  increase timing;increase superior movement of the hyolaryngeal complex;increase anterior movement of the hyolaryngeal complex;increase closure at entrance to airway/closure of airway at glottis;increase squeeze/positive pressure generation;increase tongue base retraction  -CH    Increase Timing  prepping - 3 second prep or suck swallow or 3-step swallow  -SA  prepping - 3 second prep or suck swallow or 3-step swallow  -CH  prepping - 3 second prep or suck swallow or 3-step swallow  -CH    Increase Superior Movement of the Hyolaryngeal Complex  super-supraglottic swallow  -SA  super-supraglottic swallow  -CH  super-supraglottic swallow  -CH    Increase Anterior Movement of the Hyolaryngeal Complex  chin tuck against resistance (CTAR)  -SA  chin tuck against resistance (CTAR)  -CH  chin tuck against resistance (CTAR)  -CH    Increase Closure at Entrance to Airway/Closure of Airway at Glottis  super-supraglottic swallow  -SA  super-supraglottic swallow  -CH  super-supraglottic swallow  -CH    Increase Squeeze/Positive Pressure Generation  effortful pitch glide  (falsetto + pharyngeal squeeze)  -SA  effortful pitch glide (falsetto + pharyngeal squeeze)  -CH  effortful pitch glide (falsetto + pharyngeal squeeze)  -CH    Increase Tongue Base Retraction  hard effortful swallow  -SA  hard effortful swallow  -CH  hard effortful swallow  -CH    [place]/Accuracy (Pharyngeal Strengthening Goal 1, SLP)  with minimal cues (75-90% accuracy)  -SA  with minimal cues (75-90% accuracy)  -CH  with minimal cues (75-90% accuracy)  -CH    Time Frame (Pharyngeal Strengthening Goal 1, SLP)  short term goal (STG);by discharge  -SA  short term goal (STG);by discharge  -CH  short term goal (STG);by discharge  -CH    Barriers (Pharyngeal Strengthening Goal 1, SLP)  --  Patient completed exercises x 3 each with mod cues. Handout provided and patient encouraged to complete exercises daily in room.  -CH  Patient completed exercises x 3 each with mod cues  -CH    Progress/Outcomes (Pharyngeal Strengthening Goal 1, SLP)  goal ongoing  -SA  continuing progress toward goal  -CH  continuing progress toward goal  -CH       Articulation Goal 1 (SLP)    Improve Articulation Goal 1 (SLP)  by over-articulating at phrase level;80%;with minimal cues (75-90%)  -SA  by over-articulating at phrase level;80%;with minimal cues (75-90%)  -CH  by over-articulating at phrase level;80%;with minimal cues (75-90%)  -CH    Time Frame (Articulation Goal 1, SLP)  short term goal (STG)  -SA  short term goal (STG)  -CH  short term goal (STG)  -CH    Progress (Articulation Goal 1, SLP)  70%;with minimal cues (75-90%)  -SA  70%;with minimal cues (75-90%)  -CH  50%;with minimal cues (75-90%)  -CH    Progress/Outcomes (Articulation Goal 1, SLP)  Goal ongoing  -SA  continuing progress toward goal  -CH  continuing progress toward goal  -CH       Attention Goal 1 (SLP)    Improve Attention by Goal 1 (SLP)  looking at speaker;80%;with minimal cues (75-90%)  -SA  looking at speaker;80%;with minimal cues (75-90%)  -CH  looking at  speaker;80%;with minimal cues (75-90%)  -CH    Time Frame (Attention Goal 1, SLP)  short term goal (STG);by discharge  -SA  short term goal (STG);by discharge  -CH  short term goal (STG);by discharge  -CH    Progress (Attention Goal 1, SLP)  50%;with minimal cues (75-90%)  -SA  50%;with minimal cues (75-90%)  -CH  30%;with moderate cues (50-74%)  -CH    Progress/Outcomes (Attention Goal 1, SLP)  Goal ongoing  -SA  continuing progress toward goal  -CH  continuing progress toward goal  -CH       Additional Goal 1 (SLP)    Additional Goal 1, SLP  LTG: Pt will improve cognitive-communication skills in order to participate in care in hospital setting for 100% of opportunities w/o cues.  -SA  LTG: Pt will improve cognitive-communication skills in order to participate in care in hospital setting for 100% of opportunities w/o cues.  -CH  LTG: Pt will improve cognitive-communication skills in order to participate in care in hospital setting for 100% of opportunities w/o cues.  -CH    Time Frame (Additional Goal 1, SLP)  by discharge  -SA  by discharge  -CH  by discharge  -CH    Progress/Outcomes (Additional Goal 1, SLP)  Goal ongoing   -SA  continuing progress toward goal  -CH  continuing progress toward goal  -CH      User Key  (r) = Recorded By, (t) = Taken By, (c) = Cosigned By    Initials Name Provider Type    Margaret Lockhart MS CCC-SLP Speech and Language Pathologist    Fely Sharpe MS [facility]3]-SLP Speech and Language Pathologist          EDUCATION  The patient has been educated in the following areas:   Dysphagia (Swallowing Impairment).    SLP Recommendation and Plan  Daily Summary of Progress (SLP): progress toward functional goals is good     Plan for Continued Treatment (SLP): Pt seen for dysphagia tx. Given trials of ice chips, thin via tsp/cup, and puree. No observable s/sxs of aspiration noted w/ trials. Discussed plan to repeat MBS study to further assess swallow function. MBS to be completed later  this afternoon or as radiology schedule allows.   Anticipated Dischage Disposition (SLP): inpatient rehabilitation facility, anticipate therapy at next level of care                    Time Calculation:   Time Calculation- SLP     Row Name 06/10/20 1130             Time Calculation- SLP    SLP Start Time  1100  -SA      SLP Received On  06/10/20  -        User Key  (r) = Recorded By, (t) = Taken By, (c) = Cosigned By    Initials Name Provider Type    Fely Sharpe MS CCC-SLP Speech and Language Pathologist          Therapy Charges for Today     Code Description Service Date Service Provider Modifiers Qty    [id number]  ST TREATMENT SWALLOW 3 6/10/2020 Fely Gage MS CCC-SLP GN 1                 Fely Gage MS CCC-SLP  6/10/2020

## 2020-06-10 NOTE — PLAN OF CARE
Problem: Patient Care Overview  Goal: Plan of Care Review  Outcome: Ongoing (interventions implemented as appropriate)  Flowsheets (Taken 6/10/2020 1125)  Plan of Care Reviewed With: patient  Note:   SLP treatment completed. Will continue to address dysphagia. Please see note for further details and recommendations.

## 2020-06-10 NOTE — PROGRESS NOTES
Continued Stay Note  [facility]     Patient Name: Sidra Lane  MRN: [id number]  Today's Date: 6/10/2020    Admit Date: 5/31/2020    Discharge Plan     Row Name 06/10/20 1236       Plan    Plan  Signature of Jonas Virgen    Patient/Family in Agreement with Plan  yes    Plan Comments  discussed in MDR. Speech is evaluating dysphagia with a MBS. Will determine if patient needs a PEG placed for placement. Plan is Signature of Jonas Virgen CM will continue to follow.    Final Discharge Disposition Code  03 - skilled nursing facility (SNF)        Discharge Codes    No documentation.             Naga Muñoz RN

## 2020-06-10 NOTE — PLAN OF CARE
Problem: Patient Care Overview  Goal: Plan of Care Review  Outcome: Ongoing (interventions implemented as appropriate)  Flowsheets (Taken 6/10/2020 1602)  Plan of Care Reviewed With: patient  Note:   SLP MBS re-evaluation completed. Will continue to address dysphagia. Please see note for further details and recommendations.

## 2020-06-10 NOTE — PROGRESS NOTES
"                  Clinical Nutrition     Nutrition Support Assessment  Reason for Visit: Follow-up protocol, EN    Patient Name: Sidra Lane  YOB: 1955  MRN: [id number]  Date of Encounter: 06/10/20 08:39  Admission date: 5/31/2020     Comments: EN follow up. Patient passed swallow eval for Dysphagia 3, HTL. Will adjust EN regimen to nocturnal regimen - Peptamen AF @ 60 mL/hr running from 6p-6a to provide 720 mL total, and off at 6 am to allow for encouraged oral intake during the day.    RD to continue to monitor and adjust EN regimen as medically appropriate    Nutrition Assessment     Admission Problem List:  R MCA - CVA   Unsuccessful thrombectomy - iatPA (5/31)  PAF w/ RVR  Fever  C. Diff - negative (6/9)    Applicable PMH:  A-fib   CAD  GERD   DVT  PE  HTN    Applicable Nutrition-Related Information:  (5/31) NPO  (6/2) SLP MBS - NPO, temporary alternate methods of nutrition/hydration, other (see comments)(per MD discretion)  (6/2) NGT placed and EN initiated - Replete w/ Fiber @ 75 mL/hr TGV = 1500 mL, free water @ 15 mL  (6/5) EN changed to Peptamen AF @ 60 mL/hr, TGV = 1320 mL  (6/7) Pt removed keofeed  (6/9) SLP tx - Patient continues to display overt s/s of aspiration with ice chips, thin via tsp and NT liquids via tsp  (6/10) SLP     Applicable medical tests/procedures since admission:  (6/2) MBS - with recommendation for NPO status  (6/4) SLP - \"pt not appropriate for tx per RN\"    Reported/Observed/Food/Nutrition Related History:      Patient CARRIE for swallow eval. RD observed Peptamen AF hanging at bedside. Spoke with RN who states patient passed eval for Dysphagia 3, HTL. Reported patient was hungry, wanting to eat. Will adjust EN regimen for nocturnal feeds to assess patients tolerance and intake of modified PO prior to d/c EN. RN reports patients diarrhea better, only x1 BM today.    Per I/O documentation:  BM x1 (6/10)  BM x7 (6/8)    Anthropometrics     Height: Height: 154.9 cm " "(61\")  Weight: Weight: 104 kg (229 lb) (06/06/20 1000)  BMI: BMI (Calculated): 43.3  BMI classification: Obese Class III extreme obesity: > or equal to 40kg/m2   IBW:  105#     Labs reviewed     Results from last 7 days   Lab Units 06/10/20  0445 06/09/20  0521   SODIUM mmol/L 139 141  136   POTASSIUM mmol/L 4.5 4.7  4.5   CHLORIDE mmol/L 103 107  102   CO2 mmol/L 26.0 15.0*  21.0*   BUN mg/dL 18 20  19   CREATININE mg/dL 0.72 0.68  0.63   GLUCOSE mg/dL 113* 105*  101*   CALCIUM mg/dL 9.4 9.0  9.0   PHOSPHORUS mg/dL  --  3.9     Results from last 7 days   Lab Units 06/10/20  0512 06/09/20  2343 06/09/20  1700 06/09/20  1142 06/08/20  2100 06/08/20  1710   GLUCOSE mg/dL 131* 154* 105 122 118 118     Lab Results   Lab Value Date/Time    HGBA1C 5.80 (H) 06/01/2020 0637       Medications reviewed   Pertinent: abx, eliquis, lomotil, pepcid, cholestyramine x4/d, seroquel    Intake/Ouptut 24 hrs (7:00AM - 6:59 AM)     Intake & Output (last day)       06/09 0701 - 06/10 0700 06/10 0701 - 06/11 0700    Other 457 0    NG/GT 2848 0    Total Intake(mL/kg) 3305 (31.8) 0 (0)    Urine (mL/kg/hr) 300 (0.1)     Total Output 300     Net +3005 0          Urine Unmeasured Occurrence  1 x    Stool Unmeasured Occurrence  1 x        Needs Assessment (6/2)     Height used:  154.9cm  Weight used: 105kg  IBW: 47.7kg     Estimated calorie needs: ~1500 kcal/day  Method:Kcals/KG 14= 1470  Method:HB 1627    Estimated protein needs: ~100 g pro/day  1.0 g/kg= 105g pro  2.0grm KG/IBW = 95    Current Nutrition Prescription     PO: NPO  EN: Peptamen AF  Goal rate: 60 mL  Route: NDT    Evaluation of Received Nutrient/Fluid Intake last day - (6/9):     At Target Goal Volume  Received per I/O's    % Est needs   Volume  1320 ml 1594 mL      Modulars        Energy/kcals 1584 kcals 1913 kcals 121%   Protein  100 g pro 121 g pro 121%               Fiber 8 g 9.6 g    Fluid   W/Free water 1071 ml  1236 ml 1293 mL  1750 mL            TGV calculated " based on 22hrs delivery with anticipated interruption in EN infusion for routine patient care    Nutrition Diagnosis     6/5 updated 6/8  Problem Less than optimal enteral infusion composition/modality   Etiology Removal of keofeed   Signs/Symptoms 45% of goal volume provided d/t lost access   Status: resolved      6/2 updated 6/5, 6/8, 6/10  Problem Swallowing difficulty   Etiology Per SLP eval    Signs/Symptoms Per MBS (6/2) with recommendation for NPO status.    Status: ongoing    Nutrition Intervention     1.  Follow treatment progress, Care plan reviewed   2.  RD recommends nocturnal EN to assure sufficient oral intake with modified diet/liquids. EN to run from 6p-6a, Peptamen AF @ 60 mL/hr, TGV = 720 mL, FW @ 15 mL/hr.     At Target Goal Volume     % Est needs   Volume  720ml      Energy/kcals 864kcals 58%   Protein 55g pro 55%   Fiber 4 g         Fluid   W/Free water 584 ml  914 ml       *TGV calculated based on 12hrs delivery overnight    2. Nutrition panel, Pre-alb and CRP weekly while on EN  3. Monitor electrolytes and replace as indicated   4. RD to monitor patient status, SLP eval, and adjust nutrition support regimen as medically appropriate     Goal:   General: Nutrition support treatment   PO: Tolerate PO, Increase Intake  EN/PN: Adjust EN, EN to PO      Monitoring/Evaluation:   Per protocol, I&O, Pertinent labs, EN delivery/tolerance, GI status, Symptoms, Swallow function    Will Continue to follow per protocol      Alejandrina Sandoval RDN, LD  Time Spent: 35 minutes

## 2020-06-10 NOTE — MBS/VFSS/FEES
Acute Care - Speech Language Pathology   Swallow Initial Evaluation  Leti   Modified Barium Swallow Study (MBS)     Patient Name: Sidra Lane  : 1955  MRN: [id number]  Today's Date: 6/10/2020               Admit Date: 2020    Visit Dx:     ICD-10-CM ICD-9-CM   1. Acute ischemic stroke (CMS/HCC) I63.9 434.91   2. Oropharyngeal dysphagia R13.12 787.22   3. Dysarthria R47.1 784.51   4. Cognitive communication deficit R41.841 799.52     Patient Active Problem List   Diagnosis   • Shortness of breath   • Atrial fibrillation (CMS/HCC)   • Essential hypertension   • Dizziness   • Deep vein thrombosis (DVT) of proximal lower extremity (CMS/HCC)   • Coronary artery disease involving native coronary artery of native heart without angina pectoris   • Fatigue   • Chest pain   • Longstanding persistent atrial fibrillation (CMS/HCC)   • Cerebrovascular accident (CVA) due to embolism of right middle cerebral artery (CMS/HCC)   • Acute ischemic stroke (CMS/HCC)     Past Medical History:   Diagnosis Date   • Atrial fibrillation (CMS/HCC)    • CHF (congestive heart failure) (CMS/HCC)    • Deep vein thrombosis (CMS/HCC)    • GERD (gastroesophageal reflux disease)    • Hypertension    • May-Thurner syndrome    • Pulmonary embolism (CMS/HCC)      Past Surgical History:   Procedure Laterality Date   • CHOLECYSTECTOMY     • COLON SURGERY      bowel leakage, some of colon removed   • INTERVENTIONAL RADIOLOGY PROCEDURE Bilateral 2020    Procedure: CAROTID CEREBRAL ANGIOGRAM BILATERAL;  Surgeon: Brant Duarte MD;  Location: [facility] INVASIVE LOCATION;  Service: Interventional Radiology;  Laterality: Bilateral;   • LAPAROSCOPIC TUBAL LIGATION     • LEG SURGERY      multiple stents to BLE   • TONSILLECTOMY          SWALLOW EVALUATION (last 72 hours)      SLP Adult Swallow Evaluation     Row Name 06/10/20 1320                   Rehab Evaluation    Document Type  re-evaluation  -MP        Subjective  Information  no complaints  -MP        Patient Observations  alert;cooperative  -MP        Patient/Family Observations  No family present  -MP        Patient Effort  good  -MP           General Information    Patient Profile Reviewed  yes  -MP        Pertinent History Of Current Problem  See Initial eval  -MP        Current Method of Nutrition  NPO;nasogastric feedings  -MP        Plans/Goals Discussed with  patient;agreed upon  -MP        Barriers to Rehab  none identified  -MP           Pain Assessment    Additional Documentation  Pain Scale: FACES Pre/Post-Treatment (Group)  -MP           Pain Scale: FACES Pre/Post-Treatment    Pain: FACES Scale, Pretreatment  0-->no hurt  -MP        Pain: FACES Scale, Post-Treatment  0-->no hurt  -MP           MBS/VFSS    Utensils Used  spoon;cup;straw  -MP        Consistencies Trialed  thin liquids;nectar/syrup-thick liquids;honey-thick liquids;pudding thick;regular textures  -MP           MBS/VFSS Interpretation    Oral Prep Phase  impaired oral phase of swallowing  -MP        Oral Transit Phase  impaired  -MP        Oral Residue  impaired  -MP        VFSS Summary  SLP MBS evaluation completed. Pt presents w/ mild-moderate oral and moderate pharyngeal dysphagia. Prolonged mastication & incr'd A-P transit w/ regular solid. Reduced lingual control resulting in pre-spill of liquids into the pharynx. Aspiration before/during the swallow w/ thin & nectar-thick liquid. Aspiration was SILENT. No penetration/aspiration w/ honey-thick liquid, pudding, or solid. No significant pharyngeal residue. Recommend dysphagia level III (puree, some mashed) diet w/ honey-thick liquids. No straws. Meds crushed in pudding/puree. Standard aspiration precautions. SLP will continue to follow for tx.  -MP           Oral Preparatory Phase    Oral Preparatory Phase  prolonged manipulation  -MP        Prolonged Manipulation  regular textures  -MP           Oral Transit Phase    Impaired Oral Transit Phase   increased A-P transit time;premature spillage of liquids into pharynx  -MP        Increased A-P Transit Time  regular textures  -MP        Premature Spillage of Liquids into Pharynx  thin liquids;nectar-thick liquids  -MP           Oral Residue    Impaired Oral Residue  lingual residue  -MP        Lingual Residue  all consistencies tested  -MP        Response to Oral Residue  cleared residue;with spontaneous subsequent swallow  -MP           Initiation of Pharyngeal Swallow    Initiation of Pharyngeal Swallow  bolus in pyriform sinuses  -MP        Pharyngeal Phase  impaired pharyngeal phase of swallowing  -MP        Aspiration Before the Swallow  thin liquids;nectar-thick liquids;secondary to reduced back of tongue control;secondary to delayed swallow initiation or mistiming  -MP        Aspiration During the Swallow  thin liquids;nectar-thick liquids  -MP        Response to Aspiration  no response, silent aspiration  -MP        Rosenbek's Scale  thin:;nectar:;8--->level 8  -MP        Attempted Compensatory Maneuvers  bolus size;bolus presentation style  -MP        Response to Attempted Compensatory Maneuvers  did not prevent aspiration  -MP           Clinical Impression    SLP Swallowing Diagnosis  mild-moderate;oral dysfunction;moderate;pharyngeal dysfunction  -MP        Functional Impact  risk of aspiration/pneumonia  -MP        Rehab Potential/Prognosis, Swallowing  good, to achieve stated therapy goals  -MP        Swallow Criteria for Skilled Therapeutic Interventions Met  demonstrates skilled criteria  -MP           Recommendations    Therapy Frequency (Swallow)  5 days per week  -MP        Predicted Duration Therapy Intervention (Days)  until discharge  -MP        SLP Diet Recommendation  puree with some mashed;honey thick liquids  -MP        Recommended Precautions and Strategies  upright posture during/after eating;small bites of food and sips of liquid;no straw  -MP        SLP Rec. for Method of Medication  Administration  meds crushed;with pudding or applesauce  -MP        Monitor for Signs of Aspiration  yes;notify SLP if any concerns  -MP        Anticipated Dischage Disposition (SLP)  inpatient rehabilitation facility;anticipate therapy at next level of care  -MP          User Key  (r) = Recorded By, (t) = Taken By, (c) = Cosigned By    Initials Name Effective Dates    ENRICO Metcalf Xavi, MS CCC-SLP 06/19/19 -           EDUCATION  The patient has been educated in the following areas:   Dysphagia (Swallowing Impairment) Modified Diet Instruction.    SLP Recommendation and Plan  SLP Swallowing Diagnosis: mild-moderate, oral dysfunction, moderate, pharyngeal dysfunction  SLP Diet Recommendation: puree with some mashed, honey thick liquids  Recommended Precautions and Strategies: upright posture during/after eating, small bites of food and sips of liquid, no straw  SLP Rec. for Method of Medication Administration: meds crushed, with pudding or applesauce     Monitor for Signs of Aspiration: yes, notify SLP if any concerns     Swallow Criteria for Skilled Therapeutic Interventions Met: demonstrates skilled criteria  Anticipated Dischage Disposition (SLP): inpatient rehabilitation facility, anticipate therapy at next level of care  Rehab Potential/Prognosis, Swallowing: good, to achieve stated therapy goals  Therapy Frequency (Swallow): 5 days per week  Predicted Duration Therapy Intervention (Days): until discharge       Plan of Care Reviewed With: patient    SLP GOALS     Row Name 06/10/20 1320 06/10/20 1100 06/09/20 1130       Oral Nutrition/Hydration Goal 1 (SLP)    Oral Nutrition/Hydration Goal 1, SLP  LTG: Pt will return to regular diet, thin liquids w/ no overt s/sxs aspiration/distress w/ 100% acc and no cues  -MP  LTG: Pt will return to regular diet & thin liquids w/ 100% accuracy w/o cues  -SA  LTG: Pt will return to regular diet & thin liquids w/ 100% accuracy w/o cues  -CH    Time Frame (Oral  Nutrition/Hydration Goal 1, SLP)  by discharge  -MP  by discharge  -SA  by discharge  -CH    Progress/Outcomes (Oral Nutrition/Hydration Goal 1, SLP)  continuing progress toward goal  -MP  continuing progress toward goal  -SA  continuing progress toward goal  -CH       Oral Nutrition/Hydration Goal 2 (SLP)    Oral Nutrition/Hydration Goal 2, SLP  Pt will tolerate puree, some mashed & honey-thick liquids w/ no overt s/sxs aspiration/distress w/ 100% acc and no cues  -MP  Pt will tolerate therapeutic trials of thin H2O w/ no overt s/s of aspiration w/ 60% accuracy w/o cues to indicate readiness for repeat instrumental evaluation  -SA  Pt will tolerate therapeutic trials of thin H2O w/ no overt s/s of aspiration w/ 60% accuracy w/o cues to indicate readiness for repeat instrumental evaluation  -CH    Time Frame (Oral Nutrition/Hydration Goal 2, SLP)  short term goal (STG);by discharge  -MP  short term goal (STG);by discharge  -SA  short term goal (STG);by discharge  -CH    Barriers (Oral Nutrition/Hydration Goal 2, SLP)  --  Tolerated ice chips, thin via tsp/cup w/o any observable s/sxs of aspiration  -SA  Delayed cough with ice chips, immediate cough with spoon sips of thin.. NT H2O via teaspoon trialed x 3 with delayed cough.   -CH    Progress/Outcomes (Oral Nutrition/Hydration Goal 2, SLP)  goal revised this date  -MP  continuing progress toward goal  -SA  continuing progress toward goal  -CH       Oral Nutrition/Hydration Goal (SLP)    Oral Nutrition/Hydration Goal, SLP  Pt will tolerate therapeutic trials of thin H2O w/ no overt s/sxs aspiration/distress w/ 60% acc and no cues in order to assess readiness for repeat insrumental eval  -MP  --  --    Time Frame (Oral Nutrition/Hydration Goal, SLP)  short term goal (STG);by discharge  -MP  --  --       Labial Strengthening Goal 1 (SLP)    Activity (Labial Strengthening Goal 1, SLP)  --  increase labial tone  -SA  increase labial tone  -CH    Increase Labial Tone  --   labial resistance exercises  -SA  labial resistance exercises  -CH    [place]/Accuracy (Labial Strengthening Goal 1, SLP)  --  with minimal cues (75-90% accuracy)  -SA  with minimal cues (75-90% accuracy)  -CH    Time Frame (Labial Strengthening Goal 1, SLP)  --  short term goal (STG);by discharge  -SA  short term goal (STG);by discharge  -CH    Barriers (Labial Strengthening Goal 1, SLP)  --  --  Handout provided and reviewed with patient. Patient completed exercises x 3 each with mod cues  -CH    Progress/Outcomes (Labial Strengthening Goal 1, SLP)  --  goal ongoing  -SA  continuing progress toward goal  -CH       Lingual Strengthening Goal 1 (SLP)    Activity (Lingual Strengthening Goal 1, SLP)  increase tongue back strength  -MP  increase lingual tone/sensation/control/coordination/movement;increase tongue back strength  -SA  increase lingual tone/sensation/control/coordination/movement;increase tongue back strength  -CH    Increase Lingual Tone/Sensation/Control/Coordination/Movement  --  lingual movement exercises  -SA  lingual movement exercises  -CH    Increase Tongue Back Strength  swallow trials;lingual resistance exercises  -MP  lingual resistance exercises  -SA  lingual resistance exercises  -CH    [place]/Accuracy (Lingual Strengthening Goal 1, SLP)  with minimal cues (75-90% accuracy)  -MP  with minimal cues (75-90% accuracy)  -SA  with minimal cues (75-90% accuracy)  -CH    Time Frame (Lingual Strengthening Goal 1, SLP)  short term goal (STG);by discharge  -MP  short term goal (STG);by discharge  -SA  short term goal (STG);by discharge  -CH    Barriers (Lingual Strengthening Goal 1, SLP)  --  --  Handout provided and reviewed with patient. Patient completed exercises x 3 each with mod cues  -CH    Progress/Outcomes (Lingual Strengthening Goal 1, SLP)  goal revised this date  -MP  goal ongoing  -SA  continuing progress toward goal  -CH       Pharyngeal Strengthening Exercise Goal 1 (SLP)     Activity (Pharyngeal Strengthening Goal 1, SLP)  increase timing;increase closure at entrance to airway/closure of airway at glottis  -MP  increase timing;increase superior movement of the hyolaryngeal complex;increase anterior movement of the hyolaryngeal complex;increase closure at entrance to airway/closure of airway at glottis;increase squeeze/positive pressure generation;increase tongue base retraction  -SA  increase timing;increase superior movement of the hyolaryngeal complex;increase anterior movement of the hyolaryngeal complex;increase closure at entrance to airway/closure of airway at glottis;increase squeeze/positive pressure generation;increase tongue base retraction  -CH    Increase Timing  prepping - 3 second prep or suck swallow or 3-step swallow  -MP  prepping - 3 second prep or suck swallow or 3-step swallow  -SA  prepping - 3 second prep or suck swallow or 3-step swallow  -CH    Increase Superior Movement of the Hyolaryngeal Complex  super-supraglottic swallow  -MP  super-supraglottic swallow  -SA  super-supraglottic swallow  -CH    Increase Anterior Movement of the Hyolaryngeal Complex  --  chin tuck against resistance (CTAR)  -SA  chin tuck against resistance (CTAR)  -CH    Increase Closure at Entrance to Airway/Closure of Airway at Glottis  supraglottic swallow  -MP  super-supraglottic swallow  -SA  super-supraglottic swallow  -CH    Increase Squeeze/Positive Pressure Generation  --  effortful pitch glide (falsetto + pharyngeal squeeze)  -SA  effortful pitch glide (falsetto + pharyngeal squeeze)  -CH    Increase Tongue Base Retraction  --  hard effortful swallow  -SA  hard effortful swallow  -CH    [place]/Accuracy (Pharyngeal Strengthening Goal 1, SLP)  with minimal cues (75-90% accuracy)  -MP  with minimal cues (75-90% accuracy)  -SA  with minimal cues (75-90% accuracy)  -CH    Time Frame (Pharyngeal Strengthening Goal 1, SLP)  short term goal (STG);by discharge  -MP  short term goal  (STG);by discharge  -SA  short term goal (STG);by discharge  -CH    Barriers (Pharyngeal Strengthening Goal 1, SLP)  --  --  Patient completed exercises x 3 each with mod cues. Handout provided and patient encouraged to complete exercises daily in room.  -CH    Progress/Outcomes (Pharyngeal Strengthening Goal 1, SLP)  goal revised this date  -  goal ongoing  -SA  continuing progress toward goal  -CH       Articulation Goal 1 (SLP)    Improve Articulation Goal 1 (SLP)  --  by over-articulating at phrase level;80%;with minimal cues (75-90%)  -SA  by over-articulating at phrase level;80%;with minimal cues (75-90%)  -CH    Time Frame (Articulation Goal 1, SLP)  --  short term goal (STG)  -SA  short term goal (STG)  -CH    Progress (Articulation Goal 1, SLP)  --  70%;with minimal cues (75-90%)  -SA  70%;with minimal cues (75-90%)  -CH    Progress/Outcomes (Articulation Goal 1, SLP)  --  continuing progress toward goal  -SA  continuing progress toward goal  -CH       Attention Goal 1 (SLP)    Improve Attention by Goal 1 (SLP)  --  looking at speaker;80%;with minimal cues (75-90%)  -SA  looking at speaker;80%;with minimal cues (75-90%)  -CH    Time Frame (Attention Goal 1, SLP)  --  short term goal (STG);by discharge  -SA  short term goal (STG);by discharge  -CH    Progress (Attention Goal 1, SLP)  --  50%;with minimal cues (75-90%)  -SA  50%;with minimal cues (75-90%)  -CH    Progress/Outcomes (Attention Goal 1, SLP)  --  continuing progress toward goal  -SA  continuing progress toward goal  -CH       Additional Goal 1 (SLP)    Additional Goal 1, SLP  --  LTG: Pt will improve cognitive-communication skills in order to participate in care in hospital setting for 100% of opportunities w/o cues.  -SA  LTG: Pt will improve cognitive-communication skills in order to participate in care in hospital setting for 100% of opportunities w/o cues.  -    Time Frame (Additional Goal 1, SLP)  --  by discharge  -SA  by discharge  -     Progress/Outcomes (Additional Goal 1, SLP)  --  continuing progress toward goal  -SA  continuing progress toward goal  -CH    Row Name 06/08/20 1600             Oral Nutrition/Hydration Goal 1 (SLP)    Oral Nutrition/Hydration Goal 1, SLP  LTG: Pt will return to regular diet & thin liquids w/ 100% accuracy w/o cues  -CH      Time Frame (Oral Nutrition/Hydration Goal 1, SLP)  by discharge  -CH      Progress/Outcomes (Oral Nutrition/Hydration Goal 1, SLP)  continuing progress toward goal  -CH         Oral Nutrition/Hydration Goal 2 (SLP)    Oral Nutrition/Hydration Goal 2, SLP  Pt will tolerate therapeutic trials of thin H2O w/ no overt s/s of aspiration w/ 60% accuracy w/o cues to indicate readiness for repeat instrumental evaluation  -CH      Time Frame (Oral Nutrition/Hydration Goal 2, SLP)  short term goal (STG);by discharge  -CH      Barriers (Oral Nutrition/Hydration Goal 2, SLP)  Delayed cough with ice chips, immediate cough with spoon sips of thin.  -CH      Progress/Outcomes (Oral Nutrition/Hydration Goal 2, SLP)  continuing progress toward goal  -CH         Labial Strengthening Goal 1 (SLP)    Activity (Labial Strengthening Goal 1, SLP)  increase labial tone  -      Increase Labial Tone  labial resistance exercises  -CH      [place]/Accuracy (Labial Strengthening Goal 1, SLP)  with minimal cues (75-90% accuracy)  -CH      Time Frame (Labial Strengthening Goal 1, SLP)  short term goal (STG);by discharge  -CH      Barriers (Labial Strengthening Goal 1, SLP)  Patient completed exercises x 3 each with mod cues  -CH      Progress/Outcomes (Labial Strengthening Goal 1, SLP)  continuing progress toward goal  -CH         Lingual Strengthening Goal 1 (SLP)    Activity (Lingual Strengthening Goal 1, SLP)  increase lingual tone/sensation/control/coordination/movement;increase tongue back strength  -      Increase Lingual Tone/Sensation/Control/Coordination/Movement  lingual movement exercises  -       Increase Tongue Back Strength  lingual resistance exercises  -CH      [place]/Accuracy (Lingual Strengthening Goal 1, SLP)  with minimal cues (75-90% accuracy)  -CH      Time Frame (Lingual Strengthening Goal 1, SLP)  short term goal (STG);by discharge  -CH      Barriers (Lingual Strengthening Goal 1, SLP)  Patient completed exercises x 3 each with mod cues  -CH      Progress/Outcomes (Lingual Strengthening Goal 1, SLP)  continuing progress toward goal  -CH         Pharyngeal Strengthening Exercise Goal 1 (SLP)    Activity (Pharyngeal Strengthening Goal 1, SLP)  increase timing;increase superior movement of the hyolaryngeal complex;increase anterior movement of the hyolaryngeal complex;increase closure at entrance to airway/closure of airway at glottis;increase squeeze/positive pressure generation;increase tongue base retraction  -CH      Increase Timing  prepping - 3 second prep or suck swallow or 3-step swallow  -CH      Increase Superior Movement of the Hyolaryngeal Complex  super-supraglottic swallow  -CH      Increase Anterior Movement of the Hyolaryngeal Complex  chin tuck against resistance (CTAR)  -CH      Increase Closure at Entrance to Airway/Closure of Airway at Glottis  super-supraglottic swallow  -CH      Increase Squeeze/Positive Pressure Generation  effortful pitch glide (falsetto + pharyngeal squeeze)  -CH      Increase Tongue Base Retraction  hard effortful swallow  -CH      [place]/Accuracy (Pharyngeal Strengthening Goal 1, SLP)  with minimal cues (75-90% accuracy)  -CH      Time Frame (Pharyngeal Strengthening Goal 1, SLP)  short term goal (STG);by discharge  -CH      Barriers (Pharyngeal Strengthening Goal 1, SLP)  Patient completed exercises x 3 each with mod cues  -CH      Progress/Outcomes (Pharyngeal Strengthening Goal 1, SLP)  continuing progress toward goal  -CH         Articulation Goal 1 (SLP)    Improve Articulation Goal 1 (SLP)  by over-articulating at phrase level;80%;with  minimal cues (75-90%)  -CH      Time Frame (Articulation Goal 1, SLP)  short term goal (STG)  -CH      Progress (Articulation Goal 1, SLP)  50%;with minimal cues (75-90%)  -CH      Progress/Outcomes (Articulation Goal 1, SLP)  continuing progress toward goal  -CH         Attention Goal 1 (SLP)    Improve Attention by Goal 1 (SLP)  looking at speaker;80%;with minimal cues (75-90%)  -CH      Time Frame (Attention Goal 1, SLP)  short term goal (STG);by discharge  -CH      Progress (Attention Goal 1, SLP)  30%;with moderate cues (50-74%)  -CH      Progress/Outcomes (Attention Goal 1, SLP)  continuing progress toward goal  -CH         Additional Goal 1 (SLP)    Additional Goal 1, SLP  LTG: Pt will improve cognitive-communication skills in order to participate in care in hospital setting for 100% of opportunities w/o cues.  -CH      Time Frame (Additional Goal 1, SLP)  by discharge  -CH      Progress/Outcomes (Additional Goal 1, SLP)  continuing progress toward goal  -CH        User Key  (r) = Recorded By, (t) = Taken By, (c) = Cosigned By    Initials Name Provider Type    Xavi Chaudhry MS CCC-SLP Speech and Language Pathologist    Margaret Lockhart MS CCC-SLP Speech and Language Pathologist    Fely Sharpe MS CCC-SLP Speech and Language Pathologist             Time Calculation:   Time Calculation- SLP     Row Name 06/10/20 1603 06/10/20 1130          Time Calculation- SLP    SLP Start Time  1320  -MP  1100  -SA     SLP Received On  06/10/20  -  06/10/20  -       User Key  (r) = Recorded By, (t) = Taken By, (c) = Cosigned By    Initials Name Provider Type    Xavi Chaudhry MS CCC-SLP Speech and Language Pathologist    Fely Sharpe MS CCC-SLP Speech and Language Pathologist          Therapy Charges for Today     Code Description Service Date Service Provider Modifiers Qty    [id number] HC ST MOTION FLUORO EVAL SWALLOW 4 6/10/2020 Xavi Metcalf MS CCC-SLP GN 1                Xavi Metcalf, MS CCC-SLP  6/10/2020

## 2020-06-10 NOTE — PLAN OF CARE
Problem: Patient Care Overview  Goal: Plan of Care Review  Flowsheets (Taken 6/10/2020 1610)  Plan of Care Reviewed With: patient  Note:   VSS, no significant neuro changes throughout day, and pt. Has rested intermittently.  Advanced to Dysphasia 3 diet with honey thick liquids.  Tolerated that well with no difficulty swallowing      Problem: Patient Care Overview  Goal: Plan of Care Review  Flowsheets (Taken 6/10/2020 1610)  Plan of Care Reviewed With: patient  Note:   VSS, no significant neuro changes throughout day, and pt. Has rested intermittently.  Advanced to Dysphasia 3 diet with honey thick liquids.  Tolerated that well with no difficulty swallowing

## 2020-06-11 LAB
ANION GAP SERPL CALCULATED.3IONS-SCNC: 14 MMOL/L (ref 5–15)
BUN BLD-MCNC: 19 MG/DL (ref 8–23)
BUN/CREAT SERPL: 27.5 (ref 7–25)
CALCIUM SPEC-SCNC: 9.5 MG/DL (ref 8.6–10.5)
CHLORIDE SERPL-SCNC: 101 MMOL/L (ref 98–107)
CO2 SERPL-SCNC: 22 MMOL/L (ref 22–29)
CREAT BLD-MCNC: 0.69 MG/DL (ref 0.57–1)
DEPRECATED RDW RBC AUTO: 55.7 FL (ref 37–54)
ERYTHROCYTE [DISTWIDTH] IN BLOOD BY AUTOMATED COUNT: 19.7 % (ref 12.3–15.4)
GFR SERPL CREATININE-BSD FRML MDRD: 85 ML/MIN/1.73
GLUCOSE BLD-MCNC: 139 MG/DL (ref 65–99)
GLUCOSE BLDC GLUCOMTR-MCNC: 123 MG/DL (ref 70–130)
GLUCOSE BLDC GLUCOMTR-MCNC: 125 MG/DL (ref 70–130)
GLUCOSE BLDC GLUCOMTR-MCNC: 133 MG/DL (ref 70–130)
GLUCOSE BLDC GLUCOMTR-MCNC: 142 MG/DL (ref 70–130)
HCT VFR BLD AUTO: 43.4 % (ref 34–46.6)
HGB BLD-MCNC: 12.1 G/DL (ref 12–15.9)
MCH RBC QN AUTO: 22.6 PG (ref 26.6–33)
MCHC RBC AUTO-ENTMCNC: 27.9 G/DL (ref 31.5–35.7)
MCV RBC AUTO: 81.1 FL (ref 79–97)
PLATELET # BLD AUTO: 414 10*3/MM3 (ref 140–450)
PMV BLD AUTO: 10.7 FL (ref 6–12)
POTASSIUM BLD-SCNC: 4.4 MMOL/L (ref 3.5–5.2)
RBC # BLD AUTO: 5.35 10*6/MM3 (ref 3.77–5.28)
SODIUM BLD-SCNC: 137 MMOL/L (ref 136–145)
WBC NRBC COR # BLD: 12.12 10*3/MM3 (ref 3.4–10.8)

## 2020-06-11 PROCEDURE — 99233 SBSQ HOSP IP/OBS HIGH 50: CPT | Performed by: INTERNAL MEDICINE

## 2020-06-11 PROCEDURE — 80048 BASIC METABOLIC PNL TOTAL CA: CPT | Performed by: INTERNAL MEDICINE

## 2020-06-11 PROCEDURE — 97110 THERAPEUTIC EXERCISES: CPT

## 2020-06-11 PROCEDURE — 97116 GAIT TRAINING THERAPY: CPT

## 2020-06-11 PROCEDURE — 85027 COMPLETE CBC AUTOMATED: CPT | Performed by: INTERNAL MEDICINE

## 2020-06-11 PROCEDURE — 25010000002 ONDANSETRON PER 1 MG: Performed by: INTERNAL MEDICINE

## 2020-06-11 PROCEDURE — 82962 GLUCOSE BLOOD TEST: CPT

## 2020-06-11 RX ORDER — FLUCONAZOLE 100 MG/1
150 TABLET ORAL ONCE
Status: COMPLETED | OUTPATIENT
Start: 2020-06-11 | End: 2020-06-11

## 2020-06-11 RX ADMIN — LISINOPRIL 10 MG: 10 TABLET ORAL at 17:22

## 2020-06-11 RX ADMIN — QUETIAPINE FUMARATE 12.5 MG: 25 TABLET ORAL at 20:45

## 2020-06-11 RX ADMIN — METOPROLOL TARTRATE 100 MG: 100 TABLET ORAL at 20:47

## 2020-06-11 RX ADMIN — FAMOTIDINE 20 MG: 20 TABLET, FILM COATED ORAL at 06:43

## 2020-06-11 RX ADMIN — MICONAZOLE NITRATE 1 APPLICATION: 2 CREAM TOPICAL at 20:45

## 2020-06-11 RX ADMIN — Medication 1 CAPSULE: at 08:12

## 2020-06-11 RX ADMIN — ONDANSETRON 4 MG: 2 INJECTION INTRAMUSCULAR; INTRAVENOUS at 15:11

## 2020-06-11 RX ADMIN — AMANTADINE HYDROCHLORIDE 100 MG: 100 CAPSULE ORAL at 15:11

## 2020-06-11 RX ADMIN — AMLODIPINE BESYLATE 5 MG: 5 TABLET ORAL at 08:12

## 2020-06-11 RX ADMIN — METOPROLOL TARTRATE 5 MG: 5 INJECTION INTRAVENOUS at 18:07

## 2020-06-11 RX ADMIN — METOPROLOL TARTRATE 100 MG: 100 TABLET ORAL at 08:12

## 2020-06-11 RX ADMIN — AMOXICILLIN AND CLAVULANATE POTASSIUM 1 TABLET: 875; 125 TABLET, FILM COATED ORAL at 20:45

## 2020-06-11 RX ADMIN — FLUCONAZOLE 150 MG: 100 TABLET ORAL at 12:23

## 2020-06-11 RX ADMIN — AMANTADINE HYDROCHLORIDE 100 MG: 100 CAPSULE ORAL at 08:12

## 2020-06-11 RX ADMIN — FAMOTIDINE 20 MG: 20 TABLET, FILM COATED ORAL at 17:22

## 2020-06-11 RX ADMIN — ASPIRIN 81 MG 81 MG: 81 TABLET ORAL at 08:12

## 2020-06-11 RX ADMIN — ATORVASTATIN CALCIUM 80 MG: 40 TABLET, FILM COATED ORAL at 20:44

## 2020-06-11 RX ADMIN — DIPHENOXYLATE HYDROCHLORIDE AND ATROPINE SULFATE 1 TABLET: 2.5; .025 TABLET ORAL at 20:44

## 2020-06-11 RX ADMIN — AMOXICILLIN AND CLAVULANATE POTASSIUM 1 TABLET: 875; 125 TABLET, FILM COATED ORAL at 08:12

## 2020-06-11 RX ADMIN — CHOLESTYRAMINE 4 G: 4 POWDER, FOR SUSPENSION ORAL at 08:12

## 2020-06-11 RX ADMIN — RIVAROXABAN 20 MG: 20 TABLET, FILM COATED ORAL at 17:22

## 2020-06-11 RX ADMIN — DIPHENOXYLATE HYDROCHLORIDE AND ATROPINE SULFATE 1 TABLET: 2.5; .025 TABLET ORAL at 08:12

## 2020-06-11 NOTE — PROGRESS NOTES
Continued Stay Note  Psychiatric     Patient Name: Sidra Lane  MRN: [id number]  Today's Date: 6/11/2020    Admit Date: 5/31/2020    Discharge Plan     Row Name 06/11/20 1442       Plan    Plan  [facility].    Plan Comments  Spoke with Cassia at [facility]. They have no beds available at this time but should have some opening up in the next couple of days or by the first of next week. CM will continue to follow.    Final Discharge Disposition Code  03 - skilled nursing facility (SNF)    Row Name 06/11/20 1309       Plan    Plan  [facility].    Patient/Family in Agreement with Plan  yes    Plan Comments  Spoke with patient and daughter at bedside. Plan is [place] if patient's PO intake increases. No longer needs a PEG. [facility] at [facility] and notified her that patient could be ready as soon as Saturday. CM will continue to follow.    Final Discharge Disposition Code  03 - skilled nursing facility (SNF)        Discharge Codes    No documentation.             Naga Muñoz RN

## 2020-06-11 NOTE — PLAN OF CARE
Problem: Patient Care Overview  Goal: Plan of Care Review  Flowsheets (Taken 6/11/2020 2262)  Plan of Care Reviewed With: patient;daughter  Outcome Summary: INCREASED DISTANCE AMBULATED TO 75 FEET VIA B UE SUPPORT AND GAIT BELT. PT NEEDS CUES FOR SAFETY AWARENESS AND TO SCAN TO THE L DURING FUNCTIONAL ACTIVITY. PT HAS R GAZE PREFERENCE WITH L INATTENTION. EXPECT CONTINUED PROGRESS AT [facility] AS PT IS MOTIVATED FOR RETURN TO MAX LEVEL OF FUNCTION.

## 2020-06-11 NOTE — CONSULTS
Clinical Nutrition     Reason for Visit:   Calorie count, Nursing order    Patient Name: Sidra Lane  YOB: 1955  MRN: [id number]  Date of Encounter: 06/11/20 13:35  Admission date: 5/31/2020    Comments: Consult received for calorie count. RD in room observed patient had eaten ~50% of pureed vegetables and bites of meat. Reviewed modified diet with family and patient as recommended per SLP benja. Discussed the goal to meet at least 60% of estimated kcal/protein needs before keofeed removal. Patient reports she does not have much of an appetite, hungry for foods not allowed on current diet restrictions. Preferences obtained and sent to kitchen for dinner meal. Emphasized importance of PO intake to remove keofeed; encouraged patient to try Oral Nutrition Supplements as they can help increase overall kcal/protein intake in effort to meet needs.     Calorie count sheet in room. RD to follow and monitor consumed intake for duration of calorie count    Alejandrina Sandoval RDN, LD  Time Spent: 35 minutes

## 2020-06-11 NOTE — PROGRESS NOTES
[facility] Medicine Services  PROGRESS NOTE    Patient Name: Sidra Lane  : 1955  MRN: [id number]    Date of Admission: 2020  Primary Care Physician: Jl Mcknight MD    Subjective   Subjective     CC:  Follow-up acute CVA    HPI:  Pt passed swallow eval yesterday and has since been started on PO diet. Did not eat much with breakfast.  Pt also c/o yeast infection and asking for medication.  No other issues overnight other than she did not sleep until late in the evening.     Review of Systems  Gen- No fevers, chills  CV- No chest pain, palpitations  Resp- No cough, dyspnea  GI- + diarrhea, no abdominal pain      Objective   Objective     Vital Signs:   Temp:  [97.5 °F (36.4 °C)-97.9 °F (36.6 °C)] 97.7 °F (36.5 °C)  Heart Rate:  [] 109  Resp:  [18] 18  BP: (107-135)/(69-97) 116/69  Total (NIH Stroke Scale): 7     Physical Exam:  Constitutional: No acute distress, awake, alert  HENT: NCAT, mucous membranes moist  Respiratory: Clear to auscultation bilaterally, respiratory effort normal   Cardiovascular: irregularly irregular, tachycardic with rates in the low 100s while I was at bedside, no murmurs, rubs, or gallops, palpable pedal pulses bilaterally  Gastrointestinal: Positive bowel sounds, soft, nontender, nondistended  Musculoskeletal: No bilateral ankle edema  Psychiatric: Appropriate affect, cooperative  Neurologic: LUE weakness, confused at times, but improving  Skin: No rashes        Results Reviewed:  Results from last 7 days   Lab Units 20  0507 06/10/20  0445 20  0521   WBC 10*3/mm3 12.12* 10.03 8.95   HEMOGLOBIN g/dL 12.1 10.9* 11.0*   HEMATOCRIT % 43.4 40.8 41.4   PLATELETS 10*3/mm3 414 372 357     Results from last 7 days   Lab Units 20  0507 06/10/20  0445 20  0521   SODIUM mmol/L 137 139 141  136   POTASSIUM mmol/L 4.4 4.5 4.7  4.5   CHLORIDE mmol/L 101 103 107  102   CO2 mmol/L 22.0 26.0 15.0*  21.0*   BUN mg/dL 19 18  20  19   CREATININE mg/dL 0.69 0.72 0.68  0.63   GLUCOSE mg/dL 139* 113* 105*  101*   CALCIUM mg/dL 9.5 9.4 9.0  9.0   ALT (SGPT) U/L  --   --  15   AST (SGOT) U/L  --   --  22     Estimated Creatinine Clearance: 75.7 mL/min (by C-G formula based on SCr of 0.69 mg/dL).    Microbiology Results Abnormal     Procedure Component Value - Date/Time    Blood Culture - Blood, Hand, Left [[id number]] Collected:  06/04/20 1406    Lab Status:  Final result Specimen:  Blood from Hand, Left Updated:  06/09/20 1501     Blood Culture No growth at 5 days    Blood Culture - Blood, Hand, Right [[id number]] Collected:  06/04/20 1400    Lab Status:  Final result Specimen:  Blood from Hand, Right Updated:  06/09/20 1501     Blood Culture No growth at 5 days    Clostridium Difficile Toxin - Stool, Per Rectum [[id number]] Collected:  06/08/20 1750    Lab Status:  Final result Specimen:  Stool from Per Rectum Updated:  06/08/20 2011    Narrative:       The following orders were created for panel order Clostridium Difficile Toxin - Stool, Per Rectum.  Procedure                               Abnormality         Status                     ---------                               -----------         ------                     Clostridium Difficile To...[[id number]]  Normal              Final result                 Please view results for these tests on the individual orders.    Clostridium Difficile Toxin, PCR - Stool, Per Rectum [[id number]]  (Normal) Collected:  06/08/20 1750    Lab Status:  Final result Specimen:  Stool from Per Rectum Updated:  06/08/20 2011     C. Difficile Toxins by PCR Not Detected    Narrative:       Performance characteristics of test not established for patients <2 years of age.  Negative for Toxigenic C. Difficile    Urine Culture - Urine, Urine, Clean Catch [[id number]] Collected:  06/05/20 1358    Lab Status:  Final result Specimen:  Urine, Clean Catch Updated:  06/06/20 0937     Urine Culture 50,000 CFU/mL Mixed  Herberth Isolated    Narrative:       Specimen contains mixed organisms of questionable pathogenicity which indicates contamination with commensal herberth.  Further identification is unlikely to provide clinically useful information.  Suggest recollection.    MRSA Screen, PCR (Inpatient) - Swab, Nares [[id number]]  (Abnormal) Collected:  06/05/20 1403    Lab Status:  Final result Specimen:  Swab from Nares Updated:  06/05/20 1528     MRSA PCR Positive    COVID-19,CEPHEID,AMBERLY IN-HOUSE(OR EMERGENT/ADD-ON),NP SWAB IN TRANSPORT MEDIA 3-4 HR TAT - Swab, Nasopharynx [[id number]]  (Normal) Collected:  05/31/20 1533    Lab Status:  Final result Specimen:  Swab from Nasopharynx Updated:  05/31/20 1638     COVID19 Not Detected          Imaging Results (Last 24 Hours)     Procedure Component Value Units Date/Time    FL Video Swallow With Speech Single Contrast [[id number]] Collected:  06/10/20 1610     Updated:  06/10/20 1720    Narrative:       EXAMINATION: FL VIDEO SWALLOW W SPEECH SINGLE-CONTRAST-     INDICATION: dysphagia; I63.9-Cerebral infarction, unspecified;  R13.12-Dysphagia, oropharyngeal phase; R47.1-Dysarthria and anarthria;  R41.841-Cognitive communication deficit     TECHNIQUE: 48 seconds of fluoroscopic time was used for this exam. 11  associated fluoroscopic loops were saved. The patient was evaluated in  the seated lateral position while taking a variety of consistencies of  barium by mouth under the direction of speech pathology.     COMPARISON: NONE     FINDINGS: 48 seconds of fluoroscopy provided for a modified barium  swallow. Please see speech therapy report for full details and  recommendations.          Impression:       Fluoroscopy provided for a modified barium swallow. Please  see speech therapy report for full details and recommendations.         This report was finalized on 6/10/2020 5:17 PM by Dr. Cristi Perez.             Results for orders placed during the hospital encounter of 05/31/20   Adult  Transthoracic Echo Complete W/ Cont if Necessary Per Protocol    Narrative · Left ventricular systolic function is normal. Estimated EF appears to be   in the range of 56 - 60%.  · Normal right ventricular cavity size and systolic function noted.  · Left atrial cavity size is moderately dilated. Left atrial volume is   moderately increased.  · No evidence of a patent foramen ovale. Saline test results are negative.  · The aortic valve is abnormal in structure. The valve exhibits sclerosis.   There is mild calcification of the aortic valve  · Mild aortic valve stenosis is present          I have reviewed the medications:  Scheduled Meds:    amantadine 100 mg Oral BID   amLODIPine 5 mg Oral Daily   amoxicillin-clavulanate 1 tablet Oral Q12H   aspirin 81 mg Oral Daily   atorvastatin 80 mg Oral Nightly   cholestyramine light 1 packet Oral Daily   diphenoxylate-atropine 1 tablet Oral BID   famotidine 20 mg Oral BID AC   lactobacillus acidophilus 1 capsule Oral Daily   lisinopril 10 mg Oral Q PM   metoprolol tartrate 100 mg Oral Q12H   QUEtiapine 12.5 mg Oral Nightly   rivaroxaban 20 mg Oral Daily With Dinner     Continuous Infusions:     PRN Meds:.•  acetaminophen  •  acetaminophen  •  butalbital-acetaminophen-caffeine  •  diphenoxylate-atropine  •  enalaprilat  •  metoprolol tartrate  •  nitroglycerin  •  ondansetron  •  potassium chloride **OR** potassium chloride **OR** potassium chloride  •  simethicone    Assessment/Plan   Assessment & Plan     Active Hospital Problems    Diagnosis  POA   • **Cerebrovascular accident (CVA) due to embolism of right middle cerebral artery (CMS/HCC) [I63.411]  Yes   • Acute ischemic stroke (CMS/HCC) [I63.9]  Yes   • Atrial fibrillation (CMS/HCC) [I48.91]  Yes      Resolved Hospital Problems   No resolved problems to display.        Brief Hospital Course to date:  Sidra Lane is a 65 y.o. 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  She was found to have a right MCA territory reversible ischemia on CT perfusion.  She underwent a CT head neck angiogram with an intra-arterial TPA after failed thrombectomy and admitted to the ICU afterward.  She was transferred out of ICU on 6/3 with hospitalist assuming care on 6/4.    Right MCA stroke  - likely cardioembolic.  Patient has a history of persistent atrial fibrillation and had been anticoagulated with Eliquis.  She had been followed by her electrophysiologist and had plans for an ablation prior to readmission.  -Again she is status post intra-arterial TPA after failed thrombectomy  -She had some delirium while in the ICU and Seroquel 50 mg nightly was given to her  -Stat CT brain was performed on 6/4 due to worsening left sided weakness and acute hemorrhagic transformation was ruled out, but does still reporting evolving MCA CVA that was stable from the previous exam.  -The reasons for her worsening symptoms may be related to Seroquel.  Now tolerating low dose Seroquel QHS  -Repeat CT head 6/6 with no evidence of hemorrhage, stopped heparin gtt and started on Eliquis, however, Cards feels that she failed Eliquis as she was on this prior to admission. Now transitioned to Xarelto.   -EP also following, resumed Metoprolol PO    Persistent atrial fibrillation with RVR  -Patient had been on beta-blocker per her cardiologist and had plans for ablation prior to admission  -Currently she is not a candidate for an ablation  -We will defer management to her EP  --getting PO Metoprolol, increased per Cards.  May need to further up-titrate  --Now on Xarelto      Hypertension  -Blood pressure much better controlled.  Beta blocker resumed by Cards.     SIRS  -Possible aspiration when patient pulled out her Keofeed tube earlier this stay.    --CXR unremarkable on 6/4 and 6/5  --now afebrile, added Vanc/Zosyn on 6/5 due to  worsening leukocytosis with left shift. MRSA PCR +, so had been on Vanc.  Will do Augmentin BID to complete a seven day course (to cover for possible aspiration). Defer MRSA coverage for now as leukocytosis had resolved without any MRSA coverage.  --leukocytosis present again today, 6/11, monitor as pt was just started on PO diet yesterday.  She is still be covered for possible aspiration with Augmentin, so even if that were the etiology of her leukocytosis, I would not expand coverage at this time.     Hyperlipidemia  -High intensity statin therapy    Left-sided weakness  -PT OT, will benefit from short-term rehab once she is medically stable    Dysphagia  -Speech pathologist following, passed [facility] 6/10, started on diet    Diarrhea  --apparently this is a chronic issue at home, but has worsened since admission. Pt's weight is down 12 lbs, having multiple loose stools daily.  Per daughter, pt has had diarrhea since partial colectomy in 2016 ([facility]) and has been told she may have Crohn's- has never been on immunosuppression and can't remember with whom she follows with GI. May consider GI consult. Started probiotic    Vaginal Candidiasis  --PO Diflucan x 1      DVT Prophylaxis: Fully anticoagulated with Xarelto       Disposition: pending bed availability, she has improved significantly and is now on PO diet    CODE STATUS:   Code Status and Medical Interventions:   Ordered at: 05/31/20 1743     Code Status:    CPR     Medical Interventions (Level of Support Prior to Arrest):    Full         Electronically signed by Evelina Banda MD, 06/11/20, 07:39.

## 2020-06-11 NOTE — PROGRESS NOTES
Continued Stay Note  [facility]     Patient Name: Sidra Lane  MRN: [id number]  Today's Date: 6/11/2020    Admit Date: 5/31/2020    Discharge Plan     Row Name 06/11/20 1309       Plan    Plan  [facility]    Patient/Family in Agreement with Plan  yes    Plan Comments  Spoke with patient and daughter at bedside. Plan is [facility] if patient's PO intake increases. No longer needs a PEG. Called [facility] and notified her that patient could be ready as soon as Saturday. CM will continue to follow.    Final Discharge Disposition Code  03 - skilled nursing facility (SNF)        Discharge Codes    No documentation.             Naga Muñoz RN

## 2020-06-11 NOTE — PLAN OF CARE
"Alert and oriented.  NIH \"7\" for left weakness, vision, dysarthria, and facial asymmetry.  Napped intermittent.  Respirations unlabored.  Monitor afib RVR.  Tachy up to 150s during physical therapy.  PRN lopressor once for resting HR 120s.  Minimal intake with meals.  PRN Zofran once.  Calorie count initiated and nocturnal tube feed started.  Up in recliner and to BSC 1 assist.  Daughters bedside for visit.    "

## 2020-06-11 NOTE — NURSING NOTE
WOC nurse for [facility] report 15 or less    Spoke with RN Eneida -Patient still has diarrhea, however now calls out for bedpan.  Skin on buttocks appears to be yeast dermatitis - MD giving one dose of Diflucan for suspected vaginal yeast.  Will order miconazole cream to be applied then z guard to buttocks .  Patient is getting OOB to chair.      No pressure injury skin issues noted, all skin interventions in place, including heel boots, repositioning and waffle.    Please consult WOC nurse if needed.

## 2020-06-11 NOTE — THERAPY TREATMENT NOTE
Patient Name: Sidra Lane  : 1955    MRN: [id number]                              Today's Date: 2020       Admit Date: 2020    Visit Dx:     ICD-10-CM ICD-9-CM   1. Acute ischemic stroke (CMS/HCC) I63.9 434.91   2. Oropharyngeal dysphagia R13.12 787.22   3. Dysarthria R47.1 784.51   4. Cognitive communication deficit R41.841 799.52     Patient Active Problem List   Diagnosis   • Shortness of breath   • Atrial fibrillation (CMS/HCC)   • Essential hypertension   • Dizziness   • Deep vein thrombosis (DVT) of proximal lower extremity (CMS/HCC)   • Coronary artery disease involving native coronary artery of native heart without angina pectoris   • Fatigue   • Chest pain   • Longstanding persistent atrial fibrillation (CMS/HCC)   • Cerebrovascular accident (CVA) due to embolism of right middle cerebral artery (CMS/HCC)   • Acute ischemic stroke (CMS/HCC)     Past Medical History:   Diagnosis Date   • Atrial fibrillation (CMS/HCC)    • CHF (congestive heart failure) (CMS/HCC)    • Deep vein thrombosis (CMS/HCC)    • GERD (gastroesophageal reflux disease)    • Hypertension    • May-Thurner syndrome    • Pulmonary embolism (CMS/HCC)      Past Surgical History:   Procedure Laterality Date   • CHOLECYSTECTOMY     • COLON SURGERY      bowel leakage, some of colon removed   • INTERVENTIONAL RADIOLOGY PROCEDURE Bilateral 2020    Procedure: CAROTID CEREBRAL ANGIOGRAM BILATERAL;  Surgeon: Brant Duarte MD;  Location: [facility] INVASIVE LOCATION;  Service: Interventional Radiology;  Laterality: Bilateral;   • LAPAROSCOPIC TUBAL LIGATION     • LEG SURGERY      multiple stents to BLE   • TONSILLECTOMY       General Information     Row Name 20 1614          PT Evaluation Time/Intention    Document Type  therapy note (daily note)  -CD     Mode of Treatment  physical therapy  -CD     Row Name 20 1614          General Information    Patient Profile Reviewed?  yes  -CD     Existing  Precautions/Restrictions  fall L SIDED WEAKNESS  -CD     Barriers to Rehab  none identified  -CD     Row Name 06/11/20 1614          Cognitive Assessment/Intervention- PT/OT    Orientation Status (Cognition)  oriented x 3  -CD     Cognitive Assessment/Intervention Comment  FOLLOWS ALL COMMANDS, L INATTENTION , R GAZE PREFERENCE.   -CD     Row Name 06/11/20 1614          Safety Issues, Functional Mobility    Safety Issues Affecting Function (Mobility)  insight into deficits/self awareness;safety precaution awareness;safety precautions follow-through/compliance;awareness of need for assistance  -CD     Impairments Affecting Function (Mobility)  balance;coordination;endurance/activity tolerance;strength;motor control;visual/perceptual  -CD       User Key  (r) = Recorded By, (t) = Taken By, (c) = Cosigned By    Initials Name Provider Type    CD Dayna Julian PT Physical Therapist        Mobility     Row Name 06/11/20 1616          Bed Mobility Assessment/Treatment    Bed Mobility Assessment/Treatment  supine-sit  -CD     Supine-Sit [place] (Bed Mobility)  verbal cues;minimum assist (75% patient effort);2 person assist  -CD     Assistive Device (Bed Mobility)  bed rails;head of bed elevated  -CD     Comment (Bed Mobility)  INCREASED TIME AND EFFORT. CUES FOR SEQUENCING.   -CD     Row Name 06/11/20 1616          Transfer Assessment/Treatment    Comment (Transfers)  CUES FOR HAND PLACEMENT. STS AND PIVOT VIA B UE SUPPORT AND GAIT BELT.   -CD     Row Name 06/11/20 1616          Bed-Chair Transfer    Bed-Chair [place] (Transfers)  minimum assist (75% patient effort);2 person assist  -CD     Row Name 06/11/20 1616          Sit-Stand Transfer    Sit-Stand [place] (Transfers)  minimum assist (75% patient effort);2 person assist  -CD     Row Name 06/11/20 1616          Gait/Stairs Assessment/Training    Gait/Stairs Assessment/Training  gait/ambulation independence  -CD     [place] Level (Gait)  minimum  assist (75% patient effort);2 person assist  -CD     Assistive Device (Gait)  -- VIA B UE SUPPORT AND GAIT BELT.   -CD     Distance in Feet (Gait)  75 FEET - FOLLOWED WITH RECLINER.   -CD     Pattern (Gait)  step-through  -CD     Deviations/Abnormal Patterns (Gait)  base of support, narrow;patti decreased;gait speed decreased;stride length decreased  -CD     Bilateral Gait Deviations  forward flexed posture;heel strike decreased  -CD     Left Sided Gait Deviations  forward flexed posture;weight shift ability decreased  -CD     Comment (Gait/Stairs)  CUES TO SCAN TO THE L DURING GAIT IN GUNN AND INDENTIFY SIGNS/ ROOM NUMBERS.   -CD       User Key  (r) = Recorded By, (t) = Taken By, (c) = Cosigned By    Initials Name Provider Type    CD Dayna Julian, PT Physical Therapist        Obj/Interventions     Row Name 06/11/20 1620          Therapeutic Exercise    Lower Extremity (Therapeutic Exercise)  LAQ (long arc quad), bilateral;marching while seated;SLR (straight leg raise), bilateral  -CD     Lower Extremity Range of Motion (Therapeutic Exercise)  ankle dorsiflexion/plantar flexion, bilateral  -CD     Exercise Type (Therapeutic Exercise)  AROM (active range of motion)  -CD     Position (Therapeutic Exercise)  seated  -CD     Sets/Reps (Therapeutic Exercise)  1 SET OF 10 REPS. DTR INSTRUCTED IN EXERCISES TO ASSIST PT IN PERFORMING AGAIN LATER TONIGHT.   -CD     Comment (Therapeutic Exercise)  REVIEWED POSITIONING OF L UE FOR COMFORT AND PROTECTION.   -CD     Row Name 06/11/20 1620          Static Sitting Balance    Level of Walsh (Unsupported Sitting, Static Balance)  contact guard assist DUE TO MILDLY IMPULSIVE.   -CD     Sitting Position (Unsupported Sitting, Static Balance)  sitting on edge of bed  -CD     Time Able to Maintain Position (Unsupported Sitting, Static Balance)  more than 5 minutes  -CD     Row Name 06/11/20 1620          Dynamic Sitting Balance    Level of Walsh, Reaches Outside Midline  (Sitting, Dynamic Balance)  contact guard assist  -CD     Sitting Position, Reaches Outside Midline (Sitting, Dynamic Balance)  sitting on edge of bed  -CD     Comment, Reaches Outside Midline (Sitting, Dynamic Balance)  THER EX, SCOOTING IN/OUT OF RECLINER.   -CD     Row Name 06/11/20 1624          Static Standing Balance    Level of [place] (Supported Standing, Static Balance)  minimal assist, 75% patient effort;2 person assist  -CD     Assistive Device Utilized (Supported Standing, Static Balance)  -- VIA B UE SUPPORT AND GAIT BELT.   -CD     Row Name 06/11/20 1620          Dynamic Standing Balance    Level of [place], Reaches Outside Midline (Standing, Dynamic Balance)  minimal assist, 75% patient effort;2 person assist  -CD     Assistive Device Utilized (Supported Standing, Dynamic Balance)  -- VIA B UE SUPPORT AND GAIT BELT.   -CD       User Key  (r) = Recorded By, (t) = Taken By, (c) = Cosigned By    Initials Name Provider Type    CD Dayna Julian PT Physical Therapist        Goals/Plan    No documentation.       Clinical Impression     Row Name 06/11/20 1623          Pain Scale: Numbers Pre/Post-Treatment    Pain Scale: Numbers, Pretreatment  0/10 - no pain  -CD     Pain Scale: Numbers, Post-Treatment  0/10 - no pain  -CD     Row Name 06/11/20 1623          Plan of Care Review    Plan of Care Reviewed With  patient;daughter  -CD     Progress  improving  -CD     Outcome Summary  INCREASED DISTANCE AMBULATED TO 75 FEET VIA B UE SUPPORT AND GAIT BELT. PT NEEDS CUES FOR SAFETY AWARENESS AND TO SCAN TO THE L DURING FUNCTIONAL ACTIVITY. PT HAS R GAZE PREFERENCE WITH L INATTENTION. EXPECT CONTINUED PROGRESS AT IR[facility] AS PT IS MOTIVATED FOR RETURN TO MAX LEVEL OF FUNCTION.   -CD     Row Name 06/11/20 1623          Physical Therapy Clinical Impression    Patient/Family Goals Statement (PT Clinical Impression)  TO RETURN TO PLOF.   -CD     Criteria for Skilled Interventions Met (PT Clinical Impression)  yes   -CD     Rehab Potential (PT Clinical Summary)  good, to achieve stated therapy goals  -CD     Row Name 06/11/20 1623          Vital Signs    Pre Systolic BP Rehab  -- VSS. NSG CLEARED FOR TREATMENT.   -CD     Pretreatment Heart Rate (beats/min)  107  -CD     Intratreatment Heart Rate (beats/min)  150 NSG AWARE.   -CD     Posttreatment Heart Rate (beats/min)  120  -CD     Pre SpO2 (%)  96  -CD     O2 Delivery Pre Treatment  supplemental O2  -CD     Intra SpO2 (%)  94  -CD     O2 Delivery Intra Treatment  room air  -CD     Post SpO2 (%)  95  -CD     O2 Delivery Post Treatment  room air NSG OK'D TO LEAVE PT ON RA WHILE UIC.   -CD     Pre Patient Position  Supine  -CD     Intra Patient Position  Standing  -CD     Post Patient Position  Sitting  -CD     Row Name 06/11/20 1623          Positioning and Restraints    Pre-Treatment Position  in bed  -CD     Post Treatment Position  chair  -CD     In Chair  reclined;call light within reach;encouraged to call for assist;exit alarm on;with family/caregiver;legs elevated;LUE elevated;notified nsg  -CD       User Key  (r) = Recorded By, (t) = Taken By, (c) = Cosigned By    Initials Name Provider Type    CD Dayna Julian, PT Physical Therapist        Outcome Measures     Row Name 06/11/20 1627          How much help from another person do you currently need...    Turning from your back to your side while in flat bed without using bedrails?  3  -CD     Moving from lying on back to sitting on the side of a flat bed without bedrails?  2  -CD     Moving to and from a bed to a chair (including a wheelchair)?  3  -CD     Standing up from a chair using your arms (e.g., wheelchair, bedside chair)?  3  -CD     Climbing 3-5 steps with a railing?  2  -CD     To walk in hospital room?  2  -CD     AM-PAC 6 Clicks Score (PT)  15  -CD     Row Name 06/11/20 2907          Modified Isaac Scale    Modified Glacier Scale  4 - Moderately severe disability.  Unable to walk without assistance, and  unable to attend to own bodily needs without assistance.  -CD     Row Name 06/11/20 1627          Functional Assessment    Outcome Measure Options  AM-PAC 6 Clicks Basic Mobility (PT)  -CD       User Key  (r) = Recorded By, (t) = Taken By, (c) = Cosigned By    Initials Name Provider Type    Dayna Camarena PT Physical Therapist        Physical Therapy Education                 Title: PT OT SLP Therapies (In Progress)     Topic: Physical Therapy (Done)     Point: Mobility training (Done)     Description:   Instruct learner(s) on safety and technique for assisting patient out of bed, chair or wheelchair.  Instruct in the proper use of assistive devices, such as walker, crutches, cane or brace.              Patient Friendly Description:   It's important to get you on your feet again, but we need to do so in a way that is safe for you. Falling has serious consequences, and your personal safety is the most important thing of all.        When it's time to get out of bed, one of us or a family member will sit next to you on the bed to give you support.     If your doctor or nurse tells you to use a walker, crutches, a cane, or a brace, be sure you use it every time you get out of bed, even if you think you don't need it.    Learning Progress Summary           Patient Acceptance, E, VU,NR by CD at 6/11/2020 1628    Comment:  SEE FLOWSHEET.    Acceptance, E, VU,NR by CD at 6/8/2020 1113    Comment:  SEE FLOWSHEET.    Acceptance, E,D, NR,VU by SH at 6/3/2020 1350    Acceptance, E, NR by KG at 6/2/2020 1315   Family Acceptance, E, VU,NR by CD at 6/11/2020 1628    Comment:  SEE FLOWSHEET.                   Point: Home exercise program (Done)     Description:   Instruct learner(s) on appropriate technique for monitoring, assisting and/or progressing patient with therapeutic exercises and activities.              Learning Progress Summary           Patient Acceptance, E, VU,NR by CD at 6/11/2020 1628    Comment:  SEE  FLOWSHEET.    Acceptance, E, VU,NR by CD at 6/8/2020 1113    Comment:  SEE FLOWSHEET.   Family Acceptance, E, VU,NR by CD at 6/11/2020 1628    Comment:  SEE FLOWSHEET.                   Point: Body mechanics (Done)     Description:   Instruct learner(s) on proper positioning and spine alignment for patient and/or caregiver during mobility tasks and/or exercises.              Learning Progress Summary           Patient Acceptance, E, VU,NR by CD at 6/11/2020 1628    Comment:  SEE FLOWSHEET.    Acceptance, E, VU,NR by CD at 6/8/2020 1113    Comment:  SEE FLOWSHEET.    Acceptance, E,D, NR,VU by  at 6/3/2020 1350    Acceptance, E, NR by KG at 6/2/2020 1315   Family Acceptance, E, VU,NR by CD at 6/11/2020 1628    Comment:  SEE FLOWSHEET.                   Point: Precautions (Done)     Description:   Instruct learner(s) on prescribed precautions during mobility and gait tasks              Learning Progress Summary           Patient Acceptance, E, VU,NR by CD at 6/11/2020 1628    Comment:  SEE FLOWSHEET.    Acceptance, E, VU,NR by CD at 6/8/2020 1113    Comment:  SEE FLOWSHEET.    Acceptance, E,D, NR,VU by  at 6/3/2020 1350    Acceptance, E, NR by KG at 6/2/2020 1315   Family Acceptance, E, VU,NR by CD at 6/11/2020 1628    Comment:  SEE FLOWSHEET.                               User Key     Initials Effective Dates Name Provider Type Discipline     06/19/15 -  Dayna Julian, PT Physical Therapist PT    KG 05/22/20 -  Taylor Jeff PT Physical Therapist PT     03/19/20 -  Tahira Ruiz PT Student PT Student PT              PT Recommendation and Plan     Outcome Summary/Treatment Plan (PT)  Anticipated Discharge Disposition (PT): inpatient rehabilitation facility  Plan of Care Reviewed With: patient, daughter  Progress: improving  Outcome Summary: INCREASED DISTANCE AMBULATED TO 75 FEET VIA B UE SUPPORT AND GAIT BELT. PT NEEDS CUES FOR SAFETY AWARENESS AND TO SCAN TO THE L DURING FUNCTIONAL ACTIVITY. PT  HAS R GAZE PREFERENCE WITH L INATTENTION. EXPECT CONTINUED PROGRESS AT IRF AS PT IS MOTIVATED FOR RETURN TO MAX LEVEL OF FUNCTION.      Time Calculation:   PT Charges     Row Name 06/11/20 1629             Time Calculation    Start Time  1536  -CD      PT Received On  06/11/20  -CD      PT Goal Re-Cert Due Date  06/12/20  -CD         Time Calculation- PT    Total Timed Code Minutes- PT  29 minute(s)  -CD         Timed Charges    52660 - PT Therapeutic Exercise Minutes  7  -CD      11054 - Gait Training Minutes   15  -CD      10052 - PT Therapeutic Activity Minutes  7  -CD        User Key  (r) = Recorded By, (t) = Taken By, (c) = Cosigned By    Initials Name Provider Type    CD Dayna Julian, PT Physical Therapist        Therapy Charges for Today     Code Description Service Date Service Provider Modifiers Qty    93244162036 HC PT THER PROC EA 15 MIN 6/11/2020 Dayna Julian, PT GP 1    81769100714 HC GAIT TRAINING EA 15 MIN 6/11/2020 Dayna Julian, PT GP 1    31759102302 HC PT THER SUPP EA 15 MIN 6/11/2020 Dayna Julian, PT GP 1          PT G-Codes  Outcome Measure Options: AM-PAC 6 Clicks Basic Mobility (PT)  AM-PAC 6 Clicks Score (PT): 15  AM-PAC 6 Clicks Score (OT): 11  Modified Blair Scale: 4 - Moderately severe disability.  Unable to walk without assistance, and unable to attend to own bodily needs without assistance.    Dayna Julian, PT  6/11/2020

## 2020-06-12 LAB
ANION GAP SERPL CALCULATED.3IONS-SCNC: 12 MMOL/L (ref 5–15)
BASOPHILS # BLD AUTO: 0.08 10*3/MM3 (ref 0–0.2)
BASOPHILS NFR BLD AUTO: 0.7 % (ref 0–1.5)
BUN BLD-MCNC: 21 MG/DL (ref 8–23)
BUN/CREAT SERPL: 27.6 (ref 7–25)
CALCIUM SPEC-SCNC: 9.3 MG/DL (ref 8.6–10.5)
CHLORIDE SERPL-SCNC: 104 MMOL/L (ref 98–107)
CO2 SERPL-SCNC: 23 MMOL/L (ref 22–29)
CREAT BLD-MCNC: 0.76 MG/DL (ref 0.57–1)
DEPRECATED RDW RBC AUTO: 55.7 FL (ref 37–54)
EOSINOPHIL # BLD AUTO: 0.33 10*3/MM3 (ref 0–0.4)
EOSINOPHIL NFR BLD AUTO: 3.1 % (ref 0.3–6.2)
ERYTHROCYTE [DISTWIDTH] IN BLOOD BY AUTOMATED COUNT: 19.3 % (ref 12.3–15.4)
GFR SERPL CREATININE-BSD FRML MDRD: 76 ML/MIN/1.73
GLUCOSE BLD-MCNC: 101 MG/DL (ref 65–99)
GLUCOSE BLDC GLUCOMTR-MCNC: 104 MG/DL (ref 70–130)
GLUCOSE BLDC GLUCOMTR-MCNC: 106 MG/DL (ref 70–130)
GLUCOSE BLDC GLUCOMTR-MCNC: 110 MG/DL (ref 70–130)
GLUCOSE BLDC GLUCOMTR-MCNC: 123 MG/DL (ref 70–130)
GLUCOSE BLDC GLUCOMTR-MCNC: 135 MG/DL (ref 70–130)
GLUCOSE BLDC GLUCOMTR-MCNC: 144 MG/DL (ref 70–130)
HCT VFR BLD AUTO: 40.7 % (ref 34–46.6)
HGB BLD-MCNC: 11.4 G/DL (ref 12–15.9)
IMM GRANULOCYTES # BLD AUTO: 0.05 10*3/MM3 (ref 0–0.05)
IMM GRANULOCYTES NFR BLD AUTO: 0.5 % (ref 0–0.5)
LYMPHOCYTES # BLD AUTO: 1.78 10*3/MM3 (ref 0.7–3.1)
LYMPHOCYTES NFR BLD AUTO: 16.5 % (ref 19.6–45.3)
MCH RBC QN AUTO: 22.9 PG (ref 26.6–33)
MCHC RBC AUTO-ENTMCNC: 28 G/DL (ref 31.5–35.7)
MCV RBC AUTO: 81.9 FL (ref 79–97)
MONOCYTES # BLD AUTO: 0.99 10*3/MM3 (ref 0.1–0.9)
MONOCYTES NFR BLD AUTO: 9.2 % (ref 5–12)
NEUTROPHILS # BLD AUTO: 7.55 10*3/MM3 (ref 1.7–7)
NEUTROPHILS NFR BLD AUTO: 70 % (ref 42.7–76)
NRBC BLD AUTO-RTO: 0 /100 WBC (ref 0–0.2)
PLATELET # BLD AUTO: 399 10*3/MM3 (ref 140–450)
PMV BLD AUTO: 11.2 FL (ref 6–12)
POTASSIUM BLD-SCNC: 4.7 MMOL/L (ref 3.5–5.2)
RBC # BLD AUTO: 4.97 10*6/MM3 (ref 3.77–5.28)
SODIUM BLD-SCNC: 139 MMOL/L (ref 136–145)
WBC NRBC COR # BLD: 10.78 10*3/MM3 (ref 3.4–10.8)

## 2020-06-12 PROCEDURE — 92526 ORAL FUNCTION THERAPY: CPT

## 2020-06-12 PROCEDURE — 99232 SBSQ HOSP IP/OBS MODERATE 35: CPT | Performed by: NURSE PRACTITIONER

## 2020-06-12 PROCEDURE — 82962 GLUCOSE BLOOD TEST: CPT

## 2020-06-12 PROCEDURE — 80048 BASIC METABOLIC PNL TOTAL CA: CPT | Performed by: INTERNAL MEDICINE

## 2020-06-12 PROCEDURE — 99232 SBSQ HOSP IP/OBS MODERATE 35: CPT | Performed by: INTERNAL MEDICINE

## 2020-06-12 PROCEDURE — 85025 COMPLETE CBC W/AUTO DIFF WBC: CPT | Performed by: INTERNAL MEDICINE

## 2020-06-12 RX ORDER — DILTIAZEM HYDROCHLORIDE 60 MG/1
60 TABLET, FILM COATED ORAL EVERY 8 HOURS SCHEDULED
Status: DISCONTINUED | OUTPATIENT
Start: 2020-06-12 | End: 2020-06-16

## 2020-06-12 RX ADMIN — MICONAZOLE NITRATE: 2 CREAM TOPICAL at 09:37

## 2020-06-12 RX ADMIN — FAMOTIDINE 20 MG: 20 TABLET, FILM COATED ORAL at 18:15

## 2020-06-12 RX ADMIN — ATORVASTATIN CALCIUM 80 MG: 40 TABLET, FILM COATED ORAL at 21:27

## 2020-06-12 RX ADMIN — Medication 1 CAPSULE: at 09:37

## 2020-06-12 RX ADMIN — ASPIRIN 81 MG 81 MG: 81 TABLET ORAL at 09:37

## 2020-06-12 RX ADMIN — DIPHENOXYLATE HYDROCHLORIDE AND ATROPINE SULFATE 1 TABLET: 2.5; .025 TABLET ORAL at 09:37

## 2020-06-12 RX ADMIN — RIVAROXABAN 20 MG: 20 TABLET, FILM COATED ORAL at 18:15

## 2020-06-12 RX ADMIN — CHOLESTYRAMINE 4 G: 4 POWDER, FOR SUSPENSION ORAL at 09:36

## 2020-06-12 RX ADMIN — AMANTADINE HYDROCHLORIDE 100 MG: 100 CAPSULE ORAL at 15:30

## 2020-06-12 RX ADMIN — LISINOPRIL 10 MG: 10 TABLET ORAL at 18:15

## 2020-06-12 RX ADMIN — MICONAZOLE NITRATE: 2 CREAM TOPICAL at 21:34

## 2020-06-12 RX ADMIN — QUETIAPINE FUMARATE 12.5 MG: 25 TABLET ORAL at 21:27

## 2020-06-12 RX ADMIN — AMLODIPINE BESYLATE 5 MG: 5 TABLET ORAL at 09:37

## 2020-06-12 RX ADMIN — FAMOTIDINE 20 MG: 20 TABLET, FILM COATED ORAL at 08:29

## 2020-06-12 RX ADMIN — DIPHENOXYLATE HYDROCHLORIDE AND ATROPINE SULFATE 1 TABLET: 2.5; .025 TABLET ORAL at 21:27

## 2020-06-12 RX ADMIN — METOPROLOL TARTRATE 100 MG: 100 TABLET ORAL at 09:37

## 2020-06-12 RX ADMIN — AMANTADINE HYDROCHLORIDE 100 MG: 100 CAPSULE ORAL at 09:37

## 2020-06-12 RX ADMIN — METOPROLOL TARTRATE 5 MG: 5 INJECTION INTRAVENOUS at 23:36

## 2020-06-12 RX ADMIN — DILTIAZEM HYDROCHLORIDE 60 MG: 60 TABLET, FILM COATED ORAL at 15:30

## 2020-06-12 NOTE — PLAN OF CARE
Problem: Patient Care Overview  Goal: Plan of Care Review  Outcome: Ongoing (interventions implemented as appropriate)  Flowsheets (Taken 6/12/2020 1214)  Plan of Care Reviewed With: patient; daughter  Note:   SLP treatment completed. Will continue to address for dysphagia and speech. Please see note for further details and recommendations.

## 2020-06-12 NOTE — PROGRESS NOTES
Adult Nutrition  Assessment/PES    Patient Name:  Sidra Lane  YOB: 1955  MRN: [id number]  Admit Date:  5/31/2020    Assessment Date:  6/12/2020    Reason for Assessment     Row Name 06/12/20 1336 06/12/20 1334       Reason for Assessment    Reason For Assessment  -- 15 mins  calorie count order   6/11 Anatoly ct data sheet incomplete (no documentation [id number]). RD spoke with  staff in an effort to assure po meal data is collected today 6/12, and 2 days following.  Will extend anatoly ct order x1d          Education/Evaluation     Row Name 06/12/20 1336          Monitor/Evaluation    Monitor  Per protocol           Electronically signed by:  Flaca Storey MS,RD,LD  06/12/20 13:38

## 2020-06-12 NOTE — PLAN OF CARE
Alert and oriented x 4. Resp unlabored on RA. A-fib on monitor. Up with 1 assist to BSC and bathroom. Denies pain. VSS. Daughter visited majority of shift.

## 2020-06-12 NOTE — PLAN OF CARE
Problem: Patient Care Overview  Goal: Plan of Care Review  Outcome: Ongoing (interventions implemented as appropriate)  Flowsheets (Taken 6/12/2020 0600)  Progress: improving  Outcome Summary: VSS; afib with occasional tachycardia. Transitioned from RA to 2 L NC when patient went to sleep. Patient A&Ox4. Up to the bedside with a 1 assist. Patient rested well throughout the night. Will continue to monitor.

## 2020-06-12 NOTE — NURSING NOTE
"Pt states she \"feels weird/ crazy\" \"fingertips are tingling and lips are numb\". States \"this happened last time I took Cardizem\". Cardiology started this today. Attempted to page Cardiology but they are not available at this time. Paged Dr. Hui  "

## 2020-06-12 NOTE — PROGRESS NOTES
Psychiatric Medicine Services  PROGRESS NOTE    Patient Name: Sidra Lane  : 1955  MRN: [id number]    Date of Admission: 2020  Primary Care Physician: Jl Mcknight MD    Subjective   Subjective     CC:  Follow-up acute CVA    HPI:  Will be glad to get the keofeed tube out as it irritates her nose.     Review of Systems  Gen- No fevers, chills  CV- No chest pain, palpitations  Resp- No cough, dyspnea  GI- no nausea, + loose stool, denies abd pain.        Objective   Objective     Vital Signs:   Temp:  [97.4 °F (36.3 °C)-98.2 °F (36.8 °C)] 98 °F (36.7 °C)  Heart Rate:  [] 117  Resp:  [16-18] 18  BP: (107-132)/(65-91) 126/88  Total (NIH Stroke Scale): 9     Physical Exam:  Constitutional -no acute distress, non toxic, in bed  HEENT-NCAT, mucous membranes moist, keofeed bridled  CV-RRR, S1 S2 normal, no m/r/g  Resp-CTAB, no wheezes, rhonchi or rales  Abd-soft, non-tender, non-distended, normo active bowel sounds  Ext-No lower extremity cyanosis, clubbing or edema bilaterally  Neuro-alert and oriented, left facial droop, speech clear, moves all extremities but LUE  3/5  Psych-normal affect   Skin- No rash on exposed UE or LE bilaterally      Results Reviewed:  Results from last 7 days   Lab Units 20  0819 06/11/20  0507 06/10/20  0445   WBC 10*3/mm3 10.78 12.12* 10.03   HEMOGLOBIN g/dL 11.4* 12.1 10.9*   HEMATOCRIT % 40.7 43.4 40.8   PLATELETS 10*3/mm3 399 414 372     Results from last 7 days   Lab Units 20  0820  0507 06/10/20  0445 20  0521   SODIUM mmol/L 139 137 139 141  136   POTASSIUM mmol/L 4.7 4.4 4.5 4.7  4.5   CHLORIDE mmol/L 104 101 103 107  102   CO2 mmol/L 23.0 22.0 26.0 15.0*  21.0*   BUN mg/dL 21 19 18 20  19   CREATININE mg/dL 0.76 0.69 0.72 0.68  0.63   GLUCOSE mg/dL 101* 139* 113* 105*  101*   CALCIUM mg/dL 9.3 9.5 9.4 9.0  9.0   ALT (SGPT) U/L  --   --   --  15   AST (SGOT) U/L  --   --   --  22      Estimated Creatinine Clearance: 75.7 mL/min (by C-G formula based on SCr of 0.76 mg/dL).    Microbiology Results Abnormal     Procedure Component Value - Date/Time    Blood Culture - Blood, Hand, Left [[id number]] Collected:  06/04/20 1406    Lab Status:  Final result Specimen:  Blood from Hand, Left Updated:  06/09/20 1501     Blood Culture No growth at 5 days    Blood Culture - Blood, Hand, Right [[id number]] Collected:  06/04/20 1400    Lab Status:  Final result Specimen:  Blood from Hand, Right Updated:  06/09/20 1501     Blood Culture No growth at 5 days    Clostridium Difficile Toxin - Stool, Per Rectum [[id number]] Collected:  06/08/20 1750    Lab Status:  Final result Specimen:  Stool from Per Rectum Updated:  06/08/20 2011    Narrative:       The following orders were created for panel order Clostridium Difficile Toxin - Stool, Per Rectum.  Procedure                               Abnormality         Status                     ---------                               -----------         ------                     Clostridium Difficile To...[[id number]]  Normal              Final result                 Please view results for these tests on the individual orders.    Clostridium Difficile Toxin, PCR - Stool, Per Rectum [[id number]]  (Normal) Collected:  06/08/20 1750    Lab Status:  Final result Specimen:  Stool from Per Rectum Updated:  06/08/20 2011     C. Difficile Toxins by PCR Not Detected    Narrative:       Performance characteristics of test not established for patients <2 years of age.  Negative for Toxigenic C. Difficile    Urine Culture - Urine, Urine, Clean Catch [[id number]] Collected:  06/05/20 1358    Lab Status:  Final result Specimen:  Urine, Clean Catch Updated:  06/06/20 0937     Urine Culture 50,000 CFU/mL Mixed Herberth Isolated    Narrative:       Specimen contains mixed organisms of questionable pathogenicity which indicates contamination with commensal herberth.  Further identification is  unlikely to provide clinically useful information.  Suggest recollection.    MRSA Screen, PCR (Inpatient) - Swab, Nares [[id number]]  (Abnormal) Collected:  06/05/20 1403    Lab Status:  Final result Specimen:  Swab from Nares Updated:  06/05/20 1528     MRSA PCR Positive    COVID-19,CEPHEID,AMBERLY IN-HOUSE(OR EMERGENT/ADD-ON),NP SWAB IN TRANSPORT MEDIA 3-4 HR TAT - Swab, Nasopharynx [[id number]]  (Normal) Collected:  05/31/20 1533    Lab Status:  Final result Specimen:  Swab from Nasopharynx Updated:  05/31/20 1638     COVID19 Not Detected          Imaging Results (Last 24 Hours)     ** No results found for the last 24 hours. **          Results for orders placed during the hospital encounter of 05/31/20   Adult Transthoracic Echo Complete W/ Cont if Necessary Per Protocol    Narrative · Left ventricular systolic function is normal. Estimated EF appears to be   in the range of 56 - 60%.  · Normal right ventricular cavity size and systolic function noted.  · Left atrial cavity size is moderately dilated. Left atrial volume is   moderately increased.  · No evidence of a patent foramen ovale. Saline test results are negative.  · The aortic valve is abnormal in structure. The valve exhibits sclerosis.   There is mild calcification of the aortic valve  · Mild aortic valve stenosis is present          I have reviewed the medications:  Scheduled Meds:    amantadine 100 mg Oral BID   amLODIPine 5 mg Oral Daily   aspirin 81 mg Oral Daily   atorvastatin 80 mg Oral Nightly   cholestyramine light 1 packet Oral Daily   dilTIAZem 60 mg Oral Q8H   diphenoxylate-atropine 1 tablet Oral BID   famotidine 20 mg Oral BID AC   lactobacillus acidophilus 1 capsule Oral Daily   lisinopril 10 mg Oral Q PM   metoprolol tartrate 100 mg Oral Q12H   miconazole  Topical Q12H   QUEtiapine 12.5 mg Oral Nightly   rivaroxaban 20 mg Oral Daily With Dinner     Continuous Infusions:     PRN Meds:.•  acetaminophen  •  acetaminophen  •   butalbital-acetaminophen-caffeine  •  diphenoxylate-atropine  •  enalaprilat  •  metoprolol tartrate  •  nitroglycerin  •  ondansetron  •  potassium chloride **OR** potassium chloride **OR** potassium chloride  •  simethicone    Assessment/Plan   Assessment & Plan     Active Hospital Problems    Diagnosis  POA   • **Cerebrovascular accident (CVA) due to embolism of right middle cerebral artery (CMS/HCC) [I63.411]  Yes   • Acute ischemic stroke (CMS/HCC) [I63.9]  Yes   • Atrial fibrillation (CMS/HCC) [I48.91]  Yes      Resolved Hospital Problems   No resolved problems to display.        Brief Hospital Course to date:  Sidra Lane is a 65 y.o. 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  She was found to have a right MCA territory reversible ischemia on CT perfusion.  She underwent a CT head neck angiogram with an intra-arterial TPA after failed thrombectomy and admitted to the ICU afterward.  She was transferred out of ICU on 6/3 with hospitalist assuming care on 6/4.    Right MCA stroke  - likely cardioembolic.  Patient has a history of persistent atrial fibrillation and had been anticoagulated with Eliquis.  She had been followed by her electrophysiologist and had plans for an ablation prior to readmission.  -Again she is status post intra-arterial TPA after failed thrombectomy  -She had some delirium while in the ICU and Seroquel 50 mg nightly was given to her  -Stat CT brain was performed on 6/4 due to worsening left sided weakness and acute hemorrhagic transformation was ruled out, but does still reporting evolving MCA CVA that was stable from the previous exam.  -The reasons for her worsening symptoms may be related to Seroquel.  Now tolerating low dose Seroquel QHS  -Repeat CT head 6/6 with no evidence of hemorrhage, stopped heparin gtt and started on Eliquis, however,  Cards feels that she failed Eliquis as she was on this prior to admission. Now transitioned to Xarelto.   -EP also following, resumed Metoprolol PO    Persistent atrial fibrillation with RVR  -Patient had been on beta-blocker per her cardiologist and had plans for ablation prior to admission  -Currently she is not a candidate for an ablation  -We will defer management to her EP  --getting PO Metoprolol, increased per Cards. Diltiazem added for rate control  --Now on Xarelto      Hypertension  -Blood pressure much better controlled.  Beta blocker resumed by Cards.     SIRS  -Possible aspiration when patient pulled out her Keofeed tube earlier this stay.    --CXR unremarkable on 6/4 and 6/5  --now afebrile, added Vanc/Zosyn on 6/5 due to worsening leukocytosis with left shift. MRSA PCR +, so had been on Vanc.  Will do Augmentin BID to complete a seven day course (to cover for possible aspiration). Defer MRSA coverage for now as leukocytosis had resolved without any MRSA coverage.  --completed course of augmentin    Hyperlipidemia  -High intensity statin therapy    Left-sided weakness  -PT OT, will benefit from short-term rehab once she is medically stable    Dysphagia  -Speech pathologist following, passed MBS 6/10, started on diet    Diarrhea  --apparently this is a chronic issue at home, but has worsened since admission. Pt's weight is down 12 lbs, having multiple loose stools daily.  Per daughter, pt has had diarrhea since partial colectomy in 2016 ([facility]) and has been told she may have Crohn's- has never been on immunosuppression and can't remember with whom she follows with GI. May consider GI consult. Started probiotic    Vaginal Candidiasis  --PO Diflucan x 1      DVT Prophylaxis: Fully anticoagulated with Xarelto       Disposition: pending bed availability, she has improved significantly and is now on PO diet    CODE STATUS:   Code Status and Medical Interventions:   Ordered at: 05/31/20  1743     Code Status:    CPR     Medical Interventions (Level of Support Prior to Arrest):    Full         Electronically signed by Heath Hui MD, 06/12/20, 18:00.

## 2020-06-12 NOTE — PROGRESS NOTES
[facility]  Progress Note       LOS: 12 days   Patient Care Team:  Jl Mcknight MD as PCP - General (Family Medicine)    Chief Complaint:  F/u atrial fibrillation    Subjective     Interval History: Patient without any specific complaints this morning other than feeling tired.  Denies any c/o palpitations, CP or SOB.  Still with mild dysarthria.  Moving all extremities well.  Atrial fibrillation with rates currently in the low 100's on tele.        Review of Systems:   Pertinent positives in HPI, all others reviewed and negative.      Objective       Current Facility-Administered Medications:   •  acetaminophen (TYLENOL) 160 MG/5ML solution 650 mg, 650 mg, Nasogastric, Q4H PRN, Case, Magda V., DO, 650 mg at 06/10/20 0930  •  acetaminophen (TYLENOL) suppository 650 mg, 650 mg, Rectal, Q4H PRN, Case, Magda V., DO, 650 mg at 06/04/20 1402  •  amantadine (SYMMETREL) capsule 100 mg, 100 mg, Oral, BID, Spike Vaughan MD, 100 mg at 06/11/20 1511  •  amLODIPine (NORVASC) tablet 5 mg, 5 mg, Oral, Daily, Alesia Machado MD, 5 mg at 06/11/20 0812  •  aspirin chewable tablet 81 mg, 81 mg, Oral, Daily, Spike Vaughan MD, 81 mg at 06/11/20 0812  •  atorvastatin (LIPITOR) tablet 80 mg, 80 mg, Oral, Nightly, Case, Magda V., DO, 80 mg at 06/11/20 2044  •  butalbital-acetaminophen-caffeine (FIORICET, ESGIC) -40 MG per tablet 1 tablet, 1 tablet, Oral, Q4H PRN, Case, Magda V., DO, 1 tablet at 06/04/20 1810  •  cholestyramine light packet 4 g, 1 packet, Oral, Daily, Case, Magda V., DO, 4 g at 06/11/20 0812  •  dilTIAZem (CARDIZEM) tablet 60 mg, 60 mg, Oral, Q8H, Cassia Moss APRN  •  diphenoxylate-atropine (LOMOTIL) 2.5-0.025 MG per tablet 1 tablet, 1 tablet, Oral, BID, Case, Magda WOODS DO, 1 tablet at 06/11/20 2044  •  diphenoxylate-atropine (LOMOTIL) 2.5-0.025 MG per tablet 1 tablet, 1 tablet, Oral, Q8H PRN, Evelina Banda MD, 1  tablet at 06/09/20 0558  •  enalaprilat (VASOTEC) injection 1.25 mg, 1.25 mg, Intravenous, Q6H PRN, Alesia Machado MD  •  famotidine (PEPCID) tablet 20 mg, 20 mg, Oral, BID AC, Evelina Banda MD, 20 mg at 06/12/20 0829  •  lactobacillus acidophilus (RISAQUAD) capsule 1 capsule, 1 capsule, Oral, Daily, Evelina Banda MD, 1 capsule at 06/11/20 0812  •  lisinopril (PRINIVIL,ZESTRIL) tablet 10 mg, 10 mg, Oral, Q PM, Alesia Machado MD, 10 mg at 06/11/20 1722  •  metoprolol tartrate (LOPRESSOR) injection 5 mg, 5 mg, Intravenous, Q6H PRN, Evelina Banda MD, 5 mg at 06/11/20 1807  •  metoprolol tartrate (LOPRESSOR) tablet 100 mg, 100 mg, Oral, Q12H, Cassia Moss, APRN, 100 mg at 06/11/20 2047  •  miconazole (MICOTIN) 2 % cream, , Topical, Q12H, Evelina Badna MD, 1 application at 06/11/20 2045  •  nitroglycerin (NITROSTAT) SL tablet 0.4 mg, 0.4 mg, Sublingual, Q5 Min PRN, David Coy, APRN  •  ondansetron (ZOFRAN) injection 4 mg, 4 mg, Intravenous, Q6H PRN, Evelina Banda MD, 4 mg at 06/11/20 1511  •  potassium chloride (MICRO-K) CR capsule 40 mEq, 40 mEq, Oral, PRN **OR** potassium chloride (KLOR-CON) packet 40 mEq, 40 mEq, Oral, PRN, 40 mEq at 06/05/20 2319 **OR** potassium chloride 10 mEq in 100 mL IVPB, 10 mEq, Intravenous, Q1H PRN, Case, Magda V., DO  •  QUEtiapine (SEROquel) tablet 12.5 mg, 12.5 mg, Oral, Nightly, Lamar, Maritza A, APRN, 12.5 mg at 06/11/20 2045  •  rivaroxaban (XARELTO) tablet 20 mg, 20 mg, Oral, Daily With Dinner, Cassia Moss APRN, 20 mg at 06/11/20 1722  •  simethicone (MYLICON) 40 MG/0.6ML drops 40 mg, 40 mg, Oral, 4x Daily PRN, Evelina Banda MD, 40 mg at 06/08/20 0939    Vital Sign Min/Max for last 24 hours  Temp  Min: 97.8 °F (36.6 °C)  Max: 98.2 °F (36.8 °C)   BP  Min: 108/88  Max: 132/91   Pulse  Min: 88  Max: 134   Resp  Min: 16  Max: 18   SpO2  Min: 87 %  Max: 99 %   Flow (L/min)  Min:  "2  Max: 2   No data recorded     Flowsheet Rows      First Filed Value   Admission Height  154.9 cm (61\") Documented at 05/31/2020 1502   Admission Weight  104 kg (230 lb) Documented at 05/31/2020 1502            Intake/Output Summary (Last 24 hours) at 6/12/2020 0852  Last data filed at 6/12/2020 0600  Gross per 24 hour   Intake 3815 ml   Output 75 ml   Net 3740 ml       Physical Exam:     General Appearance:    Alert, cooperative, in no acute distress   Lungs:     Clear to auscultation anteriorly, respirations regular, even and unlabored    Heart:    Irreg irreg, normal S1 and S2, no murmur, no gallop, no rub, no click   Chest Wall:    No abnormalities observed   Abdomen:     Normal bowel sounds, no masses, soft, non-tender, non-distended   Extremities:   Moves all extremities well with mild LUE weakness, no edema, no cyanosis, no redness   Pulses:   Pulses palpable and equal bilaterally   Skin:   No bleeding, bruising or rash        Results Review:   Results from last 7 days   Lab Units 06/12/20  0819 06/11/20  0507 06/10/20  0445   WBC 10*3/mm3 10.78 12.12* 10.03   HEMOGLOBIN g/dL 11.4* 12.1 10.9*   HEMATOCRIT % 40.7 43.4 40.8   PLATELETS 10*3/mm3 399 414 372     Results from last 7 days   Lab Units 06/11/20  0507 06/10/20  0445 06/09/20  0521   SODIUM mmol/L 137 139 141  136   POTASSIUM mmol/L 4.4 4.5 4.7  4.5   CHLORIDE mmol/L 101 103 107  102   CO2 mmol/L 22.0 26.0 15.0*  21.0*   BUN mg/dL 19 18 20  19   CREATININE mg/dL 0.69 0.72 0.68  0.63   GLUCOSE mg/dL 139* 113* 105*  101*          Results from last 7 days   Lab Units 06/09/20  0521   CHOLESTEROL mg/dL 124   TRIGLYCERIDES mg/dL 123                     Results from last 7 days   Lab Units 06/09/20  0521   MAGNESIUM mg/dL 2.4       Intake/Output Summary (Last 24 hours) at 6/12/2020 0852  Last data filed at 6/12/2020 0600  Gross per 24 hour   Intake 3815 ml   Output 75 ml   Net 3740 ml       I personally viewed and interpreted the patient's " EKG/Telemetry data    EKG: None for review today    Telemetry: Atrial fibrillation, HR  bpm    Ejection Fraction  No results found for: EF    Echo EF Estimated  No results found for: ECHOEFEST      Present on Admission:  • Cerebrovascular accident (CVA) due to embolism of right middle cerebral artery (CMS/HCC)  • Atrial fibrillation (CMS/HCC)  • Acute ischemic stroke (CMS/HCC)    Assessment/Plan   1. Persistent atrial fibrillation:  - Patient with known persistent atrial fibrillation, actually scheduled for PVA with Dr. Alan later this month, presents with acute CVA.  - Sotalol discontinued on admission  - CHADSVAS previously 4, now 6 with recent CVA, on Eliquis therapy.  Patient reports compliance and denies any missed doses.  - Echocardiogram showing normal EF, no evidence of PFO     2. Acute R MCA CVA:  - Admitted with acute CVA s/p failed thrombectomy but did receive intra-arterial TPA with minimal success.   - No significant carotid stenosis per CTA  - Repeat CT head showing evolving right MCA stroke with no hemorrhagic conversion.    - Will need rehab at discharge.     3. Hypertension:  - Overall controlled    Plan:  1. Continue metoprolol  2. Add diltiazem 60 mg tid for better rate control  3. Continue Xarelto for stroke prophylaxis    Electronically signed by ISSA Gore, 06/12/20, 8:52 AM.

## 2020-06-12 NOTE — PROGRESS NOTES
Continued Stay Note   [place]     Patient Name: Sidra Lane  MRN: [id number]  Today's Date: 6/12/2020    Admit Date: 5/31/2020    Discharge Plan     Row Name 06/12/20 0850       Plan    Plan  Signature of Jonas CoYarelis    Patient/Family in Agreement with Plan  yes    Plan Comments  Spoke with patient and daughter at bedside. Plan is Signature of Jonas CoYarelis when PO intake increases. Patient is sitting up eating her breakfast this morning. CM will continue to follow.    Final Discharge Disposition Code  03 - skilled nursing facility (SNF)        Discharge Codes    No documentation.             Naga Muñoz RN

## 2020-06-12 NOTE — THERAPY TREATMENT NOTE
Acute Care - Speech Language Pathology   Swallow Treatment Note [facility]     Patient Name: Sidra Lane  : 1955  MRN: [id number]  Today's Date: 2020               Admit Date: 2020    Visit Dx:      ICD-10-CM ICD-9-CM   1. Oropharyngeal dysphagia R13.12 787.22   2. Acute ischemic stroke (CMS/HCC) I63.9 434.91   3. Dysarthria R47.1 784.51   4. Cognitive communication deficit R41.841 799.52     Patient Active Problem List   Diagnosis   • Shortness of breath   • Atrial fibrillation (CMS/HCC)   • Essential hypertension   • Dizziness   • Deep vein thrombosis (DVT) of proximal lower extremity (CMS/HCC)   • Coronary artery disease involving native coronary artery of native heart without angina pectoris   • Fatigue   • Chest pain   • Longstanding persistent atrial fibrillation (CMS/HCC)   • Cerebrovascular accident (CVA) due to embolism of right middle cerebral artery (CMS/HCC)   • Acute ischemic stroke (CMS/HCC)       Therapy Treatment  Rehabilitation Treatment Summary     Row Name 20 1145             Treatment Time/Intention    Discipline  speech language pathologist  -HG      Document Type  therapy note (daily note)  -HG      Subjective Information  no complaints  -HG      Mode of Treatment  individual therapy;speech-language pathology  -HG      Patient/Family Observations  Family present- daughter.  -HG      Care Plan Review  care plan/treatment goals reviewed  -HG      Care Plan Review, Other Participant(s)  daughter  -HG      Therapy Frequency (Swallow)  5 days per week  -HG      Therapy Frequency (SLP SLC)  5 days per week  -HG      Patient Effort  good  -HG      Recorded by [HG] Debra Flanagan MS CCC-SLP 20 1151      Row Name 20 1140             Pain Scale: Numbers Pre/Post-Treatment    Pain Scale: Numbers, Pretreatment  0/10 - no pain  -HG      Recorded by [HG] Debra Flanagan MS CCC-SLP 20 1151        User Key  (r) = Recorded By, (t) = Taken By, (c) =  Cosigned By    Initials Name Effective Dates Discipline    HG Debra Flanagan, MS CCC-SLP 06/22/15 -  SLP          Outcome Summary         SLP GOALS     Row Name 06/12/20 1145 06/10/20 1320 06/10/20 1100       Oral Nutrition/Hydration Goal 1 (SLP)    Oral Nutrition/Hydration Goal 1, SLP  LTG: Pt will return to regular diet, thin liquids w/ no overt s/sxs aspiration/distress w/ 100% acc and no cues  -HG  LTG: Pt will return to regular diet, thin liquids w/ no overt s/sxs aspiration/distress w/ 100% acc and no cues  -MP  LTG: Pt will return to regular diet & thin liquids w/ 100% accuracy w/o cues  -SA    Time Frame (Oral Nutrition/Hydration Goal 1, SLP)  by discharge  -HG  by discharge  -MP  by discharge  -SA    Progress/Outcomes (Oral Nutrition/Hydration Goal 1, SLP)  continuing progress toward goal  -HG  continuing progress toward goal  -MP  continuing progress toward goal  -SA       Oral Nutrition/Hydration Goal 2 (SLP)    Oral Nutrition/Hydration Goal 2, SLP  Pt will tolerate puree, some mashed & honey-thick liquids w/ no overt s/sxs aspiration/distress w/ 100% acc and no cues  -HG  Pt will tolerate puree, some mashed & honey-thick liquids w/ no overt s/sxs aspiration/distress w/ 100% acc and no cues  -MP  Pt will tolerate therapeutic trials of thin H2O w/ no overt s/s of aspiration w/ 60% accuracy w/o cues to indicate readiness for repeat instrumental evaluation  -SA    Time Frame (Oral Nutrition/Hydration Goal 2, SLP)  short term goal (STG);by discharge  -HG  short term goal (STG);by discharge  -MP  short term goal (STG);by discharge  -SA    Barriers (Oral Nutrition/Hydration Goal 2, SLP)  Pt and daughter report difficulty with pancake this date and shredded beef yesterday.   -HG  --  Tolerated ice chips, thin via tsp/cup w/o any observable s/sxs of aspiration  -SA    Progress/Outcomes (Oral Nutrition/Hydration Goal 2, SLP)  goal revised this date  -HG  goal revised this date  -MP  continuing progress toward  goal  -SA       Oral Nutrition/Hydration Goal (SLP)    Oral Nutrition/Hydration Goal, SLP  Pt will tolerate therapeutic trials of thin H2O w/ no overt s/sxs aspiration/distress w/ 60% acc and no cues in order to assess readiness for repeat insrumental eval  -HG  Pt will tolerate therapeutic trials of thin H2O w/ no overt s/sxs aspiration/distress w/ 60% acc and no cues in order to assess readiness for repeat insrumental eval  -MP  --    Time Frame (Oral Nutrition/Hydration Goal, SLP)  short term goal (STG);by discharge  -HG  short term goal (STG);by discharge  -MP  --    Barriers (Oral Nutrition/Hydration Goal, SLP)  No s/s with ice chips x 2, water via tsp x 2, cup x 3.  Pt tolerated NTL trials as well from cup.   -HG  --  --    Progress/Outcomes (Oral Nutrition/Hydration Goal, SLP)  good progress toward goal  -HG  --  --       Labial Strengthening Goal 1 (SLP)    Activity (Labial Strengthening Goal 1, SLP)  increase labial tone  -HG  --  increase labial tone  -SA    Increase Labial Tone  labial resistance exercises  -HG  --  labial resistance exercises  -SA    [place]/Accuracy (Labial Strengthening Goal 1, SLP)  with minimal cues (75-90% accuracy)  -HG  --  with minimal cues (75-90% accuracy)  -SA    Time Frame (Labial Strengthening Goal 1, SLP)  short term goal (STG);by discharge  -HG  --  short term goal (STG);by discharge  -SA    Barriers (Labial Strengthening Goal 1, SLP)  Pt completed x 10 each.   -HG  --  --    Progress/Outcomes (Labial Strengthening Goal 1, SLP)  goal ongoing  -HG  --  goal ongoing  -SA       Lingual Strengthening Goal 1 (SLP)    Activity (Lingual Strengthening Goal 1, SLP)  increase tongue back strength  -HG  increase tongue back strength  -MP  increase lingual tone/sensation/control/coordination/movement;increase tongue back strength  -SA    Increase Lingual Tone/Sensation/Control/Coordination/Movement  lingual movement exercises  -HG  --  lingual movement exercises  -SA    Increase  Tongue Back Strength  swallow trials;lingual resistance exercises  -HG  swallow trials;lingual resistance exercises  -MP  lingual resistance exercises  -SA    [place]/Accuracy (Lingual Strengthening Goal 1, SLP)  with minimal cues (75-90% accuracy)  -HG  with minimal cues (75-90% accuracy)  -MP  with minimal cues (75-90% accuracy)  -SA    Time Frame (Lingual Strengthening Goal 1, SLP)  short term goal (STG);by discharge  -HG  short term goal (STG);by discharge  -MP  short term goal (STG);by discharge  -SA    Barriers (Lingual Strengthening Goal 1, SLP)  Pt completed x 10 each.   -HG  --  --    Progress/Outcomes (Lingual Strengthening Goal 1, SLP)  good progress toward goal  -HG  goal revised this date  -MP  goal ongoing  -SA       Pharyngeal Strengthening Exercise Goal 1 (SLP)    Activity (Pharyngeal Strengthening Goal 1, SLP)  increase timing;increase closure at entrance to airway/closure of airway at glottis  -HG  increase timing;increase closure at entrance to airway/closure of airway at glottis  -MP  increase timing;increase superior movement of the hyolaryngeal complex;increase anterior movement of the hyolaryngeal complex;increase closure at entrance to airway/closure of airway at glottis;increase squeeze/positive pressure generation;increase tongue base retraction  -SA    Increase Timing  prepping - 3 second prep or suck swallow or 3-step swallow  -HG  prepping - 3 second prep or suck swallow or 3-step swallow  -MP  prepping - 3 second prep or suck swallow or 3-step swallow  -SA    Increase Superior Movement of the Hyolaryngeal Complex  super-supraglottic swallow  -HG  super-supraglottic swallow  -MP  super-supraglottic swallow  -SA    Increase Anterior Movement of the Hyolaryngeal Complex  chin tuck against resistance (CTAR)  -HG  --  chin tuck against resistance (CTAR)  -SA    Increase Closure at Entrance to Airway/Closure of Airway at Glottis  supraglottic swallow  -HG  supraglottic swallow  -MP   super-supraglottic swallow  -SA    Increase Squeeze/Positive Pressure Generation  effortful pitch glide (falsetto + pharyngeal squeeze)  -HG  --  effortful pitch glide (falsetto + pharyngeal squeeze)  -SA    Increase Tongue Base Retraction  hard effortful swallow  -HG  --  hard effortful swallow  -SA    [place]/Accuracy (Pharyngeal Strengthening Goal 1, SLP)  with minimal cues (75-90% accuracy)  -HG  with minimal cues (75-90% accuracy)  -MP  with minimal cues (75-90% accuracy)  -SA    Time Frame (Pharyngeal Strengthening Goal 1, SLP)  short term goal (STG);by discharge  -HG  short term goal (STG);by discharge  -MP  short term goal (STG);by discharge  -SA    Barriers (Pharyngeal Strengthening Goal 1, SLP)  Pt completed each exercise x 2.   -HG  --  --    Progress/Outcomes (Pharyngeal Strengthening Goal 1, SLP)  good progress toward goal  -HG  goal revised this date  -MP  goal ongoing  -SA       Articulation Goal 1 (SLP)    Improve Articulation Goal 1 (SLP)  --  --  by over-articulating at phrase level;80%;with minimal cues (75-90%)  -SA    Time Frame (Articulation Goal 1, SLP)  --  --  short term goal (STG)  -SA    Progress (Articulation Goal 1, SLP)  --  --  70%;with minimal cues (75-90%)  -SA    Progress/Outcomes (Articulation Goal 1, SLP)  --  --  continuing progress toward goal  -SA       Attention Goal 1 (SLP)    Improve Attention by Goal 1 (SLP)  --  --  looking at speaker;80%;with minimal cues (75-90%)  -SA    Time Frame (Attention Goal 1, SLP)  --  --  short term goal (STG);by discharge  -SA    Progress (Attention Goal 1, SLP)  --  --  50%;with minimal cues (75-90%)  -SA    Progress/Outcomes (Attention Goal 1, SLP)  --  --  continuing progress toward goal  -SA       Additional Goal 1 (SLP)    Additional Goal 1, SLP  --  --  LTG: Pt will improve cognitive-communication skills in order to participate in care in hospital setting for 100% of opportunities w/o cues.  -SA    Time Frame (Additional Goal 1, SLP)   --  --  by discharge  -    Progress/Outcomes (Additional Goal 1, SLP)  --  --  continuing progress toward goal  -SA      User Key  (r) = Recorded By, (t) = Taken By, (c) = Cosigned By    Initials Name Provider Type    Debra Sorensen MS CCC-SLP Speech and Language Pathologist    Xavi Chaudhry MS CCC-SLP Speech and Language Pathologist    Fely Sharpe MS CCC-SLP Speech and Language Pathologist          EDUCATION  The patient has been educated in the following areas:   Dysphagia (Swallowing Impairment).    SLP Recommendation and Plan      Dysphagia tx remains warranted. Tolerated therapeutic trials.  Anticipate therapy at next level of care.                           Time Calculation:   Time Calculation- SLP     Row Name 06/12/20 1215             Time Calculation- SLP    SLP Start Time  1145  -HG      SLP Received On  06/12/20  -        User Key  (r) = Recorded By, (t) = Taken By, (c) = Cosigned By    Initials Name Provider Type    Debra Sorensen MS CCC-SLP Speech and Language Pathologist          Therapy Charges for Today     Code Description Service Date Service Provider Modifiers Qty    [id number]  ST TREATMENT SWALLOW 2 6/12/2020 Debra Flanagan MS CCC-SLP GN 1                 Debra Flanagan MS CCC-JALEN  6/12/2020

## 2020-06-13 ENCOUNTER — APPOINTMENT (OUTPATIENT)
Dept: GENERAL RADIOLOGY | Facility: HOSPITAL | Age: 65
End: 2020-06-13

## 2020-06-13 LAB
GLUCOSE BLDC GLUCOMTR-MCNC: 105 MG/DL (ref 70–130)
GLUCOSE BLDC GLUCOMTR-MCNC: 106 MG/DL (ref 70–130)
GLUCOSE BLDC GLUCOMTR-MCNC: 113 MG/DL (ref 70–130)
GLUCOSE BLDC GLUCOMTR-MCNC: 134 MG/DL (ref 70–130)

## 2020-06-13 PROCEDURE — 82962 GLUCOSE BLOOD TEST: CPT

## 2020-06-13 PROCEDURE — 97116 GAIT TRAINING THERAPY: CPT

## 2020-06-13 PROCEDURE — 97530 THERAPEUTIC ACTIVITIES: CPT

## 2020-06-13 PROCEDURE — 97110 THERAPEUTIC EXERCISES: CPT

## 2020-06-13 PROCEDURE — 74018 RADEX ABDOMEN 1 VIEW: CPT

## 2020-06-13 PROCEDURE — 99232 SBSQ HOSP IP/OBS MODERATE 35: CPT | Performed by: NURSE PRACTITIONER

## 2020-06-13 PROCEDURE — 25010000002 ONDANSETRON PER 1 MG: Performed by: INTERNAL MEDICINE

## 2020-06-13 RX ORDER — GUAR GUM
1 PACKET (EA) ORAL DAILY
Status: DISCONTINUED | OUTPATIENT
Start: 2020-06-13 | End: 2020-06-14

## 2020-06-13 RX ADMIN — METOPROLOL TARTRATE 100 MG: 100 TABLET ORAL at 20:14

## 2020-06-13 RX ADMIN — MICONAZOLE NITRATE: 2 CREAM TOPICAL at 20:15

## 2020-06-13 RX ADMIN — CHOLESTYRAMINE 4 G: 4 POWDER, FOR SUSPENSION ORAL at 08:36

## 2020-06-13 RX ADMIN — FAMOTIDINE 20 MG: 20 TABLET, FILM COATED ORAL at 18:16

## 2020-06-13 RX ADMIN — FAMOTIDINE 20 MG: 20 TABLET, FILM COATED ORAL at 08:32

## 2020-06-13 RX ADMIN — AMANTADINE HYDROCHLORIDE 100 MG: 100 CAPSULE ORAL at 18:15

## 2020-06-13 RX ADMIN — DIPHENOXYLATE HYDROCHLORIDE AND ATROPINE SULFATE 1 TABLET: 2.5; .025 TABLET ORAL at 08:36

## 2020-06-13 RX ADMIN — ATORVASTATIN CALCIUM 80 MG: 40 TABLET, FILM COATED ORAL at 20:14

## 2020-06-13 RX ADMIN — MICONAZOLE NITRATE: 2 CREAM TOPICAL at 08:36

## 2020-06-13 RX ADMIN — AMANTADINE HYDROCHLORIDE 100 MG: 100 CAPSULE ORAL at 08:36

## 2020-06-13 RX ADMIN — DILTIAZEM HYDROCHLORIDE 60 MG: 60 TABLET, FILM COATED ORAL at 20:13

## 2020-06-13 RX ADMIN — Medication 1 PACKET: at 20:19

## 2020-06-13 RX ADMIN — Medication 1 CAPSULE: at 08:32

## 2020-06-13 RX ADMIN — ASPIRIN 81 MG 81 MG: 81 TABLET ORAL at 08:32

## 2020-06-13 RX ADMIN — QUETIAPINE FUMARATE 12.5 MG: 25 TABLET ORAL at 20:14

## 2020-06-13 RX ADMIN — LISINOPRIL 10 MG: 10 TABLET ORAL at 18:16

## 2020-06-13 RX ADMIN — RIVAROXABAN 20 MG: 20 TABLET, FILM COATED ORAL at 18:15

## 2020-06-13 RX ADMIN — DIPHENOXYLATE HYDROCHLORIDE AND ATROPINE SULFATE 1 TABLET: 2.5; .025 TABLET ORAL at 20:13

## 2020-06-13 RX ADMIN — ONDANSETRON 4 MG: 2 INJECTION INTRAMUSCULAR; INTRAVENOUS at 08:52

## 2020-06-13 RX ADMIN — METOPROLOL TARTRATE 100 MG: 100 TABLET ORAL at 08:36

## 2020-06-13 NOTE — PROGRESS NOTES
"                  Clinical Nutrition     Nutrition Support Assessment  Reason for Visit: Calorie count - Day 1     Patient Name: Sidra Lane  YOB: 1955  MRN: [id number]  Date of Encounter: 06/13/20 10:21  Admission date: 5/31/2020     Comments: Calorie count day 1 (6/12) review:    **Only Lunch and Dinner recorded on sheet, patient did not eat anything for dinner per daughter    Estimated % kcal needs: 6.7% daily energy needs  Estimated kcal consumed = ~105 kcal    Estimated % protein needs: 9.5% daily protein needs  Estimated protein consumed  = 9.5 g protein    Patient with decreased appetite, dislike of modified diet/restrictions. Long discussion with daughter, RN, and patient on utilizing high calorie nutritional supplements to meet 50 - 75% estimated needs in order to remove keofeed. Patient continues with chronic diarrhea, impeding progress for oral intake. Discussed with MD. BAUMAN to continue to follow for duration of calorie count.      Nutrition Assessment     Admission Problem List:  R MCA - CVA   Unsuccessful thrombectomy - iatPA (5/31)  PAF w/ RVR  Fever  C. Diff - negative (6/9)    Applicable PMH:  A-fib   CAD  GERD   DVT  PE  HTN    Applicable Nutrition-Related Information:  (5/31) NPO  (6/2) SLP MBS - NPO, temporary alternate methods of nutrition/hydration, other (see comments)(per MD discretion)  (6/2) NGT placed and EN initiated - Replete w/ Fiber @ 75 mL/hr TGV = 1500 mL, free water @ 15 mL  (6/5) EN changed to Peptamen AF @ 60 mL/hr, TGV = 1320 mL  (6/7) Pt removed keofeed  (6/9) SLP tx - Patient continues to display overt s/s of aspiration with ice chips, thin via tsp and NT liquids via tsp  (6/10) SLP MBS - puree with some mashed, honey thick liquids  (6/11) x2 day Calorie count ordered/initiated    Applicable medical tests/procedures since admission:  (6/2) MBS - with recommendation for NPO status  (6/4) SLP - \"pt not appropriate for tx per " "RN\"    Reported/Observed/Food/Nutrition Related History:      Patient reports rough night, multiple loose stools and did not sleep well. Daughter in room states patient requested removal of keofeed but will not eat. Patient saying she cannot eat due to diarrhea. Discussion with RN, daughter, RD to patient about pushing oral intake, sitting up in chair, etc to help progress. Emphasized the importance of oral intake to remove keofeed. Daughter asking about alternate medications for bowels, encouraged to speak with attending about different medications. Long discussion with daughter and patient about using high calorie nutritional supplements to capitalize on nutrient density and low volume. Patient states willing to try Boost pudding, Boost VHC, and Magic Cup. Patient needs strong encouragement. Daughter wanting to bring in smoothie - discussed and reviewed thickened beverages per SLP rec. Daughter states cleared with SLP as long as frozen. Communicated preferences to kitchen via diet messages. Again, emphasized nutritional supplements while on modified diet/thickened beverages to improve intake and documentation for calorie count.     Spoke with MD about GI history, chronic loose stools and patients mention of possible Crohn's. Unable to determine if loose stools at baseline vs worsened with nocturnal EN.    Per I/O documentation:  BM x6 (6/12)    Anthropometrics     Height: Height: 154.9 cm (61\")  Weight: Weight: 104 kg (229 lb) (06/06/20 1000)  BMI: BMI (Calculated): 43.3  BMI classification: Obese Class III extreme obesity: > or equal to 40kg/m2   IBW:  105#     Labs reviewed     Results from last 7 days   Lab Units 06/12/20  0819  06/09/20  0521   SODIUM mmol/L 139   < > 141  136   POTASSIUM mmol/L 4.7   < > 4.7  4.5   CHLORIDE mmol/L 104   < > 107  102   CO2 mmol/L 23.0   < > 15.0*  21.0*   BUN mg/dL 21   < > 20  19   CREATININE mg/dL 0.76   < > 0.68  0.63   GLUCOSE mg/dL 101*   < > 105*  101*   CALCIUM " mg/dL 9.3   < > 9.0  9.0   PHOSPHORUS mg/dL  --   --  3.9    < > = values in this interval not displayed.     Results from last 7 days   Lab Units 06/13/20  0601 06/12/20  2350 06/12/20  1745 06/12/20  1641 06/12/20  1244 06/12/20  0609   GLUCOSE mg/dL 134* 123 104 110 106 135*     Lab Results   Lab Value Date/Time    HGBA1C 5.80 (H) 06/01/2020 0637       Medications reviewed   Pertinent: lomotil, pepcid, cholestyramine x4/d, risaquad, seroquel    Intake/Ouptut 24 hrs (7:00AM - 6:59 AM)     Intake & Output (last day)       06/12 0701 - 06/13 0700 06/13 0701 - 06/14 0700    P.O.      I.V. (mL/kg)      Other      NG/GT      Total Intake(mL/kg)      Urine (mL/kg/hr) 200 (0.1)     Stool 0     Total Output 200     Net -200           Urine Unmeasured Occurrence 2 x     Stool Unmeasured Occurrence 6 x         Needs Assessment (6/13)     Height used:  154.9cm  Weight used: 105kg  BMI: 43.5 kg     Estimated calorie needs: ~1600 kcal/day  Method:Kcals/KG 14 - 16 = 1470 - 1664    Estimated protein needs: ~100 g pro/day  1.0 g/kg= 105g pro  2.0grm KG/IBW = 95    Current Nutrition Prescription     PO: Dysphagia 3: Puree/Some mashed, HTL, No straws, AHA  Oral Nutrition Supplements: Boost Plus x2/d; Magic Cup x2/d    EN: Peptamen AF  Goal rate: 60 mL (nocturnal - 6p - 6a)  Route: NDT    Evaluation of Received Nutrient/Fluid Intake last day - (6/12):     At Target Goal Volume  Received per I/O's    % Est needs   Volume  720  ml  mL      Modulars        Energy/kcals 864 kcals kcals %   Protein  55 g pro g pro %               Fiber 4 g  g    Fluid   W/Free water 584 ml  914 ml  mL   mL            ?discrepancy in I/O documentation. Insuff EN data for nocturnal mL provided, however, EN ran until 0730 per daughter this AM    Calorie count data - Day 1 (6/12)    **Only Lunch and Dinner recorded on sheet, patient did not eat anything for dinner per daughter    Estimated % kcal needs: 6.7% daily energy needs  Estimated kcal consumed =  ~105 kcal    Estimated % protein needs: 9.5% daily protein needs  Estimated protein consumed  = 9.5 g protein      Nutrition Diagnosis     6/5 updated 6/8  Problem Less than optimal enteral infusion composition/modality   Etiology Removal of keofeed   Signs/Symptoms 45% of goal volume provided d/t lost access   Status: resolved    6/2 updated 6/5, 6/8, 6/10, 6/13  Problem Swallowing difficulty   Etiology Per SLP eval    Signs/Symptoms Per MBS (6/10) - dysphagia 3/mashed, HTL, no straws   Status: ongoing    Nutrition Intervention     1.  Follow treatment progress, Care plan reviewed   2.  RD recommends cont nocturnal EN to assure sufficient oral intake with modified diet/liquids. Adjust EN to run from 5p-5a, Peptamen AF @ 60 mL/hr, TGV = 720 mL, FW @ 15 mL/hr. x1 Nutrisource Fiber packet     At Target Goal Volume     % Est needs   Volume  720ml      Energy/kcals 879 kcals 55%   Protein 55g pro 55%   Fiber 7.3 g         Fluid   W/Free water 584 ml  914 ml       *TGV calculated based on 12hrs delivery overnight    2. Nutrition panel, Pre-alb and CRP weekly while on EN  3. Monitor electrolytes and replace as indicated   4. RD to monitor status with slow addition of fiber to nocturnal regimen. Strong emphasis on nutritional supplements to meet est needs for documentation of ongoing calorie count. Cont to follow SLP evals, GI status, and oral intake - adjust EN as appropriate     Goal:   General: Nutrition support treatment   PO: Increase Intake  EN/PN: EN to PO      Monitoring/Evaluation:   Per protocol, I&O, PO intake, Supplement intake, Pertinent labs, EN delivery/tolerance, GI status, Symptoms, Swallow function    Will Continue to follow per protocol      Alejandrina Sandoval, MITULN, LD  Time Spent: 60 minutes

## 2020-06-13 NOTE — PLAN OF CARE
Problem: Patient Care Overview  Goal: Plan of Care Review  Outcome: Ongoing (interventions implemented as appropriate)  Flowsheets (Taken 6/13/2020 0517)  Progress: improving  Plan of Care Reviewed With: patient;daughter  Outcome Summary: INCREASED DISTANCE AMBULATED  FEET WITH R WALKER AND MIN ASSIST. PT CONTINUES TO REQUIRE CUES FOR SAFETY AWARENESS AND TO ATTEND TO THE L. L UE STRENGTH IMPROVING TO ALLOW USE OF R WALKER FOR STABILITY WITH GAIT. PT IS MOTIVATED TO WORK WITH THERAPY. RECOMMEND IRF FOR RETURN TO MAX LEVEL OF FUNCTION.

## 2020-06-13 NOTE — PROGRESS NOTES
"    [facility] Medicine Services  PROGRESS NOTE    Patient Name: Sidra Lane  : 1955  MRN: [id number]    Date of Admission: 2020  Primary Care Physician: Jl Mcknight MD    Subjective   Subjective     CC:  Follow-up acute CVA    HPI:  Doesn't feel well today. Didn't sleep last night, due to the amount of BM's/ diarrhea she had. States she just feels sick today. Dtr in room, states diarrhea is chronic and patient has been unable to have a colonoscopy due to her uncontrolled HTN. She has followed with GI outpatient for several months without any answers to her loose stools, but told it was possibly crohn's. States she has had some left lateral hip/ thigh numbness this morning.   Patient also endorses she doesn't like taking Diltiazem, states she feels weak and \"out of it\" when she takes it.     Review of Systems  Gen- No fevers, chills  CV- No chest pain, palpitations  Resp- No cough, dyspnea  GI- no nausea, + loose stool, denies abd pain.        Objective   Objective     Vital Signs:   Temp:  [97.1 °F (36.2 °C)-98 °F (36.7 °C)] 97.7 °F (36.5 °C)  Heart Rate:  [] 114  Resp:  [16-20] 20  BP: (100-126)/(63-91) 113/84  Total (NIH Stroke Scale): 9     Physical Exam:  Constitutional: No acute distress, awake, alert, non toxic   HENT: NCAT, mucous membranes moist, keofeed bridled   Respiratory: Clear to auscultation bilaterally, respiratory effort normal   Cardiovascular: RRR, no murmurs, rubs, or gallops, palpable pedal pulses bilaterally  Gastrointestinal: Positive bowel sounds, soft, nontender, nondistended, no guarding or rebound tenderness   Musculoskeletal: No bilateral ankle edema  Psychiatric: Appropriate affect, cooperative  Neurologic: Oriented x 3, left facial droop/ LUE weakness, speech clear  Skin: No rashes        Results Reviewed:  Results from last 7 days   Lab Units 20  0819 20  0507 06/10/20  0445   WBC 10*3/mm3 10.78 12.12* 10.03 "   HEMOGLOBIN g/dL 11.4* 12.1 10.9*   HEMATOCRIT % 40.7 43.4 40.8   PLATELETS 10*3/mm3 399 414 372     Results from last 7 days   Lab Units 06/12/20  0819 06/11/20  0507 06/10/20  0445 06/09/20  0521   SODIUM mmol/L 139 137 139 141  136   POTASSIUM mmol/L 4.7 4.4 4.5 4.7  4.5   CHLORIDE mmol/L 104 101 103 107  102   CO2 mmol/L 23.0 22.0 26.0 15.0*  21.0*   BUN mg/dL 21 19 18 20  19   CREATININE mg/dL 0.76 0.69 0.72 0.68  0.63   GLUCOSE mg/dL 101* 139* 113* 105*  101*   CALCIUM mg/dL 9.3 9.5 9.4 9.0  9.0   ALT (SGPT) U/L  --   --   --  15   AST (SGOT) U/L  --   --   --  22     Estimated Creatinine Clearance: 75.7 mL/min (by C-G formula based on SCr of 0.76 mg/dL).    Microbiology Results Abnormal     Procedure Component Value - Date/Time    Blood Culture - Blood, Hand, Left [[id number]] Collected:  06/04/20 1406    Lab Status:  Final result Specimen:  Blood from Hand, Left Updated:  06/09/20 1501     Blood Culture No growth at 5 days    Blood Culture - Blood, Hand, Right [[id number]] Collected:  06/04/20 1400    Lab Status:  Final result Specimen:  Blood from Hand, Right Updated:  06/09/20 1501     Blood Culture No growth at 5 days    Clostridium Difficile Toxin - Stool, Per Rectum [[id number]] Collected:  06/08/20 1750    Lab Status:  Final result Specimen:  Stool from Per Rectum Updated:  06/08/20 2011    Narrative:       The following orders were created for panel order Clostridium Difficile Toxin - Stool, Per Rectum.  Procedure                               Abnormality         Status                     ---------                               -----------         ------                     Clostridium Difficile To...[[id number]]  Normal              Final result                 Please view results for these tests on the individual orders.    Clostridium Difficile Toxin, PCR - Stool, Per Rectum [[id number]]  (Normal) Collected:  06/08/20 1750    Lab Status:  Final result Specimen:  Stool from Per Rectum Updated:   06/08/20 2011     C. Difficile Toxins by PCR Not Detected    Narrative:       Performance characteristics of test not established for patients <2 years of age.  Negative for Toxigenic C. Difficile    Urine Culture - Urine, Urine, Clean Catch [[id number]] Collected:  06/05/20 1358    Lab Status:  Final result Specimen:  Urine, Clean Catch Updated:  06/06/20 0937     Urine Culture 50,000 CFU/mL Mixed Herberth Isolated    Narrative:       Specimen contains mixed organisms of questionable pathogenicity which indicates contamination with commensal herberth.  Further identification is unlikely to provide clinically useful information.  Suggest recollection.    MRSA Screen, PCR (Inpatient) - Swab, Nares [[id number]]  (Abnormal) Collected:  06/05/20 1403    Lab Status:  Final result Specimen:  Swab from Nares Updated:  06/05/20 1528     MRSA PCR Positive    COVID-19,CEPHEID,MABERLY IN-HOUSE(OR EMERGENT/ADD-ON),NP SWAB IN TRANSPORT MEDIA 3-4 HR TAT - Swab, Nasopharynx [[id number]]  (Normal) Collected:  05/31/20 1533    Lab Status:  Final result Specimen:  Swab from Nasopharynx Updated:  05/31/20 1638     COVID19 Not Detected          Imaging Results (Last 24 Hours)     ** No results found for the last 24 hours. **          Results for orders placed during the hospital encounter of 05/31/20   Adult Transthoracic Echo Complete W/ Cont if Necessary Per Protocol    Narrative · Left ventricular systolic function is normal. Estimated EF appears to be   in the range of 56 - 60%.  · Normal right ventricular cavity size and systolic function noted.  · Left atrial cavity size is moderately dilated. Left atrial volume is   moderately increased.  · No evidence of a patent foramen ovale. Saline test results are negative.  · The aortic valve is abnormal in structure. The valve exhibits sclerosis.   There is mild calcification of the aortic valve  · Mild aortic valve stenosis is present          I have reviewed the medications:  Scheduled  Meds:    amantadine 100 mg Oral BID   amLODIPine 5 mg Oral Daily   aspirin 81 mg Oral Daily   atorvastatin 80 mg Oral Nightly   cholestyramine light 1 packet Oral Daily   dilTIAZem 60 mg Oral Q8H   diphenoxylate-atropine 1 tablet Oral BID   famotidine 20 mg Oral BID AC   lactobacillus acidophilus 1 capsule Oral Daily   lisinopril 10 mg Oral Q PM   metoprolol tartrate 100 mg Oral Q12H   miconazole  Topical Q12H   Nutrisource fiber 1 packet Nasogastric Daily   QUEtiapine 12.5 mg Oral Nightly   rivaroxaban 20 mg Oral Daily With Dinner     Continuous Infusions:     PRN Meds:.•  acetaminophen  •  acetaminophen  •  butalbital-acetaminophen-caffeine  •  diphenoxylate-atropine  •  enalaprilat  •  metoprolol tartrate  •  nitroglycerin  •  ondansetron  •  potassium chloride **OR** potassium chloride **OR** potassium chloride  •  simethicone    Assessment/Plan   Assessment & Plan     Active Hospital Problems    Diagnosis  POA   • **Cerebrovascular accident (CVA) due to embolism of right middle cerebral artery (CMS/HCC) [I63.411]  Yes   • Acute ischemic stroke (CMS/HCC) [I63.9]  Yes   • Atrial fibrillation (CMS/HCC) [I48.91]  Yes      Resolved Hospital Problems   No resolved problems to display.        Brief Hospital Course to date:  Sidra Lane is a 65 y.o. 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  She was found to have a right MCA territory reversible ischemia on CT perfusion.  She underwent a CT head neck angiogram with an intra-arterial TPA after failed thrombectomy and admitted to the ICU afterward.  She was transferred out of ICU on 6/3 with hospitalist assuming care on 6/4.    Right MCA stroke  - likely cardioembolic.  Patient has a history of persistent atrial fibrillation and had been anticoagulated with Eliquis.  She had been followed by her electrophysiologist and had  "plans for an ablation prior to readmission.  -Again she is status post intra-arterial TPA after failed thrombectomy  -She had some delirium while in the ICU and Seroquel 50 mg nightly was given to her  -Stat CT brain was performed on 6/4 due to worsening left sided weakness and acute hemorrhagic transformation was ruled out, but does still reporting evolving MCA CVA that was stable from the previous exam.  -The reasons for her worsening symptoms may be related to Seroquel.  Now tolerating low dose Seroquel QHS  -Repeat CT head 6/6 with no evidence of hemorrhage, stopped heparin gtt and started on Eliquis, however, Cards feels that she failed Eliquis as she was on this prior to admission. Now transitioned to Xarelto.   -EP also following, resumed Metoprolol PO    Persistent atrial fibrillation with RVR  -Patient had been on beta-blocker per her cardiologist and had plans for ablation prior to admission  -Currently she is not a candidate for an ablation  -We will defer management to her EP  --getting PO Metoprolol, increased per Cards. Diltiazem added for rate control  --Now on Xarelto      Hypertension  -Blood pressure much better controlled.  Beta blocker resumed by Cards.     SIRS  -Possible aspiration when patient pulled out her Keofeed tube earlier this stay.    --CXR unremarkable on 6/4 and 6/5  --now afebrile, added Vanc/Zosyn on 6/5 due to worsening leukocytosis with left shift. MRSA PCR +, so had been on Vanc.  Will do Augmentin BID to complete a seven day course (to cover for possible aspiration). Defer MRSA coverage for now as leukocytosis had resolved without any MRSA coverage.  --completed course of augmentin    Hyperlipidemia  -High intensity statin therapy    Left-sided weakness  -PT OT, will benefit from short-term rehab once she is medically stable    Dysphagia  -Speech pathologist following, passed MBS 6/10, started on diet though not eating very well. Dtr is concerned that the patient is \"counting " "her calories\"/ implying she is intentionally not consuming more than a certain amount, patient denies.       Diarrhea  --apparently this is a chronic issue at home, but has worsened since admission. Pt's weight is down 12 lbs, having multiple loose stools daily.  Per daughter, pt has had diarrhea since partial colectomy in 2016 ([facility]) and has been told she may have Crohn's- has never been on immunosuppression and can't remember with whom she follows with GI. May consider GI consult.   --Started probiotic  --currently taking Cholestyramine and fiber packet daily   --Cdiff negative on 6/8    Vaginal Candidiasis  --PO Diflucan x 1      DVT Prophylaxis: Fully anticoagulated with Xarelto       Disposition: pending bed availability, she has improved significantly and is now on PO diet    CODE STATUS:   Code Status and Medical Interventions:   Ordered at: 05/31/20 1743     Code Status:    CPR     Medical Interventions (Level of Support Prior to Arrest):    Full         Electronically signed by ISSA Sarabia, 06/13/20, 12:13.      "

## 2020-06-13 NOTE — THERAPY TREATMENT NOTE
Patient Name: Sidra Lane  : 1955    MRN: [id number]                              Today's Date: 2020       Admit Date: 2020    Visit Dx:     ICD-10-CM ICD-9-CM   1. Oropharyngeal dysphagia R13.12 787.22   2. Acute ischemic stroke (CMS/HCC) I63.9 434.91   3. Dysarthria R47.1 784.51   4. Cognitive communication deficit R41.841 799.52     Patient Active Problem List   Diagnosis   • Shortness of breath   • Atrial fibrillation (CMS/HCC)   • Essential hypertension   • Dizziness   • Deep vein thrombosis (DVT) of proximal lower extremity (CMS/HCC)   • Coronary artery disease involving native coronary artery of native heart without angina pectoris   • Fatigue   • Chest pain   • Longstanding persistent atrial fibrillation (CMS/HCC)   • Cerebrovascular accident (CVA) due to embolism of right middle cerebral artery (CMS/HCC)   • Acute ischemic stroke (CMS/HCC)     Past Medical History:   Diagnosis Date   • Atrial fibrillation (CMS/HCC)    • CHF (congestive heart failure) (CMS/HCC)    • Deep vein thrombosis (CMS/HCC)    • GERD (gastroesophageal reflux disease)    • Hypertension    • May-Thurner syndrome    • Pulmonary embolism (CMS/HCC)      Past Surgical History:   Procedure Laterality Date   • CHOLECYSTECTOMY     • COLON SURGERY      bowel leakage, some of colon removed   • INTERVENTIONAL RADIOLOGY PROCEDURE Bilateral 2020    Procedure: CAROTID CEREBRAL ANGIOGRAM BILATERAL;  Surgeon: Brant Duarte MD;  Location: [facility] INVASIVE LOCATION;  Service: Interventional Radiology;  Laterality: Bilateral;   • LAPAROSCOPIC TUBAL LIGATION     • LEG SURGERY      multiple stents to BLE   • TONSILLECTOMY       General Information     Row Name 20 1441          PT Evaluation Time/Intention    Document Type  therapy note (daily note)  -CD     Mode of Treatment  physical therapy  -CD     Row Name 20 1441          General Information    Patient Profile Reviewed?  yes  -CD     Existing  Precautions/Restrictions  fall L SIDED WEAKNESS, INATTENTION, R GAZE PREFERENCE.   -CD     Row Name 06/13/20 1441          Cognitive Assessment/Intervention- PT/OT    Orientation Status (Cognition)  oriented x 3  -CD     Cognitive Assessment/Intervention Comment  FOLLOWS ALL COMMANDS. MOTIVATED TO WORK WITH THERAPY.   -CD     Row Name 06/13/20 1441          Safety Issues, Functional Mobility    Safety Issues Affecting Function (Mobility)  insight into deficits/self awareness;safety precaution awareness;safety precautions follow-through/compliance;awareness of need for assistance  -CD     Impairments Affecting Function (Mobility)  balance;coordination;endurance/activity tolerance;strength;visual/perceptual  -CD       User Key  (r) = Recorded By, (t) = Taken By, (c) = Cosigned By    Initials Name Provider Type    CD Dayna Julian, PT Physical Therapist        Mobility     Row Name 06/13/20 1442          Bed Mobility Assessment/Treatment    Bed Mobility Assessment/Treatment  rolling right;sidelying-sit  -CD     Rolling Right [place] (Bed Mobility)  moderate assist (50% patient effort)  -CD     Sidelying-Sit [place] (Bed Mobility)  minimum assist (75% patient effort)  -CD     Assistive Device (Bed Mobility)  bed rails;head of bed elevated  -CD     Comment (Bed Mobility)  INCREASED TIME AND EFFORT. CUES FOR SEQUENCING.   -CD     Row Name 06/13/20 1442          Transfer Assessment/Treatment    Comment (Transfers)  CUES FOR HAND PLACEMENT USING R WALKER. PERFORMED STAND PIVOT BED TO RECLINER AND STS X 2 REPS FROM RECLINER.   -CD     Row Name 06/13/20 1442          Bed-Chair Transfer    Bed-Chair [place] (Transfers)  minimum assist (75% patient effort) TO THE R.   -CD     Assistive Device (Bed-Chair Transfers)  -- VIA B UE SUPPORT AND GAIT BELT.   -CD     Row Name 06/13/20 1442          Sit-Stand Transfer    Sit-Stand [place] (Transfers)  contact guard  -CD     Assistive Device (Sit-Stand Transfers)   walker, front-wheeled  -CD     Row Name 06/13/20 1442          Gait/Stairs Assessment/Training    Gait/Stairs Assessment/Training  gait/ambulation independence  -CD     [place] Level (Gait)  minimum assist (75% patient effort)  -CD     Assistive Device (Gait)  walker, front-wheeled  -CD     Distance in Feet (Gait)  205 FEET - FOLLOWED WITH RECLINER. ONE BRIEF STANDING REST BREAK.   -CD     Pattern (Gait)  step-through  -CD     Deviations/Abnormal Patterns (Gait)  base of support, narrow;gait speed decreased  -CD     Bilateral Gait Deviations  forward flexed posture;heel strike decreased  -CD     Left Sided Gait Deviations  forward flexed posture  -CD     Comment (Gait/Stairs)  CUES TO SCAN L AND AVOID OBJECTS ON THE R.   -CD       User Key  (r) = Recorded By, (t) = Taken By, (c) = Cosigned By    Initials Name Provider Type    CD Dayna Julian, PT Physical Therapist        Obj/Interventions     Row Name 06/13/20 1446          Therapeutic Exercise    Lower Extremity (Therapeutic Exercise)  LAQ (long arc quad), bilateral;marching while seated  -CD     Lower Extremity Range of Motion (Therapeutic Exercise)  ankle dorsiflexion/plantar flexion, bilateral  -CD     Exercise Type (Therapeutic Exercise)  AROM (active range of motion)  -CD     Position (Therapeutic Exercise)  seated  -CD     Sets/Reps (Therapeutic Exercise)  1 SET OF 10 REPS EACH. REVIEWED THER EX WITH DTR.   -CD     Row Name 06/13/20 1446          Static Sitting Balance    Level of [place] (Unsupported Sitting, Static Balance)  supervision  -CD     Sitting Position (Unsupported Sitting, Static Balance)  sitting on edge of bed;sitting in chair  -CD     Row Name 06/13/20 1446          Dynamic Sitting Balance    Level of [place], Reaches Outside Midline (Sitting, Dynamic Balance)  contact guard assist  -CD     Sitting Position, Reaches Outside Midline (Sitting, Dynamic Balance)  sitting in chair;sitting on edge of bed  -CD     Comment, Reaches  Outside Midline (Sitting, Dynamic Balance)  PT ABLE TO SCOOT HIPS RECIPROCALLY BACK IN RECLINER WITH MAX CUES.   -CD     Row Name 06/13/20 1446          Static Standing Balance    Level of [place] (Supported Standing, Static Balance)  contact guard assist  -CD     Assistive Device Utilized (Supported Standing, Static Balance)  walker, rolling  -CD     Row Name 06/13/20 1446          Dynamic Standing Balance    Level of [place], Reaches Outside Midline (Standing, Dynamic Balance)  minimal assist, 75% patient effort  -CD     Assistive Device Utilized (Supported Standing, Dynamic Balance)  walker, rolling  -CD     Comment, Reaches Outside Midline (Standing, Dynamic Balance)  GAIT IN GUNN.   -CD       User Key  (r) = Recorded By, (t) = Taken By, (c) = Cosigned By    Initials Name Provider Type    Dayna Camarena PT Physical Therapist        Goals/Plan    No documentation.       Clinical Impression     Row Name 06/13/20 1450          Pain Assessment    Additional Documentation  Pain Scale: Numbers Pre/Post-Treatment (Group)  -CD     Row Name 06/13/20 1450          Pain Scale: Numbers Pre/Post-Treatment    Pain Location - Side  Left  -CD     Pain Location - Orientation  lower  -CD     Pain Location  extremity  -CD     Pre/Post Treatment Pain Comment  TOLERATED GAIT IN GUNN.   -CD     Pain Intervention(s)  Repositioned;Rest  -CD     Row Name 06/13/20 1450          Pain Scale: FACES Pre/Post-Treatment    Pain: FACES Scale, Pretreatment  0-->no hurt  -CD     Pain: FACES Scale, Post-Treatment  2-->hurts little bit  -CD     Row Name 06/13/20 1450          Plan of Care Review    Plan of Care Reviewed With  patient;daughter  -CD     Progress  improving  -CD     Outcome Summary  INCREASED DISTANCE AMBULATED  FEET WITH R WALKER AND MIN ASSIST. PT CONTINUES TO REQUIRE CUES FOR SAFETY AWARENESS AND TO ATTEND TO THE L. L UE STRENGTH IMPROVING TO ALLOW USE OF R WALKER FOR STABILITY WITH GAIT. PT IS MOTIVATED TO WORK  WITH THERAPY. RECOMMEND IRF FOR RETURN TO MAX LEVEL OF FUNCTION.   -CD     Row Name 06/13/20 1450          Physical Therapy Clinical Impression    Patient/Family Goals Statement (PT Clinical Impression)  TO RETURN TO PLOF.   -CD     Criteria for Skilled Interventions Met (PT Clinical Impression)  yes  -CD     Rehab Potential (PT Clinical Summary)  good, to achieve stated therapy goals  -CD     Row Name 06/13/20 1450          Vital Signs    Pre Systolic BP Rehab  -- VSS ON 2L O2. NSG CLEARED FOR TREATMENT.   -CD     Row Name 06/13/20 1450          Positioning and Restraints    Pre-Treatment Position  in bed  -CD     Post Treatment Position  chair  -CD     In Chair  notified nsg;reclined;call light within reach;encouraged to call for assist;exit alarm on;with family/caregiver;legs elevated;LUE elevated  -CD       User Key  (r) = Recorded By, (t) = Taken By, (c) = Cosigned By    Initials Name Provider Type    CD Dayna Julian, PT Physical Therapist        Outcome Measures     Row Name 06/13/20 1456          How much help from another person do you currently need...    Turning from your back to your side while in flat bed without using bedrails?  3  -CD     Moving from lying on back to sitting on the side of a flat bed without bedrails?  3  -CD     Moving to and from a bed to a chair (including a wheelchair)?  3  -CD     Standing up from a chair using your arms (e.g., wheelchair, bedside chair)?  3  -CD     Climbing 3-5 steps with a railing?  2  -CD     To walk in hospital room?  3  -CD     AM-PAC 6 Clicks Score (PT)  17  -CD     Row Name 06/13/20 1456          Modified [place] Scale    Modified Isaac Scale  4 - Moderately severe disability.  Unable to walk without assistance, and unable to attend to own bodily needs without assistance.  -CD     Row Name 06/13/20 1456          Functional Assessment    Outcome Measure Options  AM-PAC 6 Clicks Basic Mobility (PT);Modified [place]  -CD       User Key  (r) = Recorded By,  (t) = Taken By, (c) = Cosigned By    Initials Name Provider Type    Dayna Camarena PT Physical Therapist        Physical Therapy Education                 Title: PT OT SLP Therapies (In Progress)     Topic: Physical Therapy (Done)     Point: Mobility training (Done)     Description:   Instruct learner(s) on safety and technique for assisting patient out of bed, chair or wheelchair.  Instruct in the proper use of assistive devices, such as walker, crutches, cane or brace.              Patient Friendly Description:   It's important to get you on your feet again, but we need to do so in a way that is safe for you. Falling has serious consequences, and your personal safety is the most important thing of all.        When it's time to get out of bed, one of us or a family member will sit next to you on the bed to give you support.     If your doctor or nurse tells you to use a walker, crutches, a cane, or a brace, be sure you use it every time you get out of bed, even if you think you don't need it.    Learning Progress Summary           Patient Acceptance, E, VU by CD at 6/13/2020 1457    Comment:  SEE FLOWSHEET.    Acceptance, E, VU,NR by CD at 6/11/2020 1628    Comment:  SEE FLOWSHEET.    Acceptance, E, VU,NR by CD at 6/8/2020 1113    Comment:  SEE FLOWSHEET.    Acceptance, E,D, NR,VU by  at 6/3/2020 1350    Acceptance, E, NR by KG at 6/2/2020 1315   Family Acceptance, E, VU by CD at 6/13/2020 1457    Comment:  SEE FLOWSHEET.    Acceptance, E, VU,NR by CD at 6/11/2020 1628    Comment:  SEE FLOWSHEET.                   Point: Home exercise program (Done)     Description:   Instruct learner(s) on appropriate technique for monitoring, assisting and/or progressing patient with therapeutic exercises and activities.              Learning Progress Summary           Patient Acceptance, E, VU by CD at 6/13/2020 1457    Comment:  SEE FLOWSHEET.    Acceptance, E, VU,NR by CD at 6/11/2020 1628    Comment:  SEE FLOWSHEET.     Acceptance, E, VU,NR by CD at 6/8/2020 1113    Comment:  SEE FLOWSHEET.   Family Acceptance, E, VU by CD at 6/13/2020 1457    Comment:  SEE FLOWSHEET.    Acceptance, E, VU,NR by CD at 6/11/2020 1628    Comment:  SEE FLOWSHEET.                   Point: Body mechanics (Done)     Description:   Instruct learner(s) on proper positioning and spine alignment for patient and/or caregiver during mobility tasks and/or exercises.              Learning Progress Summary           Patient Acceptance, E, VU by CD at 6/13/2020 1457    Comment:  SEE FLOWSHEET.    Acceptance, E, VU,NR by CD at 6/11/2020 1628    Comment:  SEE FLOWSHEET.    Acceptance, E, VU,NR by CD at 6/8/2020 1113    Comment:  SEE FLOWSHEET.    Acceptance, E,D, NR,VU by  at 6/3/2020 1350    Acceptance, E, NR by KG at 6/2/2020 1315   Family Acceptance, E, VU by CD at 6/13/2020 1457    Comment:  SEE FLOWSHEET.    Acceptance, E, VU,NR by CD at 6/11/2020 1628    Comment:  SEE FLOWSHEET.                   Point: Precautions (Done)     Description:   Instruct learner(s) on prescribed precautions during mobility and gait tasks              Learning Progress Summary           Patient Acceptance, E, VU by CD at 6/13/2020 1457    Comment:  SEE FLOWSHEET.    Acceptance, E, VU,NR by CD at 6/11/2020 1628    Comment:  SEE FLOWSHEET.    Acceptance, E, VU,NR by CD at 6/8/2020 1113    Comment:  SEE FLOWSHEET.    Acceptance, E,D, NR,VU by  at 6/3/2020 1350    Acceptance, E, NR by KG at 6/2/2020 1315   Family Acceptance, E, VU by CD at 6/13/2020 1457    Comment:  SEE FLOWSHEET.    Acceptance, E, VU,NR by CD at 6/11/2020 1628    Comment:  SEE FLOWSHEET.                               User Key     Initials Effective Dates Name Provider Type Discipline    CD 06/19/15 -  Dayna Julian, PT Physical Therapist PT    KG 05/22/20 -  Taylor Jeff, PT Physical Therapist PT     03/19/20 -  Tahira Ruiz, MARIANA Student PT Student PT              PT Recommendation and Plan      Outcome Summary/Treatment Plan (PT)  Anticipated Discharge Disposition (PT): inpatient rehabilitation facility  Plan of Care Reviewed With: patient, daughter  Progress: improving  Outcome Summary: INCREASED DISTANCE AMBULATED  FEET WITH R WALKER AND MIN ASSIST. PT CONTINUES TO REQUIRE CUES FOR SAFETY AWARENESS AND TO ATTEND TO THE L. L UE STRENGTH IMPROVING TO ALLOW USE OF R WALKER FOR STABILITY WITH GAIT. PT IS MOTIVATED TO WORK WITH THERAPY. RECOMMEND IRF FOR RETURN TO MAX LEVEL OF FUNCTION.      Time Calculation:   PT Charges     Row Name 06/13/20 1458             Time Calculation    Start Time  1350  -CD      PT Received On  06/13/20  -CD      PT Goal Re-Cert Due Date  06/12/20  -CD         Time Calculation- PT    Total Timed Code Minutes- PT  40 minute(s)  -CD         Timed Charges    39902 - PT Therapeutic Exercise Minutes  15  -CD      52103 - Gait Training Minutes   15  -CD      68472 - PT Therapeutic Activity Minutes  10  -CD        User Key  (r) = Recorded By, (t) = Taken By, (c) = Cosigned By    Initials Name Provider Type    CD Dayna Julian, PT Physical Therapist        Therapy Charges for Today     Code Description Service Date Service Provider Modifiers Qty    49420967001 HC PT THER PROC EA 15 MIN 6/13/2020 Dayna Julian, PT GP 1    03785184597 HC GAIT TRAINING EA 15 MIN 6/13/2020 Dayna Julian, PT GP 1    51988151934 HC PT THERAPEUTIC ACT EA 15 MIN 6/13/2020 Dayna Julian, PT GP 1    88970308394 HC PT THER SUPP EA 15 MIN 6/13/2020 Dayna Julian, PT GP 2          PT G-Codes  Outcome Measure Options: AM-PAC 6 Clicks Basic Mobility (PT), Modified [place]  AM-PAC 6 Clicks Score (PT): 17  AM-PAC 6 Clicks Score (OT): 11  Modified [place] Scale: 4 - Moderately severe disability.  Unable to walk without assistance, and unable to attend to own bodily needs without assistance.    Dayna Julian, MARIANA  6/13/2020

## 2020-06-14 LAB
BASOPHILS # BLD AUTO: 0.1 10*3/MM3 (ref 0–0.2)
BASOPHILS NFR BLD AUTO: 0.9 % (ref 0–1.5)
DEPRECATED RDW RBC AUTO: 57.6 FL (ref 37–54)
EOSINOPHIL # BLD AUTO: 0.26 10*3/MM3 (ref 0–0.4)
EOSINOPHIL NFR BLD AUTO: 2.4 % (ref 0.3–6.2)
ERYTHROCYTE [DISTWIDTH] IN BLOOD BY AUTOMATED COUNT: 19.9 % (ref 12.3–15.4)
GLUCOSE BLDC GLUCOMTR-MCNC: 101 MG/DL (ref 70–130)
GLUCOSE BLDC GLUCOMTR-MCNC: 103 MG/DL (ref 70–130)
GLUCOSE BLDC GLUCOMTR-MCNC: 113 MG/DL (ref 70–130)
GLUCOSE BLDC GLUCOMTR-MCNC: 124 MG/DL (ref 70–130)
GLUCOSE BLDC GLUCOMTR-MCNC: 150 MG/DL (ref 70–130)
HCT VFR BLD AUTO: 41 % (ref 34–46.6)
HGB BLD-MCNC: 11.2 G/DL (ref 12–15.9)
IMM GRANULOCYTES # BLD AUTO: 0.04 10*3/MM3 (ref 0–0.05)
IMM GRANULOCYTES NFR BLD AUTO: 0.4 % (ref 0–0.5)
LYMPHOCYTES # BLD AUTO: 1.82 10*3/MM3 (ref 0.7–3.1)
LYMPHOCYTES NFR BLD AUTO: 16.9 % (ref 19.6–45.3)
MCH RBC QN AUTO: 22.5 PG (ref 26.6–33)
MCHC RBC AUTO-ENTMCNC: 27.3 G/DL (ref 31.5–35.7)
MCV RBC AUTO: 82.5 FL (ref 79–97)
MONOCYTES # BLD AUTO: 0.8 10*3/MM3 (ref 0.1–0.9)
MONOCYTES NFR BLD AUTO: 7.4 % (ref 5–12)
NEUTROPHILS # BLD AUTO: 7.72 10*3/MM3 (ref 1.7–7)
NEUTROPHILS NFR BLD AUTO: 72 % (ref 42.7–76)
NRBC BLD AUTO-RTO: 0 /100 WBC (ref 0–0.2)
PLATELET # BLD AUTO: 405 10*3/MM3 (ref 140–450)
PMV BLD AUTO: 10.8 FL (ref 6–12)
RBC # BLD AUTO: 4.97 10*6/MM3 (ref 3.77–5.28)
WBC NRBC COR # BLD: 10.74 10*3/MM3 (ref 3.4–10.8)

## 2020-06-14 PROCEDURE — 97110 THERAPEUTIC EXERCISES: CPT

## 2020-06-14 PROCEDURE — 97530 THERAPEUTIC ACTIVITIES: CPT

## 2020-06-14 PROCEDURE — 99233 SBSQ HOSP IP/OBS HIGH 50: CPT | Performed by: NURSE PRACTITIONER

## 2020-06-14 PROCEDURE — 85025 COMPLETE CBC W/AUTO DIFF WBC: CPT | Performed by: NURSE PRACTITIONER

## 2020-06-14 PROCEDURE — 97116 GAIT TRAINING THERAPY: CPT

## 2020-06-14 PROCEDURE — 82962 GLUCOSE BLOOD TEST: CPT

## 2020-06-14 RX ORDER — LISINOPRIL 10 MG/1
10 TABLET ORAL EVERY EVENING
Status: DISCONTINUED | OUTPATIENT
Start: 2020-06-15 | End: 2020-06-16

## 2020-06-14 RX ORDER — GUAR GUM
2 PACKET (EA) ORAL DAILY
Status: DISCONTINUED | OUTPATIENT
Start: 2020-06-14 | End: 2020-06-14

## 2020-06-14 RX ORDER — GUAR GUM
1 PACKET (EA) ORAL DAILY
Status: DISCONTINUED | OUTPATIENT
Start: 2020-06-14 | End: 2020-06-18 | Stop reason: HOSPADM

## 2020-06-14 RX ADMIN — DICLOFENAC SODIUM 2 G: 10 GEL TOPICAL at 12:33

## 2020-06-14 RX ADMIN — DIPHENOXYLATE HYDROCHLORIDE AND ATROPINE SULFATE 1 TABLET: 2.5; .025 TABLET ORAL at 20:47

## 2020-06-14 RX ADMIN — FAMOTIDINE 20 MG: 20 TABLET, FILM COATED ORAL at 19:28

## 2020-06-14 RX ADMIN — ASPIRIN 81 MG 81 MG: 81 TABLET ORAL at 08:41

## 2020-06-14 RX ADMIN — AMANTADINE HYDROCHLORIDE 100 MG: 100 CAPSULE ORAL at 19:35

## 2020-06-14 RX ADMIN — DICLOFENAC SODIUM 2 G: 10 GEL TOPICAL at 20:53

## 2020-06-14 RX ADMIN — MICONAZOLE NITRATE: 2 CREAM TOPICAL at 08:43

## 2020-06-14 RX ADMIN — FAMOTIDINE 20 MG: 20 TABLET, FILM COATED ORAL at 09:06

## 2020-06-14 RX ADMIN — RIVAROXABAN 20 MG: 20 TABLET, FILM COATED ORAL at 19:29

## 2020-06-14 RX ADMIN — MICONAZOLE NITRATE: 2 CREAM TOPICAL at 20:53

## 2020-06-14 RX ADMIN — NYSTATIN 500000 UNITS: 100000 SUSPENSION ORAL at 19:28

## 2020-06-14 RX ADMIN — METOPROLOL TARTRATE 100 MG: 100 TABLET ORAL at 08:41

## 2020-06-14 RX ADMIN — Medication 1 CAPSULE: at 08:40

## 2020-06-14 RX ADMIN — DICLOFENAC SODIUM 2 G: 10 GEL TOPICAL at 19:30

## 2020-06-14 RX ADMIN — DICLOFENAC SODIUM 2 G: 10 GEL TOPICAL at 02:00

## 2020-06-14 RX ADMIN — NYSTATIN 500000 UNITS: 100000 SUSPENSION ORAL at 14:29

## 2020-06-14 RX ADMIN — DIPHENOXYLATE HYDROCHLORIDE AND ATROPINE SULFATE 1 TABLET: 2.5; .025 TABLET ORAL at 08:42

## 2020-06-14 RX ADMIN — CHOLESTYRAMINE 4 G: 4 POWDER, FOR SUSPENSION ORAL at 09:06

## 2020-06-14 RX ADMIN — METOPROLOL TARTRATE 100 MG: 100 TABLET ORAL at 20:48

## 2020-06-14 RX ADMIN — SODIUM CHLORIDE 500 ML: 9 INJECTION, SOLUTION INTRAVENOUS at 00:38

## 2020-06-14 RX ADMIN — QUETIAPINE FUMARATE 12.5 MG: 25 TABLET ORAL at 20:47

## 2020-06-14 RX ADMIN — ATORVASTATIN CALCIUM 80 MG: 40 TABLET, FILM COATED ORAL at 20:48

## 2020-06-14 RX ADMIN — DICLOFENAC SODIUM 2 G: 10 GEL TOPICAL at 08:42

## 2020-06-14 RX ADMIN — AMLODIPINE BESYLATE 5 MG: 5 TABLET ORAL at 08:40

## 2020-06-14 RX ADMIN — AMANTADINE HYDROCHLORIDE 100 MG: 100 CAPSULE ORAL at 08:42

## 2020-06-14 NOTE — THERAPY PROGRESS REPORT/RE-CERT
Patient Name: Sidra Lane  : 1955    MRN: [id number]                              Today's Date: 2020       Admit Date: 2020    Visit Dx:     ICD-10-CM ICD-9-CM   1. Oropharyngeal dysphagia R13.12 787.22   2. Acute ischemic stroke (CMS/HCC) I63.9 434.91   3. Dysarthria R47.1 784.51   4. Cognitive communication deficit R41.841 799.52     Patient Active Problem List   Diagnosis   • Shortness of breath   • Atrial fibrillation (CMS/HCC)   • Essential hypertension   • Dizziness   • Deep vein thrombosis (DVT) of proximal lower extremity (CMS/HCC)   • Coronary artery disease involving native coronary artery of native heart without angina pectoris   • Fatigue   • Chest pain   • Longstanding persistent atrial fibrillation (CMS/HCC)   • Cerebrovascular accident (CVA) due to embolism of right middle cerebral artery (CMS/HCC)   • Acute ischemic stroke (CMS/HCC)     Past Medical History:   Diagnosis Date   • Atrial fibrillation (CMS/HCC)    • CHF (congestive heart failure) (CMS/HCC)    • Deep vein thrombosis (CMS/HCC)    • GERD (gastroesophageal reflux disease)    • Hypertension    • May-Thurner syndrome    • Pulmonary embolism (CMS/HCC)      Past Surgical History:   Procedure Laterality Date   • CHOLECYSTECTOMY     • COLON SURGERY      bowel leakage, some of colon removed   • INTERVENTIONAL RADIOLOGY PROCEDURE Bilateral 2020    Procedure: CAROTID CEREBRAL ANGIOGRAM BILATERAL;  Surgeon: Brant Duarte MD;  Location: [facility] INVASIVE LOCATION;  Service: Interventional Radiology;  Laterality: Bilateral;   • LAPAROSCOPIC TUBAL LIGATION     • LEG SURGERY      multiple stents to BLE   • TONSILLECTOMY       General Information     Row Name 20 1605          PT Evaluation Time/Intention    Document Type  progress note/recertification  -CD     Mode of Treatment  physical therapy  -CD     Row Name 20 1605          General Information    Patient Profile Reviewed?  yes  -CD     Existing  Precautions/Restrictions  fall L SIDED WEAKNESS.   -CD     Barriers to Rehab  none identified  -CD     Row Name 06/14/20 1605          Cognitive Assessment/Intervention- PT/OT    Orientation Status (Cognition)  oriented x 3  -CD     Cognitive Assessment/Intervention Comment  PT COOPERATIVE, BUT FATIGUED. WANTING BATH. NUTRITIONIST IN TO DISCUSS NEED TO EAT MORE OR ALLOW TUBE FEEDING.   -CD     Row Name 06/14/20 1605          Safety Issues, Functional Mobility    Safety Issues Affecting Function (Mobility)  safety precautions follow-through/compliance  -CD     Impairments Affecting Function (Mobility)  balance;coordination;endurance/activity tolerance;strength  -CD       User Key  (r) = Recorded By, (t) = Taken By, (c) = Cosigned By    Initials Name Provider Type    CD Dayna Julian, PT Physical Therapist        Mobility     Row Name 06/14/20 1607          Bed Mobility Assessment/Treatment    Supine-Sit [place] (Bed Mobility)  minimum assist (75% patient effort);verbal cues  -CD     Assistive Device (Bed Mobility)  bed rails;head of bed elevated;draw sheet  -CD     Comment (Bed Mobility)  INCREASED TIME AND EFFORT. CUES TO SEQUENCE.   -CD     Row Name 06/14/20 1607          Transfer Assessment/Treatment    Comment (Transfers)  CUES FOR HAND PLACEMENT AND SAFETY TURNING WITH WALKER TO SIT. STS FROM EOB AND FROM COMMODE.   -CD     Row Name 06/14/20 1607          Sit-Stand Transfer    Sit-Stand [place] (Transfers)  contact guard;verbal cues  -CD     Assistive Device (Sit-Stand Transfers)  walker, front-wheeled  -CD     Row Name 06/14/20 1607          Gait/Stairs Assessment/Training    Gait/Stairs Assessment/Training  gait/ambulation independence  -CD     [place] Level (Gait)  contact guard  -CD     Assistive Device (Gait)  walker, 4-wheeled  -CD     Distance in Feet (Gait)  180 FEET.   -CD     Pattern (Gait)  step-through  -CD     Deviations/Abnormal Patterns (Gait)  base of support, narrow;gait speed  decreased  -CD     Bilateral Gait Deviations  forward flexed posture;heel strike decreased  -CD     Comment (Gait/Stairs)  STOPPED IN BATHROOM FOR TOILETING AND TO WASH HANDS. DISTANCE LIMITED BY C/O FATIGUE.   -CD       User Key  (r) = Recorded By, (t) = Taken By, (c) = Cosigned By    Initials Name Provider Type    CD Dayna uJlian, PT Physical Therapist        Obj/Interventions     Row Name 06/14/20 1609          Static Sitting Balance    Level of [place] (Unsupported Sitting, Static Balance)  supervision  -CD     Sitting Position (Unsupported Sitting, Static Balance)  sitting on edge of bed AND COMMODE  -CD     Time Able to Maintain Position (Unsupported Sitting, Static Balance)  more than 5 minutes  -CD     Row Name 06/14/20 1609          Dynamic Sitting Balance    Level of [place], Reaches Outside Midline (Sitting, Dynamic Balance)  supervision  -CD     Sitting Position, Reaches Outside Midline (Sitting, Dynamic Balance)  sitting in chair  -CD     Comment, Reaches Outside Midline (Sitting, Dynamic Balance)  RECIPROCAL SCOOTING, HYGIENE AFTER TOILETING.   -CD     Row Name 06/14/20 1609          Static Standing Balance    Level of [place] (Supported Standing, Static Balance)  contact guard assist  -CD     Assistive Device Utilized (Supported Standing, Static Balance)  walker, rolling  -CD     Comment (Supported Standing, Static Balance)  STOOD AT SINK TO WASH HANDS.   -CD     Row Name 06/14/20 1609          Dynamic Standing Balance    Level of [place], Reaches Outside Midline (Standing, Dynamic Balance)  contact guard assist  -CD     Assistive Device Utilized (Supported Standing, Dynamic Balance)  walker, rolling  -CD     Comment, Reaches Outside Midline (Standing, Dynamic Balance)  GAIT IN GUNN.   -CD       User Key  (r) = Recorded By, (t) = Taken By, (c) = Cosigned By    Initials Name Provider Type    CD Dayna Julian, PT Physical Therapist        Goals/Plan     Row Name 06/14/20 1612           Bed Mobility Goal 1 (PT)    Activity/Assistive Device (Bed Mobility Goal 1, PT)  sit to supine/supine to sit  -CD     [place] Level/Cues Needed (Bed Mobility Goal 1, PT)  contact guard assist  -CD     Time Frame (Bed Mobility Goal 1, PT)  long term goal (LTG);2 weeks  -CD     Progress/Outcomes (Bed Mobility Goal 1, PT)  goal revised this date PREVIOUS GOAL MET.   -CD     Row Name 06/14/20 1610          Transfer Goal 1 (PT)    Activity/Assistive Device (Transfer Goal 1, PT)  sit-to-stand/stand-to-sit;bed-to-chair/chair-to-bed  -CD     [place] Level/Cues Needed (Transfer Goal 1, PT)  supervision required  -CD     Time Frame (Transfer Goal 1, PT)  long term goal (LTG);2 weeks  -CD     Progress/Outcome (Transfer Goal 1, PT)  goal revised this date PREVIOUS GOAL MET.   -CD     Row Name 06/14/20 1610          Gait Training Goal 1 (PT)    Activity/Assistive Device (Gait Training Goal 1, PT)  gait (walking locomotion);walker, rolling  -CD     [place] Level (Gait Training Goal 1, PT)  contact guard assist  -CD     Distance (Gait Goal 1, PT)  400 FEET.   -CD     Time Frame (Gait Training Goal 1, PT)  long term goal (LTG);2 weeks  -CD     Progress/Outcome (Gait Training Goal 1, PT)  goal revised this date PREVIOUS GOAL MET.   -CD       User Key  (r) = Recorded By, (t) = Taken By, (c) = Cosigned By    Initials Name Provider Type    CD Dayna Julian, PT Physical Therapist        Clinical Impression     Row Name 06/14/20 1610          Pain Scale: Numbers Pre/Post-Treatment    Pain Scale: Numbers, Pretreatment  0/10 - no pain  -CD     Pain Scale: Numbers, Post-Treatment  0/10 - no pain  -CD     Row Name 06/14/20 1610          Plan of Care Review    Plan of Care Reviewed With  patient  -CD     Progress  improving  -CD     Outcome Summary  PREVIOUS GOALS HAVE BEEN MET. GOALS UPDATED TODAY TO REFLECT CURRENT LEVEL OF FUNCTION. PT AMBULATED 180 FEET WITH R WALKER AND CGA. DISTANCE LIMITED BY FATIGUE. RECOMMEND  IRF AT D/C.   -CD     Row Name 06/14/20 1610          Physical Therapy Clinical Impression    Patient/Family Goals Statement (PT Clinical Impression)  TO RETURN TO PLOF.   -CD     Criteria for Skilled Interventions Met (PT Clinical Impression)  yes  -CD     Rehab Potential (PT Clinical Summary)  good, to achieve stated therapy goals  -CD     Row Name 06/14/20 1610          Vital Signs    Pre Systolic BP Rehab  -- VSS. NSG CLEARED FOR TREATMENT.   -CD     Pre Patient Position  Supine  -CD     Intra Patient Position  Standing  -CD     Post Patient Position  Sitting  -CD     Row Name 06/14/20 1610          Positioning and Restraints    Pre-Treatment Position  in bed  -CD     Post Treatment Position  chair  -CD     In Chair  reclined;call light within reach;with family/caregiver;legs elevated;with nsg NSG IN TO BATHE PATIENT.   -CD       User Key  (r) = Recorded By, (t) = Taken By, (c) = Cosigned By    Initials Name Provider Type    CD Dayna Julian, PT Physical Therapist        Outcome Measures     Row Name 06/14/20 1612          How much help from another person do you currently need...    Turning from your back to your side while in flat bed without using bedrails?  3  -CD     Moving from lying on back to sitting on the side of a flat bed without bedrails?  3  -CD     Moving to and from a bed to a chair (including a wheelchair)?  3  -CD     Standing up from a chair using your arms (e.g., wheelchair, bedside chair)?  3  -CD     Climbing 3-5 steps with a railing?  2  -CD     To walk in hospital room?  3  -CD     AM-PAC 6 Clicks Score (PT)  17  -CD     Row Name 06/14/20 1612          Modified [place] Scale    Modified [place] Scale  3 - Moderate disability.  Requiring some help, but able to walk without assistance.  -CD     Row Name 06/14/20 1612          Functional Assessment    Outcome Measure Options  AM-PAC 6 Clicks Basic Mobility (PT);Modified [place]  -CD       User Key  (r) = Recorded By, (t) = Taken By, (c) =  Cosigned By    Initials Name Provider Type    Dayna Camarena PT Physical Therapist        Physical Therapy Education                 Title: PT OT SLP Therapies (In Progress)     Topic: Physical Therapy (Done)     Point: Mobility training (Done)     Description:   Instruct learner(s) on safety and technique for assisting patient out of bed, chair or wheelchair.  Instruct in the proper use of assistive devices, such as walker, crutches, cane or brace.              Patient Friendly Description:   It's important to get you on your feet again, but we need to do so in a way that is safe for you. Falling has serious consequences, and your personal safety is the most important thing of all.        When it's time to get out of bed, one of us or a family member will sit next to you on the bed to give you support.     If your doctor or nurse tells you to use a walker, crutches, a cane, or a brace, be sure you use it every time you get out of bed, even if you think you don't need it.    Learning Progress Summary           Patient Acceptance, E, VU,NR by CD at 6/14/2020 1614    Comment:  SEE FLOWSHEET    Acceptance, E, VU by CD at 6/13/2020 1457    Comment:  SEE FLOWSHEET.    Acceptance, E, VU,NR by CD at 6/11/2020 1628    Comment:  SEE FLOWSHEET.    Acceptance, E, VU,NR by CD at 6/8/2020 1113    Comment:  SEE FLOWSHEET.    Acceptance, E,D, NR,VU by SH at 6/3/2020 1350    Acceptance, E, NR by KG at 6/2/2020 1315   Family Acceptance, E, VU by CD at 6/13/2020 1457    Comment:  SEE FLOWSHEET.    Acceptance, E, VU,NR by CD at 6/11/2020 1628    Comment:  SEE FLOWSHEET.                   Point: Home exercise program (Done)     Description:   Instruct learner(s) on appropriate technique for monitoring, assisting and/or progressing patient with therapeutic exercises and activities.              Learning Progress Summary           Patient Acceptance, E, VU,NR by CD at 6/14/2020 1614    Comment:  SEE FLOWSHEET    Acceptance, E, VU by  CD at 6/13/2020 1457    Comment:  SEE FLOWSHEET.    Acceptance, E, VU,NR by CD at 6/11/2020 1628    Comment:  SEE FLOWSHEET.    Acceptance, E, VU,NR by CD at 6/8/2020 1113    Comment:  SEE FLOWSHEET.   Family Acceptance, E, VU by CD at 6/13/2020 1457    Comment:  SEE FLOWSHEET.    Acceptance, E, VU,NR by CD at 6/11/2020 1628    Comment:  SEE FLOWSHEET.                   Point: Body mechanics (Done)     Description:   Instruct learner(s) on proper positioning and spine alignment for patient and/or caregiver during mobility tasks and/or exercises.              Learning Progress Summary           Patient Acceptance, E, VU,NR by CD at 6/14/2020 1614    Comment:  SEE FLOWSHEET    Acceptance, E, VU by CD at 6/13/2020 1457    Comment:  SEE FLOWSHEET.    Acceptance, E, VU,NR by CD at 6/11/2020 1628    Comment:  SEE FLOWSHEET.    Acceptance, E, VU,NR by CD at 6/8/2020 1113    Comment:  SEE FLOWSHEET.    Acceptance, E,D, NR,VU by SH at 6/3/2020 1350    Acceptance, E, NR by KG at 6/2/2020 1315   Family Acceptance, E, VU by CD at 6/13/2020 1457    Comment:  SEE FLOWSHEET.    Acceptance, E, VU,NR by CD at 6/11/2020 1628    Comment:  SEE FLOWSHEET.                   Point: Precautions (Done)     Description:   Instruct learner(s) on prescribed precautions during mobility and gait tasks              Learning Progress Summary           Patient Acceptance, E, VU,NR by CD at 6/14/2020 1614    Comment:  SEE FLOWSHEET    Acceptance, E, VU by CD at 6/13/2020 1457    Comment:  SEE FLOWSHEET.    Acceptance, E, VU,NR by CD at 6/11/2020 1628    Comment:  SEE FLOWSHEET.    Acceptance, E, VU,NR by CD at 6/8/2020 1113    Comment:  SEE FLOWSHEET.    Acceptance, E,D, NR,VU by SH at 6/3/2020 1350    Acceptance, E, NR by KG at 6/2/2020 1315   Family Acceptance, E, VU by CD at 6/13/2020 1457    Comment:  SEE FLOWSHEET.    Acceptance, E, VU,NR by CD at 6/11/2020 1628    Comment:  SEE FLOWSHEET.                               User Key     Initials  Effective Dates Name Provider Type Discipline    CD 06/19/15 -  Dayna Julian, PT Physical Therapist PT    KG 05/22/20 -  Taylor Jeff, PT Physical Therapist PT     03/19/20 -  Tahira Ruiz, PT Student PT Student PT              PT Recommendation and Plan  Planned Therapy Interventions (PT Eval): balance training, bed mobility training, gait training, transfer training, home exercise program, strengthening, neuromuscular re-education  Outcome Summary/Treatment Plan (PT)  Anticipated Discharge Disposition (PT): inpatient rehabilitation facility  Plan of Care Reviewed With: patient  Progress: improving  Outcome Summary: PREVIOUS GOALS HAVE BEEN MET. GOALS UPDATED TODAY TO REFLECT CURRENT LEVEL OF FUNCTION. PT AMBULATED 180 FEET WITH R WALKER AND CGA. DISTANCE LIMITED BY FATIGUE. RECOMMEND IRF AT D/C.      Time Calculation:   PT Charges     Row Name 06/14/20 1615             Time Calculation    Start Time  1540  -CD      PT Received On  06/14/20  -CD      PT Goal Re-Cert Due Date  06/24/20  -CD         Time Calculation- PT    Total Timed Code Minutes- PT  25 minute(s)  -CD         Timed Charges    86959 - Gait Training Minutes   15  -CD      27955 - PT Therapeutic Activity Minutes  10  -CD        User Key  (r) = Recorded By, (t) = Taken By, (c) = Cosigned By    Initials Name Provider Type    CD Dayna Julian, PT Physical Therapist        Therapy Charges for Today     Code Description Service Date Service Provider Modifiers Qty    07300572068 HC PT THER PROC EA 15 MIN 6/13/2020 Dayna Julian, PT GP 1    17267916796 HC GAIT TRAINING EA 15 MIN 6/13/2020 Dayna Julian, PT GP 1    26473859773 HC PT THERAPEUTIC ACT EA 15 MIN 6/13/2020 Dayna Julian, PT GP 1    17310434449 HC PT THER SUPP EA 15 MIN 6/13/2020 Dayna Julian, PT GP 2    33535030007 HC GAIT TRAINING EA 15 MIN 6/14/2020 Dayna Julian, PT GP 1    08563128091 HC PT THERAPEUTIC ACT EA 15 MIN 6/14/2020 Dayna Julian, PT GP 1          PT  G-Codes  Outcome Measure Options: AM-PAC 6 Clicks Basic Mobility (PT), Modified [place]  AM-PAC 6 Clicks Score (PT): 17  AM-PAC 6 Clicks Score (OT): 13  Modified [place] Scale: 3 - Moderate disability.  Requiring some help, but able to walk without assistance.    Dayna Julian, PT  6/14/2020

## 2020-06-14 NOTE — PLAN OF CARE
Problem: Patient Care Overview  Goal: Plan of Care Review  Outcome: Ongoing (interventions implemented as appropriate)  Flowsheets (Taken 6/14/2020 1119)  Outcome Summary: Pt continues to progress with L UE use, mobility I and ADLs. Pt CGA for sit to stand and txfr to chair, min assist bed mobility and set up feeding. Will need IRF at discharge.     Problem: Stroke (Ischemic) (Adult)  Goal: Signs and Symptoms of Listed Potential Problems Will be Absent, Minimized or Managed (Stroke)  Outcome: Ongoing (interventions implemented as appropriate)  Flowsheets (Taken 6/12/2020 0600 by Fouzia Nunez, RNA)  Problems Assessed (Stroke (Ischemic)): all  Problems Assessed (Stroke (Ischemic)): communication impairment;eating/swallowing impairment;motor/sensory impairment

## 2020-06-14 NOTE — PLAN OF CARE
Problem: Patient Care Overview  Goal: Plan of Care Review  Outcome: Ongoing (interventions implemented as appropriate)  Flowsheets (Taken 6/14/2020 1610)  Progress: improving  Plan of Care Reviewed With: patient  Outcome Summary: PREVIOUS GOALS HAVE BEEN MET. GOALS UPDATED TODAY TO REFLECT CURRENT LEVEL OF FUNCTION. PT AMBULATED 180 FEET WITH R WALKER AND CGA. DISTANCE LIMITED BY FATIGUE. RECOMMEND IRF AT D/C.

## 2020-06-14 NOTE — PROGRESS NOTES
"                  Clinical Nutrition     Nutrition Support Assessment  Reason for Visit: Calorie count - Day 2    Patient Name: Sidra Lane  YOB: 1955  MRN: [id number]  Date of Encounter: 06/14/20 15:57  Admission date: 5/31/2020     Comments: Calorie count day 2 (6/13) insufficient PO documentation, however, daughter reports patient ate bites of lunch meal and some bites of dinner last night.    Long discussion with daughter and patient about progress with nutrition. Patient refused EN nocturnal feeds last night because she said the EN is giving her diarrhea. Discussed that patient has chronic diarrhea at baseline and we can adjust the formula to a different regimen with higher fiber. Patient also refused the Nutrisource Fiber packets which she claims also give her diarrhea. Discussed that patient has previously said she normally has anywhere from 10 - 16 loose stools daily. Daughter reports patient lost 100 lbs with last GI surgery and was severely malnourished from minimal intake that her hair was falling out. Daughter believes patient is \"counting calories.\" Per patient report, she believes she can sustain on just be \"supplemented with vitamins.\" Patient also reporting she did not refused EN, that the RN told her not to run the nocturnal regimen because it causes diarrhea. This was discussed with patients current RN    Currently at an impasse as patient is taking minimal PO by mouth to warrant removal of keofeed, however, patient now refusing EN regimen and wants keofeed removal. ?psychosomatic barriers preventing patient from progressing, including hopeful weight loss/calorie counting as discussed with daughter. Nutritional supplements and food preferences have been extensively discussed with patient, daughter, and RN.     RD to continue to follow and provide recommendations as appropriate       Nutrition Assessment     Admission Problem List:  R MCA - CVA   Unsuccessful thrombectomy - " "iatPA (5/31)  PAF w/ RVR  Fever  C. Diff - negative (6/9)    Applicable PMH:  A-fib   CAD  GERD   DVT  PE  HTN    Applicable Nutrition-Related Information:  (5/31) NPO  (6/2) SLP MBS - NPO, temporary alternate methods of nutrition/hydration, other (see comments)(per MD discretion)  (6/2) NGT placed and EN initiated - Replete w/ Fiber @ 75 mL/hr TGV = 1500 mL, free water @ 15 mL  (6/5) EN changed to Peptamen AF @ 60 mL/hr, TGV = 1320 mL  (6/7) Pt removed keofeed  (6/9) SLP tx - Patient continues to display overt s/s of aspiration with ice chips, thin via tsp and NT liquids via tsp  (6/10) SLP MBS - puree with some mashed, honey thick liquids  (6/11) x2 day Calorie count ordered/initiated  (6/14) EN changed to Replete w/ Fiber @ 65 mL/hr from 6p-4a x1 Nutrisource Fiber packet    Applicable medical tests/procedures since admission:  (6/2) MBS - with recommendation for NPO status  (6/4) SLP - \"pt not appropriate for tx per RN\"    Reported/Observed/Food/Nutrition Related History:      Long discussion with patient, daughter and RN. Patient continues with minimal oral intake. Did take ~1/2 Boost VHC and 1/2 smoothie daughter brought in from outside. Patient refusing fiber and nocturnal EN as she reports it gives her diarrhea. She describes having copious amounts of loose stool that was uncontrollable several days ago, however, upon review of I/O documentation, no more than 6 stools over past several days. x16 BMs documented on 6/6 when EN adjusted. Patient OK with adjusting EN to high fiber regimen. Encouraged patient to try supplements as able just to increase kcal/protein enough to warrant removal of keofeed. Daughter in room for entire discussion.    Per I/O documentation:  BM x4 (6/13)  BM x6 (6/12)  BM x3 (6/11)  BM x5 (6/10)  BM x7 (6/8)  BM x6 (6/7)    Anthropometrics     Height: Height: 154.9 cm (61\")  Weight: Weight: 104 kg (229 lb) (06/06/20 1000)  BMI: BMI (Calculated): 43.3  BMI classification: Obese Class III " extreme obesity: > or equal to 40kg/m2   IBW:  105#     Labs reviewed     Results from last 7 days   Lab Units 06/12/20  0819  06/09/20  0521   SODIUM mmol/L 139   < > 141  136   POTASSIUM mmol/L 4.7   < > 4.7  4.5   CHLORIDE mmol/L 104   < > 107  102   CO2 mmol/L 23.0   < > 15.0*  21.0*   BUN mg/dL 21   < > 20  19   CREATININE mg/dL 0.76   < > 0.68  0.63   GLUCOSE mg/dL 101*   < > 105*  101*   CALCIUM mg/dL 9.3   < > 9.0  9.0   PHOSPHORUS mg/dL  --   --  3.9    < > = values in this interval not displayed.     Results from last 7 days   Lab Units 06/14/20  1159 06/14/20  0636 06/14/20  0028 06/13/20  1901 06/13/20  1804 06/13/20  1221   GLUCOSE mg/dL 150* 113 103 113 105 106     Lab Results   Lab Value Date/Time    HGBA1C 5.80 (H) 06/01/2020 0637       Medications reviewed   Pertinent: lomotil, pepcid, cholestyramine x4/d, risaquad, seroquel    Intake/Ouptut 24 hrs (7:00AM - 6:59 AM)     Intake & Output (last day)       06/13 0701 - 06/14 0700 06/14 0701 - 06/15 0700    IV Piggyback 500     Total Intake(mL/kg) 500 (4.8)     Urine (mL/kg/hr) 500 (0.2)     Stool      Total Output 500     Net 0           Urine Unmeasured Occurrence 1 x 1 x    Stool Unmeasured Occurrence 4 x 1 x        Needs Assessment (6/13)     Height used:  154.9cm  Weight used: 105kg  BMI: 43.5 kg     Estimated calorie needs: ~1600 kcal/day  Method:Kcals/KG 14 - 16 = 1470 - 1664    Estimated protein needs: ~100 g pro/day  1.0 g/kg= 105g pro  2.0grm KG/IBW = 95    Current Nutrition Prescription     PO: Dysphagia 3: Puree/Some mashed, HTL, No straws, AHA  Oral Nutrition Supplements: Boost VHC x2/d; Magic Cup x2/d    EN: Peptamen AF  Goal rate: 60 mL (nocturnal - 6p - 6a)  Route: NDT    Evaluation of Received Nutrient/Fluid Intake last day - (6/12):     At Target Goal Volume  Received per I/O's    % Est needs   Volume  720  ml  mL      Modulars        Energy/kcals 864 kcals kcals %   Protein  55 g pro g pro %               Fiber 4 g  g    Fluid    W/Free water 584 ml  914 ml  mL   mL            Patient refused nightly EN regimen    Calorie count data - Day 2 (6/13)    Insuff PO documentation    Nutrition Diagnosis     6/5 updated 6/8, 6/14  Problem Less than optimal enteral infusion composition/modality   Etiology GI status   Signs/Symptoms Adjustment to high fiber formula to assist with loose stools       6/2 updated 6/5, 6/8, 6/10, 6/13  Problem Swallowing difficulty   Etiology Per SLP eval    Signs/Symptoms Per MBS (6/10) - dysphagia 3/mashed, HTL, no straws   Status: ongoing    Nutrition Intervention     1.  Follow treatment progress, Care plan reviewed   2.  RD recommends adjustment to nocturnal EN to assist with loose stool. Adjust EN to run from 6p-4a, Replete w/ Fiber @ 65 mL/hr, TGV = 650 mL, FW @ 50 mL/hr. x1 Nutrisource Fiber packet     At Target Goal Volume     % Est needs   Volume  650ml      Energy/kcals 665 kcals 42%   Protein 42 g pro 42%   Fiber 13 g         Fluid   W/Free water 543 ml  1043 ml       *TGV calculated based on 10hrs delivery overnight    2. Nutrition panel, Pre-alb and CRP weekly while on EN  3. Monitor electrolytes and replace as indicated   4. RD to monitor status with slow addition of fiber to nocturnal regimen. Strong emphasis on nutritional supplements to meet est needs. Cont to follow SLP evals, GI status, and oral intake - adjust EN as appropriate     Goal:   General: Nutrition support treatment   PO: Increase Intake  EN/PN: EN to PO      Monitoring/Evaluation:   Per protocol, I&O, PO intake, Supplement intake, Pertinent labs, EN delivery/tolerance, GI status, Symptoms, Swallow function    Will Continue to follow per protocol      Alejandrina Sandoval RDN, LD  Time Spent: 60 minutes

## 2020-06-14 NOTE — PROGRESS NOTES
"    [facility] Medicine Services  PROGRESS NOTE    Patient Name: Sidra Lane  : 1955  MRN: [id number]    Date of Admission: 2020  Primary Care Physician: Jl Mcknight MD    Subjective   Subjective     CC:  Follow-up acute CVA    HPI:  Patient accidentally pulled out her Keofeed last night. She also asked to not receive tube feeds since her bowels were improving and she did not have any BM's overnight, for which she is very happy. Dtr at bedside, states she is trying to eat a little bit more, but overall really doesn't like hospital food, she like more fast food. Throat is very raw, tender, dry \"feels like it is closing up\". Having some mild left ear pain.     Review of Systems  Gen- No fevers, chills  CV- No chest pain, palpitations  Resp- No cough, dyspnea  GI- no nausea, + loose stool, denies abd pain.  HENT- + throat pain, left ear pain        Objective   Objective     Vital Signs:   Temp:  [97.2 °F (36.2 °C)-98.3 °F (36.8 °C)] 97.5 °F (36.4 °C)  Heart Rate:  [] 83  Resp:  [18-20] 20  BP: ()/() 102/47  Total (NIH Stroke Scale): 9     Physical Exam:  Constitutional: No acute distress, awake, alert, non toxic   HENT: NCAT, mucous membranes moist, keofeed bridled in right nare, moderately erythematous tongue and posterior oropharyngeal with white plaques. Left TM with mild erythema/ opaque/ dull/ non bulging TM, external canal nontender  Respiratory: Clear to auscultation bilaterally, respiratory effort normal satting 93% on RA  Cardiovascular: RRR, no murmurs, rubs, or gallops, palpable pedal pulses bilaterally  Gastrointestinal: Positive bowel sounds, soft, nontender, nondistended, no guarding or rebound tenderness, obese  Musculoskeletal: No bilateral ankle edema  Psychiatric: Appropriate affect, cooperative  Neurologic: Oriented x 3, left facial droop/ LUE weakness, speech clear  Skin: No rashes        Results Reviewed:  Results from last 7 days "   Lab Units 06/14/20  0903 06/12/20  0819 06/11/20  0507   WBC 10*3/mm3 10.74 10.78 12.12*   HEMOGLOBIN g/dL 11.2* 11.4* 12.1   HEMATOCRIT % 41.0 40.7 43.4   PLATELETS 10*3/mm3 405 399 414     Results from last 7 days   Lab Units 06/12/20  0819 06/11/20  0507 06/10/20  0445 06/09/20  0521   SODIUM mmol/L 139 137 139 141  136   POTASSIUM mmol/L 4.7 4.4 4.5 4.7  4.5   CHLORIDE mmol/L 104 101 103 107  102   CO2 mmol/L 23.0 22.0 26.0 15.0*  21.0*   BUN mg/dL 21 19 18 20  19   CREATININE mg/dL 0.76 0.69 0.72 0.68  0.63   GLUCOSE mg/dL 101* 139* 113* 105*  101*   CALCIUM mg/dL 9.3 9.5 9.4 9.0  9.0   ALT (SGPT) U/L  --   --   --  15   AST (SGOT) U/L  --   --   --  22     Estimated Creatinine Clearance: 75.7 mL/min (by C-G formula based on SCr of 0.76 mg/dL).    Microbiology Results Abnormal     Procedure Component Value - Date/Time    Blood Culture - Blood, Hand, Left [[id number]] Collected:  06/04/20 1406    Lab Status:  Final result Specimen:  Blood from Hand, Left Updated:  06/09/20 1501     Blood Culture No growth at 5 days    Blood Culture - Blood, Hand, Right [[id number]] Collected:  06/04/20 1400    Lab Status:  Final result Specimen:  Blood from Hand, Right Updated:  06/09/20 1501     Blood Culture No growth at 5 days    Clostridium Difficile Toxin - Stool, Per Rectum [[id number]] Collected:  06/08/20 1750    Lab Status:  Final result Specimen:  Stool from Per Rectum Updated:  06/08/20 2011    Narrative:       The following orders were created for panel order Clostridium Difficile Toxin - Stool, Per Rectum.  Procedure                               Abnormality         Status                     ---------                               -----------         ------                     Clostridium Difficile To...[[id number]]  Normal              Final result                 Please view results for these tests on the individual orders.    Clostridium Difficile Toxin, PCR - Stool, Per Rectum [[id number]]  (Normal)  Collected:  06/08/20 1750    Lab Status:  Final result Specimen:  Stool from Per Rectum Updated:  06/08/20 2011     C. Difficile Toxins by PCR Not Detected    Narrative:       Performance characteristics of test not established for patients <2 years of age.  Negative for Toxigenic C. Difficile    Urine Culture - Urine, Urine, Clean Catch [[id number]] Collected:  06/05/20 1358    Lab Status:  Final result Specimen:  Urine, Clean Catch Updated:  06/06/20 0937     Urine Culture 50,000 CFU/mL Mixed Herberth Isolated    Narrative:       Specimen contains mixed organisms of questionable pathogenicity which indicates contamination with commensal herberth.  Further identification is unlikely to provide clinically useful information.  Suggest recollection.    MRSA Screen, PCR (Inpatient) - Swab, Nares [[id number]]  (Abnormal) Collected:  06/05/20 1403    Lab Status:  Final result Specimen:  Swab from Nares Updated:  06/05/20 1528     MRSA PCR Positive    COVID-19,CEPHEID,AMBERLY IN-HOUSE(OR EMERGENT/ADD-ON),NP SWAB IN TRANSPORT MEDIA 3-4 HR TAT - Swab, Nasopharynx [[id number]]  (Normal) Collected:  05/31/20 1533    Lab Status:  Final result Specimen:  Swab from Nasopharynx Updated:  05/31/20 1638     COVID19 Not Detected          Imaging Results (Last 24 Hours)     Procedure Component Value Units Date/Time    XR Abdomen KUB [[id number]] Collected:  06/13/20 2047     Updated:  06/13/20 2049    Narrative:       CR Abdomen 1 Vw    INDICATION:   65-year-old female for enteric tube placement.    COMPARISON:   6/7/2020    FINDINGS:  AP radiographs of the abdomen. Enteric tube tip is at the level of the gastric antrum/pylorus. Status post cholecystectomy. Sequela of vascular surgical stents are present in the low abdomen and pelvis are unchanged. Additional postoperative changes in  the right lower quadrant. No new suspicious calcifications or mass effect. No dilated air-filled loops of bowel are seen.      Impression:         1. Enteric tube  tip is at the level of the gastric antrum/pylorus.    Signer Name: Ernesto Vargas MD   Signed: 6/13/2020 8:47 PM   Workstation Name: [id number]    [facility]          Results for orders placed during the hospital encounter of 05/31/20   Adult Transthoracic Echo Complete W/ Cont if Necessary Per Protocol    Narrative · Left ventricular systolic function is normal. Estimated EF appears to be   in the range of 56 - 60%.  · Normal right ventricular cavity size and systolic function noted.  · Left atrial cavity size is moderately dilated. Left atrial volume is   moderately increased.  · No evidence of a patent foramen ovale. Saline test results are negative.  · The aortic valve is abnormal in structure. The valve exhibits sclerosis.   There is mild calcification of the aortic valve  · Mild aortic valve stenosis is present          I have reviewed the medications:  Scheduled Meds:    amantadine 100 mg Oral BID   amLODIPine 5 mg Oral Daily   aspirin 81 mg Oral Daily   atorvastatin 80 mg Oral Nightly   cholestyramine light 1 packet Oral Daily   diclofenac 2 g Topical 4x Daily   dilTIAZem 60 mg Oral Q8H   diphenoxylate-atropine 1 tablet Oral BID   famotidine 20 mg Oral BID AC   lactobacillus acidophilus 1 capsule Oral Daily   lisinopril 10 mg Oral Q PM   metoprolol tartrate 100 mg Oral Q12H   miconazole  Topical Q12H   Nutrisource fiber 1 packet Nasogastric Daily   nystatin 5 mL Swish & Swallow 4x Daily   QUEtiapine 12.5 mg Oral Nightly   rivaroxaban 20 mg Oral Daily With Dinner     Continuous Infusions:     PRN Meds:.•  acetaminophen  •  acetaminophen  •  butalbital-acetaminophen-caffeine  •  diphenoxylate-atropine  •  enalaprilat  •  magic mouthwash  •  metoprolol tartrate  •  nitroglycerin  •  ondansetron  •  phenol  •  potassium chloride **OR** potassium chloride **OR** potassium chloride  •  simethicone    Assessment/Plan   Assessment & Plan     Active Hospital Problems    Diagnosis  POA   •  **Cerebrovascular accident (CVA) due to embolism of right middle cerebral artery (CMS/HCC) [I63.411]  Yes   • Acute ischemic stroke (CMS/HCC) [I63.9]  Yes   • Atrial fibrillation (CMS/HCC) [I48.91]  Yes      Resolved Hospital Problems   No resolved problems to display.        Brief Hospital Course to date:  Sidra Lane is a 65 y.o. 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  She was found to have a right MCA territory reversible ischemia on CT perfusion.  She underwent a CT head neck angiogram with an intra-arterial TPA after failed thrombectomy and admitted to the ICU afterward.  She was transferred out of ICU on 6/3 with hospitalist assuming care on 6/4.    Right MCA stroke  - likely cardioembolic.  Patient has a history of persistent atrial fibrillation and had been anticoagulated with Eliquis.  She had been followed by her electrophysiologist and had plans for an ablation prior to readmission.  -Again she is status post intra-arterial TPA after failed thrombectomy  -She had some delirium while in the ICU and Seroquel 50 mg nightly was given to her  -Stat CT brain was performed on 6/4 due to worsening left sided weakness and acute hemorrhagic transformation was ruled out, but does still reporting evolving MCA CVA that was stable from the previous exam.  -The reasons for her worsening symptoms may be related to Seroquel.  Now tolerating low dose Seroquel QHS  -Repeat CT head 6/6 with no evidence of hemorrhage, stopped heparin gtt and started on Eliquis, however, Cards feels that she failed Eliquis as she was on this prior to admission. Now transitioned to Xarelto.   -EP also following, resumed Metoprolol PO    Persistent atrial fibrillation with RVR  -Patient had been on beta-blocker per her cardiologist and had plans for ablation prior to admission  -Currently she is not  "a candidate for an ablation  -We will defer management to her EP  --getting PO Metoprolol, increased per Cards. Diltiazem added for rate control  --Now on Xarelto  --currently good rate control, though she is refusing Diltiazem a lot, states she isn't comfortable taking at home due to how it makes her feel weak      Hypertension  -Blood pressure much better controlled.  Beta blocker resumed by Cards.   --patient has been refusing several doses of Cardizem due to how it makes her feel. She did take it last night and had a hypotensive episode with lowest reading of 62/39. I have held her Lisinopril for tonight to monitor.     SIRS  -Possible aspiration when patient pulled out her Keofeed tube earlier this stay.    --CXR unremarkable on 6/4 and 6/5  --now afebrile, added Vanc/Zosyn on 6/5 due to worsening leukocytosis with left shift. MRSA PCR +, so had been on Vanc.  Will do Augmentin BID to complete a seven day course (to cover for possible aspiration). Defer MRSA coverage for now as leukocytosis had resolved without any MRSA coverage.  --completed course of augmentin    Hyperlipidemia  -High intensity statin therapy    Left-sided weakness  -PT OT, will benefit from short-term rehab once she is medically stable    Dysphagia  -Speech pathologist following, passed MBS 6/10, started on diet though not eating very well. Dtr is concerned that the patient is \"counting her calories\"/ implying she is intentionally not consuming more than a certain amount, patient denies.       Diarrhea  --apparently this is a chronic issue at home, but has worsened since admission. Pt's weight is down 12 lbs, having multiple loose stools daily.  Per daughter, pt has had diarrhea since partial colectomy in 2016 ([facility]) and has been told she may have Crohn's- has never been on immunosuppression and can't remember with whom she follows with GI. May consider GI consult.   --Started probiotic  --currently taking Cholestyramine " and fiber packet daily/ Lomotil BID  --Cdiff negative on 6/8  --improved overnight, will continue to monitor     Vaginal Candidiasis  --PO Diflucan x 1 ; 6/11    Oral candidiasis  --started on Nystatin swish and swallow today  --added magic mouthwash too    Left otitis media   --recently just completed 7 days of Zosyn/Augmentin for PNA   --will continue to monitor      DVT Prophylaxis: Fully anticoagulated with Xarelto       Disposition: pending bed availability, she has improved significantly and is now on PO diet    CODE STATUS:   Code Status and Medical Interventions:   Ordered at: 05/31/20 1743     Code Status:    CPR     Medical Interventions (Level of Support Prior to Arrest):    Full         Electronically signed by ISSA Sarabia, 06/14/20, 15:51.

## 2020-06-14 NOTE — THERAPY PROGRESS REPORT/RE-CERT
Acute Care - Occupational Therapy Progress Note  [facility]     Patient Name: Sidra Lane  : 1955  MRN: [id number]  Today's Date: 2020             Admit Date: 2020       ICD-10-CM ICD-9-CM   1. Oropharyngeal dysphagia R13.12 787.22   2. Acute ischemic stroke (CMS/HCC) I63.9 434.91   3. Dysarthria R47.1 784.51   4. Cognitive communication deficit R41.841 799.52     Patient Active Problem List   Diagnosis   • Shortness of breath   • Atrial fibrillation (CMS/HCC)   • Essential hypertension   • Dizziness   • Deep vein thrombosis (DVT) of proximal lower extremity (CMS/HCC)   • Coronary artery disease involving native coronary artery of native heart without angina pectoris   • Fatigue   • Chest pain   • Longstanding persistent atrial fibrillation (CMS/HCC)   • Cerebrovascular accident (CVA) due to embolism of right middle cerebral artery (CMS/HCC)   • Acute ischemic stroke (CMS/HCC)     Past Medical History:   Diagnosis Date   • Atrial fibrillation (CMS/HCC)    • CHF (congestive heart failure) (CMS/HCC)    • Deep vein thrombosis (CMS/HCC)    • GERD (gastroesophageal reflux disease)    • Hypertension    • May-Thurner syndrome    • Pulmonary embolism (CMS/HCC)      Past Surgical History:   Procedure Laterality Date   • CHOLECYSTECTOMY     • COLON SURGERY      bowel leakage, some of colon removed   • INTERVENTIONAL RADIOLOGY PROCEDURE Bilateral 2020    Procedure: CAROTID CEREBRAL ANGIOGRAM BILATERAL;  Surgeon: Brant Duarte MD;  Location: [facility] INVASIVE LOCATION;  Service: Interventional Radiology;  Laterality: Bilateral;   • LAPAROSCOPIC TUBAL LIGATION     • LEG SURGERY      multiple stents to BLE   • TONSILLECTOMY         Therapy Treatment    Rehabilitation Treatment Summary     Row Name 20 1119             Treatment Time/Intention    Discipline  occupational therapist  -AN      Document Type  progress note/recertification  -AN2      Subjective Information  no complaints  -AN       Mode of Treatment  occupational therapy  -AN      Patient/Family Observations  daughter present, pt supine in bed dozing  -AN      Care Plan Review  risks/benefits reviewed;care plan/treatment goals reviewed  -AN      Care Plan Review, Other Participant(s)  daughter  -AN      Patient Effort  good  -AN      Existing Precautions/Restrictions  fall  -AN      Treatment Considerations/Comments  L side weakness, inattention  -AN      Recorded by [AN] Sherie Gandara, OT 06/14/20 1148  [AN2] Sherie Gandara, OT 06/14/20 1151      Row Name 06/14/20 1119             Cognitive Assessment/Intervention- PT/OT    Orientation Status (Cognition)  oriented x 3  -AN      Follows Commands (Cognition)  WFL  -AN      Personal Safety Interventions  fall prevention program maintained  -AN      Recorded by [AN] Sherie Gandara OT 06/14/20 1148      Row Name 06/14/20 1119             Bed Mobility Assessment/Treatment    Supine-Sit [place] (Bed Mobility)  minimum assist (75% patient effort)  -AN      Assistive Device (Bed Mobility)  head of bed elevated;bed rails  -AN      Recorded by [AN] Sherie Gandara OT 06/14/20 1148      Row Name 06/14/20 1119             Bed-Chair Transfer    Bed-Chair [place] (Transfers)  verbal cues;contact guard  -AN      Assistive Device (Bed-Chair Transfers)  walker, front-wheeled  -AN      Recorded by [AN] Sherie Gandara, OT 06/14/20 1148      Row Name 06/14/20 1119             Chair-Bed Transfer    Chair-Bed [place] (Transfers)  verbal cues;contact guard  -AN      Assistive Device (Chair-Bed Transfers)  walker, front-wheeled  -AN      Recorded by [AN] Sherie Gandara OT 06/14/20 1148      Row Name 06/14/20 1119             Sit-Stand Transfer    Sit-Stand [place] (Transfers)  verbal cues;contact guard  -AN      Assistive Device (Sit-Stand Transfers)  walker, front-wheeled  -AN      Recorded by [AN] Sherie Gandara OT 06/14/20 1148      Row Name 06/14/20 1119             ADL  Assessment/Intervention    BADL Assessment/Intervention  feeding  -AN      Recorded by [AN] Sherie Gandara, OT 06/14/20 1148      Row Name 06/14/20 1119             Self-Feeding Assessment/Training    [place] Level (Feeding)  prepare tray/open items;maximum assist (25% patient effort);supervision;set up;feeding skills  -AN      Position (Self-Feeding)  supported sitting  -AN      Recorded by [AN] Sherie Gandara, OT 06/14/20 1148      Row Name 06/14/20 1119             Motor Skills Assessment/Interventions    Additional Documentation  Motor Coordination Assessment/Training (Group);Fine Motor Testing & Training (Group)  -AN      Recorded by [AN] Sherie Gandara, OT 06/14/20 1148      Row Name 06/14/20 1119             Therapeutic Exercise    08284 - OT Therapeutic Exercise Minutes  10  -AN      24751 - OT Therapeutic Activity Minutes  13  -AN      Recorded by [AN] Sherie Gandara, OT 06/14/20 1148      Row Name 06/14/20 1119             Therapeutic Exercise    Upper Extremity Range of Motion (Therapeutic Exercise)  shoulder flexion/extension, bilateral;shoulder horizontal abduction/adduction, bilateral;elbow flexion/extension, bilateral;shoulder internal/external rotation, bilateral;forearm supination/pronation, bilateral;wrist flexion/extension, bilateral  -AN      Comment (Therapeutic Exercise)  needs more assist distally, no active thumb ROM,AAROM all other L joints; foam block assist L hand  -AN      Recorded by [AN] Sherie Gandara, OT 06/14/20 1148      Row Name 06/14/20 1119             Static Sitting Balance    Level of [place] (Unsupported Sitting, Static Balance)  supervision  -AN      Sitting Position (Unsupported Sitting, Static Balance)  sitting on edge of bed  -AN      Recorded by [AN] Sherie Gandara, OT 06/14/20 1148      Row Name 06/14/20 1119             Dynamic Sitting Balance    Level of [place], Reaches Outside Midline (Sitting, Dynamic Balance)  contact guard assist  -AN      Sitting  Position, Reaches Outside Midline (Sitting, Dynamic Balance)  sitting on edge of bed  -AN      Recorded by [AN] Sherie Gandara, OT 06/14/20 1148      Row Name 06/14/20 1119             Static Standing Balance    Level of [place] (Supported Standing, Static Balance)  contact guard assist  -AN      Recorded by [AN] Sherie Gandara, OT 06/14/20 1148      Row Name 06/14/20 1119             Dynamic Standing Balance    Level of [place], Reaches Outside Midline (Standing, Dynamic Balance)  contact guard assist  -AN      Assistive Device Utilized (Supported Standing, Dynamic Balance)  walker, rolling  -AN      Comment, Reaches Outside Midline (Standing, Dynamic Balance)  txfr  -AN      Recorded by [AN] Sherie Gandara, OT 06/14/20 1148      Row Name 06/14/20 1119             Motor Coordination Assessment/Training    Comment, Fine Motor Coordination Training  Use of foam blocks for increased L thumb; use of playing cards (pt identified as IADL) and assist with FM holding cards and sup/pronation for playing   -AN      Recorded by [AN] Sherie Gandara, OT 06/14/20 1148      Row Name 06/14/20 1119             Positioning and Restraints    Pre-Treatment Position  in bed  -AN      Post Treatment Position  chair  -AN      In Chair  reclined;encouraged to call for assist;call light within reach;notified nsg;with family/caregiver  -AN      Recorded by [AN] Sherie Gandara, OT 06/14/20 1148      Row Name 06/14/20 1119             Sensory Assessment/Intervention    Additional Documentation  Vision Assessment/Intervention (Group)  -AN      Recorded by [AN] Sherie Gandara, OT 06/14/20 1148      Row Name 06/14/20 1119             Vision Assessment/Intervention    Impact of Vision Impairment on Function (Vision)  educated on visual compensation techniqus  -AN      Recorded by [AN] Sherie Gandara, OT 06/14/20 1148      Row Name 06/14/20 1119             Outcome Summary/Treatment Plan (OT)    Daily Summary of Progress (OT)  progress  toward functional goals is good  -AN      Anticipated Discharge Disposition (OT)  inpatient rehabilitation facility  -AN      Recorded by [AN] JuliocesarLorettaSherie TRACEY OT 06/14/20 1148        User Key  (r) = Recorded By, (t) = Taken By, (c) = Cosigned By    Initials Name Effective Dates Discipline    AN Sherie Gandara, OT 06/22/15 -  OT             Occupational Therapy Education                 Title: PT OT SLP Therapies (In Progress)     Topic: Occupational Therapy (In Progress)     Point: ADL training (Done)     Description:   Instruct learner(s) on proper safety adaptation and remediation techniques during self care or transfers.   Instruct in proper use of assistive devices.              Learning Progress Summary           Patient Acceptance, E,D, VU,NR by LORETTA at 6/14/2020 1148    Comment:  HEP, assisted ROM for L hand for ADL activities.    Acceptance, E, VU,DU by MADINA at 6/5/2020 1505    Acceptance, E,D, VU,DU by MAXIMO at 6/2/2020 1410    Comment:  Pt educated on role of OT, safety, fall prevention, ADLs, transfers, and POC.   Family Acceptance, E,D, VU,NR by LORETTA at 6/14/2020 1148    Comment:  HEP, assisted ROM for L hand for ADL activities.    Acceptance, E, VU,DU by MADINA at 6/5/2020 1505    Acceptance, E,D, VU,DU by MAXIMO at 6/2/2020 1410    Comment:  Pt educated on role of OT, safety, fall prevention, ADLs, transfers, and POC.                   Point: Home exercise program (Done)     Description:   Instruct learner(s) on appropriate technique for monitoring, assisting and/or progressing therapeutic exercises/activities.              Learning Progress Summary           Patient Acceptance, E,D, VU,NR by LORETTA at 6/14/2020 1148    Comment:  HEP, assisted ROM for L hand for ADL activities.    Acceptance, E,D, NR by CHERELLE at 6/9/2020 1514    Acceptance, E, VU,DU by MADINA at 6/5/2020 1505    Acceptance, E,D, VU,DU by MAXIMO at 6/2/2020 1410    Comment:  Pt educated on role of OT, safety, fall prevention, ADLs, transfers, and POC.   Family  Acceptance, E,D, VU,NR by LORETTA at 6/14/2020 1148    Comment:  HEP, assisted ROM for L hand for ADL activities.    Acceptance, E, VU,DU by MADINA at 6/5/2020 1505    Acceptance, E,D, VU,DU by MAXIMO at 6/2/2020 1410    Comment:  Pt educated on role of OT, safety, fall prevention, ADLs, transfers, and POC.                   Point: Precautions (In Progress)     Description:   Instruct learner(s) on prescribed precautions during self-care and functional transfers.              Learning Progress Summary           Patient Acceptance, E,D, NR by CHERELLE at 6/9/2020 1514    Acceptance, E, VU,DU by MADINA at 6/5/2020 1505    Acceptance, E,D, VU,DU by MAXIMO at 6/2/2020 1410    Comment:  Pt educated on role of OT, safety, fall prevention, ADLs, transfers, and POC.   Family Acceptance, E, VU,DU by MADINA at 6/5/2020 1505    Acceptance, E,D, VU,DU by MAXIMO at 6/2/2020 1410    Comment:  Pt educated on role of OT, safety, fall prevention, ADLs, transfers, and POC.                   Point: Body mechanics (In Progress)     Description:   Instruct learner(s) on proper positioning and spine alignment during self-care, functional mobility activities and/or exercises.              Learning Progress Summary           Patient Acceptance, E,D, NR by CHERELLE at 6/9/2020 1514    Acceptance, E, VU,DU by MADINA at 6/5/2020 1505    Acceptance, E,D, VU,DU by MAXIMO at 6/2/2020 1410    Comment:  Pt educated on role of OT, safety, fall prevention, ADLs, transfers, and POC.   Family Acceptance, E, VU,DU by MADINA at 6/5/2020 1505    Acceptance, E,D, VU,DU by MAXIMO at 6/2/2020 1410    Comment:  Pt educated on role of OT, safety, fall prevention, ADLs, transfers, and POC.                               User Key     Initials Effective Dates Name Provider Type Discipline    LORETTA 06/22/15 -  Sherie Gandara, OT Occupational Therapist OT    MAXIMO 07/17/19 -  Cassia Zee, OT Occupational Therapist OT    CHERELLE 01/29/20 -  Annika Aviles OT Occupational Therapist OT    MADINA 01/29/20 -  Casandra Mueller, OT  Occupational Therapist OT                OT Recommendation and Plan  Outcome Summary/Treatment Plan (OT)  Daily Summary of Progress (OT): progress toward functional goals is good  Anticipated Discharge Disposition (OT): inpatient rehabilitation facility  Daily Summary of Progress (OT): progress toward functional goals is good  Outcome Summary: Pt continues to progress with L UE use, mobility I and ADLs. Pt CGA for sit to stand and txfr to chair, min assist bed mobility and set up feeding. Will need IRF at discharge.  Outcome Measures     Row Name 06/14/20 1119             How much help from another is currently needed...    Putting on and taking off regular lower body clothing?  1  -AN      Bathing (including washing, rinsing, and drying)  2  -AN      Toileting (which includes using toilet bed pan or urinal)  2  -AN      Putting on and taking off regular upper body clothing  2  -AN      Taking care of personal grooming (such as brushing teeth)  3  -AN      Eating meals  3  -AN      AM-PAC 6 Clicks Score (OT)  13  -AN         Modified [place] Scale    Modified [place] Scale  4 - Moderately severe disability.  Unable to walk without assistance, and unable to attend to own bodily needs without assistance.  -AN        User Key  (r) = Recorded By, (t) = Taken By, (c) = Cosigned By    Initials Name Provider Type    Sherie Palafox OT Occupational Therapist           Time Calculation:   Time Calculation- OT     Row Name 06/14/20 1150 06/14/20 1119          Time Calculation- OT    OT Start Time  1119  -AN  --     Total Timed Code Minutes- OT  23 minute(s)  -AN  --     OT Received On  06/14/20  -AN  --     OT Goal Re-Cert Due Date  06/24/20  -AN  --        Timed Charges    06215 - OT Therapeutic Exercise Minutes  --  10  -AN     17457 - OT Therapeutic Activity Minutes  --  13  -AN       User Key  (r) = Recorded By, (t) = Taken By, (c) = Cosigned By    Initials Name Provider Type    Sherie Palafox OT Occupational  Therapist        Therapy Charges for Today     Code Description Service Date Service Provider Modifiers Qty    54293031203  OT THER PROC EA 15 MIN 6/14/2020 Sherie Gandara OT GO 1    [id number]  OT THERAPEUTIC ACT EA 15 MIN 6/14/2020 Sherie Gandara OT GO 1               Sherie Gandara OT  6/14/2020

## 2020-06-15 LAB
CRP SERPL-MCNC: 0.29 MG/DL (ref 0–0.5)
GLUCOSE BLDC GLUCOMTR-MCNC: 108 MG/DL (ref 70–130)
GLUCOSE BLDC GLUCOMTR-MCNC: 112 MG/DL (ref 70–130)
GLUCOSE BLDC GLUCOMTR-MCNC: 136 MG/DL (ref 70–130)
GLUCOSE BLDC GLUCOMTR-MCNC: 93 MG/DL (ref 70–130)
PREALB SERPL-MCNC: 23.2 MG/DL (ref 20–40)

## 2020-06-15 PROCEDURE — 84100 ASSAY OF PHOSPHORUS: CPT | Performed by: INTERNAL MEDICINE

## 2020-06-15 PROCEDURE — 83735 ASSAY OF MAGNESIUM: CPT | Performed by: INTERNAL MEDICINE

## 2020-06-15 PROCEDURE — 84478 ASSAY OF TRIGLYCERIDES: CPT | Performed by: INTERNAL MEDICINE

## 2020-06-15 PROCEDURE — 80053 COMPREHEN METABOLIC PANEL: CPT | Performed by: INTERNAL MEDICINE

## 2020-06-15 PROCEDURE — 86140 C-REACTIVE PROTEIN: CPT | Performed by: INTERNAL MEDICINE

## 2020-06-15 PROCEDURE — 99232 SBSQ HOSP IP/OBS MODERATE 35: CPT | Performed by: INTERNAL MEDICINE

## 2020-06-15 PROCEDURE — 84134 ASSAY OF PREALBUMIN: CPT | Performed by: INTERNAL MEDICINE

## 2020-06-15 PROCEDURE — 82962 GLUCOSE BLOOD TEST: CPT

## 2020-06-15 PROCEDURE — 92526 ORAL FUNCTION THERAPY: CPT

## 2020-06-15 PROCEDURE — 82465 ASSAY BLD/SERUM CHOLESTEROL: CPT | Performed by: INTERNAL MEDICINE

## 2020-06-15 PROCEDURE — 25010000002 ONDANSETRON PER 1 MG: Performed by: INTERNAL MEDICINE

## 2020-06-15 RX ADMIN — CHOLESTYRAMINE 4 G: 4 POWDER, FOR SUSPENSION ORAL at 08:49

## 2020-06-15 RX ADMIN — ONDANSETRON 4 MG: 2 INJECTION INTRAMUSCULAR; INTRAVENOUS at 20:01

## 2020-06-15 RX ADMIN — METOPROLOL TARTRATE 100 MG: 100 TABLET ORAL at 08:42

## 2020-06-15 RX ADMIN — LISINOPRIL 10 MG: 10 TABLET ORAL at 17:41

## 2020-06-15 RX ADMIN — DICLOFENAC SODIUM 2 G: 10 GEL TOPICAL at 20:01

## 2020-06-15 RX ADMIN — DILTIAZEM HYDROCHLORIDE 60 MG: 60 TABLET, FILM COATED ORAL at 20:00

## 2020-06-15 RX ADMIN — DIPHENOXYLATE HYDROCHLORIDE AND ATROPINE SULFATE 1 TABLET: 2.5; .025 TABLET ORAL at 08:42

## 2020-06-15 RX ADMIN — QUETIAPINE FUMARATE 12.5 MG: 25 TABLET ORAL at 20:00

## 2020-06-15 RX ADMIN — ATORVASTATIN CALCIUM 80 MG: 40 TABLET, FILM COATED ORAL at 20:01

## 2020-06-15 RX ADMIN — DICLOFENAC SODIUM 2 G: 10 GEL TOPICAL at 17:41

## 2020-06-15 RX ADMIN — MICONAZOLE NITRATE: 2 CREAM TOPICAL at 20:02

## 2020-06-15 RX ADMIN — DIPHENOXYLATE HYDROCHLORIDE AND ATROPINE SULFATE 1 TABLET: 2.5; .025 TABLET ORAL at 19:59

## 2020-06-15 RX ADMIN — Medication 1 CAPSULE: at 08:42

## 2020-06-15 RX ADMIN — DILTIAZEM HYDROCHLORIDE 60 MG: 60 TABLET, FILM COATED ORAL at 14:06

## 2020-06-15 RX ADMIN — DICLOFENAC SODIUM 2 G: 10 GEL TOPICAL at 08:55

## 2020-06-15 RX ADMIN — ASPIRIN 81 MG 81 MG: 81 TABLET ORAL at 08:41

## 2020-06-15 RX ADMIN — Medication 1 PACKET: at 08:55

## 2020-06-15 RX ADMIN — MICONAZOLE NITRATE: 2 CREAM TOPICAL at 08:55

## 2020-06-15 RX ADMIN — NYSTATIN 500000 UNITS: 100000 SUSPENSION ORAL at 17:40

## 2020-06-15 RX ADMIN — FAMOTIDINE 20 MG: 20 TABLET, FILM COATED ORAL at 08:42

## 2020-06-15 RX ADMIN — NYSTATIN 500000 UNITS: 100000 SUSPENSION ORAL at 08:42

## 2020-06-15 RX ADMIN — FAMOTIDINE 20 MG: 20 TABLET, FILM COATED ORAL at 17:41

## 2020-06-15 RX ADMIN — RIVAROXABAN 20 MG: 20 TABLET, FILM COATED ORAL at 17:41

## 2020-06-15 RX ADMIN — METOPROLOL TARTRATE 100 MG: 100 TABLET ORAL at 20:01

## 2020-06-15 RX ADMIN — AMANTADINE HYDROCHLORIDE 100 MG: 100 CAPSULE ORAL at 08:49

## 2020-06-15 RX ADMIN — NYSTATIN 500000 UNITS: 100000 SUSPENSION ORAL at 20:01

## 2020-06-15 RX ADMIN — AMANTADINE HYDROCHLORIDE 100 MG: 100 CAPSULE ORAL at 15:49

## 2020-06-15 RX ADMIN — NYSTATIN 500000 UNITS: 100000 SUSPENSION ORAL at 12:08

## 2020-06-15 NOTE — PLAN OF CARE
Alert & oriented x4. VSS. 3L NC. Afib on the monitor. Up with 1 to BSC. Nocturnal TF infusing. No complaints at this time. Will continue to monitor.

## 2020-06-15 NOTE — PLAN OF CARE
Problem: Patient Care Overview  Goal: Plan of Care Review  Outcome: Ongoing (interventions implemented as appropriate)       SLP evaluation completed. Will continue to address dysphagia with MBS tomorrow. Please see note for further details and recommendations.

## 2020-06-15 NOTE — THERAPY TREATMENT NOTE
Acute Care - Speech Language Pathology   Swallow Treatment Note [facility]     Patient Name: Sidra Lane  : 1955  MRN: [id number]  Today's Date: 6/15/2020               Admit Date: 2020    Visit Dx:      ICD-10-CM ICD-9-CM   1. Oropharyngeal dysphagia R13.12 787.22   2. Acute ischemic stroke (CMS/HCC) I63.9 434.91   3. Dysarthria R47.1 784.51   4. Cognitive communication deficit R41.841 799.52     Patient Active Problem List   Diagnosis   • Shortness of breath   • Atrial fibrillation (CMS/HCC)   • Essential hypertension   • Dizziness   • Deep vein thrombosis (DVT) of proximal lower extremity (CMS/HCC)   • Coronary artery disease involving native coronary artery of native heart without angina pectoris   • Fatigue   • Chest pain   • Longstanding persistent atrial fibrillation (CMS/HCC)   • Cerebrovascular accident (CVA) due to embolism of right middle cerebral artery (CMS/HCC)   • Acute ischemic stroke (CMS/HCC)       Therapy Treatment  Rehabilitation Treatment Summary     Row Name 06/15/20 1300             Treatment Time/Intention    Discipline  speech language pathologist  -SG      Document Type  therapy note (daily note)  -SG      Subjective Information  complains of food, eating lunch tray during tx  -SG      Mode of Treatment  speech-language pathology  -SG      Patient/Family Observations  no family present  -SG      Therapy Frequency (Swallow)  5 days per week  -SG      Therapy Frequency (SLP SLC)  5 days per week  -SG      Patient Effort  good  -SG      Comment  Dec'd PO intake, would benefit from MBS re-eval to further assess for any improvements  -SG      Recorded by [SG] Monie Matias, MS CCC-SLP 06/15/20 1332      Row Name 06/15/20 1300             Outcome Summary/Treatment Plan (SLP)    Daily Summary of Progress (SLP)  progress toward functional goals as expected  -SG      Plan for Continued Treatment (SLP)  Seen for dys tx this date (pt eating lunch tray, has had  significantly dec'd PO recently, did not address dysarthria at today and focused on dysphagia tx instead)  -SG      Anticipated Dischage Disposition (SLP)  inpatient rehabilitation facility;anticipate therapy at next level of care  -SG      Recorded by [SG] Monie Matias, MS MALLORY-SLP 06/15/20 1338        User Key  (r) = Recorded By, (t) = Taken By, (c) = Cosigned By    Initials Name Effective Dates Discipline    SG Monie Matias MS CCC-SLP 06/22/15 -  SLP          Outcome Summary  Outcome Summary/Treatment Plan (SLP)  Daily Summary of Progress (SLP): progress toward functional goals as expected (06/15/20 1300 : Monie Matias, MS CCC-SLP)  Plan for Continued Treatment (SLP): Seen for dys tx this date (pt eating lunch tray, has had significantly dec'd PO recently, did not address dysarthria at today and focused on dysphagia tx instead) (06/15/20 1300 : Monie Matias, MS MALLORY-SLP)  Anticipated Dischage Disposition (SLP): inpatient rehabilitation facility, anticipate therapy at next level of care (06/15/20 1300 : Monie Matias, MS CCC-SLP)      SLP GOALS     Row Name 06/15/20 1300             Oral Nutrition/Hydration Goal 1 (SLP)    Oral Nutrition/Hydration Goal 1, SLP  LTG: Pt will return to regular diet, thin liquids w/ no overt s/sxs aspiration/distress w/ 100% acc and no cues  -SG      Time Frame (Oral Nutrition/Hydration Goal 1, SLP)  by discharge  -SG      Progress/Outcomes (Oral Nutrition/Hydration Goal 1, SLP)  good progress toward goal  -SG         Oral Nutrition/Hydration Goal 2 (SLP)    Oral Nutrition/Hydration Goal 2, SLP  Pt will tolerate puree, some mashed & honey-thick liquids w/ no overt s/sxs aspiration/distress w/ 100% acc and no cues  -SG      Time Frame (Oral Nutrition/Hydration Goal 2, SLP)  short term goal (STG);by discharge  -SG      Barriers (Oral Nutrition/Hydration Goal 2, SLP)  -- no issues w/ mashed potatoes, ground  beef, and mashed veggie  -SG      Progress/Outcomes (Oral Nutrition/Hydration Goal 2, SLP)  good progress toward goal  -SG         Oral Nutrition/Hydration Goal (SLP)    Oral Nutrition/Hydration Goal, SLP  Pt will tolerate therapeutic trials of thin H2O w/ no overt s/sxs aspiration/distress w/ 60% acc and no cues in order to assess readiness for repeat insrumental eval  -SG      Time Frame (Oral Nutrition/Hydration Goal, SLP)  short term goal (STG);by discharge  -SG      Barriers (Oral Nutrition/Hydration Goal, SLP)  No s/s w/ any trials of ice chips or water. H/o silent aspiration on prior MBS  -SG      Progress/Outcomes (Oral Nutrition/Hydration Goal, SLP)  good progress toward goal  -SG         Labial Strengthening Goal 1 (SLP)    Activity (Labial Strengthening Goal 1, SLP)  increase labial tone  -SG      Increase Labial Tone  labial resistance exercises  -SG      [place]/Accuracy (Labial Strengthening Goal 1, SLP)  with minimal cues (75-90% accuracy)  -SG      Time Frame (Labial Strengthening Goal 1, SLP)  short term goal (STG);by discharge  -SG      Progress/Outcomes (Labial Strengthening Goal 1, SLP)  good progress toward goal  -SG         Lingual Strengthening Goal 1 (SLP)    Activity (Lingual Strengthening Goal 1, SLP)  increase tongue back strength  -SG      Increase Lingual Tone/Sensation/Control/Coordination/Movement  lingual movement exercises  -SG      Increase Tongue Back Strength  swallow trials;lingual resistance exercises  -SG      [place]/Accuracy (Lingual Strengthening Goal 1, SLP)  with minimal cues (75-90% accuracy)  -SG      Time Frame (Lingual Strengthening Goal 1, SLP)  short term goal (STG);by discharge  -SG      Progress/Outcomes (Lingual Strengthening Goal 1, SLP)  good progress toward goal  -SG         Pharyngeal Strengthening Exercise Goal 1 (SLP)    Activity (Pharyngeal Strengthening Goal 1, SLP)  increase timing;increase closure at entrance to airway/closure of airway at  glottis  -SG      Increase Timing  prepping - 3 second prep or suck swallow or 3-step swallow  -SG      Increase Superior Movement of the Hyolaryngeal Complex  super-supraglottic swallow  -SG      Increase Anterior Movement of the Hyolaryngeal Complex  chin tuck against resistance (CTAR)  -SG      Increase Closure at Entrance to Airway/Closure of Airway at Glottis  supraglottic swallow  -SG      Increase Squeeze/Positive Pressure Generation  effortful pitch glide (falsetto + pharyngeal squeeze)  -SG      Increase Tongue Base Retraction  hard effortful swallow  -SG      Time Frame (Pharyngeal Strengthening Goal 1, SLP)  short term goal (STG);by discharge  -SG      Barriers (Pharyngeal Strengthening Goal 1, SLP)  -- Pt eating lunch tray, requested to defer pharyngeal exercise  -SG      Progress/Outcomes (Pharyngeal Strengthening Goal 1, SLP)  goal ongoing  -SG         Articulation Goal 1 (SLP)    Improve Articulation Goal 1 (SLP)  by over-articulating at phrase level;80%;with minimal cues (75-90%)  -SG      Time Frame (Articulation Goal 1, SLP)  short term goal (STG)  -SG      Progress/Outcomes (Articulation Goal 1, SLP)  goal ongoing  -SG         Attention Goal 1 (SLP)    Improve Attention by Goal 1 (SLP)  looking at speaker;80%;with minimal cues (75-90%)  -SG      Time Frame (Attention Goal 1, SLP)  short term goal (STG);by discharge  -SG      Progress/Outcomes (Attention Goal 1, SLP)  goal ongoing  -SG         Additional Goal 1 (SLP)    Additional Goal 1, SLP  LTG: Pt will improve cognitive-communication skills in order to participate in care in hospital setting for 100% of opportunities w/o cues.  -SG      Time Frame (Additional Goal 1, SLP)  by discharge  -SG      Progress/Outcomes (Additional Goal 1, SLP)  goal ongoing  -SG        User Key  (r) = Recorded By, (t) = Taken By, (c) = Cosigned By    Initials Name Provider Type    SG Monie Matias MS CCC-SLP Speech and Language Pathologist           EDUCATION  The patient has been educated in the following areas:   Dysphagia (Swallowing Impairment) Oral Care/Hydration Modified Diet Instruction.    SLP Recommendation and Plan  Daily Summary of Progress (SLP): progress toward functional goals as expected     Plan for Continued Treatment (SLP): Seen for dys tx this date (pt eating lunch tray, has had significantly dec'd PO recently, did not address dysarthria at today and focused on dysphagia tx instead)  Anticipated Dischage Disposition (SLP): inpatient rehabilitation facility, anticipate therapy at next level of care                    Time Calculation:   Time Calculation- SLP     Row Name 06/15/20 1339             Time Calculation- SLP    SLP Start Time  1300  -SG      SLP Received On  06/15/20  -SG        User Key  (r) = Recorded By, (t) = Taken By, (c) = Cosigned By    Initials Name Provider Type    Monie Gates MS CCC-SLP Speech and Language Pathologist          Therapy Charges for Today     Code Description Service Date Service Provider Modifiers Qty    [id number] HC ST TREATMENT SWALLOW 2 6/15/2020 Monie Matias MS CCC-SLP GN 1                 Monie Matias MS CCC-JALEN  6/15/2020

## 2020-06-15 NOTE — PROGRESS NOTES
Continued Stay Note  [facility]     Patient Name: Sidra Lane  MRN: [id number]  Today's Date: 6/15/2020    Admit Date: 5/31/2020    Discharge Plan     Row Name 06/15/20 1319       Plan    Plan  [facility].    Patient/Family in Agreement with Plan  yes    Plan Comments  Spoke with Cassia at [facility]. and they have a bed for the patient when medically ready. CM will continue to follow.    Final Discharge Disposition Code  03 - skilled nursing facility (SNF)        Discharge Codes    No documentation.             Naga Muñoz RN

## 2020-06-15 NOTE — PROGRESS NOTES
Clinical Nutrition     Reason for Visit:   Follow-up protocol    Patient Name: Sidra Lane  YOB: 1955  MRN: [id number]  Date of Encounter: 06/15/20 13:55  Admission date: 5/31/2020    Comments: Patient allowed for nocturnal EN regimen to run overnight. Regimen changed last night to Replete w/ Fiber. Per I/O documentation, x2 BM yesterday. Patient with improved PO intake per documentation - 25% of both dinner last night and breakfast this AM. Called and spoke with SLP who reports patient seen today while eating lunch. Had 4 bites of mashed potatoes and x2 bites of ground meat. SLP reported water trials and planning for MBS eval tomorrow to assist with liberalizing of diet as patients swallow allows.    RD awaiting SLP eval, will follow per protocol    Alejandrina Sandoval RDN, LD  Time Spent: 20 minutes

## 2020-06-15 NOTE — PROGRESS NOTES
[facility] Medicine Services  PROGRESS NOTE    Patient Name: Sidra Lane  : 1955  MRN: [id number]    Date of Admission: 2020  Primary Care Physician: Jl Mcknight MD    Subjective   Subjective     CC:  Follow-up acute CVA    HPI:  Doesn't like the taste of our food very much. Would like some salt.    Review of Systems  Gen- No fevers, chills  CV- No chest pain, palpitations  Resp- No cough, dyspnea  GI- + loose stool        Objective   Objective     Vital Signs:   Temp:  [97.5 °F (36.4 °C)-98.2 °F (36.8 °C)] 97.8 °F (36.6 °C)  Heart Rate:  [] 106  Resp:  [16-18] 18  BP: ()/(66-80) 102/75  Total (NIH Stroke Scale): 7     Physical Exam:  Constitutional -no acute distress, non toxic, in bed  HEENT-NCAT, mucous membranes moist, keofeed in place  CV-RRR, S1 S2 normal, no m/r/g  Resp-CTAB, no wheezes, rhonchi or rales  Abd-soft, non-tender, non-distended, normo active bowel sounds  Ext-No lower extremity cyanosis, clubbing or edema bilaterally  Neuro-alert and oriented, left facial droop with some left side weakness, speech clear, moves all extremities   Psych-normal affect   Skin- No rash on exposed UE or LE bilaterally      Results Reviewed:  Results from last 7 days   Lab Units 20  0903 20  0819 20  0507   WBC 10*3/mm3 10.74 10.78 12.12*   HEMOGLOBIN g/dL 11.2* 11.4* 12.1   HEMATOCRIT % 41.0 40.7 43.4   PLATELETS 10*3/mm3 405 399 414     Results from last 7 days   Lab Units 20  0819 20  0507 06/10/20  0445 20  0521   SODIUM mmol/L 139 137 139 141  136   POTASSIUM mmol/L 4.7 4.4 4.5 4.7  4.5   CHLORIDE mmol/L 104 101 103 107  102   CO2 mmol/L 23.0 22.0 26.0 15.0*  21.0*   BUN mg/dL 21 19 18 20  19   CREATININE mg/dL 0.76 0.69 0.72 0.68  0.63   GLUCOSE mg/dL 101* 139* 113* 105*  101*   CALCIUM mg/dL 9.3 9.5 9.4 9.0  9.0   ALT (SGPT) U/L  --   --   --  15   AST (SGOT) U/L  --   --   --  22     Estimated Creatinine  Clearance: 75.7 mL/min (by C-G formula based on SCr of 0.76 mg/dL).    Microbiology Results Abnormal     Procedure Component Value - Date/Time    Blood Culture - Blood, Hand, Left [[id number]] Collected:  06/04/20 1406    Lab Status:  Final result Specimen:  Blood from Hand, Left Updated:  06/09/20 1501     Blood Culture No growth at 5 days    Blood Culture - Blood, Hand, Right [[id number]] Collected:  06/04/20 1400    Lab Status:  Final result Specimen:  Blood from Hand, Right Updated:  06/09/20 1501     Blood Culture No growth at 5 days    Clostridium Difficile Toxin - Stool, Per Rectum [[id number]] Collected:  06/08/20 1750    Lab Status:  Final result Specimen:  Stool from Per Rectum Updated:  06/08/20 2011    Narrative:       The following orders were created for panel order Clostridium Difficile Toxin - Stool, Per Rectum.  Procedure                               Abnormality         Status                     ---------                               -----------         ------                     Clostridium Difficile To...[[id number]]  Normal              Final result                 Please view results for these tests on the individual orders.    Clostridium Difficile Toxin, PCR - Stool, Per Rectum [[id number]]  (Normal) Collected:  06/08/20 1750    Lab Status:  Final result Specimen:  Stool from Per Rectum Updated:  06/08/20 2011     C. Difficile Toxins by PCR Not Detected    Narrative:       Performance characteristics of test not established for patients <2 years of age.  Negative for Toxigenic C. Difficile    Urine Culture - Urine, Urine, Clean Catch [[id number]] Collected:  06/05/20 1358    Lab Status:  Final result Specimen:  Urine, Clean Catch Updated:  06/06/20 0937     Urine Culture 50,000 CFU/mL Mixed Herberth Isolated    Narrative:       Specimen contains mixed organisms of questionable pathogenicity which indicates contamination with commensal herberth.  Further identification is unlikely to provide clinically  useful information.  Suggest recollection.    MRSA Screen, PCR (Inpatient) - Swab, Nares [[id number]]  (Abnormal) Collected:  06/05/20 1403    Lab Status:  Final result Specimen:  Swab from Nares Updated:  06/05/20 1528     MRSA PCR Positive    COVID-19,CEPHEID,AMBERLY IN-HOUSE(OR EMERGENT/ADD-ON),NP SWAB IN TRANSPORT MEDIA 3-4 HR TAT - Swab, Nasopharynx [[id number]]  (Normal) Collected:  05/31/20 1533    Lab Status:  Final result Specimen:  Swab from Nasopharynx Updated:  05/31/20 1638     COVID19 Not Detected          Imaging Results (Last 24 Hours)     ** No results found for the last 24 hours. **          Results for orders placed during the hospital encounter of 05/31/20   Adult Transthoracic Echo Complete W/ Cont if Necessary Per Protocol    Narrative · Left ventricular systolic function is normal. Estimated EF appears to be   in the range of 56 - 60%.  · Normal right ventricular cavity size and systolic function noted.  · Left atrial cavity size is moderately dilated. Left atrial volume is   moderately increased.  · No evidence of a patent foramen ovale. Saline test results are negative.  · The aortic valve is abnormal in structure. The valve exhibits sclerosis.   There is mild calcification of the aortic valve  · Mild aortic valve stenosis is present          I have reviewed the medications:  Scheduled Meds:    amantadine 100 mg Oral BID   amLODIPine 5 mg Oral Daily   aspirin 81 mg Oral Daily   atorvastatin 80 mg Oral Nightly   cholestyramine light 1 packet Oral Daily   diclofenac 2 g Topical 4x Daily   dilTIAZem 60 mg Oral Q8H   diphenoxylate-atropine 1 tablet Oral BID   famotidine 20 mg Oral BID AC   lactobacillus acidophilus 1 capsule Oral Daily   lisinopril 10 mg Oral Q PM   metoprolol tartrate 100 mg Oral Q12H   miconazole  Topical Q12H   Nutrisource fiber 1 packet Nasogastric Daily   nystatin 5 mL Swish & Swallow 4x Daily   QUEtiapine 12.5 mg Oral Nightly   rivaroxaban 20 mg Oral Daily With Dinner      Continuous Infusions:     PRN Meds:.•  acetaminophen  •  acetaminophen  •  butalbital-acetaminophen-caffeine  •  diphenoxylate-atropine  •  enalaprilat  •  magic mouthwash  •  metoprolol tartrate  •  nitroglycerin  •  ondansetron  •  phenol  •  potassium chloride **OR** potassium chloride **OR** potassium chloride  •  simethicone    Assessment/Plan   Assessment & Plan     Active Hospital Problems    Diagnosis  POA   • **Cerebrovascular accident (CVA) due to embolism of right middle cerebral artery (CMS/HCC) [I63.411]  Yes   • Acute ischemic stroke (CMS/HCC) [I63.9]  Yes   • Atrial fibrillation (CMS/HCC) [I48.91]  Yes      Resolved Hospital Problems   No resolved problems to display.        Brief Hospital Course to date:  Sidra Lane is a 65 y.o. 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  She was found to have a right MCA territory reversible ischemia on CT perfusion.  She underwent a CT head neck angiogram with an intra-arterial TPA after failed thrombectomy and admitted to the ICU afterward.  She was transferred out of ICU on 6/3 with hospitalist assuming care on 6/4.    Right MCA stroke  - likely cardioembolic.  Patient has a history of persistent atrial fibrillation and had been anticoagulated with Eliquis.  She had been followed by her electrophysiologist and had plans for an ablation prior to readmission.  -Again she is status post intra-arterial TPA after failed thrombectomy  -She had some delirium while in the ICU and Seroquel 50 mg nightly was given to her  -Stat CT brain was performed on 6/4 due to worsening left sided weakness and acute hemorrhagic transformation was ruled out, but does still reporting evolving MCA CVA that was stable from the previous exam.  -The reasons for her worsening symptoms may be related to Seroquel.  Now tolerating low dose Seroquel  QHS  -Repeat CT head 6/6 with no evidence of hemorrhage, stopped heparin gtt and started on Eliquis, however, Cards feels that she failed Eliquis as she was on this prior to admission. Now transitioned to Xarelto.   -EP also following, resumed Metoprolol PO    Persistent atrial fibrillation with RVR  -Patient had been on beta-blocker per her cardiologist and had plans for ablation prior to admission  -Currently she is not a candidate for an ablation  -We will defer management to her EP  --getting PO Metoprolol, increased per Cards. Diltiazem added for rate control  --Now on Xarelto  --currently good rate control, though she is refusing Diltiazem a lot, states she isn't comfortable taking at home due to how it makes her feel weak    Hypertension  -Blood pressure much better controlled.  Beta blocker resumed by Cards.   --patient has been refusing several doses of Cardizem due to how it makes her feel.     SIRS  -Possible aspiration when patient pulled out her Keofeed tube earlier this stay.    --CXR unremarkable on 6/4 and 6/5  --completed course of augmentin    Hyperlipidemia  -High intensity statin therapy    Left-sided weakness  -PT OT, will benefit from short-term rehab once she is medically stable    Dysphagia  -no with PO diet, modified. When adequate calories consumed PO, will dc keofeed tube. Added salt to diet to help with palatability.    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  Requested records from GI Dr Oliveros in [facility]  --Started probiotic  --currently taking Cholestyramine and fiber packet daily/ Lomotil BID  --Cdiff negative on 6/8    Vaginal Candidiasis  --PO Diflucan x 1 ; 6/11    Oral candidiasis  --started on Nystatin swish and swallow  --added magic mouthwash too    Left otitis media   --recently just completed 7 days of Zosyn/Augmentin for PNA   --will  continue to monitor      DVT Prophylaxis: Fully anticoagulated with Xarelto       Disposition: pending bed availability, she has improved significantly and is now on PO diet    CODE STATUS:   Code Status and Medical Interventions:   Ordered at: 05/31/20 1743     Code Status:    CPR     Medical Interventions (Level of Support Prior to Arrest):    Full         Electronically signed by Heath Hui MD, 06/15/20, 16:23.

## 2020-06-15 NOTE — PROGRESS NOTES
Continued Stay Note  [facility]     Patient Name: Sidra Lane  MRN: [id number]  Today's Date: 6/15/2020    Admit Date: 5/31/2020    Discharge Plan     Row Name 06/15/20 0923       Plan    Plan  [facility]    Patient/Family in Agreement with Plan  yes    Plan Comments  Spoke with patient and daughter at bedside. [facility] is [facility] Patient still is not taking much PO. [facility] at [facility] and notified of possible transfer in 1 to 2 days. CM will continue to follow.    Final Discharge Disposition Code  03 - skilled nursing facility (SNF)        Discharge Codes    No documentation.             Naga Muñoz RN

## 2020-06-16 ENCOUNTER — APPOINTMENT (OUTPATIENT)
Dept: GENERAL RADIOLOGY | Facility: HOSPITAL | Age: 65
End: 2020-06-16

## 2020-06-16 LAB
ALBUMIN SERPL-MCNC: 3.8 G/DL (ref 3.5–5.2)
ALP SERPL-CCNC: 64 U/L (ref 39–117)
ALT SERPL W P-5'-P-CCNC: 22 U/L (ref 1–33)
ANION GAP SERPL CALCULATED.3IONS-SCNC: 19 MMOL/L (ref 5–15)
AST SERPL-CCNC: 29 U/L (ref 1–32)
BILIRUB SERPL-MCNC: 0.3 MG/DL (ref 0.2–1.2)
BUN BLD-MCNC: 29 MG/DL (ref 8–23)
CALCIUM SPEC-SCNC: 9.4 MG/DL (ref 8.6–10.5)
CHLORIDE SERPL-SCNC: 106 MMOL/L (ref 98–107)
CHOLEST SERPL-MCNC: 95 MG/DL (ref 0–200)
CO2 SERPL-SCNC: 17 MMOL/L (ref 22–29)
CREAT BLD-MCNC: 0.88 MG/DL (ref 0.57–1)
GLUCOSE BLD-MCNC: 128 MG/DL (ref 65–99)
GLUCOSE BLDC GLUCOMTR-MCNC: 102 MG/DL (ref 70–130)
GLUCOSE BLDC GLUCOMTR-MCNC: 105 MG/DL (ref 70–130)
GLUCOSE BLDC GLUCOMTR-MCNC: 105 MG/DL (ref 70–130)
MAGNESIUM SERPL-MCNC: 2.2 MG/DL (ref 1.6–2.4)
PHOSPHATE SERPL-MCNC: 4 MG/DL (ref 2.5–4.5)
POTASSIUM BLD-SCNC: 4.8 MMOL/L (ref 3.5–5.2)
PROT SERPL-MCNC: 7 G/DL (ref 6–8.5)
SODIUM BLD-SCNC: 142 MMOL/L (ref 136–145)
TRIGL SERPL-MCNC: 120 MG/DL (ref 0–150)

## 2020-06-16 PROCEDURE — 97530 THERAPEUTIC ACTIVITIES: CPT

## 2020-06-16 PROCEDURE — 25010000002 ONDANSETRON PER 1 MG: Performed by: INTERNAL MEDICINE

## 2020-06-16 PROCEDURE — 99232 SBSQ HOSP IP/OBS MODERATE 35: CPT | Performed by: INTERNAL MEDICINE

## 2020-06-16 PROCEDURE — 82962 GLUCOSE BLOOD TEST: CPT

## 2020-06-16 PROCEDURE — 74230 X-RAY XM SWLNG FUNCJ C+: CPT

## 2020-06-16 PROCEDURE — 92611 MOTION FLUOROSCOPY/SWALLOW: CPT

## 2020-06-16 RX ORDER — DILTIAZEM HYDROCHLORIDE 120 MG/1
120 CAPSULE, COATED, EXTENDED RELEASE ORAL
Status: DISCONTINUED | OUTPATIENT
Start: 2020-06-17 | End: 2020-06-18 | Stop reason: HOSPADM

## 2020-06-16 RX ORDER — DILTIAZEM HYDROCHLORIDE 60 MG/1
30 TABLET, FILM COATED ORAL EVERY 8 HOURS SCHEDULED
Status: DISPENSED | OUTPATIENT
Start: 2020-06-16 | End: 2020-06-16

## 2020-06-16 RX ORDER — AMLODIPINE BESYLATE 2.5 MG/1
2.5 TABLET ORAL DAILY
Status: DISCONTINUED | OUTPATIENT
Start: 2020-06-17 | End: 2020-06-16

## 2020-06-16 RX ORDER — SODIUM CHLORIDE 9 MG/ML
75 INJECTION, SOLUTION INTRAVENOUS CONTINUOUS
Status: ACTIVE | OUTPATIENT
Start: 2020-06-16 | End: 2020-06-17

## 2020-06-16 RX ADMIN — PHENOL 2 SPRAY: 1.5 LIQUID ORAL at 04:55

## 2020-06-16 RX ADMIN — DICLOFENAC SODIUM 2 G: 10 GEL TOPICAL at 20:04

## 2020-06-16 RX ADMIN — Medication 1 CAPSULE: at 09:23

## 2020-06-16 RX ADMIN — BARIUM SULFATE 20 ML: 400 PASTE ORAL at 12:30

## 2020-06-16 RX ADMIN — METOPROLOL TARTRATE 100 MG: 100 TABLET ORAL at 09:23

## 2020-06-16 RX ADMIN — ONDANSETRON 4 MG: 2 INJECTION INTRAMUSCULAR; INTRAVENOUS at 09:32

## 2020-06-16 RX ADMIN — SODIUM CHLORIDE 75 ML/HR: 9 INJECTION, SOLUTION INTRAVENOUS at 17:30

## 2020-06-16 RX ADMIN — RIVAROXABAN 20 MG: 20 TABLET, FILM COATED ORAL at 17:30

## 2020-06-16 RX ADMIN — BARIUM SULFATE 50 ML: 400 SUSPENSION ORAL at 12:30

## 2020-06-16 RX ADMIN — NYSTATIN 500000 UNITS: 100000 SUSPENSION ORAL at 13:22

## 2020-06-16 RX ADMIN — QUETIAPINE FUMARATE 12.5 MG: 25 TABLET ORAL at 20:04

## 2020-06-16 RX ADMIN — DIPHENOXYLATE HYDROCHLORIDE AND ATROPINE SULFATE 1 TABLET: 2.5; .025 TABLET ORAL at 20:03

## 2020-06-16 RX ADMIN — NYSTATIN 500000 UNITS: 100000 SUSPENSION ORAL at 09:26

## 2020-06-16 RX ADMIN — NYSTATIN 500000 UNITS: 100000 SUSPENSION ORAL at 17:30

## 2020-06-16 RX ADMIN — FAMOTIDINE 20 MG: 20 TABLET, FILM COATED ORAL at 17:30

## 2020-06-16 RX ADMIN — FAMOTIDINE 20 MG: 20 TABLET, FILM COATED ORAL at 09:24

## 2020-06-16 RX ADMIN — MICONAZOLE NITRATE: 2 CREAM TOPICAL at 09:25

## 2020-06-16 RX ADMIN — AMANTADINE HYDROCHLORIDE 100 MG: 100 CAPSULE ORAL at 13:21

## 2020-06-16 RX ADMIN — AMANTADINE HYDROCHLORIDE 100 MG: 100 CAPSULE ORAL at 09:38

## 2020-06-16 RX ADMIN — ACETAMINOPHEN ORAL SOLUTION 650 MG: 650 SOLUTION ORAL at 00:15

## 2020-06-16 RX ADMIN — MICONAZOLE NITRATE: 2 CREAM TOPICAL at 20:04

## 2020-06-16 RX ADMIN — DIPHENOXYLATE HYDROCHLORIDE AND ATROPINE SULFATE 1 TABLET: 2.5; .025 TABLET ORAL at 09:24

## 2020-06-16 RX ADMIN — DICLOFENAC SODIUM 2 G: 10 GEL TOPICAL at 13:22

## 2020-06-16 RX ADMIN — ATORVASTATIN CALCIUM 80 MG: 40 TABLET, FILM COATED ORAL at 20:03

## 2020-06-16 RX ADMIN — ASPIRIN 81 MG 81 MG: 81 TABLET ORAL at 09:25

## 2020-06-16 RX ADMIN — NYSTATIN 500000 UNITS: 100000 SUSPENSION ORAL at 20:02

## 2020-06-16 RX ADMIN — BARIUM SULFATE 100 ML: 0.81 POWDER, FOR SUSPENSION ORAL at 12:29

## 2020-06-16 RX ADMIN — DICLOFENAC SODIUM 2 G: 10 GEL TOPICAL at 09:24

## 2020-06-16 NOTE — PLAN OF CARE
Problem: Patient Care Overview  Goal: Plan of Care Review  Outcome: Ongoing (interventions implemented as appropriate)  Flowsheets (Taken 6/16/2020 1419)  Progress: improving  Plan of Care Reviewed With: patient  Outcome Summary: TREATMENT LIMITED TODAY TO PIVOT TRANSFER BED TO CHAIR DUE TO NAUSEA, DIARRHEA, AND DIZZINESS. BP STABLE. ENCOURAGED PT TO AMBULATE WITH NSG LATER TONIGHT. PER PT, NG TUBE TO BE REMOVED TODAY AND HAS BEEN CLEARED FOR REGULAR DIET.

## 2020-06-16 NOTE — PROGRESS NOTES
[facility] Medicine Services  PROGRESS NOTE    Patient Name: Sidra Lane  : 1955  MRN: [id number]    Date of Admission: 2020  Primary Care Physician: Jl Mcknight MD    Subjective   Subjective     CC:  Follow-up acute CVA    HPI:  keofeed tube irritating the back of her throat.    Review of Systems  Gen- No fevers, chills  CV- No chest pain, palpitations  Resp- No cough, dyspnea  GI- still having loose stool, thinks keofeeds makes it worse.        Objective   Objective     Vital Signs:   Temp:  [97 °F (36.1 °C)-97.7 °F (36.5 °C)] 97.7 °F (36.5 °C)  Heart Rate:  [] 75  Resp:  [16-20] 18  BP: ()/(44-85) 91/68  Total (NIH Stroke Scale): 8     Physical Exam:  Constitutional -no acute distress, non toxic, in bed  HEENT-NCAT, mucous membranes moist, keofeed bridled  CV-RRR, S1 S2 normal, no m/r/g  Resp-grossly clear bilaterally  Abd-soft, non-tender, non-distended, normo active bowel sounds, obese  Ext-No lower extremity cyanosis, clubbing or edema bilaterally  Neuro-alert , speech clear, moves all extremities, left facial droop  Psych-normal affect   Skin- No rash on exposed UE or LE bilaterally      Results Reviewed:  Results from last 7 days   Lab Units 20  0903 20  0819 20  0507   WBC 10*3/mm3 10.74 10.78 12.12*   HEMOGLOBIN g/dL 11.2* 11.4* 12.1   HEMATOCRIT % 41.0 40.7 43.4   PLATELETS 10*3/mm3 405 399 414     Results from last 7 days   Lab Units 06/15/20  0554 20  0819 20  0507   SODIUM mmol/L 142 139 137   POTASSIUM mmol/L 4.8 4.7 4.4   CHLORIDE mmol/L 106 104 101   CO2 mmol/L 17.0* 23.0 22.0   BUN mg/dL 29* 21 19   CREATININE mg/dL 0.88 0.76 0.69   GLUCOSE mg/dL 128* 101* 139*   CALCIUM mg/dL 9.4 9.3 9.5   ALT (SGPT) U/L 22  --   --    AST (SGOT) U/L 29  --   --      Estimated Creatinine Clearance: 68.8 mL/min (by C-G formula based on SCr of 0.88 mg/dL).    Microbiology Results Abnormal     Procedure Component Value -  Date/Time    Blood Culture - Blood, Hand, Left [[id number]] Collected:  06/04/20 1406    Lab Status:  Final result Specimen:  Blood from Hand, Left Updated:  06/09/20 1501     Blood Culture No growth at 5 days    Blood Culture - Blood, Hand, Right [[id number]] Collected:  06/04/20 1400    Lab Status:  Final result Specimen:  Blood from Hand, Right Updated:  06/09/20 1501     Blood Culture No growth at 5 days    Clostridium Difficile Toxin - Stool, Per Rectum [[id number]] Collected:  06/08/20 1750    Lab Status:  Final result Specimen:  Stool from Per Rectum Updated:  06/08/20 2011    Narrative:       The following orders were created for panel order Clostridium Difficile Toxin - Stool, Per Rectum.  Procedure                               Abnormality         Status                     ---------                               -----------         ------                     Clostridium Difficile To...[[id number]]  Normal              Final result                 Please view results for these tests on the individual orders.    Clostridium Difficile Toxin, PCR - Stool, Per Rectum [[id number]]  (Normal) Collected:  06/08/20 1750    Lab Status:  Final result Specimen:  Stool from Per Rectum Updated:  06/08/20 2011     C. Difficile Toxins by PCR Not Detected    Narrative:       Performance characteristics of test not established for patients <2 years of age.  Negative for Toxigenic C. Difficile    Urine Culture - Urine, Urine, Clean Catch [[id number]] Collected:  06/05/20 1358    Lab Status:  Final result Specimen:  Urine, Clean Catch Updated:  06/06/20 0937     Urine Culture 50,000 CFU/mL Mixed Herberth Isolated    Narrative:       Specimen contains mixed organisms of questionable pathogenicity which indicates contamination with commensal herberth.  Further identification is unlikely to provide clinically useful information.  Suggest recollection.    MRSA Screen, PCR (Inpatient) - Swab, Nares [[id number]]  (Abnormal) Collected:  06/05/20  1403    Lab Status:  Final result Specimen:  Swab from Nares Updated:  06/05/20 1528     MRSA PCR Positive    COVID-19,CEPHEID,AMBERLY IN-HOUSE(OR EMERGENT/ADD-ON),NP SWAB IN TRANSPORT MEDIA 3-4 HR TAT - Swab, Nasopharynx [[id number]]  (Normal) Collected:  05/31/20 1533    Lab Status:  Final result Specimen:  Swab from Nasopharynx Updated:  05/31/20 1638     COVID19 Not Detected          Imaging Results (Last 24 Hours)     Procedure Component Value Units Date/Time    FL Video Swallow With Speech Single Contrast [[id number]] Collected:  06/16/20 1602     Updated:  06/16/20 1602    Narrative:       EXAMINATION: FL VIDEO SWALLOW W SPEECH SINGLE-CONTRAST-     INDICATION: Dysphagia; R13.12-Dysphagia, oropharyngeal phase;  I63.9-Cerebral infarction, unspecified; R47.1-Dysarthria and anarthria;  R41.841-Cognitive communication deficit     TECHNIQUE: 54 seconds of fluoroscopic time was used for this exam. 10  associated fluoroscopic loops were saved. The patient was evaluated in  the seated lateral position while taking a variety of consistencies of  barium by mouth under the direction of speech pathology.     COMPARISON: NONE     FINDINGS: 54 seconds of fluoroscopy provided for a modified barium  swallow. Please see speech therapy report for full details and  recommendations.          Impression:       Fluoroscopy provided for a modified barium swallow. Please  see speech therapy report for full details and recommendations.                 Results for orders placed during the hospital encounter of 05/31/20   Adult Transthoracic Echo Complete W/ Cont if Necessary Per Protocol    Narrative · Left ventricular systolic function is normal. Estimated EF appears to be   in the range of 56 - 60%.  · Normal right ventricular cavity size and systolic function noted.  · Left atrial cavity size is moderately dilated. Left atrial volume is   moderately increased.  · No evidence of a patent foramen ovale. Saline test results are negative.  ·  The aortic valve is abnormal in structure. The valve exhibits sclerosis.   There is mild calcification of the aortic valve  · Mild aortic valve stenosis is present          I have reviewed the medications:  Scheduled Meds:    amantadine 100 mg Oral BID   [START ON 6/17/2020] amLODIPine 2.5 mg Oral Daily   aspirin 81 mg Oral Daily   atorvastatin 80 mg Oral Nightly   cholestyramine light 1 packet Oral Daily   diclofenac 2 g Topical 4x Daily   dilTIAZem 30 mg Oral Q8H   [START ON 6/17/2020] dilTIAZem  mg Oral Q24H   diphenoxylate-atropine 1 tablet Oral BID   famotidine 20 mg Oral BID AC   lactobacillus acidophilus 1 capsule Oral Daily   lisinopril 10 mg Oral Q PM   metoprolol tartrate 100 mg Oral Q12H   miconazole  Topical Q12H   Nutrisource fiber 1 packet Nasogastric Daily   nystatin 5 mL Swish & Swallow 4x Daily   QUEtiapine 12.5 mg Oral Nightly   rivaroxaban 20 mg Oral Daily With Dinner     Continuous Infusions:     PRN Meds:.•  acetaminophen  •  acetaminophen  •  butalbital-acetaminophen-caffeine  •  diphenoxylate-atropine  •  enalaprilat  •  magic mouthwash  •  metoprolol tartrate  •  nitroglycerin  •  ondansetron  •  phenol  •  potassium chloride **OR** potassium chloride **OR** potassium chloride  •  simethicone    Assessment/Plan   Assessment & Plan     Active Hospital Problems    Diagnosis  POA   • **Cerebrovascular accident (CVA) due to embolism of right middle cerebral artery (CMS/HCC) [I63.411]  Yes   • Acute ischemic stroke (CMS/HCC) [I63.9]  Yes   • Atrial fibrillation (CMS/HCC) [I48.91]  Yes      Resolved Hospital Problems   No resolved problems to display.        Brief Hospital Course to date:  Sidra Lane is a 65 y.o. 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  She was found to have a right MCA territory reversible ischemia on CT perfusion.  She  underwent a CT head neck angiogram with an intra-arterial TPA after failed thrombectomy and admitted to the ICU afterward.  She was transferred out of ICU on 6/3 with hospitalist assuming care on 6/4.    Right MCA stroke  - likely cardioembolic.  Patient has a history of persistent atrial fibrillation and had been anticoagulated with Eliquis.  She had been followed by her electrophysiologist and had plans for an ablation prior to readmission.   status post intra-arterial TPA after failed thrombectomy  - Cards feels that she failed Eliquis as she was on this prior to admission. Now transitioned to Xarelto.     Persistent atrial fibrillation with RVR  --changed from sotalol to metoprolol, and diltiazem added by EP for rate control  -- Xarelto    Delirium  - improved with low dose seroquel    Hypertension/Hypotension  - patient with some low BPs and reports of dizziness this morning  - will hold norvasc and lisinopril, continue b-blocker and CCB for rate control  - gentle IV fluid hydration this afternoon and tonight  - bmp am    SIRS  - Possible aspiration earlier this stay.    --CXR unremarkable on 6/4 and 6/5  --completed course of augmentin    Hyperlipidemia  -High intensity statin therapy    Left-sided weakness  -PT OT, will benefit from short-term rehab once she is medically stable    Dysphagia  -upgraded to regular diet today. PO intake has improved, particularly with relaxed restrictions (she likes salt on food). Nutrition follows, calorie count underway. The keofeed tube is very uncomfortable for the patient, will remove and monitor PO intake.    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  Requested records from GI Dr Oliveros in [facility]  --Started probiotic  --currently taking Cholestyramine and fiber packet daily/ Lomotil BID  --Cdiff negative on 6/8    Vaginal  Candidiasis  --PO Diflucan x 1 ; 6/11    Oral candidiasis  -- Nystatin swish and swallow      Left otitis media   --recently just completed 7 days of Zosyn/Augmentin for PNA   --will continue to monitor      DVT Prophylaxis: Fully anticoagulated with Xarelto       Disposition: pending bed availability, should be ready for rehab in 1-2 days.    CODE STATUS:   Code Status and Medical Interventions:   Ordered at: 05/31/20 1743     Code Status:    CPR     Medical Interventions (Level of Support Prior to Arrest):    Full         Electronically signed by Heath Hui MD, 06/16/20, 16:12.

## 2020-06-16 NOTE — PLAN OF CARE
"  Problem: Patient Care Overview  Goal: Plan of Care Review  Outcome: Ongoing (interventions implemented as appropriate)  Flowsheets (Taken 6/15/2020 1750 by Barby Terry, RN)  Plan of Care Reviewed With: patient  Note:   Pt VSS, though hypotensive at times this shift. Refused night tube feeding, pt states, \"it hurts my belly. It makes me nauseous and then I do not want to eat anything during the day. I do not want it.\" CONNIE Vogel notified. Seems to be resting well throughout the shift. Tylenol and position changes helping to control right leg pain. NIHSS - 8 this shift. Utilizing bedside commode as needed. Bed alarm in place. Will cont to monitor.       "

## 2020-06-16 NOTE — MBS/VFSS/FEES
Acute Care - Speech Language Pathology   Swallow Re-Evaluation  Leti   Modified Barium Swallow Study (MBS)       Patient Name: Sidra Lane  : 1955  MRN: [id number]  Today's Date: 2020               Admit Date: 2020    Visit Dx:     ICD-10-CM ICD-9-CM   1. Oropharyngeal dysphagia R13.12 787.22   2. Acute ischemic stroke (CMS/HCC) I63.9 434.91   3. Dysarthria R47.1 784.51   4. Cognitive communication deficit R41.841 799.52     Patient Active Problem List   Diagnosis   • Shortness of breath   • Atrial fibrillation (CMS/HCC)   • Essential hypertension   • Dizziness   • Deep vein thrombosis (DVT) of proximal lower extremity (CMS/HCC)   • Coronary artery disease involving native coronary artery of native heart without angina pectoris   • Fatigue   • Chest pain   • Longstanding persistent atrial fibrillation (CMS/HCC)   • Cerebrovascular accident (CVA) due to embolism of right middle cerebral artery (CMS/HCC)   • Acute ischemic stroke (CMS/HCC)     Past Medical History:   Diagnosis Date   • Atrial fibrillation (CMS/HCC)    • CHF (congestive heart failure) (CMS/HCC)    • Deep vein thrombosis (CMS/HCC)    • GERD (gastroesophageal reflux disease)    • Hypertension    • May-Thurner syndrome    • Pulmonary embolism (CMS/HCC)      Past Surgical History:   Procedure Laterality Date   • CHOLECYSTECTOMY     • COLON SURGERY      bowel leakage, some of colon removed   • INTERVENTIONAL RADIOLOGY PROCEDURE Bilateral 2020    Procedure: CAROTID CEREBRAL ANGIOGRAM BILATERAL;  Surgeon: Brant Duarte MD;  Location: Located within [facility] INVASIVE LOCATION;  Service: Interventional Radiology;  Laterality: Bilateral;   • LAPAROSCOPIC TUBAL LIGATION     • LEG SURGERY      multiple stents to BLE   • TONSILLECTOMY          SWALLOW EVALUATION (last 72 hours)      SLP Adult Swallow Evaluation     Row Name 20 1145                   Rehab Evaluation    Document Type  evaluation MBS  -SG        Subjective Information   complains of;nausea/vomiting no issues during MBS  -SG        Patient Observations  alert;cooperative  -SG        Patient/Family Observations  no family present  -SG        Patient Effort  good  -SG           General Information    Patient Profile Reviewed  yes  -SG        Pertinent History Of Current Problem  see inital eval  -SG        Current Method of Nutrition  NPO;nasogastric feedings  -SG        Precautions/Limitations, Vision  WFL;for purposes of eval  -SG        Precautions/Limitations, Hearing  WFL;for purposes of eval  -SG        Prior Level of Function-Communication  other (see comments) unknown baseline  -SG        Prior Level of Function-Swallowing  no diet consistency restrictions  -SG        Plans/Goals Discussed with  patient;agreed upon  -SG        Barriers to Rehab  none identified  -SG        Patient's Goals for Discharge  return to regular diet  -SG           Pain Assessment    Additional Documentation  Pain Scale: Numbers Pre/Post-Treatment (Group)  -SG           Pain Scale: Numbers Pre/Post-Treatment    Pain Scale: Numbers, Pretreatment  4/10  -SG        Pain Scale: Numbers, Post-Treatment  4/10  -SG        Pain Location  abdomen  -SG        Pre/Post Treatment Pain Comment  -- repositioned  -SG           MBS/VFSS    Utensils Used  spoon;cup;straw  -SG        Consistencies Trialed  thin liquids;nectar/syrup-thick liquids;pudding thick;regular textures  -SG           MBS/VFSS Interpretation    Oral Prep Phase  impaired oral phase of swallowing  -SG        Oral Transit Phase  impaired  -SG        Oral Residue  impaired  -SG        VFSS Summary  MBS completed this date. Pt alert and cooperative but c/o nausea/vomiting. Pt presented w/ mild-moderate oral dysphagia and functional pharyngeal swallow on this exam. No aspiration noted w/ any consistency which is an improvement from prior MBS. Inc'd oral prep and transit w/ solid item, some piecemeal deglutition, some mild oral residue, but pt able to  safely masticate and swallow regular, chewable item during MBS. High penetration that cleared w/ NT via staw x1-2 times 2' location of NG. No aspiration noted w/ any PO trials. Rec: Reg and thins w/ strict aspiration precautions and supervision to ensure no pocketing and full masticaiton of any regular item. Con't oral dys tx, d/c pharyngeal tx goals and remove NG asap. RD dept contacted and RN updated on results. THank you, SLP  -SG           Oral Preparatory Phase    Oral Preparatory Phase  prolonged manipulation  -SG        Reduced Lip Closure Around Utensil  regular textures  -SG        Prolonged Manipulation  regular textures  -SG           Oral Transit Phase    Impaired Oral Transit Phase  piecemeal oral transit;increased A-P transit time  -SG        Increased A-P Transit Time  regular textures  -SG        Piecemeal Oral Transit  regular textures  -SG           Oral Residue    Impaired Oral Residue  lingual residue  -SG        Lingual Residue  regular textures  -SG        Diffuse Residue throughout Oral Cavity  regular textures  -SG        Response to Oral Residue  with liquid wash;cleared residue;with spontaneous subsequent swallow  -SG           Initiation of Pharyngeal Swallow    Initiation of Pharyngeal Swallow  bolus in valleculae;bolus in pyriform sinuses  -SG        Pharyngeal Phase  functional pharyngeal phase of swallowing  -SG        Penetration During the Swallow  nectar-thick liquids x2 2' positioning of NG  -SG        Response to Penetration  shallow;no response;other (see comments) cleared completely, did not pose risk to swallowing safety  -SG        Rosenbek's Scale  nectar:;2--->level 2  -SG        Pharyngeal Residue  other (see comments) no significant pharyngeal residue  -SG           Clinical Impression    SLP Swallowing Diagnosis  functional pharyngeal phase;oral dysfunction  -SG        Functional Impact  risk of malnutrition;risk of dehydration  -SG        Rehab Potential/Prognosis,  Swallowing  good, to achieve stated therapy goals  -SG        Swallow Criteria for Skilled Therapeutic Interventions Met  demonstrates skilled criteria  -SG           Recommendations    Therapy Frequency (Swallow)  5 days per week  -SG        Predicted Duration Therapy Intervention (Days)  until discharge  -SG        SLP Diet Recommendation  regular textures;thin liquids  -SG        Recommended Precautions and Strategies  check mouth frequently for oral residue/pocketing;upright posture during/after eating;small bites of food and sips of liquid  -SG        SLP Rec. for Method of Medication Administration  meds whole;with thin liquids;meds crushed;with pudding or applesauce  -SG        Monitor for Signs of Aspiration  yes;notify SLP if any concerns  -SG        Anticipated Dischage Disposition (SLP)  inpatient rehabilitation facility;anticipate therapy at next level of care  -SG          User Key  (r) = Recorded By, (t) = Taken By, (c) = Cosigned By    Initials Name Effective Dates    AN Yepez-Monie Jean, MS CCC-SLP 06/22/15 -           EDUCATION  The patient has been educated in the following areas:   Dysphagia (Swallowing Impairment) Oral Care/Hydration Modified Diet Instruction.    SLP Recommendation and Plan  SLP Swallowing Diagnosis: functional pharyngeal phase, oral dysfunction  SLP Diet Recommendation: regular textures, thin liquids  Recommended Precautions and Strategies: check mouth frequently for oral residue/pocketing, upright posture during/after eating, small bites of food and sips of liquid  SLP Rec. for Method of Medication Administration: meds whole, with thin liquids, meds crushed, with pudding or applesauce     Monitor for Signs of Aspiration: yes, notify SLP if any concerns     Swallow Criteria for Skilled Therapeutic Interventions Met: demonstrates skilled criteria  Anticipated Dischage Disposition (SLP): inpatient rehabilitation facility, anticipate therapy at next level of care  Rehab  Potential/Prognosis, Swallowing: good, to achieve stated therapy goals  Therapy Frequency (Swallow): 5 days per week  Predicted Duration Therapy Intervention (Days): until discharge            SLP GOALS     Row Name 06/16/20 1145 06/15/20 1300          Oral Nutrition/Hydration Goal 1 (SLP)    Oral Nutrition/Hydration Goal 1, SLP  LTG: Pt will return to regular diet, thin liquids w/ no overt s/sxs aspiration/distress w/ 100% acc and no cues  -SG  LTG: Pt will return to regular diet, thin liquids w/ no overt s/sxs aspiration/distress w/ 100% acc and no cues  -SG     Time Frame (Oral Nutrition/Hydration Goal 1, SLP)  by discharge  -SG  by discharge  -SG     Progress/Outcomes (Oral Nutrition/Hydration Goal 1, SLP)  good progress toward goal  -SG  good progress toward goal  -SG        Oral Nutrition/Hydration Goal 2 (SLP)    Oral Nutrition/Hydration Goal 2, SLP  Pt will tolerate puree, some mashed & honey-thick liquids w/ no overt s/sxs aspiration/distress w/ 100% acc and no cues  -SG  Pt will tolerate puree, some mashed & honey-thick liquids w/ no overt s/sxs aspiration/distress w/ 100% acc and no cues  -SG     Time Frame (Oral Nutrition/Hydration Goal 2, SLP)  short term goal (STG);by discharge  -SG  short term goal (STG);by discharge  -SG     Barriers (Oral Nutrition/Hydration Goal 2, SLP)  --  -- no issues w/ mashed potatoes, ground beef, and mashed veggie  -SG     Progress/Outcomes (Oral Nutrition/Hydration Goal 2, SLP)  goal met  -SG  good progress toward goal  -SG        Oral Nutrition/Hydration Goal (SLP)    Oral Nutrition/Hydration Goal, SLP  Pt will tolerate therapeutic trials of thin H2O w/ no overt s/sxs aspiration/distress w/ 60% acc and no cues in order to assess readiness for repeat insrumental eval  -SG  Pt will tolerate therapeutic trials of thin H2O w/ no overt s/sxs aspiration/distress w/ 60% acc and no cues in order to assess readiness for repeat insrumental eval  -SG     Time Frame (Oral  Nutrition/Hydration Goal, SLP)  short term goal (STG);by discharge  -SG  short term goal (STG);by discharge  -SG     Barriers (Oral Nutrition/Hydration Goal, SLP)  --  No s/s w/ any trials of ice chips or water. H/o silent aspiration on prior MBS  -SG     Progress/Outcomes (Oral Nutrition/Hydration Goal, SLP)  goal met  -SG  good progress toward goal  -SG        Labial Strengthening Goal 1 (SLP)    Activity (Labial Strengthening Goal 1, SLP)  increase labial tone  -SG  increase labial tone  -SG     Increase Labial Tone  labial resistance exercises  -SG  labial resistance exercises  -SG     [place]/Accuracy (Labial Strengthening Goal 1, SLP)  with minimal cues (75-90% accuracy)  -SG  with minimal cues (75-90% accuracy)  -SG     Time Frame (Labial Strengthening Goal 1, SLP)  short term goal (STG);by discharge  -SG  short term goal (STG);by discharge  -SG     Progress/Outcomes (Labial Strengthening Goal 1, SLP)  goal ongoing  -SG  good progress toward goal  -SG        Lingual Strengthening Goal 1 (SLP)    Activity (Lingual Strengthening Goal 1, SLP)  increase tongue back strength  -SG  increase tongue back strength  -SG     Increase Lingual Tone/Sensation/Control/Coordination/Movement  lingual movement exercises  -SG  lingual movement exercises  -SG     Increase Tongue Back Strength  swallow trials;lingual resistance exercises  -SG  swallow trials;lingual resistance exercises  -SG     [place]/Accuracy (Lingual Strengthening Goal 1, SLP)  with minimal cues (75-90% accuracy)  -SG  with minimal cues (75-90% accuracy)  -SG     Time Frame (Lingual Strengthening Goal 1, SLP)  short term goal (STG);by discharge  -SG  short term goal (STG);by discharge  -SG     Progress/Outcomes (Lingual Strengthening Goal 1, SLP)  goal ongoing  -SG  good progress toward goal  -SG        Pharyngeal Strengthening Exercise Goal 1 (SLP)    Activity (Pharyngeal Strengthening Goal 1, SLP)  increase timing;increase closure at entrance to  airway/closure of airway at glottis  -SG  increase timing;increase closure at entrance to airway/closure of airway at glottis  -SG     Increase Timing  prepping - 3 second prep or suck swallow or 3-step swallow  -SG  prepping - 3 second prep or suck swallow or 3-step swallow  -SG     Increase Superior Movement of the Hyolaryngeal Complex  super-supraglottic swallow  -SG  super-supraglottic swallow  -SG     Increase Anterior Movement of the Hyolaryngeal Complex  chin tuck against resistance (CTAR)  -SG  chin tuck against resistance (CTAR)  -SG     Increase Closure at Entrance to Airway/Closure of Airway at Glottis  supraglottic swallow  -SG  supraglottic swallow  -SG     Increase Squeeze/Positive Pressure Generation  effortful pitch glide (falsetto + pharyngeal squeeze)  -SG  effortful pitch glide (falsetto + pharyngeal squeeze)  -SG     Increase Tongue Base Retraction  hard effortful swallow  -SG  hard effortful swallow  -SG     Time Frame (Pharyngeal Strengthening Goal 1, SLP)  short term goal (STG);by discharge  -SG  short term goal (STG);by discharge  -SG     Barriers (Pharyngeal Strengthening Goal 1, SLP)  --  -- Pt eating lunch tray, requested to defer pharyngeal exercise  -SG     Progress/Outcomes (Pharyngeal Strengthening Goal 1, SLP)  goal met  -SG  goal ongoing  -SG        Articulation Goal 1 (SLP)    Improve Articulation Goal 1 (SLP)  --  by over-articulating at phrase level;80%;with minimal cues (75-90%)  -SG     Time Frame (Articulation Goal 1, SLP)  --  short term goal (STG)  -SG     Progress/Outcomes (Articulation Goal 1, SLP)  --  goal ongoing  -SG        Attention Goal 1 (SLP)    Improve Attention by Goal 1 (SLP)  --  looking at speaker;80%;with minimal cues (75-90%)  -SG     Time Frame (Attention Goal 1, SLP)  --  short term goal (STG);by discharge  -SG     Progress/Outcomes (Attention Goal 1, SLP)  --  goal ongoing  -SG        Additional Goal 1 (SLP)    Additional Goal 1, SLP  --  LTG: Pt will  improve cognitive-communication skills in order to participate in care in hospital setting for 100% of opportunities w/o cues.  -SG     Time Frame (Additional Goal 1, SLP)  --  by discharge  -SG     Progress/Outcomes (Additional Goal 1, SLP)  --  goal ongoing  -SG       User Key  (r) = Recorded By, (t) = Taken By, (c) = Cosigned By    Initials Name Provider Type    Monie Gates MS CCC-SLP Speech and Language Pathologist             Time Calculation:   Time Calculation- SLP     Row Name 06/16/20 1451             Time Calculation- SLP    SLP Start Time  1145  -SG      SLP Received On  06/16/20  -SG        User Key  (r) = Recorded By, (t) = Taken By, (c) = Cosigned By    Initials Name Provider Type    Monie Gates MS CCC-SLP Speech and Language Pathologist          Therapy Charges for Today     Code Description Service Date Service Provider Modifiers Qty    96279219987 HC ST TREATMENT SWALLOW 2 6/15/2020 Monie Matias MS CCC-SLP GN 1    [id number] HC ST MOTION FLUORO EVAL SWALLOW 6 6/16/2020 Monie Matias MS CCC-SLP GN 1               MS KAMRON Garcia  6/16/2020

## 2020-06-16 NOTE — THERAPY TREATMENT NOTE
Patient Name: Sidra Lane  : 1955    MRN: [id number]                              Today's Date: 2020       Admit Date: 2020    Visit Dx:     ICD-10-CM ICD-9-CM   1. Oropharyngeal dysphagia R13.12 787.22   2. Acute ischemic stroke (CMS/HCC) I63.9 434.91   3. Dysarthria R47.1 784.51   4. Cognitive communication deficit R41.841 799.52     Patient Active Problem List   Diagnosis   • Shortness of breath   • Atrial fibrillation (CMS/HCC)   • Essential hypertension   • Dizziness   • Deep vein thrombosis (DVT) of proximal lower extremity (CMS/HCC)   • Coronary artery disease involving native coronary artery of native heart without angina pectoris   • Fatigue   • Chest pain   • Longstanding persistent atrial fibrillation (CMS/HCC)   • Cerebrovascular accident (CVA) due to embolism of right middle cerebral artery (CMS/HCC)   • Acute ischemic stroke (CMS/HCC)     Past Medical History:   Diagnosis Date   • Atrial fibrillation (CMS/HCC)    • CHF (congestive heart failure) (CMS/HCC)    • Deep vein thrombosis (CMS/HCC)    • GERD (gastroesophageal reflux disease)    • Hypertension    • May-Thurner syndrome    • Pulmonary embolism (CMS/HCC)      Past Surgical History:   Procedure Laterality Date   • CHOLECYSTECTOMY     • COLON SURGERY      bowel leakage, some of colon removed   • INTERVENTIONAL RADIOLOGY PROCEDURE Bilateral 2020    Procedure: CAROTID CEREBRAL ANGIOGRAM BILATERAL;  Surgeon: Brant Duarte MD;  Location: [facility] INVASIVE LOCATION;  Service: Interventional Radiology;  Laterality: Bilateral;   • LAPAROSCOPIC TUBAL LIGATION     • LEG SURGERY      multiple stents to BLE   • TONSILLECTOMY       General Information     Row Name 20 1407          PT Evaluation Time/Intention    Document Type  therapy note (daily note)  -CD     Mode of Treatment  physical therapy  -CD     Row Name 20 1407          General Information    Patient Profile Reviewed?  yes  -CD     Existing  Precautions/Restrictions  fall  -CD     Row Name 06/16/20 1407          Cognitive Assessment/Intervention- PT/OT    Orientation Status (Cognition)  oriented x 3  -CD     Cognitive Assessment/Intervention Comment  C/O NAUSEA, DIARRHEA AND DIZZINESS- BP STABLE. PT REPORTS DIARRHEA FROM TUBE FEED LAST NIGHT. TUBE FEED TO BE REMOVED TODAY.   -CD     Row Name 06/16/20 1407          Safety Issues, Functional Mobility    Safety Issues Affecting Function (Mobility)  safety precaution awareness  -CD     Impairments Affecting Function (Mobility)  balance;endurance/activity tolerance;coordination;strength  -CD       User Key  (r) = Recorded By, (t) = Taken By, (c) = Cosigned By    Initials Name Provider Type    CD Dayna Julian, PT Physical Therapist        Mobility     Row Name 06/16/20 1416          Bed Mobility Assessment/Treatment    Supine-Sit [place] (Bed Mobility)  verbal cues;contact guard  -CD     Assistive Device (Bed Mobility)  bed rails;head of bed elevated  -CD     Comment (Bed Mobility)  VIA LOG ROLL AND CUES TO REACH FOR BEDRAIL TO ASSIST.   -CD     Row Name 06/16/20 1416          Transfer Assessment/Treatment    Comment (Transfers)  STAND PIVOT TO CHAIR VIA BUE SUPPORT AND GAIT BELT.   -CD     Row Name 06/16/20 1416          Bed-Chair Transfer    Bed-Chair [place] (Transfers)  verbal cues;contact guard  -CD     Row Name 06/16/20 1416          Gait/Stairs Assessment/Training    Comment (Gait/Stairs)  DEFERRED DUE TO NAUSEA, DIARRHEA.   -CD       User Key  (r) = Recorded By, (t) = Taken By, (c) = Cosigned By    Initials Name Provider Type    CD Dayna Julian PT Physical Therapist        Obj/Interventions     Row Name 06/16/20 1418          Static Sitting Balance    Level of [place] (Unsupported Sitting, Static Balance)  independent  -CD     Sitting Position (Unsupported Sitting, Static Balance)  sitting in chair  -CD     Time Able to Maintain Position (Unsupported Sitting, Static Balance)  more  than 5 minutes  -CD     Row Name 06/16/20 1418          Dynamic Sitting Balance    Level of [place], Reaches Outside Midline (Sitting, Dynamic Balance)  supervision  -CD     Sitting Position, Reaches Outside Midline (Sitting, Dynamic Balance)  sitting in chair  -CD     Comment, Reaches Outside Midline (Sitting, Dynamic Balance)  RECIPROCAL SCOOTING.   -CD     Row Name 06/16/20 1418          Static Standing Balance    Level of [place] (Supported Standing, Static Balance)  contact guard assist  -CD     Row Name 06/16/20 1418          Dynamic Standing Balance    Level of [place], Reaches Outside Midline (Standing, Dynamic Balance)  contact guard assist  -CD     Comment, Reaches Outside Midline (Standing, Dynamic Balance)  PIVOT TRANSFER.   -CD       User Key  (r) = Recorded By, (t) = Taken By, (c) = Cosigned By    Initials Name Provider Type    CD Dayna Julian, PT Physical Therapist        Goals/Plan    No documentation.       Clinical Impression     Row Name 06/16/20 1419          Pain Scale: Numbers Pre/Post-Treatment    Pain Scale: Numbers, Pretreatment  3/10  -CD     Pain Scale: Numbers, Post-Treatment  3/10  -CD     Pain Location  abdomen  -CD     Pain Intervention(s)  Rest  -CD     Row Name 06/16/20 1419          Plan of Care Review    Plan of Care Reviewed With  patient  -CD     Progress  improving  -CD     Outcome Summary  TREATMENT LIMITED TODAY TO PIVOT TRANSFER BED TO CHAIR DUE TO NAUSEA, DIARRHEA, AND DIZZINESS. BP STABLE. ENCOURAGED PT TO AMBULATE WITH NSG LATER TONIGHT. PER PT, NG TUBE TO BE REMOVED TODAY AND HAS BEEN CLEARED FOR REGULAR DIET.   -CD     Row Name 06/16/20 1419          Physical Therapy Clinical Impression    Patient/Family Goals Statement (PT Clinical Impression)  TO RETURN TO PLOF.   -CD     Criteria for Skilled Interventions Met (PT Clinical Impression)  yes  -CD     Rehab Potential (PT Clinical Summary)  good, to achieve stated therapy goals  -CD     Row Name 06/16/20  1419          Vital Signs    Pre Systolic BP Rehab  9191  -CD     Pre Treatment Diastolic BP  68  -CD     Post Systolic BP Rehab  104  -CD     Post Treatment Diastolic BP  78  -CD     Pretreatment Heart Rate (beats/min)  78  -CD     Posttreatment Heart Rate (beats/min)  90  -CD     Pre SpO2 (%)  96  -CD     O2 Delivery Pre Treatment  room air  -CD     O2 Delivery Intra Treatment  room air  -CD     Post SpO2 (%)  95  -CD     O2 Delivery Post Treatment  room air  -CD     Pre Patient Position  Supine  -CD     Intra Patient Position  Standing  -CD     Post Patient Position  Sitting  -CD     Row Name 06/16/20 1419          Positioning and Restraints    Pre-Treatment Position  in bed  -CD     Post Treatment Position  chair  -CD     In Chair  reclined;call light within reach;encouraged to call for assist;exit alarm on;legs elevated;notified nsg  -CD       User Key  (r) = Recorded By, (t) = Taken By, (c) = Cosigned By    Initials Name Provider Type    CD Dayna Julian, PT Physical Therapist        Outcome Measures     Row Name 06/16/20 1422          How much help from another person do you currently need...    Turning from your back to your side while in flat bed without using bedrails?  3  -CD     Moving from lying on back to sitting on the side of a flat bed without bedrails?  3  -CD     Moving to and from a bed to a chair (including a wheelchair)?  3  -CD     Standing up from a chair using your arms (e.g., wheelchair, bedside chair)?  3  -CD     Climbing 3-5 steps with a railing?  2  -CD     To walk in hospital room?  3  -CD     AM-PAC 6 Clicks Score (PT)  17  -CD     Row Name 06/16/20 1422          Modified [place] Scale    Modified Isaac Scale  3 - Moderate disability.  Requiring some help, but able to walk without assistance.  -CD     Row Name 06/16/20 1422          Functional Assessment    Outcome Measure Options  AM-PAC 6 Clicks Basic Mobility (PT);Modified Isaac  -CD       User Key  (r) = Recorded By, (t) = Taken  By, (c) = Cosigned By    Initials Name Provider Type    Dayna Camarena PT Physical Therapist        Physical Therapy Education                 Title: PT OT SLP Therapies (In Progress)     Topic: Physical Therapy (Done)     Point: Mobility training (Done)     Description:   Instruct learner(s) on safety and technique for assisting patient out of bed, chair or wheelchair.  Instruct in the proper use of assistive devices, such as walker, crutches, cane or brace.              Patient Friendly Description:   It's important to get you on your feet again, but we need to do so in a way that is safe for you. Falling has serious consequences, and your personal safety is the most important thing of all.        When it's time to get out of bed, one of us or a family member will sit next to you on the bed to give you support.     If your doctor or nurse tells you to use a walker, crutches, a cane, or a brace, be sure you use it every time you get out of bed, even if you think you don't need it.    Learning Progress Summary           Patient Acceptance, E, VU,NR by CD at 6/16/2020 1425    Comment:  SEE FLOWSHEET    Acceptance, E, VU,NR by CD at 6/14/2020 1614    Comment:  SEE FLOWSHEET    Acceptance, E, VU by CD at 6/13/2020 1457    Comment:  SEE FLOWSHEET.    Acceptance, E, VU,NR by CD at 6/11/2020 1628    Comment:  SEE FLOWSHEET.    Acceptance, E, VU,NR by CD at 6/8/2020 1113    Comment:  SEE FLOWSHEET.    Acceptance, E,D, NR,VU by SH at 6/3/2020 1350    Acceptance, E, NR by KG at 6/2/2020 1315   Family Acceptance, E, VU by CD at 6/13/2020 1457    Comment:  SEE FLOWSHEET.    Acceptance, E, VU,NR by CD at 6/11/2020 1628    Comment:  SEE FLOWSHEET.                   Point: Home exercise program (Done)     Description:   Instruct learner(s) on appropriate technique for monitoring, assisting and/or progressing patient with therapeutic exercises and activities.              Learning Progress Summary           Patient Acceptance,  E, VU,NR by CD at 6/16/2020 1425    Comment:  SEE FLOWSHEET    Acceptance, E, VU,NR by CD at 6/14/2020 1614    Comment:  SEE FLOWSHEET    Acceptance, E, VU by CD at 6/13/2020 1457    Comment:  SEE FLOWSHEET.    Acceptance, E, VU,NR by CD at 6/11/2020 1628    Comment:  SEE FLOWSHEET.    Acceptance, E, VU,NR by CD at 6/8/2020 1113    Comment:  SEE FLOWSHEET.   Family Acceptance, E, VU by CD at 6/13/2020 1457    Comment:  SEE FLOWSHEET.    Acceptance, E, VU,NR by CD at 6/11/2020 1628    Comment:  SEE FLOWSHEET.                   Point: Body mechanics (Done)     Description:   Instruct learner(s) on proper positioning and spine alignment for patient and/or caregiver during mobility tasks and/or exercises.              Learning Progress Summary           Patient Acceptance, E, VU,NR by CD at 6/16/2020 1425    Comment:  SEE FLOWSHEET    Acceptance, E, VU,NR by CD at 6/14/2020 1614    Comment:  SEE FLOWSHEET    Acceptance, E, VU by CD at 6/13/2020 1457    Comment:  SEE FLOWSHEET.    Acceptance, E, VU,NR by CD at 6/11/2020 1628    Comment:  SEE FLOWSHEET.    Acceptance, E, VU,NR by CD at 6/8/2020 1113    Comment:  SEE FLOWSHEET.    Acceptance, E,D, NR,VU by SH at 6/3/2020 1350    Acceptance, E, NR by KG at 6/2/2020 1315   Family Acceptance, E, VU by CD at 6/13/2020 1457    Comment:  SEE FLOWSHEET.    Acceptance, E, VU,NR by CD at 6/11/2020 1628    Comment:  SEE FLOWSHEET.                   Point: Precautions (Done)     Description:   Instruct learner(s) on prescribed precautions during mobility and gait tasks              Learning Progress Summary           Patient Acceptance, E, VU,NR by CD at 6/16/2020 1425    Comment:  SEE FLOWSHEET    Acceptance, E, VU,NR by CD at 6/14/2020 1614    Comment:  SEE FLOWSHEET    Acceptance, E, VU by CD at 6/13/2020 1457    Comment:  SEE FLOWSHEET.    Acceptance, E, VU,NR by CD at 6/11/2020 1628    Comment:  SEE FLOWSHEET.    Acceptance, E, VU,NR by CD at 6/8/2020 1113    Comment:  SEE  FLOWSHEET.    Acceptance, E,D, NR,VU by  at 6/3/2020 1350    Acceptance, E, NR by KG at 6/2/2020 1315   Family Acceptance, E, VU by CD at 6/13/2020 1457    Comment:  SEE FLOWSHEET.    Acceptance, E, VU,NR by CD at 6/11/2020 1628    Comment:  SEE FLOWSHEET.                               User Key     Initials Effective Dates Name Provider Type Discipline    CD 06/19/15 -  Dayna Julian, PT Physical Therapist PT    KG 05/22/20 -  Taylor Jeff, PT Physical Therapist PT     03/19/20 -  Tahira Ruiz PT Student PT Student PT              PT Recommendation and Plan  Planned Therapy Interventions (PT Eval): balance training, bed mobility training, gait training, transfer training, home exercise program, strengthening, neuromuscular re-education  Outcome Summary/Treatment Plan (PT)  Anticipated Discharge Disposition (PT): inpatient rehabilitation facility  Plan of Care Reviewed With: patient  Progress: improving  Outcome Summary: TREATMENT LIMITED TODAY TO PIVOT TRANSFER BED TO CHAIR DUE TO NAUSEA, DIARRHEA, AND DIZZINESS. BP STABLE. ENCOURAGED PT TO AMBULATE WITH NSG LATER TONIGHT. PER PT, NG TUBE TO BE REMOVED TODAY AND HAS BEEN CLEARED FOR REGULAR DIET.      Time Calculation:   PT Charges     Row Name 06/16/20 1426             Time Calculation    Start Time  1335  -CD      PT Received On  06/16/20  -CD      PT Goal Re-Cert Due Date  06/24/20  -CD         Time Calculation- PT    Total Timed Code Minutes- PT  29 minute(s)  -CD         Timed Charges    04483 - PT Therapeutic Activity Minutes  29  -CD        User Key  (r) = Recorded By, (t) = Taken By, (c) = Cosigned By    Initials Name Provider Type    CD Dayna Julian, PT Physical Therapist        Therapy Charges for Today     Code Description Service Date Service Provider Modifiers Qty    45699647908 HC PT THERAPEUTIC ACT EA 15 MIN 6/16/2020 Dayna Julian, PT GP 2          PT G-Codes  Outcome Measure Options: AM-PAC 6 Clicks Basic Mobility (PT),  Modified Isaac  AM-PAC 6 Clicks Score (PT): 17  AM-PAC 6 Clicks Score (OT): 13  Modified Bell Scale: 3 - Moderate disability.  Requiring some help, but able to walk without assistance.    Dayna Julian, PT  6/16/2020

## 2020-06-16 NOTE — PROGRESS NOTES
Adult Nutrition  Assessment/PES    Patient Name:  Sidra Lane  YOB: 1955  MRN: [id number]  Admit Date:  5/31/2020    Assessment Date:  6/16/2020      Reason for Assessment     Row Name 06/16/20 7660          Reason for Assessment    Reason For Assessment Noted results of MBS. RD ordered one-day bhavya ct to assess po intake now that on regular diet.            Education/Evaluation     Row Name 06/16/20 5528          Monitor/Evaluation    Monitor  Per protocol           Electronically signed by:  Flaca Storey MS,RD,LD  06/16/20 16:53

## 2020-06-16 NOTE — PROGRESS NOTES
Continued Stay Note  [facility]     Patient Name: Sidra Lane  MRN: [id number]  Today's Date: 6/16/2020    Admit Date: 5/31/2020    Discharge Plan     Row Name 06/16/20 1121       Plan    Plan  [facility]    Patient/Family in Agreement with Plan  yes    Plan Comments  Spoke with patient and daughter at bedside. Plan is [facility] when Keofeed is DC'ed and patient is taking in more PO. CM will continue to follow.    Final Discharge Disposition Code  03 - skilled nursing facility (SNF)        Discharge Codes    No documentation.             Naga Muñoz RN

## 2020-06-16 NOTE — PLAN OF CARE
Problem: Patient Care Overview  Goal: Plan of Care Review  Outcome: Ongoing (interventions implemented as appropriate)     SLP evaluation completed. Will continue to address oral dysphagia. Please see note for further details and recommendations.

## 2020-06-17 LAB
ANION GAP SERPL CALCULATED.3IONS-SCNC: 14 MMOL/L (ref 5–15)
BUN BLD-MCNC: 24 MG/DL (ref 8–23)
BUN/CREAT SERPL: 22.6 (ref 7–25)
CALCIUM SPEC-SCNC: 9.4 MG/DL (ref 8.6–10.5)
CHLORIDE SERPL-SCNC: 108 MMOL/L (ref 98–107)
CO2 SERPL-SCNC: 21 MMOL/L (ref 22–29)
CREAT BLD-MCNC: 1.06 MG/DL (ref 0.57–1)
DEPRECATED RDW RBC AUTO: 57.8 FL (ref 37–54)
ERYTHROCYTE [DISTWIDTH] IN BLOOD BY AUTOMATED COUNT: 19.9 % (ref 12.3–15.4)
GFR SERPL CREATININE-BSD FRML MDRD: 52 ML/MIN/1.73
GLUCOSE BLD-MCNC: 125 MG/DL (ref 65–99)
HCT VFR BLD AUTO: 42.5 % (ref 34–46.6)
HGB BLD-MCNC: 11.8 G/DL (ref 12–15.9)
MCH RBC QN AUTO: 22.8 PG (ref 26.6–33)
MCHC RBC AUTO-ENTMCNC: 27.8 G/DL (ref 31.5–35.7)
MCV RBC AUTO: 82.2 FL (ref 79–97)
PLATELET # BLD AUTO: 372 10*3/MM3 (ref 140–450)
PMV BLD AUTO: 11.2 FL (ref 6–12)
POTASSIUM BLD-SCNC: 4.2 MMOL/L (ref 3.5–5.2)
RBC # BLD AUTO: 5.17 10*6/MM3 (ref 3.77–5.28)
SODIUM BLD-SCNC: 143 MMOL/L (ref 136–145)
WBC NRBC COR # BLD: 6.82 10*3/MM3 (ref 3.4–10.8)

## 2020-06-17 PROCEDURE — 97110 THERAPEUTIC EXERCISES: CPT

## 2020-06-17 PROCEDURE — 85027 COMPLETE CBC AUTOMATED: CPT | Performed by: INTERNAL MEDICINE

## 2020-06-17 PROCEDURE — 25010000002 ONDANSETRON PER 1 MG: Performed by: INTERNAL MEDICINE

## 2020-06-17 PROCEDURE — 80048 BASIC METABOLIC PNL TOTAL CA: CPT | Performed by: INTERNAL MEDICINE

## 2020-06-17 PROCEDURE — 99232 SBSQ HOSP IP/OBS MODERATE 35: CPT | Performed by: INTERNAL MEDICINE

## 2020-06-17 PROCEDURE — 87635 SARS-COV-2 COVID-19 AMP PRB: CPT | Performed by: INTERNAL MEDICINE

## 2020-06-17 RX ADMIN — DICLOFENAC SODIUM 2 G: 10 GEL TOPICAL at 08:43

## 2020-06-17 RX ADMIN — METOPROLOL TARTRATE 100 MG: 100 TABLET ORAL at 08:41

## 2020-06-17 RX ADMIN — DIPHENOXYLATE HYDROCHLORIDE AND ATROPINE SULFATE 1 TABLET: 2.5; .025 TABLET ORAL at 20:19

## 2020-06-17 RX ADMIN — FAMOTIDINE 20 MG: 20 TABLET, FILM COATED ORAL at 08:41

## 2020-06-17 RX ADMIN — NYSTATIN 500000 UNITS: 100000 SUSPENSION ORAL at 08:42

## 2020-06-17 RX ADMIN — Medication 1 CAPSULE: at 08:42

## 2020-06-17 RX ADMIN — NYSTATIN 500000 UNITS: 100000 SUSPENSION ORAL at 14:04

## 2020-06-17 RX ADMIN — NYSTATIN 500000 UNITS: 100000 SUSPENSION ORAL at 20:18

## 2020-06-17 RX ADMIN — Medication 1 PACKET: at 08:46

## 2020-06-17 RX ADMIN — DIPHENOXYLATE HYDROCHLORIDE AND ATROPINE SULFATE 1 TABLET: 2.5; .025 TABLET ORAL at 08:45

## 2020-06-17 RX ADMIN — CHOLESTYRAMINE 4 G: 4 POWDER, FOR SUSPENSION ORAL at 08:43

## 2020-06-17 RX ADMIN — NYSTATIN 500000 UNITS: 100000 SUSPENSION ORAL at 19:23

## 2020-06-17 RX ADMIN — DICLOFENAC SODIUM 2 G: 10 GEL TOPICAL at 18:07

## 2020-06-17 RX ADMIN — DICLOFENAC SODIUM 2 G: 10 GEL TOPICAL at 14:03

## 2020-06-17 RX ADMIN — ATORVASTATIN CALCIUM 80 MG: 40 TABLET, FILM COATED ORAL at 20:19

## 2020-06-17 RX ADMIN — MICONAZOLE NITRATE: 2 CREAM TOPICAL at 08:51

## 2020-06-17 RX ADMIN — SIMETHICONE 40 MG: 20 SUSPENSION/ DROPS ORAL at 14:02

## 2020-06-17 RX ADMIN — MICONAZOLE NITRATE: 2 CREAM TOPICAL at 20:20

## 2020-06-17 RX ADMIN — DICLOFENAC SODIUM 2 G: 10 GEL TOPICAL at 20:19

## 2020-06-17 RX ADMIN — ASPIRIN 81 MG 81 MG: 81 TABLET ORAL at 08:42

## 2020-06-17 RX ADMIN — QUETIAPINE FUMARATE 12.5 MG: 25 TABLET ORAL at 20:18

## 2020-06-17 RX ADMIN — RIVAROXABAN 20 MG: 20 TABLET, FILM COATED ORAL at 19:23

## 2020-06-17 RX ADMIN — AMANTADINE HYDROCHLORIDE 100 MG: 100 CAPSULE ORAL at 08:42

## 2020-06-17 RX ADMIN — DIPHENOXYLATE HYDROCHLORIDE AND ATROPINE SULFATE 1 TABLET: 2.5; .025 TABLET ORAL at 08:42

## 2020-06-17 RX ADMIN — METOPROLOL TARTRATE 100 MG: 100 TABLET ORAL at 20:19

## 2020-06-17 RX ADMIN — ONDANSETRON 4 MG: 2 INJECTION INTRAMUSCULAR; INTRAVENOUS at 12:23

## 2020-06-17 RX ADMIN — AMANTADINE HYDROCHLORIDE 100 MG: 100 CAPSULE ORAL at 14:02

## 2020-06-17 RX ADMIN — FAMOTIDINE 20 MG: 20 TABLET, FILM COATED ORAL at 19:23

## 2020-06-17 NOTE — PROGRESS NOTES
Continued Stay Note  [facility]     Patient Name: Sidra Lane  MRN: [id number]  Today's Date: 6/17/2020    Admit Date: 5/31/2020    Discharge Plan     Row Name 06/17/20 1334       [facility]    [facility]    Plan Comments  [facility] to Cassia with [facility] results of [facility] test, hopefully tomorrow. Daughter will transport. CM will continue to follow.        Discharge Codes    No documentation.             Naga Muñoz RN

## 2020-06-17 NOTE — THERAPY TREATMENT NOTE
Acute Care - Occupational Therapy Treatment Note  [facility]     Patient Name: Sidra Lane  : 1955  MRN: [id number]  Today's Date: 2020             Admit Date: 2020       ICD-10-CM ICD-9-CM   1. Oropharyngeal dysphagia R13.12 787.22   2. Acute ischemic stroke (CMS/HCC) I63.9 434.91   3. Dysarthria R47.1 784.51   4. Cognitive communication deficit R41.841 799.52     Patient Active Problem List   Diagnosis   • Shortness of breath   • Atrial fibrillation (CMS/HCC)   • Essential hypertension   • Dizziness   • Deep vein thrombosis (DVT) of proximal lower extremity (CMS/HCC)   • Coronary artery disease involving native coronary artery of native heart without angina pectoris   • Fatigue   • Chest pain   • Longstanding persistent atrial fibrillation (CMS/HCC)   • Cerebrovascular accident (CVA) due to embolism of right middle cerebral artery (CMS/HCC)   • Acute ischemic stroke (CMS/HCC)     Past Medical History:   Diagnosis Date   • Atrial fibrillation (CMS/HCC)    • CHF (congestive heart failure) (CMS/HCC)    • Deep vein thrombosis (CMS/HCC)    • GERD (gastroesophageal reflux disease)    • Hypertension    • May-Thurner syndrome    • Pulmonary embolism (CMS/HCC)      Past Surgical History:   Procedure Laterality Date   • CHOLECYSTECTOMY     • COLON SURGERY      bowel leakage, some of colon removed   • INTERVENTIONAL RADIOLOGY PROCEDURE Bilateral 2020    Procedure: CAROTID CEREBRAL ANGIOGRAM BILATERAL;  Surgeon: Brant Duarte MD;  Location: [facility] INVASIVE LOCATION;  Service: Interventional Radiology;  Laterality: Bilateral;   • LAPAROSCOPIC TUBAL LIGATION     • LEG SURGERY      multiple stents to BLE   • TONSILLECTOMY         Therapy Treatment    Rehabilitation Treatment Summary     Row Name 20 1050             Treatment Time/Intention    Discipline  occupational therapist  -AN      Document Type  therapy note (daily note)  -AN      Subjective Information  no complaints  -AN       Mode of Treatment  occupational therapy  -AN      Patient/Family Observations  daughter present, pt just back to bed from chair then bathroom  -AN      Care Plan Review  care plan/treatment goals reviewed;risks/benefits reviewed  -AN      Patient Effort  good  -AN      Existing Precautions/Restrictions  fall  -AN      Treatment Considerations/Comments  L side weakness  -AN      Recorded by [AN] Sherie Gandara, OT 06/17/20 1132      Row Name 06/17/20 1050             Cognitive Assessment/Intervention- PT/OT    Affect/Mental Status (Cognitive)  confused  -AN      Orientation Status (Cognition)  oriented x 3  -AN      Follows Commands (Cognition)  WFL  -AN      Recorded by [AN] Sherie Gandara, OT 06/17/20 1132      Row Name 06/17/20 1050             Bed Mobility Assessment/Treatment    Rolling Right [place] (Bed Mobility)  supervision  -AN      Assistive Device (Bed Mobility)  bed rails  -AN      Recorded by [AN] Sherie Gandara, OT 06/17/20 1132      Row Name 06/17/20 1050             Grooming Assessment/Training    [place] Level (Grooming)  wash face, hands add lotion, chapstick  -AN      Comment (Grooming)  with cues, pt able to utilize L hand also  -AN      Recorded by [AN] Sherie Gandara, OT 06/17/20 1132      Row Name 06/17/20 1050             Therapeutic Exercise    93974 - OT Therapeutic Exercise Minutes  8  -AN      37162 - OT Therapeutic Activity Minutes  4  -AN      Recorded by [AN] Sherie Gandara, OT 06/17/20 1132      Row Name 06/17/20 1050             Therapeutic Exercise    Upper Extremity Range of Motion (Therapeutic Exercise)  shoulder flexion/extension, bilateral;shoulder horizontal abduction/adduction, bilateral;shoulder internal/external rotation, bilateral;elbow flexion/extension, bilateral;forearm supination/pronation, bilateral;wrist flexion/extension, bilateral  -AN      Exercise Type (Therapeutic Exercise)  AROM (active range of motion)  -AN      Comment (Therapeutic Exercise)  pt with  improved AROM L sup/pronation and wrist flex/ext. No active thumb ROM  -AN      Recorded by [AN] Sherie Gandara, OT 06/17/20 1132      Row Name 06/17/20 1050             Fine Motor Testing & Training    Comment, Fine Motor Coordination  adapted techniques to use chapstick, play cards with Baldev hands  -AN      Recorded by [AN] Sherie Gandara, OT 06/17/20 1132      Row Name 06/17/20 1050             Positioning and Restraints    Pre-Treatment Position  in bed  -AN      Post Treatment Position  bed  -AN      In Bed  supine;call light within reach;encouraged to call for assist;exit alarm on;with family/caregiver  -AN      Recorded by [AN] Sherie Gandara, OT 06/17/20 1132      Row Name 06/17/20 1050             Pain Scale: Numbers Pre/Post-Treatment    Pain Scale: Numbers, Pretreatment  0/10 - no pain  -AN      Pain Scale: Numbers, Post-Treatment  0/10 - no pain  -AN      Recorded by [AN] Sherie Gandara, OT 06/17/20 1132      Row Name 06/17/20 1050             Vision Assessment/Intervention    Impact of Vision Impairment on Function (Vision)  pt tracks and attend to L with less cues  -AN      Recorded by [AN] Sherie Gandara, OT 06/17/20 1132      Row Name 06/17/20 1050             Plan of Care Review    Plan of Care Reviewed With  patient  -AN      Recorded by [AN] Sherie Gandara, OT 06/17/20 1132      Row Name 06/17/20 1050             Outcome Summary/Treatment Plan (OT)    Daily Summary of Progress (OT)  progress toward functional goals is good  -AN      Anticipated Discharge Disposition (OT)  inpatient rehabilitation facility  -AN      Recorded by [AN] Sherie Gandara, OT 06/17/20 1132        User Key  (r) = Recorded By, (t) = Taken By, (c) = Cosigned By    Initials Name Effective Dates Discipline    AN Sherie Gandara, OT 06/22/15 -  OT             Occupational Therapy Education                 Title: PT OT SLP Therapies (In Progress)     Topic: Occupational Therapy (In Progress)     Point: ADL training (Done)      Description:   Instruct learner(s) on proper safety adaptation and remediation techniques during self care or transfers.   Instruct in proper use of assistive devices.              Learning Progress Summary           Patient Acceptance, E, VU,Bed IU by LORETTA at 6/17/2020 1050    Acceptance, E,D, VU,NR by AN at 6/14/2020 1148    Comment:  HEP, assisted ROM for L hand for ADL activities.    Acceptance, E, VU,DU by MADINA at 6/5/2020 1505    Acceptance, E,D, VU,DU by MAXIMO at 6/2/2020 1410    Comment:  Pt educated on role of OT, safety, fall prevention, ADLs, transfers, and POC.   Family Acceptance, E,D, VU,NR by LORETTA at 6/14/2020 1148    Comment:  HEP, assisted ROM for L hand for ADL activities.    Acceptance, E, VU,DU by MADINA at 6/5/2020 1505    Acceptance, E,D, VU,DU by MAXIMO at 6/2/2020 1410    Comment:  Pt educated on role of OT, safety, fall prevention, ADLs, transfers, and POC.                   Point: Home exercise program (Done)     Description:   Instruct learner(s) on appropriate technique for monitoring, assisting and/or progressing therapeutic exercises/activities.              Learning Progress Summary           Patient Acceptance, E,D, NR,VU,DU by LORETTA at 6/17/2020 1050    Comment:  Educated on funcitonal activities for UE motor coordination.    Acceptance, E,D, VU,NR by LORETTA at 6/14/2020 1148    Comment:  HEP, assisted ROM for L hand for ADL activities.    Acceptance, E,D, NR by CHERELLE at 6/9/2020 1514    Acceptance, E, VU,DU by MADINA at 6/5/2020 1505    Acceptance, E,D, VU,DU by MAXIMO at 6/2/2020 1410    Comment:  Pt educated on role of OT, safety, fall prevention, ADLs, transfers, and POC.   Family Acceptance, E,D, NR,VU,DU by LORETTA at 6/17/2020 1050    Comment:  Educated on funcitonal activities for UE motor coordination.    Acceptance, E,D, VU,NR by LORETTA at 6/14/2020 1148    Comment:  HEP, assisted ROM for L hand for ADL activities.    Acceptance, E, VU,DU by MADINA at 6/5/2020 1505    Acceptance, E,D, VU,DU by MAXIMO at 6/2/2020 0118     Comment:  Pt educated on role of OT, safety, fall prevention, ADLs, transfers, and POC.                   Point: Precautions (In Progress)     Description:   Instruct learner(s) on prescribed precautions during self-care and functional transfers.              Learning Progress Summary           Patient Acceptance, E,D, NR by CHERELLE at 6/9/2020 1514    Acceptance, E, VU,DU by MADINA at 6/5/2020 1505    Acceptance, E,D, VU,DU by MAXIMO at 6/2/2020 1410    Comment:  Pt educated on role of OT, safety, fall prevention, ADLs, transfers, and POC.   Family Acceptance, E, VU,DU by MADINA at 6/5/2020 1505    Acceptance, E,D, VU,DU by MAXIMO at 6/2/2020 1410    Comment:  Pt educated on role of OT, safety, fall prevention, ADLs, transfers, and POC.                   Point: Body mechanics (In Progress)     Description:   Instruct learner(s) on proper positioning and spine alignment during self-care, functional mobility activities and/or exercises.              Learning Progress Summary           Patient Acceptance, E,D, NR by CHERELLE at 6/9/2020 1514    Acceptance, E, VU,DU by MADINA at 6/5/2020 1505    Acceptance, E,D, VU,DU by MAXIMO at 6/2/2020 1410    Comment:  Pt educated on role of OT, safety, fall prevention, ADLs, transfers, and POC.   Family Acceptance, E, VU,DU by MADINA at 6/5/2020 1505    Acceptance, E,D, VU,DU by MAXIMO at 6/2/2020 1410    Comment:  Pt educated on role of OT, safety, fall prevention, ADLs, transfers, and POC.                               User Key     Initials Effective Dates Name Provider Type Discipline    LORETTA 06/22/15 -  Sherie Gandara OT Occupational Therapist OT    MAXIMO 07/17/19 -  Cassia Zee OT Occupational Therapist OT    CHERELLE 01/29/20 -  Annika Aviles OT Occupational Therapist OT     01/29/20 -  Casandra Mueller OT Occupational Therapist OT                OT Recommendation and Plan  Outcome Summary/Treatment Plan (OT)  Daily Summary of Progress (OT): progress toward functional goals is good  Anticipated Discharge Disposition  (OT): inpatient rehabilitation facility  Daily Summary of Progress (OT): progress toward functional goals is good  Plan of Care Review  Plan of Care Reviewed With: patient  Plan of Care Reviewed With: patient  Outcome Summary: Pt with improved UE AROM with elbow, wrist, hand this date. Pt unable to activity range L thumb. Completed grooming activites with inclusion of L handthis date. Continue POC, recommend iRF.  Outcome Measures     Row Name 06/17/20 1050             How much help from another is currently needed...    Putting on and taking off regular lower body clothing?  2  -AN      Bathing (including washing, rinsing, and drying)  2  -AN      Toileting (which includes using toilet bed pan or urinal)  2  -AN      Putting on and taking off regular upper body clothing  2  -AN      Taking care of personal grooming (such as brushing teeth)  3  -AN      Eating meals  3  -AN      AM-PAC 6 Clicks Score (OT)  14  -AN         Modified Isaac Scale    Modified Isaac Scale  3 - Moderate disability.  Requiring some help, but able to walk without assistance.  -AN        User Key  (r) = Recorded By, (t) = Taken By, (c) = Cosigned By    Initials Name Provider Type    Sherie Palafox OT Occupational Therapist           Time Calculation:   Time Calculation- OT     Row Name 06/17/20 1137 06/17/20 1050          Time Calculation- OT    OT Start Time  1050  -AN  --     Total Timed Code Minutes- OT  12 minute(s)  -AN  --     OT Received On  06/17/20  -AN  --     OT Goal Re-Cert Due Date  06/24/20  -AN  --        Timed Charges    06385 - OT Therapeutic Exercise Minutes  --  8  -AN     93039 - OT Therapeutic Activity Minutes  --  4  -AN       User Key  (r) = Recorded By, (t) = Taken By, (c) = Cosigned By    Initials Name Provider Type    Sherie Palafox OT Occupational Therapist        Therapy Charges for Today     Code Description Service Date Service Provider Modifiers Qty    [id number]  OT THER PROC EA 15 MIN 6/17/2020  Juliocesar, Sherie WEBB, OT GO 1               Sherie WEBB. Juliocesar, SULEMA  6/17/2020

## 2020-06-17 NOTE — PLAN OF CARE
Problem: Patient Care Overview  Goal: Plan of Care Review  Outcome: Ongoing (interventions implemented as appropriate)     Problem: Fall Risk (Adult)  Goal: Identify Related Risk Factors and Signs and Symptoms  Outcome: Ongoing (interventions implemented as appropriate)

## 2020-06-17 NOTE — CONSULTS
Clinical Nutrition     Nutrition Support Assessment  Reason for Visit: Calorie count    Patient Name: Sidra Lane  YOB: 1955  MRN: [id number]  Date of Encounter: 06/17/20 08:35  Admission date: 5/31/2020     Comments: Patient seen today for calorie count. RD briefed by previous dietitian over discussion 6/16 with attending MD for keofeed removal. Keofeed removed this AM. Patient advanced to regular textures 6/15. Assessment of current intake as follows:    - Patient consuming ~50% of [facility]  trays which provided ~1800 kcal/day   900 kcal / 1600 est kcal needs = ~56% est kcal needs consumed    - Patient consuming ~50% of [facility]  trays which provided ~80 g protein/day   40 g / 100 est protein needs = ~40% est protein needs consumed    Patient with significant improvement in oral intake since passing MBS for regular/thins. Anticipate continued improved oral intake following keofeed removal.    RD to continue to follow PO intake and adequacy    Nutrition Assessment     Admission Problem List:  R MCA - CVA   Unsuccessful thrombectomy - iatPA (5/31)  PAF w/ RVR  Fever  C. Diff - negative (6/9)    Applicable PMH:  A-fib   CAD  GERD   DVT  PE  HTN    Applicable Nutrition-Related Information:  (5/31) NPO  (6/2) SLP MBS - NPO, temporary alternate methods of nutrition/hydration, other (see comments)(per MD discretion)  (6/2) NGT placed and EN initiated - Replete w/ Fiber @ 75 mL/hr TGV = 1500 mL, free water @ 15 mL  (6/5) EN changed to Peptamen AF @ 60 mL/hr, TGV = 1320 mL  (6/7) Pt removed keofeed  (6/9) SLP tx - Patient continues to display overt s/s of aspiration with ice chips, thin via tsp and NT liquids via tsp  (6/10) SLP MBS - puree with some mashed, honey thick liquids  (6/11) x2 day Calorie count ordered/initiated  (6/14) EN changed to Replete w/ Fiber @ 65 mL/hr from 6p-4a x1 [employer] Fiber packet  (6/15) SLP MBS - rec, regular textures, thin  "liquids  (6/17) keofeed removed    Applicable medical tests/procedures since admission:  (6/2) MBS - with recommendation for NPO status  (6/4) SLP - \"pt not appropriate for tx per RN\"    Reported/Observed/Food/Nutrition Related History:      Patient sleeping at attempted visit with lights off. No family present, did not wake when name called. Observed meal tray on counter, patient had eaten ~50% of tray. Spoke with RD who discussed keofeed removal 6/16 with attending MD. Keofeed was removed early this AM, as patient refusing nocturnal feedings, diet advanced to regular, and discharge planning progressing per case management.     Patient consumed 50% of both lunch and dinner meal yesterday which is significantly improved intake. She is requesting alternate selections with catering staff and has no diet modifications/restrictions.    Per I/O documentation:  BM x4 (6/16)    Anthropometrics     Height: Height: 154.9 cm (61\")  Weight: Weight: 104 kg (229 lb) (06/06/20 1000)  BMI: BMI (Calculated): 43.3  BMI classification: Obese Class III extreme obesity: > or equal to 40kg/m2   IBW:  105#     Labs reviewed     Results from last 7 days   Lab Units 06/15/20  0554   SODIUM mmol/L 142   POTASSIUM mmol/L 4.8   CHLORIDE mmol/L 106   CO2 mmol/L 17.0*   BUN mg/dL 29*   CREATININE mg/dL 0.88   GLUCOSE mg/dL 128*   CALCIUM mg/dL 9.4   PHOSPHORUS mg/dL 4.0     Results from last 7 days   Lab Units 06/16/20  1640 06/16/20  1113 06/16/20  0523 06/15/20  2330 06/15/20  1722 06/15/20  1200   GLUCOSE mg/dL 105 102 105 93 108 112     Lab Results   Lab Value Date/Time    HGBA1C 5.80 (H) 06/01/2020 0637       Medications reviewed   Pertinent: lomotil, pepcid, cholestyramine x4/d, risaquad, seroquel    Intake/Ouptut 24 hrs (7:00AM - 6:59 AM)     Intake & Output (last day)       06/16 0701 - 06/17 0700 06/17 0701 - 06/18 0700    P.O. 360     Other      Total Intake(mL/kg) 360 (3.5)     Net +360           Urine Unmeasured Occurrence 7 x     " Stool Unmeasured Occurrence 4 x         Needs Assessment (6/13)     Height used:  154.9cm  Weight used: 105kg  BMI: 43.5 kg     Estimated calorie needs: ~1600 kcal/day  Method:Kcals/KG 14 - 16 = 1470 - 1664    Estimated protein needs: ~100 g pro/day  1.0 g/kg= 105g pro  2.0grm KG/IBW = 95    Current Nutrition Prescription     PO: Regular   Oral Nutrition Supplements: Boost VHC x2/d; Magic Cup x2/d  Intake: 50% x 3 meals    Nutrition Diagnosis     6/17  Problem Inadequate oral intake   Etiology Clinical status   Signs/Symptoms PO Intake (50% x 3 meals)   Status: ongoing, however, pt intake improving since diet advancement/keofeed removal      6/5 updated 6/8, 6/14, 6/17  Problem Less than optimal enteral infusion composition/modality   Etiology GI status   Signs/Symptoms Adjustment to high fiber formula to assist with loose stools   Status: EN d/c'd    6/2 updated 6/5, 6/8, 6/10, 6/13, 6/17  Problem Swallowing difficulty   Etiology Per SLP eval    Signs/Symptoms Per MBS (6/10) - dysphagia 3/mashed, HTL, no straws   Status: resolved    Nutrition Intervention     1.  Follow treatment progress, Care plan reviewed   2.  Adjusted supplement - d/c Boost Very High Calorie  3. Cont to send Magic Cup supplement for increased kcal/protein intake  4. Catering staff to honor food preferences as requested by patient  5. RD to continue to monitor PO intake/adequacy    Goal:   General: Nutrition support treatment   PO: Increase Intake      Monitoring/Evaluation:   Per protocol, PO intake, Supplement intake, Pertinent labs, GI status, Symptoms, POC/GOC    Will Continue to follow per protocol      Alejandrina Sandoval RDN, LD  Time Spent: 30 minutes

## 2020-06-17 NOTE — PLAN OF CARE
Problem: Patient Care Overview  Goal: Plan of Care Review  Outcome: Ongoing (interventions implemented as appropriate)  Flowsheets (Taken 6/17/2020 1050)  Outcome Summary: Pt with improved UE AROM with elbow, wrist, hand this date. Pt unable to activity range L thumb. Completed grooming activites with inclusion of L handthis date. Continue POC, recommend iRF.     Problem: Stroke (Ischemic) (Adult)  Goal: Signs and Symptoms of Listed Potential Problems Will be Absent, Minimized or Managed (Stroke)  Outcome: Ongoing (interventions implemented as appropriate)  Flowsheets (Taken 6/17/2020 1050)  Problems Assessed (Stroke (Ischemic)): acute neurologic deterioration; motor/sensory impairment; communication impairment; cognitive impairment; muscle tone abnormal  Problems Assessed (Stroke (Ischemic)): motor/sensory impairment; muscle tone abnormal; communication impairment

## 2020-06-17 NOTE — PROGRESS NOTES
[facility] Medicine Services  PROGRESS NOTE    Patient Name: Sidra Lane  : 1955  MRN: [id number]    Date of Admission: 2020  Primary Care Physician: Jl Mcknight MD    Subjective   Subjective     CC:  cva    HPI:  No new issues. No pain or dyspnea. Diarrhea persists but is improved.    Review of Systems  No palpitations or chest pain    Objective   Objective     Vital Signs:   Temp:  [98.2 °F (36.8 °C)-98.6 °F (37 °C)] 98.2 °F (36.8 °C)  Heart Rate:  [] 98  Resp:  [16-18] 16  BP: (114-132)/(80-97) 120/85  Total (NIH Stroke Scale): 7     Physical Exam:  Alert, nontoxic appearing, confused to details but answers most questions appropriately  Ncat, oroph clear  rrr  Lungs clear  abd soft nontender  No cce  No extremity edema    Results Reviewed:  Results from last 7 days   Lab Units 20  0945 20  0903 20  0819   WBC 10*3/mm3 6.82 10.74 10.78   HEMOGLOBIN g/dL 11.8* 11.2* 11.4*   HEMATOCRIT % 42.5 41.0 40.7   PLATELETS 10*3/mm3 372 405 399     Results from last 7 days   Lab Units 20  0945 06/15/20  0554 20  0819   SODIUM mmol/L 143 142 139   POTASSIUM mmol/L 4.2 4.8 4.7   CHLORIDE mmol/L 108* 106 104   CO2 mmol/L 21.0* 17.0* 23.0   BUN mg/dL 24* 29* 21   CREATININE mg/dL 1.06* 0.88 0.76   GLUCOSE mg/dL 125* 128* 101*   CALCIUM mg/dL 9.4 9.4 9.3   ALT (SGPT) U/L  --  22  --    AST (SGOT) U/L  --  29  --      Estimated Creatinine Clearance: 57.2 mL/min (A) (by C-G formula based on SCr of 1.06 mg/dL (H)).    Microbiology Results Abnormal     Procedure Component Value - Date/Time    Blood Culture - Blood, Hand, Left [[id number]] Collected:  20 0856    Lab Status:  Final result Specimen:  Blood from Hand, Left Updated:  20 1501     Blood Culture No growth at 5 days    Blood Culture - Blood, Hand, Right [[id number]] Collected:  20 1400    Lab Status:  Final result Specimen:  Blood from Hand, Right Updated:  20 1504      Blood Culture No growth at 5 days    Clostridium Difficile Toxin - Stool, Per Rectum [[id number]] Collected:  06/08/20 1750    Lab Status:  Final result Specimen:  Stool from Per Rectum Updated:  06/08/20 2011    Narrative:       The following orders were created for panel order Clostridium Difficile Toxin - Stool, Per Rectum.  Procedure                               Abnormality         Status                     ---------                               -----------         ------                     Clostridium Difficile To...[[id number]]  Normal              Final result                 Please view results for these tests on the individual orders.    Clostridium Difficile Toxin, PCR - Stool, Per Rectum [[id number]]  (Normal) Collected:  06/08/20 1750    Lab Status:  Final result Specimen:  Stool from Per Rectum Updated:  06/08/20 2011     C. Difficile Toxins by PCR Not Detected    Narrative:       Performance characteristics of test not established for patients <2 years of age.  Negative for Toxigenic C. Difficile    Urine Culture - Urine, Urine, Clean Catch [[id number]] Collected:  06/05/20 1358    Lab Status:  Final result Specimen:  Urine, Clean Catch Updated:  06/06/20 0937     Urine Culture 50,000 CFU/mL Mixed Herberth Isolated    Narrative:       Specimen contains mixed organisms of questionable pathogenicity which indicates contamination with commensal herberth.  Further identification is unlikely to provide clinically useful information.  Suggest recollection.    MRSA Screen, PCR (Inpatient) - Swab, Nares [[id number]]  (Abnormal) Collected:  06/05/20 1403    Lab Status:  Final result Specimen:  Swab from Nares Updated:  06/05/20 1528     MRSA PCR Positive    COVID-19,CEPHEID,AMBERLY IN-HOUSE(OR EMERGENT/ADD-ON),NP SWAB IN TRANSPORT MEDIA 3-4 HR TAT - Swab, Nasopharynx [[id number]]  (Normal) Collected:  05/31/20 1533    Lab Status:  Final result Specimen:  Swab from Nasopharynx Updated:  05/31/20 1638     COVID19 Not  Detected          Imaging Results (Last 24 Hours)     Procedure Component Value Units Date/Time    FL Video Swallow With Speech Single Contrast [[id number]] Collected:  06/16/20 1602     Updated:  06/16/20 1710    Narrative:       EXAMINATION: FL VIDEO SWALLOW W SPEECH SINGLE-CONTRAST-     INDICATION: Dysphagia; R13.12-Dysphagia, oropharyngeal phase;  I63.9-Cerebral infarction, unspecified; R47.1-Dysarthria and anarthria;  R41.841-Cognitive communication deficit     TECHNIQUE: 54 seconds of fluoroscopic time was used for this exam. 10  associated fluoroscopic loops were saved. The patient was evaluated in  the seated lateral position while taking a variety of consistencies of  barium by mouth under the direction of speech pathology.     COMPARISON: NONE     FINDINGS: 54 seconds of fluoroscopy provided for a modified barium  swallow. Please see speech therapy report for full details and  recommendations.          Impression:       Fluoroscopy provided for a modified barium swallow. Please  see speech therapy report for full details and recommendations.         This report was finalized on 6/16/2020 5:06 PM by Dr. Chastity Rico MD.             Results for orders placed during the hospital encounter of 05/31/20   Adult Transthoracic Echo Complete W/ Cont if Necessary Per Protocol    Narrative · Left ventricular systolic function is normal. Estimated EF appears to be   in the range of 56 - 60%.  · Normal right ventricular cavity size and systolic function noted.  · Left atrial cavity size is moderately dilated. Left atrial volume is   moderately increased.  · No evidence of a patent foramen ovale. Saline test results are negative.  · The aortic valve is abnormal in structure. The valve exhibits sclerosis.   There is mild calcification of the aortic valve  · Mild aortic valve stenosis is present          I have reviewed the medications:  Scheduled Meds:  amantadine 100 mg Oral BID   aspirin 81 mg Oral Daily    atorvastatin 80 mg Oral Nightly   cholestyramine light 1 packet Oral Daily   diclofenac 2 g Topical 4x Daily   dilTIAZem  mg Oral Q24H   diphenoxylate-atropine 1 tablet Oral BID   famotidine 20 mg Oral BID AC   lactobacillus acidophilus 1 capsule Oral Daily   metoprolol tartrate 100 mg Oral Q12H   miconazole  Topical Q12H   Nutrisource fiber 1 packet Nasogastric Daily   nystatin 5 mL Swish & Swallow 4x Daily   QUEtiapine 12.5 mg Oral Nightly   rivaroxaban 20 mg Oral Daily With Dinner     Continuous Infusions:   PRN Meds:.•  acetaminophen  •  acetaminophen  •  butalbital-acetaminophen-caffeine  •  diphenoxylate-atropine  •  enalaprilat  •  magic mouthwash  •  metoprolol tartrate  •  nitroglycerin  •  ondansetron  •  phenol  •  potassium chloride **OR** potassium chloride **OR** potassium chloride  •  simethicone    Assessment/Plan   Assessment & Plan     Active Hospital Problems    Diagnosis  POA   • **Cerebrovascular accident (CVA) due to embolism of right middle cerebral artery (CMS/HCC) [I63.411]  Yes   • Acute ischemic stroke (CMS/HCC) [I63.9]  Yes   • Atrial fibrillation (CMS/HCC) [I48.91]  Yes      Resolved Hospital Problems   No resolved problems to display.        Brief Hospital Course to date:    Sidra Lane is a 65 y.o. 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  She was found to have a right MCA territory reversible ischemia on CT perfusion.  She underwent a CT head neck angiogram with an intra-arterial TPA after failed thrombectomy and admitted to the ICU afterward.  She was transferred out of ICU on 6/3 with hospitalist assuming care on 6/4. I copied hospital course and assessment/plan from partner note to ensure continuity. I reviewed and have edited as appropriate.     Right MCA stroke  - likely cardioembolic.  Patient has a history of persistent  atrial fibrillation and had been anticoagulated with Eliquis.  She had been followed by her electrophysiologist and had plans for an ablation prior to readmission.   status post intra-arterial TPA after failed thrombectomy  - Cards feels that she failed Eliquis as she was on this prior to admission. Now transitioned to Xarelto.      Persistent atrial fibrillation with RVR  --changed from sotalol to metoprolol, and diltiazem added by EP for rate control  -- Xarelto     Delirium, improved  - improved with low dose seroquel     Hypertension/Hypotension  - patient with some low BPs and reports of dizziness this morning  - will hold norvasc and lisinopril, continue b-blocker and CCB for rate control  - gentle IV fluid hydration this afternoon and tonight  - bmp am     SIRS  - Possible aspiration earlier this stay.    --CXR unremarkable on 6/4 and 6/5  --completed course of augmentin     Hyperlipidemia  -High intensity statin therapy     Left-sided weakness  -PT OT, will benefit from short-term rehab once she is medically stable     Dysphagia, improved (keofeed out)  -upgraded to regular diet today. PO intake has improved, particularly with relaxed restrictions (she likes salt on food). Nutrition follows, calorie count underway. The keofeed tube is very uncomfortable for the patient, will remove and monitor PO intake.     Diarrhea, improved  --apparently this is a chronic issue at home -- patient and family say Ms Lane has short gut syndrome after small and large bowel resection for possible crohn's disease.  Requested records from GI Dr Oliveros in [facility]  --Started probiotic  --currently taking Cholestyramine and fiber packet daily/ Lomotil BID  --Cdiff negative on 6/8     Vaginal Candidiasis  --PO Diflucan x 1 ; 6/11     Oral candidiasis  -- Nystatin swish and swallow        Left otitis media   --recently just completed 7 days of Zosyn/Augmentin for PNA   --will continue to monitor      -I discussed w/ daughter at  bedside    DVT Prophylaxis:  xarelto    Disposition: I expect the patient to be discharged to rehab facility tomorrow once covid screen negative    CODE STATUS:   Code Status and Medical Interventions:   Ordered at: 05/31/20 1743     Code Status:    CPR     Medical Interventions (Level of Support Prior to Arrest):    Full         Electronically signed by Bartolo Wilson MD, 06/17/20, 17:03.

## 2020-06-17 NOTE — PROGRESS NOTES
Continued Stay Note  [facility]     Patient Name: Sidra Lane  MRN: [id number]  Today's Date: 6/17/2020    Admit Date: 5/31/2020    Discharge Plan     Row Name 06/17/20 0821       Plan    Plan  Signature of Jonas Virgen    Patient/Family in Agreement with Plan  yes    Plan Comments  Spoke with patient at bedside. Patient is doing much better today. Keofeed is out and patient is eating on her own. Plan is Signature of Jonas Virgen CM will continue to follow.    Final Discharge Disposition Code  03 - skilled nursing facility (SNF)        Discharge Codes    No documentation.             Naga Muñoz RN

## 2020-06-18 VITALS
DIASTOLIC BLOOD PRESSURE: 96 MMHG | RESPIRATION RATE: 14 BRPM | HEIGHT: 61 IN | BODY MASS INDEX: 43.23 KG/M2 | WEIGHT: 229 LBS | SYSTOLIC BLOOD PRESSURE: 130 MMHG | OXYGEN SATURATION: 95 % | TEMPERATURE: 97.4 F | HEART RATE: 94 BPM

## 2020-06-18 LAB — SARS-COV-2 N GENE NPH QL NAA+PROBE: NOT DETECTED

## 2020-06-18 PROCEDURE — 99239 HOSP IP/OBS DSCHRG MGMT >30: CPT | Performed by: NURSE PRACTITIONER

## 2020-06-18 PROCEDURE — 92526 ORAL FUNCTION THERAPY: CPT

## 2020-06-18 PROCEDURE — 92507 TX SP LANG VOICE COMM INDIV: CPT

## 2020-06-18 RX ORDER — L.ACID,PARA/B.BIFIDUM/S.THERM 8B CELL
1 CAPSULE ORAL DAILY
Start: 2020-06-18 | End: 2020-07-29

## 2020-06-18 RX ORDER — METOPROLOL TARTRATE 100 MG/1
100 TABLET ORAL EVERY 12 HOURS SCHEDULED
Start: 2020-06-18

## 2020-06-18 RX ORDER — DIPHENOXYLATE HYDROCHLORIDE AND ATROPINE SULFATE 2.5; .025 MG/1; MG/1
1 TABLET ORAL 2 TIMES DAILY
Qty: 6 TABLET | Refills: 0 | Status: SHIPPED | OUTPATIENT
Start: 2020-06-18 | End: 2020-07-29 | Stop reason: SDUPTHER

## 2020-06-18 RX ORDER — GUAR GUM
1 PACKET (EA) ORAL DAILY
Start: 2020-06-18 | End: 2020-07-29

## 2020-06-18 RX ORDER — AMANTADINE HYDROCHLORIDE 100 MG/1
100 CAPSULE, GELATIN COATED ORAL 2 TIMES DAILY
Start: 2020-06-18

## 2020-06-18 RX ORDER — FAMOTIDINE 20 MG/1
20 TABLET, FILM COATED ORAL
Start: 2020-06-18

## 2020-06-18 RX ORDER — QUETIAPINE FUMARATE 25 MG/1
12.5 TABLET, FILM COATED ORAL NIGHTLY
Start: 2020-06-18

## 2020-06-18 RX ORDER — DILTIAZEM HYDROCHLORIDE 120 MG/1
120 CAPSULE, COATED, EXTENDED RELEASE ORAL
Start: 2020-06-18

## 2020-06-18 RX ORDER — ATORVASTATIN CALCIUM 80 MG/1
80 TABLET, FILM COATED ORAL NIGHTLY
Status: ON HOLD
Start: 2020-06-18 | End: 2020-09-11 | Stop reason: SDUPTHER

## 2020-06-18 RX ORDER — DIPHENOXYLATE HYDROCHLORIDE AND ATROPINE SULFATE 2.5; .025 MG/1; MG/1
1 TABLET ORAL EVERY 8 HOURS PRN
Qty: 9 TABLET | Refills: 0 | Status: SHIPPED | OUTPATIENT
Start: 2020-06-18

## 2020-06-18 RX ORDER — ASPIRIN 81 MG/1
81 TABLET, CHEWABLE ORAL DAILY
Start: 2020-06-18

## 2020-06-18 RX ADMIN — CHOLESTYRAMINE 4 G: 4 POWDER, FOR SUSPENSION ORAL at 09:49

## 2020-06-18 RX ADMIN — DICLOFENAC SODIUM 2 G: 10 GEL TOPICAL at 09:51

## 2020-06-18 RX ADMIN — NYSTATIN 500000 UNITS: 100000 SUSPENSION ORAL at 09:49

## 2020-06-18 RX ADMIN — Medication 1 PACKET: at 09:48

## 2020-06-18 RX ADMIN — DIPHENOXYLATE HYDROCHLORIDE AND ATROPINE SULFATE 1 TABLET: 2.5; .025 TABLET ORAL at 09:50

## 2020-06-18 RX ADMIN — AMANTADINE HYDROCHLORIDE 100 MG: 100 CAPSULE ORAL at 09:49

## 2020-06-18 RX ADMIN — ASPIRIN 81 MG 81 MG: 81 TABLET ORAL at 10:14

## 2020-06-18 RX ADMIN — Medication 1 CAPSULE: at 09:50

## 2020-06-18 RX ADMIN — SIMETHICONE 40 MG: 20 SUSPENSION/ DROPS ORAL at 11:10

## 2020-06-18 RX ADMIN — FAMOTIDINE 20 MG: 20 TABLET, FILM COATED ORAL at 09:51

## 2020-06-18 RX ADMIN — METOPROLOL TARTRATE 100 MG: 100 TABLET ORAL at 11:10

## 2020-06-18 RX ADMIN — DILTIAZEM HYDROCHLORIDE 120 MG: 120 CAPSULE, COATED, EXTENDED RELEASE ORAL at 09:50

## 2020-06-18 RX ADMIN — DIPHENOXYLATE HYDROCHLORIDE AND ATROPINE SULFATE 1 TABLET: 2.5; .025 TABLET ORAL at 10:14

## 2020-06-18 RX ADMIN — MICONAZOLE NITRATE: 2 CREAM TOPICAL at 11:10

## 2020-06-18 NOTE — DISCHARGE INSTR - APPOINTMENTS
For [facility] Neurology appointment, please make appointment as needed. If you feel like you need this appointment, please let the office know you were discharged from the hospital and were told to schedule as needed.

## 2020-06-18 NOTE — DISCHARGE PLACEMENT REQUEST
"Sidra Lane Chen (65 y.o. Female)     Date of Birth Social Security Number Address Home Phone MRN    1955  [address] [place] [address] [phone] [id number]    [employer] Marital Status          Unknown Single       Admission Date Admission Type Admitting Provider Attending Provider Department, Room/Bed    5/31/20 Emergency Bartolo Wilson MD West, Christopher R, MD [facility] 3F, S311/1    Discharge Date Discharge Disposition Discharge Destination         Skilled Nursing Facility (DC - External)              Attending Provider:  Bartolo Wilson MD    Allergies:  Digoxin, Iodine, Other    Isolation:  None   Infection:  MRSA (06/05/20)   Code Status:  CPR    Ht:  154.9 cm (61\")   Wt:  104 kg (229 lb)    Admission Cmt:  None   Principal Problem:  Cerebrovascular accident (CVA) due to embolism of right middle cerebral artery (CMS/HCC) [I63.411]                 Active Insurance as of 5/31/2020     Primary Coverage     Payor Plan Insurance Group Employer/Plan Group    ANTHEM MEDICARE REPLACEMENT ANTHEM MEDICARE ADVANTAGE [id number]     Payor Plan Address Payor Plan Phone Number Payor Plan Fax Number Effective Dates    [address] [phone]  7/1/2019 - None Entered    [facility] [id number]       Subscriber Name Subscriber Birth Date Member ID       SIDRA LANE CHEN 1955 [id number]           Secondary Coverage     Payor Plan Insurance Group Employer/Plan Group    [place]      Payor Plan Address Payor Plan Phone Number Payor Plan Fax Number Effective Dates    [address] [phone]  8/27/2019 - None Entered    [facility] [address]       Subscriber Name Subscriber Birth Date Member ID       SIDRA LANE CHEN 1955 [id number]                 Emergency Contacts      (Rel.) Home Phone Work Phone Mobile Phone    Kaye Porter (Daughter) -- -- [phone]    MANDA CUEVAS (Sister) [phone] -- [phone]    "            Discharge Summary      Ivett Tenorio APRN at 20 1041              [facility]  DISCHARGE SUMMARY    Patient Name: Sidra Lane  : 1955  MRN: [id number]    Date of Admission: 2020  2:42 PM  Date of Discharge:  2020  Primary Care Physician: Jl Mcknight MD    Consults     Date and Time Order Name Status Description    2020 0904 Inpatient Cardiology Consult Completed           Hospital Course     Presenting Problem:   Acute ischemic stroke (CMS/HCC) [I63.9]    Active Hospital Problems    Diagnosis  POA   • **Cerebrovascular accident (CVA) due to embolism of right middle cerebral artery (CMS/HCC) [I63.411]  Yes   • Acute ischemic stroke (CMS/HCC) [I63.9]  Yes   • Atrial fibrillation (CMS/HCC) [I48.91]  Yes      Resolved Hospital Problems   No resolved problems to display.          Hospital Course:  Sidra Lane is a 65 y.o. 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  She was found to have a right MCA territory reversible ischemia on CT perfusion.  She underwent a CT head neck angiogram with an intra-arterial TPA after failed thrombectomy and admitted to the ICU afterward.  She was transferred out of ICU on 6/3 with hospitalist assuming care on .     Right MCA stroke  - likely cardioembolic.  Patient has a history of persistent atrial fibrillation and had been anticoagulated with Eliquis.  She had been followed by her electrophysiologist and had plans for an ablation prior to readmission.   status post intra-arterial TPA after failed thrombectomy  - Cards feels that she failed Eliquis as she was on this prior to admission. Now transitioned to Xarelto.  Follow up with primary cardiologist after rehab.        Persistent atrial fibrillation with RVR  --changed from sotalol to metoprolol,  and diltiazem added by EP for rate control  -- Xarelto     Delirium, improved  - improved with low dose seroquel at bedtime  - appears back to baseline.  Continue for now, but may be able to come off of this at the rehab facility.      Hypertension/Hypotension  - patient had some low BPs and reports of dizziness  - will continue to  hold norvasc and lisinopril, continue b-blocker and CCB for rate control  - improved with above med changes    SIRS  - cannot rule out aspiration pneumonia earlier in the stay.     --CXR unremarkable on 6/4 and 6/5  --completed course of augmentin x 7 days     Hyperlipidemia  -High intensity statin therapy     Left-sided weakness  -PT OT, will benefit from short-term rehab once she is medically stable     Dysphagia, improved (keofeed out)  -upgraded to regular diet. PO intake has improved, particularly with relaxed restrictions (she likes salt on food).  -keofeed removed on 6/17     Diarrhea, improved  --apparently this is a chronic issue at home -- patient and family say Ms Lane has short gut syndrome after small and large bowel resection for possible crohn's disease.   --follows with Dr. Oliveros in [place]  --continue probiotic  --currently taking Cholestyramine and fiber packet daily/ Lomotil BID  --Cdiff negative on 6/8     Vaginal Candidiasis  --PO Diflucan x 1 ; 6/11  --resolved     Oral candidiasis  -- Nystatin swish and swallow   --improving     Left otitis media   --recently just completed 7 days of Zosyn/Augmentin for PNA   --improved         Discharge Follow Up Recommendations for outpatient labs/diagnostics:   Follow up with PCP once discharged from rehab.   Follow up with Primary cardiologist in 1 month  Follow up with neurology as needed    Day of Discharge     HPI:   Resting comfortably in bed this morning.  No overnight issues.  Complains of ongoing chronic diarrhea and is requesting a lomotil.  Ready to transfer to rehab.     Review of Systems  Gen- No fevers, chills  CV-  No chest pain, palpitations  Resp- No cough, dyspnea  GI- No N/V, + diarrhea, no abd pain    Vital Signs:   Temp:  [97.4 °F (36.3 °C)-98.4 °F (36.9 °C)] 97.4 °F (36.3 °C)  Heart Rate:  [88-98] 88  Resp:  [14-16] 14  BP: (102-127)/(73-94) 120/90     Physical Exam:  Constitutional: No acute distress, awake, alert, resting in bed, no family at bedside.   HENT: NCAT, mucous membranes moist  Respiratory: Clear to auscultation bilaterally, respiratory effort normal   Cardiovascular: RRR, no murmurs, rubs, or gallops, palpable pedal pulses bilaterally  Gastrointestinal: Positive bowel sounds, soft, nontender, nondistended  Musculoskeletal: No bilateral ankle edema  Psychiatric: Appropriate affect, cooperative  Neurologic: Oriented x 3, speech clear  Skin: No rashes noted to exposed skin       Pertinent  and/or Most Recent Results     Results from last 7 days   Lab Units 06/17/20  0945 06/15/20  0554 06/14/20  0903 06/12/20  0819   WBC 10*3/mm3 6.82  --  10.74 10.78   HEMOGLOBIN g/dL 11.8*  --  11.2* 11.4*   HEMATOCRIT % 42.5  --  41.0 40.7   PLATELETS 10*3/mm3 372  --  405 399   SODIUM mmol/L 143 142  --  139   POTASSIUM mmol/L 4.2 4.8  --  4.7   CHLORIDE mmol/L 108* 106  --  104   CO2 mmol/L 21.0* 17.0*  --  23.0   BUN mg/dL 24* 29*  --  21   CREATININE mg/dL 1.06* 0.88  --  0.76   GLUCOSE mg/dL 125* 128*  --  101*   CALCIUM mg/dL 9.4 9.4  --  9.3     Results from last 7 days   Lab Units 06/15/20  0554   BILIRUBIN mg/dL 0.3   ALK PHOS U/L 64   ALT (SGPT) U/L 22   AST (SGOT) U/L 29     Results from last 7 days   Lab Units 06/15/20  0554   CHOLESTEROL mg/dL 95   TRIGLYCERIDES mg/dL 120           Brief Urine Lab Results  (Last result in the past 365 days)      Color   Clarity   Blood   Leuk Est   Nitrite   Protein   CREAT   Urine HCG        06/05/20 1358 Yellow Clear Negative Moderate (2+) Negative Negative               Microbiology Results Abnormal     Procedure Component Value - Date/Time    COVID-19,[facility] IN-HOUSE, NP  SWAB IN TRANSPORT MEDIA 8-12 HR TAT - Swab, Nasopharynx [[id number]]  (Normal) Collected:  06/17/20 1650    Lab Status:  Final result Specimen:  Swab from Nasopharynx Updated:  06/18/20 0243     COVID19 Not Detected    Blood Culture - Blood, Hand, Left [[id number]] Collected:  06/04/20 1406    Lab Status:  Final result Specimen:  Blood from Hand, Left Updated:  06/09/20 1501     Blood Culture No growth at 5 days    Blood Culture - Blood, Hand, Right [[id number]] Collected:  06/04/20 1400    Lab Status:  Final result Specimen:  Blood from Hand, Right Updated:  06/09/20 1501     Blood Culture No growth at 5 days    Clostridium Difficile Toxin - Stool, Per Rectum [[id number]] Collected:  06/08/20 1750    Lab Status:  Final result Specimen:  Stool from Per Rectum Updated:  06/08/20 2011    Narrative:       The following orders were created for panel order Clostridium Difficile Toxin - Stool, Per Rectum.  Procedure                               Abnormality         Status                     ---------                               -----------         ------                     Clostridium Difficile To...[[id number]]  Normal              Final result                 Please view results for these tests on the individual orders.    Clostridium Difficile Toxin, PCR - Stool, Per Rectum [[id number]]  (Normal) Collected:  06/08/20 1750    Lab Status:  Final result Specimen:  Stool from Per Rectum Updated:  06/08/20 2011     C. Difficile Toxins by PCR Not Detected    Narrative:       Performance characteristics of test not established for patients <2 years of age.  Negative for Toxigenic C. Difficile    Urine Culture - Urine, Urine, Clean Catch [[id number]] Collected:  06/05/20 1358    Lab Status:  Final result Specimen:  Urine, Clean Catch Updated:  06/06/20 0937     Urine Culture 50,000 CFU/mL Mixed Josee Isolated    Narrative:       Specimen contains mixed organisms of questionable pathogenicity which indicates contamination with  commensal herberth.  Further identification is unlikely to provide clinically useful information.  Suggest recollection.    MRSA Screen, PCR (Inpatient) - Swab, Nares [[id number]]  (Abnormal) Collected:  06/05/20 1403    Lab Status:  Final result Specimen:  Swab from Nares Updated:  06/05/20 1528     MRSA PCR Positive    COVID-19,[facility] IN-HOUSE(OR EMERGENT/ADD-ON),NP SWAB IN TRANSPORT MEDIA 3-4 HR TAT - Swab, Nasopharynx [[id number]]  (Normal) Collected:  05/31/20 1533    Lab Status:  Final result Specimen:  Swab from Nasopharynx Updated:  05/31/20 1638     COVID19 Not Detected          Imaging Results (All)     Procedure Component Value Units Date/Time    FL Video Swallow With Speech Single Contrast [[id number]] Collected:  06/16/20 1602     Updated:  06/16/20 1710    Narrative:       EXAMINATION: FL VIDEO SWALLOW W SPEECH SINGLE-CONTRAST-     INDICATION: Dysphagia; R13.12-Dysphagia, oropharyngeal phase;  I63.9-Cerebral infarction, unspecified; R47.1-Dysarthria and anarthria;  R41.841-Cognitive communication deficit     TECHNIQUE: 54 seconds of fluoroscopic time was used for this exam. 10  associated fluoroscopic loops were saved. The patient was evaluated in  the seated lateral position while taking a variety of consistencies of  barium by mouth under the direction of speech pathology.     COMPARISON: NONE     FINDINGS: 54 seconds of fluoroscopy provided for a modified barium  swallow. Please see speech therapy report for full details and  recommendations.          Impression:       Fluoroscopy provided for a modified barium swallow. Please  see speech therapy report for full details and recommendations.         This report was finalized on 6/16/2020 5:06 PM by Dr. Chastity Rico MD.       XR Abdomen KUB [[id number]] Collected:  06/13/20 2047     Updated:  06/13/20 2049    Narrative:       CR Abdomen 1 Vw    INDICATION:   65-year-old female for enteric tube placement.    COMPARISON:   6/7/2020    FINDINGS:  AP  radiographs of the abdomen. Enteric tube tip is at the level of the gastric antrum/pylorus. Status post cholecystectomy. Sequela of vascular surgical stents are present in the low abdomen and pelvis are unchanged. Additional postoperative changes in  the right lower quadrant. No new suspicious calcifications or mass effect. No dilated air-filled loops of bowel are seen.      Impression:         1. Enteric tube tip is at the level of the gastric antrum/pylorus.    Signer Name: Ernesto Vargas MD   Signed: 6/13/2020 8:47 PM   Workstation Name: [email or web address]    [facility]    FL Video Swallow With Speech Single Contrast [[id number]] Collected:  06/10/20 1610     Updated:  06/10/20 1720    Narrative:       EXAMINATION: FL VIDEO SWALLOW W SPEECH SINGLE-CONTRAST-     INDICATION: dysphagia; I63.9-Cerebral infarction, unspecified;  R13.12-Dysphagia, oropharyngeal phase; R47.1-Dysarthria and anarthria;  R41.841-Cognitive communication deficit     TECHNIQUE: 48 seconds of fluoroscopic time was used for this exam. 11  associated fluoroscopic loops were saved. The patient was evaluated in  the seated lateral position while taking a variety of consistencies of  barium by mouth under the direction of speech pathology.     COMPARISON: NONE     FINDINGS: 48 seconds of fluoroscopy provided for a modified barium  swallow. Please see speech therapy report for full details and  recommendations.          Impression:       Fluoroscopy provided for a modified barium swallow. Please  see speech therapy report for full details and recommendations.         This report was finalized on 6/10/2020 5:17 PM by Dr. Cristi Perez.       XR Abdomen KUB [[id number]] Collected:  06/07/20 1355     Updated:  06/07/20 2301    Narrative:       EXAMINATION: XR ABDOMEN KUB - 06/07/2020     INDICATION: NG tube placement.  I63.9-Cerebral infarction, unspecified;  R13.12-Dysphagia, oropharyngeal phase; R47.1-Dysarthria and  anarthria;  R41.841-Cognitive communication deficit.     COMPARISON: 06/04/2020     FINDINGS: Feeding tube is seen with its tip in the region of the  duodenal bulb. Gas is seen in normal caliber colon. No abnormally  dilated bowel loops are identified. Multiple iliac stents are noted.       Impression:       Feeding tube tip in the region of the duodenal bulb.      DICTATED:   06/07/2020  EDITED/ls :   06/07/2020      This report was finalized on 6/7/2020 10:59 PM by Dr. Ervin Troncoso MD.       CT Head Without Contrast [[id number]] Collected:  06/06/20 0929     Updated:  06/06/20 2226    Narrative:       EXAMINATION: CT HEAD WO CONTRAST - 06/06/2020     INDICATION: Stroke. I63.9-Cerebral infarction, unspecified;  R13.12-Dysphagia, oropharyngeal phase; R47.1-Dysarthria and anarthria;  R41.841-Cognitive communication deficit.     TECHNIQUE: 5 mm unenhanced images through the brain     The radiation dose reduction device was turned on for each scan per the  ALARA (As Low as Reasonably Achievable) protocol.     COMPARISON: 06/04/2020     FINDINGS: Patient history indicates evolving right MCA infarcts.  Evaluate for intracranial hemorrhage.     The calvarium appears intact. Included paranasal sinuses and mastoids  appear clear. Patient's right frontal and much smaller right parietal  infarcts are stable in extent. There is no evidence of new infarct, no  evidence of hemorrhage, and no evidence of mass, mass effect,  hydrocephalus or abnormal extra-axial collection.       Impression:       Stable extent of patient's previously noted right MCA  territory infarcts. No evidence of intracranial hemorrhage, new infarct,  or other clearly new intracranial disease.     DICTATED:   06/06/2020  EDITED/ls :   06/06/2020      This report was finalized on 6/6/2020 10:23 PM by Dr. Ervin Troncoso MD.       CT Head Without Contrast [[id number]] Collected:  06/04/20 0934     Updated:  06/05/20 2239    Narrative:       EXAMINATION: CT HEAD WO  CONTRAST-      INDICATION: Cerebral hemorrhage suspected; I63.9-Cerebral infarction,  unspecified; R13.12-Dysphagia, oropharyngeal phase; R47.1-Dysarthria and  anarthria; R41.841-Cognitive communication deficit.     TECHNIQUE: 5 mm unenhanced images through the brain.     The radiation dose reduction device was turned on for each scan per the  ALARA (As Low as Reasonably Achievable) protocol.     COMPARISON: 06/01/2020 head CT scan.     FINDINGS: Evolving right MCA territory infarcts are seen, the 4 cm area  of edema in the anterior, watershed region showing decreased  attenuation, but no significant interval enlargement. The more patchy,  posterior right watershed region infarct is similar, extending over a  roughly 2 cm area, but lower in density than on the prior scan typical  of evolving infarct. No clearly new area of infarction is identified.  There is no evidence of intracranial hemorrhage, no evidence of mass or  significant mass effect. Ventricles are normal in size. No abnormal  extraaxial collection is seen.       Impression:       Evolving right MCA infarcts in the anterior and posterior  watershed regions as described, without significant interval extension  of infarct territory. No evidence of hemorrhage, new infarct or other  new acute intracranial disease is appreciated     D:  06/04/2020  E:  06/04/2020     This report was finalized on 6/5/2020 10:36 PM by Dr. Ervin Troncoso MD.       XR Chest 1 View [[id number]] Collected:  06/05/20 1020     Updated:  06/05/20 1644    Narrative:       EXAMINATION: XR CHEST 1 VW-      INDICATION: Shortness of breath; I63.9-Cerebral infarction, unspecified;  R13.12-Dysphagia, oropharyngeal phase; R47.1-Dysarthria and anarthria;  R41.841-Cognitive communication deficit,      COMPARISON: 06/04/2020.     FINDINGS: Portable chest reveals cardiac silhouette to be enlarged.  Prominence of the pulmonary vascularity. Degenerative change is seen  within the spine. There is a small  left pleural effusion. Upper lung  fields are clear. Feeding tube identified above the level of the  diaphragm.           Impression:       Heart is enlarged with slight prominence of the pulmonary  vascularity. Findings are stable.     D:  06/05/2020  E:  06/05/2020     This report was finalized on 6/5/2020 4:41 PM by Dr. Chastity Rico MD.       XR Chest 1 View [[id number]] Collected:  06/04/20 1420     Updated:  06/04/20 2200    Narrative:       EXAMINATION: XR CHEST 1 VW-06/04/2020:     INDICATION: Fever; I63.9-Cerebral infarction, unspecified;  R13.12-Dysphagia, oropharyngeal phase; R47.1-Dysarthria and anarthria;  R41.841-Cognitive communication deficit.     COMPARISON: 05/31/2020.     FINDINGS: Feeding tube is seen below the left hemidiaphragm. The heart  is enlarged. The vasculature appears upper limits of normal. The lungs  are moderately well expanded and appear grossly clear.       Impression:       1. Feeding tube below the left hemidiaphragm.  2. Cardiomegaly without evidence of congestive failure.  3. No evidence of active chest disease elsewhere.      D:  06/04/2020  E:  06/04/2020     This report was finalized on 6/4/2020 9:56 PM by Dr. Ervin Troncoso MD.       XR Abdomen KUB [[id number]] Collected:  06/04/20 0201     Updated:  06/04/20 0204    Narrative:       CR Abdomen 1 Vw    INDICATION:   Feeding tube placement.    COMPARISON:   6/2/2020    FINDINGS:  AP view of the abdomen. Tip of the feeding tube remains in the gastric antrum or duodenal bulb. Visualized bowel gas pattern is normal.      Impression:       Feeding tube tip in the gastric antrum or duodenal bulb.    Signer Name: Hi Hansen MD   Signed: 6/4/2020 2:01 AM   Workstation Name: [facility]    [facility]    XR Abdomen KUB [[id number]] Collected:  06/02/20 1701     Updated:  06/03/20 2051    Narrative:       EXAMINATION: XR ABDOMEN KUB- 06/02/2020     INDICATION: Keofeed; I63.9-Cerebral infarction,  unspecified;  R13.12-Dysphagia, oropharyngeal phase; R47.1-Dysarthria and anarthria;  R41.841-Cognitive communication deficit     COMPARISON: NONE     FINDINGS: Feeding tube is seen in the distal stomach, almost in the  duodenal bulb. Bowel gas pattern is nonobstructive.       Impression:       Feeding tube tip in the distal stomach or duodenal bulb.     D:  06/02/2020  E:  06/03/2020         This report was finalized on 6/3/2020 8:48 PM by Dr. Ervin Troncoso MD.       CT Head Without Contrast [[id number]] Collected:  06/03/20 0841     Updated:  06/03/20 1752    Narrative:       EXAMINATION: CT HEAD WO CONTRAST-      INDICATION: I63.9-Cerebral infarction, unspecified; R13.12-Dysphagia,  oropharyngeal phase; R47.1-Dysarthria and anarthria; R41.841-Cognitive  communication deficit; followup stroke, left-sided weakness.     TECHNIQUE: Multiple axial CT imaging was obtained of the head from the  skull base to the skull vertex without the administration of intravenous  contrast.     The radiation dose reduction device was turned on for each scan per the  ALARA (As Low as Reasonably Achievable) protocol.     COMPARISON: 06/01/2020.     FINDINGS: Evolving right MCA territory area of infarction extending from  the temporal lobe into the right parietal lobe. The edema is slightly  increased in size when compared to the prior study. The surrounding mass  effect is unchanged. There is no midline shift. No effacement of the  ventricular system.       Impression:       Slight increase seen in size in the edematous area in the  right MCA territory. No significant change seen in the surrounding mass  effect or midline shift. No hemorrhagic conversion.     D:  06/03/2020  E:  06/03/2020     This report was finalized on 6/3/2020 5:49 PM by Dr. Chastity Rico MD.       MRI Brain Without Contrast [[id number]] Collected:  06/02/20 1301     Updated:  06/02/20 1916    Narrative:       EXAMINATION: MRI BRAIN WO CONTRAST-06/02/2020:      INDICATION: CVA; I63.9-Cerebral infarction, unspecified;  R13.12-Dysphagia, oropharyngeal phase; R47.1-Dysarthria and anarthria.      TECHNIQUE: Multiplanar MRI of the brain without intravenous contrast.     COMPARISON: NONE.     FINDINGS: Confluent area of restricted diffusion within the right  frontotemporal region along with anterior and posterior MCA distribution  restriction consistent with evolving right MCA infarction with  effacement of the right lateral ventricle, however, no midline shift.  Background increased signal findings of at least moderate involvement  demonstrating moderately advanced chronic small vessel ischemic disease.  Susceptibility-weighted imaging performed without susceptibility  artifact. Pituitary and sella within normal limits. Globes and orbits  retain normal T2 signal characteristics. The visualized paranasal  sinuses and mastoid air cells are grossly clear and well pneumatized. No  cerebellopontine angle mass lesion. Normal signal flow voids of the  distal internal carotid and vertebrobasilar arteries.       Impression:       Evolving right MCA infarction with restricted diffusion and  effacement of the right lateral ventricle, however, no midline shift.  This is superimposed upon moderately advanced chronic small vessel  ischemic disease findings.     D:  06/02/2020  E:  06/02/2020            This report was finalized on 6/2/2020 7:13 PM by Dr. Cristi Perez.       FL Video Swallow With Speech Single Contrast [[id number]] Collected:  06/02/20 1339     Updated:  06/02/20 1638    Narrative:       EXAMINATION: FL VIDEO SWALLOW W SPEECH SINGLE-CONTRAST-     INDICATION: dysphagia; I63.9-Cerebral infarction, unspecified;  R13.10-Dysphagia, unspecified; R47.1-Dysarthria and anarthria     TECHNIQUE: 48 seconds of fluoroscopic time was used for this exam. 6  associated fluoroscopic loops were saved. Patient was evaluated in the  seated lateral position while taking a variety of  consistencies of  barium by mouth under the direction of speech pathology.     COMPARISON: NONE     FINDINGS: 48 seconds of fluoroscopy provided for a modified barium  swallow. Please see speech therapy report for full details and  recommendations.          Impression:       Fluoroscopy provided for a modified barium swallow. Please  see speech therapy report for full details and recommendations.         This report was finalized on 6/2/2020 4:35 PM by Dr. Chastity Rico MD.       CT Head Without Contrast [[id number]] Collected:  06/01/20 1639     Updated:  06/02/20 1637    Narrative:       EXAMINATION: CT HEAD WO CONTRAST- 06/01/2020     INDICATION: stroke; I63.9-Cerebral infarction, unspecified;  R13.10-Dysphagia, unspecified; R47.1-Dysarthria and anarthria; acute  left-sided weakness     TECHNIQUE: Multiple axial CT imaging was obtained of the head without  the administration of intravenous contrast.     The radiation dose reduction device was turned on for each scan per the  ALARA (As Low as Reasonably Achievable) protocol.     COMPARISON: NONE     FINDINGS: There is a large area of low density involving the right MCA  territory representing the patient's evolving area of infarction. There  is no significant change seen in the interval. No hemorrhagic  conversion. There is no significant effacement on the right lateral  ventricle. No significant midline shift. Bony structures are  unremarkable. Visualized paranasal sinuses are clear. The mastoid air  cells are patent.       Impression:       Stable examination with evolving right MCA territory  infarct. No hemorrhagic conversion.     D:  06/01/2020  E:  06/01/2020     This report was finalized on 6/2/2020 4:34 PM by Dr. Chastity Rico MD.       CT Head Without Contrast [[id number]] Collected:  06/01/20 0815     Updated:  06/01/20 2249    Narrative:       EXAMINATION: CT HEAD WO CONTRAST-06/01/2020:      INDICATION: Stroke; I63.9-Cerebral infarction,  unspecified.     TECHNIQUE: 5 mm unenhanced images through the brain.     The radiation dose reduction device was turned on for each scan per the  ALARA (As Low as Reasonably Achievable) protocol.     COMPARISON: Cerebral perfusion exam of 05/31/2020.     FINDINGS: The calvarium appears intact. The included paranasal sinuses  and mastoids appear clear. Soft tissue window images show a fairly  subtle wedge-shaped area of edema approximately 3.5 cm in diameter in  the right frontal lobe and anterior-superior temporal lobe, also a  sub-centimeter focus of low attenuation in the right basal ganglia,  probably acute infarcts. This corresponds well to the perfusion scan  abnormality seen on yesterday's exam. Focal low-density areas in the  chucho are inconsistent between the initial and delayed scans and are  likely skull base streak artifact. There is no evidence of edema/infarct  elsewhere, no evidence of hemorrhage, mass or mass effect,  hydrocephalus, or abnormal extra-axial collection.       Impression:       Evolving right MCA territory infarct corresponding to  perfusion scan abnormality. Small right basal ganglia infarct. No  evidence of hemorrhage.     D:  06/01/2020  E:  06/01/2020           This report was finalized on 6/1/2020 10:46 PM by Dr. Ervin Troncoso MD.       CT Angiogram Neck [[id number]] Collected:  05/31/20 1528     Updated:  06/01/20 1327    Narrative:       EXAMINATION: CT ANGIOGRAM NECK, CT ANGIOGRAM HEAD - 05/31/2020     INDICATION: Left-sided weakness, facial droop and slurred speech.  Evaluate for stroke.     TECHNIQUE: CT angiogram head and neck with and without intravenous  contrast. 2D and 3D reconstructions performed.     The radiation dose reduction device was turned on for each scan per the  ALARA (As Low as Reasonably Achievable) protocol.     COMPARISON: CT head noncontrast and CT cerebral perfusion performed  concurrently.     FINDINGS:      CTA Neck: Normal 3-vessel arch with patent great  vessel origins.  Proximal subclavian arteries are patent. Vertebral arteries are  symmetric in caliber without focal severe stenosis, aneurysm or  occlusion. Carotids demonstrate retropharyngeal course of the distal  right common and proximal internal carotid arteries high branching  pattern and tortuosity demonstrating no atherosclerotic involvement and  calcific disease burden of the carotid bifurcations with 0% right and 0%  left luminal narrowing as measured by NASCET  criteria with patency of  the distal internal carotid arteries to the intracranial segments as  discussed further below the dedicated CTA head portion. Cervical soft  tissues unremarkable without bulky cervical adenopathy or soft tissue  findings of abnormality or acuity.     CTA Head: Distal internal carotid arteries are patent with only mild  atherosclerotic involvement and calcific burden however no  hemodynamically significant stenosis, aneurysm or occlusion. Anterior  cerebral arteries are patent without hemodynamically significant  stenosis, aneurysm or occlusion. Middle cerebral arteries are patent  without hemodynamically significant stenosis aneurysm or occlusion; in  particular no large vessel occlusion throughout the M2 and and M3  segments with only mild irregularities of potential atherosclerotic  involvement of the distal portions but no distinct occlusion.  Vertebrobasilar and posterior cerebral arteries are patent without  hemodynamically significant stenosis, aneurysm or occlusion. Partially  visualized venous structures unremarkable with superior sagittal sinus  patent.       Impression:       CTA head and neck with only mild atherosclerotic involvement  of the distal internal carotid arteries however no hemodynamically  significant stenosis, aneurysm or occlusion; specifically no large  vessel occlusion involving the [place] with only mild  irregularities in the distal MCA distributions of potential  underlying  atherosclerotic involvement without obvious occlusion.     DICTATED:   05/31/2020  EDITED/ls :   05/31/2020         This report was finalized on 6/1/2020 1:24 PM by Dr. Cristi Perez.       CT Angiogram Head [[id number]] Collected:  05/31/20 1528     Updated:  06/01/20 1327    Narrative:       EXAMINATION: CT ANGIOGRAM NECK, CT ANGIOGRAM HEAD - 05/31/2020     INDICATION: Left-sided weakness, facial droop and slurred speech.  Evaluate for stroke.     TECHNIQUE: CT angiogram head and neck with and without intravenous  contrast. 2D and 3D reconstructions performed.     The radiation dose reduction device was turned on for each scan per the  ALARA (As Low as Reasonably Achievable) protocol.     COMPARISON: CT head noncontrast and CT cerebral perfusion performed  concurrently.     FINDINGS:      CTA Neck: Normal 3-vessel arch with patent great vessel origins.  Proximal subclavian arteries are patent. Vertebral arteries are  symmetric in caliber without focal severe stenosis, aneurysm or  occlusion. Carotids demonstrate retropharyngeal course of the distal  right common and proximal internal carotid arteries high branching  pattern and tortuosity demonstrating no atherosclerotic involvement and  calcific disease burden of the carotid bifurcations with 0% right and 0%  left luminal narrowing as measured by NASCET  criteria with patency of  the distal internal carotid arteries to the intracranial segments as  discussed further below the dedicated CTA head portion. Cervical soft  tissues unremarkable without bulky cervical adenopathy or soft tissue  findings of abnormality or acuity.     CTA Head: Distal internal carotid arteries are patent with only mild  atherosclerotic involvement and calcific burden however no  hemodynamically significant stenosis, aneurysm or occlusion. Anterior  cerebral arteries are patent without hemodynamically significant  stenosis, aneurysm or occlusion. Middle cerebral arteries are  patent  without hemodynamically significant stenosis aneurysm or occlusion; in  particular no large vessel occlusion throughout the M2 and and M3  segments with only mild irregularities of potential atherosclerotic  involvement of the distal portions but no distinct occlusion.  Vertebrobasilar and posterior cerebral arteries are patent without  hemodynamically significant stenosis, aneurysm or occlusion. Partially  visualized venous structures unremarkable with superior sagittal sinus  patent.       Impression:       CTA head and neck with only mild atherosclerotic involvement  of the distal internal carotid arteries however no hemodynamically  significant stenosis, aneurysm or occlusion; specifically no large  vessel occlusion involving the [place] with only mild  irregularities in the distal MCA distributions of potential underlying  atherosclerotic involvement without obvious occlusion.     DICTATED:   05/31/2020  EDITED/ls :   05/31/2020         This report was finalized on 6/1/2020 1:24 PM by Dr. Cristi Perez.       CT Cerebral Perfusion With & Without Contrast [[id number]] Collected:  05/31/20 1527     Updated:  06/01/20 1327    Narrative:       EXAMINATION: CT CEREBRAL PERFUSION WWO CONTRAST - 05/31/2020      INDICATION: Left-sided weakness, facial droop and slurred speech.     TECHNIQUE: CT cerebral perfusion with and without intravenous contrast.  Multiple parametric maps including mean transit time, time to drain,  cerebral blood flow and cerebral blood volume performed.     The radiation dose reduction device was turned on for each scan per the  ALARA (As Low as Reasonably Achievable) protocol.     COMPARISON: CT head performed immediately prior.     FINDINGS: Abnormal area of perfusion defect within the right  frontotemporal region and insular cortex of the right MCA distribution.  Reversible ischemia without core infarct.       Impression:       Area of reversible ischemia within the right MCA  territory  without core infarct component identified.     DICTATED:   05/31/2020  EDITED/ls :   05/31/2020      This report was finalized on 6/1/2020 1:24 PM by Dr. Cristi Perez.       CT Head Without Contrast Stroke Protocol [[id number]] Collected:  05/31/20 1450     Updated:  06/01/20 1327    Narrative:       EXAMINATION: CT HEAD WO CONTRAST - 05/31/2020      INDICATION: Left-sided weakness, facial droop and slurred speech.     TECHNIQUE: CT head without intravenous contrast following stroke  protocol.      The radiation dose reduction device was turned on for each scan per the  ALARA (As Low as Reasonably Achievable) protocol.     COMPARISON: NONE     FINDINGS: Abnormal sulcal effacement and edema within the right insular  cortex and right frontotemporal region concerning for evolving right MCA  infarction without intra-axial hemorrhage or extra-axial fluid  collection. No midline shift or hydrocephalus. Globes and orbits  unremarkable. Visualized paranasal sinuses and mastoid air cells are  grossly clear an dwell-pneumatized. Calvarium intact.       Impression:       Abnormal sulcal effacement with cortical extension of edema  in the right frontotemporal region and insular cortex concerning for  evolving right MCA infarction. No acute hemorrhage..     Scan performed on 05/31/2020 at 1443 hours. Scan report given to  treatment team on 05/31/2020 at 1448 hours.     DICTATED:   05/31/2020  EDITED/ls :   05/31/2020      This report was finalized on 6/1/2020 1:24 PM by Dr. Cristi Perez.       XR Chest 1 View [[id number]] Collected:  06/01/20 0022     Updated:  06/01/20 0025    Narrative:       CR Chest 1 Vw    INDICATION:   Stroke protocol.     COMPARISON:    None available.    FINDINGS:  Single portable AP view(s) of the chest.    Heart is enlarged. Atherosclerotic calcifications are present in the aorta. The lungs are clear. No pneumothorax is identified. Pulmonary vascularity is normal.       Impression:       No  acute cardiopulmonary findings.    Signer Name: Hi Hansen MD   Signed: 6/1/2020 12:22 AM   Workstation Name: [facility]    [facility]                    Results for orders placed during the hospital encounter of 05/31/20   Adult Transthoracic Echo Complete W/ Cont if Necessary Per Protocol    Narrative · Left ventricular systolic function is normal. Estimated EF appears to be   in the range of 56 - 60%.  · Normal right ventricular cavity size and systolic function noted.  · Left atrial cavity size is moderately dilated. Left atrial volume is   moderately increased.  · No evidence of a patent foramen ovale. Saline test results are negative.  · The aortic valve is abnormal in structure. The valve exhibits sclerosis.   There is mild calcification of the aortic valve  · Mild aortic valve stenosis is present          Plan for Follow-up of Pending Labs/Results:     Discharge Details        Discharge Medications      New Medications      Instructions Start Date   amantadine 100 MG capsule  Commonly known as:  SYMMETREL   100 mg, Oral, 2 Times Daily      aspirin 81 MG chewable tablet   81 mg, Oral, Daily      atorvastatin 80 MG tablet  Commonly known as:  LIPITOR   80 mg, Oral, Nightly      diclofenac 1 % gel gel  Commonly known as:  VOLTAREN   2 g, Topical, 4 Times Daily      dilTIAZem  MG 24 hr capsule  Commonly known as:  CARDIZEM CD   120 mg, Oral, Every 24 Hours Scheduled      famotidine 20 MG tablet  Commonly known as:  PEPCID   20 mg, Oral, 2 Times Daily Before Meals      lactobacillus acidophilus capsule capsule   1 capsule, Oral, Daily      miconazole 2 % cream  Commonly known as:  MICOTIN   Topical, Every 12 Hours Scheduled      Nutrisource fiber pack pack   1 packet, Oral, Daily      nystatin [id number] UNIT/ML suspension  Commonly known as:  MYCOSTATIN   500,000 Units, Swish & Swallow, 4 Times Daily      QUEtiapine 25 MG tablet  Commonly known as:  SEROquel   12.5 mg, Oral,  Nightly      rivaroxaban 20 MG tablet  Commonly known as:  XARELTO   20 mg, Oral, Daily With Dinner         Changes to Medications      Instructions Start Date   diphenoxylate-atropine 2.5-0.025 MG per tablet  Commonly known as:  LOMOTIL  What changed:  You were already taking a medication with the same name, and this prescription was added. Make sure you understand how and when to take each.   1 tablet, Oral, Every 8 Hours PRN      diphenoxylate-atropine 2.5-0.025 MG per tablet  Commonly known as:  LOMOTIL  What changed:  additional instructions   1 tablet, Oral, 2 Times Daily      metoprolol tartrate 100 MG tablet  Commonly known as:  LOPRESSOR  What changed:    · medication strength  · how much to take  · when to take this  · additional instructions   100 mg, Oral, Every 12 Hours Scheduled         Continue These Medications      Instructions Start Date   fluticasone 27.5 MCG/SPRAY nasal spray  Commonly known as:  VERAMYST   2 sprays, Nasal, Daily      nitroglycerin 0.4 MG SL tablet  Commonly known as:  NITROSTAT   1 under the tongue as needed for angina, may repeat q5mins for up three doses      Welchol 625 MG tablet  Generic drug:  colesevelam   2 tablets, Oral, 2 Times Daily With Meals         Stop These Medications    amLODIPine 5 MG tablet  Commonly known as:  NORVASC     apixaban 5 MG tablet tablet  Commonly known as:  ELIQUIS     lisinopril 10 MG tablet  Commonly known as:  PRINIVIL,ZESTRIL     meclizine 25 MG tablet  Commonly known as:  ANTIVERT     omeprazole 20 MG capsule  Commonly known as:  priLOSEC     sotalol 80 MG tablet  Commonly known as:  BETAPACE            Allergies   Allergen Reactions   • Digoxin Other (See Comments)     Severe c/p, left arm pain, lips numb, finger tip numbness   • Iodine Rash     Breathing problems   • Other Other (See Comments)     Monistat, causes blisters         Discharge Disposition:  Skilled Nursing Facility (DC - External)    Diet:  Hospital:  Diet Order   Procedures    • Diet Regular       Activity:  Activity Instructions     Activity as Tolerated                   CODE STATUS:    Code Status and Medical Interventions:   Ordered at: 05/31/20 1740     Code Status:    CPR     Medical Interventions (Level of Support Prior to Arrest):    Full       Future Appointments   Date Time Provider Department Center   6/29/2020  1:00 PM Genna De La O APRN [facility] None       Additional Instructions for the Follow-ups that You Need to Schedule     Discharge Follow-up with PCP   As directed       Currently Documented PCP:    Jl Mcknight MD    PCP Phone Number:    [phone]     Follow Up Details:  once discharged from rehab.         Discharge Follow-up with Specified Provider: Cardiology- Dr. Singh; 1 Month   As directed      To:  Cardiology- Dr. Singh    Follow Up:  1 Month         Discharge Follow-up with Specified Provider: neurology   As directed      To:  neurology    Follow Up Details:  as needed               Time Spent on Discharge:  45 minutes    Electronically signed by ISSA Garcia, 06/18/20, 10:41 AM.        Electronically signed by Ivett Tenorio APRN at 06/18/20 3169

## 2020-06-18 NOTE — PLAN OF CARE
Problem: Patient Care Overview  Goal: Plan of Care Review  Outcome: Ongoing (interventions implemented as appropriate)  Flowsheets (Taken 6/18/2020 1030)  Progress: improving  Plan of Care Reviewed With: patient  Note:   SLP treatment completed. Will continue to address oral dysphagia and speech goals in rehab. Pt is preparing for dc this am. Please see note for further details and recommendations.

## 2020-06-18 NOTE — PROGRESS NOTES
Continued Stay Note  [facility]     Patient Name: Sidra Lane  MRN: [id number]  Today's Date: 6/18/2020    Admit Date: 5/31/2020    Discharge Plan     Row Name 06/18/20 0903       Plan    Plan  [facility]    Patient/Family in Agreement with Plan  yes    Plan Comments  Spoke with Cassia with [facility] and COVID came back negative so patient can go today to [facility] Daughter will transport. CM will continue to follow.    Final Discharge Disposition Code  03 - skilled nursing facility (SNF)        Discharge Codes    No documentation.             Naga Muñoz RN

## 2020-06-18 NOTE — DISCHARGE SUMMARY
[facility] Medicine Services  DISCHARGE SUMMARY    Patient Name: Sidra Lane  : 1955  MRN: [id number]    Date of Admission: 2020  2:42 PM  Date of Discharge:  2020  Primary Care Physician: Jl Mcknight MD    Consults     Date and Time Order Name Status Description    2020 0904 Inpatient Cardiology Consult Completed           Hospital Course     Presenting Problem:   Acute ischemic stroke (CMS/HCC) [I63.9]    Active Hospital Problems    Diagnosis  POA   • **Cerebrovascular accident (CVA) due to embolism of right middle cerebral artery (CMS/HCC) [I63.411]  Yes   • Acute ischemic stroke (CMS/HCC) [I63.9]  Yes   • Atrial fibrillation (CMS/HCC) [I48.91]  Yes      Resolved Hospital Problems   No resolved problems to display.          Hospital Course:  Sidra Lane is a 65 y.o. 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  She was found to have a right MCA territory reversible ischemia on CT perfusion.  She underwent a CT head neck angiogram with an intra-arterial TPA after failed thrombectomy and admitted to the ICU afterward.  She was transferred out of ICU on 6/3 with hospitalist assuming care on .     Right MCA stroke  - likely cardioembolic.  Patient has a history of persistent atrial fibrillation and had been anticoagulated with Eliquis.  She had been followed by her electrophysiologist and had plans for an ablation prior to readmission.   status post intra-arterial TPA after failed thrombectomy  - Cards feels that she failed Eliquis as she was on this prior to admission. Now transitioned to Xarelto.  Follow up with primary cardiologist after rehab.        Persistent atrial fibrillation with RVR  --changed from sotalol to metoprolol, and diltiazem added by EP for rate control  -- Xarelto     Delirium, improved  -  improved with low dose seroquel at bedtime  - appears back to baseline.  Continue for now, but may be able to come off of this at the rehab facility.      Hypertension/Hypotension  - patient had some low BPs and reports of dizziness  - will continue to  hold norvasc and lisinopril, continue b-blocker and CCB for rate control  - improved with above med changes    SIRS  - cannot rule out aspiration pneumonia earlier in the stay.     --CXR unremarkable on 6/4 and 6/5  --completed course of augmentin x 7 days     Hyperlipidemia  -High intensity statin therapy     Left-sided weakness  -PT OT, will benefit from short-term rehab once she is medically stable     Dysphagia, improved (keofeed out)  -upgraded to regular diet. PO intake has improved, particularly with relaxed restrictions (she likes salt on food).  -keofeed removed on 6/17     Diarrhea, improved  --apparently this is a chronic issue at home -- patient and family say Ms Lane has short gut syndrome after small and large bowel resection for possible crohn's disease.   --follows with Dr. Oliveros in [place]  --continue probiotic  --currently taking Cholestyramine and fiber packet daily/ Lomotil BID  --Cdiff negative on 6/8     Vaginal Candidiasis  --PO Diflucan x 1 ; 6/11  --resolved     Oral candidiasis  -- Nystatin swish and swallow   --improving     Left otitis media   --recently just completed 7 days of Zosyn/Augmentin for PNA   --improved         Discharge Follow Up Recommendations for outpatient labs/diagnostics:   Follow up with PCP once discharged from rehab.   Follow up with Primary cardiologist in 1 month  Follow up with neurology as needed    Day of Discharge     HPI:   Resting comfortably in bed this morning.  No overnight issues.  Complains of ongoing chronic diarrhea and is requesting a lomotil.  Ready to transfer to rehab.     Review of Systems  Gen- No fevers, chills  CV- No chest pain, palpitations  Resp- No cough, dyspnea  GI- No N/V, + diarrhea, no  abd pain    Vital Signs:   Temp:  [97.4 °F (36.3 °C)-98.4 °F (36.9 °C)] 97.4 °F (36.3 °C)  Heart Rate:  [88-98] 88  Resp:  [14-16] 14  BP: (102-127)/(73-94) 120/90     Physical Exam:  Constitutional: No acute distress, awake, alert, resting in bed, no family at bedside.   HENT: NCAT, mucous membranes moist  Respiratory: Clear to auscultation bilaterally, respiratory effort normal   Cardiovascular: RRR, no murmurs, rubs, or gallops, palpable pedal pulses bilaterally  Gastrointestinal: Positive bowel sounds, soft, nontender, nondistended  Musculoskeletal: No bilateral ankle edema  Psychiatric: Appropriate affect, cooperative  Neurologic: Oriented x 3, speech clear  Skin: No rashes noted to exposed skin       Pertinent  and/or Most Recent Results     Results from last 7 days   Lab Units 06/17/20  0945 06/15/20  0554 06/14/20  0903 06/12/20  0819   WBC 10*3/mm3 6.82  --  10.74 10.78   HEMOGLOBIN g/dL 11.8*  --  11.2* 11.4*   HEMATOCRIT % 42.5  --  41.0 40.7   PLATELETS 10*3/mm3 372  --  405 399   SODIUM mmol/L 143 142  --  139   POTASSIUM mmol/L 4.2 4.8  --  4.7   CHLORIDE mmol/L 108* 106  --  104   CO2 mmol/L 21.0* 17.0*  --  23.0   BUN mg/dL 24* 29*  --  21   CREATININE mg/dL 1.06* 0.88  --  0.76   GLUCOSE mg/dL 125* 128*  --  101*   CALCIUM mg/dL 9.4 9.4  --  9.3     Results from last 7 days   Lab Units 06/15/20  0554   BILIRUBIN mg/dL 0.3   ALK PHOS U/L 64   ALT (SGPT) U/L 22   AST (SGOT) U/L 29     Results from last 7 days   Lab Units 06/15/20  0554   CHOLESTEROL mg/dL 95   TRIGLYCERIDES mg/dL 120           Brief Urine Lab Results  (Last result in the past 365 days)      Color   Clarity   Blood   Leuk Est   Nitrite   Protein   CREAT   Urine HCG        06/05/20 1358 Yellow Clear Negative Moderate (2+) Negative Negative               Microbiology Results Abnormal     Procedure Component Value - Date/Time    COVID-19,[facility] IN-HOUSE, NP SWAB IN TRANSPORT MEDIA 8-12 HR TAT - Swab, Nasopharynx [[id number]]  (Normal)  Collected:  06/17/20 1650    Lab Status:  Final result Specimen:  Swab from Nasopharynx Updated:  06/18/20 0243     COVID19 Not Detected    Blood Culture - Blood, Hand, Left [[id number]] Collected:  06/04/20 1406    Lab Status:  Final result Specimen:  Blood from Hand, Left Updated:  06/09/20 1501     Blood Culture No growth at 5 days    Blood Culture - Blood, Hand, Right [[id number]] Collected:  06/04/20 1400    Lab Status:  Final result Specimen:  Blood from Hand, Right Updated:  06/09/20 1501     Blood Culture No growth at 5 days    Clostridium Difficile Toxin - Stool, Per Rectum [[id number]] Collected:  06/08/20 1750    Lab Status:  Final result Specimen:  Stool from Per Rectum Updated:  06/08/20 2011    Narrative:       The following orders were created for panel order Clostridium Difficile Toxin - Stool, Per Rectum.  Procedure                               Abnormality         Status                     ---------                               -----------         ------                     Clostridium Difficile To...[[id number]]  Normal              Final result                 Please view results for these tests on the individual orders.    Clostridium Difficile Toxin, PCR - Stool, Per Rectum [[id number]]  (Normal) Collected:  06/08/20 1750    Lab Status:  Final result Specimen:  Stool from Per Rectum Updated:  06/08/20 2011     C. Difficile Toxins by PCR Not Detected    Narrative:       Performance characteristics of test not established for patients <2 years of age.  Negative for Toxigenic C. Difficile    Urine Culture - Urine, Urine, Clean Catch [[id number]] Collected:  06/05/20 1358    Lab Status:  Final result Specimen:  Urine, Clean Catch Updated:  06/06/20 0937     Urine Culture 50,000 CFU/mL Mixed Herberth Isolated    Narrative:       Specimen contains mixed organisms of questionable pathogenicity which indicates contamination with commensal herberth.  Further identification is unlikely to provide clinically useful  information.  Suggest recollection.    MRSA Screen, PCR (Inpatient) - Swab, Nares [[id number]]  (Abnormal) Collected:  06/05/20 1403    Lab Status:  Final result Specimen:  Swab from Nares Updated:  06/05/20 1528     MRSA PCR Positive    COVID-19,[facility] IN-HOUSE(OR EMERGENT/ADD-ON),NP SWAB IN TRANSPORT MEDIA 3-4 HR TAT - Swab, Nasopharynx [[id number]]  (Normal) Collected:  05/31/20 1533    Lab Status:  Final result Specimen:  Swab from Nasopharynx Updated:  05/31/20 1638     COVID19 Not Detected          Imaging Results (All)     Procedure Component Value Units Date/Time    FL Video Swallow With Speech Single Contrast [[id number]] Collected:  06/16/20 1602     Updated:  06/16/20 1710    Narrative:       EXAMINATION: FL VIDEO SWALLOW W SPEECH SINGLE-CONTRAST-     INDICATION: Dysphagia; R13.12-Dysphagia, oropharyngeal phase;  I63.9-Cerebral infarction, unspecified; R47.1-Dysarthria and anarthria;  R41.841-Cognitive communication deficit     TECHNIQUE: 54 seconds of fluoroscopic time was used for this exam. 10  associated fluoroscopic loops were saved. The patient was evaluated in  the seated lateral position while taking a variety of consistencies of  barium by mouth under the direction of speech pathology.     COMPARISON: NONE     FINDINGS: 54 seconds of fluoroscopy provided for a modified barium  swallow. Please see speech therapy report for full details and  recommendations.          Impression:       Fluoroscopy provided for a modified barium swallow. Please  see speech therapy report for full details and recommendations.         This report was finalized on 6/16/2020 5:06 PM by Dr. Chastity Rico MD.       XR Abdomen KUB [[id number]] Collected:  06/13/20 2047     Updated:  06/13/20 2049    Narrative:       CR Abdomen 1 Vw    INDICATION:   65-year-old female for enteric tube placement.    COMPARISON:   6/7/2020    FINDINGS:  AP radiographs of the abdomen. Enteric tube tip is at the level of the gastric  antrum/pylorus. Status post cholecystectomy. Sequela of vascular surgical stents are present in the low abdomen and pelvis are unchanged. Additional postoperative changes in  the right lower quadrant. No new suspicious calcifications or mass effect. No dilated air-filled loops of bowel are seen.      Impression:         1. Enteric tube tip is at the level of the gastric antrum/pylorus.    Signer Name: Ernesto Vargas MD   Signed: 6/13/2020 8:47 PM   Workstation Name: [facility]    [facility]    FL Video Swallow With Speech Single Contrast [[id number]] Collected:  06/10/20 1610     Updated:  06/10/20 1720    Narrative:       EXAMINATION: FL VIDEO SWALLOW W SPEECH SINGLE-CONTRAST-     INDICATION: dysphagia; I63.9-Cerebral infarction, unspecified;  R13.12-Dysphagia, oropharyngeal phase; R47.1-Dysarthria and anarthria;  R41.841-Cognitive communication deficit     TECHNIQUE: 48 seconds of fluoroscopic time was used for this exam. 11  associated fluoroscopic loops were saved. The patient was evaluated in  the seated lateral position while taking a variety of consistencies of  barium by mouth under the direction of speech pathology.     COMPARISON: NONE     FINDINGS: 48 seconds of fluoroscopy provided for a modified barium  swallow. Please see speech therapy report for full details and  recommendations.          Impression:       Fluoroscopy provided for a modified barium swallow. Please  see speech therapy report for full details and recommendations.         This report was finalized on 6/10/2020 5:17 PM by Dr. Cristi Perez.       XR Abdomen KUB [[id number]] Collected:  06/07/20 1355     Updated:  06/07/20 2302    Narrative:       EXAMINATION: XR ABDOMEN KUB - 06/07/2020     INDICATION: NG tube placement.  I63.9-Cerebral infarction, unspecified;  R13.12-Dysphagia, oropharyngeal phase; R47.1-Dysarthria and anarthria;  R41.841-Cognitive communication deficit.     COMPARISON: 06/04/2020     FINDINGS:  Feeding tube is seen with its tip in the region of the  duodenal bulb. Gas is seen in normal caliber colon. No abnormally  dilated bowel loops are identified. Multiple iliac stents are noted.       Impression:       Feeding tube tip in the region of the duodenal bulb.      DICTATED:   06/07/2020  EDITED/ls :   06/07/2020      This report was finalized on 6/7/2020 10:59 PM by Dr. Ervin Troncoso MD.       CT Head Without Contrast [[id number]] Collected:  06/06/20 0929     Updated:  06/06/20 2226    Narrative:       EXAMINATION: CT HEAD WO CONTRAST - 06/06/2020     INDICATION: Stroke. I63.9-Cerebral infarction, unspecified;  R13.12-Dysphagia, oropharyngeal phase; R47.1-Dysarthria and anarthria;  R41.841-Cognitive communication deficit.     TECHNIQUE: 5 mm unenhanced images through the brain     The radiation dose reduction device was turned on for each scan per the  ALARA (As Low as Reasonably Achievable) protocol.     COMPARISON: 06/04/2020     FINDINGS: Patient history indicates evolving right MCA infarcts.  Evaluate for intracranial hemorrhage.     The calvarium appears intact. Included paranasal sinuses and mastoids  appear clear. Patient's right frontal and much smaller right parietal  infarcts are stable in extent. There is no evidence of new infarct, no  evidence of hemorrhage, and no evidence of mass, mass effect,  hydrocephalus or abnormal extra-axial collection.       Impression:       Stable extent of patient's previously noted right MCA  territory infarcts. No evidence of intracranial hemorrhage, new infarct,  or other clearly new intracranial disease.     DICTATED:   06/06/2020  EDITED/ls :   06/06/2020      This report was finalized on 6/6/2020 10:23 PM by Dr. Ervin Troncoso MD.       CT Head Without Contrast [[id number]] Collected:  06/04/20 0934     Updated:  06/05/20 2239    Narrative:       EXAMINATION: CT HEAD WO CONTRAST-      INDICATION: Cerebral hemorrhage suspected; I63.9-Cerebral infarction,  unspecified;  R13.12-Dysphagia, oropharyngeal phase; R47.1-Dysarthria and  anarthria; R41.841-Cognitive communication deficit.     TECHNIQUE: 5 mm unenhanced images through the brain.     The radiation dose reduction device was turned on for each scan per the  ALARA (As Low as Reasonably Achievable) protocol.     COMPARISON: 06/01/2020 head CT scan.     FINDINGS: Evolving right MCA territory infarcts are seen, the 4 cm area  of edema in the anterior, watershed region showing decreased  attenuation, but no significant interval enlargement. The more patchy,  posterior right watershed region infarct is similar, extending over a  roughly 2 cm area, but lower in density than on the prior scan typical  of evolving infarct. No clearly new area of infarction is identified.  There is no evidence of intracranial hemorrhage, no evidence of mass or  significant mass effect. Ventricles are normal in size. No abnormal  extraaxial collection is seen.       Impression:       Evolving right MCA infarcts in the anterior and posterior  watershed regions as described, without significant interval extension  of infarct territory. No evidence of hemorrhage, new infarct or other  new acute intracranial disease is appreciated     D:  06/04/2020  E:  06/04/2020     This report was finalized on 6/5/2020 10:36 PM by Dr. Ervin Troncoso MD.       XR Chest 1 View [[id number]] Collected:  06/05/20 1020     Updated:  06/05/20 1644    Narrative:       EXAMINATION: XR CHEST 1 VW-      INDICATION: Shortness of breath; I63.9-Cerebral infarction, unspecified;  R13.12-Dysphagia, oropharyngeal phase; R47.1-Dysarthria and anarthria;  R41.841-Cognitive communication deficit,      COMPARISON: 06/04/2020.     FINDINGS: Portable chest reveals cardiac silhouette to be enlarged.  Prominence of the pulmonary vascularity. Degenerative change is seen  within the spine. There is a small left pleural effusion. Upper lung  fields are clear. Feeding tube identified above the level of  the  diaphragm.           Impression:       Heart is enlarged with slight prominence of the pulmonary  vascularity. Findings are stable.     D:  06/05/2020  E:  06/05/2020     This report was finalized on 6/5/2020 4:41 PM by Dr. Chastity Rico MD.       XR Chest 1 View [[id number]] Collected:  06/04/20 1420     Updated:  06/04/20 2200    Narrative:       EXAMINATION: XR CHEST 1 VW-06/04/2020:     INDICATION: Fever; I63.9-Cerebral infarction, unspecified;  R13.12-Dysphagia, oropharyngeal phase; R47.1-Dysarthria and anarthria;  R41.841-Cognitive communication deficit.     COMPARISON: 05/31/2020.     FINDINGS: Feeding tube is seen below the left hemidiaphragm. The heart  is enlarged. The vasculature appears upper limits of normal. The lungs  are moderately well expanded and appear grossly clear.       Impression:       1. Feeding tube below the left hemidiaphragm.  2. Cardiomegaly without evidence of congestive failure.  3. No evidence of active chest disease elsewhere.      D:  06/04/2020  E:  06/04/2020     This report was finalized on 6/4/2020 9:56 PM by Dr. Ervin Troncoso MD.       XR Abdomen KUB [[id number]] Collected:  06/04/20 0201     Updated:  06/04/20 0204    Narrative:       CR Abdomen 1 Vw    INDICATION:   Feeding tube placement.    COMPARISON:   6/2/2020    FINDINGS:  AP view of the abdomen. Tip of the feeding tube remains in the gastric antrum or duodenal bulb. Visualized bowel gas pattern is normal.      Impression:       Feeding tube tip in the gastric antrum or duodenal bulb.    Signer Name: Hi Hansen MD   Signed: 6/4/2020 2:01 AM   Workstation Name: [facility]    [facility]    XR Abdomen KUB [[id number]] Collected:  06/02/20 1701     Updated:  06/03/20 2051    Narrative:       EXAMINATION: XR ABDOMEN KUB- 06/02/2020     INDICATION: Keofeed; I63.9-Cerebral infarction, unspecified;  R13.12-Dysphagia, oropharyngeal phase; R47.1-Dysarthria and anarthria;  R41.841-Cognitive  communication deficit     COMPARISON: NONE     FINDINGS: Feeding tube is seen in the distal stomach, almost in the  duodenal bulb. Bowel gas pattern is nonobstructive.       Impression:       Feeding tube tip in the distal stomach or duodenal bulb.     D:  06/02/2020  E:  06/03/2020         This report was finalized on 6/3/2020 8:48 PM by Dr. Ervin Troncoso MD.       CT Head Without Contrast [[id number]] Collected:  06/03/20 0841     Updated:  06/03/20 1752    Narrative:       EXAMINATION: CT HEAD WO CONTRAST-      INDICATION: I63.9-Cerebral infarction, unspecified; R13.12-Dysphagia,  oropharyngeal phase; R47.1-Dysarthria and anarthria; R41.841-Cognitive  communication deficit; followup stroke, left-sided weakness.     TECHNIQUE: Multiple axial CT imaging was obtained of the head from the  skull base to the skull vertex without the administration of intravenous  contrast.     The radiation dose reduction device was turned on for each scan per the  ALARA (As Low as Reasonably Achievable) protocol.     COMPARISON: 06/01/2020.     FINDINGS: Evolving right MCA territory area of infarction extending from  the temporal lobe into the right parietal lobe. The edema is slightly  increased in size when compared to the prior study. The surrounding mass  effect is unchanged. There is no midline shift. No effacement of the  ventricular system.       Impression:       Slight increase seen in size in the edematous area in the  right MCA territory. No significant change seen in the surrounding mass  effect or midline shift. No hemorrhagic conversion.     D:  06/03/2020  E:  06/03/2020     This report was finalized on 6/3/2020 5:49 PM by Dr. Chastity Rico MD.       MRI Brain Without Contrast [[id number]] Collected:  06/02/20 1301     Updated:  06/02/20 1916    Narrative:       EXAMINATION: MRI BRAIN WO CONTRAST-06/02/2020:     INDICATION: CVA; I63.9-Cerebral infarction, unspecified;  R13.12-Dysphagia, oropharyngeal phase;  R47.1-Dysarthria and anarthria.      TECHNIQUE: Multiplanar MRI of the brain without intravenous contrast.     COMPARISON: NONE.     FINDINGS: Confluent area of restricted diffusion within the right  frontotemporal region along with anterior and posterior MCA distribution  restriction consistent with evolving right MCA infarction with  effacement of the right lateral ventricle, however, no midline shift.  Background increased signal findings of at least moderate involvement  demonstrating moderately advanced chronic small vessel ischemic disease.  Susceptibility-weighted imaging performed without susceptibility  artifact. Pituitary and sella within normal limits. Globes and orbits  retain normal T2 signal characteristics. The visualized paranasal  sinuses and mastoid air cells are grossly clear and well pneumatized. No  cerebellopontine angle mass lesion. Normal signal flow voids of the  distal internal carotid and vertebrobasilar arteries.       Impression:       Evolving right MCA infarction with restricted diffusion and  effacement of the right lateral ventricle, however, no midline shift.  This is superimposed upon moderately advanced chronic small vessel  ischemic disease findings.     D:  06/02/2020  E:  06/02/2020            This report was finalized on 6/2/2020 7:13 PM by Dr. Cristi Perez.       FL Video Swallow With Speech Single Contrast [[id number]] Collected:  06/02/20 1339     Updated:  06/02/20 1638    Narrative:       EXAMINATION: FL VIDEO SWALLOW W SPEECH SINGLE-CONTRAST-     INDICATION: dysphagia; I63.9-Cerebral infarction, unspecified;  R13.10-Dysphagia, unspecified; R47.1-Dysarthria and anarthria     TECHNIQUE: 48 seconds of fluoroscopic time was used for this exam. 6  associated fluoroscopic loops were saved. Patient was evaluated in the  seated lateral position while taking a variety of consistencies of  barium by mouth under the direction of speech pathology.     COMPARISON: NONE     FINDINGS:  48 seconds of fluoroscopy provided for a modified barium  swallow. Please see speech therapy report for full details and  recommendations.          Impression:       Fluoroscopy provided for a modified barium swallow. Please  see speech therapy report for full details and recommendations.         This report was finalized on 6/2/2020 4:35 PM by Dr. Chastity Rico MD.       CT Head Without Contrast [[id number]] Collected:  06/01/20 1639     Updated:  06/02/20 1637    Narrative:       EXAMINATION: CT HEAD WO CONTRAST- 06/01/2020     INDICATION: stroke; I63.9-Cerebral infarction, unspecified;  R13.10-Dysphagia, unspecified; R47.1-Dysarthria and anarthria; acute  left-sided weakness     TECHNIQUE: Multiple axial CT imaging was obtained of the head without  the administration of intravenous contrast.     The radiation dose reduction device was turned on for each scan per the  ALARA (As Low as Reasonably Achievable) protocol.     COMPARISON: NONE     FINDINGS: There is a large area of low density involving the right MCA  territory representing the patient's evolving area of infarction. There  is no significant change seen in the interval. No hemorrhagic  conversion. There is no significant effacement on the right lateral  ventricle. No significant midline shift. Bony structures are  unremarkable. Visualized paranasal sinuses are clear. The mastoid air  cells are patent.       Impression:       Stable examination with evolving right MCA territory  infarct. No hemorrhagic conversion.     D:  06/01/2020  E:  06/01/2020     This report was finalized on 6/2/2020 4:34 PM by Dr. Chastity Rico MD.       CT Head Without Contrast [[id number]] Collected:  06/01/20 0815     Updated:  06/01/20 2249    Narrative:       EXAMINATION: CT HEAD WO CONTRAST-06/01/2020:      INDICATION: Stroke; I63.9-Cerebral infarction, unspecified.     TECHNIQUE: 5 mm unenhanced images through the brain.     The radiation dose reduction device was  turned on for each scan per the  ALARA (As Low as Reasonably Achievable) protocol.     COMPARISON: Cerebral perfusion exam of 05/31/2020.     FINDINGS: The calvarium appears intact. The included paranasal sinuses  and mastoids appear clear. Soft tissue window images show a fairly  subtle wedge-shaped area of edema approximately 3.5 cm in diameter in  the right frontal lobe and anterior-superior temporal lobe, also a  sub-centimeter focus of low attenuation in the right basal ganglia,  probably acute infarcts. This corresponds well to the perfusion scan  abnormality seen on yesterday's exam. Focal low-density areas in the  chucho are inconsistent between the initial and delayed scans and are  likely skull base streak artifact. There is no evidence of edema/infarct  elsewhere, no evidence of hemorrhage, mass or mass effect,  hydrocephalus, or abnormal extra-axial collection.       Impression:       Evolving right MCA territory infarct corresponding to  perfusion scan abnormality. Small right basal ganglia infarct. No  evidence of hemorrhage.     D:  06/01/2020  E:  06/01/2020           This report was finalized on 6/1/2020 10:46 PM by Dr. Ervin Troncoso MD.       CT Angiogram Neck [[id number]] Collected:  05/31/20 1528     Updated:  06/01/20 1327    Narrative:       EXAMINATION: CT ANGIOGRAM NECK, CT ANGIOGRAM HEAD - 05/31/2020     INDICATION: Left-sided weakness, facial droop and slurred speech.  Evaluate for stroke.     TECHNIQUE: CT angiogram head and neck with and without intravenous  contrast. 2D and 3D reconstructions performed.     The radiation dose reduction device was turned on for each scan per the  ALARA (As Low as Reasonably Achievable) protocol.     COMPARISON: CT head noncontrast and CT cerebral perfusion performed  concurrently.     FINDINGS:      CTA Neck: Normal 3-vessel arch with patent great vessel origins.  Proximal subclavian arteries are patent. Vertebral arteries are  symmetric in caliber without  focal severe stenosis, aneurysm or  occlusion. Carotids demonstrate retropharyngeal course of the distal  right common and proximal internal carotid arteries high branching  pattern and tortuosity demonstrating no atherosclerotic involvement and  calcific disease burden of the carotid bifurcations with 0% right and 0%  left luminal narrowing as measured by NASCET  criteria with patency of  the distal internal carotid arteries to the intracranial segments as  discussed further below the dedicated CTA head portion. Cervical soft  tissues unremarkable without bulky cervical adenopathy or soft tissue  findings of abnormality or acuity.     CTA Head: Distal internal carotid arteries are patent with only mild  atherosclerotic involvement and calcific burden however no  hemodynamically significant stenosis, aneurysm or occlusion. Anterior  cerebral arteries are patent without hemodynamically significant  stenosis, aneurysm or occlusion. Middle cerebral arteries are patent  without hemodynamically significant stenosis aneurysm or occlusion; in  particular no large vessel occlusion throughout the M2 and and M3  segments with only mild irregularities of potential atherosclerotic  involvement of the distal portions but no distinct occlusion.  Vertebrobasilar and posterior cerebral arteries are patent without  hemodynamically significant stenosis, aneurysm or occlusion. Partially  visualized venous structures unremarkable with superior sagittal sinus  patent.       Impression:       CTA head and neck with only mild atherosclerotic involvement  of the distal internal carotid arteries however no hemodynamically  significant stenosis, aneurysm or occlusion; specifically no large  vessel occlusion involving the [place] with only mild  irregularities in the distal MCA distributions of potential underlying  atherosclerotic involvement without obvious occlusion.     DICTATED:   05/31/2020  EDITED/ls :   05/31/2020          This report was finalized on 6/1/2020 1:24 PM by Dr. Cristi Perez.       CT Angiogram Head [[id number]] Collected:  05/31/20 1528     Updated:  06/01/20 1327    Narrative:       EXAMINATION: CT ANGIOGRAM NECK, CT ANGIOGRAM HEAD - 05/31/2020     INDICATION: Left-sided weakness, facial droop and slurred speech.  Evaluate for stroke.     TECHNIQUE: CT angiogram head and neck with and without intravenous  contrast. 2D and 3D reconstructions performed.     The radiation dose reduction device was turned on for each scan per the  ALARA (As Low as Reasonably Achievable) protocol.     COMPARISON: CT head noncontrast and CT cerebral perfusion performed  concurrently.     FINDINGS:      CTA Neck: Normal 3-vessel arch with patent great vessel origins.  Proximal subclavian arteries are patent. Vertebral arteries are  symmetric in caliber without focal severe stenosis, aneurysm or  occlusion. Carotids demonstrate retropharyngeal course of the distal  right common and proximal internal carotid arteries high branching  pattern and tortuosity demonstrating no atherosclerotic involvement and  calcific disease burden of the carotid bifurcations with 0% right and 0%  left luminal narrowing as measured by NASCET  criteria with patency of  the distal internal carotid arteries to the intracranial segments as  discussed further below the dedicated CTA head portion. Cervical soft  tissues unremarkable without bulky cervical adenopathy or soft tissue  findings of abnormality or acuity.     CTA Head: Distal internal carotid arteries are patent with only mild  atherosclerotic involvement and calcific burden however no  hemodynamically significant stenosis, aneurysm or occlusion. Anterior  cerebral arteries are patent without hemodynamically significant  stenosis, aneurysm or occlusion. Middle cerebral arteries are patent  without hemodynamically significant stenosis aneurysm or occlusion; in  particular no large vessel occlusion throughout  the M2 and and M3  segments with only mild irregularities of potential atherosclerotic  involvement of the distal portions but no distinct occlusion.  Vertebrobasilar and posterior cerebral arteries are patent without  hemodynamically significant stenosis, aneurysm or occlusion. Partially  visualized venous structures unremarkable with superior sagittal sinus  patent.       Impression:       CTA head and neck with only mild atherosclerotic involvement  of the distal internal carotid arteries however no hemodynamically  significant stenosis, aneurysm or occlusion; specifically no large  vessel occlusion involving the [place] with only mild  irregularities in the distal MCA distributions of potential underlying  atherosclerotic involvement without obvious occlusion.     DICTATED:   05/31/2020  EDITED/ls :   05/31/2020         This report was finalized on 6/1/2020 1:24 PM by Dr. Cristi Perez.       CT Cerebral Perfusion With & Without Contrast [[id number]] Collected:  05/31/20 1527     Updated:  06/01/20 1327    Narrative:       EXAMINATION: CT CEREBRAL PERFUSION WWO CONTRAST - 05/31/2020      INDICATION: Left-sided weakness, facial droop and slurred speech.     TECHNIQUE: CT cerebral perfusion with and without intravenous contrast.  Multiple parametric maps including mean transit time, time to drain,  cerebral blood flow and cerebral blood volume performed.     The radiation dose reduction device was turned on for each scan per the  ALARA (As Low as Reasonably Achievable) protocol.     COMPARISON: CT head performed immediately prior.     FINDINGS: Abnormal area of perfusion defect within the right  frontotemporal region and insular cortex of the right MCA distribution.  Reversible ischemia without core infarct.       Impression:       Area of reversible ischemia within the right MCA territory  without core infarct component identified.     DICTATED:   05/31/2020  EDITED/ls :   05/31/2020      This report was  finalized on 6/1/2020 1:24 PM by Dr. Cristi Perez.       CT Head Without Contrast Stroke Protocol [[id number]] Collected:  05/31/20 1450     Updated:  06/01/20 1327    Narrative:       EXAMINATION: CT HEAD WO CONTRAST - 05/31/2020      INDICATION: Left-sided weakness, facial droop and slurred speech.     TECHNIQUE: CT head without intravenous contrast following stroke  protocol.      The radiation dose reduction device was turned on for each scan per the  ALARA (As Low as Reasonably Achievable) protocol.     COMPARISON: NONE     FINDINGS: Abnormal sulcal effacement and edema within the right insular  cortex and right frontotemporal region concerning for evolving right MCA  infarction without intra-axial hemorrhage or extra-axial fluid  collection. No midline shift or hydrocephalus. Globes and orbits  unremarkable. Visualized paranasal sinuses and mastoid air cells are  grossly clear an dwell-pneumatized. Calvarium intact.       Impression:       Abnormal sulcal effacement with cortical extension of edema  in the right frontotemporal region and insular cortex concerning for  evolving right MCA infarction. No acute hemorrhage..     Scan performed on 05/31/2020 at 1443 hours. Scan report given to  treatment team on 05/31/2020 at 1448 hours.     DICTATED:   05/31/2020  EDITED/ls :   05/31/2020      This report was finalized on 6/1/2020 1:24 PM by Dr. Cristi Perez.       XR Chest 1 View [[id number]] Collected:  06/01/20 0022     Updated:  06/01/20 0025    Narrative:       CR Chest 1 Vw    INDICATION:   Stroke protocol.     COMPARISON:    None available.    FINDINGS:  Single portable AP view(s) of the chest.    Heart is enlarged. Atherosclerotic calcifications are present in the aorta. The lungs are clear. No pneumothorax is identified. Pulmonary vascularity is normal.       Impression:       No acute cardiopulmonary findings.    Signer Name: Hi Hansen MD   Signed: 6/1/2020 12:22 AM   Workstation Name: [email or web address]     [facility]                    Results for orders placed during the hospital encounter of 05/31/20   Adult Transthoracic Echo Complete W/ Cont if Necessary Per Protocol    Narrative · Left ventricular systolic function is normal. Estimated EF appears to be   in the range of 56 - 60%.  · Normal right ventricular cavity size and systolic function noted.  · Left atrial cavity size is moderately dilated. Left atrial volume is   moderately increased.  · No evidence of a patent foramen ovale. Saline test results are negative.  · The aortic valve is abnormal in structure. The valve exhibits sclerosis.   There is mild calcification of the aortic valve  · Mild aortic valve stenosis is present          Plan for Follow-up of Pending Labs/Results:     Discharge Details        Discharge Medications      New Medications      Instructions Start Date   amantadine 100 MG capsule  Commonly known as:  SYMMETREL   100 mg, Oral, 2 Times Daily      aspirin 81 MG chewable tablet   81 mg, Oral, Daily      atorvastatin 80 MG tablet  Commonly known as:  LIPITOR   80 mg, Oral, Nightly      diclofenac 1 % gel gel  Commonly known as:  VOLTAREN   2 g, Topical, 4 Times Daily      dilTIAZem  MG 24 hr capsule  Commonly known as:  CARDIZEM CD   120 mg, Oral, Every 24 Hours Scheduled      famotidine 20 MG tablet  Commonly known as:  PEPCID   20 mg, Oral, 2 Times Daily Before Meals      lactobacillus acidophilus capsule capsule   1 capsule, Oral, Daily      miconazole 2 % cream  Commonly known as:  MICOTIN   Topical, Every 12 Hours Scheduled      Nutrisource fiber pack pack   1 packet, Oral, Daily      nystatin [id number] UNIT/ML suspension  Commonly known as:  MYCOSTATIN   500,000 Units, Swish & Swallow, 4 Times Daily      QUEtiapine 25 MG tablet  Commonly known as:  SEROquel   12.5 mg, Oral, Nightly      rivaroxaban 20 MG tablet  Commonly known as:  XARELTO   20 mg, Oral, Daily With Dinner         Changes to Medications       Instructions Start Date   diphenoxylate-atropine 2.5-0.025 MG per tablet  Commonly known as:  LOMOTIL  What changed:  You were already taking a medication with the same name, and this prescription was added. Make sure you understand how and when to take each.   1 tablet, Oral, Every 8 Hours PRN      diphenoxylate-atropine 2.5-0.025 MG per tablet  Commonly known as:  LOMOTIL  What changed:  additional instructions   1 tablet, Oral, 2 Times Daily      metoprolol tartrate 100 MG tablet  Commonly known as:  LOPRESSOR  What changed:    medication strength  how much to take  when to take this  additional instructions   100 mg, Oral, Every 12 Hours Scheduled         Continue These Medications      Instructions Start Date   fluticasone 27.5 MCG/SPRAY nasal spray  Commonly known as:  VERAMYST   2 sprays, Nasal, Daily      nitroglycerin 0.4 MG SL tablet  Commonly known as:  NITROSTAT   1 under the tongue as needed for angina, may repeat q5mins for up three doses      Welchol 625 MG tablet  Generic drug:  colesevelam   2 tablets, Oral, 2 Times Daily With Meals         Stop These Medications    amLODIPine 5 MG tablet  Commonly known as:  NORVASC     apixaban 5 MG tablet tablet  Commonly known as:  ELIQUIS     lisinopril 10 MG tablet  Commonly known as:  PRINIVIL,ZESTRIL     meclizine 25 MG tablet  Commonly known as:  ANTIVERT     omeprazole 20 MG capsule  Commonly known as:  priLOSEC     sotalol 80 MG tablet  Commonly known as:  BETAPACE            Allergies   Allergen Reactions   • Digoxin Other (See Comments)     Severe c/p, left arm pain, lips numb, finger tip numbness   • Iodine Rash     Breathing problems   • Other Other (See Comments)     Monistat, causes blisters         Discharge Disposition:  Skilled Nursing Facility (DC - External)    Diet:  Hospital:  Diet Order   Procedures   • Diet Regular       Activity:  Activity Instructions     Activity as Tolerated                   CODE STATUS:    Code Status and Medical  Interventions:   Ordered at: 05/31/20 1743     Code Status:    CPR     Medical Interventions (Level of Support Prior to Arrest):    Full       Future Appointments   Date Time Provider Department Center   6/29/2020  1:00 PM Genna De La O APRN [facility] None       Additional Instructions for the Follow-ups that You Need to Schedule     Discharge Follow-up with PCP   As directed       Currently Documented PCP:    Jl Mcknight MD    PCP Phone Number:    [phone]     Follow Up Details:  once discharged from rehab.         Discharge Follow-up with Specified Provider: Cardiology- Dr. Singh; 1 Month   As directed      To:  Cardiology- Dr. Singh    Follow Up:  1 Month         Discharge Follow-up with Specified Provider: neurology   As directed      To:  neurology    Follow Up Details:  as needed               Time Spent on Discharge:  45 minutes    Electronically signed by ISSA Garcia, 06/18/20, 10:41 AM.

## 2020-06-18 NOTE — PROGRESS NOTES
Case Management Discharge Note      Final Note: Plan is [facility]. Daughter will transport. Nurse to call report to [phone] and fax discharge summary to [phone]. Please send any hard scripts for medications with the patient in the transfer apcket.         Destination - Selection Complete      Service Provider Request Status Selected Services Address Phone Number Fax Number    [facility] Sk[facility] Nursing [address], [place] [address] [phone]      Durable Medical Equipment      No service has been selected for the patient.      Dialysis/Infusion      No service has been selected for the patient.      Home Medical Care      No service has been selected for the patient.      Therapy      No service has been selected for the patient.      Community Resources      No service has been selected for the patient.        Transportation Services  Ambulance: [facility]    Final Discharge Disposition Code: 03 - skilled nursing facility (SNF)

## 2020-06-18 NOTE — NURSING NOTE
Pt A&O x's 4. Denies pain, VSS, RA with O2 sats in the mid 90'S. Pt up to chair for meals. Ambulated to the bathroom with assist of 1 and walker and gait belt. Tolerates well. CO loose stools. Meds per MAR. Pt to DC in am if Covid negative. Sent Dr. Wilson secure chat r/t pt's daughter wanting to speak with him. Dr. Wilson spoke with daughter at bedside. Continue to monitor.

## 2020-06-18 NOTE — THERAPY TREATMENT NOTE
Acute Care - Speech Language Pathology Treatment Note  [facility]     Patient Name: Sidra Lane  : 1955  MRN: [id number]  Today's Date: 2020         Admit Date: 2020    Visit Dx:      ICD-10-CM ICD-9-CM   1. Oropharyngeal dysphagia R13.12 787.22   2. Acute ischemic stroke (CMS/HCC) I63.9 434.91   3. Dysarthria R47.1 784.51   4. Cognitive communication deficit R41.841 799.52   5. Chronic diarrhea K52.9 787.91     Patient Active Problem List   Diagnosis   • Shortness of breath   • Atrial fibrillation (CMS/HCC)   • Essential hypertension   • Dizziness   • Deep vein thrombosis (DVT) of proximal lower extremity (CMS/HCC)   • Coronary artery disease involving native coronary artery of native heart without angina pectoris   • Fatigue   • Chest pain   • Longstanding persistent atrial fibrillation (CMS/HCC)   • Cerebrovascular accident (CVA) due to embolism of right middle cerebral artery (CMS/HCC)   • Acute ischemic stroke (CMS/HCC)        Therapy Treatment  Rehabilitation Treatment Summary     Row Name 20 1000             Treatment Time/Intention    Discipline  speech language pathologist  -AW      Document Type  therapy note (daily note)  -AW      Subjective Information  complains of diarrhea  -AW      Mode of Treatment  speech-language pathology  -AW      Patient/Family Observations  dtr present at end of session  -AW      Care Plan Review  care plan/treatment goals reviewed  -AW      Patient Effort  good  -AW      Recorded by [AW] Saray Garcia MS CCC-SLP 20 1027        User Key  (r) = Recorded By, (t) = Taken By, (c) = Cosigned By    Initials Name Effective Dates Discipline    AW Saray Garcia MS CCC-SLP 06/22/15 -  SLP          EDUCATION  The patient has been educated in the following areas:   Communication Impairment.    SLP Recommendation and Plan                         SLP GOALS     Row Name 20 1000 20 1145 06/15/20 1300       Oral Nutrition/Hydration Goal  1 (SLP)    Oral Nutrition/Hydration Goal 1, SLP  --  LTG: Pt will return to regular diet, thin liquids w/ no overt s/sxs aspiration/distress w/ 100% acc and no cues  -SG  LTG: Pt will return to regular diet, thin liquids w/ no overt s/sxs aspiration/distress w/ 100% acc and no cues  -SG    Time Frame (Oral Nutrition/Hydration Goal 1, SLP)  --  by discharge  -SG  by discharge  -SG    Progress/Outcomes (Oral Nutrition/Hydration Goal 1, SLP)  goal met;other (see comments) pt tolerating well, using finger to sweep pocketed food  -AW  good progress toward goal  -SG  good progress toward goal  -SG       Oral Nutrition/Hydration Goal 2 (SLP)    Oral Nutrition/Hydration Goal 2, SLP  --  Pt will tolerate puree, some mashed & honey-thick liquids w/ no overt s/sxs aspiration/distress w/ 100% acc and no cues  -SG  Pt will tolerate puree, some mashed & honey-thick liquids w/ no overt s/sxs aspiration/distress w/ 100% acc and no cues  -SG    Time Frame (Oral Nutrition/Hydration Goal 2, SLP)  --  short term goal (STG);by discharge  -SG  short term goal (STG);by discharge  -SG    Barriers (Oral Nutrition/Hydration Goal 2, SLP)  --  --  -- no issues w/ mashed potatoes, ground beef, and mashed veggie  -SG    Progress/Outcomes (Oral Nutrition/Hydration Goal 2, SLP)  --  goal met  -SG  good progress toward goal  -SG       Oral Nutrition/Hydration Goal (SLP)    Oral Nutrition/Hydration Goal, SLP  --  Pt will tolerate therapeutic trials of thin H2O w/ no overt s/sxs aspiration/distress w/ 60% acc and no cues in order to assess readiness for repeat insrumental eval  -SG  Pt will tolerate therapeutic trials of thin H2O w/ no overt s/sxs aspiration/distress w/ 60% acc and no cues in order to assess readiness for repeat insrumental eval  -SG    Time Frame (Oral Nutrition/Hydration Goal, SLP)  --  short term goal (STG);by discharge  -SG  short term goal (STG);by discharge  -SG    Barriers (Oral Nutrition/Hydration Goal, SLP)  --  --  No s/s  w/ any trials of ice chips or water. H/o silent aspiration on prior MBS  -SG    Progress/Outcomes (Oral Nutrition/Hydration Goal, SLP)  --  goal met  -SG  good progress toward goal  -SG       Labial Strengthening Goal 1 (SLP)    Activity (Labial Strengthening Goal 1, SLP)  --  increase labial tone  -SG  increase labial tone  -SG    Increase Labial Tone  --  labial resistance exercises  -SG  labial resistance exercises  -SG    [place]/Accuracy (Labial Strengthening Goal 1, SLP)  --  with minimal cues (75-90% accuracy)  -SG  with minimal cues (75-90% accuracy)  -SG    Time Frame (Labial Strengthening Goal 1, SLP)  --  short term goal (STG);by discharge  -SG  short term goal (STG);by discharge  -SG    Progress/Outcomes (Labial Strengthening Goal 1, SLP)  goal ongoing  -AW  goal ongoing  -SG  good progress toward goal  -SG       Lingual Strengthening Goal 1 (SLP)    Activity (Lingual Strengthening Goal 1, SLP)  --  increase tongue back strength  -SG  increase tongue back strength  -SG    Increase Lingual Tone/Sensation/Control/Coordination/Movement  --  lingual movement exercises  -SG  lingual movement exercises  -SG    Increase Tongue Back Strength  --  swallow trials;lingual resistance exercises  -SG  swallow trials;lingual resistance exercises  -SG    [place]/Accuracy (Lingual Strengthening Goal 1, SLP)  --  with minimal cues (75-90% accuracy)  -SG  with minimal cues (75-90% accuracy)  -SG    Time Frame (Lingual Strengthening Goal 1, SLP)  --  short term goal (STG);by discharge  -SG  short term goal (STG);by discharge  -SG    Progress/Outcomes (Lingual Strengthening Goal 1, SLP)  goal ongoing  -AW  goal ongoing  -SG  good progress toward goal  -SG       Pharyngeal Strengthening Exercise Goal 1 (SLP)    Activity (Pharyngeal Strengthening Goal 1, SLP)  --  increase timing;increase closure at entrance to airway/closure of airway at glottis  -SG  increase timing;increase closure at entrance to airway/closure of  airway at glottis  -SG    Increase Timing  --  prepping - 3 second prep or suck swallow or 3-step swallow  -SG  prepping - 3 second prep or suck swallow or 3-step swallow  -SG    Increase Superior Movement of the Hyolaryngeal Complex  --  super-supraglottic swallow  -SG  super-supraglottic swallow  -SG    Increase Anterior Movement of the Hyolaryngeal Complex  --  chin tuck against resistance (CTAR)  -SG  chin tuck against resistance (CTAR)  -SG    Increase Closure at Entrance to Airway/Closure of Airway at Glottis  --  supraglottic swallow  -SG  supraglottic swallow  -SG    Increase Squeeze/Positive Pressure Generation  --  effortful pitch glide (falsetto + pharyngeal squeeze)  -SG  effortful pitch glide (falsetto + pharyngeal squeeze)  -SG    Increase Tongue Base Retraction  --  hard effortful swallow  -SG  hard effortful swallow  -SG    Time Frame (Pharyngeal Strengthening Goal 1, SLP)  --  short term goal (STG);by discharge  -SG  short term goal (STG);by discharge  -SG    Barriers (Pharyngeal Strengthening Goal 1, SLP)  --  --  -- Pt eating lunch tray, requested to defer pharyngeal exercise  -SG    Progress/Outcomes (Pharyngeal Strengthening Goal 1, SLP)  --  goal met  -SG  goal ongoing  -SG       Articulation Goal 1 (SLP)    Improve Articulation Goal 1 (SLP)  --  --  by over-articulating at phrase level;80%;with minimal cues (75-90%)  -SG    Time Frame (Articulation Goal 1, SLP)  --  --  short term goal (STG)  -SG    Progress (Articulation Goal 1, SLP)  independently (over 90% accuracy);90%  -AW  --  --    Progress/Outcomes (Articulation Goal 1, SLP)  good progress toward goal  -AW  --  goal ongoing  -SG    Comment (Articulation Goal 1, SLP)  pt's speech fully intelligible  -AW  --  --       Attention Goal 1 (SLP)    Improve Attention by Goal 1 (SLP)  --  --  looking at speaker;80%;with minimal cues (75-90%)  -SG    Time Frame (Attention Goal 1, SLP)  --  --  short term goal (STG);by discharge  -SG    Progress  (Attention Goal 1, SLP)  90%;independently (over 90% accuracy)  -AW  --  --    Progress/Outcomes (Attention Goal 1, SLP)  goal met  -AW  --  goal ongoing  -SG    Comment (Attention Goal 1, SLP)  Pt attending well to speaker without any cueing today. Said some visual issues with Left eye persist, but she is attending better.  -AW  --  --       Additional Goal 1 (SLP)    Additional Goal 1, SLP  --  --  LTG: Pt will improve cognitive-communication skills in order to participate in care in hospital setting for 100% of opportunities w/o cues.  -SG    Time Frame (Additional Goal 1, SLP)  --  --  by discharge  -SG    Progress/Outcomes (Additional Goal 1, SLP)  goal met;discharged from facility pt d/c facility this morning  -AW  --  goal ongoing  -SG      User Key  (r) = Recorded By, (t) = Taken By, (c) = Cosigned By    Initials Name Provider Type    Monie Gates MS CCC-SLP Speech and Language Pathologist    Saray Castro MS CCC-SLP Speech and Language Pathologist              Time Calculation:     Time Calculation- SLP     Row Name 20 1031             Time Calculation- SLP    SLP Start Time  0945  -      SLP Received On  20  -        User Key  (r) = Recorded By, (t) = Taken By, (c) = Cosigned By    Initials Name Provider Type    Saray Castro MS CCC-SLP Speech and Language Pathologist          Therapy Charges for Today     Code Description Service Date Service Provider Modifiers Qty    38089188358 HC ST TREATMENT SWALLOW 1 2020 Sraay Garcia MS CCC-SLP GN 1    28759815256 HC ST TREATMENT SPEECH 2 2020 Saray Garcia MS CCC-SLP GN 1                     Saray Garcia MS CCC-SLP  2020   and Acute Care - Speech Language Pathology   Swallow Treatment Note  [facility]     Patient Name: Sidra Lane  : 1955  MRN: [id number]  Today's Date: 2020               Admit Date: 2020    Visit Dx:      ICD-10-CM ICD-9-CM   1. Oropharyngeal  dysphagia R13.12 787.22   2. Acute ischemic stroke (CMS/HCC) I63.9 434.91   3. Dysarthria R47.1 784.51   4. Cognitive communication deficit R41.841 799.52   5. Chronic diarrhea K52.9 787.91     Patient Active Problem List   Diagnosis   • Shortness of breath   • Atrial fibrillation (CMS/HCC)   • Essential hypertension   • Dizziness   • Deep vein thrombosis (DVT) of proximal lower extremity (CMS/HCC)   • Coronary artery disease involving native coronary artery of native heart without angina pectoris   • Fatigue   • Chest pain   • Longstanding persistent atrial fibrillation (CMS/HCC)   • Cerebrovascular accident (CVA) due to embolism of right middle cerebral artery (CMS/HCC)   • Acute ischemic stroke (CMS/HCC)       Therapy Treatment  Rehabilitation Treatment Summary     Row Name 06/18/20 1000             Treatment Time/Intention    Discipline  speech language pathologist  -AW      Document Type  therapy note (daily note)  -AW      Subjective Information  complains of diarrhea  -AW      Mode of Treatment  speech-language pathology  -AW      Patient/Family Observations  dtr present at end of session  -AW      Care Plan Review  care plan/treatment goals reviewed  -AW      Patient Effort  good  -AW      Recorded by [AW] Saray Garcia MS CCC-SLP 06/18/20 1027        User Key  (r) = Recorded By, (t) = Taken By, (c) = Cosigned By    Initials Name Effective Dates Discipline    AW Saray Garcia, MS CCC-SLP 06/22/15 -  SLP          Outcome Summary         SLP GOALS     Row Name 06/18/20 1000 06/16/20 1145 06/15/20 1300       Oral Nutrition/Hydration Goal 1 (SLP)    Oral Nutrition/Hydration Goal 1, SLP  --  LTG: Pt will return to regular diet, thin liquids w/ no overt s/sxs aspiration/distress w/ 100% acc and no cues  -SG  LTG: Pt will return to regular diet, thin liquids w/ no overt s/sxs aspiration/distress w/ 100% acc and no cues  -SG    Time Frame (Oral Nutrition/Hydration Goal 1, SLP)  --  by discharge  -SG  by  discharge  -SG    Progress/Outcomes (Oral Nutrition/Hydration Goal 1, SLP)  goal met;other (see comments) pt tolerating well, using finger to sweep pocketed food  -AW  good progress toward goal  -SG  good progress toward goal  -SG       Oral Nutrition/Hydration Goal 2 (SLP)    Oral Nutrition/Hydration Goal 2, SLP  --  Pt will tolerate puree, some mashed & honey-thick liquids w/ no overt s/sxs aspiration/distress w/ 100% acc and no cues  -SG  Pt will tolerate puree, some mashed & honey-thick liquids w/ no overt s/sxs aspiration/distress w/ 100% acc and no cues  -SG    Time Frame (Oral Nutrition/Hydration Goal 2, SLP)  --  short term goal (STG);by discharge  -SG  short term goal (STG);by discharge  -SG    Barriers (Oral Nutrition/Hydration Goal 2, SLP)  --  --  -- no issues w/ mashed potatoes, ground beef, and mashed veggie  -SG    Progress/Outcomes (Oral Nutrition/Hydration Goal 2, SLP)  --  goal met  -SG  good progress toward goal  -SG       Oral Nutrition/Hydration Goal (SLP)    Oral Nutrition/Hydration Goal, SLP  --  Pt will tolerate therapeutic trials of thin H2O w/ no overt s/sxs aspiration/distress w/ 60% acc and no cues in order to assess readiness for repeat insrumental eval  -SG  Pt will tolerate therapeutic trials of thin H2O w/ no overt s/sxs aspiration/distress w/ 60% acc and no cues in order to assess readiness for repeat insrumental eval  -SG    Time Frame (Oral Nutrition/Hydration Goal, SLP)  --  short term goal (STG);by discharge  -SG  short term goal (STG);by discharge  -SG    Barriers (Oral Nutrition/Hydration Goal, SLP)  --  --  No s/s w/ any trials of ice chips or water. H/o silent aspiration on prior MBS  -SG    Progress/Outcomes (Oral Nutrition/Hydration Goal, SLP)  --  goal met  -SG  good progress toward goal  -SG       Labial Strengthening Goal 1 (SLP)    Activity (Labial Strengthening Goal 1, SLP)  --  increase labial tone  -SG  increase labial tone  -SG    Increase Labial Tone  --  labial  resistance exercises  -SG  labial resistance exercises  -SG    [place]/Accuracy (Labial Strengthening Goal 1, SLP)  --  with minimal cues (75-90% accuracy)  -SG  with minimal cues (75-90% accuracy)  -SG    Time Frame (Labial Strengthening Goal 1, SLP)  --  short term goal (STG);by discharge  -SG  short term goal (STG);by discharge  -SG    Progress/Outcomes (Labial Strengthening Goal 1, SLP)  goal ongoing  -AW  goal ongoing  -SG  good progress toward goal  -SG       Lingual Strengthening Goal 1 (SLP)    Activity (Lingual Strengthening Goal 1, SLP)  --  increase tongue back strength  -SG  increase tongue back strength  -SG    Increase Lingual Tone/Sensation/Control/Coordination/Movement  --  lingual movement exercises  -SG  lingual movement exercises  -SG    Increase Tongue Back Strength  --  swallow trials;lingual resistance exercises  -SG  swallow trials;lingual resistance exercises  -SG    [place]/Accuracy (Lingual Strengthening Goal 1, SLP)  --  with minimal cues (75-90% accuracy)  -SG  with minimal cues (75-90% accuracy)  -SG    Time Frame (Lingual Strengthening Goal 1, SLP)  --  short term goal (STG);by discharge  -SG  short term goal (STG);by discharge  -SG    Progress/Outcomes (Lingual Strengthening Goal 1, SLP)  goal ongoing  -AW  goal ongoing  -SG  good progress toward goal  -SG       Pharyngeal Strengthening Exercise Goal 1 (SLP)    Activity (Pharyngeal Strengthening Goal 1, SLP)  --  increase timing;increase closure at entrance to airway/closure of airway at glottis  -SG  increase timing;increase closure at entrance to airway/closure of airway at glottis  -SG    Increase Timing  --  prepping - 3 second prep or suck swallow or 3-step swallow  -SG  prepping - 3 second prep or suck swallow or 3-step swallow  -SG    Increase Superior Movement of the Hyolaryngeal Complex  --  super-supraglottic swallow  -SG  super-supraglottic swallow  -SG    Increase Anterior Movement of the Hyolaryngeal Complex  --   chin tuck against resistance (CTAR)  -SG  chin tuck against resistance (CTAR)  -SG    Increase Closure at Entrance to Airway/Closure of Airway at Glottis  --  supraglottic swallow  -SG  supraglottic swallow  -SG    Increase Squeeze/Positive Pressure Generation  --  effortful pitch glide (falsetto + pharyngeal squeeze)  -SG  effortful pitch glide (falsetto + pharyngeal squeeze)  -SG    Increase Tongue Base Retraction  --  hard effortful swallow  -SG  hard effortful swallow  -SG    Time Frame (Pharyngeal Strengthening Goal 1, SLP)  --  short term goal (STG);by discharge  -SG  short term goal (STG);by discharge  -SG    Barriers (Pharyngeal Strengthening Goal 1, SLP)  --  --  -- Pt eating lunch tray, requested to defer pharyngeal exercise  -SG    Progress/Outcomes (Pharyngeal Strengthening Goal 1, SLP)  --  goal met  -SG  goal ongoing  -SG       Articulation Goal 1 (SLP)    Improve Articulation Goal 1 (SLP)  --  --  by over-articulating at phrase level;80%;with minimal cues (75-90%)  -SG    Time Frame (Articulation Goal 1, SLP)  --  --  short term goal (STG)  -SG    Progress (Articulation Goal 1, SLP)  independently (over 90% accuracy);90%  -AW  --  --    Progress/Outcomes (Articulation Goal 1, SLP)  good progress toward goal  -AW  --  goal ongoing  -SG    Comment (Articulation Goal 1, SLP)  pt's speech fully intelligible  -AW  --  --       Attention Goal 1 (SLP)    Improve Attention by Goal 1 (SLP)  --  --  looking at speaker;80%;with minimal cues (75-90%)  -SG    Time Frame (Attention Goal 1, SLP)  --  --  short term goal (STG);by discharge  -SG    Progress (Attention Goal 1, SLP)  90%;independently (over 90% accuracy)  -AW  --  --    Progress/Outcomes (Attention Goal 1, SLP)  goal met  -AW  --  goal ongoing  -SG    Comment (Attention Goal 1, SLP)  Pt attending well to speaker without any cueing today. Said some visual issues with Left eye persist, but she is attending better.  -AW  --  --       Additional Goal 1  (SLP)    Additional Goal 1, SLP  --  --  LTG: Pt will improve cognitive-communication skills in order to participate in care in hospital setting for 100% of opportunities w/o cues.  -SG    Time Frame (Additional Goal 1, SLP)  --  --  by discharge  -SG    Progress/Outcomes (Additional Goal 1, SLP)  goal met;discharged from facility pt d/c facility this morning  -AW  --  goal ongoing  -SG      User Key  (r) = Recorded By, (t) = Taken By, (c) = Cosigned By    Initials Name Provider Type    Monie Gates, MS CCC-SLP Speech and Language Pathologist    Saray Castro MS CCC-SLP Speech and Language Pathologist          EDUCATION  The patient has been educated in the following areas:   Dysphagia (Swallowing Impairment).    SLP Recommendation and Plan                                Time Calculation:   Time Calculation- SLP     Row Name 06/18/20 1031             Time Calculation- SLP    SLP Start Time  0945  -AW      SLP Received On  06/18/20  -        User Key  (r) = Recorded By, (t) = Taken By, (c) = Cosigned By    Initials Name Provider Type    Saray Castro, MS CCC-SLP Speech and Language Pathologist          Therapy Charges for Today     Code Description Service Date Service Provider Modifiers Qty    11484339728 HC ST TREATMENT SWALLOW 1 6/18/2020 Saray Garcia MS CCC-SLP GN 1    21735277739 HC ST TREATMENT SPEECH 2 6/18/2020 Saray Garcia MS MOHAMUD-SLP GN 1                 MS KAMRON Robledo  6/18/2020

## 2020-06-19 ENCOUNTER — APPOINTMENT (OUTPATIENT)
Dept: PREADMISSION TESTING | Facility: HOSPITAL | Age: 65
End: 2020-06-19

## 2020-06-23 ENCOUNTER — APPOINTMENT (OUTPATIENT)
Dept: CT IMAGING | Facility: HOSPITAL | Age: 65
End: 2020-06-23

## 2020-06-23 ENCOUNTER — HOSPITAL ENCOUNTER (INPATIENT)
Facility: HOSPITAL | Age: 65
LOS: 3 days | Discharge: NURSING, REHAB OR LONG-TERM CARE | End: 2020-06-26
Attending: INTERNAL MEDICINE | Admitting: INTERNAL MEDICINE

## 2020-06-23 ENCOUNTER — HOSPITAL ENCOUNTER (EMERGENCY)
Facility: HOSPITAL | Age: 65
Discharge: ANOTHER HOSPITAL | End: 2020-06-23
Attending: EMERGENCY MEDICINE | Admitting: EMERGENCY MEDICINE

## 2020-06-23 VITALS
WEIGHT: 230 LBS | HEART RATE: 58 BPM | OXYGEN SATURATION: 97 % | RESPIRATION RATE: 18 BRPM | BODY MASS INDEX: 42.33 KG/M2 | TEMPERATURE: 98.2 F | HEIGHT: 62 IN | SYSTOLIC BLOOD PRESSURE: 109 MMHG | DIASTOLIC BLOOD PRESSURE: 85 MMHG

## 2020-06-23 DIAGNOSIS — R42 DIZZINESS: ICD-10-CM

## 2020-06-23 DIAGNOSIS — I63.411 CEREBROVASCULAR ACCIDENT (CVA) DUE TO EMBOLISM OF RIGHT MIDDLE CEREBRAL ARTERY (HCC): Primary | ICD-10-CM

## 2020-06-23 DIAGNOSIS — I61.9 HEMORRHAGIC STROKE (HCC): Primary | ICD-10-CM

## 2020-06-23 DIAGNOSIS — R53.83 FATIGUE, UNSPECIFIED TYPE: ICD-10-CM

## 2020-06-23 PROBLEM — I63.9 ACUTE ISCHEMIC STROKE (HCC): Status: RESOLVED | Noted: 2020-05-31 | Resolved: 2020-06-23

## 2020-06-23 PROBLEM — I87.1 MAY-THURNER SYNDROME: Status: ACTIVE | Noted: 2020-06-23

## 2020-06-23 LAB
ALBUMIN SERPL-MCNC: 3.9 G/DL (ref 3.5–5.2)
ALBUMIN/GLOB SERPL: 1.4 G/DL
ALP SERPL-CCNC: 68 U/L (ref 39–117)
ALT SERPL W P-5'-P-CCNC: 20 U/L (ref 1–33)
ANION GAP SERPL CALCULATED.3IONS-SCNC: 12 MMOL/L (ref 5–15)
AST SERPL-CCNC: 23 U/L (ref 1–32)
BASOPHILS # BLD AUTO: 0.05 10*3/MM3 (ref 0–0.2)
BASOPHILS NFR BLD AUTO: 0.8 % (ref 0–1.5)
BILIRUB SERPL-MCNC: 0.5 MG/DL (ref 0.2–1.2)
BUN BLD-MCNC: 18 MG/DL (ref 8–23)
BUN/CREAT SERPL: 18.4 (ref 7–25)
CALCIUM SPEC-SCNC: 8.8 MG/DL (ref 8.6–10.5)
CHLORIDE SERPL-SCNC: 110 MMOL/L (ref 98–107)
CO2 SERPL-SCNC: 20 MMOL/L (ref 22–29)
CREAT BLD-MCNC: 0.98 MG/DL (ref 0.57–1)
DEPRECATED RDW RBC AUTO: 63.5 FL (ref 37–54)
EOSINOPHIL # BLD AUTO: 0.16 10*3/MM3 (ref 0–0.4)
EOSINOPHIL NFR BLD AUTO: 2.7 % (ref 0.3–6.2)
ERYTHROCYTE [DISTWIDTH] IN BLOOD BY AUTOMATED COUNT: 20 % (ref 12.3–15.4)
GFR SERPL CREATININE-BSD FRML MDRD: 57 ML/MIN/1.73
GLOBULIN UR ELPH-MCNC: 2.8 GM/DL
GLUCOSE BLD-MCNC: 92 MG/DL (ref 65–99)
GLUCOSE BLDC GLUCOMTR-MCNC: 88 MG/DL (ref 70–130)
GLUCOSE BLDC GLUCOMTR-MCNC: 89 MG/DL (ref 70–130)
HCT VFR BLD AUTO: 41.6 % (ref 34–46.6)
HGB BLD-MCNC: 11.2 G/DL (ref 12–15.9)
HOLD SPECIMEN: NORMAL
HOLD SPECIMEN: NORMAL
IMM GRANULOCYTES # BLD AUTO: 0.01 10*3/MM3 (ref 0–0.05)
IMM GRANULOCYTES NFR BLD AUTO: 0.2 % (ref 0–0.5)
LYMPHOCYTES # BLD AUTO: 1.64 10*3/MM3 (ref 0.7–3.1)
LYMPHOCYTES NFR BLD AUTO: 27.7 % (ref 19.6–45.3)
MAGNESIUM SERPL-MCNC: 2 MG/DL (ref 1.6–2.4)
MCH RBC QN AUTO: 23.6 PG (ref 26.6–33)
MCHC RBC AUTO-ENTMCNC: 26.9 G/DL (ref 31.5–35.7)
MCV RBC AUTO: 87.6 FL (ref 79–97)
MONOCYTES # BLD AUTO: 0.56 10*3/MM3 (ref 0.1–0.9)
MONOCYTES NFR BLD AUTO: 9.5 % (ref 5–12)
NEUTROPHILS # BLD AUTO: 3.5 10*3/MM3 (ref 1.7–7)
NEUTROPHILS NFR BLD AUTO: 59.1 % (ref 42.7–76)
NRBC BLD AUTO-RTO: 0 /100 WBC (ref 0–0.2)
PHOSPHATE SERPL-MCNC: 3.9 MG/DL (ref 2.5–4.5)
PLATELET # BLD AUTO: 240 10*3/MM3 (ref 140–450)
PMV BLD AUTO: 10.5 FL (ref 6–12)
POTASSIUM BLD-SCNC: 4.1 MMOL/L (ref 3.5–5.2)
PROT SERPL-MCNC: 6.7 G/DL (ref 6–8.5)
RBC # BLD AUTO: 4.75 10*6/MM3 (ref 3.77–5.28)
SODIUM BLD-SCNC: 142 MMOL/L (ref 136–145)
TROPONIN T SERPL-MCNC: <0.01 NG/ML (ref 0–0.03)
WBC NRBC COR # BLD: 5.92 10*3/MM3 (ref 3.4–10.8)
WHOLE BLOOD HOLD SPECIMEN: NORMAL
WHOLE BLOOD HOLD SPECIMEN: NORMAL

## 2020-06-23 PROCEDURE — 84100 ASSAY OF PHOSPHORUS: CPT | Performed by: INTERNAL MEDICINE

## 2020-06-23 PROCEDURE — 84484 ASSAY OF TROPONIN QUANT: CPT | Performed by: NURSE PRACTITIONER

## 2020-06-23 PROCEDURE — C9132 KCENTRA, PER I.U.: HCPCS | Performed by: INTERNAL MEDICINE

## 2020-06-23 PROCEDURE — 82962 GLUCOSE BLOOD TEST: CPT

## 2020-06-23 PROCEDURE — 25010000002 PROTHROMBIN COMPLEX CONC HUMAN 1000 UNITS KIT: Performed by: INTERNAL MEDICINE

## 2020-06-23 PROCEDURE — 70450 CT HEAD/BRAIN W/O DYE: CPT | Performed by: RADIOLOGY

## 2020-06-23 PROCEDURE — 85025 COMPLETE CBC W/AUTO DIFF WBC: CPT | Performed by: INTERNAL MEDICINE

## 2020-06-23 PROCEDURE — 93010 ELECTROCARDIOGRAM REPORT: CPT | Performed by: INTERNAL MEDICINE

## 2020-06-23 PROCEDURE — 25010000002 PROTHROMBIN COMPLEX CONC HUMAN 500 UNITS KIT: Performed by: INTERNAL MEDICINE

## 2020-06-23 PROCEDURE — 70450 CT HEAD/BRAIN W/O DYE: CPT

## 2020-06-23 PROCEDURE — 99285 EMERGENCY DEPT VISIT HI MDM: CPT

## 2020-06-23 PROCEDURE — 99223 1ST HOSP IP/OBS HIGH 75: CPT | Performed by: INTERNAL MEDICINE

## 2020-06-23 PROCEDURE — 83735 ASSAY OF MAGNESIUM: CPT | Performed by: INTERNAL MEDICINE

## 2020-06-23 PROCEDURE — 93005 ELECTROCARDIOGRAM TRACING: CPT | Performed by: INTERNAL MEDICINE

## 2020-06-23 PROCEDURE — 80053 COMPREHEN METABOLIC PANEL: CPT | Performed by: INTERNAL MEDICINE

## 2020-06-23 PROCEDURE — 93005 ELECTROCARDIOGRAM TRACING: CPT | Performed by: EMERGENCY MEDICINE

## 2020-06-23 RX ORDER — DILTIAZEM HYDROCHLORIDE 120 MG/1
120 CAPSULE, COATED, EXTENDED RELEASE ORAL
Status: DISCONTINUED | OUTPATIENT
Start: 2020-06-24 | End: 2020-06-26 | Stop reason: HOSPADM

## 2020-06-23 RX ORDER — ATORVASTATIN CALCIUM 40 MG/1
80 TABLET, FILM COATED ORAL NIGHTLY
Status: DISCONTINUED | OUTPATIENT
Start: 2020-06-23 | End: 2020-06-24

## 2020-06-23 RX ORDER — QUETIAPINE FUMARATE 25 MG/1
12.5 TABLET, FILM COATED ORAL NIGHTLY
Status: DISCONTINUED | OUTPATIENT
Start: 2020-06-23 | End: 2020-06-26 | Stop reason: HOSPADM

## 2020-06-23 RX ORDER — SODIUM CHLORIDE 0.9 % (FLUSH) 0.9 %
10 SYRINGE (ML) INJECTION AS NEEDED
Status: DISCONTINUED | OUTPATIENT
Start: 2020-06-23 | End: 2020-06-26 | Stop reason: HOSPADM

## 2020-06-23 RX ORDER — SODIUM CHLORIDE 0.9 % (FLUSH) 0.9 %
10 SYRINGE (ML) INJECTION AS NEEDED
Status: DISCONTINUED | OUTPATIENT
Start: 2020-06-23 | End: 2020-06-23 | Stop reason: HOSPADM

## 2020-06-23 RX ORDER — FAMOTIDINE 20 MG/1
20 TABLET, FILM COATED ORAL
Status: DISCONTINUED | OUTPATIENT
Start: 2020-06-24 | End: 2020-06-26 | Stop reason: HOSPADM

## 2020-06-23 RX ORDER — SODIUM CHLORIDE 0.9 % (FLUSH) 0.9 %
10 SYRINGE (ML) INJECTION EVERY 12 HOURS SCHEDULED
Status: DISCONTINUED | OUTPATIENT
Start: 2020-06-23 | End: 2020-06-26 | Stop reason: HOSPADM

## 2020-06-23 RX ORDER — METOPROLOL TARTRATE 100 MG/1
100 TABLET ORAL EVERY 12 HOURS SCHEDULED
Status: DISCONTINUED | OUTPATIENT
Start: 2020-06-23 | End: 2020-06-26 | Stop reason: HOSPADM

## 2020-06-23 RX ADMIN — PROTHROMBIN, COAGULATION FACTOR VII HUMAN, COAGULATION FACTOR IX HUMAN, COAGULATION FACTOR X HUMAN, PROTEIN C, PROTEIN S HUMAN, AND WATER 5000 UNITS: KIT at 21:45

## 2020-06-23 RX ADMIN — SODIUM CHLORIDE, PRESERVATIVE FREE 10 ML: 5 INJECTION INTRAVENOUS at 21:49

## 2020-06-23 NOTE — ED NOTES
Dr. Cardona Neurologist from [facility] is on the line with Dr. Mahmood at this time.      Pro Cuevas  06/23/20 7171

## 2020-06-23 NOTE — ED NOTES
Called [facility] for stroke coordinator who is on the line with Dr. Mahmood at this time.      Pro Cuevas  06/23/20 0634

## 2020-06-23 NOTE — ED PROVIDER NOTES
Subjective   Patient presents to [facility] with stroke-like symptoms. Three weeks ago she was sent to [facility] For cardio- embolic stroke.      Stroke   Presenting symptoms: sensory loss and weakness    Onset quality:  Gradual  Timing:  Constant  Similar to previous episodes: yes        Review of Systems   HENT: Negative.    Eyes: Negative.    Respiratory: Negative.    Cardiovascular: Negative.    Gastrointestinal: Negative.    Endocrine: Negative.    Genitourinary: Negative.    Musculoskeletal: Negative.    Skin: Negative.    Allergic/Immunologic: Negative.    Neurological: Positive for weakness.   Hematological: Negative.    Psychiatric/Behavioral: Negative.        Past Medical History:   Diagnosis Date   • Atrial fibrillation (CMS/HCC)    • CHF (congestive heart failure) (CMS/HCC)    • Deep vein thrombosis (CMS/HCC)    • GERD (gastroesophageal reflux disease)    • Hypertension    • May-Thurner syndrome    • Pulmonary embolism (CMS/HCC)        Allergies   Allergen Reactions   • Digoxin Other (See Comments)     Severe c/p, left arm pain, lips numb, finger tip numbness   • Iodine Rash     Breathing problems   • Other Other (See Comments)     Monistat, causes blisters       Past Surgical History:   Procedure Laterality Date   • CHOLECYSTECTOMY     • COLON SURGERY      bowel leakage, some of colon removed   • INTERVENTIONAL RADIOLOGY PROCEDURE Bilateral 5/31/2020    Procedure: CAROTID CEREBRAL ANGIOGRAM BILATERAL;  Surgeon: Brant Duarte MD;  Location: [facility] INVASIVE LOCATION;  Service: Interventional Radiology;  Laterality: Bilateral;   • LAPAROSCOPIC TUBAL LIGATION     • LEG SURGERY      multiple stents to BLE   • TONSILLECTOMY         Family History   Problem Relation Age of Onset   • Hypertension Mother    • Cervical cancer Mother        Social History     Socioeconomic History   • Marital status: Single     Spouse name: Not on file   • Number of children: Not on file   • Years of education: Not on file   •  Highest education level: Not on file   Tobacco Use   • Smoking status: Never Smoker   • Smokeless tobacco: Never Used   Substance and Sexual Activity   • Alcohol use: No   • Drug use: No           Objective   Physical Exam   Constitutional: She appears well-developed.   HENT:   Head: Normocephalic.   Eyes: Pupils are equal, round, and reactive to light.   Neck: Normal range of motion.   Cardiovascular: Normal rate.   Pulmonary/Chest: Effort normal.   Abdominal: Soft.   Neurological:   Right  mildly weak, mild facial droop   Skin: Skin is warm.   Psychiatric: She has a normal mood and affect.   Nursing note and vitals reviewed.      Procedures           ED Course                                           MDM    Final diagnoses:   Hemorrhagic stroke (CMS/[facility])            Tl Mahmood MD  06/23/20 1003

## 2020-06-23 NOTE — ED NOTES
Called [facility] for transport to [facility] per Dr. Mahmood.      Pro Cuevas  06/23/20 3926

## 2020-06-24 ENCOUNTER — APPOINTMENT (OUTPATIENT)
Dept: NEUROLOGY | Facility: HOSPITAL | Age: 65
End: 2020-06-24

## 2020-06-24 ENCOUNTER — APPOINTMENT (OUTPATIENT)
Dept: CT IMAGING | Facility: HOSPITAL | Age: 65
End: 2020-06-24

## 2020-06-24 PROBLEM — I82.409 DEEP VEIN THROMBOSIS (HCC): Status: ACTIVE | Noted: 2018-10-01

## 2020-06-24 PROBLEM — R93.0 ABNORMAL HEAD CT: Status: ACTIVE | Noted: 2020-06-24

## 2020-06-24 LAB
CHOLEST SERPL-MCNC: 88 MG/DL (ref 0–200)
GLUCOSE BLDC GLUCOMTR-MCNC: 100 MG/DL (ref 70–130)
HBA1C MFR BLD: 5.6 % (ref 4.8–5.6)
HDLC SERPL-MCNC: 53 MG/DL (ref 40–60)
LDLC SERPL CALC-MCNC: 14 MG/DL (ref 0–100)
LDLC/HDLC SERPL: 0.27 {RATIO}
TRIGL SERPL-MCNC: 103 MG/DL (ref 0–150)
VLDLC SERPL-MCNC: 20.6 MG/DL

## 2020-06-24 PROCEDURE — 25010000002 HALOPERIDOL LACTATE PER 5 MG: Performed by: NURSE PRACTITIONER

## 2020-06-24 PROCEDURE — 99233 SBSQ HOSP IP/OBS HIGH 50: CPT | Performed by: INTERNAL MEDICINE

## 2020-06-24 PROCEDURE — 97165 OT EVAL LOW COMPLEX 30 MIN: CPT

## 2020-06-24 PROCEDURE — 92610 EVALUATE SWALLOWING FUNCTION: CPT

## 2020-06-24 PROCEDURE — 70450 CT HEAD/BRAIN W/O DYE: CPT

## 2020-06-24 PROCEDURE — 97162 PT EVAL MOD COMPLEX 30 MIN: CPT

## 2020-06-24 PROCEDURE — 25010000002 HYDRALAZINE PER 20 MG: Performed by: NURSE PRACTITIONER

## 2020-06-24 PROCEDURE — 99222 1ST HOSP IP/OBS MODERATE 55: CPT | Performed by: PSYCHIATRY & NEUROLOGY

## 2020-06-24 PROCEDURE — 95816 EEG AWAKE AND DROWSY: CPT

## 2020-06-24 PROCEDURE — 83036 HEMOGLOBIN GLYCOSYLATED A1C: CPT | Performed by: NEUROLOGICAL SURGERY

## 2020-06-24 PROCEDURE — 82962 GLUCOSE BLOOD TEST: CPT

## 2020-06-24 PROCEDURE — 99232 SBSQ HOSP IP/OBS MODERATE 35: CPT | Performed by: INTERNAL MEDICINE

## 2020-06-24 PROCEDURE — 97530 THERAPEUTIC ACTIVITIES: CPT

## 2020-06-24 PROCEDURE — 80061 LIPID PANEL: CPT | Performed by: NEUROLOGICAL SURGERY

## 2020-06-24 PROCEDURE — 92523 SPEECH SOUND LANG COMPREHEN: CPT

## 2020-06-24 RX ORDER — ATORVASTATIN CALCIUM 10 MG/1
10 TABLET, FILM COATED ORAL NIGHTLY
Status: DISCONTINUED | OUTPATIENT
Start: 2020-06-24 | End: 2020-06-26 | Stop reason: HOSPADM

## 2020-06-24 RX ORDER — LORAZEPAM 2 MG/ML
1 INJECTION INTRAMUSCULAR EVERY 6 HOURS PRN
Status: DISCONTINUED | OUTPATIENT
Start: 2020-06-24 | End: 2020-06-24

## 2020-06-24 RX ORDER — HYDRALAZINE HYDROCHLORIDE 20 MG/ML
10 INJECTION INTRAMUSCULAR; INTRAVENOUS EVERY 6 HOURS PRN
Status: DISCONTINUED | OUTPATIENT
Start: 2020-06-24 | End: 2020-06-26 | Stop reason: HOSPADM

## 2020-06-24 RX ORDER — ASPIRIN 81 MG/1
81 TABLET, CHEWABLE ORAL DAILY
Status: DISCONTINUED | OUTPATIENT
Start: 2020-06-24 | End: 2020-06-26 | Stop reason: HOSPADM

## 2020-06-24 RX ORDER — HALOPERIDOL 5 MG/ML
5 INJECTION INTRAMUSCULAR EVERY 6 HOURS PRN
Status: DISCONTINUED | OUTPATIENT
Start: 2020-06-24 | End: 2020-06-26 | Stop reason: HOSPADM

## 2020-06-24 RX ORDER — LORAZEPAM 1 MG/1
1 TABLET ORAL EVERY 6 HOURS PRN
Status: DISCONTINUED | OUTPATIENT
Start: 2020-06-24 | End: 2020-06-26 | Stop reason: HOSPADM

## 2020-06-24 RX ORDER — ACETAMINOPHEN 325 MG/1
650 TABLET ORAL EVERY 6 HOURS PRN
Status: DISCONTINUED | OUTPATIENT
Start: 2020-06-24 | End: 2020-06-26 | Stop reason: HOSPADM

## 2020-06-24 RX ADMIN — HYDRALAZINE HYDROCHLORIDE 10 MG: 20 INJECTION INTRAMUSCULAR; INTRAVENOUS at 00:58

## 2020-06-24 RX ADMIN — DILTIAZEM HYDROCHLORIDE 120 MG: 120 CAPSULE, COATED, EXTENDED RELEASE ORAL at 10:07

## 2020-06-24 RX ADMIN — FAMOTIDINE 20 MG: 20 TABLET, FILM COATED ORAL at 17:28

## 2020-06-24 RX ADMIN — NICARDIPINE HYDROCHLORIDE 6 MG/HR: 0.1 INJECTION, SOLUTION INTRAVENOUS at 09:08

## 2020-06-24 RX ADMIN — QUETIAPINE FUMARATE 12.5 MG: 25 TABLET ORAL at 20:57

## 2020-06-24 RX ADMIN — NICARDIPINE HYDROCHLORIDE 6 MG/HR: 0.1 INJECTION, SOLUTION INTRAVENOUS at 04:59

## 2020-06-24 RX ADMIN — SODIUM CHLORIDE, PRESERVATIVE FREE 10 ML: 5 INJECTION INTRAVENOUS at 20:59

## 2020-06-24 RX ADMIN — RIVAROXABAN 20 MG: 20 TABLET, FILM COATED ORAL at 17:28

## 2020-06-24 RX ADMIN — ACETAMINOPHEN 650 MG: 325 TABLET, FILM COATED ORAL at 10:17

## 2020-06-24 RX ADMIN — ATORVASTATIN CALCIUM 10 MG: 10 TABLET, FILM COATED ORAL at 20:57

## 2020-06-24 RX ADMIN — METOPROLOL TARTRATE 100 MG: 100 TABLET ORAL at 20:56

## 2020-06-24 RX ADMIN — HALOPERIDOL LACTATE 5 MG: 5 INJECTION, SOLUTION INTRAMUSCULAR at 02:20

## 2020-06-24 RX ADMIN — ASPIRIN 81 MG 81 MG: 81 TABLET ORAL at 17:29

## 2020-06-24 RX ADMIN — NICARDIPINE HYDROCHLORIDE 5 MG/HR: 0.1 INJECTION, SOLUTION INTRAVENOUS at 01:38

## 2020-06-24 RX ADMIN — METOPROLOL TARTRATE 100 MG: 100 TABLET ORAL at 10:07

## 2020-06-25 ENCOUNTER — APPOINTMENT (OUTPATIENT)
Dept: GENERAL RADIOLOGY | Facility: HOSPITAL | Age: 65
End: 2020-06-25

## 2020-06-25 LAB
ANION GAP SERPL CALCULATED.3IONS-SCNC: 12 MMOL/L (ref 5–15)
BASOPHILS # BLD AUTO: 0.05 10*3/MM3 (ref 0–0.2)
BASOPHILS NFR BLD AUTO: 0.8 % (ref 0–1.5)
BUN BLD-MCNC: 10 MG/DL (ref 8–23)
BUN/CREAT SERPL: 12.8 (ref 7–25)
CALCIUM SPEC-SCNC: 9 MG/DL (ref 8.6–10.5)
CHLORIDE SERPL-SCNC: 109 MMOL/L (ref 98–107)
CO2 SERPL-SCNC: 21 MMOL/L (ref 22–29)
CREAT BLD-MCNC: 0.78 MG/DL (ref 0.57–1)
DEPRECATED RDW RBC AUTO: 65.3 FL (ref 37–54)
EOSINOPHIL # BLD AUTO: 0.25 10*3/MM3 (ref 0–0.4)
EOSINOPHIL NFR BLD AUTO: 4.1 % (ref 0.3–6.2)
ERYTHROCYTE [DISTWIDTH] IN BLOOD BY AUTOMATED COUNT: 21.1 % (ref 12.3–15.4)
GFR SERPL CREATININE-BSD FRML MDRD: 74 ML/MIN/1.73
GLUCOSE BLD-MCNC: 102 MG/DL (ref 65–99)
HCT VFR BLD AUTO: 42.9 % (ref 34–46.6)
HGB BLD-MCNC: 11.4 G/DL (ref 12–15.9)
IMM GRANULOCYTES # BLD AUTO: 0.03 10*3/MM3 (ref 0–0.05)
IMM GRANULOCYTES NFR BLD AUTO: 0.5 % (ref 0–0.5)
LYMPHOCYTES # BLD AUTO: 1.72 10*3/MM3 (ref 0.7–3.1)
LYMPHOCYTES NFR BLD AUTO: 28.5 % (ref 19.6–45.3)
MCH RBC QN AUTO: 22.9 PG (ref 26.6–33)
MCHC RBC AUTO-ENTMCNC: 26.6 G/DL (ref 31.5–35.7)
MCV RBC AUTO: 86.1 FL (ref 79–97)
MONOCYTES # BLD AUTO: 0.49 10*3/MM3 (ref 0.1–0.9)
MONOCYTES NFR BLD AUTO: 8.1 % (ref 5–12)
NEUTROPHILS # BLD AUTO: 3.49 10*3/MM3 (ref 1.7–7)
NEUTROPHILS NFR BLD AUTO: 58 % (ref 42.7–76)
NRBC BLD AUTO-RTO: 0 /100 WBC (ref 0–0.2)
PLATELET # BLD AUTO: 237 10*3/MM3 (ref 140–450)
PMV BLD AUTO: 11.5 FL (ref 6–12)
POTASSIUM BLD-SCNC: 3.5 MMOL/L (ref 3.5–5.2)
RBC # BLD AUTO: 4.98 10*6/MM3 (ref 3.77–5.28)
SODIUM BLD-SCNC: 142 MMOL/L (ref 136–145)
WBC NRBC COR # BLD: 6.03 10*3/MM3 (ref 3.4–10.8)

## 2020-06-25 PROCEDURE — 80048 BASIC METABOLIC PNL TOTAL CA: CPT | Performed by: INTERNAL MEDICINE

## 2020-06-25 PROCEDURE — 99232 SBSQ HOSP IP/OBS MODERATE 35: CPT | Performed by: INTERNAL MEDICINE

## 2020-06-25 PROCEDURE — 99231 SBSQ HOSP IP/OBS SF/LOW 25: CPT | Performed by: CLINICAL NURSE SPECIALIST

## 2020-06-25 PROCEDURE — 99232 SBSQ HOSP IP/OBS MODERATE 35: CPT | Performed by: NURSE PRACTITIONER

## 2020-06-25 PROCEDURE — 71046 X-RAY EXAM CHEST 2 VIEWS: CPT

## 2020-06-25 PROCEDURE — 97110 THERAPEUTIC EXERCISES: CPT

## 2020-06-25 PROCEDURE — 97116 GAIT TRAINING THERAPY: CPT

## 2020-06-25 PROCEDURE — 85025 COMPLETE CBC W/AUTO DIFF WBC: CPT | Performed by: INTERNAL MEDICINE

## 2020-06-25 RX ORDER — CHOLESTYRAMINE LIGHT 4 G/5.7G
1 POWDER, FOR SUSPENSION ORAL DAILY
Status: DISCONTINUED | OUTPATIENT
Start: 2020-06-25 | End: 2020-06-26 | Stop reason: HOSPADM

## 2020-06-25 RX ORDER — DIPHENOXYLATE HYDROCHLORIDE AND ATROPINE SULFATE 2.5; .025 MG/1; MG/1
1 TABLET ORAL 2 TIMES DAILY
Status: DISCONTINUED | OUTPATIENT
Start: 2020-06-25 | End: 2020-06-26 | Stop reason: HOSPADM

## 2020-06-25 RX ORDER — DIPHENOXYLATE HYDROCHLORIDE AND ATROPINE SULFATE 2.5; .025 MG/1; MG/1
1 TABLET ORAL EVERY 8 HOURS PRN
Status: DISCONTINUED | OUTPATIENT
Start: 2020-06-25 | End: 2020-06-26 | Stop reason: HOSPADM

## 2020-06-25 RX ADMIN — SODIUM CHLORIDE, PRESERVATIVE FREE 10 ML: 5 INJECTION INTRAVENOUS at 21:13

## 2020-06-25 RX ADMIN — DIPHENOXYLATE HYDROCHLORIDE AND ATROPINE SULFATE 1 TABLET: 2.5; .025 TABLET ORAL at 11:50

## 2020-06-25 RX ADMIN — DIPHENOXYLATE HYDROCHLORIDE AND ATROPINE SULFATE 1 TABLET: 2.5; .025 TABLET ORAL at 21:20

## 2020-06-25 RX ADMIN — QUETIAPINE FUMARATE 12.5 MG: 25 TABLET ORAL at 21:20

## 2020-06-25 RX ADMIN — ASPIRIN 81 MG 81 MG: 81 TABLET ORAL at 09:11

## 2020-06-25 RX ADMIN — METOPROLOL TARTRATE 100 MG: 100 TABLET ORAL at 09:12

## 2020-06-25 RX ADMIN — ATORVASTATIN CALCIUM 10 MG: 10 TABLET, FILM COATED ORAL at 21:20

## 2020-06-25 RX ADMIN — METOPROLOL TARTRATE 100 MG: 100 TABLET ORAL at 21:20

## 2020-06-25 RX ADMIN — CHOLESTYRAMINE 4 G: 4 POWDER, FOR SUSPENSION ORAL at 11:50

## 2020-06-25 RX ADMIN — FAMOTIDINE 20 MG: 20 TABLET, FILM COATED ORAL at 09:11

## 2020-06-25 RX ADMIN — FAMOTIDINE 20 MG: 20 TABLET, FILM COATED ORAL at 16:59

## 2020-06-25 RX ADMIN — SODIUM CHLORIDE, PRESERVATIVE FREE 10 ML: 5 INJECTION INTRAVENOUS at 09:12

## 2020-06-25 RX ADMIN — RIVAROXABAN 20 MG: 20 TABLET, FILM COATED ORAL at 16:59

## 2020-06-25 RX ADMIN — DILTIAZEM HYDROCHLORIDE 120 MG: 120 CAPSULE, COATED, EXTENDED RELEASE ORAL at 09:11

## 2020-06-26 VITALS
HEART RATE: 66 BPM | OXYGEN SATURATION: 93 % | SYSTOLIC BLOOD PRESSURE: 108 MMHG | TEMPERATURE: 97.9 F | WEIGHT: 222.44 LBS | RESPIRATION RATE: 18 BRPM | HEIGHT: 62 IN | BODY MASS INDEX: 40.93 KG/M2 | DIASTOLIC BLOOD PRESSURE: 68 MMHG

## 2020-06-26 PROCEDURE — 99239 HOSP IP/OBS DSCHRG MGMT >30: CPT | Performed by: INTERNAL MEDICINE

## 2020-06-26 PROCEDURE — 97535 SELF CARE MNGMENT TRAINING: CPT

## 2020-06-26 PROCEDURE — 97110 THERAPEUTIC EXERCISES: CPT

## 2020-06-26 PROCEDURE — 99231 SBSQ HOSP IP/OBS SF/LOW 25: CPT | Performed by: CLINICAL NURSE SPECIALIST

## 2020-06-26 PROCEDURE — 97530 THERAPEUTIC ACTIVITIES: CPT

## 2020-06-26 RX ADMIN — SODIUM CHLORIDE, PRESERVATIVE FREE 10 ML: 5 INJECTION INTRAVENOUS at 08:27

## 2020-06-26 RX ADMIN — METOPROLOL TARTRATE 100 MG: 100 TABLET ORAL at 08:26

## 2020-06-26 RX ADMIN — ACETAMINOPHEN 650 MG: 325 TABLET, FILM COATED ORAL at 08:26

## 2020-06-26 RX ADMIN — ASPIRIN 81 MG 81 MG: 81 TABLET ORAL at 08:26

## 2020-06-26 RX ADMIN — CHOLESTYRAMINE 4 G: 4 POWDER, FOR SUSPENSION ORAL at 08:26

## 2020-06-26 RX ADMIN — FAMOTIDINE 20 MG: 20 TABLET, FILM COATED ORAL at 08:26

## 2020-06-26 RX ADMIN — DIPHENOXYLATE HYDROCHLORIDE AND ATROPINE SULFATE 1 TABLET: 2.5; .025 TABLET ORAL at 08:26

## 2020-06-26 RX ADMIN — DILTIAZEM HYDROCHLORIDE 120 MG: 120 CAPSULE, COATED, EXTENDED RELEASE ORAL at 08:26

## 2020-06-29 NOTE — TELEPHONE ENCOUNTER
Contacted patient regarding Sotalol therapy. Flecainide stopped on 1/16/19 due to symptoms of dizziness and to start Sotalol 80mg BID on 1/20/19. She stated she was admitted to [facility] for rectal bleeding on 1/19/2019. While hospitalized she redeveloped afib and was placed under the care of Dr Nice. Sotalol was started on 1/20/19 and monitored by EKGs. Patient discharged today 1/22/19. Patient to be seen as hospital follow up on 1/28/19. She will call with any questions/concerns or proceed to the ER. Anne Wiggins MA    
Billing Type: Third-Party Bill
Bill For Surgical Tray: no
Expected Date Of Service: 05/28/2020

## 2020-07-02 ENCOUNTER — TRANSCRIBE ORDERS (OUTPATIENT)
Dept: ADMINISTRATIVE | Facility: HOSPITAL | Age: 65
End: 2020-07-02

## 2020-07-02 DIAGNOSIS — I61.9 CEREBRAL HEMORRHAGE (HCC): Primary | ICD-10-CM

## 2020-07-06 ENCOUNTER — HOSPITAL ENCOUNTER (OUTPATIENT)
Dept: CT IMAGING | Facility: HOSPITAL | Age: 65
Discharge: HOME | End: 2020-07-06
Admitting: INTERNAL MEDICINE

## 2020-07-06 DIAGNOSIS — I61.9 CEREBRAL HEMORRHAGE (HCC): ICD-10-CM

## 2020-07-06 PROCEDURE — 70450 CT HEAD/BRAIN W/O DYE: CPT

## 2020-07-06 PROCEDURE — 70450 CT HEAD/BRAIN W/O DYE: CPT | Performed by: RADIOLOGY

## 2020-07-29 ENCOUNTER — OFFICE VISIT (OUTPATIENT)
Dept: CARDIOLOGY | Facility: CLINIC | Age: 65
End: 2020-07-29

## 2020-07-29 VITALS
HEART RATE: 89 BPM | OXYGEN SATURATION: 97 % | WEIGHT: 215 LBS | BODY MASS INDEX: 40.59 KG/M2 | HEIGHT: 61 IN | DIASTOLIC BLOOD PRESSURE: 66 MMHG | SYSTOLIC BLOOD PRESSURE: 104 MMHG

## 2020-07-29 DIAGNOSIS — D68.59 HYPERCOAGULABLE STATE (HCC): Primary | ICD-10-CM

## 2020-07-29 DIAGNOSIS — I10 ESSENTIAL HYPERTENSION: ICD-10-CM

## 2020-07-29 DIAGNOSIS — I48.11 LONGSTANDING PERSISTENT ATRIAL FIBRILLATION (HCC): Primary | ICD-10-CM

## 2020-07-29 DIAGNOSIS — I63.411 CEREBROVASCULAR ACCIDENT (CVA) DUE TO EMBOLISM OF RIGHT MIDDLE CEREBRAL ARTERY (HCC): ICD-10-CM

## 2020-07-29 DIAGNOSIS — I82.429 ACUTE DEEP VEIN THROMBOSIS (DVT) OF ILIAC VEIN, UNSPECIFIED LATERALITY (HCC): ICD-10-CM

## 2020-07-29 PROCEDURE — 99213 OFFICE O/P EST LOW 20 MIN: CPT | Performed by: INTERNAL MEDICINE

## 2020-07-29 RX ORDER — POTASSIUM CHLORIDE 750 MG/1
10 TABLET, EXTENDED RELEASE ORAL DAILY
COMMUNITY
End: 2020-12-19

## 2020-07-29 RX ORDER — ACETAMINOPHEN 325 MG/1
650 TABLET ORAL EVERY 6 HOURS PRN
COMMUNITY

## 2020-07-29 NOTE — PROGRESS NOTES
[address]  1955  [phone]    07/29/2020    [facility] CARDIOLOGY     Jl Mcknight MD  [address]  [facility] [id number]    Chief Complaint   Patient presents with   • Atrial Fibrillation   • Coronary Artery Disease       Problem List:     1.  Symptomatic persistent atrial fibrillation              A.  Failed flecainide, sotalol, and amiodarone therapy              B.  Intolerant to digoxin              C.  In persistent AF at least since February              D.  On chronic anticoagulation   E.  Echo 6/1/2020 LVEF 55%, mild AS  2.  CVA   A. R MCA CVA on eliquis with failed thrombectomy. Treated intra-arterial TPA   B. Readmitted with laminar necrosis  3.  CAD              A.  Stress test August 2018 at [facility]: No evidence of ischemia,  normal LV size  and  function              B.  Stress test at [facility] 12/14/2019 normal LV size and function no ischemia              C.  Echocardiogram [facility] 12/14/2019: LVEF 60%, mild to moderate aortic stenosis, mild TR.  4.  HTN  5.  DVT on chronic anticoagulation.  6.  Left lower lobe pulmonary embolism January 2019 with subtherapeutic PT/INR on Coumadin: now on eliquis  7.  Stenting of both iliac veins with wall stents 03/2016 &10/2016   8.  Chronic diarrhea  9.  Surgeries              A.  Laparoscopic tubal ligation              B.  Colon surgery with surgical removal due to bowel leakage              C.  Cholecystectomy    Allergies  Allergies   Allergen Reactions   • Digoxin Other (See Comments)     Severe c/p, left arm pain, lips numb, finger tip numbness   • Monistat [Miconazole] Dermatitis     Causes blisters   • Iodine Rash     Breathing problems       Current Medications    Current Outpatient Medications:   •  acetaminophen (TYLENOL) 325 MG tablet, Take 650 mg by mouth Every 6 (Six) Hours As Needed for Mild Pain ., Disp: , Rfl:   •  amantadine (SYMMETREL) 100 MG capsule, Take 1 capsule by mouth  2 (Two) Times a Day., Disp: , Rfl:   •  aspirin 81 MG chewable tablet, Chew 1 tablet Daily., Disp: , Rfl:   •  atorvastatin (LIPITOR) 80 MG tablet, Take 1 tablet by mouth Every Night., Disp: , Rfl:   •  diclofenac (VOLTAREN) 1 % gel gel, Apply 2 g topically to the appropriate area as directed 4 (Four) Times a Day., Disp: , Rfl:   •  dilTIAZem CD (CARDIZEM CD) 120 MG 24 hr capsule, Take 1 capsule by mouth Daily., Disp: , Rfl:   •  diphenoxylate-atropine (LOMOTIL) 2.5-0.025 MG per tablet, Take 1 tablet by mouth Every 8 (Eight) Hours As Needed for Diarrhea., Disp: 9 tablet, Rfl: 0  •  famotidine (PEPCID) 20 MG tablet, Take 1 tablet by mouth 2 (Two) Times a Day Before Meals., Disp: , Rfl:   •  metoprolol tartrate (LOPRESSOR) 100 MG tablet, Take 1 tablet by mouth Every 12 (Twelve) Hours., Disp: , Rfl:   •  miconazole (MICOTIN) 2 % cream, Apply  topically to the appropriate area as directed Every 12 (Twelve) Hours., Disp: , Rfl:   •  nitroglycerin (NITROSTAT) 0.4 MG SL tablet, 1 under the tongue as needed for angina, may repeat q5mins for up three doses, Disp: 25 tablet, Rfl: 11  •  potassium chloride (K-DUR,KLOR-CON) 10 MEQ CR tablet, Take 10 mEq by mouth Daily., Disp: , Rfl:   •  QUEtiapine (SEROquel) 25 MG tablet, Take 0.5 tablets by mouth Every Night., Disp: , Rfl:   •  rivaroxaban (XARELTO) 20 MG tablet, Take 1 tablet by mouth Daily With Dinner. Indications: Atrial Fibrillation, Disp: 30 tablet, Rfl:   •  WELCHOL 625 MG tablet, Take 2 tablets by mouth 2 (Two) Times a Day With Meals., Disp: , Rfl:     History of Present Illness     Pt presents for follow up of AF/HTN/CAD. Since we last saw the pt, pt had a CVA in June on eliquis. He eliquis was switched to xarelto and ASA started. Readmitted for possible head bleed. No bleeding present. CAnnot tell if in AF.  No SOB, CP, LH, and dizziness. Denies any bleeding or new TIA/CVA symptoms. Overall feels ok. BP well controlled at home    ROS:  General:  + fatigue, No weight  "gain or loss  Cardiovascular:  Denies CP, PND, syncope, near syncope, edema or palpitations.  Pulmonary:  Denies HELMS, cough, or wheezing      Vitals:    07/29/20 1319   BP: 104/66   BP Location: Left arm   Patient Position: Sitting   Pulse: 89   SpO2: 97%   Weight: 97.5 kg (215 lb)   Height: 154.9 cm (61\")     Body mass index is 40.62 kg/m².  PE:  General: NAD  Neck: no JVD, no carotid bruits, no TM  Heart irreg irreg rate and rhythm, NL S1, S2, no rubs, murmurs  Lungs: CTA, no wheezes, rhonchi, or rales  Abd: soft, non-tender, NL BS  Ext: No musculoskeletal deformities, no edema, cyanosis, or clubbing  Psych: normal mood and affect    Diagnostic Data:      Procedures    1. Longstanding persistent atrial fibrillation on anticoagulation (CMS/HCC)    2. Acute deep vein thrombosis (DVT) of iliac vein, unspecified laterality (CMS/HCC)    3. History of CVA due to embolism of right MCA 5/31/2020    4. Essential hypertension          Plan:  1.  Persistent atrial fibrillation symptomatic in nature refractory or intolerant to flecainide, sotalol, and amiodarone.  Now with recent CVA. Concerned about hypercoagulable state in pt with DVT,PE, and now CVA on NOAC. Will schedule to see a Hematologist    2.  Hypertensive heart disease; presently doing well on current medical therapy     3. CAD; per Dr. Singh         F/up in 6 months      "

## 2020-09-03 ENCOUNTER — CALL CENTER PROGRAMS (OUTPATIENT)
Dept: CALL CENTER | Facility: HOSPITAL | Age: 65
End: 2020-09-03

## 2020-09-03 NOTE — OUTREACH NOTE
Stroke Isaac Survey      Responses   Facility patient discharged from?  [place]   Attempt successful  No   Unsuccessful attempts  Attempt 1          Delaney Rawls RN

## 2020-09-09 ENCOUNTER — APPOINTMENT (OUTPATIENT)
Dept: MRI IMAGING | Facility: HOSPITAL | Age: 65
End: 2020-09-09

## 2020-09-09 ENCOUNTER — NURSE TRIAGE (OUTPATIENT)
Dept: CALL CENTER | Facility: HOSPITAL | Age: 65
End: 2020-09-09

## 2020-09-09 ENCOUNTER — HOSPITAL ENCOUNTER (EMERGENCY)
Facility: HOSPITAL | Age: 65
Discharge: HOME | End: 2020-09-09
Attending: FAMILY MEDICINE | Admitting: EMERGENCY MEDICINE

## 2020-09-09 ENCOUNTER — APPOINTMENT (OUTPATIENT)
Dept: GENERAL RADIOLOGY | Facility: HOSPITAL | Age: 65
End: 2020-09-09

## 2020-09-09 ENCOUNTER — APPOINTMENT (OUTPATIENT)
Dept: CT IMAGING | Facility: HOSPITAL | Age: 65
End: 2020-09-09

## 2020-09-09 VITALS
BODY MASS INDEX: 38.71 KG/M2 | DIASTOLIC BLOOD PRESSURE: 85 MMHG | HEIGHT: 61 IN | SYSTOLIC BLOOD PRESSURE: 135 MMHG | HEART RATE: 78 BPM | RESPIRATION RATE: 18 BRPM | WEIGHT: 205 LBS | OXYGEN SATURATION: 98 % | TEMPERATURE: 97.8 F

## 2020-09-09 DIAGNOSIS — N39.0 BACTERIAL UTI: ICD-10-CM

## 2020-09-09 DIAGNOSIS — A49.9 BACTERIAL UTI: ICD-10-CM

## 2020-09-09 DIAGNOSIS — I10 ESSENTIAL HYPERTENSION: Primary | ICD-10-CM

## 2020-09-09 DIAGNOSIS — Z86.73 HISTORY OF CVA (CEREBROVASCULAR ACCIDENT): ICD-10-CM

## 2020-09-09 LAB
6-ACETYL MORPHINE: NEGATIVE
A-A DO2: 17.1 MMHG (ref 0–300)
ALBUMIN SERPL-MCNC: 3.93 G/DL (ref 3.5–5.2)
ALBUMIN/GLOB SERPL: 1.4 G/DL
ALP SERPL-CCNC: 69 U/L (ref 39–117)
ALT SERPL W P-5'-P-CCNC: 12 U/L (ref 1–33)
AMPHET+METHAMPHET UR QL: NEGATIVE
ANION GAP SERPL CALCULATED.3IONS-SCNC: 12.5 MMOL/L (ref 5–15)
ANISOCYTOSIS BLD QL: NORMAL
APTT PPP: 36.1 SECONDS (ref 25.6–35.3)
ARTERIAL PATENCY WRIST A: ABNORMAL
AST SERPL-CCNC: 15 U/L (ref 1–32)
ATMOSPHERIC PRESS: 729 MMHG
BACTERIA UR QL AUTO: ABNORMAL /HPF
BARBITURATES UR QL SCN: NEGATIVE
BASE EXCESS BLDA CALC-SCNC: -0.9 MMOL/L (ref 0–2)
BASOPHILS # BLD AUTO: 0.07 10*3/MM3 (ref 0–0.2)
BASOPHILS NFR BLD AUTO: 0.9 % (ref 0–1.5)
BDY SITE: ABNORMAL
BENZODIAZ UR QL SCN: NEGATIVE
BILIRUB SERPL-MCNC: 0.6 MG/DL (ref 0–1.2)
BILIRUB UR QL STRIP: NEGATIVE
BODY TEMPERATURE: 0 C
BUN SERPL-MCNC: 13 MG/DL (ref 8–23)
BUN/CREAT SERPL: 13.3 (ref 7–25)
BUPRENORPHINE SERPL-MCNC: NEGATIVE NG/ML
CALCIUM SPEC-SCNC: 9.1 MG/DL (ref 8.6–10.5)
CANNABINOIDS SERPL QL: NEGATIVE
CHLORIDE SERPL-SCNC: 103 MMOL/L (ref 98–107)
CLARITY UR: CLEAR
CO2 BLDA-SCNC: 24.6 MMOL/L (ref 22–33)
CO2 SERPL-SCNC: 23.5 MMOL/L (ref 22–29)
COCAINE UR QL: NEGATIVE
COHGB MFR BLD: <0.2 % (ref 0–5)
COLOR UR: YELLOW
CREAT SERPL-MCNC: 0.98 MG/DL (ref 0.57–1)
DEPRECATED RDW RBC AUTO: 58.9 FL (ref 37–54)
EOSINOPHIL # BLD AUTO: 0.22 10*3/MM3 (ref 0–0.4)
EOSINOPHIL NFR BLD AUTO: 2.7 % (ref 0.3–6.2)
ERYTHROCYTE [DISTWIDTH] IN BLOOD BY AUTOMATED COUNT: 20.2 % (ref 12.3–15.4)
GFR SERPL CREATININE-BSD FRML MDRD: 57 ML/MIN/1.73
GLOBULIN UR ELPH-MCNC: 2.8 GM/DL
GLUCOSE SERPL-MCNC: 88 MG/DL (ref 65–99)
GLUCOSE UR STRIP-MCNC: NEGATIVE MG/DL
HCO3 BLDA-SCNC: 23.5 MMOL/L (ref 20–26)
HCT VFR BLD AUTO: 41.2 % (ref 34–46.6)
HCT VFR BLD CALC: 35.4 % (ref 38–51)
HGB BLD-MCNC: 11.4 G/DL (ref 12–15.9)
HGB BLDA-MCNC: 11.5 G/DL (ref 13.5–17.5)
HGB UR QL STRIP.AUTO: ABNORMAL
HOLD SPECIMEN: NORMAL
HOLD SPECIMEN: NORMAL
HYALINE CASTS UR QL AUTO: ABNORMAL /LPF
HYPOCHROMIA BLD QL: NORMAL
IMM GRANULOCYTES # BLD AUTO: 0.03 10*3/MM3 (ref 0–0.05)
IMM GRANULOCYTES NFR BLD AUTO: 0.4 % (ref 0–0.5)
INHALED O2 CONCENTRATION: 21 %
INR PPP: 1.85 (ref 0.9–1.1)
KETONES UR QL STRIP: NEGATIVE
LEUKOCYTE ESTERASE UR QL STRIP.AUTO: ABNORMAL
LIPASE SERPL-CCNC: 19 U/L (ref 13–60)
LYMPHOCYTES # BLD AUTO: 1.68 10*3/MM3 (ref 0.7–3.1)
LYMPHOCYTES NFR BLD AUTO: 20.9 % (ref 19.6–45.3)
Lab: ABNORMAL
MCH RBC QN AUTO: 23 PG (ref 26.6–33)
MCHC RBC AUTO-ENTMCNC: 27.7 G/DL (ref 31.5–35.7)
MCV RBC AUTO: 83.1 FL (ref 79–97)
METHADONE UR QL SCN: NEGATIVE
METHGB BLD QL: 1.2 % (ref 0–3)
MODALITY: ABNORMAL
MONOCYTES # BLD AUTO: 0.67 10*3/MM3 (ref 0.1–0.9)
MONOCYTES NFR BLD AUTO: 8.3 % (ref 5–12)
NEUTROPHILS NFR BLD AUTO: 5.37 10*3/MM3 (ref 1.7–7)
NEUTROPHILS NFR BLD AUTO: 66.8 % (ref 42.7–76)
NITRITE UR QL STRIP: NEGATIVE
NOTE: ABNORMAL
NRBC BLD AUTO-RTO: 0 /100 WBC (ref 0–0.2)
NT-PROBNP SERPL-MCNC: 1963 PG/ML (ref 0–900)
OPIATES UR QL: NEGATIVE
OXYCODONE UR QL SCN: NEGATIVE
OXYHGB MFR BLDV: 94.3 % (ref 94–99)
PCO2 BLDA: 36.9 MM HG (ref 35–45)
PCO2 TEMP ADJ BLD: ABNORMAL MM[HG]
PCP UR QL SCN: NEGATIVE
PH BLDA: 7.41 PH UNITS (ref 7.35–7.45)
PH UR STRIP.AUTO: 5.5 [PH] (ref 5–8)
PH, TEMP CORRECTED: ABNORMAL
PLAT MORPH BLD: NORMAL
PLATELET # BLD AUTO: 249 10*3/MM3 (ref 140–450)
PMV BLD AUTO: 10.7 FL (ref 6–12)
PO2 BLDA: 84.5 MM HG (ref 83–108)
PO2 TEMP ADJ BLD: ABNORMAL MM[HG]
POTASSIUM SERPL-SCNC: 3.9 MMOL/L (ref 3.5–5.2)
PROT SERPL-MCNC: 6.7 G/DL (ref 6–8.5)
PROT UR QL STRIP: NEGATIVE
PROTHROMBIN TIME: 21.2 SECONDS (ref 11.9–14.1)
RBC # BLD AUTO: 4.96 10*6/MM3 (ref 3.77–5.28)
RBC # UR: ABNORMAL /HPF
REF LAB TEST METHOD: ABNORMAL
SAO2 % BLDCOA: 95.4 % (ref 94–99)
SODIUM SERPL-SCNC: 139 MMOL/L (ref 136–145)
SP GR UR STRIP: 1.01 (ref 1–1.03)
SQUAMOUS #/AREA URNS HPF: ABNORMAL /HPF
TROPONIN T SERPL-MCNC: <0.01 NG/ML (ref 0–0.03)
TROPONIN T SERPL-MCNC: <0.01 NG/ML (ref 0–0.03)
UROBILINOGEN UR QL STRIP: ABNORMAL
VENTILATOR MODE: ABNORMAL
WBC # BLD AUTO: 8.04 10*3/MM3 (ref 3.4–10.8)
WBC UR QL AUTO: ABNORMAL /HPF
WHOLE BLOOD HOLD SPECIMEN: NORMAL
WHOLE BLOOD HOLD SPECIMEN: NORMAL

## 2020-09-09 PROCEDURE — 80307 DRUG TEST PRSMV CHEM ANLYZR: CPT | Performed by: FAMILY MEDICINE

## 2020-09-09 PROCEDURE — 85025 COMPLETE CBC W/AUTO DIFF WBC: CPT | Performed by: FAMILY MEDICINE

## 2020-09-09 PROCEDURE — 25010000002 HYDRALAZINE PER 20 MG: Performed by: FAMILY MEDICINE

## 2020-09-09 PROCEDURE — 96375 TX/PRO/DX INJ NEW DRUG ADDON: CPT

## 2020-09-09 PROCEDURE — 93005 ELECTROCARDIOGRAM TRACING: CPT | Performed by: EMERGENCY MEDICINE

## 2020-09-09 PROCEDURE — 83690 ASSAY OF LIPASE: CPT | Performed by: FAMILY MEDICINE

## 2020-09-09 PROCEDURE — 25010000002 MORPHINE PER 10 MG: Performed by: EMERGENCY MEDICINE

## 2020-09-09 PROCEDURE — 81001 URINALYSIS AUTO W/SCOPE: CPT | Performed by: FAMILY MEDICINE

## 2020-09-09 PROCEDURE — 99285 EMERGENCY DEPT VISIT HI MDM: CPT

## 2020-09-09 PROCEDURE — 85730 THROMBOPLASTIN TIME PARTIAL: CPT | Performed by: FAMILY MEDICINE

## 2020-09-09 PROCEDURE — 96365 THER/PROPH/DIAG IV INF INIT: CPT

## 2020-09-09 PROCEDURE — 82375 ASSAY CARBOXYHB QUANT: CPT

## 2020-09-09 PROCEDURE — 80053 COMPREHEN METABOLIC PANEL: CPT | Performed by: FAMILY MEDICINE

## 2020-09-09 PROCEDURE — 36415 COLL VENOUS BLD VENIPUNCTURE: CPT

## 2020-09-09 PROCEDURE — 71045 X-RAY EXAM CHEST 1 VIEW: CPT

## 2020-09-09 PROCEDURE — 84484 ASSAY OF TROPONIN QUANT: CPT | Performed by: FAMILY MEDICINE

## 2020-09-09 PROCEDURE — 25010000002 CEFTRIAXONE PER 250 MG: Performed by: EMERGENCY MEDICINE

## 2020-09-09 PROCEDURE — 84484 ASSAY OF TROPONIN QUANT: CPT | Performed by: EMERGENCY MEDICINE

## 2020-09-09 PROCEDURE — 83050 HGB METHEMOGLOBIN QUAN: CPT

## 2020-09-09 PROCEDURE — 93010 ELECTROCARDIOGRAM REPORT: CPT | Performed by: INTERNAL MEDICINE

## 2020-09-09 PROCEDURE — 36600 WITHDRAWAL OF ARTERIAL BLOOD: CPT

## 2020-09-09 PROCEDURE — 83880 ASSAY OF NATRIURETIC PEPTIDE: CPT | Performed by: FAMILY MEDICINE

## 2020-09-09 PROCEDURE — 93005 ELECTROCARDIOGRAM TRACING: CPT | Performed by: FAMILY MEDICINE

## 2020-09-09 PROCEDURE — 70551 MRI BRAIN STEM W/O DYE: CPT | Performed by: RADIOLOGY

## 2020-09-09 PROCEDURE — 25010000002 ONDANSETRON PER 1 MG: Performed by: EMERGENCY MEDICINE

## 2020-09-09 PROCEDURE — 70450 CT HEAD/BRAIN W/O DYE: CPT

## 2020-09-09 PROCEDURE — 70551 MRI BRAIN STEM W/O DYE: CPT

## 2020-09-09 PROCEDURE — 85610 PROTHROMBIN TIME: CPT | Performed by: FAMILY MEDICINE

## 2020-09-09 PROCEDURE — 87086 URINE CULTURE/COLONY COUNT: CPT | Performed by: EMERGENCY MEDICINE

## 2020-09-09 PROCEDURE — 82805 BLOOD GASES W/O2 SATURATION: CPT

## 2020-09-09 PROCEDURE — 85007 BL SMEAR W/DIFF WBC COUNT: CPT | Performed by: FAMILY MEDICINE

## 2020-09-09 RX ORDER — HYDRALAZINE HYDROCHLORIDE 20 MG/ML
10 INJECTION INTRAMUSCULAR; INTRAVENOUS ONCE
Status: DISCONTINUED | OUTPATIENT
Start: 2020-09-09 | End: 2020-09-09 | Stop reason: HOSPADM

## 2020-09-09 RX ORDER — CEFDINIR 300 MG/1
300 CAPSULE ORAL 2 TIMES DAILY
Qty: 20 CAPSULE | Refills: 0 | Status: SHIPPED | OUTPATIENT
Start: 2020-09-09 | End: 2020-12-19

## 2020-09-09 RX ORDER — ONDANSETRON 2 MG/ML
4 INJECTION INTRAMUSCULAR; INTRAVENOUS ONCE
Status: COMPLETED | OUTPATIENT
Start: 2020-09-09 | End: 2020-09-09

## 2020-09-09 RX ORDER — SODIUM CHLORIDE 0.9 % (FLUSH) 0.9 %
10 SYRINGE (ML) INJECTION AS NEEDED
Status: DISCONTINUED | OUTPATIENT
Start: 2020-09-09 | End: 2020-09-09 | Stop reason: HOSPADM

## 2020-09-09 RX ORDER — HYDRALAZINE HYDROCHLORIDE 20 MG/ML
10 INJECTION INTRAMUSCULAR; INTRAVENOUS ONCE
Status: COMPLETED | OUTPATIENT
Start: 2020-09-09 | End: 2020-09-09

## 2020-09-09 RX ADMIN — ONDANSETRON 4 MG: 2 INJECTION INTRAMUSCULAR; INTRAVENOUS at 09:34

## 2020-09-09 RX ADMIN — CEFTRIAXONE 1 G: 1 INJECTION, POWDER, FOR SOLUTION INTRAMUSCULAR; INTRAVENOUS at 09:39

## 2020-09-09 RX ADMIN — MORPHINE SULFATE 4 MG: 4 INJECTION, SOLUTION INTRAMUSCULAR; INTRAVENOUS at 09:36

## 2020-09-09 RX ADMIN — HYDRALAZINE HYDROCHLORIDE 10 MG: 20 INJECTION INTRAMUSCULAR; INTRAVENOUS at 05:32

## 2020-09-09 NOTE — ED PROVIDER NOTES
Subjective   65-year-old white female with past medical history of CHF, atrial fibrillation, May Thurner syndrome, hypertension, pulmonary embolism, CVA which was in June 2020 that left her with residual left-sided weakness.  Reports the emergency department due to elevated blood pressure.  Patient reports that yesterday morning she woke up around 9 AM with some drawing of her mouth and some numbness and she noticed her blood pressure was slightly elevated however she decided to just go back to sleep and then woke up throughout the day with blood pressure elevated and then at 5 PM when she got up she noticed her blood pressure the diastolic was as high as 110.  Patient reports that she became scared and came in for evaluation.      History provided by:  Patient  Hypertension   Severity:  Moderate  Onset quality:  Gradual  Timing:  Intermittent  Chronicity:  Recurrent  Context: stress    Relieved by:  Nothing  Ineffective treatments:  ACE inhibitors  Associated symptoms: headaches    Associated symptoms: no abdominal pain, no chest pain and no fever    Risk factors: family hx of HTN, obesity and prior stroke    Risk factors: no alcohol use, no cardiac disease, no cocaine use and no decongestant use        Review of Systems   Constitutional: Negative.  Negative for fever.   Respiratory: Negative.    Cardiovascular: Negative.  Negative for chest pain.   Gastrointestinal: Negative.  Negative for abdominal pain.   Endocrine: Negative.    Genitourinary: Negative.  Negative for dysuria.   Skin: Negative.    Neurological: Positive for headaches.   Psychiatric/Behavioral: Negative.    All other systems reviewed and are negative.      Past Medical History:   Diagnosis Date   • Atrial fibrillation (CMS/HCC)    • CHF (congestive heart failure) (CMS/HCC)    • Deep vein thrombosis (CMS/HCC)    • GERD (gastroesophageal reflux disease)    • Hypertension    • May-Thurner syndrome    • Pulmonary embolism (CMS/HCC)    • Stroke  (CMS/[facility]) 06/27/2020       Allergies   Allergen Reactions   • Digoxin Other (See Comments)     Severe c/p, left arm pain, lips numb, finger tip numbness   • Monistat [Miconazole] Dermatitis     Causes blisters   • Iodine Rash     Breathing problems       Past Surgical History:   Procedure Laterality Date   • CHOLECYSTECTOMY     • COLON SURGERY      bowel leakage, some of colon removed   • INTERVENTIONAL RADIOLOGY PROCEDURE Bilateral 5/31/2020    Procedure: CAROTID CEREBRAL ANGIOGRAM BILATERAL;  Surgeon: Brant Duarte MD;  Location: [facility] INVASIVE LOCATION;  Service: Interventional Radiology;  Laterality: Bilateral;   • LAPAROSCOPIC TUBAL LIGATION     • LEG SURGERY      multiple stents to BLE   • TONSILLECTOMY         Family History   Problem Relation Age of Onset   • Hypertension Mother    • Cervical cancer Mother        Social History     Socioeconomic History   • Marital status: Single     Spouse name: Not on file   • Number of children: Not on file   • Years of education: Not on file   • Highest education level: Not on file   Tobacco Use   • Smoking status: Never Smoker   • Smokeless tobacco: Never Used   Substance and Sexual Activity   • Alcohol use: No   • Drug use: No           Objective   Physical Exam   Constitutional: She is oriented to person, place, and time. She appears well-developed and well-nourished.   HENT:   Head: Normocephalic and atraumatic.   Right Ear: External ear normal.   Left Ear: External ear normal.   Mouth/Throat: Oropharynx is clear and moist.   Eyes: Pupils are equal, round, and reactive to light. EOM are normal.   Cardiovascular: Normal rate and regular rhythm.   Pulmonary/Chest: Effort normal.   Abdominal: Soft. Bowel sounds are normal.   Musculoskeletal: Normal range of motion.   Neurological: She is alert and oriented to person, place, and time.   Skin: Skin is warm. Capillary refill takes less than 2 seconds.   Psychiatric: Her behavior is normal. Judgment and thought  content normal. Her mood appears anxious. Cognition and memory are normal.   Nursing note and vitals reviewed.      Procedures           ED Course  ED Course as of Sep 09 1101   Wed Sep 09, 2020   0422 EKG interpretation time is 420 atrial fibrillation 75 bpm QRS duration is 86 QT is 386 QTC is 431 nonspecific anterior changes are noted however no criteria to meet ST elevation.    [MH]   0953 IMPRESSION:  No definite acute infarction.   MRI Brain Without Contrast [ES]   0953 IMPRESSION:  Negative chest.   XR Chest 1 View [ES]   0954 IMPRESSION:  No clearly acute findings in the brain. There are chronic appearing right cerebral infarcts.   CT Head Without Contrast Stroke Protocol [ES]   1057 Discussed case with the stroke neuro team at [facility], and no indication and recess that he to admit or further evaluate at this time.  Patient is stable from their standpoint, and can follow-up on an outpatient basis.  Again, patient has been cleared from neurology standpoint at this time and is to be discharged home.  Return to the emergency department with any worsening symptoms.    [ES]   1058 Atrial fibrillation  Low voltage QRS  Cannot rule out Anterior infarct (cited on or before 23-JUN-2020)  Abnormal ECG  When compared with ECG of 23-JUN-2020 17:03,  Inverted T waves have replaced nonspecific T wave abnormality in Inferior leads  Nonspecific T wave abnormality now evident in Lateral leads  Vent. rate 75 BPM  ID interval * ms  QRS duration 86 ms  QT/QTc 386/431 ms  P-R-T axes * 14 -44   ECG 12 Lead [ES]      ED Course User Index  [ES] Chun Arnett MD  [MH] Kaye Blake DO                                           [facility]    Final diagnoses:   Essential hypertension   Bacterial UTI   History of CVA (cerebrovascular accident)            Chun Arnett MD  09/09/20 1101

## 2020-09-09 NOTE — ED NOTES
Called the Stroke navigator at [facility] for Dr. Arnett. On the line at this time.      Pro Cuevas  09/09/20 5802

## 2020-09-09 NOTE — ED NOTES
Spoke with Tish, patient sister. Will be coming from [place] to take her home and it will take her over an hour. Notified lead RN     [phone]     Taryn Hogan RN  09/09/20 4127

## 2020-09-10 ENCOUNTER — HOSPITAL ENCOUNTER (EMERGENCY)
Facility: HOSPITAL | Age: 65
Discharge: ANOTHER HOSPITAL | End: 2020-09-10
Attending: EMERGENCY MEDICINE | Admitting: EMERGENCY MEDICINE

## 2020-09-10 ENCOUNTER — APPOINTMENT (OUTPATIENT)
Dept: GENERAL RADIOLOGY | Facility: HOSPITAL | Age: 65
End: 2020-09-10

## 2020-09-10 ENCOUNTER — NURSE TRIAGE (OUTPATIENT)
Dept: CALL CENTER | Facility: HOSPITAL | Age: 65
End: 2020-09-10

## 2020-09-10 ENCOUNTER — APPOINTMENT (OUTPATIENT)
Dept: CT IMAGING | Facility: HOSPITAL | Age: 65
End: 2020-09-10

## 2020-09-10 ENCOUNTER — HOSPITAL ENCOUNTER (OUTPATIENT)
Facility: HOSPITAL | Age: 65
Setting detail: OBSERVATION
Discharge: HOME | End: 2020-09-11
Attending: HOSPITALIST | Admitting: INTERNAL MEDICINE

## 2020-09-10 VITALS
HEART RATE: 71 BPM | RESPIRATION RATE: 16 BRPM | WEIGHT: 210 LBS | HEIGHT: 61 IN | SYSTOLIC BLOOD PRESSURE: 136 MMHG | DIASTOLIC BLOOD PRESSURE: 94 MMHG | TEMPERATURE: 98 F | OXYGEN SATURATION: 99 % | BODY MASS INDEX: 39.65 KG/M2

## 2020-09-10 DIAGNOSIS — G45.9 TIA (TRANSIENT ISCHEMIC ATTACK): Primary | ICD-10-CM

## 2020-09-10 DIAGNOSIS — I10 ESSENTIAL HYPERTENSION: Primary | ICD-10-CM

## 2020-09-10 DIAGNOSIS — I63.411 CEREBROVASCULAR ACCIDENT (CVA) DUE TO EMBOLISM OF RIGHT MIDDLE CEREBRAL ARTERY (HCC): ICD-10-CM

## 2020-09-10 PROBLEM — Z79.01 CHRONIC ANTICOAGULATION: Status: ACTIVE | Noted: 2020-09-10

## 2020-09-10 PROBLEM — G47.30 SLEEP APNEA: Status: ACTIVE | Noted: 2020-09-10

## 2020-09-10 PROBLEM — I35.8 AORTIC VALVE SCLEROSIS: Status: ACTIVE | Noted: 2020-09-10

## 2020-09-10 PROBLEM — I48.19 ATRIAL FIBRILLATION, PERSISTENT (HCC): Status: ACTIVE | Noted: 2020-09-10

## 2020-09-10 LAB
ALBUMIN SERPL-MCNC: 4 G/DL (ref 3.5–5.2)
ALBUMIN/GLOB SERPL: 1.3 G/DL
ALP SERPL-CCNC: 73 U/L (ref 39–117)
ALT SERPL W P-5'-P-CCNC: 13 U/L (ref 1–33)
ANION GAP SERPL CALCULATED.3IONS-SCNC: 13.9 MMOL/L (ref 5–15)
ANISOCYTOSIS BLD QL: NORMAL
AST SERPL-CCNC: 20 U/L (ref 1–32)
BACTERIA SPEC AEROBE CULT: NO GROWTH
BASOPHILS # BLD AUTO: 0.05 10*3/MM3 (ref 0–0.2)
BASOPHILS NFR BLD AUTO: 0.7 % (ref 0–1.5)
BILIRUB SERPL-MCNC: 0.8 MG/DL (ref 0–1.2)
BUN SERPL-MCNC: 12 MG/DL (ref 8–23)
BUN/CREAT SERPL: 9.5 (ref 7–25)
CALCIUM SPEC-SCNC: 9.4 MG/DL (ref 8.6–10.5)
CHLORIDE SERPL-SCNC: 103 MMOL/L (ref 98–107)
CO2 SERPL-SCNC: 21.1 MMOL/L (ref 22–29)
CREAT SERPL-MCNC: 1.26 MG/DL (ref 0.57–1)
DEPRECATED RDW RBC AUTO: 58.9 FL (ref 37–54)
EOSINOPHIL # BLD AUTO: 0.09 10*3/MM3 (ref 0–0.4)
EOSINOPHIL NFR BLD AUTO: 1.2 % (ref 0.3–6.2)
ERYTHROCYTE [DISTWIDTH] IN BLOOD BY AUTOMATED COUNT: 20.5 % (ref 12.3–15.4)
GFR SERPL CREATININE-BSD FRML MDRD: 42 ML/MIN/1.73
GLOBULIN UR ELPH-MCNC: 3 GM/DL
GLUCOSE SERPL-MCNC: 97 MG/DL (ref 65–99)
HCT VFR BLD AUTO: 41.7 % (ref 34–46.6)
HGB BLD-MCNC: 11.8 G/DL (ref 12–15.9)
HOLD SPECIMEN: NORMAL
HOLD SPECIMEN: NORMAL
HYPOCHROMIA BLD QL: NORMAL
IMM GRANULOCYTES # BLD AUTO: 0.02 10*3/MM3 (ref 0–0.05)
IMM GRANULOCYTES NFR BLD AUTO: 0.3 % (ref 0–0.5)
LYMPHOCYTES # BLD AUTO: 1.49 10*3/MM3 (ref 0.7–3.1)
LYMPHOCYTES NFR BLD AUTO: 19.5 % (ref 19.6–45.3)
MCH RBC QN AUTO: 23.2 PG (ref 26.6–33)
MCHC RBC AUTO-ENTMCNC: 28.3 G/DL (ref 31.5–35.7)
MCV RBC AUTO: 81.9 FL (ref 79–97)
MONOCYTES # BLD AUTO: 0.66 10*3/MM3 (ref 0.1–0.9)
MONOCYTES NFR BLD AUTO: 8.7 % (ref 5–12)
NEUTROPHILS NFR BLD AUTO: 5.32 10*3/MM3 (ref 1.7–7)
NEUTROPHILS NFR BLD AUTO: 69.6 % (ref 42.7–76)
NRBC BLD AUTO-RTO: 0 /100 WBC (ref 0–0.2)
PLAT MORPH BLD: NORMAL
PLATELET # BLD AUTO: 254 10*3/MM3 (ref 140–450)
PMV BLD AUTO: 10.9 FL (ref 6–12)
POTASSIUM SERPL-SCNC: 3.9 MMOL/L (ref 3.5–5.2)
PROT SERPL-MCNC: 7 G/DL (ref 6–8.5)
RBC # BLD AUTO: 5.09 10*6/MM3 (ref 3.77–5.28)
SARS-COV-2 RNA RESP QL NAA+PROBE: NOT DETECTED
SODIUM SERPL-SCNC: 138 MMOL/L (ref 136–145)
TROPONIN T SERPL-MCNC: <0.01 NG/ML (ref 0–0.03)
WBC # BLD AUTO: 7.63 10*3/MM3 (ref 3.4–10.8)
WHOLE BLOOD HOLD SPECIMEN: NORMAL
WHOLE BLOOD HOLD SPECIMEN: NORMAL

## 2020-09-10 PROCEDURE — 93010 ELECTROCARDIOGRAM REPORT: CPT | Performed by: INTERNAL MEDICINE

## 2020-09-10 PROCEDURE — 80053 COMPREHEN METABOLIC PANEL: CPT | Performed by: EMERGENCY MEDICINE

## 2020-09-10 PROCEDURE — 85025 COMPLETE CBC W/AUTO DIFF WBC: CPT | Performed by: EMERGENCY MEDICINE

## 2020-09-10 PROCEDURE — 93005 ELECTROCARDIOGRAM TRACING: CPT | Performed by: EMERGENCY MEDICINE

## 2020-09-10 PROCEDURE — 99214 OFFICE O/P EST MOD 30 MIN: CPT | Performed by: NURSE PRACTITIONER

## 2020-09-10 PROCEDURE — 84484 ASSAY OF TROPONIN QUANT: CPT | Performed by: EMERGENCY MEDICINE

## 2020-09-10 PROCEDURE — 99285 EMERGENCY DEPT VISIT HI MDM: CPT

## 2020-09-10 PROCEDURE — G0378 HOSPITAL OBSERVATION PER HR: HCPCS

## 2020-09-10 PROCEDURE — 70450 CT HEAD/BRAIN W/O DYE: CPT

## 2020-09-10 PROCEDURE — 71045 X-RAY EXAM CHEST 1 VIEW: CPT | Performed by: RADIOLOGY

## 2020-09-10 PROCEDURE — 99220 PR INITIAL OBSERVATION CARE/DAY 70 MINUTES: CPT | Performed by: HOSPITALIST

## 2020-09-10 PROCEDURE — 87635 SARS-COV-2 COVID-19 AMP PRB: CPT | Performed by: EMERGENCY MEDICINE

## 2020-09-10 PROCEDURE — 71045 X-RAY EXAM CHEST 1 VIEW: CPT

## 2020-09-10 PROCEDURE — 70450 CT HEAD/BRAIN W/O DYE: CPT | Performed by: RADIOLOGY

## 2020-09-10 PROCEDURE — 85007 BL SMEAR W/DIFF WBC COUNT: CPT | Performed by: EMERGENCY MEDICINE

## 2020-09-10 RX ORDER — ASPIRIN 300 MG/1
300 SUPPOSITORY RECTAL DAILY
Status: DISCONTINUED | OUTPATIENT
Start: 2020-09-10 | End: 2020-09-11 | Stop reason: HOSPADM

## 2020-09-10 RX ORDER — METOPROLOL TARTRATE 50 MG/1
50 TABLET, FILM COATED ORAL EVERY 12 HOURS SCHEDULED
Status: DISCONTINUED | OUTPATIENT
Start: 2020-09-10 | End: 2020-09-11 | Stop reason: HOSPADM

## 2020-09-10 RX ORDER — DILTIAZEM HYDROCHLORIDE 120 MG/1
120 CAPSULE, COATED, EXTENDED RELEASE ORAL
Status: DISCONTINUED | OUTPATIENT
Start: 2020-09-11 | End: 2020-09-11 | Stop reason: HOSPADM

## 2020-09-10 RX ORDER — SODIUM CHLORIDE 0.9 % (FLUSH) 0.9 %
10 SYRINGE (ML) INJECTION AS NEEDED
Status: DISCONTINUED | OUTPATIENT
Start: 2020-09-10 | End: 2020-09-11 | Stop reason: HOSPADM

## 2020-09-10 RX ORDER — PANTOPRAZOLE SODIUM 40 MG/1
40 TABLET, DELAYED RELEASE ORAL
Status: DISCONTINUED | OUTPATIENT
Start: 2020-09-11 | End: 2020-09-11 | Stop reason: HOSPADM

## 2020-09-10 RX ORDER — ATORVASTATIN CALCIUM 10 MG/1
10 TABLET, FILM COATED ORAL NIGHTLY
Status: DISCONTINUED | OUTPATIENT
Start: 2020-09-10 | End: 2020-09-11 | Stop reason: HOSPADM

## 2020-09-10 RX ORDER — ASPIRIN 81 MG/1
81 TABLET, CHEWABLE ORAL DAILY
Status: DISCONTINUED | OUTPATIENT
Start: 2020-09-10 | End: 2020-09-11 | Stop reason: HOSPADM

## 2020-09-10 RX ORDER — SODIUM CHLORIDE 0.9 % (FLUSH) 0.9 %
10 SYRINGE (ML) INJECTION AS NEEDED
Status: DISCONTINUED | OUTPATIENT
Start: 2020-09-10 | End: 2020-09-10 | Stop reason: HOSPADM

## 2020-09-10 RX ORDER — ATORVASTATIN CALCIUM 40 MG/1
80 TABLET, FILM COATED ORAL NIGHTLY
Status: DISCONTINUED | OUTPATIENT
Start: 2020-09-10 | End: 2020-09-10

## 2020-09-10 RX ORDER — DILTIAZEM HYDROCHLORIDE 120 MG/1
120 CAPSULE, COATED, EXTENDED RELEASE ORAL
Status: DISCONTINUED | OUTPATIENT
Start: 2020-09-11 | End: 2020-09-10

## 2020-09-10 RX ORDER — SODIUM CHLORIDE 0.9 % (FLUSH) 0.9 %
10 SYRINGE (ML) INJECTION EVERY 12 HOURS SCHEDULED
Status: DISCONTINUED | OUTPATIENT
Start: 2020-09-10 | End: 2020-09-11 | Stop reason: HOSPADM

## 2020-09-10 RX ADMIN — DILTIAZEM HYDROCHLORIDE 120 MG: 120 CAPSULE, COATED, EXTENDED RELEASE ORAL at 23:29

## 2020-09-10 RX ADMIN — RIVAROXABAN 20 MG: 20 TABLET, FILM COATED ORAL at 22:25

## 2020-09-10 RX ADMIN — ASPIRIN 81 MG CHEWABLE TABLET 81 MG: 81 TABLET CHEWABLE at 22:25

## 2020-09-10 RX ADMIN — METOPROLOL TARTRATE 50 MG: 50 TABLET, FILM COATED ORAL at 23:29

## 2020-09-10 RX ADMIN — SODIUM CHLORIDE, PRESERVATIVE FREE 10 ML: 5 INJECTION INTRAVENOUS at 22:27

## 2020-09-10 NOTE — ED NOTES
Bed obtained at [facility], pt to go to [place] [id number]     Lennie Mcarthur, RN  09/10/20 3027

## 2020-09-10 NOTE — ED PROVIDER NOTES
Subjective   Patient is a 65-year-old female who presents after having an approximately 5-minute episode of numbness and tingling of her right arm, her right face, and drooping of the right side of her face.  Her symptoms resolved before arriving here.  She is very worried, as she had a stroke earlier this summer that affected her left side.  She states that she has nearly completely recovered from that stroke, all except for a left facial droop.  She reports that the strength in her left side has returned to nearly 100%.  Patient does have a history of atrial fibrillation, she is chronically anticoagulated with aspirin 81 mg daily and Xarelto 20 mg daily.  She has been taking her medication as directed.  She was seen here yesterday, had an MRI of her brain that showed no acute changes.  She had no weakness of her right arm, no involvement of her right leg.  No speech difficulties.  She denies other symptoms or complaints.          Review of Systems   Constitutional: Negative for chills, diaphoresis and fever.   HENT: Negative for ear pain, sore throat and trouble swallowing.    Eyes: Negative for photophobia and pain.   Respiratory: Negative for shortness of breath, wheezing and stridor.    Cardiovascular: Negative for chest pain and palpitations.   Gastrointestinal: Negative for abdominal distention, abdominal pain, blood in stool, diarrhea, nausea and vomiting.   Endocrine: Negative for polydipsia and polyphagia.   Genitourinary: Negative for difficulty urinating and flank pain.   Musculoskeletal: Negative for back pain, neck pain and neck stiffness.   Skin: Negative for color change and pallor.   Neurological: Negative for seizures, syncope and speech difficulty.   Psychiatric/Behavioral: Negative for confusion.   All other systems reviewed and are negative.      Past Medical History:   Diagnosis Date   • Atrial fibrillation (CMS/[facility])    • CHF (congestive heart failure) (CMS/[facility])    • Deep vein thrombosis  (CMS/[facility])    • GERD (gastroesophageal reflux disease)    • Hypertension    • May-Thurner syndrome    • Pulmonary embolism (CMS/HCC)    • Stroke (CMS/HCC) 06/27/2020       Allergies   Allergen Reactions   • Digoxin Other (See Comments)     Severe c/p, left arm pain, lips numb, finger tip numbness   • Monistat [Miconazole] Dermatitis     Causes blisters   • Iodine Rash     Breathing problems       Past Surgical History:   Procedure Laterality Date   • CHOLECYSTECTOMY     • COLON SURGERY      bowel leakage, some of colon removed   • INTERVENTIONAL RADIOLOGY PROCEDURE Bilateral 5/31/2020    Procedure: CAROTID CEREBRAL ANGIOGRAM BILATERAL;  Surgeon: Brant Duarte MD;  Location:  [facility] INVASIVE LOCATION;  Service: Interventional Radiology;  Laterality: Bilateral;   • LAPAROSCOPIC TUBAL LIGATION     • LEG SURGERY      multiple stents to BLE   • TONSILLECTOMY         Family History   Problem Relation Age of Onset   • Hypertension Mother    • Cervical cancer Mother        Social History     Socioeconomic History   • Marital status: Single     Spouse name: Not on file   • Number of children: Not on file   • Years of education: Not on file   • Highest education level: Not on file   Tobacco Use   • Smoking status: Never Smoker   • Smokeless tobacco: Never Used   Substance and Sexual Activity   • Alcohol use: No   • Drug use: No           Objective   Physical Exam   Constitutional: She is oriented to person, place, and time. She appears well-developed and well-nourished. No distress.   HENT:   Head: Normocephalic and atraumatic.   Eyes: Pupils are equal, round, and reactive to light. EOM are normal. No scleral icterus.   Neck: Normal range of motion. Neck supple. No neck rigidity. No tracheal deviation present.   Cardiovascular: Normal rate, regular rhythm and intact distal pulses.   Pulmonary/Chest: Effort normal and breath sounds normal. No respiratory distress. She exhibits no tenderness.   Abdominal: Soft. Bowel  sounds are normal. There is no tenderness. There is no rebound and no guarding.   Musculoskeletal: Normal range of motion. She exhibits no tenderness.   Neurological: She is alert and oriented to person, place, and time. GCS eye subscore is 4. GCS verbal subscore is 5. GCS motor subscore is 6.   There is a left-sided facial droop which patient reports is residual from her stroke a few months ago.  No right-sided deficits are appreciated, no other deficits are appreciated.  Her NIH stroke scale today is 0.   Skin: Skin is warm and dry. Capillary refill takes less than 2 seconds. No cyanosis. No pallor.   Psychiatric: She has a normal mood and affect. Her behavior is normal.   Nursing note and vitals reviewed.      Procedures  EKG shows atrial fibrillation with ventricular rate of 70.  Nonspecific ST-T abnormality.  XR Chest 1 View   Final Result   No radiographic evidence of acute cardiac or pulmonary   disease.           This report was finalized on 9/10/2020 2:06 PM by Dr. Luis Armando Prieto MD.          CT Head Without Contrast Stroke Protocol   Final Result   Atrophy and chronic small vessel ischemic change, but there   are no acute intracranial abnormalities identified. Old infarcts are   again noted.       This report was finalized on 9/10/2020 1:29 PM by Dr. Luis Armando Prieto MD.            Results for orders placed or performed during the hospital encounter of 09/10/20   Comprehensive Metabolic Panel   Result Value Ref Range    Glucose 97 65 - 99 mg/dL    BUN 12 8 - 23 mg/dL    Creatinine 1.26 (H) 0.57 - 1.00 mg/dL    Sodium 138 136 - 145 mmol/L    Potassium 3.9 3.5 - 5.2 mmol/L    Chloride 103 98 - 107 mmol/L    CO2 21.1 (L) 22.0 - 29.0 mmol/L    Calcium 9.4 8.6 - 10.5 mg/dL    Total Protein 7.0 6.0 - 8.5 g/dL    Albumin 4.00 3.50 - 5.20 g/dL    ALT (SGPT) 13 1 - 33 U/L    AST (SGOT) 20 1 - 32 U/L    Alkaline Phosphatase 73 39 - 117 U/L    Total Bilirubin 0.8 0.0 - 1.2 mg/dL    eGFR Non  Amer 42 (L) >60  mL/min/1.73    Globulin 3.0 gm/dL    A/G Ratio 1.3 g/dL    BUN/Creatinine Ratio 9.5 7.0 - 25.0    Anion Gap 13.9 5.0 - 15.0 mmol/L   Troponin   Result Value Ref Range    Troponin T <0.010 0.000 - 0.030 ng/mL   CBC Auto Differential   Result Value Ref Range    WBC 7.63 3.40 - 10.80 10*3/mm3    RBC 5.09 3.77 - 5.28 10*6/mm3    Hemoglobin 11.8 (L) 12.0 - 15.9 g/dL    Hematocrit 41.7 34.0 - 46.6 %    MCV 81.9 79.0 - 97.0 fL    MCH 23.2 (L) 26.6 - 33.0 pg    MCHC 28.3 (L) 31.5 - 35.7 g/dL    RDW 20.5 (H) 12.3 - 15.4 %    RDW-SD 58.9 (H) 37.0 - 54.0 fl    MPV 10.9 6.0 - 12.0 fL    Platelets 254 140 - 450 10*3/mm3    Neutrophil % 69.6 42.7 - 76.0 %    Lymphocyte % 19.5 (L) 19.6 - 45.3 %    Monocyte % 8.7 5.0 - 12.0 %    Eosinophil % 1.2 0.3 - 6.2 %    Basophil % 0.7 0.0 - 1.5 %    Immature Grans % 0.3 0.0 - 0.5 %    Neutrophils, Absolute 5.32 1.70 - 7.00 10*3/mm3    Lymphocytes, Absolute 1.49 0.70 - 3.10 10*3/mm3    Monocytes, Absolute 0.66 0.10 - 0.90 10*3/mm3    Eosinophils, Absolute 0.09 0.00 - 0.40 10*3/mm3    Basophils, Absolute 0.05 0.00 - 0.20 10*3/mm3    Immature Grans, Absolute 0.02 0.00 - 0.05 10*3/mm3    nRBC 0.0 0.0 - 0.2 /100 WBC   Scan Slide   Result Value Ref Range    Anisocytosis Mod/2+ None Seen    Hypochromia Mod/2+ None Seen    Platelet Morphology Normal Normal   Light Blue Top   Result Value Ref Range    Extra Tube hold for add-on    Green Top (Gel)   Result Value Ref Range    Extra Tube Hold for add-ons.    Lavender Top   Result Value Ref Range    Extra Tube hold for add-on    Gold Top - SST   Result Value Ref Range    Extra Tube Hold for add-ons.                 ED Course  ED Course as of Sep 10 1603   Thu Sep 10, 2020   1540 I discussed the case with Ana Paula, stroke navigator at [facility].  She is willing to accept the patient in transfer.  Patient voices that she would rather be admitted here.  I discussed the case with our hospitalist on-call, Dr. Jiménez.  He advises that he will not admit her  here without neurology backup.  Patient is calling her family to help her make a decision as to whether she will allow us to transfer her to [facility] or not.    [CM]   9168 Patient has decided she does want to be transferred to [facility].  I spoke with Ana Paula, stroke navigator, once again, she accepts the patient in transfer under the service of Dr. Trinidad.  Once [facility] gives us a bed assignment we will make transport arrangements.    [CM]      ED Course User Index  [CM] Dennis Hair MD                                           [facility]    Final diagnoses:   TIA (transient ischemic attack)             Please note that portions of this note were completed with a voice recognition program.        Dennis Hair MD  09/10/20 9458

## 2020-09-10 NOTE — ED NOTES
Left side facial droop noted at this time, pt reports droop is improving since june PierceLennie, RN  09/10/20 5944

## 2020-09-10 NOTE — ED NOTES
Called [facility] for dr san   He is needing the stroke neuro team      Fore, Dg  09/10/20 0933

## 2020-09-10 NOTE — ED NOTES
Called [facility] for a transfer to [facility] waiting on [facility] to call us back      Fore, Dg  09/10/20 1424

## 2020-09-10 NOTE — TELEPHONE ENCOUNTER
"Was discharged yesterday from the ED with high B/P and UTI prescribed antibiotic for UTI.  B/P 118/75  Now B/P 107/80 HR 58 Had a stroke back on June 27/2020    Reason for Disposition  • [1] Numbness (i.e., loss of sensation) of the face, arm / hand, or leg / foot on one side of the body AND [2] sudden onset AND [3] present now    Additional Information  • Negative: [1] SEVERE weakness (i.e., unable to walk or barely able to walk, requires support) AND [2] new onset or worsening  • Negative: [1] Weakness (i.e., paralysis, loss of muscle strength) of the face, arm / hand, or leg / foot on one side of the body AND [2] sudden onset AND [3] present now    Answer Assessment - Initial Assessment Questions  1. SYMPTOM: \"What is the main symptom you are concerned about?\" (e.g., weakness, numbness)      right side of face, lips, arm feels numb   2. ONSET: \"When did this start?\" (minutes, hours, days; while sleeping)     A few minutes ago  3. LAST NORMAL: \"When was the last time you were normal (no symptoms)?\"      *No Answer*  4. PATTERN \"Does this come and go, or has it been constant since it started?\"  \"Is it present now?\"  constant  5. CARDIAC SYMPTOMS: \"Have you had any of the following symptoms: chest pain, difficulty breathing, palpitations?\"   denies  6. NEUROLOGIC SYMPTOMS: \"Have you had any of the following symptoms: headache, dizziness, vision loss, double vision, changes in speech, unsteady on your feet?\"   denies  7. OTHER SYMPTOMS: \"Do you have any other symptoms?\"      *No Answer*  8. PREGNANCY: \"Is there any chance you are pregnant?\" \"When was your last menstrual period?\"  na    Protocols used: NEUROLOGIC DEFICIT-ADULT-AH      "

## 2020-09-10 NOTE — TELEPHONE ENCOUNTER
"    Reason for Disposition  • General information question, no triage required and triager able to answer question    Additional Information  • Negative: [1] Caller is not with the adult (patient) AND [2] reporting urgent symptoms  • Negative: Lab result questions  • Negative: Medication questions  • Negative: Caller can't be reached by phone  • Negative: Caller has already spoken to PCP or another triager  • Negative: RN needs further essential information from caller in order to complete triage  • Negative: Requesting regular office appointment  • Negative: [1] Caller requesting NON-URGENT health information AND [2] PCP's office is the best resource  • Negative: Health Information question, no triage required and triager able to answer question    Answer Assessment - Initial Assessment Questions  1. REASON FOR CALL or QUESTION: \"What is your reason for calling today?\" or \"How can I best help you?\" or \"What question do you have that I can help answer?\"      Wants to know if she should take her nightly medication ( she took her morning medication at 230PM today after returning from the ED).  States she was given morphine, zofran, and IV abx in the ED; then DCd home this afternoon after the MRI was negative.  Advised her to go ahead and take her evening medications and restart the abx in the morning (she's already had 2 doses today).    Protocols used: INFORMATION ONLY CALL-ADULT-      "

## 2020-09-11 ENCOUNTER — APPOINTMENT (OUTPATIENT)
Dept: GENERAL RADIOLOGY | Facility: HOSPITAL | Age: 65
End: 2020-09-11

## 2020-09-11 VITALS
SYSTOLIC BLOOD PRESSURE: 127 MMHG | TEMPERATURE: 97.3 F | RESPIRATION RATE: 18 BRPM | DIASTOLIC BLOOD PRESSURE: 97 MMHG | HEART RATE: 110 BPM | OXYGEN SATURATION: 96 %

## 2020-09-11 PROBLEM — I10 ESSENTIAL HYPERTENSION: Status: ACTIVE | Noted: 2020-09-11

## 2020-09-11 LAB
ALBUMIN SERPL-MCNC: 3.7 G/DL (ref 3.5–5.2)
ALBUMIN/GLOB SERPL: 1.3 G/DL
ALP SERPL-CCNC: 70 U/L (ref 39–117)
ALT SERPL W P-5'-P-CCNC: 12 U/L (ref 1–33)
ANION GAP SERPL CALCULATED.3IONS-SCNC: 12 MMOL/L (ref 5–15)
AST SERPL-CCNC: 22 U/L (ref 1–32)
BACTERIA UR QL AUTO: ABNORMAL /HPF
BASOPHILS # BLD AUTO: 0.04 10*3/MM3 (ref 0–0.2)
BASOPHILS NFR BLD AUTO: 0.6 % (ref 0–1.5)
BILIRUB SERPL-MCNC: 0.8 MG/DL (ref 0–1.2)
BILIRUB UR QL STRIP: NEGATIVE
BUN SERPL-MCNC: 11 MG/DL (ref 8–23)
BUN/CREAT SERPL: 10.6 (ref 7–25)
CALCIUM SPEC-SCNC: 9.1 MG/DL (ref 8.6–10.5)
CHLORIDE SERPL-SCNC: 100 MMOL/L (ref 98–107)
CLARITY UR: ABNORMAL
CO2 SERPL-SCNC: 24 MMOL/L (ref 22–29)
COLOR UR: ABNORMAL
CREAT SERPL-MCNC: 1.04 MG/DL (ref 0.57–1)
DEPRECATED RDW RBC AUTO: 58.8 FL (ref 37–54)
EOSINOPHIL # BLD AUTO: 0.17 10*3/MM3 (ref 0–0.4)
EOSINOPHIL NFR BLD AUTO: 2.6 % (ref 0.3–6.2)
ERYTHROCYTE [DISTWIDTH] IN BLOOD BY AUTOMATED COUNT: 19.9 % (ref 12.3–15.4)
GFR SERPL CREATININE-BSD FRML MDRD: 53 ML/MIN/1.73
GLOBULIN UR ELPH-MCNC: 2.9 GM/DL
GLUCOSE BLDC GLUCOMTR-MCNC: 94 MG/DL (ref 70–130)
GLUCOSE BLDC GLUCOMTR-MCNC: 94 MG/DL (ref 70–130)
GLUCOSE SERPL-MCNC: 90 MG/DL (ref 65–99)
GLUCOSE UR STRIP-MCNC: NEGATIVE MG/DL
HCT VFR BLD AUTO: 39.1 % (ref 34–46.6)
HGB BLD-MCNC: 11.1 G/DL (ref 12–15.9)
HGB UR QL STRIP.AUTO: ABNORMAL
HYALINE CASTS UR QL AUTO: ABNORMAL /LPF
IMM GRANULOCYTES # BLD AUTO: 0.02 10*3/MM3 (ref 0–0.05)
IMM GRANULOCYTES NFR BLD AUTO: 0.3 % (ref 0–0.5)
KETONES UR QL STRIP: ABNORMAL
LEUKOCYTE ESTERASE UR QL STRIP.AUTO: ABNORMAL
LYMPHOCYTES # BLD AUTO: 1.55 10*3/MM3 (ref 0.7–3.1)
LYMPHOCYTES NFR BLD AUTO: 23.6 % (ref 19.6–45.3)
MAGNESIUM SERPL-MCNC: 2.3 MG/DL (ref 1.6–2.4)
MCH RBC QN AUTO: 23.7 PG (ref 26.6–33)
MCHC RBC AUTO-ENTMCNC: 28.4 G/DL (ref 31.5–35.7)
MCV RBC AUTO: 83.5 FL (ref 79–97)
MONOCYTES # BLD AUTO: 0.59 10*3/MM3 (ref 0.1–0.9)
MONOCYTES NFR BLD AUTO: 9 % (ref 5–12)
MUCOUS THREADS URNS QL MICRO: ABNORMAL /HPF
NEUTROPHILS NFR BLD AUTO: 4.19 10*3/MM3 (ref 1.7–7)
NEUTROPHILS NFR BLD AUTO: 63.9 % (ref 42.7–76)
NITRITE UR QL STRIP: NEGATIVE
NRBC BLD AUTO-RTO: 0 /100 WBC (ref 0–0.2)
NT-PROBNP SERPL-MCNC: 1447 PG/ML (ref 0–900)
PH UR STRIP.AUTO: <=5 [PH] (ref 5–8)
PLATELET # BLD AUTO: 261 10*3/MM3 (ref 140–450)
PMV BLD AUTO: 11.1 FL (ref 6–12)
POTASSIUM SERPL-SCNC: 3.7 MMOL/L (ref 3.5–5.2)
PROT SERPL-MCNC: 6.6 G/DL (ref 6–8.5)
PROT UR QL STRIP: ABNORMAL
RBC # BLD AUTO: 4.68 10*6/MM3 (ref 3.77–5.28)
RBC # UR: ABNORMAL /HPF
REF LAB TEST METHOD: ABNORMAL
RENAL EPI CELLS #/AREA URNS HPF: ABNORMAL /HPF
SODIUM SERPL-SCNC: 136 MMOL/L (ref 136–145)
SP GR UR STRIP: >=1.03 (ref 1–1.03)
SQUAMOUS #/AREA URNS HPF: ABNORMAL /HPF
TROPONIN T SERPL-MCNC: <0.01 NG/ML (ref 0–0.03)
TROPONIN T SERPL-MCNC: <0.01 NG/ML (ref 0–0.03)
UROBILINOGEN UR QL STRIP: ABNORMAL
WBC # BLD AUTO: 6.56 10*3/MM3 (ref 3.4–10.8)
WBC UR QL AUTO: ABNORMAL /HPF

## 2020-09-11 PROCEDURE — 99217 PR OBSERVATION CARE DISCHARGE MANAGEMENT: CPT | Performed by: INTERNAL MEDICINE

## 2020-09-11 PROCEDURE — 83735 ASSAY OF MAGNESIUM: CPT | Performed by: NURSE PRACTITIONER

## 2020-09-11 PROCEDURE — 97161 PT EVAL LOW COMPLEX 20 MIN: CPT

## 2020-09-11 PROCEDURE — 85025 COMPLETE CBC W/AUTO DIFF WBC: CPT | Performed by: NURSE PRACTITIONER

## 2020-09-11 PROCEDURE — 92610 EVALUATE SWALLOWING FUNCTION: CPT

## 2020-09-11 PROCEDURE — G0378 HOSPITAL OBSERVATION PER HR: HCPCS

## 2020-09-11 PROCEDURE — 71045 X-RAY EXAM CHEST 1 VIEW: CPT

## 2020-09-11 PROCEDURE — 99213 OFFICE O/P EST LOW 20 MIN: CPT | Performed by: PSYCHIATRY & NEUROLOGY

## 2020-09-11 PROCEDURE — 93005 ELECTROCARDIOGRAM TRACING: CPT | Performed by: HOSPITALIST

## 2020-09-11 PROCEDURE — 25010000002 CEFTRIAXONE PER 250 MG: Performed by: INTERNAL MEDICINE

## 2020-09-11 PROCEDURE — 80053 COMPREHEN METABOLIC PANEL: CPT | Performed by: NURSE PRACTITIONER

## 2020-09-11 PROCEDURE — 87086 URINE CULTURE/COLONY COUNT: CPT | Performed by: INTERNAL MEDICINE

## 2020-09-11 PROCEDURE — 97165 OT EVAL LOW COMPLEX 30 MIN: CPT

## 2020-09-11 PROCEDURE — 81001 URINALYSIS AUTO W/SCOPE: CPT | Performed by: INTERNAL MEDICINE

## 2020-09-11 PROCEDURE — 94660 CPAP INITIATION&MGMT: CPT

## 2020-09-11 PROCEDURE — 84484 ASSAY OF TROPONIN QUANT: CPT | Performed by: NURSE PRACTITIONER

## 2020-09-11 PROCEDURE — 92523 SPEECH SOUND LANG COMPREHEN: CPT

## 2020-09-11 PROCEDURE — 97535 SELF CARE MNGMENT TRAINING: CPT

## 2020-09-11 PROCEDURE — 83880 ASSAY OF NATRIURETIC PEPTIDE: CPT | Performed by: NURSE PRACTITIONER

## 2020-09-11 PROCEDURE — 93010 ELECTROCARDIOGRAM REPORT: CPT | Performed by: INTERNAL MEDICINE

## 2020-09-11 PROCEDURE — 94799 UNLISTED PULMONARY SVC/PX: CPT

## 2020-09-11 PROCEDURE — 82962 GLUCOSE BLOOD TEST: CPT

## 2020-09-11 PROCEDURE — 96365 THER/PROPH/DIAG IV INF INIT: CPT

## 2020-09-11 RX ORDER — LISINOPRIL 10 MG/1
10 TABLET ORAL
Status: DISCONTINUED | OUTPATIENT
Start: 2020-09-11 | End: 2020-09-11 | Stop reason: HOSPADM

## 2020-09-11 RX ORDER — DOCUSATE SODIUM 100 MG/1
100 CAPSULE, LIQUID FILLED ORAL 2 TIMES DAILY PRN
Status: DISCONTINUED | OUTPATIENT
Start: 2020-09-11 | End: 2020-09-11 | Stop reason: HOSPADM

## 2020-09-11 RX ORDER — ATORVASTATIN CALCIUM 80 MG/1
40 TABLET, FILM COATED ORAL NIGHTLY
Start: 2020-09-11

## 2020-09-11 RX ORDER — AMANTADINE HYDROCHLORIDE 100 MG/1
100 CAPSULE, GELATIN COATED ORAL 2 TIMES DAILY
Status: DISCONTINUED | OUTPATIENT
Start: 2020-09-11 | End: 2020-09-11 | Stop reason: HOSPADM

## 2020-09-11 RX ORDER — ACETAMINOPHEN 325 MG/1
650 TABLET ORAL EVERY 6 HOURS PRN
Status: DISCONTINUED | OUTPATIENT
Start: 2020-09-11 | End: 2020-09-11 | Stop reason: HOSPADM

## 2020-09-11 RX ORDER — QUETIAPINE FUMARATE 25 MG/1
12.5 TABLET, FILM COATED ORAL NIGHTLY
Status: DISCONTINUED | OUTPATIENT
Start: 2020-09-11 | End: 2020-09-11 | Stop reason: HOSPADM

## 2020-09-11 RX ORDER — ONDANSETRON 2 MG/ML
4 INJECTION INTRAMUSCULAR; INTRAVENOUS EVERY 6 HOURS PRN
Status: DISCONTINUED | OUTPATIENT
Start: 2020-09-11 | End: 2020-09-11 | Stop reason: HOSPADM

## 2020-09-11 RX ORDER — NITROGLYCERIN 0.4 MG/1
0.4 TABLET SUBLINGUAL
Status: DISCONTINUED | OUTPATIENT
Start: 2020-09-11 | End: 2020-09-11 | Stop reason: HOSPADM

## 2020-09-11 RX ORDER — LISINOPRIL 10 MG/1
10 TABLET ORAL
Qty: 30 TABLET | Refills: 0 | Status: SHIPPED | OUTPATIENT
Start: 2020-09-12

## 2020-09-11 RX ORDER — FAMOTIDINE 20 MG/1
20 TABLET, FILM COATED ORAL 2 TIMES DAILY PRN
Status: DISCONTINUED | OUTPATIENT
Start: 2020-09-11 | End: 2020-09-11 | Stop reason: HOSPADM

## 2020-09-11 RX ADMIN — METOPROLOL TARTRATE 50 MG: 50 TABLET, FILM COATED ORAL at 09:49

## 2020-09-11 RX ADMIN — ASPIRIN 81 MG CHEWABLE TABLET 81 MG: 81 TABLET CHEWABLE at 09:49

## 2020-09-11 RX ADMIN — PANTOPRAZOLE SODIUM 40 MG: 40 TABLET, DELAYED RELEASE ORAL at 09:49

## 2020-09-11 RX ADMIN — DILTIAZEM HYDROCHLORIDE 120 MG: 120 CAPSULE, COATED, EXTENDED RELEASE ORAL at 17:58

## 2020-09-11 RX ADMIN — NITROGLYCERIN 0.4 MG: 0.4 TABLET SUBLINGUAL at 04:35

## 2020-09-11 RX ADMIN — CEFTRIAXONE SODIUM 2 G: 2 INJECTION, POWDER, FOR SOLUTION INTRAMUSCULAR; INTRAVENOUS at 12:18

## 2020-09-11 RX ADMIN — LISINOPRIL 10 MG: 10 TABLET ORAL at 09:49

## 2020-09-11 RX ADMIN — SODIUM CHLORIDE, PRESERVATIVE FREE 10 ML: 5 INJECTION INTRAVENOUS at 09:50

## 2020-09-11 RX ADMIN — AMANTADINE HYDROCHLORIDE 100 MG: 100 CAPSULE ORAL at 09:49

## 2020-09-11 RX ADMIN — RIVAROXABAN 20 MG: 20 TABLET, FILM COATED ORAL at 17:57

## 2020-09-11 RX ADMIN — NITROGLYCERIN 0.4 MG: 0.4 TABLET SUBLINGUAL at 05:11

## 2020-09-12 ENCOUNTER — READMISSION MANAGEMENT (OUTPATIENT)
Dept: CALL CENTER | Facility: HOSPITAL | Age: 65
End: 2020-09-12

## 2020-09-12 LAB — BACTERIA SPEC AEROBE CULT: NORMAL

## 2020-09-12 NOTE — OUTREACH NOTE
Prep Survey      Responses   [facility] [place] facility patient discharged from?  [place]   Is LACE score < 7 ?  Yes   Eligibility  Readm Mgmt   Discharge diagnosis  tia   Chronic, Persistent Atrial fibrillation   COVID-19 Test Status  Negative   Does the patient have one of the following disease processes/diagnoses(primary or secondary)?  Stroke (TIA)   Does the patient have Home health ordered?  No   Is there a DME ordered?  No   Prep survey completed?  Yes          Estela Hinton RN

## 2020-09-16 ENCOUNTER — READMISSION MANAGEMENT (OUTPATIENT)
Dept: CALL CENTER | Facility: HOSPITAL | Age: 65
End: 2020-09-16

## 2020-09-16 NOTE — OUTREACH NOTE
Stroke Week 1 Survey      Responses   [facility] patient discharged from?  [place]   Does the patient have one of the following disease processes/diagnoses(primary or secondary)?  Stroke (TIA)   Is there a successful TCM telephone encounter documented?  No   Week 1 attempt successful?  Yes   Call start time  1019   Rescheduled  Rescheduled-pt requested [pt is not home per her mother. ]   Call end time  1021   Discharge diagnosis  tia   Chronic, Persistent Atrial fibrillation          Leesa Vera RN

## 2020-09-18 ENCOUNTER — CALL CENTER PROGRAMS (OUTPATIENT)
Dept: CALL CENTER | Facility: HOSPITAL | Age: 65
End: 2020-09-18

## 2020-09-18 NOTE — OUTREACH NOTE
Stroke Isaac Survey      Responses   Facility patient discharged from?  [place]   Attempt successful  No   Unsuccessful attempts  Attempt 2          Delaney Rawls RN

## 2020-09-21 ENCOUNTER — CALL CENTER PROGRAMS (OUTPATIENT)
Dept: CALL CENTER | Facility: HOSPITAL | Age: 65
End: 2020-09-21

## 2020-09-21 NOTE — OUTREACH NOTE
"Stroke Isaac Survey      Responses   Facility patient discharged from?  [place]   Attempt successful  Yes   Call start time  1103   Person spoke with today (if not patient) and relationship  Patient   Did the patient die within 30 days of admission?  No   Did the patient die within 30 days of discharge?  No   Call end time  1110   Patient location 30 days post discharge if known  Home   Was the patient readmitted within 30 days of discharge?  Yes   Number of hospitalizations within 30 days of discharge  1   Date first readmitted within 30 days of discharge   06/23/20   First readmit within 30 days of discharge reason:  Unknown   Could you live alone without any help from another person?  Yes   Can you do everything that you were doing right before your stroke even if slower and not as much?  No   Are you completely back to the way you were right before your stroke?  No   Can you walk from one room to another without help from another person?  Yes   Can the patient sit up in bed without any help?  Yes   Call Center Isaac Score  2   [place] score call completed  Yes   Comments  Patient reports she has no further left sided weakness.  She states \"I have problems comprehending what I read and I need help with my check book.\"  She notes it is difficult for her to \"keep numbers straight\".  She is managing her hyertension and has a follow up with PCP in am.          Delaney Rawls RN  "

## 2020-09-22 ENCOUNTER — READMISSION MANAGEMENT (OUTPATIENT)
Dept: CALL CENTER | Facility: HOSPITAL | Age: 65
End: 2020-09-22

## 2020-09-22 NOTE — OUTREACH NOTE
Stroke Week 2 Survey      Responses   [facility] patient discharged from?  [place]   Does the patient have one of the following disease processes/diagnoses(primary or secondary)?  Stroke (TIA)   Week 2 attempt successful?  No   Unsuccessful attempts  Attempt 1          Estela Hinton RN

## 2020-09-24 ENCOUNTER — READMISSION MANAGEMENT (OUTPATIENT)
Dept: CALL CENTER | Facility: HOSPITAL | Age: 65
End: 2020-09-24

## 2020-09-24 NOTE — OUTREACH NOTE
Stroke Week 2 Survey      Responses   [facility] patient discharged from?  [place]   Does the patient have one of the following disease processes/diagnoses(primary or secondary)?  Stroke (TIA)   Week 2 attempt successful?  No   Unsuccessful attempts  Attempt 2          Karl Rocha RN

## 2020-09-30 ENCOUNTER — READMISSION MANAGEMENT (OUTPATIENT)
Dept: CALL CENTER | Facility: HOSPITAL | Age: 65
End: 2020-09-30

## 2020-09-30 NOTE — OUTREACH NOTE
Stroke Week 3 Survey      Responses   [facility] patient discharged from?  [place]   Does the patient have one of the following disease processes/diagnoses(primary or secondary)?  Stroke (TIA)   Week 3 attempt successful?  No   Unsuccessful attempts  Attempt 1          Estela Hinton RN

## 2020-10-06 ENCOUNTER — READMISSION MANAGEMENT (OUTPATIENT)
Dept: CALL CENTER | Facility: HOSPITAL | Age: 65
End: 2020-10-06

## 2020-10-06 NOTE — OUTREACH NOTE
Stroke Week 3 Survey      Responses   [facility] patient discharged from?  [place]   Does the patient have one of the following disease processes/diagnoses(primary or secondary)?  Stroke (TIA)   Week 3 attempt successful?  No   Unsuccessful attempts  Attempt 2          Casandra Barnhart RN

## 2020-12-09 ENCOUNTER — TELEPHONE (OUTPATIENT)
Dept: CARDIOLOGY | Facility: CLINIC | Age: 65
End: 2020-12-09

## 2020-12-09 NOTE — TELEPHONE ENCOUNTER
Pt is having radical abdominal hysterectomy, bilateral salpingotomy, and pelvic lymphadenotomy on 1/6/2021. They want to know if ok to hold Xarelto 20mg.

## 2020-12-09 NOTE — TELEPHONE ENCOUNTER
April,    Based on Ms. Lane's labs from 9/10/2020- her CrCl is 64.2 using 1DayLater CrCl calculator. Scr: 1.04 weight: 95.3kg. If there is time, I would advise the patient get updated labs for lovenox dosing. Since she's a high clot risk and the procedure is a high bleed risk, I would recommend holding Xarelto 2 days prior to the procedure and bridge with lovenox treatment dose of 1mg/kg q12h.     Day 3 pre-op: Xarelto normal dose  Day 2 pre-op: HOLD Xarelto; Lovenox 1mg/kg PM dose at 1800  Day 1 pre-op: HOLD Xarelto; Lovenox 1mg/kg AM dose at 0600; If the procedure the next day is later than 0600 I'd administer lovenox 1mg/kg at 1800. (Want to ensure last lovenox dose is over 12hours from procedure)  Day of procedure- resume anticoagulation based on surgeon recommendation for bleed risk for post op.     Will she be inpatient several days post op? Please let me know if I can help further!     Thank you,    [place]

## 2020-12-19 ENCOUNTER — APPOINTMENT (OUTPATIENT)
Dept: NUCLEAR MEDICINE | Facility: HOSPITAL | Age: 65
End: 2020-12-19

## 2020-12-19 ENCOUNTER — HOSPITAL ENCOUNTER (EMERGENCY)
Facility: HOSPITAL | Age: 65
Discharge: HOME | End: 2020-12-19
Attending: EMERGENCY MEDICINE | Admitting: EMERGENCY MEDICINE

## 2020-12-19 ENCOUNTER — APPOINTMENT (OUTPATIENT)
Dept: GENERAL RADIOLOGY | Facility: HOSPITAL | Age: 65
End: 2020-12-19

## 2020-12-19 VITALS
SYSTOLIC BLOOD PRESSURE: 133 MMHG | HEART RATE: 98 BPM | WEIGHT: 200 LBS | BODY MASS INDEX: 37.76 KG/M2 | RESPIRATION RATE: 20 BRPM | TEMPERATURE: 98.6 F | HEIGHT: 61 IN | DIASTOLIC BLOOD PRESSURE: 88 MMHG | OXYGEN SATURATION: 94 %

## 2020-12-19 DIAGNOSIS — U07.1 COVID-19 VIRUS DETECTED: Primary | ICD-10-CM

## 2020-12-19 DIAGNOSIS — N30.00 ACUTE CYSTITIS WITHOUT HEMATURIA: ICD-10-CM

## 2020-12-19 DIAGNOSIS — R09.02 HYPOXIA: ICD-10-CM

## 2020-12-19 LAB
A-A DO2: 43.5 MMHG (ref 0–300)
ALBUMIN SERPL-MCNC: 3.68 G/DL (ref 3.5–5.2)
ALBUMIN/GLOB SERPL: 0.9 G/DL
ALP SERPL-CCNC: 77 U/L (ref 39–117)
ALT SERPL W P-5'-P-CCNC: 14 U/L (ref 1–33)
ANION GAP SERPL CALCULATED.3IONS-SCNC: 9 MMOL/L (ref 5–15)
ANISOCYTOSIS BLD QL: NORMAL
ARTERIAL PATENCY WRIST A: ABNORMAL
AST SERPL-CCNC: 27 U/L (ref 1–32)
ATMOSPHERIC PRESS: 732 MMHG
BACTERIA UR QL AUTO: ABNORMAL /HPF
BASE EXCESS BLDA CALC-SCNC: -4 MMOL/L (ref 0–2)
BASOPHILS # BLD AUTO: 0.01 10*3/MM3 (ref 0–0.2)
BASOPHILS NFR BLD AUTO: 0.2 % (ref 0–1.5)
BDY SITE: ABNORMAL
BILIRUB SERPL-MCNC: 0.6 MG/DL (ref 0–1.2)
BILIRUB UR QL STRIP: NEGATIVE
BODY TEMPERATURE: 0 C
BUN SERPL-MCNC: 11 MG/DL (ref 8–23)
BUN/CREAT SERPL: 10.8 (ref 7–25)
CALCIUM SPEC-SCNC: 9.1 MG/DL (ref 8.6–10.5)
CHLORIDE SERPL-SCNC: 103 MMOL/L (ref 98–107)
CLARITY UR: CLEAR
CO2 BLDA-SCNC: 19.4 MMOL/L (ref 22–33)
CO2 SERPL-SCNC: 20 MMOL/L (ref 22–29)
COHGB MFR BLD: <0.2 % (ref 0–5)
COLOR UR: ABNORMAL
CREAT SERPL-MCNC: 1.02 MG/DL (ref 0.57–1)
CRP SERPL-MCNC: 4.89 MG/DL (ref 0–0.5)
D DIMER PPP FEU-MCNC: 0.9 MCGFEU/ML (ref 0–0.5)
D-LACTATE SERPL-SCNC: 1.4 MMOL/L (ref 0.5–2)
DEPRECATED RDW RBC AUTO: 54.5 FL (ref 37–54)
EOSINOPHIL # BLD AUTO: 0 10*3/MM3 (ref 0–0.4)
EOSINOPHIL NFR BLD AUTO: 0 % (ref 0.3–6.2)
ERYTHROCYTE [DISTWIDTH] IN BLOOD BY AUTOMATED COUNT: 16.6 % (ref 12.3–15.4)
FERRITIN SERPL-MCNC: 157.4 NG/ML (ref 13–150)
FLUAV RNA RESP QL NAA+PROBE: NOT DETECTED
FLUBV RNA RESP QL NAA+PROBE: NOT DETECTED
GFR SERPL CREATININE-BSD FRML MDRD: 54 ML/MIN/1.73
GLOBULIN UR ELPH-MCNC: 4 GM/DL
GLUCOSE SERPL-MCNC: 90 MG/DL (ref 65–99)
GLUCOSE UR STRIP-MCNC: NEGATIVE MG/DL
HCO3 BLDA-SCNC: 18.6 MMOL/L (ref 20–26)
HCT VFR BLD AUTO: 45 % (ref 34–46.6)
HCT VFR BLD CALC: 42.2 % (ref 38–51)
HGB BLD-MCNC: 13.6 G/DL (ref 12–15.9)
HGB BLDA-MCNC: 13.8 G/DL (ref 13.5–17.5)
HGB UR QL STRIP.AUTO: NEGATIVE
HYALINE CASTS UR QL AUTO: ABNORMAL /LPF
HYPOCHROMIA BLD QL: NORMAL
IMM GRANULOCYTES # BLD AUTO: 0.02 10*3/MM3 (ref 0–0.05)
IMM GRANULOCYTES NFR BLD AUTO: 0.4 % (ref 0–0.5)
INHALED O2 CONCENTRATION: 21 %
KETONES UR QL STRIP: ABNORMAL
LARGE PLATELETS: NORMAL
LDH SERPL-CCNC: 338 U/L (ref 135–214)
LEUKOCYTE ESTERASE UR QL STRIP.AUTO: ABNORMAL
LYMPHOCYTES # BLD AUTO: 1.05 10*3/MM3 (ref 0.7–3.1)
LYMPHOCYTES NFR BLD AUTO: 19.3 % (ref 19.6–45.3)
Lab: ABNORMAL
MCH RBC QN AUTO: 27.1 PG (ref 26.6–33)
MCHC RBC AUTO-ENTMCNC: 30.2 G/DL (ref 31.5–35.7)
MCV RBC AUTO: 89.6 FL (ref 79–97)
METHGB BLD QL: 1.5 % (ref 0–3)
MODALITY: ABNORMAL
MONOCYTES # BLD AUTO: 0.25 10*3/MM3 (ref 0.1–0.9)
MONOCYTES NFR BLD AUTO: 4.6 % (ref 5–12)
NEUTROPHILS NFR BLD AUTO: 4.12 10*3/MM3 (ref 1.7–7)
NEUTROPHILS NFR BLD AUTO: 75.5 % (ref 42.7–76)
NITRITE UR QL STRIP: NEGATIVE
NOTE: ABNORMAL
NRBC BLD AUTO-RTO: 0 /100 WBC (ref 0–0.2)
NT-PROBNP SERPL-MCNC: 1318 PG/ML (ref 0–900)
OXYHGB MFR BLDV: 92.6 % (ref 94–99)
PCO2 BLDA: 26.8 MM HG (ref 35–45)
PCO2 TEMP ADJ BLD: ABNORMAL MM[HG]
PH BLDA: 7.45 PH UNITS (ref 7.35–7.45)
PH UR STRIP.AUTO: <=5 [PH] (ref 5–8)
PH, TEMP CORRECTED: ABNORMAL
PLATELET # BLD AUTO: 157 10*3/MM3 (ref 140–450)
PMV BLD AUTO: 11.4 FL (ref 6–12)
PO2 BLDA: 70.1 MM HG (ref 83–108)
PO2 TEMP ADJ BLD: ABNORMAL MM[HG]
POTASSIUM SERPL-SCNC: 4.7 MMOL/L (ref 3.5–5.2)
PROCALCITONIN SERPL-MCNC: 0.07 NG/ML (ref 0–0.25)
PROT SERPL-MCNC: 7.7 G/DL (ref 6–8.5)
PROT UR QL STRIP: ABNORMAL
QT INTERVAL: 336 MS
QTC INTERVAL: 441 MS
RBC # BLD AUTO: 5.02 10*6/MM3 (ref 3.77–5.28)
RBC # UR: ABNORMAL /HPF
REF LAB TEST METHOD: ABNORMAL
SAO2 % BLDCOA: 93.6 % (ref 94–99)
SARS-COV-2 RNA RESP QL NAA+PROBE: DETECTED
SODIUM SERPL-SCNC: 132 MMOL/L (ref 136–145)
SP GR UR STRIP: 1.02 (ref 1–1.03)
SQUAMOUS #/AREA URNS HPF: ABNORMAL /HPF
TROPONIN T SERPL-MCNC: <0.01 NG/ML (ref 0–0.03)
UROBILINOGEN UR QL STRIP: ABNORMAL
VENTILATOR MODE: ABNORMAL
WBC # BLD AUTO: 5.45 10*3/MM3 (ref 3.4–10.8)
WBC UR QL AUTO: ABNORMAL /HPF

## 2020-12-19 PROCEDURE — 99285 EMERGENCY DEPT VISIT HI MDM: CPT

## 2020-12-19 PROCEDURE — 83050 HGB METHEMOGLOBIN QUAN: CPT

## 2020-12-19 PROCEDURE — 71045 X-RAY EXAM CHEST 1 VIEW: CPT | Performed by: RADIOLOGY

## 2020-12-19 PROCEDURE — 82728 ASSAY OF FERRITIN: CPT | Performed by: PHYSICIAN ASSISTANT

## 2020-12-19 PROCEDURE — 96365 THER/PROPH/DIAG IV INF INIT: CPT

## 2020-12-19 PROCEDURE — 84145 PROCALCITONIN (PCT): CPT | Performed by: PHYSICIAN ASSISTANT

## 2020-12-19 PROCEDURE — 93005 ELECTROCARDIOGRAM TRACING: CPT | Performed by: PHYSICIAN ASSISTANT

## 2020-12-19 PROCEDURE — 36600 WITHDRAWAL OF ARTERIAL BLOOD: CPT

## 2020-12-19 PROCEDURE — 85007 BL SMEAR W/DIFF WBC COUNT: CPT | Performed by: PHYSICIAN ASSISTANT

## 2020-12-19 PROCEDURE — 25010000002 ONDANSETRON PER 1 MG: Performed by: PHYSICIAN ASSISTANT

## 2020-12-19 PROCEDURE — 93010 ELECTROCARDIOGRAM REPORT: CPT | Performed by: SPECIALIST

## 2020-12-19 PROCEDURE — 83605 ASSAY OF LACTIC ACID: CPT | Performed by: PHYSICIAN ASSISTANT

## 2020-12-19 PROCEDURE — 0 TECHNETIUM ALBUMIN AGGREGATED: Performed by: EMERGENCY MEDICINE

## 2020-12-19 PROCEDURE — 25010000002 CEFTRIAXONE PER 250 MG: Performed by: PHYSICIAN ASSISTANT

## 2020-12-19 PROCEDURE — 71045 X-RAY EXAM CHEST 1 VIEW: CPT

## 2020-12-19 PROCEDURE — 82805 BLOOD GASES W/O2 SATURATION: CPT

## 2020-12-19 PROCEDURE — 83880 ASSAY OF NATRIURETIC PEPTIDE: CPT | Performed by: PHYSICIAN ASSISTANT

## 2020-12-19 PROCEDURE — 96375 TX/PRO/DX INJ NEW DRUG ADDON: CPT

## 2020-12-19 PROCEDURE — 85025 COMPLETE CBC W/AUTO DIFF WBC: CPT | Performed by: PHYSICIAN ASSISTANT

## 2020-12-19 PROCEDURE — 81001 URINALYSIS AUTO W/SCOPE: CPT | Performed by: PHYSICIAN ASSISTANT

## 2020-12-19 PROCEDURE — 87040 BLOOD CULTURE FOR BACTERIA: CPT | Performed by: PHYSICIAN ASSISTANT

## 2020-12-19 PROCEDURE — 86140 C-REACTIVE PROTEIN: CPT | Performed by: PHYSICIAN ASSISTANT

## 2020-12-19 PROCEDURE — 85379 FIBRIN DEGRADATION QUANT: CPT | Performed by: PHYSICIAN ASSISTANT

## 2020-12-19 PROCEDURE — 83615 LACTATE (LD) (LDH) ENZYME: CPT | Performed by: PHYSICIAN ASSISTANT

## 2020-12-19 PROCEDURE — A9540 TC99M MAA: HCPCS | Performed by: EMERGENCY MEDICINE

## 2020-12-19 PROCEDURE — 78580 LUNG PERFUSION IMAGING: CPT

## 2020-12-19 PROCEDURE — 82375 ASSAY CARBOXYHB QUANT: CPT

## 2020-12-19 PROCEDURE — 87636 SARSCOV2 & INF A&B AMP PRB: CPT | Performed by: PHYSICIAN ASSISTANT

## 2020-12-19 PROCEDURE — 84484 ASSAY OF TROPONIN QUANT: CPT | Performed by: PHYSICIAN ASSISTANT

## 2020-12-19 PROCEDURE — 25010000002 METHYLPREDNISOLONE PER 40 MG: Performed by: PHYSICIAN ASSISTANT

## 2020-12-19 PROCEDURE — 80053 COMPREHEN METABOLIC PANEL: CPT | Performed by: PHYSICIAN ASSISTANT

## 2020-12-19 RX ORDER — ONDANSETRON 2 MG/ML
4 INJECTION INTRAMUSCULAR; INTRAVENOUS ONCE
Status: COMPLETED | OUTPATIENT
Start: 2020-12-19 | End: 2020-12-19

## 2020-12-19 RX ORDER — SODIUM CHLORIDE 0.9 % (FLUSH) 0.9 %
10 SYRINGE (ML) INJECTION AS NEEDED
Status: DISCONTINUED | OUTPATIENT
Start: 2020-12-19 | End: 2020-12-19 | Stop reason: HOSPADM

## 2020-12-19 RX ORDER — ACETAMINOPHEN 500 MG
1000 TABLET ORAL ONCE
Status: COMPLETED | OUTPATIENT
Start: 2020-12-19 | End: 2020-12-19

## 2020-12-19 RX ORDER — METHYLPREDNISOLONE SODIUM SUCCINATE 40 MG/ML
80 INJECTION, POWDER, LYOPHILIZED, FOR SOLUTION INTRAMUSCULAR; INTRAVENOUS ONCE
Status: COMPLETED | OUTPATIENT
Start: 2020-12-19 | End: 2020-12-19

## 2020-12-19 RX ORDER — DEXAMETHASONE 1 MG
6 TABLET ORAL
Qty: 6 TABLET | Refills: 0 | Status: SHIPPED | OUTPATIENT
Start: 2020-12-19 | End: 2020-12-25

## 2020-12-19 RX ORDER — CLONIDINE HYDROCHLORIDE 0.1 MG/1
0.2 TABLET ORAL ONCE
Status: COMPLETED | OUTPATIENT
Start: 2020-12-19 | End: 2020-12-19

## 2020-12-19 RX ADMIN — ACETAMINOPHEN 1000 MG: 500 TABLET ORAL at 13:54

## 2020-12-19 RX ADMIN — ONDANSETRON 4 MG: 2 INJECTION INTRAMUSCULAR; INTRAVENOUS at 12:40

## 2020-12-19 RX ADMIN — METHYLPREDNISOLONE SODIUM SUCCINATE 80 MG: 40 INJECTION, POWDER, FOR SOLUTION INTRAMUSCULAR; INTRAVENOUS at 12:40

## 2020-12-19 RX ADMIN — CEFTRIAXONE 1 G: 1 INJECTION, POWDER, FOR SOLUTION INTRAMUSCULAR; INTRAVENOUS at 15:46

## 2020-12-19 RX ADMIN — SODIUM CHLORIDE 1000 ML: 9 INJECTION, SOLUTION INTRAVENOUS at 12:40

## 2020-12-19 RX ADMIN — CLONIDINE HYDROCHLORIDE 0.2 MG: 0.1 TABLET ORAL at 13:54

## 2020-12-19 RX ADMIN — KIT FOR THE PREPARATION OF TECHNETIUM TC 99M ALBUMIN AGGREGATED 1 DOSE: 2.5 INJECTION, POWDER, FOR SOLUTION INTRAVENOUS at 16:30

## 2020-12-19 NOTE — ED PROVIDER NOTES
Subjective   This is a 65 year old female patient who presents to the ER with chief complaint of SOB. PMH is significant for history of CVA back in May with no current weakness, HTN, history of PE on xarelto, A fibb, and GERD.  Patient was diagnosed with COVID-19 yesterday and was started on azithromycin and zofran without relief of symptoms thus far. She says her symptoms began 1 week ago and include productive cough, chest pain, SOB, loss of taste/smell, fever, diarrhea, nausea and vomiting.           Review of Systems   Constitutional: Positive for appetite change, chills and fever. Negative for activity change, diaphoresis, fatigue and unexpected weight change.   HENT: Positive for congestion and sore throat. Negative for dental problem, drooling, ear discharge, ear pain, facial swelling, hearing loss, mouth sores, nosebleeds, postnasal drip, rhinorrhea, sinus pressure, sinus pain, sneezing, tinnitus, trouble swallowing and voice change.    Respiratory: Positive for cough and shortness of breath. Negative for apnea, choking, chest tightness, wheezing and stridor.    Cardiovascular: Positive for chest pain. Negative for palpitations and leg swelling.   Gastrointestinal: Positive for diarrhea, nausea and vomiting. Negative for abdominal distention, abdominal pain, anal bleeding, blood in stool, constipation and rectal pain.   Endocrine: Negative.    Genitourinary: Negative.  Negative for dysuria.   Skin: Negative.    Neurological: Negative.    Psychiatric/Behavioral: Negative.    All other systems reviewed and are negative.      Past Medical History:   Diagnosis Date   • Atrial fibrillation (CMS/[facility])    • CHF (congestive heart failure) (CMS/[facility])    • Deep vein thrombosis (CMS/[facility])    • GERD (gastroesophageal reflux disease)    • Hypertension    • May-Thurner syndrome    • Pulmonary embolism (CMS/HCC)    • Stroke (CMS/HCC) 06/27/2020       Allergies   Allergen Reactions   • Digoxin Other (See Comments)     Severe  c/p, left arm pain, lips numb, finger tip numbness   • Monistat [Miconazole] Dermatitis     Causes blisters   • Iodine Rash     Breathing problems       Past Surgical History:   Procedure Laterality Date   • CHOLECYSTECTOMY     • COLON SURGERY      bowel leakage, some of colon removed   • INTERVENTIONAL RADIOLOGY PROCEDURE Bilateral 5/31/2020    Procedure: CAROTID CEREBRAL ANGIOGRAM BILATERAL;  Surgeon: Brant Duarte MD;  Location: [facility] INVASIVE LOCATION;  Service: Interventional Radiology;  Laterality: Bilateral;   • LAPAROSCOPIC TUBAL LIGATION     • LEG SURGERY      multiple stents to BLE   • TONSILLECTOMY         Family History   Problem Relation Age of Onset   • Hypertension Mother    • Cervical cancer Mother    • Cancer Mother        Social History     Socioeconomic History   • Marital status: Single     Spouse name: Not on file   • Number of children: Not on file   • Years of education: Not on file   • Highest education level: Not on file   Tobacco Use   • Smoking status: Never Smoker   • Smokeless tobacco: Never Used   Substance and Sexual Activity   • Alcohol use: No   • Drug use: No   • Sexual activity: Defer           Objective   Physical Exam  Vitals signs and nursing note reviewed.   Constitutional:       General: She is not in acute distress.     Appearance: She is well-developed. She is not diaphoretic.   HENT:      Head: Normocephalic and atraumatic.      Right Ear: External ear normal.      Left Ear: External ear normal.      Nose: Nose normal.   Eyes:      Conjunctiva/sclera: Conjunctivae normal.      Pupils: Pupils are equal, round, and reactive to light.   Neck:      Musculoskeletal: Normal range of motion and neck supple.      Vascular: No JVD.      Trachea: No tracheal deviation.   Cardiovascular:      Rate and Rhythm: Normal rate and regular rhythm.      Heart sounds: Normal heart sounds. No murmur.   Pulmonary:      Effort: Pulmonary effort is normal. No respiratory distress.       Breath sounds: Normal breath sounds. No stridor. No wheezing, rhonchi or rales.   Chest:      Chest wall: No tenderness.   Abdominal:      General: Bowel sounds are normal.      Palpations: Abdomen is soft.      Tenderness: There is no abdominal tenderness.   Musculoskeletal: Normal range of motion.         General: No deformity.   Skin:     General: Skin is warm and dry.      Coloration: Skin is not pale.      Findings: No erythema or rash.   Neurological:      Mental Status: She is alert and oriented to person, place, and time.      Cranial Nerves: No cranial nerve deficit.   Psychiatric:         Behavior: Behavior normal.         Thought Content: Thought content normal.         Procedures           ED Course  ED Course as of Dec 19 1901   Sat Dec 19, 2020   1437 Dr. Toledo assessed the patient at bedside and and agrees with current treatment plan. Because patient is allergic to contrast, we can't do a PE protocol and since her d dimer is elevated, she needs VQ scan. I called Jaem, [facility], who says that he can do it after his current outpatient test in about 2 hours since she is COVID-19 positive, he can't do it simultaneously. We will plan for that.     [MM]   1440 IMPRESSION:    Unremarkable exam. No acute cardiopulmonary findings identified.     This report was finalized on 12/19/2020 2:02 PM by Dr. Seth Erazo MD.      XR Chest 1 View [MM]   1517 Atrial fibrillation with RVR.  Rate 104.  Normal axis.  Normal intervals.  T wave inversions in lead II, III and aVF.  No acute ST elevation or depression.  Interpreted by me.   ECG 12 Lead [BC]   1539 Pt seen with PA.  Reviewed findings.  Agree with assessment and plan.    [BC]   1716 IMPRESSION:  Negative perfusion imaging of the lung. No focal or multifocal perfusion defect to suggest pulmonary embolism.     Signer Name: Naga Denny MD   Signed: 12/19/2020 5:12 PM   Workstation Name: [facility]-    [facility]  Scan Perfusion Particulate [MM]   1817 Spoke with Dr. Jiménez who doesn't believe that she meets inpatient criteria because she already has oxygen at home that she can use during the day. He wants her to continue her azithromycin and zofran and give her an rx for PO decadron. Obviously, will encourage her to return to ER if symptoms worsen. We will also get her a pulse ox to monitor her O2 at home.     [MM]      ED Course User Index  [BC] Reese Toledo MD  [MM] Dori Nguyen PA                                           MDM  Number of Diagnoses or Management Options  Acute cystitis without hematuria:   COVID-19 virus detected:   Hypoxia:      Amount and/or Complexity of Data Reviewed  Clinical lab tests: ordered and reviewed  Tests in the radiology section of CPT®: ordered and reviewed  Decide to obtain previous medical records or to obtain history from someone other than the patient: yes  Discuss the patient with other providers: yes        Final diagnoses:   COVID-19 virus detected   Hypoxia   Acute cystitis without hematuria            Dori Nguyen PA  12/19/20 3086

## 2020-12-19 NOTE — ED NOTES
Attempted to stick pt x2 was unsuccessful RN tyree made aware.       Marleny Barr  12/19/20 1628

## 2020-12-20 NOTE — ED NOTES
Patient arrived earlier today via EMS, Patient is ready for discharge at this time, pt doesn't meet admission criteria, pt states they have no family that will pick them up due to being COVID positive, pt doesn't meet criteria for EMS to take them home. Lead RN Fiordaliza notified she said she would think of a solution and get back to me.     Diego Hopkins RN  12/19/20 1920

## 2020-12-20 NOTE — DISCHARGE INSTRUCTIONS
Please complete your antibiotics, utilize your zofran for nausea and complete your steroid therapy. Please monitor oxygen level closely and return to ER immediately if symptoms worsen.

## 2020-12-21 ENCOUNTER — READMISSION MANAGEMENT (OUTPATIENT)
Dept: CALL CENTER | Facility: HOSPITAL | Age: 65
End: 2020-12-21

## 2020-12-21 NOTE — OUTREACH NOTE
COVID-19 Week 1 Survey      Responses   [facility] patient discharged from?  [place]   Does the patient have one of the following disease processes/diagnoses(primary or secondary)?  COVID-19   COVID-19 underlying condition?  None   Call Number  Call 1   Week 1 Call successful?  Yes   Call start time  0943   Call end time  0957   Meds reviewed with patient/caregiver?  Yes   Is the patient having any side effects they believe may be caused by any medication additions or changes?  No   Does the patient have all medications ordered at discharge?  Yes   Is the patient taking all medications as directed (includes completed medication regime)?  Yes   Does the patient have a primary care provider?   Yes   Does the patient have an appointment with their PCP or specialist within 7 days of discharge?  No   What is preventing the patient from scheduling follow up appointments within 7 days of discharge?  Haven't had time   Nursing Interventions  Advised patient to make appointment, Educated patient on importance of making appointment   Has the patient kept scheduled appointments due by today?  N/A   Comments  Advised to call PCP office this morning for evaluation of s/s.   Has home health visited the patient within 72 hours of discharge?  N/A   DME comments  States using home oxygen continuous at 2L.   Psychosocial issues?  No   Psychosocial comments  States family are helping her-lives alone.   Did the patient receive a copy of their discharge instructions?  Yes   Did the patient receive a copy of COVID-19 specific instructions?  Yes   Nursing interventions  Reviewed instructions with patient   What is the patient's perception of their health status since discharge?  Same   Does the patient have any of the following symptoms?  Fever/chills, Cough, Loss of taste/smell   Nursing Interventions  Nurse provided patient education   Pulse Ox monitoring  Intermittent   Pulse Ox device source  Hospital   O2 Sat comments  95% on 2L  "home O2.   O2 Sat: education provided  Sat levels, Monitoring frequency, When to seek care   O2 Sat education comments  States was advised to return to ER if O2 sats remain below 90%.   Is the patient/caregiver able to teach back steps to recovery at home?  Set small, achievable goals for return to baseline health, Eat a well-balance diet, Rest and rebuild strength, gradually increase activity   If the patient is a current smoker, are they able to teach back resources for cessation?  Not a smoker   Is the patient/caregiver able to teach back the hierarchy of who to call/visit for symptoms/problems? PCP, Specialist, Home health nurse, Urgent Care, ED, 911  Yes   COVID-19 call completed?  Yes   Wrap up additional comments  States was better yesterday, but very nauseated today. Denies any vomiting, diarrhea, SOA or chest pain. States temp 98.6. Reports weakness, fatigue, urine \"dark like coffee\". Advised to call PCP immediately for evaluation. Encouraged to take frequent sips of water/gatorade-states has urinated recently. States will call PCP office and speak with nurse.          Susanne Chowdary RN  "

## 2020-12-21 NOTE — OUTREACH NOTE
Prep Survey      Responses   [facility] from?  [place]   Is LACE score < 7 ?  Yes   Emergency Room discharge w/ pulse ox?  Yes   Eligibility  Readm Mgmt   Discharge diagnosis  nausea/vomiting,  Covid 19   Does the patient have one of the following disease processes/diagnoses(primary or secondary)?  COVID-19   Does the patient have Home health ordered?  No   Is there a DME ordered?  Yes   What DME was ordered?  Sent home w/ pulse ox   General alerts for this patient  ED Discharge - call x 3 only   Prep survey completed?  Yes          Maria L Gaspar RN

## 2020-12-22 ENCOUNTER — READMISSION MANAGEMENT (OUTPATIENT)
Dept: CALL CENTER | Facility: HOSPITAL | Age: 65
End: 2020-12-22

## 2020-12-22 NOTE — OUTREACH NOTE
"COVID-19 Week 1 Survey      Responses   [facility] patient discharged from?  [place]   Does the patient have one of the following disease processes/diagnoses(primary or secondary)?  COVID-19   COVID-19 underlying condition?  None   Call Number  Call 2   Week 1 Call successful?  Yes   Call start time  0933   Call end time  0946   General alerts for this patient  ED Discharge - call x 3 only   Discharge diagnosis  nausea/vomiting,  Covid 19   Medication comments  taking blood thinners--has been on for awhile   Comments  Advised to call PCP if s/s worsen   DME comments  States using home oxygen continuous at 2L.   Psychosocial issues?  No   What is the patient's perception of their health status since discharge?  Same [Having some diarrhea which is chronic and had some hemoptysis--not large amount]   Does the patient have any of the following symptoms?  Cough   Nursing Interventions  Nurse provided patient education   Pulse Ox monitoring  Intermittent   Pulse Ox device source  Hospital   O2 Sat comments  100% on 2L home O2.   O2 Sat: education provided  Sat levels, When to seek care   O2 Sat education comments  States was advised to return to ER if O2 sats remain below 90%.   Is the patient/caregiver able to teach back steps to recovery at home?  Rest and rebuild strength, gradually increase activity, Eat a well-balance diet   COVID-19 call completed?  Yes   Wrap up additional comments  Pt to have \"female\" surgery for Ca but will f/u with her provider. Advised to watch for melena and enc. to push po fluids and add protein to her diet.           Yodit Blake RN  "

## 2020-12-23 ENCOUNTER — READMISSION MANAGEMENT (OUTPATIENT)
Dept: CALL CENTER | Facility: HOSPITAL | Age: 65
End: 2020-12-23

## 2020-12-23 NOTE — OUTREACH NOTE
COVID-19 Week 3 Survey      Responses   [facility] patient discharged from?  [place]   Does the patient have one of the following disease processes/diagnoses(primary or secondary)?  COVID-19   COVID-19 underlying condition?  None   Call start time  1529   Call end time  1534   General alerts for this patient  ED Discharge - call x 3 only   Discharge diagnosis  nausea/vomiting,  Covid 19   Is the patient taking all medications as directed (includes completed medication regime)?  Yes   Does the patient have a primary care provider?   Yes   Does the patient have an appointment with their PCP or specialist within 7 days of discharge?  Yes   Has the patient kept scheduled appointments due by today?  Yes   Comments  Spoke with the APRN over the phone   Has home health visited the patient within 72 hours of discharge?  N/A   What DME was ordered?  Sent home w/ pulse ox   DME comments  States using home oxygen continuous at 2L.   Psychosocial issues?  No   Did the patient receive a copy of their discharge instructions?  Yes   Did the patient receive a copy of COVID-19 specific instructions?  Yes   What is the patient's perception of their health status since discharge?  Improving   Does the patient have any of the following symptoms?  Cough, Loss of taste/smell, Shortness of breath [shortness of breath with exertion]   Pulse Ox monitoring  Intermittent   Pulse Ox device source  Hospital   O2 Sat comments  95%   O2 Sat: education provided  Sat levels, When to seek care   O2 Sat education comments  States was advised to return to ER if O2 sats remain below 90%.   Is the patient/caregiver able to teach back steps to recovery at home?  Set small, achievable goals for return to baseline health, Eat a well-balance diet, Make a list of questions for provider's appointment, Rest and rebuild strength, gradually increase activity   If the patient is a current smoker, are they able to teach back resources for cessation?  Not a  smoker   Is the patient/caregiver able to teach back the hierarchy of who to call/visit for symptoms/problems? PCP, Specialist, Home health nurse, Urgent Care, ED, 911  Yes   Wrap up additional comments  States she is doing much better.  States she is to have an operation for her cancer on Jan 6 and will be retested e day before.  Asked if she could get Covid19 again.  Educated that likely rojas in the first 90 days is low but still unknown and to still take all precautions.  Voice her understanding.          Leana White LPN

## 2020-12-24 LAB
BACTERIA SPEC AEROBE CULT: NORMAL
BACTERIA SPEC AEROBE CULT: NORMAL

## (undated) DEVICE — NEURO GUIDEWIRE WITH HYDROPHILIC COATING: Brand: SYNCHRO 14

## (undated) DEVICE — TREVO XP PROVUE RETRIEVER: Brand: TREVO

## (undated) DEVICE — RADIFOCUS GLIDEWIRE: Brand: GLIDEWIRE

## (undated) DEVICE — 2 TIP PRE-SHAPED S MICROCATHETER: Brand: EXCELSIOR SL-10

## (undated) DEVICE — LEX NEURO ANGIOGRAPHY: Brand: MEDLINE INDUSTRIES, INC.

## (undated) DEVICE — Device

## (undated) DEVICE — ANGIO-SEAL VIP VASCULAR CLOSURE DEVICE: Brand: ANGIO-SEAL

## (undated) DEVICE — SKIN PREP TRAY W/CHG: Brand: MEDLINE INDUSTRIES, INC.

## (undated) DEVICE — INTRO SHEATH ENGAGE W/50 GW .038 8F12

## (undated) DEVICE — CATH GUIDE ENVOY MPD 6F.070IN 90CM

## (undated) DEVICE — CATH TEMPO 5F BER 100CM: Brand: TEMPO

## (undated) DEVICE — ST ACC MICROPUNCTURE .018 TRANSLSS/SS/TP 5F/10CM 21G/7CM

## (undated) DEVICE — ROTATING HEMOSTATIC VALVE .096": Brand: RHV

## (undated) DEVICE — LIMB HOLDER, WRIST/ANKLE: Brand: DEROYAL

## (undated) DEVICE — STPCK 3/WY HP M/RA W/OFF/HNDL 1050PSI STRL

## (undated) DEVICE — STPCK LP 1WY RA 200PSI